# Patient Record
Sex: FEMALE | Race: WHITE | Employment: OTHER | URBAN - METROPOLITAN AREA
[De-identification: names, ages, dates, MRNs, and addresses within clinical notes are randomized per-mention and may not be internally consistent; named-entity substitution may affect disease eponyms.]

---

## 2017-03-04 ENCOUNTER — APPOINTMENT (OUTPATIENT)
Dept: GENERAL RADIOLOGY | Age: 62
DRG: 207 | End: 2017-03-04
Attending: INTERNAL MEDICINE
Payer: OTHER GOVERNMENT

## 2017-03-04 ENCOUNTER — APPOINTMENT (OUTPATIENT)
Dept: CT IMAGING | Age: 62
DRG: 207 | End: 2017-03-04
Attending: INTERNAL MEDICINE
Payer: OTHER GOVERNMENT

## 2017-03-04 ENCOUNTER — APPOINTMENT (OUTPATIENT)
Dept: ULTRASOUND IMAGING | Age: 62
DRG: 207 | End: 2017-03-04
Attending: INTERNAL MEDICINE
Payer: OTHER GOVERNMENT

## 2017-03-04 ENCOUNTER — HOSPITAL ENCOUNTER (INPATIENT)
Age: 62
LOS: 12 days | Discharge: HOME HEALTH | DRG: 207 | End: 2017-03-16
Attending: INTERNAL MEDICINE | Admitting: INTERNAL MEDICINE
Payer: OTHER GOVERNMENT

## 2017-03-04 DIAGNOSIS — R77.8 ELEVATED TROPONIN: ICD-10-CM

## 2017-03-04 DIAGNOSIS — R91.8 PULMONARY NODULES: ICD-10-CM

## 2017-03-04 DIAGNOSIS — J34.89 MASS OF SINUS: ICD-10-CM

## 2017-03-04 DIAGNOSIS — N28.89 RENAL MASS: ICD-10-CM

## 2017-03-04 DIAGNOSIS — R06.89 RESPIRATORY INSUFFICIENCY: Primary | ICD-10-CM

## 2017-03-04 DIAGNOSIS — J44.1 CHRONIC OBSTRUCTIVE PULMONARY DISEASE WITH ACUTE EXACERBATION (HCC): ICD-10-CM

## 2017-03-04 DIAGNOSIS — R07.9 CHEST PAIN: ICD-10-CM

## 2017-03-04 DIAGNOSIS — J01.80 OTHER ACUTE SINUSITIS, RECURRENCE NOT SPECIFIED: ICD-10-CM

## 2017-03-04 PROBLEM — R94.31 ABNORMAL EKG: Status: ACTIVE | Noted: 2017-03-04

## 2017-03-04 PROBLEM — R94.31 ABNORMAL EKG: Status: RESOLVED | Noted: 2017-03-04 | Resolved: 2017-03-04

## 2017-03-04 PROBLEM — E87.6 HYPOKALEMIA: Status: ACTIVE | Noted: 2017-03-04

## 2017-03-04 PROBLEM — R74.01 TRANSAMINITIS: Status: ACTIVE | Noted: 2017-03-04

## 2017-03-04 PROBLEM — J32.9 SINUSITIS: Status: ACTIVE | Noted: 2017-03-04

## 2017-03-04 PROBLEM — J43.9 COPD (CHRONIC OBSTRUCTIVE PULMONARY DISEASE) WITH EMPHYSEMA (HCC): Status: ACTIVE | Noted: 2017-03-04

## 2017-03-04 LAB
ALBUMIN SERPL BCP-MCNC: 3.3 G/DL (ref 3.4–5)
ALBUMIN/GLOB SERPL: 0.8 {RATIO} (ref 0.8–1.7)
ALP SERPL-CCNC: 129 U/L (ref 45–117)
ALT SERPL-CCNC: 62 U/L (ref 13–56)
ANION GAP BLD CALC-SCNC: 8 MMOL/L (ref 3–18)
APAP SERPL-MCNC: 7 UG/ML (ref 10–30)
APTT PPP: 28.2 SEC (ref 23–36.4)
ARTERIAL PATENCY WRIST A: YES
AST SERPL W P-5'-P-CCNC: 65 U/L (ref 15–37)
BASE DEFICIT BLD-SCNC: 1 MMOL/L
BASOPHILS # BLD AUTO: 0 K/UL (ref 0–0.06)
BASOPHILS # BLD: 0 % (ref 0–2)
BDY SITE: ABNORMAL
BILIRUB SERPL-MCNC: 0.3 MG/DL (ref 0.2–1)
BNP SERPL-MCNC: 325 PG/ML (ref 0–900)
BUN SERPL-MCNC: 16 MG/DL (ref 7–18)
BUN/CREAT SERPL: 19 (ref 12–20)
CALCIUM SERPL-MCNC: 9.1 MG/DL (ref 8.5–10.1)
CHLORIDE SERPL-SCNC: 103 MMOL/L (ref 100–108)
CK MB CFR SERPL CALC: 0.8 % (ref 0–4)
CK MB CFR SERPL CALC: 2.6 % (ref 0–4)
CK MB SERPL-MCNC: 13.6 NG/ML (ref 5–25)
CK MB SERPL-MCNC: 5.1 NG/ML (ref 5–25)
CK SERPL-CCNC: 522 U/L (ref 26–192)
CK SERPL-CCNC: 667 U/L (ref 26–192)
CO2 SERPL-SCNC: 34 MMOL/L (ref 21–32)
CREAT SERPL-MCNC: 0.85 MG/DL (ref 0.6–1.3)
D DIMER PPP FEU-MCNC: 1.18 UG/ML(FEU)
DIFFERENTIAL METHOD BLD: NORMAL
EOSINOPHIL # BLD: 0.1 K/UL (ref 0–0.4)
EOSINOPHIL NFR BLD: 1 % (ref 0–5)
ERYTHROCYTE [DISTWIDTH] IN BLOOD BY AUTOMATED COUNT: 13.6 % (ref 11.6–14.5)
EST. AVERAGE GLUCOSE BLD GHB EST-MCNC: 131 MG/DL
GAS FLOW.O2 O2 DELIVERY SYS: ABNORMAL L/MIN
GAS FLOW.O2 SETTING OXYMISER: 2 L/M
GLOBULIN SER CALC-MCNC: 4.3 G/DL (ref 2–4)
GLUCOSE BLD STRIP.AUTO-MCNC: 171 MG/DL (ref 70–110)
GLUCOSE BLD STRIP.AUTO-MCNC: 174 MG/DL (ref 70–110)
GLUCOSE SERPL-MCNC: 70 MG/DL (ref 74–99)
HBA1C MFR BLD: 6.2 % (ref 4.5–5.6)
HCO3 BLD-SCNC: 24.4 MMOL/L (ref 22–26)
HCT VFR BLD AUTO: 44.2 % (ref 35–45)
HGB BLD-MCNC: 14.8 G/DL (ref 12–16)
INR PPP: 0.8 (ref 0.8–1.2)
LACTATE SERPL-SCNC: 1.5 MMOL/L (ref 0.4–2)
LACTATE SERPL-SCNC: 1.7 MMOL/L (ref 0.4–2)
LYMPHOCYTES # BLD AUTO: 25 % (ref 21–52)
LYMPHOCYTES # BLD: 1.8 K/UL (ref 0.9–3.6)
MAGNESIUM SERPL-MCNC: 1.9 MG/DL (ref 1.8–2.4)
MCH RBC QN AUTO: 32.3 PG (ref 24–34)
MCHC RBC AUTO-ENTMCNC: 33.5 G/DL (ref 31–37)
MCV RBC AUTO: 96.5 FL (ref 74–97)
MONOCYTES # BLD: 0.7 K/UL (ref 0.05–1.2)
MONOCYTES NFR BLD AUTO: 10 % (ref 3–10)
NEUTS SEG # BLD: 4.7 K/UL (ref 1.8–8)
NEUTS SEG NFR BLD AUTO: 64 % (ref 40–73)
O2/TOTAL GAS SETTING VFR VENT: 28 %
PCO2 BLD: 41 MMHG (ref 35–45)
PH BLD: 7.38 [PH] (ref 7.35–7.45)
PHOSPHATE SERPL-MCNC: 3.1 MG/DL (ref 2.5–4.9)
PLATELET # BLD AUTO: 220 K/UL (ref 135–420)
PMV BLD AUTO: 10.9 FL (ref 9.2–11.8)
PO2 BLD: 67 MMHG (ref 80–100)
POTASSIUM SERPL-SCNC: 2.7 MMOL/L (ref 3.5–5.5)
PROT SERPL-MCNC: 7.6 G/DL (ref 6.4–8.2)
PROTHROMBIN TIME: 11.3 SEC (ref 11.5–15.2)
RBC # BLD AUTO: 4.58 M/UL (ref 4.2–5.3)
SAO2 % BLD: 93 % (ref 92–97)
SERVICE CMNT-IMP: ABNORMAL
SODIUM SERPL-SCNC: 145 MMOL/L (ref 136–145)
SPECIMEN TYPE: ABNORMAL
TOTAL RESP. RATE, ITRR: 27
TROPONIN I SERPL-MCNC: 0.37 NG/ML (ref 0–0.06)
TROPONIN I SERPL-MCNC: 2.7 NG/ML (ref 0–0.06)
WBC # BLD AUTO: 7.3 K/UL (ref 4.6–13.2)

## 2017-03-04 PROCEDURE — 70450 CT HEAD/BRAIN W/O DYE: CPT

## 2017-03-04 PROCEDURE — 74011250636 HC RX REV CODE- 250/636: Performed by: INTERNAL MEDICINE

## 2017-03-04 PROCEDURE — 77010033678 HC OXYGEN DAILY

## 2017-03-04 PROCEDURE — 74011000250 HC RX REV CODE- 250: Performed by: INTERNAL MEDICINE

## 2017-03-04 PROCEDURE — 83605 ASSAY OF LACTIC ACID: CPT | Performed by: INTERNAL MEDICINE

## 2017-03-04 PROCEDURE — 96367 TX/PROPH/DG ADDL SEQ IV INF: CPT

## 2017-03-04 PROCEDURE — 82962 GLUCOSE BLOOD TEST: CPT

## 2017-03-04 PROCEDURE — 93005 ELECTROCARDIOGRAM TRACING: CPT

## 2017-03-04 PROCEDURE — 93306 TTE W/DOPPLER COMPLETE: CPT

## 2017-03-04 PROCEDURE — 99285 EMERGENCY DEPT VISIT HI MDM: CPT

## 2017-03-04 PROCEDURE — 85379 FIBRIN DEGRADATION QUANT: CPT | Performed by: INTERNAL MEDICINE

## 2017-03-04 PROCEDURE — 83036 HEMOGLOBIN GLYCOSYLATED A1C: CPT | Performed by: INTERNAL MEDICINE

## 2017-03-04 PROCEDURE — 96375 TX/PRO/DX INJ NEW DRUG ADDON: CPT

## 2017-03-04 PROCEDURE — 85730 THROMBOPLASTIN TIME PARTIAL: CPT | Performed by: INTERNAL MEDICINE

## 2017-03-04 PROCEDURE — 77030013140 HC MSK NEB VYRM -A

## 2017-03-04 PROCEDURE — 76700 US EXAM ABDOM COMPLETE: CPT

## 2017-03-04 PROCEDURE — B246ZZZ ULTRASONOGRAPHY OF RIGHT AND LEFT HEART: ICD-10-PCS | Performed by: INTERNAL MEDICINE

## 2017-03-04 PROCEDURE — 80053 COMPREHEN METABOLIC PANEL: CPT | Performed by: INTERNAL MEDICINE

## 2017-03-04 PROCEDURE — 84439 ASSAY OF FREE THYROXINE: CPT | Performed by: INTERNAL MEDICINE

## 2017-03-04 PROCEDURE — 96361 HYDRATE IV INFUSION ADD-ON: CPT

## 2017-03-04 PROCEDURE — 65660000000 HC RM CCU STEPDOWN

## 2017-03-04 PROCEDURE — 94640 AIRWAY INHALATION TREATMENT: CPT

## 2017-03-04 PROCEDURE — 71275 CT ANGIOGRAPHY CHEST: CPT

## 2017-03-04 PROCEDURE — 82550 ASSAY OF CK (CPK): CPT | Performed by: INTERNAL MEDICINE

## 2017-03-04 PROCEDURE — 36415 COLL VENOUS BLD VENIPUNCTURE: CPT | Performed by: INTERNAL MEDICINE

## 2017-03-04 PROCEDURE — 77030009834 HC MSK O2 TRACH VYRM -A

## 2017-03-04 PROCEDURE — 71010 XR CHEST PORT: CPT

## 2017-03-04 PROCEDURE — 96365 THER/PROPH/DIAG IV INF INIT: CPT

## 2017-03-04 PROCEDURE — 87070 CULTURE OTHR SPECIMN AEROBIC: CPT | Performed by: INTERNAL MEDICINE

## 2017-03-04 PROCEDURE — 74011250637 HC RX REV CODE- 250/637: Performed by: INTERNAL MEDICINE

## 2017-03-04 PROCEDURE — 85610 PROTHROMBIN TIME: CPT | Performed by: INTERNAL MEDICINE

## 2017-03-04 PROCEDURE — 74011636320 HC RX REV CODE- 636/320: Performed by: INTERNAL MEDICINE

## 2017-03-04 PROCEDURE — 82803 BLOOD GASES ANY COMBINATION: CPT

## 2017-03-04 PROCEDURE — 84100 ASSAY OF PHOSPHORUS: CPT | Performed by: INTERNAL MEDICINE

## 2017-03-04 PROCEDURE — 80307 DRUG TEST PRSMV CHEM ANLYZR: CPT | Performed by: INTERNAL MEDICINE

## 2017-03-04 PROCEDURE — 85025 COMPLETE CBC W/AUTO DIFF WBC: CPT | Performed by: INTERNAL MEDICINE

## 2017-03-04 PROCEDURE — 87040 BLOOD CULTURE FOR BACTERIA: CPT | Performed by: INTERNAL MEDICINE

## 2017-03-04 PROCEDURE — 83880 ASSAY OF NATRIURETIC PEPTIDE: CPT | Performed by: INTERNAL MEDICINE

## 2017-03-04 PROCEDURE — 94762 N-INVAS EAR/PLS OXIMTRY CONT: CPT

## 2017-03-04 PROCEDURE — 65610000006 HC RM INTENSIVE CARE

## 2017-03-04 PROCEDURE — 36600 WITHDRAWAL OF ARTERIAL BLOOD: CPT

## 2017-03-04 PROCEDURE — 96372 THER/PROPH/DIAG INJ SC/IM: CPT

## 2017-03-04 PROCEDURE — 74011000250 HC RX REV CODE- 250

## 2017-03-04 PROCEDURE — 84443 ASSAY THYROID STIM HORMONE: CPT | Performed by: INTERNAL MEDICINE

## 2017-03-04 PROCEDURE — 83735 ASSAY OF MAGNESIUM: CPT | Performed by: INTERNAL MEDICINE

## 2017-03-04 PROCEDURE — 82977 ASSAY OF GGT: CPT | Performed by: INTERNAL MEDICINE

## 2017-03-04 PROCEDURE — 96366 THER/PROPH/DIAG IV INF ADDON: CPT

## 2017-03-04 RX ORDER — ENOXAPARIN SODIUM 100 MG/ML
1 INJECTION SUBCUTANEOUS
Status: COMPLETED | OUTPATIENT
Start: 2017-03-04 | End: 2017-03-04

## 2017-03-04 RX ORDER — HYDROCODONE BITARTRATE AND ACETAMINOPHEN 5; 325 MG/1; MG/1
1 TABLET ORAL
Status: COMPLETED | OUTPATIENT
Start: 2017-03-04 | End: 2017-03-04

## 2017-03-04 RX ORDER — IPRATROPIUM BROMIDE AND ALBUTEROL SULFATE 2.5; .5 MG/3ML; MG/3ML
3 SOLUTION RESPIRATORY (INHALATION)
Status: DISCONTINUED | OUTPATIENT
Start: 2017-03-04 | End: 2017-03-06

## 2017-03-04 RX ORDER — BUDESONIDE 0.5 MG/2ML
500 INHALANT ORAL
Status: DISCONTINUED | OUTPATIENT
Start: 2017-03-04 | End: 2017-03-15

## 2017-03-04 RX ORDER — IPRATROPIUM BROMIDE AND ALBUTEROL SULFATE 2.5; .5 MG/3ML; MG/3ML
3 SOLUTION RESPIRATORY (INHALATION)
Status: COMPLETED | OUTPATIENT
Start: 2017-03-04 | End: 2017-03-04

## 2017-03-04 RX ORDER — POTASSIUM CHLORIDE 20 MEQ/1
40 TABLET, EXTENDED RELEASE ORAL
Status: COMPLETED | OUTPATIENT
Start: 2017-03-04 | End: 2017-03-04

## 2017-03-04 RX ORDER — HYDROCODONE BITARTRATE AND ACETAMINOPHEN 5; 325 MG/1; MG/1
1 TABLET ORAL
Status: DISCONTINUED | OUTPATIENT
Start: 2017-03-04 | End: 2017-03-11

## 2017-03-04 RX ORDER — IPRATROPIUM BROMIDE AND ALBUTEROL SULFATE 2.5; .5 MG/3ML; MG/3ML
3 SOLUTION RESPIRATORY (INHALATION)
Status: DISCONTINUED | OUTPATIENT
Start: 2017-03-04 | End: 2017-03-04

## 2017-03-04 RX ORDER — LEVOFLOXACIN 5 MG/ML
750 INJECTION, SOLUTION INTRAVENOUS ONCE
Status: COMPLETED | OUTPATIENT
Start: 2017-03-04 | End: 2017-03-08

## 2017-03-04 RX ORDER — SODIUM CHLORIDE 9 MG/ML
75 INJECTION, SOLUTION INTRAVENOUS CONTINUOUS
Status: DISCONTINUED | OUTPATIENT
Start: 2017-03-04 | End: 2017-03-05

## 2017-03-04 RX ORDER — ACETAMINOPHEN 325 MG/1
650 TABLET ORAL
Status: DISCONTINUED | OUTPATIENT
Start: 2017-03-04 | End: 2017-03-16 | Stop reason: HOSPADM

## 2017-03-04 RX ORDER — LEVOFLOXACIN 5 MG/ML
750 INJECTION, SOLUTION INTRAVENOUS EVERY 24 HOURS
Status: DISCONTINUED | OUTPATIENT
Start: 2017-03-05 | End: 2017-03-13

## 2017-03-04 RX ORDER — ALBUTEROL SULFATE 0.83 MG/ML
SOLUTION RESPIRATORY (INHALATION)
Status: COMPLETED
Start: 2017-03-04 | End: 2017-03-04

## 2017-03-04 RX ORDER — INSULIN LISPRO 100 [IU]/ML
INJECTION, SOLUTION INTRAVENOUS; SUBCUTANEOUS
Status: DISCONTINUED | OUTPATIENT
Start: 2017-03-04 | End: 2017-03-05

## 2017-03-04 RX ORDER — CLONAZEPAM 0.5 MG/1
1 TABLET ORAL
Status: COMPLETED | OUTPATIENT
Start: 2017-03-04 | End: 2017-03-04

## 2017-03-04 RX ORDER — DEXTROSE 50 % IN WATER (D50W) INTRAVENOUS SYRINGE
25-50 AS NEEDED
Status: DISCONTINUED | OUTPATIENT
Start: 2017-03-04 | End: 2017-03-16 | Stop reason: HOSPADM

## 2017-03-04 RX ORDER — ONDANSETRON 2 MG/ML
4 INJECTION INTRAMUSCULAR; INTRAVENOUS
Status: DISCONTINUED | OUTPATIENT
Start: 2017-03-04 | End: 2017-03-05

## 2017-03-04 RX ORDER — ENOXAPARIN SODIUM 100 MG/ML
40 INJECTION SUBCUTANEOUS EVERY 24 HOURS
Status: DISCONTINUED | OUTPATIENT
Start: 2017-03-05 | End: 2017-03-16 | Stop reason: HOSPADM

## 2017-03-04 RX ORDER — MAGNESIUM SULFATE HEPTAHYDRATE 40 MG/ML
2 INJECTION, SOLUTION INTRAVENOUS
Status: COMPLETED | OUTPATIENT
Start: 2017-03-04 | End: 2017-03-04

## 2017-03-04 RX ORDER — POTASSIUM CHLORIDE 20 MEQ/1
40 TABLET, EXTENDED RELEASE ORAL DAILY
Status: DISCONTINUED | OUTPATIENT
Start: 2017-03-05 | End: 2017-03-04

## 2017-03-04 RX ORDER — LORAZEPAM 2 MG/ML
1 INJECTION INTRAMUSCULAR
Status: DISCONTINUED | OUTPATIENT
Start: 2017-03-04 | End: 2017-03-05

## 2017-03-04 RX ORDER — LEVOFLOXACIN 5 MG/ML
500 INJECTION, SOLUTION INTRAVENOUS EVERY 24 HOURS
Status: DISCONTINUED | OUTPATIENT
Start: 2017-03-04 | End: 2017-03-04

## 2017-03-04 RX ORDER — POTASSIUM CHLORIDE 7.45 MG/ML
10 INJECTION INTRAVENOUS
Status: COMPLETED | OUTPATIENT
Start: 2017-03-04 | End: 2017-03-04

## 2017-03-04 RX ORDER — BACLOFEN 10 MG/1
10 TABLET ORAL 3 TIMES DAILY
COMMUNITY
End: 2017-03-16

## 2017-03-04 RX ORDER — MAGNESIUM SULFATE 100 %
4 CRYSTALS MISCELLANEOUS AS NEEDED
Status: DISCONTINUED | OUTPATIENT
Start: 2017-03-04 | End: 2017-03-16 | Stop reason: HOSPADM

## 2017-03-04 RX ORDER — DIPHENHYDRAMINE HYDROCHLORIDE 50 MG/ML
25 INJECTION, SOLUTION INTRAMUSCULAR; INTRAVENOUS ONCE
Status: DISCONTINUED | OUTPATIENT
Start: 2017-03-04 | End: 2017-03-04

## 2017-03-04 RX ORDER — ALBUTEROL SULFATE 0.83 MG/ML
2.5 SOLUTION RESPIRATORY (INHALATION)
Status: DISCONTINUED | OUTPATIENT
Start: 2017-03-04 | End: 2017-03-10

## 2017-03-04 RX ORDER — ALBUTEROL SULFATE 90 UG/1
2 AEROSOL, METERED RESPIRATORY (INHALATION)
COMMUNITY

## 2017-03-04 RX ORDER — POTASSIUM CHLORIDE 20 MEQ/1
40 TABLET, EXTENDED RELEASE ORAL 2 TIMES DAILY
Status: COMPLETED | OUTPATIENT
Start: 2017-03-04 | End: 2017-03-04

## 2017-03-04 RX ADMIN — METHYLPREDNISOLONE SODIUM SUCCINATE 40 MG: 40 INJECTION, POWDER, FOR SOLUTION INTRAMUSCULAR; INTRAVENOUS at 18:10

## 2017-03-04 RX ADMIN — IPRATROPIUM BROMIDE AND ALBUTEROL SULFATE 3 ML: .5; 3 SOLUTION RESPIRATORY (INHALATION) at 16:25

## 2017-03-04 RX ADMIN — IOPAMIDOL 100 ML: 755 INJECTION, SOLUTION INTRAVENOUS at 15:02

## 2017-03-04 RX ADMIN — BUDESONIDE 500 MCG: 0.5 INHALANT RESPIRATORY (INHALATION) at 20:00

## 2017-03-04 RX ADMIN — SODIUM CHLORIDE 75 ML/HR: 900 INJECTION, SOLUTION INTRAVENOUS at 17:34

## 2017-03-04 RX ADMIN — ALBUTEROL SULFATE 2.5 MG: 2.5 SOLUTION RESPIRATORY (INHALATION) at 18:13

## 2017-03-04 RX ADMIN — HYDROCODONE BITARTRATE AND ACETAMINOPHEN 1 TABLET: 5; 325 TABLET ORAL at 22:50

## 2017-03-04 RX ADMIN — ALBUTEROL SULFATE 2.5 MG: 2.5 SOLUTION RESPIRATORY (INHALATION) at 13:12

## 2017-03-04 RX ADMIN — POTASSIUM CHLORIDE 40 MEQ: 20 TABLET, EXTENDED RELEASE ORAL at 09:36

## 2017-03-04 RX ADMIN — SODIUM CHLORIDE 500 ML: 900 INJECTION, SOLUTION INTRAVENOUS at 16:13

## 2017-03-04 RX ADMIN — SODIUM CHLORIDE 500 ML: 900 INJECTION, SOLUTION INTRAVENOUS at 17:00

## 2017-03-04 RX ADMIN — CLONAZEPAM 1 MG: 0.5 TABLET ORAL at 13:35

## 2017-03-04 RX ADMIN — IPRATROPIUM BROMIDE AND ALBUTEROL SULFATE 3 ML: .5; 3 SOLUTION RESPIRATORY (INHALATION) at 07:55

## 2017-03-04 RX ADMIN — POTASSIUM CHLORIDE 40 MEQ: 20 TABLET, EXTENDED RELEASE ORAL at 18:10

## 2017-03-04 RX ADMIN — IPRATROPIUM BROMIDE AND ALBUTEROL SULFATE 3 ML: .5; 3 SOLUTION RESPIRATORY (INHALATION) at 23:55

## 2017-03-04 RX ADMIN — LEVOFLOXACIN 750 MG: 5 INJECTION, SOLUTION INTRAVENOUS at 12:11

## 2017-03-04 RX ADMIN — METHYLPREDNISOLONE SODIUM SUCCINATE 125 MG: 125 INJECTION, POWDER, FOR SOLUTION INTRAMUSCULAR; INTRAVENOUS at 08:23

## 2017-03-04 RX ADMIN — POTASSIUM CHLORIDE 40 MEQ: 20 TABLET, EXTENDED RELEASE ORAL at 21:42

## 2017-03-04 RX ADMIN — HYDROCODONE BITARTRATE AND ACETAMINOPHEN 1 TABLET: 5; 325 TABLET ORAL at 09:48

## 2017-03-04 RX ADMIN — MAGNESIUM SULFATE HEPTAHYDRATE 2 G: 40 INJECTION, SOLUTION INTRAVENOUS at 08:23

## 2017-03-04 RX ADMIN — IPRATROPIUM BROMIDE AND ALBUTEROL SULFATE 3 ML: .5; 3 SOLUTION RESPIRATORY (INHALATION) at 20:00

## 2017-03-04 RX ADMIN — ENOXAPARIN SODIUM 60 MG: 40 INJECTION SUBCUTANEOUS at 12:11

## 2017-03-04 RX ADMIN — CLONAZEPAM 1 MG: 0.5 TABLET ORAL at 09:36

## 2017-03-04 RX ADMIN — IPRATROPIUM BROMIDE AND ALBUTEROL SULFATE 3 ML: .5; 3 SOLUTION RESPIRATORY (INHALATION) at 08:00

## 2017-03-04 RX ADMIN — POTASSIUM CHLORIDE 10 MEQ: 10 INJECTION, SOLUTION INTRAVENOUS at 09:43

## 2017-03-04 RX ADMIN — HYDROCODONE BITARTRATE AND ACETAMINOPHEN 1 TABLET: 5; 325 TABLET ORAL at 14:06

## 2017-03-04 RX ADMIN — SODIUM CHLORIDE 1000 ML: 900 INJECTION, SOLUTION INTRAVENOUS at 10:55

## 2017-03-04 NOTE — ED TRIAGE NOTES
Pt arrives via ems/als from home for c/o shortness of breath, wheezing, difficulty breathing, diaphoretic, all onset at 6am when pt blew her nose and started coughing; pt given 1 duo neb en route, unsuccessful iv attempt x 1; pt arrives in tripod sitting up position, tachypneic, diaphoretic but alert oriented, denies any pain at this time other than her chronic back pain(in pain mgmt) she states\"; also reports the neb tx en route did help;

## 2017-03-04 NOTE — ED NOTES
TRANSFER - OUT REPORT:    Verbal report given to Izaeugene Manriquez(name) on Evelyna Habermann  being transferred to [facility](unit) for routine progression of care       Report consisted of patients Situation, Background, Assessment and   Recommendations(SBAR). Information from the following report(s) SBAR, Kardex, ED Summary, MAR, Recent Results, Med Rec Status, Cardiac Rhythm nsr and Alarm Parameters  was reviewed with the receiving nurse. Lines:   Peripheral IV 03/04/17 Right Antecubital (Active)   Site Assessment Clean, dry, & intact 3/4/2017  7:58 AM   Phlebitis Assessment 0 3/4/2017  7:58 AM   Infiltration Assessment 0 3/4/2017  7:58 AM   Dressing Status Clean, dry, & intact 3/4/2017  7:58 AM   Dressing Type Transparent 3/4/2017  7:58 AM       Peripheral IV 03/04/17 Right Arm (Active)   Site Assessment Clean, dry, & intact 3/4/2017  1:56 PM   Phlebitis Assessment 0 3/4/2017  1:56 PM   Infiltration Assessment 0 3/4/2017  1:56 PM   Dressing Status Clean, dry, & intact 3/4/2017  1:56 PM   Dressing Type Transparent 3/4/2017  1:56 PM        Opportunity for questions and clarification was provided.       Patient transported with:   Monitor  O2 @ 2 liters  Registered Nurse  [facility]

## 2017-03-04 NOTE — ED PROVIDER NOTES
HPI Comments:   8:14 AM   Lanre Rand is a 64 y.o. female with a hx of COPD, HTN, PTSD, and chronic back pain (followed by pain management) presenting via EMS/ALS from home, with  and daughter at bedside, to the ED in respiratory distress that began ~2 hours ago after blowing her nose and coughing. Pt was given 1 duo neb en route to ED w/ mild improvement. Daughter reports pt has had difficulty breathing/increased URI sxs intermittent x several months but states it's become worse over the last several weeks. She has been seen by [facility] ENT Dr. Aiden Phelps (w/ whom she's had a right tympanostomy) and her PCP multiple times for these complaints but states,  \"they can never figure out what's wrong with me and I'm just so tired of this. \" Pt was recently treated for sinusitis. No prior hx of cardiac issues, GI issues, or cancer. + daily tobacco use. Pt denies any other Sx or complaints. Patient is a 64 y.o. female presenting with respiratory distress syndrome. The history is provided by the patient, the spouse and a relative. No  was used. Respiratory Distress   This is a new problem. The current episode started 1 to 2 hours ago. Associated symptoms include cough and wheezing. Associated medical issues include COPD. Past Medical History:   Diagnosis Date    Back pain     COPD (chronic obstructive pulmonary disease) (HCC)     Hypertension     PTSD (post-traumatic stress disorder)     Shingles        Past Surgical History:   Procedure Laterality Date    HX TUBAL LIGATION      HX TYMPANOSTOMY      LAMINECTOMY,LUMBAR           History reviewed. No pertinent family history. Social History     Social History    Marital status:      Spouse name: N/A    Number of children: N/A    Years of education: N/A     Occupational History    Not on file.      Social History Main Topics    Smoking status: Current Some Day Smoker     Packs/day: 0.50    Smokeless tobacco: Not on file    Alcohol use Not on file    Drug use: Not on file    Sexual activity: Not on file     Other Topics Concern    Not on file     Social History Narrative         ALLERGIES: Sulfacetamide    Review of Systems   Respiratory: Positive for cough, chest tightness, shortness of breath and wheezing. Psychiatric/Behavioral: The patient is nervous/anxious. All other systems reviewed and are negative. Vitals:    03/04/17 1312 03/04/17 1349 03/04/17 1412 03/04/17 1512   BP: 133/78 91/68 90/57 95/70   Pulse: (!) 124 95 87 84   Resp:  27 18 (!) 31   Temp:  98.6 °F (37 °C)  97.6 °F (36.4 °C)   SpO2:  98% 95% 96%   Weight:       Height:                Physical Exam   Constitutional: She is oriented to person, place, and time. She appears well-developed and well-nourished. She appears distressed. Anxious, in moderate respiratory distress. HENT:   Head: Normocephalic and atraumatic. Right Ear: No drainage. Tympanic membrane is erythematous. Tympanic membrane is not perforated. No middle ear effusion. Left Ear: External ear normal. No drainage. Tympanic membrane is perforated and erythematous. Nose: Mucosal edema and rhinorrhea present. No epistaxis. Mouth/Throat: Mucous membranes are dry (slightly). Posterior oropharyngeal edema (mild) and posterior oropharyngeal erythema (mild) present. No oropharyngeal exudate. Right TM with mild to moderate redness with redness of the eustachian tube. No perforation or fluid. Left ear there is mild to moderate erythema of TM with perforation of the TM, which is old per family members, and scarring. No drainage. Nose with moderate to severe edema (R>L) with mostly clear drainage, which is slightly yellow in color. No blood. Some postnasal drainage. Eyes: Conjunctivae and EOM are normal. Pupils are equal, round, and reactive to light. Right eye exhibits no discharge. Left eye exhibits no discharge. No scleral icterus.    Neck: Normal range of motion. Neck supple. No JVD present. No tracheal deviation present. No thyroid mass and no thyromegaly present. Cardiovascular: Normal rate, regular rhythm, normal heart sounds and intact distal pulses. Pulmonary/Chest: Tachypnea noted. She is in respiratory distress (moderate). She has wheezes. She has rales in the right lower field. Lungs with expiratory wheezes diffusely. Abdominal: Soft. Bowel sounds are normal. She exhibits no distension. There is no tenderness. No HSM   Musculoskeletal: Normal range of motion. She exhibits no edema. Neurological: She is alert and oriented to person, place, and time. She has normal reflexes. No focal motor weakness. Skin: Skin is warm and dry. No rash noted. Psychiatric: Her behavior is normal. Her mood appears anxious. Nursing note and vitals reviewed. RESULTS:    Ekg: nsr, no stemi, no arrythmia. RADIOLOGY FINDINGS  Chest X-ray shows possible RLL infiltrate. No flattening of the diaphragm. Prominent interstitial markings. No obvious evidence of CHF, though minimal left pleural blunting. Pending review by Radiologist  Recorded by PHYLICIA Vázquez, as dictated by Airam Xiao MD     CT HEAD WO CONT   Final Result:  1. No evidence of acute infarct, hemorrhage or mass. Normal noncontrast CT brain. 2. Focal left ethmoid soft tissue density. This could represent mucosal  thickening however mass cannot be completely excluded. There is adjacent  destruction of the medial wall of the left orbit with possible soft tissue  density encroaching into the left orbit. Differential includes malignancy with  bony destruction versus sinusitis with subperiosteal abscess involving the  medial left orbit. 3. Prior right mastoidectomy.   As read by the radiologist.       XR CHEST PORT   Final Result:  Slightly increased interstitial markings which are symmetric bilaterally and  new compared to previous exam. Differential diagnosis includes interval  development of interstitial lung disease. Acute interstitial process is not  excluded. As read by the radiologist.          Tracey Reyna MARQUES  Final Result: pending  As read by the radiologist.     Labs Reviewed   METABOLIC PANEL, COMPREHENSIVE - Abnormal; Notable for the following:        Result Value    Potassium 2.7 (*)     CO2 34 (*)     Glucose 70 (*)     ALT (SGPT) 62 (*)     AST (SGOT) 65 (*)     Alk. phosphatase 129 (*)     Albumin 3.3 (*)     Globulin 4.3 (*)     All other components within normal limits   CARDIAC PANEL,(CK, CKMB & TROPONIN) - Abnormal; Notable for the following:      (*)     CK - MB 5.1 (*)     Troponin-I, Qt. 0.37 (*)     All other components within normal limits   PROTHROMBIN TIME + INR - Abnormal; Notable for the following:     Prothrombin time 11.3 (*)     All other components within normal limits   D DIMER - Abnormal; Notable for the following:     D DIMER 1.18 (*)     All other components within normal limits   CULTURE, BLOOD   CBC WITH AUTOMATED DIFF   LACTIC ACID, PLASMA   PRO-BNP   MAGNESIUM   PTT   BLOOD GAS, ARTERIAL       Recent Results (from the past 12 hour(s))   CBC WITH AUTOMATED DIFF    Collection Time: 03/04/17  7:55 AM   Result Value Ref Range    WBC 7.3 4.6 - 13.2 K/uL    RBC 4.58 4.20 - 5.30 M/uL    HGB 14.8 12.0 - 16.0 g/dL    HCT 44.2 35.0 - 45.0 %    MCV 96.5 74.0 - 97.0 FL    MCH 32.3 24.0 - 34.0 PG    MCHC 33.5 31.0 - 37.0 g/dL    RDW 13.6 11.6 - 14.5 %    PLATELET 560 984 - 749 K/uL    MPV 10.9 9.2 - 11.8 FL    NEUTROPHILS 64 40 - 73 %    LYMPHOCYTES 25 21 - 52 %    MONOCYTES 10 3 - 10 %    EOSINOPHILS 1 0 - 5 %    BASOPHILS 0 0 - 2 %    ABS. NEUTROPHILS 4.7 1.8 - 8.0 K/UL    ABS. LYMPHOCYTES 1.8 0.9 - 3.6 K/UL    ABS. MONOCYTES 0.7 0.05 - 1.2 K/UL    ABS. EOSINOPHILS 0.1 0.0 - 0.4 K/UL    ABS.  BASOPHILS 0.0 0.0 - 0.06 K/UL    DF AUTOMATED     METABOLIC PANEL, COMPREHENSIVE    Collection Time: 03/04/17  7:55 AM   Result Value Ref Range    Sodium 145 136 - 145 mmol/L Potassium 2.7 (LL) 3.5 - 5.5 mmol/L    Chloride 103 100 - 108 mmol/L    CO2 34 (H) 21 - 32 mmol/L    Anion gap 8 3.0 - 18 mmol/L    Glucose 70 (L) 74 - 99 mg/dL    BUN 16 7.0 - 18 MG/DL    Creatinine 0.85 0.6 - 1.3 MG/DL    BUN/Creatinine ratio 19 12 - 20      GFR est AA >60 >60 ml/min/1.73m2    GFR est non-AA >60 >60 ml/min/1.73m2    Calcium 9.1 8.5 - 10.1 MG/DL    Bilirubin, total 0.3 0.2 - 1.0 MG/DL    ALT (SGPT) 62 (H) 13 - 56 U/L    AST (SGOT) 65 (H) 15 - 37 U/L    Alk.  phosphatase 129 (H) 45 - 117 U/L    Protein, total 7.6 6.4 - 8.2 g/dL    Albumin 3.3 (L) 3.4 - 5.0 g/dL    Globulin 4.3 (H) 2.0 - 4.0 g/dL    A-G Ratio 0.8 0.8 - 1.7     CARDIAC PANEL,(CK, CKMB & TROPONIN)    Collection Time: 03/04/17  7:55 AM   Result Value Ref Range     (H) 26 - 192 U/L    CK - MB 5.1 (H) <3.6 ng/ml    CK-MB Index 0.8 0.0 - 4.0 %    Troponin-I, Qt. 0.37 (H) 0.00 - 0.06 NG/ML   LACTIC ACID, PLASMA    Collection Time: 03/04/17  7:55 AM   Result Value Ref Range    Lactic acid 1.5 0.4 - 2.0 MMOL/L   PRO-BNP    Collection Time: 03/04/17  7:55 AM   Result Value Ref Range    NT pro- 0 - 900 PG/ML   MAGNESIUM    Collection Time: 03/04/17  7:55 AM   Result Value Ref Range    Magnesium 1.9 1.8 - 2.4 mg/dL   PROTHROMBIN TIME + INR    Collection Time: 03/04/17  7:55 AM   Result Value Ref Range    Prothrombin time 11.3 (L) 11.5 - 15.2 sec    INR 0.8 0.8 - 1.2     PTT    Collection Time: 03/04/17  7:55 AM   Result Value Ref Range    aPTT 28.2 23.0 - 36.4 SEC   D DIMER    Collection Time: 03/04/17  7:55 AM   Result Value Ref Range    D DIMER 1.18 (H) <0.46 ug/ml(FEU)        MDM  Number of Diagnoses or Management Options  Chronic obstructive pulmonary disease with acute exacerbation ([facility] [address]):   Respiratory insufficiency:      Amount and/or Complexity of Data Reviewed  Clinical lab tests: ordered and reviewed  Tests in the radiology section of CPT®: ordered and reviewed (CXR, CT head, CTA chest)  Obtain history from someone other than the patient: yes ( and daughter)  Discuss the patient with other providers: yes Sarah Centeno MD (Cardiology), Destini Alexandre DO (Internal Medicine), Hoa Faustin MD (ENT at [facility]))  Independent visualization of images, tracings, or specimens: yes (CXR)    Critical Care  Total time providing critical care: 30-74 minutes (45)    ED Course     MEDICATIONS GIVEN:  Medications   albuterol-ipratropium (DUO-NEB) 2.5 MG-0.5 MG/3 ML (3 mL Nebulization Given 3/4/17 0755)   methylPREDNISolone (PF) (SOLU-MEDROL) injection 125 mg (125 mg IntraVENous Given 3/4/17 0823)   albuterol-ipratropium (DUO-NEB) 2.5 MG-0.5 MG/3 ML (3 mL Nebulization Given 3/4/17 0800)   magnesium sulfate 2 g/50 ml IVPB (premix or compounded) (0 g IntraVENous IV Completed 3/4/17 0843)   potassium chloride (K-DUR, KLOR-CON) SR tablet 40 mEq (40 mEq Oral Given 3/4/17 0936)   potassium chloride 10 mEq in 100 ml IVPB (0 mEq IntraVENous IV Completed 3/4/17 1356)   clonazePAM (KlonoPIN) tablet 1 mg (1 mg Oral Given 3/4/17 0936)   HYDROcodone-acetaminophen (NORCO) 5-325 mg per tablet 1 Tab (1 Tab Oral Given 3/4/17 0948)   enoxaparin (LOVENOX) injection 60 mg (60 mg SubCUTAneous Given 3/4/17 1211)   levoFLOXacin (LEVAQUIN) 750 mg in D5W IVPB (750 mg IntraVENous Continued On Admission 3/4/17 1433)   sodium chloride 0.9 % bolus infusion 1,000 mL (0 mL IntraVENous IV Completed 3/4/17 1432)   albuterol (PROVENTIL VENTOLIN) 2.5 mg /3 mL (0.083 %) nebulizer solution (2.5 mg  Given 3/4/17 1312)   clonazePAM (KlonoPIN) tablet 1 mg (1 mg Oral Given 3/4/17 1335)   HYDROcodone-acetaminophen (NORCO) 5-325 mg per tablet 1 Tab (1 Tab Oral Given 3/4/17 1406)   iopamidol (ISOVUE-370) 76 % injection  mL (100 mL IntraVENous Given 3/4/17 2952)        Procedures    PROGRESS NOTE:   8:14 AM  Initial assessment performed.   Written by PHYLICIA Lockwood, as dictated by Leandra Mann MD     CONSULT NOTE:   10:10 Jeronimo Marti MD spoke with Sarah Centeno MD Specialty: Cardiology  Discussed pt's hx, disposition, and available diagnostic and imaging results over the telephone. Reviewed care plans. He agrees with plan for admission and recommends Lovenox and CTA chest.    Written by PHYLICIA Mendoza, as dictated by Jonelle Brito MD.     CONSULT NOTE:   10:27 Aby Wilkins MD spoke with Junior Thompson DO   Specialty: Internal Medicine  Discussed pt's hx, disposition, and available diagnostic and imaging results over the telephone. Reviewed care plans. He states he will accept the pt for admission once ENT clears the pt's CT scan. Written by PHYLICIA Mendozaibpato, as dictated by Jonelle Brito MD    PROGRESS NOTE:   10:47 AM  Pt has been re-examined by Jonelle Brito MD. Pt looks better. Breathing better, less anxious. Discussed CT results and pt states shes always had that and doesnt think its a new finding. She states they previously thought it was a fatty tumor. CONSULT NOTE:   12:17 PM  Jonelle Brito MD spoke with Paige Nazario MD  Specialty: ENT at [facility]  Discussed pt's hx, disposition, and available diagnostic and imaging results over the telephone. Reviewed care plans. He states he doesn't think the pt needs IP ENT consult and states OP follow up would be appropriate. Written by PHYLICIA Mendoza, as dictated by Jonelle Brito MD.     ADMISSION NOTE:  12:23 PM  Patient is being admitted to the hospital by Junior Thompson DO. The results of their tests and reasons for their admission have been discussed with them and/or available family. They convey agreement and understanding for the need to be admitted and for their admission diagnosis. Written by PHYLICIA Mendoza, as dictated by Jonelle Brito MD.      1420 hrs pt w/ increased dyspnea, so given nebs albuterol, also pt requesting pain med, vicodin and klonopin given po in Er. Floor nurse not comfortable w/ pt being on floor, so place admission to icu.      D/w  Jake Carranza. CONDITIONS ON ADMISSION:  Pneumonia is possibly present at the time of admission. MRSA is not present at the time of admission. Wound infection is not present at the time of admission. Pressure Ulcer is not present at the time of admission. CLINICAL IMPRESSION    1. Respiratory insufficiency    2. Chronic obstructive pulmonary disease with acute exacerbation ([address]    3. Mass of sinus    4. Other acute sinusitis, recurrence not specified    5. Elevated troponin    6. Pulmonary nodules    7. Renal mass         Attestations: This note is prepared by Joshua Sharpe, acting as Scribe for MD Sonia Walter MD: The scribe's documentation has been prepared under my direction and personally reviewed by me in its entirety. I confirm that the note above accurately reflects all work, treatment, procedures, and medical decision making performed by me. 12:25 PM   I have spent 45 minutes of critical care time involved in lab review, consultations with specialist, family decision-making, and documentation. During this entire length of time I was immediately available to the patient. Critical Care: The reason for providing this level of medical care for this critically ill patient was due a critical illness that impaired one or more vital organ systems such that there was a high probability of imminent or life threatening deterioration in the patients condition. This care involved high complexity decision making to assess, manipulate, and support vital system functions, to treat this degreee vital organ system failure and to prevent further life threatening deterioration of the patients condition.

## 2017-03-04 NOTE — PROGRESS NOTES
1800 Dr. Floridalma Kennedy aware of elevated troponin 2.70. No chest pain. No orders received at this time. He states that she may need a stress test in the future.

## 2017-03-04 NOTE — PROGRESS NOTES
1515 Patient arrives to ICU via stretcher. Oriented to room and call bell system. VSS. Pain under control at this time. Expiratory wheezing noted bilaterally. O2 sat stable on 2L NC. Labored breathing, patient hunched over in bed using accessory muscles. Extremely anxious. ED tech at bedside to perform prior ordered EKG. 1600 Dr. Negar Summers at bedside evaluating patient. No change in status. VSS. IV bolus ordered. See MAR.    1630  updated on plan of care at this time. Patient is to be NPO until ultrasound is completed. Patient voices understanding. 1710 U/S at bedside. 1800 Patient had anxiety episode after attempting to use bedpan. O2 sats remain >95% on 2L NC. Family anxious. Patient severely anxious, stating \"Do something, I can't do this\" Respiratory called for treatment. Mets[place] 36 Patient placed on HighFlo NC 35L, 40%. Hospitalist paged and made aware of this. 1915 Bedside and Verbal shift change report given to EFFIE Robertson (oncoming nurse) by Ankit Brock RN   (offgoing nurse). Report included the following information SBAR, Kardex and MAR.

## 2017-03-04 NOTE — ED NOTES
Pt sitting up in bed - respirations minimally labored - able to speak in sentences - skin w/d. Family at bedside - supportive and very concerned about pt. Lungs w/ no wheezes at present - somewhat diminished but moving air freely. Pt w/ noted congested cough - not bringing up anything w/ cough.

## 2017-03-04 NOTE — ED NOTES
Pt returned from echo - requested to go to bathroom - did not want bedside commode - taken to bathroom via w/c - pt became panicked - reporting she could not breathe and needed a treatment \"right now\". Pt given albuterol neb - anxiety resolved - sats 98 on o2 and HR - 124. Pt w/ prod coughing noted. Pt requesting continuous neb treatment - and highly anxious -tripod position to breathe- pulling off o2 and neb mask - family very anxious at bedside. Pt reassured and eventually calmed down - receiving HHN and breathing improved.

## 2017-03-04 NOTE — IP AVS SNAPSHOT
Current Discharge Medication List  
  
START taking these medications Dose & Instructions Dispensing Information Comments Morning Noon Evening Bedtime  
 aspirin 81 mg chewable tablet Your last dose was: Your next dose is:    
   
   
 Dose:  81 mg Take 1 Tab by mouth daily. Quantity:  30 Tab Refills:  1  
     
   
   
   
  
 atorvastatin 20 mg tablet Commonly known as:  LIPITOR Your last dose was: Your next dose is:    
   
   
 Dose:  20 mg Take 1 Tab by mouth daily. Quantity:  30 Tab Refills:  1  
     
   
   
   
  
 dronabinol 5 mg capsule Commonly known as:  Elicia Dopp Your last dose was: Your next dose is:    
   
   
 Dose:  5 mg Take 1 Cap by mouth two (2) times a day. Max Daily Amount: 10 mg.  
 Quantity:  60 Cap Refills:  1  
     
   
   
   
  
 folic acid 1 mg tablet Commonly known as:  Google Your last dose was: Your next dose is:    
   
   
 Dose:  1 mg Take 1 Tab by mouth daily. Quantity:  30 Tab Refills:  1  
     
   
   
   
  
 gabapentin 300 mg capsule Commonly known as:  NEURONTIN Your last dose was: Your next dose is:    
   
   
 Dose:  300 mg Take 1 Cap by mouth two (2) times a day. Quantity:  60 Cap Refills:  0 HYDROcodone-acetaminophen 5-325 mg per tablet Commonly known as:  Raúl Songster Replaces:  NORCO  mg tablet Your last dose was: Your next dose is:    
   
   
 Dose:  1 Tab Take 1 Tab by mouth every eight (8) hours as needed for Pain. Max Daily Amount: 3 Tabs. Quantity:  30 Tab Refills:  0  
     
   
   
   
  
 lidocaine 2 % solution Commonly known as:  XYLOCAINE Your last dose was: Your next dose is:    
   
   
 Dose:  15 mL Take 15 mL by mouth as needed for Pain. Quantity:  1 Bottle Refills:  0  
     
   
   
   
  
 magic mouthwash 038--40 mg/30 mL Mwsh oral suspension Commonly known as:  FIRST-MOUTHWASH BLM Your last dose was: Your next dose is:    
   
   
 Dose:  5 mL Take 5 mL by mouth every four (4) hours as needed for Stomatitis. Quantity:  237 mL Refills:  0  
     
   
   
   
  
 nicotine 14 mg/24 hr patch Commonly known as:  Wyatt Schulz Your last dose was: Your next dose is:    
   
   
 Dose:  1 Patch 1 Patch by TransDERmal route daily for 30 days. Quantity:  30 Patch Refills:  1  
     
   
   
   
  
 nystatin 100,000 unit/mL suspension Commonly known as:  MYCOSTATIN Your last dose was: Your next dose is:    
   
   
 Dose:  016420 Units Take 5 mL by mouth four (4) times daily for 7 days. swish and spit Quantity:  120 mL Refills:  0  
     
   
   
   
  
 pantoprazole 40 mg tablet Commonly known as:  PROTONIX Your last dose was: Your next dose is:    
   
   
 Dose:  40 mg Take 1 Tab by mouth Daily (before breakfast). Quantity:  30 Tab Refills:  0  
     
   
   
   
  
 predniSONE 20 mg tablet Commonly known as:  Rudolph Gun Your last dose was: Your next dose is:    
   
   
 Dose:  20 mg Take 1 Tab by mouth daily (with breakfast). Quantity:  5 Tab Refills:  0 Thiamine Mononitrate 100 mg tablet Commonly known as:  B-1 Your last dose was: Your next dose is:    
   
   
 Dose:  100 mg Take 1 Tab by mouth daily. Quantity:  30 Tab Refills:  1 CONTINUE these medications which have CHANGED Dose & Instructions Dispensing Information Comments Morning Noon Evening Bedtime  
 baclofen 10 mg tablet Commonly known as:  LIORESAL What changed:  how much to take Your last dose was: Your next dose is:    
   
   
 Dose:  5 mg Take 0.5 Tabs by mouth three (3) times daily. Quantity:  30 Tab Refills:  0  
     
   
   
   
  
 escitalopram oxalate 20 mg tablet Commonly known as:  Pat Laboy What changed:  how much to take Your last dose was: Your next dose is:    
   
   
 Dose:  20 mg Take 1 Tab by mouth daily. Quantity:  30 Tab Refills:  1 CONTINUE these medications which have NOT CHANGED Dose & Instructions Dispensing Information Comments Morning Noon Evening Bedtime ADVAIR DISKUS 100-50 mcg/dose diskus inhaler Generic drug:  fluticasone-salmeterol Your last dose was: Your next dose is:    
   
   
 Dose:  1 Puff Take 1 Puff by inhalation every twelve (12) hours. Refills:  0  
     
   
   
   
  
 albuterol 90 mcg/actuation inhaler Commonly known as:  PROVENTIL HFA, VENTOLIN HFA, PROAIR HFA Your last dose was: Your next dose is:    
   
   
 Dose:  2 Puff Take 2 Puffs by inhalation every four (4) hours as needed for Wheezing. Refills:  0  
     
   
   
   
  
 DIOVAN -25 mg per tablet Generic drug:  valsartan-hydroCHLOROthiazide Your last dose was: Your next dose is:    
   
   
 Dose:  1 Tab Take 1 Tab by mouth daily. Refills:  0 STOP taking these medications KlonoPIN 0.5 mg tablet Generic drug:  clonazePAM  
   
  
 MOTRIN 800 mg tablet Generic drug:  ibuprofen NORCO  mg tablet Generic drug:  HYDROcodone-acetaminophen Replaced by:  HYDROcodone-acetaminophen 5-325 mg per tablet Where to Get Your Medications Information on where to get these meds will be given to you by the nurse or doctor. ! Ask your nurse or doctor about these medications  
  aspirin 81 mg chewable tablet  
 atorvastatin 20 mg tablet  
 baclofen 10 mg tablet  
 dronabinol 5 mg capsule  
 escitalopram oxalate 20 mg tablet  
 folic acid 1 mg tablet  
 gabapentin 300 mg capsule HYDROcodone-acetaminophen 5-325 mg per tablet  
 lidocaine 2 % solution magic mouthwash 418--40 mg/30 mL Mwsh oral suspension  
 nicotine 14 mg/24 hr patch  
 nystatin 100,000 unit/mL suspension  
 pantoprazole 40 mg tablet  
 predniSONE 20 mg tablet Thiamine Mononitrate 100 mg tablet

## 2017-03-04 NOTE — CONSULTS
64 WF COPD, HTN, with sinus congestion admitted with SOB due to inability to clear secretions from her throat (yellowish sputum). Was initially told had a LBBB, but apparently she has a normal EKG. We were consulted by ER because of mild troponin elevation. No CP reported. No prior cardiac problems. Echo of suboptimal quality, but no obvious wall motion abnormalities and EF is low normal at 50%. Agree with serial cardiac enzymes. This patient has not had a MI and I do not feel the troponin elevation is reflective of an ACS. Will follow along with you and if pattern is nondiagnostic as I suspect, will sign off. The patient has been examined and chart reviewed. Full consult to follow (dictated).

## 2017-03-04 NOTE — IP AVS SNAPSHOT
[address] [address] 
[phone] Patient: Kai Aquino MRN: [id number] :1955 You are allergic to the following Allergen Reactions Sulfacetamide Anaphylaxis Recent Documentation Height Weight BMI OB Status Smoking Status 1.727 m 67.1 kg 22.5 kg/m2 Postmenopausal Current Some Day Smoker Emergency Contacts Name Discharge Info Relation Home Work Mobile Casey Cid  Spouse [3] [phone] About your hospitalization You were admitted on:  2017 You last received care in the:  [address] [place] [address] You were discharged on:  2017 Unit phone number:  [phone] Why you were hospitalized Your primary diagnosis was:  Copd (Chronic Obstructive Pulmonary Disease) With Emphysema (Hcc) Your diagnoses also included:  Respiratory Insufficiency, Sinusitis, Abnormal Ekg, Mass Of Sinus, Hypokalemia, Transaminitis, Nodule Of Kidney, Copd With Acute Exacerbation (Hcc), Ptsd (Post-Traumatic Stress Disorder), Acute Respiratory Failure (Hcc) Providers Seen During Your Hospitalizations Provider Role Specialty Primary office phone Cas Rubalcava MD Attending Provider Emergency Medicine [phone] Martha Harris DO Attending Provider Internal Medicine [phone] Your Primary Care Physician (PCP) Primary Care Physician Office Phone Office Fax Yolande Josh [phone] Follow-up Information Follow up With Details Comments Contact [address]   [phone] Martha Harris DO Go on 3/22/2017 3 pm appt [address] [place] [address] [address] [place] [address] 
[phone] Mane Jensen MD On 4/3/2017 Follow-up appointment @ 3:15 p.m. [id number] [address] 
[phone] Current Discharge Medication List  
  
 START taking these medications Dose & Instructions Dispensing Information Comments Morning Noon Evening Bedtime  
 aspirin 81 mg chewable tablet Your last dose was: Your next dose is:    
   
   
 Dose:  81 mg Take 1 Tab by mouth daily. Quantity:  30 Tab Refills:  1  
     
   
   
   
  
 atorvastatin 20 mg tablet Commonly known as:  LIPITOR Your last dose was: Your next dose is:    
   
   
 Dose:  20 mg Take 1 Tab by mouth daily. Quantity:  30 Tab Refills:  1  
     
   
   
   
  
 dronabinol 5 mg capsule Commonly known as:  Megan Galea Your last dose was: Your next dose is:    
   
   
 Dose:  5 mg Take 1 Cap by mouth two (2) times a day. Max Daily Amount: 10 mg.  
 Quantity:  60 Cap Refills:  1  
     
   
   
   
  
 folic acid 1 mg tablet Commonly known as:  Google Your last dose was: Your next dose is:    
   
   
 Dose:  1 mg Take 1 Tab by mouth daily. Quantity:  30 Tab Refills:  1  
     
   
   
   
  
 gabapentin 300 mg capsule Commonly known as:  NEURONTIN Your last dose was: Your next dose is:    
   
   
 Dose:  300 mg Take 1 Cap by mouth two (2) times a day. Quantity:  60 Cap Refills:  0 HYDROcodone-acetaminophen 5-325 mg per tablet Commonly known as:  Isreal Dolphin Replaces:  NORCO  mg tablet Your last dose was: Your next dose is:    
   
   
 Dose:  1 Tab Take 1 Tab by mouth every eight (8) hours as needed for Pain. Max Daily Amount: 3 Tabs. Quantity:  30 Tab Refills:  0  
     
   
   
   
  
 lidocaine 2 % solution Commonly known as:  XYLOCAINE Your last dose was: Your next dose is:    
   
   
 Dose:  15 mL Take 15 mL by mouth as needed for Pain. Quantity:  1 Bottle Refills:  0  
     
   
   
   
  
 magic mouthwash 709--40 mg/30 mL Mwsh oral suspension Commonly known as:  FIRST-MOUTHWASH BLM  
   
 Your last dose was: Your next dose is:    
   
   
 Dose:  5 mL Take 5 mL by mouth every four (4) hours as needed for Stomatitis. Quantity:  237 mL Refills:  0  
     
   
   
   
  
 nicotine 14 mg/24 hr patch Commonly known as:  Fartun Speaks Your last dose was: Your next dose is:    
   
   
 Dose:  1 Patch 1 Patch by TransDERmal route daily for 30 days. Quantity:  30 Patch Refills:  1  
     
   
   
   
  
 nystatin 100,000 unit/mL suspension Commonly known as:  MYCOSTATIN Your last dose was: Your next dose is:    
   
   
 Dose:  846162 Units Take 5 mL by mouth four (4) times daily for 7 days. swish and spit Quantity:  120 mL Refills:  0  
     
   
   
   
  
 pantoprazole 40 mg tablet Commonly known as:  PROTONIX Your last dose was: Your next dose is:    
   
   
 Dose:  40 mg Take 1 Tab by mouth Daily (before breakfast). Quantity:  30 Tab Refills:  0  
     
   
   
   
  
 predniSONE 20 mg tablet Commonly known as:  Mac Aas Your last dose was: Your next dose is:    
   
   
 Dose:  20 mg Take 1 Tab by mouth daily (with breakfast). Quantity:  5 Tab Refills:  0 Thiamine Mononitrate 100 mg tablet Commonly known as:  B-1 Your last dose was: Your next dose is:    
   
   
 Dose:  100 mg Take 1 Tab by mouth daily. Quantity:  30 Tab Refills:  1 CONTINUE these medications which have CHANGED Dose & Instructions Dispensing Information Comments Morning Noon Evening Bedtime  
 baclofen 10 mg tablet Commonly known as:  LIORESAL What changed:  how much to take Your last dose was: Your next dose is:    
   
   
 Dose:  5 mg Take 0.5 Tabs by mouth three (3) times daily. Quantity:  30 Tab Refills:  0  
     
   
   
   
  
 escitalopram oxalate 20 mg tablet Commonly known as:  Yovany Crawford What changed:  how much to take Your last dose was: Your next dose is:    
   
   
 Dose:  20 mg Take 1 Tab by mouth daily. Quantity:  30 Tab Refills:  1 CONTINUE these medications which have NOT CHANGED Dose & Instructions Dispensing Information Comments Morning Noon Evening Bedtime ADVAIR DISKUS 100-50 mcg/dose diskus inhaler Generic drug:  fluticasone-salmeterol Your last dose was: Your next dose is:    
   
   
 Dose:  1 Puff Take 1 Puff by inhalation every twelve (12) hours. Refills:  0  
     
   
   
   
  
 albuterol 90 mcg/actuation inhaler Commonly known as:  PROVENTIL HFA, VENTOLIN HFA, PROAIR HFA Your last dose was: Your next dose is:    
   
   
 Dose:  2 Puff Take 2 Puffs by inhalation every four (4) hours as needed for Wheezing. Refills:  0  
     
   
   
   
  
 DIOVAN -25 mg per tablet Generic drug:  valsartan-hydroCHLOROthiazide Your last dose was: Your next dose is:    
   
   
 Dose:  1 Tab Take 1 Tab by mouth daily. Refills:  0 STOP taking these medications KlonoPIN 0.5 mg tablet Generic drug:  clonazePAM  
   
  
 MOTRIN 800 mg tablet Generic drug:  ibuprofen NORCO  mg tablet Generic drug:  HYDROcodone-acetaminophen Replaced by:  HYDROcodone-acetaminophen 5-325 mg per tablet Where to Get Your Medications Information on where to get these meds will be given to you by the nurse or doctor. ! Ask your nurse or doctor about these medications  
  aspirin 81 mg chewable tablet  
 atorvastatin 20 mg tablet  
 baclofen 10 mg tablet  
 dronabinol 5 mg capsule  
 escitalopram oxalate 20 mg tablet  
 folic acid 1 mg tablet  
 gabapentin 300 mg capsule HYDROcodone-acetaminophen 5-325 mg per tablet  
 lidocaine 2 % solution  
 magic mouthwash 551--40 mg/30 mL Mwsh oral suspension nicotine 14 mg/24 hr patch  
 nystatin 100,000 unit/mL suspension  
 pantoprazole 40 mg tablet  
 predniSONE 20 mg tablet Thiamine Mononitrate 100 mg tablet Discharge Instructions Lab Results Component Value Date/Time Hemoglobin A1c 6.2 03/04/2017 04:25 PM  
 
 
Repeat ct scan 3 months Diabetic diet Chronic Obstructive Pulmonary Disease (COPD): Care Instructions Your Care Instructions Chronic obstructive pulmonary disease (COPD) is a general term for a group of lung diseases, including emphysema and chronic bronchitis. People with COPD have decreased airflow in and out of the lungs, which makes it hard to breathe. The airways also can get clogged with thick mucus. Cigarette smoking is a major cause of COPD. Although there is no cure for COPD, you can slow its progress. Following your treatment plan and taking care of yourself can help you feel better and live longer. Follow-up care is a key part of your treatment and safety. Be sure to make and go to all appointments, and call your doctor if you are having problems. It's also a good idea to know your test results and keep a list of the medicines you take. How can you care for yourself at home? Staying healthy · Do not smoke. This is the most important step you can take to prevent more damage to your lungs. If you need help quitting, talk to your doctor about stop-smoking programs and medicines. These can increase your chances of quitting for good. · Avoid colds and flu. Get a pneumococcal vaccine shot. If you have had one before, ask your doctor whether you need a second dose. Get the flu vaccine every fall. If you must be around people with colds or the flu, wash your hands often. · Avoid secondhand smoke, air pollution, and high altitudes. Also avoid cold, dry air and hot, humid air. Stay at home with your windows closed when air pollution is bad. Medicines and oxygen therapy · Take your medicines exactly as prescribed. Call your doctor if you think you are having a problem with your medicine. · You may be taking medicines such as: ¨ Bronchodilators. These help open your airways and make breathing easier. Bronchodilators are either short-acting (work for 6 to 9 hours) or long-acting (work for 24 hours). You inhale most bronchodilators, so they start to act quickly. Always carry your quick-relief inhaler with you in case you need it while you are away from home. ¨ Corticosteroids (prednisone, budesonide). These reduce airway inflammation. They come in pill or inhaled form. You must take these medicines every day for them to work well. · A spacer may help you get more inhaled medicine to your lungs. Ask your doctor or pharmacist if a spacer is right for you. If it is, ask how to use it properly. · Do not take any vitamins, over-the-counter medicine, or herbal products without talking to your doctor first. 
· If your doctor prescribed antibiotics, take them as directed. Do not stop taking them just because you feel better. You need to take the full course of antibiotics. · Oxygen therapy boosts the amount of oxygen in your blood and helps you breathe easier. Use the flow rate your doctor has recommended, and do not change it without talking to your doctor first. 
Activity · Get regular exercise. Walking is an easy way to get exercise. Start out slowly, and walk a little more each day. · Pay attention to your breathing. You are exercising too hard if you cannot talk while you are exercising. · Take short rest breaks when doing household chores and other activities. · Learn breathing methodssuch as breathing through pursed lipsto help you become less short of breath. · If your doctor has not set you up with a pulmonary rehabilitation program, talk to him or her about whether rehab is right for you.  Rehab includes exercise programs, education about your disease and how to manage it, help with diet and other changes, and emotional support. Diet · Eat regular, healthy meals. Use bronchodilators about 1 hour before you eat to make it easier to eat. Eat several small meals instead of three large ones. Drink beverages at the end of the meal. Avoid foods that are hard to chew. · Eat foods that contain protein so that you do not lose muscle mass. Mental health · Talk to your family, friends, or a therapist about your feelings. It is normal to feel frightened, angry, hopeless, helpless, and even guilty. Talking openly about bad feelings can help you cope. If these feelings last, talk to your doctor. When should you call for help? Call 911 anytime you think you may need emergency care. For example, call if: 
· You have severe trouble breathing. Call your doctor now or seek immediate medical care if: 
· You have new or worse trouble breathing. · You cough up blood. · You have a fever. Watch closely for changes in your health, and be sure to contact your doctor if: 
· You cough more deeply or more often, especially if you notice more mucus or a change in the color of your mucus. · You have new or worse swelling in your legs or belly. · You are not getting better as expected. Where can you learn more? Go to http://norah-rush.info/. Linnette Espinoza in the search box to learn more about \"Chronic Obstructive Pulmonary Disease (COPD): Care Instructions. \" Current as of: May 23, 2016 Content Version: 11.1 © 5690-5618 [employer]. Care instructions adapted under license by [employer] (which disclaims liability or warranty for this information). If you have questions about a medical condition or this instruction, always ask your healthcare professional. [place] [address] any warranty or liability for your use of this information. COPD Exacerbation Plan: Care Instructions Your Care Instructions If you have chronic obstructive pulmonary disease (COPD), your usual shortness of breath could suddenly get worse. You may start coughing more and have more mucus. This flare-up is called a COPD exacerbation (say \"fb-BGP-gp-BAY-shun\"). A lung infection or air pollution could set off an exacerbation. Sometimes it can happen after a quick change in temperature or being around chemicals. Work with your doctor to make a plan for dealing with an exacerbation. You can better manage it if you plan ahead. Follow-up care is a key part of your treatment and safety. Be sure to make and go to all appointments, and call your doctor if you are having problems. It's also a good idea to know your test results and keep a list of the medicines you take. How can you care for yourself at home? During an exacerbation · Do not panic if you start to have one. Quick treatment at home may help you prevent serious breathing problems. If you have a COPD exacerbation plan that you developed with your doctor, follow it. · Take your medicines exactly as your doctor tells you. ¨ Use your inhaler as directed by your doctor. If your symptoms do not get better after you use your medicine, have someone take you to the emergency room. Call an ambulance if necessary. ¨ With inhaled medicines, a spacer or a nebulizer may help you get more medicine to your lungs. Ask your doctor or pharmacist how to use them properly. Practice using the spacer in front of a mirror before you have an exacerbation. This may help you get the medicine into your lungs quickly. ¨ If your doctor has given you steroid pills, take them as directed. ¨ Your doctor may have given you a prescription for antibiotics, which you can fill if you need it. ¨ Talk to your doctor if you have any problems with your medicine. And call your doctor if you have to use your antibiotic or steroid pills. Preventing an exacerbation · Do not smoke. This is the most important step you can take to prevent more damage to your lungs and prevent problems. If you already smoke, it is never too late to stop. If you need help quitting, talk to your doctor about stop-smoking programs and medicines. These can increase your chances of quitting for good. · Take your daily medicines as prescribed. · Avoid colds and flu. ¨ Get a pneumococcal vaccine. ¨ Get a flu vaccine each year, as soon as it is available. Ask those you live or work with to do the same, so they will not get the flu and infect you. ¨ Try to stay away from people with colds or the flu. ¨ Wash your hands often. · Avoid secondhand smoke; air pollution; cold, dry air; hot, humid air; and high altitudes. Stay at home with your windows closed when air pollution is bad. · Learn breathing techniques for COPD, such as breathing through pursed lips. These techniques can help you breathe easier during an exacerbation. When should you call for help? Call 911 anytime you think you may need emergency care. For example, call if: 
· You have severe trouble breathing. · You have severe chest pain. Call your doctor now or seek immediate medical care if: 
· You have new or worse shortness of breath. · You develop new chest pain. · You are coughing more deeply or more often, especially if you notice more mucus or a change in the color of your mucus. · You cough up blood. · You have new or increased swelling in your legs or belly. · You have a fever. Watch closely for changes in your health, and be sure to contact your doctor if: 
· You need to use your antibiotic or steroid pills. · Your symptoms are getting worse. Where can you learn more? Go to http://norah-rush.info/. Enter O949 in the search box to learn more about \"COPD Exacerbation Plan: Care Instructions. \" Current as of: July 21, 2016 Content Version: 11.1 © 0603-5527 [employer]. Care instructions adapted under license by [employer] (which disclaims liability or warranty for this information). If you have questions about a medical condition or this instruction, always ask your healthcare professional. [address] any warranty or liability for your use of this information. Learning About COPD, Asthma, and Air Pollution How does air pollution affect COPD and asthma? When you have COPD or asthma, air pollution may make your symptoms worse. If it does, it means that air pollution is a trigger for you. It is important to know what your triggers are and how to deal with them. If air pollution is a trigger for you, you need to learn about air quality and pay attention to weather forecasts that include how bad the air is expected to be. How can you manage a flare-up caused by air pollution? · Do not panic. Quick treatment at home may help you prevent serious breathing problems. · Take your medicines exactly as your doctor tells you. ¨ Use your quick-relief inhaler as directed by your doctor. If your symptoms do not get better after you use your medicine, have someone take you to the emergency room. Call an ambulance if necessary. ¨ With inhaled medicines, a spacer or a nebulizer may help you get more medicine to your lungs. Ask your doctor or pharmacist how to use them properly. Practice using the spacer in front of a mirror before you have a flare-up. This may help you get the medicine into your lungs quickly. ¨ If your doctor has given you steroid pills, take them as directed. ¨ Talk to your doctor if you have any problems with your medicine. What can you do to prevent flare-ups? · Try not to be outside when air pollution levels are high. Stay at home with your windows closed. · Do not smoke.  This is the most important step you can take to prevent more damage to your lungs and prevent problems. If you already smoke, it is never too late to stop. If you need help quitting, talk to your doctor about stop-smoking programs and medicines. These can increase your chances of quitting for good. · Avoid secondhand smoke; cold, dry air; and high altitudes. · Take your daily medicines as prescribed. · Avoid colds and flu. ¨ Get a pneumococcal vaccine. ¨ Get a flu vaccine each year, as soon as it is available. Ask those you live or work with to do the same, so they will not get the flu and infect you. ¨ Try to stay away from people with colds or the flu. ¨ Wash your hands often. Follow-up care is a key part of your treatment and safety. Be sure to make and go to all appointments, and call your doctor if you are having problems. It's also a good idea to know your test results and keep a list of the medicines you take. Where can you learn more? Go to http://norah-rush.info/. Enter  in the search box to learn more about \"Learning About COPD, Asthma, and Air Pollution. \" Current as of: May 23, 2016 Content Version: 11.1 © 9153-7250 [employer]. Care instructions adapted under license by [employer] (which disclaims liability or warranty for this information). If you have questions about a medical condition or this instruction, always ask your healthcare professional. [place] [address] any warranty or liability for your use of this information. Learning About Emphysema What is emphysema? Emphysema is damage to the air sacs in your lungs. In a healthy person, the tiny air sacs in the lungs are like balloons. As you breathe in and out, they get bigger and smaller to move air through your lungs. With emphysema, these air sacs lose their stretch. Less oxygen gets into your blood and you feel short of breath. Emphysema is a form of COPD (chronic obstructive pulmonary disease). Emphysema is usually caused by smoking. But chemical fumes, dust, or air pollution also can cause it over time. People who get it in their 35s or 45s may have a disorder that runs in families, called alpha-1 antitrypsin deficiency. But this is rare. What can you expect when you have emphysema? Emphysema gets worse over time. You cannot undo the damage to your lungs. Over time, you may find that: 
· You get short of breath even when you do simple things like get dressed or fix a meal. 
· It is hard to eat or exercise. · You lose weight and feel weaker. But there are things you can do to prevent more damage and feel better. What are the symptoms? The main symptoms of emphysema are: · A cough that will not go away. · Mucus that comes up when you cough. · Shortness of breath that gets worse when you exercise. At times, your symptoms may suddenly flare up and get much worse. This is a called an exacerbation (say \"egg-DEEPALI--BAY-[facility]\"). When this happens, your usual symptoms quickly get worse and stay bad. This can be dangerous. You may have to go to the hospital. 
How can you keep emphysema from getting worse? Don't smoke. That is the best way to keep emphysema from getting worse. If you already smoke, it is never too late to stop. If you need help quitting, talk to your doctor about stop-smoking programs and medicines. These can increase your chances of quitting for good. You can do other things to keep emphysema from getting worse: · Avoid bad air. Air pollution, chemical fumes, and dust also can make emphysema worse. · Get a flu shot every year. A shot may keep the flu from turning into something more serious, like pneumonia. A flu shot also may lower your chances of having a flare-up. · Get a pneumococcal shot. A shot can prevent some of the serious complications of pneumonia. Ask your doctor how often you should get this shot. How is emphysema treated? Emphysema is treated with medicines and oxygen. You also can take steps at home to stay healthy and keep your condition from getting worse. Medicines and oxygen therapy · You may be taking medicines such as: ¨ Bronchodilators. These help open your airways and make breathing easier. Bronchodilators are either short-acting (work for 6 to 9 hours) or long-acting (work for 24 hours). You inhale most bronchodilators, so they start to act quickly. Always carry your quick-relief inhaler with you in case you need it while you are away from home. ¨ Corticosteroids. These reduce airway inflammation. They come in pill or inhaled form. You must take these medicines every day for them to work well. ¨ Antibiotics. These medicines are used when you have a bacterial lung infection. · Take your medicines exactly as prescribed. Call your doctor if you think you are having a problem with your medicine. · Oxygen therapy boosts the amount of oxygen in your blood and helps you breathe easier. Use the flow rate your doctor has recommended, and do not change it without talking to your doctor first. 
Other care at home · If your doctor recommends it, get more exercise. Walking is a good choice. Bit by bit, increase the amount you walk every day. Try for at least 30 minutes on most days of the week. · Learn breathing methodssuch as breathing through pursed lipsto help you become less short of breath. · If your doctor has not set you up with a pulmonary rehabilitation program, talk to him or her about whether rehab is right for you. Rehab includes exercise programs, education about your disease and how to manage it, help with diet and other changes, and emotional support. · Eat regular, healthy meals. Use bronchodilators about 1 hour before you eat to make it easier to eat. Eat several small meals instead of three large ones. Drink beverages at the end of the meal. Avoid foods that are hard to chew. Follow-up care is a key part of your treatment and safety. Be sure to make and go to all appointments, and call your doctor if you are having problems. It's also a good idea to know your test results and keep a list of the medicines you take. Where can you learn more? Go to http://norah-rush.info/. Enter Q627 in the search box to learn more about \"Learning About Emphysema. \" Current as of: May 23, 2016 Content Version: 11.1 © 5406-8410 Voolgo. Care instructions adapted under license by [employer] (which disclaims liability or warranty for this information). If you have questions about a medical condition or this instruction, always ask your healthcare professional. [address] any warranty or liability for your use of this information. Sinusitis: Care Instructions Your Care Instructions Sinusitis is an infection of the lining of the sinus cavities in your head. Sinusitis often follows a cold. It causes pain and pressure in your head and face. In most cases, sinusitis gets better on its own in 1 to 2 weeks. But some mild symptoms may last for several weeks. Sometimes antibiotics are needed. Follow-up care is a key part of your treatment and safety. Be sure to make and go to all appointments, and call your doctor if you are having problems. It's also a good idea to know your test results and keep a list of the medicines you take. How can you care for yourself at home? · Take an over-the-counter pain medicine, such as acetaminophen (Tylenol), ibuprofen (Advil, Motrin), or naproxen (Aleve). Read and follow all instructions on the label. · If the doctor prescribed antibiotics, take them as directed. Do not stop taking them just because you feel better. You need to take the full course of antibiotics.  
· Be careful when taking over-the-counter cold or flu medicines and Tylenol at the same time. Many of these medicines have acetaminophen, which is Tylenol. Read the labels to make sure that you are not taking more than the recommended dose. Too much acetaminophen (Tylenol) can be harmful. · Breathe warm, moist air from a steamy shower, a hot bath, or a sink filled with hot water. Avoid cold, dry air. Using a humidifier in your home may help. Follow the directions for cleaning the machine. · Use saline (saltwater) nasal washes to help keep your nasal passages open and wash out mucus and bacteria. You can buy saline nose drops at a grocery store or drugstore. Or you can make your own at home by adding 1 teaspoon of salt and 1 teaspoon of baking soda to 2 cups of distilled water. If you make your own, fill a bulb syringe with the solution, insert the tip into your nostril, and squeeze gently. Sidonie Jody your nose. · Put a hot, wet towel or a warm gel pack on your face 3 or 4 times a day for 5 to 10 minutes each time. · Try a decongestant nasal spray like oxymetazoline (Afrin). Do not use it for more than 3 days in a row. Using it for more than 3 days can make your congestion worse. When should you call for help? Call your doctor now or seek immediate medical care if: 
· You have new or worse swelling or redness in your face or around your eyes. · You have a new or higher fever. Watch closely for changes in your health, and be sure to contact your doctor if: 
· You have new or worse facial pain. · The mucus from your nose becomes thicker (like pus) or has new blood in it. · You are not getting better as expected. Where can you learn more? Go to http://norah-rush.info/. Enter P965 in the search box to learn more about \"Sinusitis: Care Instructions. \" Current as of: July 29, 2016 Content Version: 11.1 © 8895-0555 9You.  Care instructions adapted under license by [employer] (which disclaims liability or warranty for this information). If you have questions about a medical condition or this instruction, always ask your healthcare professional. [address] any warranty or liability for your use of this information. Post-Traumatic Stress Disorder (PTSD): Care Instructions Your Care Instructions Post-traumatic stress disorder (PTSD) is a mental condition that can result from being in or seeing a traumatic or terrifying event. These events can include combat, a terrorist attack, a natural disaster, a serious accident, an assault, or a rape. If you have PTSD, you may often relive the experience in nightmares or flashbacks. These are clear and frightening memories of the event. You may also have trouble sleeping. PTSD affects people in very different ways. It can interfere with daily activities such as work or school, and it can make you withdraw from friends or loved ones. Follow-up care is a key part of your treatment and safety. Be sure to make and go to all appointments, and call your doctor if you are having problems. It's also a good idea to know your test results and keep a list of the medicines you take. How can you care for yourself at home? · Take medicines exactly as directed. Call your doctor if you think you are having a problem with your medicine. · Go to your counseling sessions and follow-up appointments. · Recognize and accept your anxiety. Then, when you are in a situation that makes you anxious, say to yourself, \"This is not an emergency. I feel uncomfortable, but I am not in danger. I can keep going even if I feel anxious. \" · Be kind to your body: ¨ Relieve tension with exercise or a massage. ¨ Get enough rest. 
¨ Avoid alcohol, caffeine, nicotine, and illegal drugs. They can increase your anxiety level and cause sleep problems. ¨ Learn and do relaxation techniques. See below for more about these techniques. · Engage your mind. Get out and do something you enjoy. Go to a funny movie, or take a walk or hike. Plan your day. Having too much or too little to do can make you anxious. · Keep a record of your symptoms. Discuss your fears with a good friend or family member, or join a support group for people with similar problems. Talking to others sometimes relieves stress. · Get involved in social groups, or volunteer to help others. Being alone sometimes makes things seem worse than they are. · Get at least 30 minutes of exercise on most days of the week. Walking is a good choice. You also may want to do other activities, such as running, swimming, cycling, or playing tennis or team sports. · Keep the numbers for these national suicide hotlines: 4-811-644-TALK (1-157.457.5089) and 0-763-XUTBBPF (4-642.506.7277). If you or someone you know talks about suicide or feeling hopeless, get help right away. Relaxation techniques Do relaxation exercises 10 to 20 minutes a day. You can play soothing, relaxing music while you do them, if you wish. · Tell others in your house that you are going to do your relaxation exercises. Ask them not to disturb you. · Find a comfortable place, away from all distractions and noise. · Lie down on your back, or sit with your back straight. · Focus on your breathing. Make it slow and steady. · Breathe in through your nose. Breathe out through either your nose or mouth. · Breathe deeply, filling up the area between your navel and your rib cage. Breathe so that your belly goes up and down. · Do not hold your breath. · Breathe like this for 5 to 10 minutes. Notice the feeling of calmness throughout your whole body. As you continue to breathe slowly and deeply, relax by doing the following for another 5 to 10 minutes: · Tighten and relax each muscle group in your body. You can begin at your toes and work your way up to your head. · Imagine your muscle groups relaxing and becoming heavy. · Empty your mind of all thoughts. · Let yourself relax more and more deeply. · Become aware of the state of calmness that surrounds you. · When your relaxation time is over, you can bring yourself back to alertness by moving your fingers and toes and then your hands and feet and then stretching and moving your entire body. Sometimes people fall asleep during relaxation, but they usually wake up shortly afterward. · Always give yourself time to return to full alertness before you drive a car or do anything that might cause an accident if you are not fully alert. Never play a relaxation tape while you drive a car. When should you call for help? Call 911 anytime you think you may need emergency care. For example, call if: 
· You feel you cannot stop from hurting yourself or someone else. Watch closely for changes in your health, and be sure to contact your doctor if: 
· Your PTSD symptoms are getting worse. · You have new or worsening symptoms of anxiety. · You are not getting better as expected. Where can you learn more? Go to http://norah-rush.info/. Lynda Kuo in the search box to learn more about \"Post-Traumatic Stress Disorder (PTSD): Care Instructions. \" Current as of: July 26, 2016 Content Version: 11.1 © 5126-6792 PlayEnable, [employer]Materials. Care instructions adapted under license by [employer] (which disclaims liability or warranty for this information). If you have questions about a medical condition or this instruction, always ask your healthcare professional. [place] [address] any warranty or liability for your use of this information. DISCHARGE SUMMARY from Nurse The following personal items are in your possession at time of discharge: 
 
Dental Appliances: None Visual Aid: None Home Medications: None Jewelry: None Clothing: None Other Valuables: None PATIENT INSTRUCTIONS: 
 
 
F-face looks uneven A-arms unable to move or move unevenly S-speech slurred or non-existent T-time-call 911 as soon as signs and symptoms begin-DO NOT go Back to bed or wait to see if you get better-TIME IS BRAIN. Warning Signs of HEART ATTACK Call 911 if you have these symptoms: 
? Chest discomfort. Most heart attacks involve discomfort in the center of the chest that lasts more than a few minutes, or that goes away and comes back. It can feel like uncomfortable pressure, squeezing, fullness, or pain. ? Discomfort in other areas of the upper body. Symptoms can include pain or discomfort in one or both arms, the back, neck, jaw, or stomach. ? Shortness of breath with or without chest discomfort. ? Other signs may include breaking out in a cold sweat, nausea, or lightheadedness. Don't wait more than five minutes to call [address]! Fast action can save your life. Calling 911 is almost always the fastest way to get lifesaving treatment. Emergency Medical Services staff can begin treatment when they arrive  up to an hour sooner than if someone gets to the hospital by car. The discharge information has been reviewed with the patient. The patient verbalized understanding. Discharge medications reviewed with the patient and appropriate educational materials and side effects teaching were provided. This lab test reflects that your blood sugar has been slightly elevated over the past 3 months and should be evaluated by your primary care provider. An A1C of 5.7-6.4% meets the criteria for pre-diabetes; an A1C of 6.5% or higher meets the criteria for diabetes.   
 
Steroids can increase your blood sugar so it is important to follow up with your provider to determine if your blood sugar has returned to normal or needs further treatment. It is important to follow up with your provider on a routine basis to continue to evaluate your blood sugar and discuss any necessary treatment. Nutrition recommendations for discharge: Consistent Carbohydrate diet. Patient armband removed and shredded Discharge Instructions Attachments/References PREDIABETES (ENGLISH) HEALTHY DIET (ENGLISH) Discharge Orders None Introducing [facility]! Daniel England introduces [employer] patient portal. Now you can access parts of your medical record, email your doctor's office, and request medication refills online. 1. In your internet browser, go to [email or web address]. [email or web address] 2. Click on the First Time User? Click Here link in the Sign In box. You will see the New Member Sign Up page. 3. Enter your [employer] Access Code exactly as it appears below. You will not need to use this code after youve completed the sign-up process. If you do not sign up before the expiration date, you must request a new code. · [employer] Access Code: [email or web address] Expires: 6/2/2017  8:37 AM 
 
4. Enter the last four digits of your Social Security Number (xxxx) and Date of Birth (mm/dd/yyyy) as indicated and click Submit. You will be taken to the next sign-up page. 5. Create a [employer] ID. This will be your [employer] login ID and cannot be changed, so think of one that is secure and easy to remember. 6. Create a [employer] password. You can change your password at any time. 7. Enter your Password Reset Question and Answer. This can be used at a later time if you forget your password. 8. Enter your e-mail address. You will receive e-mail notification when new information is available in [address] 9. Click Sign Up. You can now view and download portions of your medical record. 10. Click the Download Summary menu link to download a portable copy of your medical information. If you have questions, please visit the Frequently Asked Questions section of the My[employer]ri[employer] website. Remember, [employer] is NOT to be used for urgent needs. For medical emergencies, dial 911. Now available from your iPhone and Android! General Information Please provide this summary of care documentation to your next provider. Patient Signature:  ____________________________________________________________ Date:  ____________________________________________________________  
  
Jose A Triplett Provider Signature:  ____________________________________________________________ Date:  ____________________________________________________________ More Information Prediabetes: Care Instructions Your Care Instructions Prediabetes is a warning sign that you are at risk for getting type 2 diabetes. It means that your blood sugar is higher than it should be. The food you eat turns into sugar, which your body uses for energy. Normally, an organ called the pancreas makes insulin, which allows the sugar in your blood to get into your body's cells. But when your body can't use insulin the right way, the sugar doesn't move into cells. It stays in your blood instead. This is called insulin resistance. The buildup of sugar in the blood causes prediabetes. The good news is that lifestyle changes may help you get your blood sugar back to normal and help you avoid or delay diabetes. Follow-up care is a key part of your treatment and safety. Be sure to make and go to all appointments, and call your doctor if you are having problems. It's also a good idea to know your test results and keep a list of the medicines you take. How can you care for yourself at home? · Watch your weight. A healthy weight helps your body use insulin properly. · Limit the amount of calories, sweets, and unhealthy fat you eat.  Ask your doctor if you should see a dietitian. A registered dietitian can help you create meal plans that fit your lifestyle. · Get at least 30 minutes of exercise on most days of the week. Exercise helps control your blood sugar. It also helps you maintain a healthy weight. Walking is a good choice. You also may want to do other activities, such as running, swimming, cycling, or playing tennis or team sports. · Do not smoke. Smoking can make prediabetes worse. If you need help quitting, talk to your doctor about stop-smoking programs and medicines. These can increase your chances of quitting for good. · If your doctor prescribed medicines, take them exactly as prescribed. Call your doctor if you think you are having a problem with your medicine. You will get more details on the specific medicines your doctor prescribes. When should you call for help? Watch closely for changes in your health, and be sure to contact your doctor if: 
· You have any symptoms of diabetes. These may include: ¨ Being thirsty more often. ¨ Urinating more. ¨ Being hungrier. ¨ Losing weight. ¨ Being very tired. ¨ Having blurry vision. · You have a wound that will not heal. 
· You have an infection that will not go away. · You have problems with your blood pressure. · You want more information about diabetes and how you can keep from getting it. Where can you learn more? Go to http://norah-rush.info/. Enter I222 in the search box to learn more about \"Prediabetes: Care Instructions. \" Current as of: May 23, 2016 Content Version: 11.1 © 0227-6400 nextsocial, Incorporated. Care instructions adapted under license by [employer] (which disclaims liability or warranty for this information). If you have questions about a medical condition or this instruction, always ask your healthcare professional. [address] any warranty or liability for your use of this information. Eating Healthy Foods: Care Instructions Your Care Instructions Eating healthy foods can help lower your risk for disease. Healthy food gives you energy and keeps your heart strong, your brain active, your muscles working, and your bones strong. A healthy diet includes a variety of foods from the basic food groups: grains, vegetables, fruits, milk and milk products, and meat and beans. Some people may eat more of their favorite foods from only one food group and, as a result, miss getting the nutrients they need. So, it is important to pay attention not only to what you eat but also to what you are missing from your diet. You can eat a healthy, balanced diet by making a few small changes. Follow-up care is a key part of your treatment and safety. Be sure to make and go to all appointments, and call your doctor if you are having problems. Its also a good idea to know your test results and keep a list of the medicines you take. How can you care for yourself at home? Look at what you eat · Keep a food diary for a week or two and record everything you eat or drink. Track the number of servings you eat from each food group. · For a balanced diet every day, eat a variety of: ¨ 6 or more ounce-equivalents of grains, such as cereals, breads, crackers, rice, or pasta, every day. An ounce-equivalent is 1 slice of bread, 1 cup of ready-to-eat cereal, or ½ cup of cooked rice, cooked pasta, or cooked cereal. 
¨ 2½ cups of vegetables, especially: § Dark-green vegetables such as broccoli and spinach. § Orange vegetables such as carrots and sweet potatoes. § Dry beans (such as jorge and kidney beans) and peas (such as lentils). ¨ 2 cups of fresh, frozen, or canned fruit. A small apple or 1 banana or orange equals 1 cup. ¨ 3 cups of nonfat or low-fat milk, yogurt, or other milk products.  
¨ 5½ ounces of meat and beans, such as chicken, fish, lean meat, beans, nuts, and seeds. One egg, 1 tablespoon of peanut butter, ½ ounce nuts or seeds, or ¼ cup of cooked beans equals 1 ounce of meat. · Learn how to read food labels for serving sizes and ingredients. Fast-food and convenience-food meals often contain few or no fruits or vegetables. Make sure you eat some fruits and vegetables to make the meal more nutritious. · Look at your food diary. For each food group, add up what you have eaten and then divide the total by the number of days. This will give you an idea of how much you are eating from each food group. See if you can find some ways to change your diet to make it more healthy. Start small · Do not try to make dramatic changes to your diet all at once. You might feel that you are missing out on your favorite foods and then be more likely to fail. · Start slowly, and gradually change your habits. Try some of the following: ¨ Use whole wheat bread instead of white bread. ¨ Use nonfat or low-fat milk instead of whole milk. ¨ Eat brown rice instead of white rice, and eat whole wheat pasta instead of white-flour pasta. ¨ Try low-fat cheeses and low-fat yogurt. ¨ Add more fruits and vegetables to meals and have them for snacks. ¨ Add lettuce, tomato, cucumber, and onion to sandwiches. ¨ Add fruit to yogurt and cereal. 
Enjoy food · You can still eat your favorite foods. You just may need to eat less of them. If your favorite foods are high in fat, salt, and sugar, limit how often you eat them, but do not cut them out entirely. · Eat a wide variety of foods. Make healthy choices when eating out · The type of restaurant you choose can help you make healthy choices. Even fast-food chains are now offering more low-fat or healthier choices on the menu. · Choose smaller portions, or take half of your meal home. · When eating out, try: ¨ A veggie pizza with a whole wheat crust or grilled chicken (instead of sausage or pepperoni). ¨ Pasta with roasted vegetables, grilled chicken, or marinara sauce instead of cream sauce. ¨ A vegetable wrap or grilled chicken wrap. ¨ Broiled or poached food instead of fried or breaded items. Make healthy choices easy · Buy packaged, prewashed, ready-to-eat fresh vegetables and fruits, such as baby carrots, salad mixes, and chopped or shredded broccoli and cauliflower. · Buy packaged, presliced fruits, such as melon or pineapple. · Choose 100% fruit or vegetable juice instead of soda. Limit juice intake to 4 to 6 oz (½ to ¾ cup) a day. · Blend low-fat yogurt, fruit juice, and canned or frozen fruit to make a smoothie for breakfast or a snack. Where can you learn more? Go to http://norah-rush.info/. Enter T756 in the search box to learn more about \"Eating Healthy Foods: Care Instructions. \" Current as of: November 20, 2015 Content Version: 11.1 © 8082-5498 UXPin. Care instructions adapted under license by [employer] (which disclaims liability or warranty for this information). If you have questions about a medical condition or this instruction, always ask your healthcare professional. [address] any warranty or liability for your use of this information.

## 2017-03-04 NOTE — Clinical Note
Status[de-identified] Inpatient [101] Type of Bed: Telemetry [19] Inpatient Hospitalization Certified Necessary for the Following Reasons: 8. Other (further clarification in H&P documentation) Admitting Diagnosis: Respiratory insufficiency [174918] Admitting Diagnosis: Abnormal EKG [190224] Admitting Diagnosis: Mass of sinus [[id number]] Admitting Diagnosis: Sinusitis [502969] Admitting Physician: Eamon Montalvo Attending Physician: Eamon Montalvo Estimated Length of Stay: > or = to 2 Midnights Discharge Plan[de-identified] Home with Office Follow-up

## 2017-03-04 NOTE — PROGRESS NOTES
PATIENT BECAME ACUTELY SOB AFTER BEING PLACED ON BEDPAN. IN TRIPOD POSITION WHEN THERAPIST ARRIVED. PRN ALBUTEROL NEB GIVEN. BS DIMINISHED PRE AND POST NEB. CHANGED FROM 3L NC TO HFNC @ 35L/40%. CURRENT SPO2 99% ON SAME. PATIENT NOW ABLE TO SPEAK IN SHORT SENTENCES. FAMILY AT BEDSIDE.

## 2017-03-05 ENCOUNTER — APPOINTMENT (OUTPATIENT)
Dept: GENERAL RADIOLOGY | Age: 62
DRG: 207 | End: 2017-03-05
Attending: INTERNAL MEDICINE
Payer: OTHER GOVERNMENT

## 2017-03-05 ENCOUNTER — ANESTHESIA (OUTPATIENT)
Dept: ICU | Age: 62
DRG: 207 | End: 2017-03-05
Payer: OTHER GOVERNMENT

## 2017-03-05 ENCOUNTER — ANESTHESIA EVENT (OUTPATIENT)
Dept: ICU | Age: 62
DRG: 207 | End: 2017-03-05
Payer: OTHER GOVERNMENT

## 2017-03-05 LAB
ALBUMIN SERPL BCP-MCNC: 2.6 G/DL (ref 3.4–5)
ALBUMIN/GLOB SERPL: 0.7 {RATIO} (ref 0.8–1.7)
ALP SERPL-CCNC: 140 U/L (ref 45–117)
ALT SERPL-CCNC: 71 U/L (ref 13–56)
ANION GAP BLD CALC-SCNC: 8 MMOL/L (ref 3–18)
APPEARANCE UR: ABNORMAL
ARTERIAL PATENCY WRIST A: ABNORMAL
ARTERIAL PATENCY WRIST A: ABNORMAL
ARTERIAL PATENCY WRIST A: YES
AST SERPL W P-5'-P-CCNC: 66 U/L (ref 15–37)
BACTERIA URNS QL MICRO: ABNORMAL /HPF
BASE DEFICIT BLD-SCNC: 2 MMOL/L
BASE DEFICIT BLD-SCNC: 5 MMOL/L
BASE DEFICIT BLD-SCNC: 5 MMOL/L
BASE DEFICIT BLD-SCNC: 6 MMOL/L
BASE DEFICIT BLDV-SCNC: 5 MMOL/L
BASOPHILS # BLD AUTO: 0 K/UL (ref 0–0.06)
BASOPHILS # BLD: 0 % (ref 0–2)
BDY SITE: ABNORMAL
BILIRUB SERPL-MCNC: 0.2 MG/DL (ref 0.2–1)
BILIRUB UR QL: NEGATIVE
BODY TEMPERATURE: 98.9
BUN SERPL-MCNC: 15 MG/DL (ref 7–18)
BUN/CREAT SERPL: 19 (ref 12–20)
CALCIUM SERPL-MCNC: 8.4 MG/DL (ref 8.5–10.1)
CHLORIDE SERPL-SCNC: 108 MMOL/L (ref 100–108)
CK MB CFR SERPL CALC: 3.5 % (ref 0–4)
CK MB SERPL-MCNC: 16.3 NG/ML (ref 5–25)
CK SERPL-CCNC: 466 U/L (ref 26–192)
CO2 SERPL-SCNC: 27 MMOL/L (ref 21–32)
COLOR UR: YELLOW
CREAT SERPL-MCNC: 0.8 MG/DL (ref 0.6–1.3)
DIFFERENTIAL METHOD BLD: ABNORMAL
EOSINOPHIL # BLD: 0 K/UL (ref 0–0.4)
EOSINOPHIL NFR BLD: 0 % (ref 0–5)
EPITH CASTS URNS QL MICRO: ABNORMAL /LPF (ref 0–5)
ERYTHROCYTE [DISTWIDTH] IN BLOOD BY AUTOMATED COUNT: 13.9 % (ref 11.6–14.5)
GAS FLOW.O2 O2 DELIVERY SYS: ABNORMAL L/MIN
GAS FLOW.O2 SETTING OXYMISER: 16 BPM
GAS FLOW.O2 SETTING OXYMISER: 18 BPM
GAS FLOW.O2 SETTING OXYMISER: 40 L/M
GLOBULIN SER CALC-MCNC: 3.7 G/DL (ref 2–4)
GLUCOSE BLD STRIP.AUTO-MCNC: 143 MG/DL (ref 70–110)
GLUCOSE BLD STRIP.AUTO-MCNC: 174 MG/DL (ref 70–110)
GLUCOSE BLD STRIP.AUTO-MCNC: 205 MG/DL (ref 70–110)
GLUCOSE SERPL-MCNC: 165 MG/DL (ref 74–99)
GLUCOSE UR STRIP.AUTO-MCNC: NEGATIVE MG/DL
GRAN CASTS URNS QL MICRO: ABNORMAL /LPF
HCO3 BLD-SCNC: 23.3 MMOL/L (ref 22–26)
HCO3 BLD-SCNC: 24.5 MMOL/L (ref 22–26)
HCO3 BLDV-SCNC: 22.3 MMOL/L (ref 23–28)
HCT VFR BLD AUTO: 37.1 % (ref 35–45)
HGB BLD-MCNC: 12.7 G/DL (ref 12–16)
HGB UR QL STRIP: ABNORMAL
HYALINE CASTS URNS QL MICRO: ABNORMAL /LPF (ref 0–2)
KETONES UR QL STRIP.AUTO: NEGATIVE MG/DL
LEUKOCYTE ESTERASE UR QL STRIP.AUTO: ABNORMAL
LYMPHOCYTES # BLD AUTO: 26 % (ref 21–52)
LYMPHOCYTES # BLD: 1.2 K/UL (ref 0.9–3.6)
MAGNESIUM SERPL-MCNC: 2.2 MG/DL (ref 1.8–2.4)
MCH RBC QN AUTO: 32.6 PG (ref 24–34)
MCHC RBC AUTO-ENTMCNC: 34.2 G/DL (ref 31–37)
MCV RBC AUTO: 95.4 FL (ref 74–97)
MONOCYTES # BLD: 0.2 K/UL (ref 0.05–1.2)
MONOCYTES NFR BLD AUTO: 5 % (ref 3–10)
NEUTS SEG # BLD: 3.1 K/UL (ref 1.8–8)
NEUTS SEG NFR BLD AUTO: 69 % (ref 40–73)
NITRITE UR QL STRIP.AUTO: NEGATIVE
O2/TOTAL GAS SETTING VFR VENT: 100 %
O2/TOTAL GAS SETTING VFR VENT: 100 %
O2/TOTAL GAS SETTING VFR VENT: 40 %
O2/TOTAL GAS SETTING VFR VENT: 70 %
O2/TOTAL GAS SETTING VFR VENT: 70 %
PCO2 BLD: 39.5 MMHG (ref 35–45)
PCO2 BLD: 58.3 MMHG (ref 35–45)
PCO2 BLD: 63.9 MMHG (ref 35–45)
PCO2 BLD: 92.4 MMHG (ref 35–45)
PCO2 BLDV: 52.3 MMHG (ref 41–51)
PEEP RESPIRATORY: 5 CMH2O
PH BLD: 7.03 [PH] (ref 7.35–7.45)
PH BLD: 7.17 [PH] (ref 7.35–7.45)
PH BLD: 7.21 [PH] (ref 7.35–7.45)
PH BLD: 7.38 [PH] (ref 7.35–7.45)
PH BLDV: 7.24 [PH] (ref 7.32–7.42)
PH UR STRIP: 5 [PH] (ref 5–8)
PLATELET # BLD AUTO: 214 K/UL (ref 135–420)
PMV BLD AUTO: 11.2 FL (ref 9.2–11.8)
PO2 BLD: 105 MMHG (ref 80–100)
PO2 BLD: 236 MMHG (ref 80–100)
PO2 BLD: 326 MMHG (ref 80–100)
PO2 BLD: 93 MMHG (ref 80–100)
PO2 BLDV: 46 MMHG (ref 25–40)
POTASSIUM SERPL-SCNC: 4.5 MMOL/L (ref 3.5–5.5)
PROT SERPL-MCNC: 6.3 G/DL (ref 6.4–8.2)
PROT UR STRIP-MCNC: 30 MG/DL
RBC # BLD AUTO: 3.89 M/UL (ref 4.2–5.3)
RBC #/AREA URNS HPF: ABNORMAL /HPF (ref 0–5)
SAO2 % BLD: 100 % (ref 92–97)
SAO2 % BLD: 100 % (ref 92–97)
SAO2 % BLD: 95 % (ref 92–97)
SAO2 % BLD: 98 % (ref 92–97)
SAO2 % BLDV: 74 % (ref 65–88)
SERVICE CMNT-IMP: ABNORMAL
SODIUM SERPL-SCNC: 143 MMOL/L (ref 136–145)
SP GR UR REFRACTOMETRY: 1.02 (ref 1–1.03)
SPECIMEN TYPE: ABNORMAL
T4 FREE SERPL-MCNC: 1.2 NG/DL (ref 0.7–1.5)
TOTAL RESP. RATE, ITRR: 18
TOTAL RESP. RATE, ITRR: 18
TOTAL RESP. RATE, ITRR: 22
TOTAL RESP. RATE, ITRR: 23
TROPONIN I SERPL-MCNC: 2.35 NG/ML (ref 0–0.06)
TROPONIN I SERPL-MCNC: ABNORMAL NG/ML (ref 0–0.04)
TSH SERPL DL<=0.05 MIU/L-ACNC: 0.05 UIU/ML (ref 0.36–3.74)
UROBILINOGEN UR QL STRIP.AUTO: 1 EU/DL (ref 0.2–1)
VENTILATION MODE VENT: ABNORMAL
VOLUME CONTROL IVLC: YES
VOLUME CONTROL PLUS IVLCP: YES
VT SETTING VENT: 400 ML
WBC # BLD AUTO: 4.5 K/UL (ref 4.6–13.2)
WBC URNS QL MICRO: ABNORMAL /HPF (ref 0–5)

## 2017-03-05 PROCEDURE — 80053 COMPREHEN METABOLIC PANEL: CPT | Performed by: INTERNAL MEDICINE

## 2017-03-05 PROCEDURE — 65610000006 HC RM INTENSIVE CARE

## 2017-03-05 PROCEDURE — 93005 ELECTROCARDIOGRAM TRACING: CPT

## 2017-03-05 PROCEDURE — 74011000250 HC RX REV CODE- 250: Performed by: INTERNAL MEDICINE

## 2017-03-05 PROCEDURE — 5A1955Z RESPIRATORY VENTILATION, GREATER THAN 96 CONSECUTIVE HOURS: ICD-10-PCS | Performed by: INTERNAL MEDICINE

## 2017-03-05 PROCEDURE — 74011000258 HC RX REV CODE- 258: Performed by: PHYSICIAN ASSISTANT

## 2017-03-05 PROCEDURE — 74011250636 HC RX REV CODE- 250/636: Performed by: INTERNAL MEDICINE

## 2017-03-05 PROCEDURE — 77010033678 HC OXYGEN DAILY

## 2017-03-05 PROCEDURE — 74011250637 HC RX REV CODE- 250/637: Performed by: INTERNAL MEDICINE

## 2017-03-05 PROCEDURE — 74011636637 HC RX REV CODE- 636/637: Performed by: PHYSICIAN ASSISTANT

## 2017-03-05 PROCEDURE — 81001 URINALYSIS AUTO W/SCOPE: CPT | Performed by: PHYSICIAN ASSISTANT

## 2017-03-05 PROCEDURE — P9045 ALBUMIN (HUMAN), 5%, 250 ML: HCPCS | Performed by: PHYSICIAN ASSISTANT

## 2017-03-05 PROCEDURE — 94002 VENT MGMT INPAT INIT DAY: CPT

## 2017-03-05 PROCEDURE — 82962 GLUCOSE BLOOD TEST: CPT

## 2017-03-05 PROCEDURE — 71010 XR CHEST PORT: CPT

## 2017-03-05 PROCEDURE — 87086 URINE CULTURE/COLONY COUNT: CPT | Performed by: PHYSICIAN ASSISTANT

## 2017-03-05 PROCEDURE — 85025 COMPLETE CBC W/AUTO DIFF WBC: CPT | Performed by: INTERNAL MEDICINE

## 2017-03-05 PROCEDURE — 74011250636 HC RX REV CODE- 250/636: Performed by: PHYSICIAN ASSISTANT

## 2017-03-05 PROCEDURE — 36600 WITHDRAWAL OF ARTERIAL BLOOD: CPT

## 2017-03-05 PROCEDURE — 84484 ASSAY OF TROPONIN QUANT: CPT | Performed by: INTERNAL MEDICINE

## 2017-03-05 PROCEDURE — 36415 COLL VENOUS BLD VENIPUNCTURE: CPT | Performed by: INTERNAL MEDICINE

## 2017-03-05 PROCEDURE — P9045 ALBUMIN (HUMAN), 5%, 250 ML: HCPCS | Performed by: INTERNAL MEDICINE

## 2017-03-05 PROCEDURE — 74011250636 HC RX REV CODE- 250/636

## 2017-03-05 PROCEDURE — 94640 AIRWAY INHALATION TREATMENT: CPT

## 2017-03-05 PROCEDURE — 82550 ASSAY OF CK (CPK): CPT | Performed by: INTERNAL MEDICINE

## 2017-03-05 PROCEDURE — 0BH17EZ INSERTION OF ENDOTRACHEAL AIRWAY INTO TRACHEA, VIA NATURAL OR ARTIFICIAL OPENING: ICD-10-PCS | Performed by: INTERNAL MEDICINE

## 2017-03-05 PROCEDURE — 82803 BLOOD GASES ANY COMBINATION: CPT

## 2017-03-05 PROCEDURE — 77030019938 HC TBNG IV PCA ICUM -A

## 2017-03-05 PROCEDURE — C1751 CATH, INF, PER/CENT/MIDLINE: HCPCS

## 2017-03-05 PROCEDURE — C9113 INJ PANTOPRAZOLE SODIUM, VIA: HCPCS | Performed by: PHYSICIAN ASSISTANT

## 2017-03-05 PROCEDURE — 74011000250 HC RX REV CODE- 250: Performed by: PHYSICIAN ASSISTANT

## 2017-03-05 PROCEDURE — 74011000250 HC RX REV CODE- 250

## 2017-03-05 PROCEDURE — 77030020454 HC TU ET CUF HI/LO COVD -B

## 2017-03-05 PROCEDURE — 65660000000 HC RM CCU STEPDOWN

## 2017-03-05 PROCEDURE — 31500 INSERT EMERGENCY AIRWAY: CPT

## 2017-03-05 PROCEDURE — 83735 ASSAY OF MAGNESIUM: CPT | Performed by: INTERNAL MEDICINE

## 2017-03-05 PROCEDURE — 74011250637 HC RX REV CODE- 250/637: Performed by: PHYSICIAN ASSISTANT

## 2017-03-05 RX ORDER — ALBUTEROL SULFATE 2.5 MG/.5ML
10 SOLUTION RESPIRATORY (INHALATION) ONCE
Status: DISCONTINUED | OUTPATIENT
Start: 2017-03-05 | End: 2017-03-05 | Stop reason: SDUPTHER

## 2017-03-05 RX ORDER — PROPOFOL 10 MG/ML
5-30 VIAL (ML) INTRAVENOUS
Status: DISCONTINUED | OUTPATIENT
Start: 2017-03-05 | End: 2017-03-05

## 2017-03-05 RX ORDER — ARFORMOTEROL TARTRATE 15 UG/2ML
15 SOLUTION RESPIRATORY (INHALATION)
Status: DISCONTINUED | OUTPATIENT
Start: 2017-03-05 | End: 2017-03-15

## 2017-03-05 RX ORDER — HALOPERIDOL 5 MG/ML
INJECTION INTRAMUSCULAR
Status: COMPLETED
Start: 2017-03-05 | End: 2017-03-05

## 2017-03-05 RX ORDER — FENTANYL CITRATE 50 UG/ML
50-100 INJECTION, SOLUTION INTRAMUSCULAR; INTRAVENOUS
Status: DISCONTINUED | OUTPATIENT
Start: 2017-03-05 | End: 2017-03-11

## 2017-03-05 RX ORDER — MAGNESIUM SULFATE HEPTAHYDRATE 40 MG/ML
2 INJECTION, SOLUTION INTRAVENOUS ONCE
Status: COMPLETED | OUTPATIENT
Start: 2017-03-05 | End: 2017-03-08

## 2017-03-05 RX ORDER — PROPOFOL 10 MG/ML
INJECTION, EMULSION INTRAVENOUS
Status: DISPENSED
Start: 2017-03-05 | End: 2017-03-05

## 2017-03-05 RX ORDER — FENTANYL CITRATE 50 UG/ML
INJECTION, SOLUTION INTRAMUSCULAR; INTRAVENOUS
Status: DISPENSED
Start: 2017-03-05 | End: 2017-03-05

## 2017-03-05 RX ORDER — CLONAZEPAM 0.5 MG/1
0.5 TABLET ORAL
Status: COMPLETED | OUTPATIENT
Start: 2017-03-05 | End: 2017-03-05

## 2017-03-05 RX ORDER — SODIUM BICARBONATE 1 MEQ/ML
SYRINGE (ML) INTRAVENOUS
Status: COMPLETED
Start: 2017-03-05 | End: 2017-03-05

## 2017-03-05 RX ORDER — HYDROCORTISONE SODIUM SUCCINATE 100 MG/2ML
100 INJECTION, POWDER, FOR SOLUTION INTRAMUSCULAR; INTRAVENOUS ONCE
Status: COMPLETED | OUTPATIENT
Start: 2017-03-05 | End: 2017-03-05

## 2017-03-05 RX ORDER — FUROSEMIDE 10 MG/ML
20 INJECTION INTRAMUSCULAR; INTRAVENOUS ONCE
Status: DISCONTINUED | OUTPATIENT
Start: 2017-03-05 | End: 2017-03-05

## 2017-03-05 RX ORDER — ALBUMIN HUMAN 50 G/1000ML
25 SOLUTION INTRAVENOUS ONCE
Status: COMPLETED | OUTPATIENT
Start: 2017-03-05 | End: 2017-03-08

## 2017-03-05 RX ORDER — MIDAZOLAM HYDROCHLORIDE 1 MG/ML
1-2 INJECTION, SOLUTION INTRAMUSCULAR; INTRAVENOUS
Status: DISCONTINUED | OUTPATIENT
Start: 2017-03-05 | End: 2017-03-09

## 2017-03-05 RX ORDER — MIDAZOLAM IN 0.9 % SOD.CHLORID 1 MG/ML
1-4 PLASTIC BAG, INJECTION (ML) INTRAVENOUS
Status: DISCONTINUED | OUTPATIENT
Start: 2017-03-05 | End: 2017-03-05

## 2017-03-05 RX ORDER — INSULIN LISPRO 100 [IU]/ML
INJECTION, SOLUTION INTRAVENOUS; SUBCUTANEOUS EVERY 6 HOURS
Status: DISCONTINUED | OUTPATIENT
Start: 2017-03-05 | End: 2017-03-10

## 2017-03-05 RX ORDER — ALBUMIN HUMAN 50 G/1000ML
12.5 SOLUTION INTRAVENOUS ONCE
Status: COMPLETED | OUTPATIENT
Start: 2017-03-05 | End: 2017-03-08

## 2017-03-05 RX ORDER — HYDROCODONE BITARTRATE AND ACETAMINOPHEN 10; 325 MG/1; MG/1
1 TABLET ORAL 4 TIMES DAILY
COMMUNITY
End: 2017-03-16

## 2017-03-05 RX ORDER — MORPHINE SULFATE 2 MG/ML
INJECTION, SOLUTION INTRAMUSCULAR; INTRAVENOUS
Status: COMPLETED
Start: 2017-03-05 | End: 2017-03-05

## 2017-03-05 RX ORDER — FENTANYL CITRATE-0.9 % NACL/PF 900MCG/30
PATIENT CONTROLLED ANALGESIA VIAL INJECTION
Status: DISCONTINUED | OUTPATIENT
Start: 2017-03-05 | End: 2017-03-11

## 2017-03-05 RX ORDER — MIDODRINE HYDROCHLORIDE 2.5 MG/1
5 TABLET ORAL ONCE
Status: COMPLETED | OUTPATIENT
Start: 2017-03-05 | End: 2017-03-05

## 2017-03-05 RX ORDER — CHLORHEXIDINE GLUCONATE 1.2 MG/ML
10 RINSE ORAL EVERY 12 HOURS
Status: DISCONTINUED | OUTPATIENT
Start: 2017-03-05 | End: 2017-03-16 | Stop reason: HOSPADM

## 2017-03-05 RX ORDER — ALBUMIN HUMAN 250 G/1000ML
25 SOLUTION INTRAVENOUS ONCE
Status: DISCONTINUED | OUTPATIENT
Start: 2017-03-05 | End: 2017-03-05

## 2017-03-05 RX ORDER — MORPHINE SULFATE 2 MG/ML
2 INJECTION, SOLUTION INTRAMUSCULAR; INTRAVENOUS ONCE
Status: COMPLETED | OUTPATIENT
Start: 2017-03-05 | End: 2017-03-05

## 2017-03-05 RX ORDER — IBUPROFEN 200 MG
1 TABLET ORAL DAILY
Status: DISCONTINUED | OUTPATIENT
Start: 2017-03-05 | End: 2017-03-16 | Stop reason: HOSPADM

## 2017-03-05 RX ORDER — FENTANYL CITRATE-0.9 % NACL/PF 900MCG/30
PATIENT CONTROLLED ANALGESIA VIAL INJECTION
Status: DISCONTINUED | OUTPATIENT
Start: 2017-03-05 | End: 2017-03-05

## 2017-03-05 RX ORDER — SODIUM CHLORIDE 9 MG/ML
150 INJECTION, SOLUTION INTRAVENOUS CONTINUOUS
Status: DISCONTINUED | OUTPATIENT
Start: 2017-03-05 | End: 2017-03-07

## 2017-03-05 RX ORDER — MIDAZOLAM HYDROCHLORIDE 1 MG/ML
INJECTION, SOLUTION INTRAMUSCULAR; INTRAVENOUS
Status: COMPLETED
Start: 2017-03-05 | End: 2017-03-05

## 2017-03-05 RX ORDER — ESCITALOPRAM OXALATE 10 MG/1
20 TABLET ORAL DAILY
Status: DISCONTINUED | OUTPATIENT
Start: 2017-03-05 | End: 2017-03-16 | Stop reason: HOSPADM

## 2017-03-05 RX ORDER — HALOPERIDOL 5 MG/ML
4 INJECTION INTRAMUSCULAR
Status: DISCONTINUED | OUTPATIENT
Start: 2017-03-05 | End: 2017-03-09 | Stop reason: DRUGHIGH

## 2017-03-05 RX ORDER — MIDAZOLAM HYDROCHLORIDE 1 MG/ML
INJECTION, SOLUTION INTRAMUSCULAR; INTRAVENOUS
Status: DISPENSED
Start: 2017-03-05 | End: 2017-03-05

## 2017-03-05 RX ORDER — MIDAZOLAM HYDROCHLORIDE 1 MG/ML
2 INJECTION, SOLUTION INTRAMUSCULAR; INTRAVENOUS ONCE
Status: ACTIVE | OUTPATIENT
Start: 2017-03-05 | End: 2017-03-05

## 2017-03-05 RX ORDER — NOREPINEPHRINE BITARTRATE/D5W 8 MG/250ML
PLASTIC BAG, INJECTION (ML) INTRAVENOUS
Status: COMPLETED
Start: 2017-03-05 | End: 2017-03-05

## 2017-03-05 RX ORDER — CHLORHEXIDINE GLUCONATE 1.2 MG/ML
10 RINSE ORAL ONCE
Status: DISCONTINUED | OUTPATIENT
Start: 2017-03-05 | End: 2017-03-05

## 2017-03-05 RX ORDER — ALBUTEROL SULFATE 0.83 MG/ML
10 SOLUTION RESPIRATORY (INHALATION)
Status: COMPLETED | OUTPATIENT
Start: 2017-03-05 | End: 2017-03-05

## 2017-03-05 RX ADMIN — MORPHINE SULFATE 1 MG: 2 INJECTION, SOLUTION INTRAMUSCULAR; INTRAVENOUS at 06:00

## 2017-03-05 RX ADMIN — ALBUTEROL SULFATE 2.5 MG: 2.5 SOLUTION RESPIRATORY (INHALATION) at 06:48

## 2017-03-05 RX ADMIN — LORAZEPAM 1 MG: 2 INJECTION INTRAMUSCULAR; INTRAVENOUS at 01:51

## 2017-03-05 RX ADMIN — PHENYLEPHRINE HYDROCHLORIDE 10 MCG/MIN: 10 INJECTION INTRAVENOUS at 18:46

## 2017-03-05 RX ADMIN — IPRATROPIUM BROMIDE AND ALBUTEROL SULFATE 3 ML: .5; 3 SOLUTION RESPIRATORY (INHALATION) at 11:50

## 2017-03-05 RX ADMIN — SODIUM CHLORIDE 40 MG: 9 INJECTION INTRAMUSCULAR; INTRAVENOUS; SUBCUTANEOUS at 12:00

## 2017-03-05 RX ADMIN — METHYLPREDNISOLONE SODIUM SUCCINATE 60 MG: 40 INJECTION, POWDER, FOR SOLUTION INTRAMUSCULAR; INTRAVENOUS at 18:22

## 2017-03-05 RX ADMIN — MAGNESIUM SULFATE HEPTAHYDRATE 2 G: 40 INJECTION, SOLUTION INTRAVENOUS at 12:30

## 2017-03-05 RX ADMIN — IPRATROPIUM BROMIDE AND ALBUTEROL SULFATE 3 ML: .5; 3 SOLUTION RESPIRATORY (INHALATION) at 20:41

## 2017-03-05 RX ADMIN — CHLORHEXIDINE GLUCONATE 10 ML: 1.2 RINSE ORAL at 21:00

## 2017-03-05 RX ADMIN — Medication: at 11:07

## 2017-03-05 RX ADMIN — LORAZEPAM 1 MG: 2 INJECTION INTRAMUSCULAR; INTRAVENOUS at 06:22

## 2017-03-05 RX ADMIN — Medication 8000 MCG: at 19:20

## 2017-03-05 RX ADMIN — ALBUMIN (HUMAN) 25 G: 12.5 INJECTION, SOLUTION INTRAVENOUS at 14:00

## 2017-03-05 RX ADMIN — NOREPINEPHRINE BITARTRATE 6 MCG/MIN: 1 INJECTION, SOLUTION, CONCENTRATE INTRAVENOUS at 20:18

## 2017-03-05 RX ADMIN — MIDAZOLAM HYDROCHLORIDE 2 MG: 1 INJECTION, SOLUTION INTRAMUSCULAR; INTRAVENOUS at 23:03

## 2017-03-05 RX ADMIN — IPRATROPIUM BROMIDE AND ALBUTEROL SULFATE 3 ML: .5; 3 SOLUTION RESPIRATORY (INHALATION) at 05:01

## 2017-03-05 RX ADMIN — ESCITALOPRAM OXALATE 20 MG: 10 TABLET ORAL at 08:10

## 2017-03-05 RX ADMIN — METHYLPREDNISOLONE SODIUM SUCCINATE 40 MG: 40 INJECTION, POWDER, FOR SOLUTION INTRAMUSCULAR; INTRAVENOUS at 06:00

## 2017-03-05 RX ADMIN — Medication: at 22:50

## 2017-03-05 RX ADMIN — Medication 1 MG: at 06:00

## 2017-03-05 RX ADMIN — METHYLPREDNISOLONE SODIUM SUCCINATE 40 MG: 40 INJECTION, POWDER, FOR SOLUTION INTRAMUSCULAR; INTRAVENOUS at 00:42

## 2017-03-05 RX ADMIN — SODIUM CHLORIDE 75 ML/HR: 900 INJECTION, SOLUTION INTRAVENOUS at 12:04

## 2017-03-05 RX ADMIN — NOREPINEPHRINE BITARTRATE 10 MCG/MIN: 1 INJECTION, SOLUTION, CONCENTRATE INTRAVENOUS at 20:32

## 2017-03-05 RX ADMIN — IPRATROPIUM BROMIDE AND ALBUTEROL SULFATE 3 ML: .5; 3 SOLUTION RESPIRATORY (INHALATION) at 07:47

## 2017-03-05 RX ADMIN — BUDESONIDE 500 MCG: 0.5 INHALANT RESPIRATORY (INHALATION) at 20:41

## 2017-03-05 RX ADMIN — INSULIN LISPRO 2 UNITS: 100 INJECTION, SOLUTION INTRAVENOUS; SUBCUTANEOUS at 22:58

## 2017-03-05 RX ADMIN — SODIUM CHLORIDE 150 ML/HR: 900 INJECTION, SOLUTION INTRAVENOUS at 19:26

## 2017-03-05 RX ADMIN — MIDAZOLAM HYDROCHLORIDE 2 MG: 1 INJECTION, SOLUTION INTRAMUSCULAR; INTRAVENOUS at 09:45

## 2017-03-05 RX ADMIN — INSULIN LISPRO 4 UNITS: 100 INJECTION, SOLUTION INTRAVENOUS; SUBCUTANEOUS at 12:13

## 2017-03-05 RX ADMIN — ENOXAPARIN SODIUM 40 MG: 40 INJECTION SUBCUTANEOUS at 12:19

## 2017-03-05 RX ADMIN — IPRATROPIUM BROMIDE AND ALBUTEROL SULFATE 3 ML: .5; 3 SOLUTION RESPIRATORY (INHALATION) at 23:29

## 2017-03-05 RX ADMIN — Medication: at 17:03

## 2017-03-05 RX ADMIN — ALBUTEROL SULFATE 10 MG: 2.5 SOLUTION RESPIRATORY (INHALATION) at 10:25

## 2017-03-05 RX ADMIN — NOREPINEPHRINE BITARTRATE 12 MCG/MIN: 1 INJECTION, SOLUTION, CONCENTRATE INTRAVENOUS at 22:49

## 2017-03-05 RX ADMIN — ALBUTEROL SULFATE 2.5 MG: 2.5 SOLUTION RESPIRATORY (INHALATION) at 02:37

## 2017-03-05 RX ADMIN — LEVOFLOXACIN 750 MG: 5 INJECTION, SOLUTION INTRAVENOUS at 12:16

## 2017-03-05 RX ADMIN — BUDESONIDE 500 MCG: 0.5 INHALANT RESPIRATORY (INHALATION) at 07:47

## 2017-03-05 RX ADMIN — NOREPINEPHRINE BITARTRATE 12 MCG/MIN: 1 INJECTION, SOLUTION, CONCENTRATE INTRAVENOUS at 20:35

## 2017-03-05 RX ADMIN — HYDROCORTISONE SODIUM SUCCINATE 100 MG: 100 INJECTION, POWDER, FOR SOLUTION INTRAMUSCULAR; INTRAVENOUS at 15:25

## 2017-03-05 RX ADMIN — SODIUM CHLORIDE 1000 ML: 900 INJECTION, SOLUTION INTRAVENOUS at 12:52

## 2017-03-05 RX ADMIN — HALOPERIDOL LACTATE 5 MG: 5 INJECTION, SOLUTION INTRAMUSCULAR at 11:20

## 2017-03-05 RX ADMIN — NOREPINEPHRINE BITARTRATE 8 MCG/MIN: 1 INJECTION, SOLUTION, CONCENTRATE INTRAVENOUS at 20:26

## 2017-03-05 RX ADMIN — FENTANYL CITRATE 100 MCG: 50 INJECTION, SOLUTION INTRAMUSCULAR; INTRAVENOUS at 16:49

## 2017-03-05 RX ADMIN — MIDAZOLAM HYDROCHLORIDE 2 MG: 1 INJECTION, SOLUTION INTRAMUSCULAR; INTRAVENOUS at 20:26

## 2017-03-05 RX ADMIN — PROPOFOL 5 MCG/KG/MIN: 10 INJECTION, EMULSION INTRAVENOUS at 11:44

## 2017-03-05 RX ADMIN — Medication: at 10:49

## 2017-03-05 RX ADMIN — SODIUM BICARBONATE 50 MEQ: 84 INJECTION INTRAVENOUS at 14:01

## 2017-03-05 RX ADMIN — HYDROCODONE BITARTRATE AND ACETAMINOPHEN 1 TABLET: 5; 325 TABLET ORAL at 05:30

## 2017-03-05 RX ADMIN — ALBUTEROL SULFATE 2.5 MG: 2.5 SOLUTION RESPIRATORY (INHALATION) at 08:40

## 2017-03-05 RX ADMIN — ARFORMOTEROL TARTRATE 15 MCG: 15 SOLUTION RESPIRATORY (INHALATION) at 20:41

## 2017-03-05 RX ADMIN — MIDODRINE HYDROCHLORIDE 5 MG: 2.5 TABLET ORAL at 15:25

## 2017-03-05 RX ADMIN — NOREPINEPHRINE BITARTRATE 2 MCG/MIN: 1 INJECTION, SOLUTION, CONCENTRATE INTRAVENOUS at 19:42

## 2017-03-05 RX ADMIN — METHYLPREDNISOLONE SODIUM SUCCINATE 125 MG: 125 INJECTION, POWDER, FOR SOLUTION INTRAMUSCULAR; INTRAVENOUS at 09:00

## 2017-03-05 RX ADMIN — CLONAZEPAM 0.5 MG: 0.5 TABLET ORAL at 03:42

## 2017-03-05 RX ADMIN — METHYLPREDNISOLONE SODIUM SUCCINATE 60 MG: 40 INJECTION, POWDER, FOR SOLUTION INTRAMUSCULAR; INTRAVENOUS at 12:13

## 2017-03-05 RX ADMIN — IPRATROPIUM BROMIDE AND ALBUTEROL SULFATE 3 ML: .5; 3 SOLUTION RESPIRATORY (INHALATION) at 15:52

## 2017-03-05 RX ADMIN — ALBUMIN (HUMAN) 12.5 G: 12.5 INJECTION, SOLUTION INTRAVENOUS at 12:29

## 2017-03-05 RX ADMIN — FENTANYL CITRATE 100 MCG: 50 INJECTION, SOLUTION INTRAMUSCULAR; INTRAVENOUS at 10:10

## 2017-03-05 NOTE — PROGRESS NOTES
Tried patient on Bipap due to increased WOB, but patient could not tolerate. HFNC placed back on patient, Bipap on stand-by.

## 2017-03-05 NOTE — PROGRESS NOTES
PCCM f/u     Late entry    Spoke with intensivist Oksana joe with MAP of >= 55 mmHg.      Zhou Holcomb PA-C

## 2017-03-05 NOTE — PROGRESS NOTES
Called by staff for intubation.  and daughter present.   Family was normally emotional.      sumeet Herrera

## 2017-03-05 NOTE — PROGRESS NOTES
Hospitalist Progress Note    Patient: Alfonso Daniels MRN: [id number]  CSN: [id number]    YOB: 1955  Age: 64 y.o. Sex: female    DOA: 3/4/2017 LOS:  LOS: 1 day            Assessment/Plan     1. Acute exacerbation of COPD- worsening anxiety with out change in hypoxia. Ventilating well, currently on high flow nasal cannula  2. Bacterial sinusitis w left enthoid soft tissue density- she has been followed by ENT at [facility]. No signs of CNS involvement. ENT had been called by ER and images reviewed with out further inpatient ENT recommendations  3. Elevated troponin with out chest pain, doubt ACS, evaluated by pulmonology  4. Anxiety  5. Chronic pain  6. transaminitis  7. Intravascular volume depletion  8. Hypokalemia  9. Exophytic kidney nodule     Plan:  - give dose of morphine now  - ativan as needed  - continue IV glucocorticoids, IV antibiotics, scheduled bronchodilators  - RUQ US, MRi orbit pending  - consult pulmonology, discussed w Dr Monae Sun  - full code, dvt ppx lovenox  - discussed w patient and  at bedside    Patient Active Problem List   Diagnosis Code    Respiratory insufficiency R06.89    Sinusitis J32.9    Mass of sinus R22.0    COPD (chronic obstructive pulmonary disease) with emphysema ([facility] [address] J43.9    Hypokalemia E87.6    Transaminitis R74.0    Nodule of kidney N28.89               Subjective:    cc: shortness of breath  Pt w worsening dyspnea overnight, associated w anxiety  Placed on high flow nasal cannula  ABG improved from admission, CXR unchanged  Given ativan with out relief    Pt on BIPAP when evaluated by me, anxious, asks to be taken off  Denies pain, chest pain, headahce      REVIEW OF SYSTEMS:  General: No fevers or chills. Cardiovascular: No chest pain or pressure. No palpitations. Pulmonary: + shortness of breath. Gastrointestinal: No nausea, vomiting.      Objective:        Vital signs/Intake and Output:  Visit Vitals    BP (!) 88/50    Pulse 79    Temp 98.9 °F (37.2 °C)    Resp (!) 42    Ht 5' 8\" (1.727 m)    Wt 62.6 kg (138 lb)    SpO2 100%    BMI 20.98 kg/m2     Current Shift:  03/04 1901 - 03/05 0700  In: 733.8 [I.V.:733.8]  Out: -   Last three shifts:  03/03 0701 - 03/04 1900  In: 1048.8 [I.V.:1048.8]  Out: -     Body mass index is 20.98 kg/(m^2).     Physical Exam:  GEN: Alert and oriented times three NAD, in respiratory distress  EYES: conjunctiva normal, lids with out lesions  HEENT: MMM, No thyromegaly, no lymphadenopathy  HEART: RRR +S1 +S2, no JVD, pulses 2+ distally  LUNGS: decreased air movement bilat w expiratory wheezes noted, no rales, rhonchi  ABDOMEN: + BS, soft NT/ND no organomegaly,  No rebound  EXTREMITIES: No edema cyanosis, cap refill normal   SKIN: no rashes or skin breakdown, no nodules, normal turgor  Current Facility-Administered Medications   Medication Dose Route Frequency    furosemide (LASIX) injection 20 mg  20 mg IntraVENous ONCE    escitalopram oxalate (LEXAPRO) tablet 20 mg  20 mg Oral DAILY    morphine injection 2 mg  2 mg IntraVENous ONCE    albuterol-ipratropium (DUO-NEB) 2.5 MG-0.5 MG/3 ML  3 mL Nebulization Q4H RT    albuterol (PROVENTIL VENTOLIN) nebulizer solution 2.5 mg  2.5 mg Nebulization Q2H PRN    methylPREDNISolone (PF) (SOLU-MEDROL) injection 40 mg  40 mg IntraVENous Q6H    LORazepam (ATIVAN) injection 1 mg  1 mg IntraVENous Q4H PRN    acetaminophen (TYLENOL) tablet 650 mg  650 mg Oral Q4H PRN    ondansetron (ZOFRAN) injection 4 mg  4 mg IntraVENous Q4H PRN    enoxaparin (LOVENOX) injection 40 mg  40 mg SubCUTAneous Q24H    insulin lispro (HUMALOG) injection   SubCUTAneous AC&HS    glucose chewable tablet 16 g  4 Tab Oral PRN    glucagon (GLUCAGEN) injection 1 mg  1 mg IntraMUSCular PRN    dextrose (D50W) injection syrg 12.5-25 g  25-50 mL IntraVENous PRN    budesonide (PULMICORT) 500 mcg/2 ml nebulizer suspension  500 mcg Nebulization BID RT    0.9% sodium chloride infusion  75 mL/hr IntraVENous CONTINUOUS    sodium chloride (OCEAN) 0.65 % nasal spray 2 Spray  2 Spray Both Nostrils Q2H PRN    levoFLOXacin (LEVAQUIN) 750 mg in D5W IVPB  750 mg IntraVENous Q24H    HYDROcodone-acetaminophen (NORCO) 5-325 mg per tablet 1 Tab  1 Tab Oral Q6H PRN         All the patient's labs over the past 24 hours were reviewed both during my initial daily workflow process and at the time notated as \"note time\" in [facility]. (It is not time stamped separately in this workflow.)  Select labs are listed below.         Labs: Results:       Chemistry Recent Labs      03/05/17   0030  03/04/17   0755   GLU  165*  70*   NA  143  145   K  4.5  2.7*   CL  108  103   CO2  27  34*   BUN  15  16   CREA  0.80  0.85   CA  8.4*  9.1   AGAP  8  8   BUCR  19  19   AP  140*  129*   TP  6.3*  7.6   ALB  2.6*  3.3*   GLOB  3.7  4.3*   AGRAT  0.7*  0.8      CBC w/Diff Recent Labs      03/05/17   0030  03/04/17   0755   WBC  4.5*  7.3   RBC  3.89*  4.58   HGB  12.7  14.8   HCT  37.1  44.2   PLT  214  220   GRANS  69  64   LYMPH  26  25   EOS  0  1      Cardiac Enzymes Recent Labs      03/04/17   1625  03/04/17   0755   CPK  522*  667*   CKND1  2.6  0.8      Coagulation Recent Labs      03/04/17   0755   PTP  11.3*   INR  0.8   APTT  28.2               Liver Enzymes Recent Labs      03/05/17   0030   TP  6.3*   ALB  2.6*   AP  140*   SGOT  66*          Procedures/imaging: see electronic medical records for all procedures/Xrays and details which were not copied into this note but were reviewed prior to creation of Plan    Imaging personally reviewed:  CXR: unchanged                Radha Lynn, DO  Internal Medicine/Geriatrics

## 2017-03-05 NOTE — PROGRESS NOTES
6153 Received bedside report, pt tripoding over bedside table stating she cant breathe, NC prongs found to be hanging out of nose and replaced at this time. She is  demanding 02 be increased to 50. Pt is  Currently on HF at 50% and 40 lpm. This was told to the pt and she began to cry and tell me that she has been suffering from PTSD for a long time and she needs her  1mg klonopin. He PTA medication list states she take 0.5 mg klonipin. Her  is at the bedside. Pts sats are 97% and RR 18. Writer informed her that her vitals are stable and that I will help her the best I can but she needs to try and calm down. I reviewed the medication shes has taken and when they are due again. They are aware that a pulmonary specialist will be here to round on here this morning and that we will speak with them regarding the klonopin but in the meantime I will give her lexapro as ordered. 0800 Pt recieving nebulizer treatment. Dr. Barragan Rbo calling pulmonologist for consult. 2899 Pt took lexapro but also states that is not the correct dose. She states she takes 40 mg not 20 like the record indicated. Writer strongly encouraged spouse to print medication list and bring it in so it can be updates. He states he will once the pulmonologist rounds. 8801 PA at bedside. Pt in respiratory distress. Placed on bipap and 125mg solumedrol administered.  and daughter at bedside and he is discussing possible intubation with them. He wishes to wait for Dr. Ramakrishna Barragan pt obtunded on bipap, needs to intubated. Daughter hysterical  left room awaiting dr. Amber Borrero and or husbands discission.  paged. [phone] chaplian took family to waiting room they agreed to intubate anesthesia paged. [phone] 4 versed and 100 mcqs fentanyl IV given by anesthesia. 3086 pt intubated by [facility] without difficulty, good color change with c02 detector. OGT inserted by writer and xray completed.  Linens changes and kassandra soft wrist restraints applied.  sats 99% RR18 bp low most likely from diprivan IVF infusing will continue to monitor closely. 0937 bp improved 93/65. Thick yellow secretions suctioned. 0945 versed 2mg iv push given per PA order    1005 chlorhexadine bath given. ett advanced by RT per MD order. 1010 fentanyl 100 mcqs iv given    1040 Family now at bedside, teaching provided re: vent, tubes and sedation. Pt began to cough and daughter again got hysterical and was asked to step out of the room. additional 2 mg versed given per PA order because pt sitting up in bed. Pharmacy called again to request pca fentanyl stat. 1045 pt calmer, daughter back to bedside. 1100 pt still not sedated awake trying to pull at ett. .     1125 haldol iv given 4 mg per dr. Caleb Nagel order, pulse dropped to 85%. FIO2 increased by RT to 100%. Pt still working to breath, diaphoretic but with eyes closed and sitting back in bed.     1145 Dr. Jina Smith to bedside spoke with family and new orders obtained, versed ordered but never started and d/cd. Diprivan started now at 5 mcqs will monitor bp.     1255 second liter IV NS infusing and albumin infusing bp 71/42 diprivan at 10 mcqs. And fentanyl remains at 150. If pt bp doesn't respond to fluid and albumin will consider decreasing diprivan. Dr. Caleb Nagel order for BP goal is MAP >55    1303 bp trending up 86/51will continue to monitor. 1332 bp 69/42 diprivan stopped. Dr. Jina Smith at bedside    1411 ABG collected, bicarb given bp 72/43 Dr. Jina Smith aware. Another liter bolus infusing. HR improved 85     1417 12 lead EKG in progress bp 69/37 PA aware, bolus still infusing with about 650 left to infuse. 1434 bp 87/47 appears calm. Daughter at bedside also calm. 26  brought in bottles of home medications PTA medlist updated. Candance Alas returned and he will take home. RT at bedside collecting ABG. bp 95/60.  Daughter at bedside other VSS resting comfortably as long as no stimulation. 1647 Fentanyl pca empty, pharmacy called, pt agitated and restless 100 mcq iv push given per PA instruction. 1800 Time out completed and Central line inserted to right groin with ultrasound. Pt tolerated well and CVP connected to verify placement is veinous. 1837 MAP 51 john order entered writer spoke with Melanie Willson pharmacist to request it be sent stat. 1846 john started at 10 via Central line, immediately after pts HR dipped to 47 breifly, stat EKG obtained and HR came back up without any other intervention. Pt resting comfortably FLACC 2 RASS -2    1905 john titrated up to 20    1912 john titrated up to [address] Report to [address] at bedside pt comfortable and VSS She will start levophed and stop john.      2059 Remaining Diprivan wasted with Raiza Gómez RN

## 2017-03-05 NOTE — CONSULTS
[facility]  Pulmonary, Critical Care, and Sleep Medicine      Name: Lazaro Chowdary MRN: [id number]   : 1955 Hospital: [facility] [place]   Date: 3/5/2017          Critical Care Initial Patient Consult    Requesting MD: Dr. Gianna Rosenthal                                  Reason for CC Consult: COPD exacerbation    IMPRESSION:   · Acute hypoxemic respiratory failure, 2/2 COPD exacerbation with unknown triggering cause - Sinusitis vs. Common cold vs influenza vs underlying PNA vs allergies   · Hypokalemia on admission, ? 2/2 extensive albuterol usage, resolved  · Elevated troponin, ? 2/2 respiratory efforts, cardiology following   · Elevated liver enzymes  · Anxiety, severe, with history of PTSD   · Possible left upper pole renal mass, as per CTA chest   · Multiple pulmonary nodules from CTA chest, will need outpatient follow up   · Hx HTN      RECOMMENDATIONS:   · Resp -  Very tight with significant wheezing on exam; Duonebs and 125 mg Solumedrol given prior to intubation; intubated for respiratory failure; 10 mg continual albuterol over 1 hour now; 60 mg q6h solumedrol, 2 mg magnesium now, CXR with high riding ETT - will advance 3 cm; obtain ABG 30 minutes post-intubation; VAP bundle; daily CXR; daily ABG. Titrate FiO2 to maintain SpO2 >90%   · ID - No clear infectious etiology; tachycardia and tachypnea are physiological responses to COPD exacerbation; will obtain sputum culture; obtain rapid flu; blood cultures pending in lab, obtain urine culture; on Levaquin which is satisfactory for now; will adjust antibiotics per culture results. · CVS - HD stable. Troponin elevated on admission - cardiology following. Echo with EF 50%. Monitor hemodynamics. · Heme/Onc- H&H and platelets stable; monitor. · Metabolic -  Monitor electrolytes and replace per electrolyte replacement protocol.    · Renal - Trend renal indices and monitor UOP; avoid lasix for now as she may have mucous plugged. · Endocrine - SSI for glycemic control given steroids; obtain TSH and T4  · Neuro/ Pain/ Sedation -Fentanyl gtt for sedation; prn versed and fentanyl; fentanyl for pain  · GI - Protonix prophylaxis; NPO for now, may start tube feeds tomorrow. · Prophylaxis - DVT (lovenox), GI (protonix)     Subjective/History: This patient has been seen and evaluated at the request of Dr. Stormy Kearns for COPD exacerbation. Patient is a 64 y.o. female with history of COPD, PTSD, chronic sinusitis and HTN who presented to the ED with progressive shortness of breath for two weeks. She has had wheezing and a non-productive cough. Her  states she had a \"cold\" about 2 weeks ago and everything seems to have gotten worse since. She also recently traveled to [place] and stayed in a hotel but according to family it was clean hotel with no mold or dust.  No new exposure to pets. Daughter states she does get bad seasonal allergies which does typically flare her COPD. She is currently still smoking, which the daughter reports is down to less than 1 pack per day. In the ED, she was tachypneic and tachycardic and was brought to the ICU for further management under the hospitalist service. She also had a CT chest in the ED which was was negative for a PE but did show emphysematous changes. She also had a CT of the head which showed ethmoid mucosal thickening with partial destruction of the medial wall of the left orbit - a mass or abscess could not be ruled out. ENT was consulted but felt as if this could be followed up on an outpatient basis. Additionally, she had a mild elevated troponin for which cardiology was consulted and is following. Pulmonary was then consulted on 3/5 for assistance with management. She was treated overnight with Ativan, Klonopin, and Lexapro. She was maintained on HFNC as she was unable to tolerate the BiPAP.   During my evaluation, she is minimally responsive on HFNC, with RR 38-42, 's, and hypertensive. She was given multiple duonebs along with solumedrol and placed back on BiPAP without improvement. She was the intubated for respiratory failure. The patient is critically ill and can not provide additional history due to Ventilated. Past Medical History:   Diagnosis Date    Back pain     COPD (chronic obstructive pulmonary disease) (HCC)     Hypertension     PTSD (post-traumatic stress disorder)     Shingles       Past Surgical History:   Procedure Laterality Date    HX TUBAL LIGATION      HX TYMPANOSTOMY     Janet Milford        Prior to Admission medications    Medication Sig Start Date End Date Taking? Authorizing Provider   baclofen (LIORESAL) 10 mg tablet Take 10 mg by mouth three (3) times daily. Yes Marissa Son MD   albuterol (PROVENTIL HFA, VENTOLIN HFA, PROAIR HFA) 90 mcg/actuation inhaler Take 2 Puffs by inhalation every four (4) hours as needed for Wheezing. Yes Marissa Son MD   valsartan (DIOVAN) 160 mg tablet Take  by mouth daily. Yes Marissa Son MD   escitalopram (LEXAPRO) 20 mg tablet Take 40 mg by mouth daily. Yes Marisas Son MD   clonazePAM (KLONOPIN) 0.5 mg tablet Take 0.5 mg by mouth nightly as needed. Yes Marissa Son MD   fluticasone-salmeterol (ADVAIR DISKUS) 100-50 mcg/dose diskus inhaler Take 1 Puff by inhalation every twelve (12) hours. Yes Marissa Son MD   HYDROcodone-acetaminophen (NORCO) 5-325 mg per tablet Take 1 Tab by mouth. Yes Marissa Son MD   busPIRone (BUSPAR) 15 mg tablet Take 15 mg by mouth three (3) times daily. Phys MD Huy   atorvastatin (LIPITOR) 20 mg tablet Take 20 mg by mouth daily. Marissa Son MD   ipratropium-albuterol (COMBIVENT)  mcg/actuation inhaler Take  by inhalation every six (6) hours as needed. Marissa Son MD   ibuprofen (MOTRIN) 800 mg tablet Take 800 mg by mouth every six (6) hours as needed.       Marissa Son MD   oxyCODONE-acetaminophen (PERCOCET) 5-712 mg per tablet Take 1 Tab by mouth every four (4) hours as needed for Pain. 5/2/12   Asa RICARDO Alberto     Current Facility-Administered Medications   Medication Dose Route Frequency    escitalopram oxalate (LEXAPRO) tablet 20 mg  20 mg Oral DAILY    methylPREDNISolone (PF) (SOLU-MEDROL) 125 mg/2 mL injection        midazolam (VERSED) 1 mg/mL injection        fentaNYL citrate (PF) 50 mcg/mL injection        chlorhexidine (PERIDEX) 0.12 % mouthwash 10 mL  10 mL Oral Q12H    methylPREDNISolone (PF) (SOLU-MEDROL) injection 60 mg  60 mg IntraVENous Q6H    midazolam (VERSED) 1 mg/mL injection        insulin lispro (HUMALOG) injection   SubCUTAneous Q6H    fentaNYL (PF)  mcg/30 ml   IntraVENous TITRATE    magnesium sulfate 2 g/50 ml IVPB (premix or compounded)  2 g IntraVENous ONCE    albuterol CONCENTRATE 2.5mg/0.5 mL neb soln  10 mg Inhalation ONCE    albuterol (PROVENTIL VENTOLIN) nebulizer solution 10 mg  10 mg Nebulization NOW    albuterol-ipratropium (DUO-NEB) 2.5 MG-0.5 MG/3 ML  3 mL Nebulization Q4H RT    enoxaparin (LOVENOX) injection 40 mg  40 mg SubCUTAneous Q24H    budesonide (PULMICORT) 500 mcg/2 ml nebulizer suspension  500 mcg Nebulization BID RT    0.9% sodium chloride infusion  75 mL/hr IntraVENous CONTINUOUS    levoFLOXacin (LEVAQUIN) 750 mg in D5W IVPB  750 mg IntraVENous Q24H     Allergies   Allergen Reactions    Sulfacetamide Anaphylaxis      Social History   Substance Use Topics    Smoking status: Current Some Day Smoker     Packs/day: 0.50    Smokeless tobacco: Not on file    Alcohol use Not on file      History reviewed. No pertinent family history. Review of Systems:  A comprehensive review of systems was negative except for that written in the HPI.     Objective:   Vital Signs:    Visit Vitals    BP (!) 135/101    Pulse (!) (P) 114    Temp 97.5 °F (36.4 °C)    Resp (P) 19    Ht 5' 8\" (1.727 m)    Wt 62.6 kg (138 lb)    SpO2 (P) 99%    BMI 20.98 kg/m2 O2 Device: Hi flow nasal cannula   O2 Flow Rate (L/min): 40 l/min   Temp (24hrs), Av.1 °F (36.7 °C), Min:97.4 °F (36.3 °C), Max:99.1 °F (37.3 °C)       Intake/Output:   Last shift:      701 - 1900  In: -   Out: 500 [Urine:500]  Last 3 shifts: 1901 - 700  In: 1782.5 [I.V.:1782.5]  Out: -     Intake/Output Summary (Last 24 hours) at 17 1003  Last data filed at 17 0739   Gross per 24 hour   Intake           1782.5 ml   Output              500 ml   Net           1282.5 ml       Ventilator Settings:  Mode Rate Tidal Volume Pressure FiO2 PEEP            40 %       Peak airway pressure:      Minute ventilation:        ARDS network Guidelines: Lung protective strategy and Pl pressure goals____________      Physical Exam:    General:  Extremely distressed mild aged female in tripod position. Head:  Normocephalic, without obvious abnormality, atraumatic. Eyes:  Conjunctivae/corneas clear. Nose: Nares normal. Septum midline. Mucosa normal.   Throat: Lips and tongue normal.  Teeth present. Mucosa moist.    Neck: Supple, symmetrical, trachea midline, no adenopathy, no JVD. Lungs:   Poor respiratory effort; little air movement with significant inspiratory and expiratory wheezes bilaterally        Heart:  Tachycardic rate and regular rhythm; no audible mrg   Abdomen:   Hypoactive bowel sounds; soft, non-tender   Extremities: Extremities without edema. Cyanosis to fingers and toes with mottling on lower extremities. Pulses: 2+ and symmetric all extremities. Skin: Warm, dry.        Neurologic: Unable to complete neurological exam due to patient status        Data:     Recent Results (from the past 24 hour(s))   CULTURE, BLOOD    Collection Time: 17 11:00 AM   Result Value Ref Range    Special Requests: NO SPECIAL REQUESTS      Culture result: NO GROWTH AFTER 19 HOURS     POC G3    Collection Time: 17  4:04 PM   Result Value Ref Range    Device: NASAL CANNULA Flow rate (POC) 2.0 L/M    FIO2 (POC) 28 %    pH (POC) 7.384 7.35 - 7.45      pCO2 (POC) 41.0 35.0 - 45.0 MMHG    pO2 (POC) 67 (L) 80 - 100 MMHG    HCO3 (POC) 24.4 22 - 26 MMOL/L    sO2 (POC) 93 92 - 97 %    Base deficit (POC) 1 mmol/L    Allens test (POC) YES      Total resp.  rate 27      Site RIGHT BRACHIAL      Specimen type (POC) ARTERIAL      Performed by Arnulfo Lewis    CARDIAC PANEL,(CK, CKMB & TROPONIN)    Collection Time: 03/04/17  4:25 PM   Result Value Ref Range     (H) 26 - 192 U/L    CK - MB 13.6 (H) <3.6 ng/ml    CK-MB Index 2.6 0.0 - 4.0 %    Troponin-I, Qt. 2.70 (HH) 0.00 - 0.06 NG/ML   HEMOGLOBIN A1C WITH EAG    Collection Time: 03/04/17  4:25 PM   Result Value Ref Range    Hemoglobin A1c 6.2 (H) 4.5 - 5.6 %    Est. average glucose 131 mg/dL   PHOSPHORUS    Collection Time: 03/04/17  4:25 PM   Result Value Ref Range    Phosphorus 3.1 2.5 - 4.9 MG/DL   ACETAMINOPHEN    Collection Time: 03/04/17  4:25 PM   Result Value Ref Range    ACETAMINOPHEN 7 (L) 10 - 30 ug/mL   LACTIC ACID, PLASMA    Collection Time: 03/04/17  4:25 PM   Result Value Ref Range    Lactic acid 1.7 0.4 - 2.0 MMOL/L   GLUCOSE, POC    Collection Time: 03/04/17  6:17 PM   Result Value Ref Range    Glucose (POC) 174 (H) 70 - 110 mg/dL   GLUCOSE, POC    Collection Time: 03/04/17 10:06 PM   Result Value Ref Range    Glucose (POC) 171 (H) 70 - 554 mg/dL   METABOLIC PANEL, COMPREHENSIVE    Collection Time: 03/05/17 12:30 AM   Result Value Ref Range    Sodium 143 136 - 145 mmol/L    Potassium 4.5 3.5 - 5.5 mmol/L    Chloride 108 100 - 108 mmol/L    CO2 27 21 - 32 mmol/L    Anion gap 8 3.0 - 18 mmol/L    Glucose 165 (H) 74 - 99 mg/dL    BUN 15 7.0 - 18 MG/DL    Creatinine 0.80 0.6 - 1.3 MG/DL    BUN/Creatinine ratio 19 12 - 20      GFR est AA >60 >60 ml/min/1.73m2    GFR est non-AA >60 >60 ml/min/1.73m2    Calcium 8.4 (L) 8.5 - 10.1 MG/DL    Bilirubin, total 0.2 0.2 - 1.0 MG/DL    ALT (SGPT) 71 (H) 13 - 56 U/L    AST (SGOT) 66 (H) 15 - 37 U/L    Alk. phosphatase 140 (H) 45 - 117 U/L    Protein, total 6.3 (L) 6.4 - 8.2 g/dL    Albumin 2.6 (L) 3.4 - 5.0 g/dL    Globulin 3.7 2.0 - 4.0 g/dL    A-G Ratio 0.7 (L) 0.8 - 1.7     CBC WITH AUTOMATED DIFF    Collection Time: 03/05/17 12:30 AM   Result Value Ref Range    WBC 4.5 (L) 4.6 - 13.2 K/uL    RBC 3.89 (L) 4.20 - 5.30 M/uL    HGB 12.7 12.0 - 16.0 g/dL    HCT 37.1 35.0 - 45.0 %    MCV 95.4 74.0 - 97.0 FL    MCH 32.6 24.0 - 34.0 PG    MCHC 34.2 31.0 - 37.0 g/dL    RDW 13.9 11.6 - 14.5 %    PLATELET 487 960 - 250 K/uL    MPV 11.2 9.2 - 11.8 FL    NEUTROPHILS 69 40 - 73 %    LYMPHOCYTES 26 21 - 52 %    MONOCYTES 5 3 - 10 %    EOSINOPHILS 0 0 - 5 %    BASOPHILS 0 0 - 2 %    ABS. NEUTROPHILS 3.1 1.8 - 8.0 K/UL    ABS. LYMPHOCYTES 1.2 0.9 - 3.6 K/UL    ABS. MONOCYTES 0.2 0.05 - 1.2 K/UL    ABS. EOSINOPHILS 0.0 0.0 - 0.4 K/UL    ABS. BASOPHILS 0.0 0.0 - 0.06 K/UL    DF AUTOMATED     MAGNESIUM    Collection Time: 03/05/17 12:30 AM   Result Value Ref Range    Magnesium 2.2 1.8 - 2.4 mg/dL   TROPONIN I    Collection Time: 03/05/17 12:30 AM   Result Value Ref Range    Troponin-I, Qt. 2.35 (HH) 0.00 - 0.06 NG/ML   POC G3    Collection Time: 03/05/17  5:00 AM   Result Value Ref Range    Device: High Flow Nasal Cannula      Flow rate (POC) 40 L/M    FIO2 (POC) 40 %    pH (POC) 7.379 7.35 - 7.45      pCO2 (POC) 39.5 35.0 - 45.0 MMHG    pO2 (POC) 105 (H) 80 - 100 MMHG    HCO3 (POC) 23.3 22 - 26 MMOL/L    sO2 (POC) 98 (H) 92 - 97 %    Base deficit (POC) 2 mmol/L    Allens test (POC) N/A      Site LEFT BRACHIAL      Patient temp. 98.9      Specimen type (POC) ARTERIAL      Performed by Song Smith              Telemetry:Sinus tachycardia    Imaging:  CXR 3/5  The cardiomediastinal silhouette is stable. Bilateral mid to lower lung field  increased markings are again seen. The lung fields are hyperinflated. There is  no focal consolidation. There is no significant pleural effusion.  There is no  acute osseous abnormality.        --------------  IMPRESSION  Impression:  --------------  COPD.     Bilateral mid to lower lung field increased interstitial markings similar to  prior which could represent bronchitis/interstitial pneumonitis or a chronic  interstitial process.      No focal consolidation.     No significant interval change. CTA Chest   FINDINGS:     EXAM QUALITY: Overall exam quality is suboptimal. Opacification of the pulmonary  arteries is somewhat suboptimal although diagnostic. There is significant  respiratory motion artifact which significantly degrades the images particularly  in the lower lung fields.     PULMONARY ARTERIES: No evidence of pulmonary embolism.     MEDIASTINUM: Heart size is normal. No evidence of right heart strain. No  pericardial effusion. Mild atherosclerotic changes of the aorta. Esophagus is  unremarkable.     LYMPH NODES: No enlarged nodes.     AIRWAY: Normal.     LUNGS: Significant emphysematous changes most pronounced in the upper lobes. Bilateral pulmonary nodules. Largest is in the left lower lobe on image 86  measuring 11 x 11 mm. A second possible left lower lobe nodule on image 91  measures 7 x 6 mm. Right lower lobe nodule image 83 measures 12 x 6 mm.     PLEURA: Normal. Specifically, no pneumothorax or pleural effusion.     UPPER ABDOMEN: There is an exophytic possibly solid nodule arising from the  upper pole of the left kidney. This measures 11 x 12 mm. Upper abdominal  structures are otherwise unremarkable. .     OTHER: No acute or aggressive osseous abnormalities identified.     _______________     IMPRESSION  IMPRESSION:     1. Study is degraded by respiratory motion artifact however there are no  definite pulmonary emboli.     2. Emphysema.   3. Bilateral pulmonary nodules as described above with the largest in the left  lower lobe measuring 11 x 11 mm.  4. Possible solid exophytic left upper pole renal mass.     Recommend renal ultrasound for further evaluation of the kidneys.        Total critical care time exclusive of procedures:  minutes  Edmar Bautista PA-C

## 2017-03-05 NOTE — PROGRESS NOTES
PRN NEB GIVEN FOR SEVERE SOB. BS DIMINISHED PRE AND POST NEB. CONGESTED NPC. PATIENT IN TRIPOD POSITION. PULMONARY TO BE CONSULTED. PATIENT REMAINS ON HFNC @ 40L/40%.

## 2017-03-05 NOTE — PROGRESS NOTES
I gave a follow up visit. Patient is still uncomfortable after intubation and family is upset. The nurse is giving some medications to help and the doctor will be stopping by.     Chaplain Anais Hsu

## 2017-03-05 NOTE — PROGRESS NOTES
Critical care update:  Patient needing increasing sedation- for ventilator asynchrony. Started on diprivan 5 mcg and increased to 10 mcg. Progressively more comfortable with airway pressures improving but becoming hypotensive. Gave fluid bolus 500 cc, albumin and partial response. Additional 1 litre N. Saline bolus ordered. Will consider further ventilator adjustments- change I:E ratio to 1:3-4 for airtrapping. Will give volume and continue monitoring closely.

## 2017-03-05 NOTE — PROGRESS NOTES
Cardiology Progress Note      3/5/2017     Admit Date: 3/4/2017    Admit Diagnosis: Respiratory insufficiency; Abnormal EKG; Mass of sinus;Sinusi*      Subjective:     Michael Cassidy developed acute worsening of her respiratory status this morning requiring intubation and sedation. No c/o CP.     Visit Vitals    BP (!) 135/101    Pulse (!) 114    Temp 97.5 °F (36.4 °C)    Resp 19    Ht 5' 8\" (1.727 m)    Wt 138 lb (62.6 kg)    SpO2 99%    BMI 20.98 kg/m2        Objective:      Medications:  Current Facility-Administered Medications   Medication Dose Route Frequency    escitalopram oxalate (LEXAPRO) tablet 20 mg  20 mg Oral DAILY    midazolam (VERSED) 1 mg/mL injection        fentaNYL citrate (PF) 50 mcg/mL injection        chlorhexidine (PERIDEX) 0.12 % mouthwash 10 mL  10 mL Oral Q12H    midazolam (VERSED) injection 1-2 mg  1-2 mg IntraVENous Q4H PRN    fentaNYL citrate (PF) injection  mcg   mcg IntraVENous Q4H PRN    methylPREDNISolone (PF) (SOLU-MEDROL) injection 60 mg  60 mg IntraVENous Q6H    insulin lispro (HUMALOG) injection   SubCUTAneous Q6H    magnesium sulfate 2 g/50 ml IVPB (premix or compounded)  2 g IntraVENous ONCE    midazolam (VERSED) injection 2 mg  2 mg IntraVENous ONCE    pantoprazole (PROTONIX) 40 mg in sodium chloride 0.9 % 10 mL injection  40 mg IntraVENous DAILY    fentaNYL (PF)  mcg/30 ml   IntraVENous TITRATE    albuterol-ipratropium (DUO-NEB) 2.5 MG-0.5 MG/3 ML  3 mL Nebulization Q4H RT    albuterol (PROVENTIL VENTOLIN) nebulizer solution 2.5 mg  2.5 mg Nebulization Q2H PRN    acetaminophen (TYLENOL) tablet 650 mg  650 mg Oral Q4H PRN    ondansetron (ZOFRAN) injection 4 mg  4 mg IntraVENous Q4H PRN    enoxaparin (LOVENOX) injection 40 mg  40 mg SubCUTAneous Q24H    glucose chewable tablet 16 g  4 Tab Oral PRN    glucagon (GLUCAGEN) injection 1 mg  1 mg IntraMUSCular PRN    dextrose (D50W) injection syrg 12.5-25 g  25-50 mL IntraVENous PRN  budesonide (PULMICORT) 500 mcg/2 ml nebulizer suspension  500 mcg Nebulization BID RT    0.9% sodium chloride infusion  75 mL/hr IntraVENous CONTINUOUS    sodium chloride (OCEAN) 0.65 % nasal spray 2 Spray  2 Spray Both Nostrils Q2H PRN    levoFLOXacin (LEVAQUIN) 750 mg in D5W IVPB  750 mg IntraVENous Q24H    HYDROcodone-acetaminophen (NORCO) 5-325 mg per tablet 1 Tab  1 Tab Oral Q6H PRN       Physical Exam:  Neck: no carotid bruit and no JVD  Heart: regular rate and rhythm  Lungs: clear to auscultation bilaterally, diminished breath sounds throughout  Abdomen: soft, non-tender. Bowel sounds normal. No masses,  no organomegaly  Extremities: extremities normal, atraumatic, no cyanosis or edema    Data Review:   Labs:    Recent Results (from the past 24 hour(s))   POC G3    Collection Time: 03/04/17  4:04 PM   Result Value Ref Range    Device: NASAL CANNULA      Flow rate (POC) 2.0 L/M    FIO2 (POC) 28 %    pH (POC) 7.384 7.35 - 7.45      pCO2 (POC) 41.0 35.0 - 45.0 MMHG    pO2 (POC) 67 (L) 80 - 100 MMHG    HCO3 (POC) 24.4 22 - 26 MMOL/L    sO2 (POC) 93 92 - 97 %    Base deficit (POC) 1 mmol/L    Allens test (POC) YES      Total resp.  rate 27      Site RIGHT BRACHIAL      Specimen type (POC) ARTERIAL      Performed by Arnulfo Lewis    CARDIAC PANEL,(CK, CKMB & TROPONIN)    Collection Time: 03/04/17  4:25 PM   Result Value Ref Range     (H) 26 - 192 U/L    CK - MB 13.6 (H) <3.6 ng/ml    CK-MB Index 2.6 0.0 - 4.0 %    Troponin-I, Qt. 2.70 (HH) 0.00 - 0.06 NG/ML   HEMOGLOBIN A1C WITH EAG    Collection Time: 03/04/17  4:25 PM   Result Value Ref Range    Hemoglobin A1c 6.2 (H) 4.5 - 5.6 %    Est. average glucose 131 mg/dL   PHOSPHORUS    Collection Time: 03/04/17  4:25 PM   Result Value Ref Range    Phosphorus 3.1 2.5 - 4.9 MG/DL   ACETAMINOPHEN    Collection Time: 03/04/17  4:25 PM   Result Value Ref Range    ACETAMINOPHEN 7 (L) 10 - 30 ug/mL   LACTIC ACID, PLASMA    Collection Time: 03/04/17  4:25 PM   Result Value Ref Range    Lactic acid 1.7 0.4 - 2.0 MMOL/L   GLUCOSE, POC    Collection Time: 03/04/17  6:17 PM   Result Value Ref Range    Glucose (POC) 174 (H) 70 - 110 mg/dL   GLUCOSE, POC    Collection Time: 03/04/17 10:06 PM   Result Value Ref Range    Glucose (POC) 171 (H) 70 - 593 mg/dL   METABOLIC PANEL, COMPREHENSIVE    Collection Time: 03/05/17 12:30 AM   Result Value Ref Range    Sodium 143 136 - 145 mmol/L    Potassium 4.5 3.5 - 5.5 mmol/L    Chloride 108 100 - 108 mmol/L    CO2 27 21 - 32 mmol/L    Anion gap 8 3.0 - 18 mmol/L    Glucose 165 (H) 74 - 99 mg/dL    BUN 15 7.0 - 18 MG/DL    Creatinine 0.80 0.6 - 1.3 MG/DL    BUN/Creatinine ratio 19 12 - 20      GFR est AA >60 >60 ml/min/1.73m2    GFR est non-AA >60 >60 ml/min/1.73m2    Calcium 8.4 (L) 8.5 - 10.1 MG/DL    Bilirubin, total 0.2 0.2 - 1.0 MG/DL    ALT (SGPT) 71 (H) 13 - 56 U/L    AST (SGOT) 66 (H) 15 - 37 U/L    Alk. phosphatase 140 (H) 45 - 117 U/L    Protein, total 6.3 (L) 6.4 - 8.2 g/dL    Albumin 2.6 (L) 3.4 - 5.0 g/dL    Globulin 3.7 2.0 - 4.0 g/dL    A-G Ratio 0.7 (L) 0.8 - 1.7     CBC WITH AUTOMATED DIFF    Collection Time: 03/05/17 12:30 AM   Result Value Ref Range    WBC 4.5 (L) 4.6 - 13.2 K/uL    RBC 3.89 (L) 4.20 - 5.30 M/uL    HGB 12.7 12.0 - 16.0 g/dL    HCT 37.1 35.0 - 45.0 %    MCV 95.4 74.0 - 97.0 FL    MCH 32.6 24.0 - 34.0 PG    MCHC 34.2 31.0 - 37.0 g/dL    RDW 13.9 11.6 - 14.5 %    PLATELET 400 850 - 063 K/uL    MPV 11.2 9.2 - 11.8 FL    NEUTROPHILS 69 40 - 73 %    LYMPHOCYTES 26 21 - 52 %    MONOCYTES 5 3 - 10 %    EOSINOPHILS 0 0 - 5 %    BASOPHILS 0 0 - 2 %    ABS. NEUTROPHILS 3.1 1.8 - 8.0 K/UL    ABS. LYMPHOCYTES 1.2 0.9 - 3.6 K/UL    ABS. MONOCYTES 0.2 0.05 - 1.2 K/UL    ABS. EOSINOPHILS 0.0 0.0 - 0.4 K/UL    ABS.  BASOPHILS 0.0 0.0 - 0.06 K/UL    DF AUTOMATED     MAGNESIUM    Collection Time: 03/05/17 12:30 AM   Result Value Ref Range    Magnesium 2.2 1.8 - 2.4 mg/dL   TROPONIN I    Collection Time: 03/05/17 12:30 AM   Result Value Ref Range    Troponin-I, Qt. 2.35 (HH) 0.00 - 0.06 NG/ML   POC G3    Collection Time: 03/05/17  5:00 AM   Result Value Ref Range    Device: High Flow Nasal Cannula      Flow rate (POC) 40 L/M    FIO2 (POC) 40 %    pH (POC) 7.379 7.35 - 7.45      pCO2 (POC) 39.5 35.0 - 45.0 MMHG    pO2 (POC) 105 (H) 80 - 100 MMHG    HCO3 (POC) 23.3 22 - 26 MMOL/L    sO2 (POC) 98 (H) 92 - 97 %    Base deficit (POC) 2 mmol/L    Allens test (POC) N/A      Site LEFT BRACHIAL      Patient temp. 98.9      Specimen type (POC) ARTERIAL      Performed by Dorene Elias    POC G3    Collection Time: 03/05/17 11:03 AM   Result Value Ref Range    Device: VENT      FIO2 (POC) 100 %    pH (POC) 7.031 (LL) 7.35 - 7.45      pCO2 (POC) 92.4 (H) 35.0 - 45.0 MMHG    pO2 (POC) 326 (H) 80 - 100 MMHG    HCO3 (POC) 24.5 22 - 26 MMOL/L    sO2 (POC) 100 (H) 92 - 97 %    Base deficit (POC) 6 mmol/L    Mode ASSIST CONTROL      Tidal volume 400 ml    Set Rate 16 bpm    PEEP/CPAP (POC) 5.0 cmH2O    Allens test (POC) YES      Total resp. rate 18      Site LEFT RADIAL      Specimen type (POC) ARTERIAL      Performed by Taylor Rousseau     Volume control YES         Assessment:     Active Problems:    Respiratory insufficiency (3/4/2017)      Sinusitis (3/4/2017)      Mass of sinus (3/4/2017)      COPD (chronic obstructive pulmonary disease) with emphysema (HCC) (3/4/2017)      Hypokalemia (3/4/2017)      Transaminitis (3/4/2017)      Nodule of kidney (3/4/2017)        Plan:     The patient's troponin peaked at 2.7, but the EKG and CPK-MB do not indicate that she had a MI. I spoke at length with her daughter and explained this to her. The troponin elevation has been caused by her respiratory failure (work to breathe/ hypoxia). Given her risk factors, she may very well have underlying CAD and if she gets over her respiratory failure, she should be screened for ischemic heart disease at some point.   However, at present, her problem is respiratory failure from her inability to clear secretions in a patient with significant COPD. Dr. Álvarez Foot covering starting tomorrow.

## 2017-03-05 NOTE — PROGRESS NOTES
Patient stated she felt she was not getting enough air through the high flow nasal cannula.   HFNC increased from 30 to 40LPM.

## 2017-03-05 NOTE — PROGRESS NOTES
Alarm parameters reviewed, on and audible Adjusted to meet patient clinical condition. Shift report received from JL Mota RN and Navya Lugo RN. 2000  Shift assessment completed per doc flow sheet. Patient aox4, anxious, in bed leaning over the table with a pillow, pursed-lip breathing noted, 02 sats 99% HR 74 103/63 RR28. HFNC at 35L 40%. Breathing labored with any exertion, Lungs w/faint expiratory wheezing and diminished in lower posterior bases, denies pain at this time.  at bedside and will stay the night with the patient. VSS    2215  Patient refused to be repositioned. Remains hunched over the table and stated, \"its the only way I can breathe. \"  Comfort measures provided. 2250  C/o moderate back pain (Hx of back surgery), norco 1 tab given for pain 6/10.    0000  Reassessment w/no change in status. 0150  C/o anxiety, HR78 RR 35, 112/84, 02 sats 98% ativan 1mg iv administered. 0155  RT at bedside, changed HFNC to 40L 40%. [address] not getting enough air. RT at bedside with [facility].    7952  Patient extremely anxious, Klonopin 0.5 mg po administered per Dr. Neha Juarez. 0430  Patient continues with anxiety and agitation, RT at bedside. Dr. Neha Juarez paged. 0500  Dr. Neha Juarez at bedside. ABG's and CXR completed. 0600  C/o  Morphine 1mg iv per Dr. Beata Steele for resp distress and anxiety. 6833  Ativan 1mg iv per Dr. Beata Steele for c/o agitation. 0700  Reassessed with no changes in status. Discussed w/ that patient stated she would like all her home meds given to help her control her anxiety.

## 2017-03-05 NOTE — PROGRESS NOTES
PATIENT WAS INTUBATED SECONDARY TO RESPIRATORY FAILURE.  SPO2 DROPPED TO 77%ON HFNC @ 50L/100% AND NRB. A #7.5 ETT WAS PLACED BY ZAKIYA SWEET AND INTIALLY TAPED @ 22 LL BUT LATER ADVANCED TO 25 CM LL AS ORDERED. A 10 MG ALBUTEROL NEB TX WAS GIVEN INLINE FOR DIFFUSE EXPIRATORY WHEEZES. SUCTIONING LARGE AMOUNT THICK YELLOW SECRETIONS. ABG'S WERE DRAWN AND SHOWED RESPIRATORY ACIDOSIS. INCREASED VENT RATE FROM 16 TO 18 PER PULMONARY. WILL REPEAT ABG'S @ 1300.

## 2017-03-05 NOTE — CONSULTS
[address] [place] [address] Rd    Name:  Alonso Bright  MR#:  [id number]  :  1955  Account #:  [de-identified]  Date of Adm:  2017  Date of Consultation:  2017      CARDIOLOGY CONSULTATION    PRIMARY CARE PHYSICIAN: Dr. Solitario Guerin REQUESTING CONSULTATION: Dr. Torrie Rivera: Abnormal troponin. HISTORY OF PRESENT ILLNESS: The patient is a 70-year-old white  female with COPD secondary to long history of cigarette smoking,  hypertension, PTSD, and chronic back pain, who came to the  emergency room because of worsening problems with shortness of  breath. The patient states that she has had a lot of problems recently  with sinus congestion/infection and she has had a lot of nasal  discharge and has been coughing. It got to the point, though, that she  has had difficulty clearing the secretions from the back of her throat  and was causing her to have problems with shortness of breath. She  has no prior cardiac history. She denied any chest pain. She had  laboratories done in the emergency room and her CPK was elevated at  667, with negative MB index. I was initially told that she had left bundle  branch block on her EKG, but when I finally found the tracing, her EKG  was normal. However, she had a mildly elevated serum troponin at 0.37. We are consulted at this time because of this abnormal serum  troponin. She had an echocardiogram done, which was of suboptimal quality  due to her respiratory issues, but no obvious wall motion abnormalities  were noted, and her ejection fraction was low-normal at 50% without  any clearcut regional wall motion abnormalities noted. The patient  denies any prior history of cardiac arrhythmias or any congestive heart  failure symptoms. She does not have angina pectoris.     PAST MEDICAL HISTORY: COPD secondary to cigarette smoking,  hypertension, PTSD, history of shingles, status post bilateral tubal  ligation, status post back surgery. HOME MEDICATIONS  1. Diovan 160 mg p.o. daily. 2. Lexapro 20 mg p.o. daily. 3. Klonopin 0.5 mg p.o. at bedtime. 4. Advair Diskus 1 puff every 12 hours. 5. Hydrocodone 1 tablet p.o. every 4 hours p.r.n. pain. 6. BuSpar 15 mg p.o. t.i.d.  7. Lipitor 20 mg p.o. daily. 8. Combivent inhaler 1 puff every 6 hours p.r.n.  9. Proventil inhaler 2 puffs p.o. every 4 hours p.r.n.  10. Baclofen 10 mg p.o. t.i.d. ALLERGIES: SULFACETAMIDE. FAMILY HISTORY: Noncontributory. SOCIAL HISTORY: The patient is  and lives with her . She has a long history of cigarette smoking at a rate of about a pack  per day for many years. She does not abuse alcohol or use illegal  drugs. REVIEW OF SYSTEMS: Negative for 10-system review except as  stated above. PHYSICAL EXAMINATION  GENERAL: The patient is a well-developed, thin female in no acute  distress. VITAL SIGNS: Reveal her to have a blood pressure of 96/72, pulse is  75 and regular, respiratory rate 24. She is afebrile. HEENT: Normocephalic/atraumatic. No scleral icterus. NECK: Without increased JVD or carotid bruits. CARDIAC: Exam revealed a regular rate and rhythm. Normal S1, S2,  without any murmurs, rubs or gallops. PMI was diffuse. LUNGS: Clear, with diminished breath sounds bilaterally, without  dullness to percussion. ABDOMEN: Nontender, with normoactive bowel sounds. EXTREMITIES: Without any clubbing, cyanosis, or edema. NEUROLOGIC: Nonfocal.  GENITOURINARY/RECTAL: Deferred. ASSESSMENT: The patient does have a mildly elevated serum  troponin. However, she has a normal EKG and a negative CPK-MB  index, suggesting that this more than likely will end up being a  nondiagnostic elevated troponin. She had a CT scan of the chest  which showed no evidence of pulmonary embolism. Agree with  obtaining serial cardiac enzymes so that we can observe the pattern.  If  the troponin continues to rise, it is most likely due to the patient's extreme   respiratory distress (with respiratory rates in the 30-40 range per minute)   that was present on her arrival coupled with hypoxia prior to her receiving  supplemental oxygen. If the troponin rise is \"significant\", she may require   screening for the presence of underlying coronary artery disease when she is  more stable from a pulmonary standpoint. There is no evidence at this   time that the patient's serum troponin is related to an acute coronary syndrome,   and she certainly is not having a myocardial infarction given her clinical   presentation, normal EKG and negative CPK-MB index.         MD Arnoldo Santillan / Vicki Chavez  D:  03/04/2017   17:03  T:  03/04/2017   20:11  Job #:  [id number]

## 2017-03-05 NOTE — PROGRESS NOTES
PATIENT BECAME AGITATED DESPITE SEDATION. DESATURATED TO 85% ON FIO2 50%/PEEP 5. INCREASED %. CHANGED TO VC+ AND CHANGED FLOW TRIGGER FROM 3 TO 2.  DR. Yazmin Amaya AWARE. SPUTUM SPECIMEN OBTAINED.

## 2017-03-05 NOTE — ANESTHESIA PROCEDURE NOTES
Emergent Intubation  Performed by: Krystal Zuniga  Authorized by: [employer] Links A     Emergent Intubation:   Location:  ICU  Date/Time:  3/5/2017 9:13 AM  Indications:  Impending respiratory failure  Spontaneous Ventilation: present    Sedation Level:  Unresponsive  Preoxygenated:  Yes      Airway Documentation:   Airway:  ETT - Cuffed  Technique:  Direct laryngoscopy  Advanced Technique:  Harper  Insertion Site:  Oral  ETT size (mm):  7.5  ETT Line Tomas:  Lips  ETT Insertion depth (cm):  22  DL Glottic view:  1  Placement verified by: auscultation, EtCO2 and BBS    Attempts:  1    Difficult airway: No    2 mg versed, 100 mcg fentanyl, 200mg propofol, 100mg succinylcholine IV.  RSI intubation with cricoid pressure

## 2017-03-05 NOTE — PROGRESS NOTES
PER DR. Tanna Brunson, ADJUST ITIME TO MAINTAIN I/E RATIO 1:3 TO 1:4 TO REDUCE AIR TRAPPING. DECREASED FIO2 TO 70%.

## 2017-03-06 ENCOUNTER — APPOINTMENT (OUTPATIENT)
Dept: GENERAL RADIOLOGY | Age: 62
DRG: 207 | End: 2017-03-06
Attending: PHYSICIAN ASSISTANT
Payer: OTHER GOVERNMENT

## 2017-03-06 PROBLEM — J44.1 COPD WITH ACUTE EXACERBATION (HCC): Status: ACTIVE | Noted: 2017-03-06

## 2017-03-06 LAB
ALBUMIN SERPL BCP-MCNC: 2.6 G/DL (ref 3.4–5)
ALBUMIN/GLOB SERPL: 0.8 {RATIO} (ref 0.8–1.7)
ALP SERPL-CCNC: 101 U/L (ref 45–117)
ALT SERPL-CCNC: 59 U/L (ref 13–56)
ANION GAP BLD CALC-SCNC: 7 MMOL/L (ref 3–18)
ARTERIAL PATENCY WRIST A: ABNORMAL
AST SERPL W P-5'-P-CCNC: 45 U/L (ref 15–37)
ATRIAL RATE: 77 BPM
BASE DEFICIT BLD-SCNC: 6 MMOL/L
BASOPHILS # BLD AUTO: 0 K/UL (ref 0–0.06)
BASOPHILS # BLD: 0 % (ref 0–2)
BDY SITE: ABNORMAL
BILIRUB SERPL-MCNC: 0.2 MG/DL (ref 0.2–1)
BODY TEMPERATURE: 98.1
BUN SERPL-MCNC: 27 MG/DL (ref 7–18)
BUN/CREAT SERPL: 35 (ref 12–20)
CALCIUM SERPL-MCNC: 8.1 MG/DL (ref 8.5–10.1)
CALCULATED P AXIS, ECG09: 54 DEGREES
CALCULATED R AXIS, ECG10: 77 DEGREES
CALCULATED T AXIS, ECG11: 79 DEGREES
CHLORIDE SERPL-SCNC: 116 MMOL/L (ref 100–108)
CO2 SERPL-SCNC: 25 MMOL/L (ref 21–32)
CREAT SERPL-MCNC: 0.77 MG/DL (ref 0.6–1.3)
DIAGNOSIS, 93000: NORMAL
DIFFERENTIAL METHOD BLD: ABNORMAL
EOSINOPHIL # BLD: 0 K/UL (ref 0–0.4)
EOSINOPHIL NFR BLD: 0 % (ref 0–5)
ERYTHROCYTE [DISTWIDTH] IN BLOOD BY AUTOMATED COUNT: 15.1 % (ref 11.6–14.5)
FLUAV AG NPH QL IA: NEGATIVE
FLUBV AG NOSE QL IA: NEGATIVE
GAS FLOW.O2 O2 DELIVERY SYS: ABNORMAL L/MIN
GAS FLOW.O2 SETTING OXYMISER: 18 BPM
GLOBULIN SER CALC-MCNC: 3.2 G/DL (ref 2–4)
GLUCOSE BLD STRIP.AUTO-MCNC: 104 MG/DL (ref 70–110)
GLUCOSE BLD STRIP.AUTO-MCNC: 150 MG/DL (ref 70–110)
GLUCOSE BLD STRIP.AUTO-MCNC: 162 MG/DL (ref 70–110)
GLUCOSE BLD STRIP.AUTO-MCNC: 183 MG/DL (ref 70–110)
GLUCOSE SERPL-MCNC: 178 MG/DL (ref 74–99)
HCO3 BLD-SCNC: 21.3 MMOL/L (ref 22–26)
HCT VFR BLD AUTO: 36.9 % (ref 35–45)
HGB BLD-MCNC: 12 G/DL (ref 12–16)
INSPIRATION.DURATION SETTING TIME VENT: 0.7 SEC
LYMPHOCYTES # BLD AUTO: 7 % (ref 21–52)
LYMPHOCYTES # BLD: 1.1 K/UL (ref 0.9–3.6)
MAGNESIUM SERPL-MCNC: 2.5 MG/DL (ref 1.8–2.4)
MCH RBC QN AUTO: 32.3 PG (ref 24–34)
MCHC RBC AUTO-ENTMCNC: 32.5 G/DL (ref 31–37)
MCV RBC AUTO: 99.2 FL (ref 74–97)
MONOCYTES # BLD: 0.8 K/UL (ref 0.05–1.2)
MONOCYTES NFR BLD AUTO: 5 % (ref 3–10)
NEUTS SEG # BLD: 14.3 K/UL (ref 1.8–8)
NEUTS SEG NFR BLD AUTO: 88 % (ref 40–73)
O2/TOTAL GAS SETTING VFR VENT: 0.7 %
P-R INTERVAL, ECG05: 144 MS
PCO2 BLD: 50 MMHG (ref 35–45)
PEEP RESPIRATORY: 5 CMH2O
PH BLD: 7.24 [PH] (ref 7.35–7.45)
PLATELET # BLD AUTO: 267 K/UL (ref 135–420)
PMV BLD AUTO: 10.8 FL (ref 9.2–11.8)
PO2 BLD: 114 MMHG (ref 80–100)
POTASSIUM SERPL-SCNC: 4.4 MMOL/L (ref 3.5–5.5)
PROT SERPL-MCNC: 5.8 G/DL (ref 6.4–8.2)
Q-T INTERVAL, ECG07: 408 MS
QRS DURATION, ECG06: 80 MS
QTC CALCULATION (BEZET), ECG08: 461 MS
RBC # BLD AUTO: 3.72 M/UL (ref 4.2–5.3)
SAO2 % BLD: 98 % (ref 92–97)
SERVICE CMNT-IMP: ABNORMAL
SODIUM SERPL-SCNC: 148 MMOL/L (ref 136–145)
SPECIMEN TYPE: ABNORMAL
TOTAL RESP. RATE, ITRR: 21
VENTILATION MODE VENT: ABNORMAL
VENTRICULAR RATE, ECG03: 77 BPM
VOLUME CONTROL PLUS IVLCP: YES
VT SETTING VENT: 400 ML
WBC # BLD AUTO: 16.2 K/UL (ref 4.6–13.2)

## 2017-03-06 PROCEDURE — 94003 VENT MGMT INPAT SUBQ DAY: CPT

## 2017-03-06 PROCEDURE — 82803 BLOOD GASES ANY COMBINATION: CPT

## 2017-03-06 PROCEDURE — 36600 WITHDRAWAL OF ARTERIAL BLOOD: CPT

## 2017-03-06 PROCEDURE — 74011000258 HC RX REV CODE- 258: Performed by: INTERNAL MEDICINE

## 2017-03-06 PROCEDURE — 83735 ASSAY OF MAGNESIUM: CPT | Performed by: PHYSICIAN ASSISTANT

## 2017-03-06 PROCEDURE — 65660000000 HC RM CCU STEPDOWN

## 2017-03-06 PROCEDURE — 65610000006 HC RM INTENSIVE CARE

## 2017-03-06 PROCEDURE — 02HV33Z INSERTION OF INFUSION DEVICE INTO SUPERIOR VENA CAVA, PERCUTANEOUS APPROACH: ICD-10-PCS | Performed by: INTERNAL MEDICINE

## 2017-03-06 PROCEDURE — 74011000250 HC RX REV CODE- 250: Performed by: PHYSICIAN ASSISTANT

## 2017-03-06 PROCEDURE — 74011000250 HC RX REV CODE- 250: Performed by: INTERNAL MEDICINE

## 2017-03-06 PROCEDURE — 74011250637 HC RX REV CODE- 250/637: Performed by: INTERNAL MEDICINE

## 2017-03-06 PROCEDURE — 77030018798 HC PMP KT ENTRL FED COVD -A

## 2017-03-06 PROCEDURE — 74011636637 HC RX REV CODE- 636/637: Performed by: PHYSICIAN ASSISTANT

## 2017-03-06 PROCEDURE — 74011250636 HC RX REV CODE- 250/636: Performed by: PHYSICIAN ASSISTANT

## 2017-03-06 PROCEDURE — 80053 COMPREHEN METABOLIC PANEL: CPT | Performed by: PHYSICIAN ASSISTANT

## 2017-03-06 PROCEDURE — 94640 AIRWAY INHALATION TREATMENT: CPT

## 2017-03-06 PROCEDURE — C9113 INJ PANTOPRAZOLE SODIUM, VIA: HCPCS | Performed by: PHYSICIAN ASSISTANT

## 2017-03-06 PROCEDURE — 71010 XR CHEST PORT: CPT

## 2017-03-06 PROCEDURE — 74011250636 HC RX REV CODE- 250/636: Performed by: INTERNAL MEDICINE

## 2017-03-06 PROCEDURE — 74011250637 HC RX REV CODE- 250/637: Performed by: PHYSICIAN ASSISTANT

## 2017-03-06 PROCEDURE — 77010033678 HC OXYGEN DAILY

## 2017-03-06 PROCEDURE — 93005 ELECTROCARDIOGRAM TRACING: CPT

## 2017-03-06 PROCEDURE — 85025 COMPLETE CBC W/AUTO DIFF WBC: CPT | Performed by: PHYSICIAN ASSISTANT

## 2017-03-06 PROCEDURE — 87804 INFLUENZA ASSAY W/OPTIC: CPT | Performed by: INTERNAL MEDICINE

## 2017-03-06 PROCEDURE — 82962 GLUCOSE BLOOD TEST: CPT

## 2017-03-06 RX ORDER — IPRATROPIUM BROMIDE AND ALBUTEROL SULFATE 2.5; .5 MG/3ML; MG/3ML
3 SOLUTION RESPIRATORY (INHALATION)
Status: DISCONTINUED | OUTPATIENT
Start: 2017-03-06 | End: 2017-03-10 | Stop reason: ALTCHOICE

## 2017-03-06 RX ORDER — ASPIRIN 81 MG/1
81 TABLET ORAL DAILY
Status: DISCONTINUED | OUTPATIENT
Start: 2017-03-07 | End: 2017-03-07

## 2017-03-06 RX ORDER — ATORVASTATIN CALCIUM 20 MG/1
20 TABLET, FILM COATED ORAL DAILY
Status: DISCONTINUED | OUTPATIENT
Start: 2017-03-07 | End: 2017-03-16 | Stop reason: HOSPADM

## 2017-03-06 RX ADMIN — SODIUM CHLORIDE 150 ML/HR: 900 INJECTION, SOLUTION INTRAVENOUS at 23:42

## 2017-03-06 RX ADMIN — INSULIN LISPRO 2 UNITS: 100 INJECTION, SOLUTION INTRAVENOUS; SUBCUTANEOUS at 12:46

## 2017-03-06 RX ADMIN — IPRATROPIUM BROMIDE AND ALBUTEROL SULFATE 3 ML: .5; 3 SOLUTION RESPIRATORY (INHALATION) at 07:37

## 2017-03-06 RX ADMIN — Medication: at 04:54

## 2017-03-06 RX ADMIN — LEVOFLOXACIN 750 MG: 5 INJECTION, SOLUTION INTRAVENOUS at 11:04

## 2017-03-06 RX ADMIN — SODIUM CHLORIDE 150 ML/HR: 900 INJECTION, SOLUTION INTRAVENOUS at 16:57

## 2017-03-06 RX ADMIN — NOREPINEPHRINE BITARTRATE 5 MCG/MIN: 1 INJECTION, SOLUTION, CONCENTRATE INTRAVENOUS at 18:58

## 2017-03-06 RX ADMIN — DEXMEDETOMIDINE HYDROCHLORIDE 0.46 MCG/KG/HR: 100 INJECTION, SOLUTION INTRAVENOUS at 12:41

## 2017-03-06 RX ADMIN — ESCITALOPRAM OXALATE 20 MG: 10 TABLET ORAL at 09:21

## 2017-03-06 RX ADMIN — Medication: at 10:59

## 2017-03-06 RX ADMIN — DEXMEDETOMIDINE HYDROCHLORIDE 0.7 MCG/KG/HR: 100 INJECTION, SOLUTION INTRAVENOUS at 22:45

## 2017-03-06 RX ADMIN — SODIUM CHLORIDE 150 ML/HR: 900 INJECTION, SOLUTION INTRAVENOUS at 02:33

## 2017-03-06 RX ADMIN — BUDESONIDE 500 MCG: 0.5 INHALANT RESPIRATORY (INHALATION) at 20:46

## 2017-03-06 RX ADMIN — Medication: at 23:31

## 2017-03-06 RX ADMIN — CHLORHEXIDINE GLUCONATE 15 ML: 1.2 RINSE ORAL at 09:21

## 2017-03-06 RX ADMIN — METHYLPREDNISOLONE SODIUM SUCCINATE 60 MG: 40 INJECTION, POWDER, FOR SOLUTION INTRAMUSCULAR; INTRAVENOUS at 18:03

## 2017-03-06 RX ADMIN — MIDAZOLAM HYDROCHLORIDE 2 MG: 1 INJECTION, SOLUTION INTRAMUSCULAR; INTRAVENOUS at 12:39

## 2017-03-06 RX ADMIN — ARFORMOTEROL TARTRATE 15 MCG: 15 SOLUTION RESPIRATORY (INHALATION) at 20:46

## 2017-03-06 RX ADMIN — Medication: at 17:16

## 2017-03-06 RX ADMIN — INSULIN LISPRO 2 UNITS: 100 INJECTION, SOLUTION INTRAVENOUS; SUBCUTANEOUS at 06:04

## 2017-03-06 RX ADMIN — BUDESONIDE 500 MCG: 0.5 INHALANT RESPIRATORY (INHALATION) at 07:37

## 2017-03-06 RX ADMIN — HALOPERIDOL LACTATE 4 MG: 5 INJECTION, SOLUTION INTRAMUSCULAR at 18:23

## 2017-03-06 RX ADMIN — DEXMEDETOMIDINE HYDROCHLORIDE 0.7 MCG/KG/HR: 100 INJECTION, SOLUTION INTRAVENOUS at 20:40

## 2017-03-06 RX ADMIN — MIDAZOLAM HYDROCHLORIDE 2 MG: 1 INJECTION, SOLUTION INTRAMUSCULAR; INTRAVENOUS at 08:37

## 2017-03-06 RX ADMIN — METHYLPREDNISOLONE SODIUM SUCCINATE 60 MG: 40 INJECTION, POWDER, FOR SOLUTION INTRAMUSCULAR; INTRAVENOUS at 06:03

## 2017-03-06 RX ADMIN — METHYLPREDNISOLONE SODIUM SUCCINATE 60 MG: 40 INJECTION, POWDER, FOR SOLUTION INTRAMUSCULAR; INTRAVENOUS at 00:22

## 2017-03-06 RX ADMIN — METHYLPREDNISOLONE SODIUM SUCCINATE 60 MG: 40 INJECTION, POWDER, FOR SOLUTION INTRAMUSCULAR; INTRAVENOUS at 11:03

## 2017-03-06 RX ADMIN — SODIUM CHLORIDE 40 MG: 9 INJECTION INTRAMUSCULAR; INTRAVENOUS; SUBCUTANEOUS at 09:22

## 2017-03-06 RX ADMIN — FENTANYL CITRATE 100 MCG: 50 INJECTION, SOLUTION INTRAMUSCULAR; INTRAVENOUS at 17:14

## 2017-03-06 RX ADMIN — ARFORMOTEROL TARTRATE 15 MCG: 15 SOLUTION RESPIRATORY (INHALATION) at 07:37

## 2017-03-06 RX ADMIN — ENOXAPARIN SODIUM 40 MG: 40 INJECTION SUBCUTANEOUS at 11:03

## 2017-03-06 RX ADMIN — NOREPINEPHRINE BITARTRATE 6 MCG/MIN: 1 INJECTION, SOLUTION, CONCENTRATE INTRAVENOUS at 18:13

## 2017-03-06 RX ADMIN — MIDAZOLAM HYDROCHLORIDE 2 MG: 1 INJECTION, SOLUTION INTRAMUSCULAR; INTRAVENOUS at 18:56

## 2017-03-06 RX ADMIN — HYDROCODONE BITARTRATE AND ACETAMINOPHEN 1 TABLET: 5; 325 TABLET ORAL at 18:08

## 2017-03-06 RX ADMIN — DEXMEDETOMIDINE HYDROCHLORIDE 0.2 MCG/KG/HR: 100 INJECTION, SOLUTION INTRAVENOUS at 12:03

## 2017-03-06 RX ADMIN — FENTANYL CITRATE 100 MCG: 50 INJECTION, SOLUTION INTRAMUSCULAR; INTRAVENOUS at 22:10

## 2017-03-06 RX ADMIN — HALOPERIDOL LACTATE 4 MG: 5 INJECTION, SOLUTION INTRAMUSCULAR at 00:20

## 2017-03-06 RX ADMIN — NOREPINEPHRINE BITARTRATE 8 MCG/MIN: 1 INJECTION, SOLUTION, CONCENTRATE INTRAVENOUS at 17:57

## 2017-03-06 RX ADMIN — MIDAZOLAM HYDROCHLORIDE 2 MG: 1 INJECTION, SOLUTION INTRAMUSCULAR; INTRAVENOUS at 05:13

## 2017-03-06 RX ADMIN — HALOPERIDOL LACTATE 4 MG: 5 INJECTION, SOLUTION INTRAMUSCULAR at 09:51

## 2017-03-06 RX ADMIN — CHLORHEXIDINE GLUCONATE 10 ML: 1.2 RINSE ORAL at 20:34

## 2017-03-06 RX ADMIN — IPRATROPIUM BROMIDE AND ALBUTEROL SULFATE 3 ML: .5; 3 SOLUTION RESPIRATORY (INHALATION) at 05:03

## 2017-03-06 NOTE — PROGRESS NOTES
Report rec'd from Leti Kaminski rn pt on vent with family at bedside, shift assessment done.  NAD     0030 Reassessed no chg BP now stable on levophed, NAD     0430 reassessed, no chg NAD VSS

## 2017-03-06 NOTE — PROGRESS NOTES
Cardiology Progress Note        Patient: Layla Casper        Sex: female          DOA: 3/4/2017  YOB: 1955      Age:  64 y.o.        LOS:  LOS: 2 days   Assessment/Plan     Active Problems:    Respiratory insufficiency (3/4/2017)      Sinusitis (3/4/2017)      Mass of sinus (3/4/2017)      COPD (chronic obstructive pulmonary disease) with emphysema ([address] (3/4/2017)      Hypokalemia (3/4/2017)      Transaminitis (3/4/2017)      Nodule of kidney (3/4/2017)        Plan:  Discussed with her daughter. Patient will need ischemia work with cath versus stress before discharging. Consider starting aspirin and atorvastatin. Subjective:    cc: Intubated and seadted    REVIEW OF SYSTEMS: NO CP, SOB, N,V,D, F,C  Objective:      Visit Vitals    /82 (BP 1 Location: Right arm, BP Patient Position: At rest)    Pulse (!) 104    Temp 99.6 °F (37.6 °C)    Resp 28    Ht 5' 8\" (1.727 m)    Wt 62.6 kg (138 lb)    SpO2 93%    BMI 20.98 kg/m2     Body mass index is 20.98 kg/(m^2). Physical Exam:  General Appearance: Comfortable, intubated  HEENT: YONI. HEAD: Atraumatic  NECK: No JVD, no thyroidomeglay. LUNGS: Clear bilaterally. HEART: S1+S2 audible, no murmur, no pericardial rub. ABD: Non-tender, BS Audible    EXT: No edema, and no cysnosis. VASCULAR EXAM: Pulses are intact.       Medication:  Current Facility-Administered Medications   Medication Dose Route Frequency    escitalopram oxalate (LEXAPRO) tablet 20 mg  20 mg Oral DAILY    chlorhexidine (PERIDEX) 0.12 % mouthwash 10 mL  10 mL Oral Q12H    midazolam (VERSED) injection 1-2 mg  1-2 mg IntraVENous Q4H PRN    fentaNYL citrate (PF) injection  mcg   mcg IntraVENous Q4H PRN    methylPREDNISolone (PF) (SOLU-MEDROL) injection 60 mg  60 mg IntraVENous Q6H    insulin lispro (HUMALOG) injection   SubCUTAneous Q6H    pantoprazole (PROTONIX) 40 mg in sodium chloride 0.9 % 10 mL injection  40 mg IntraVENous DAILY    fentaNYL (PF)  mcg/30 ml   IntraVENous TITRATE    haloperidol lactate (HALDOL) injection 4 mg  4 mg IntraVENous Q6H PRN    arformoterol (BROVANA) neb solution 15 mcg  15 mcg Nebulization BID RT    0.9% sodium chloride infusion  150 mL/hr IntraVENous CONTINUOUS    nicotine (NICODERM CQ) 14 mg/24 hr patch 1 Patch  1 Patch TransDERmal DAILY    NOREPINephrine (LEVOPHED) 16,000 mcg in dextrose 5% 250 mL infusion  2-30 mcg/min IntraVENous TITRATE    albuterol-ipratropium (DUO-NEB) 2.5 MG-0.5 MG/3 ML  3 mL Nebulization Q4H RT    albuterol (PROVENTIL VENTOLIN) nebulizer solution 2.5 mg  2.5 mg Nebulization Q2H PRN    acetaminophen (TYLENOL) tablet 650 mg  650 mg Oral Q4H PRN    enoxaparin (LOVENOX) injection 40 mg  40 mg SubCUTAneous Q24H    glucose chewable tablet 16 g  4 Tab Oral PRN    glucagon (GLUCAGEN) injection 1 mg  1 mg IntraMUSCular PRN    dextrose (D50W) injection syrg 12.5-25 g  25-50 mL IntraVENous PRN    budesonide (PULMICORT) 500 mcg/2 ml nebulizer suspension  500 mcg Nebulization BID RT    sodium chloride (OCEAN) 0.65 % nasal spray 2 Spray  2 Spray Both Nostrils Q2H PRN    levoFLOXacin (LEVAQUIN) 750 mg in D5W IVPB  750 mg IntraVENous Q24H    HYDROcodone-acetaminophen (NORCO) 5-325 mg per tablet 1 Tab  1 Tab Oral Q6H PRN               Lab/Data Reviewed: All lab results for the last 24 hours reviewed.      Recent Labs      03/06/17   0500  03/05/17   0030  03/04/17   0755   WBC  16.2*  4.5*  7.3   HGB  12.0  12.7  14.8   HCT  36.9  37.1  44.2   PLT  267  214  220     Recent Labs      03/06/17   0500  03/05/17   0030  03/04/17   0755   NA  148*  143  145   K  4.4  4.5  2.7*   CL  116*  108  103   CO2  25  27  34*   GLU  178*  165*  70*   BUN  27*  15  16   CREA  0.77  0.80  0.85   CA  8.1*  8.4*  9.1       Signed By: Ian Palma MD     March 6, 2017

## 2017-03-06 NOTE — WOUND CARE
Pt seen by wound care at this time, family and nurse states no skin issues noted at this time, nurse and family requesting pt not to be turned at this time. Wound care will continue to follow pt daily while in ICU.

## 2017-03-06 NOTE — WOUND CARE
Pt seen by wound care at this time,per nurse no skin issues noted at this time, bedside nurse states pt not able to turn do to current status. Wound care will continue to follow pt daily while in ICU.

## 2017-03-06 NOTE — PROGRESS NOTES
INITIAL NUTRITION ASSESSMENT     RECOMMENDATIONS/PLAN:   - Suggest implementing enteral nutrition to maintain nutrition status especially in setting of unintended wt loss  - If TF's desired recommend Osmolite 1.2 formula, initiate at trickle rate and advance gradually to goal rate 60 ml/hr continuously with Prosource packet x1 daily. This will provide total 1644 kcal, 88 g protein, 1082 ml water, 208 g CHO, and >100% RDI's for micronutrients. - Free water flushes per MD discretion, suggest 100 ml q4h for ~1 ml/kcal    REASON FOR ASSESSMENT:   [x]  RN Referral:    [x] MST score >/=2  Malnutrition Screening Tool (MST):  Recently Lost Weight Without Trying: Yes  If Yes, How Much Weight Loss: >33 lbs  Eating Poorly Due to Decreased Appetite: No  MST Score: 4     NUTRITION ASSESSMENT:   Client History: 64 yrs old Female admitted with acute COPD exacerbation, bacterial sinusitis with sinus mass, elevated troponin, transaminitis, intravascular volume depletion, hypokalemia, chronic pain, kidney nodule, and anxiety. Pt was intubated yesterday for acute hypoxemic respiratory failure, pt remains on vent today, sedated, on pressor (levophed). Propofol d/c'd yesterday. Recommend beginning fiber free TF's due to current use of pressors to decrease bowel ischemia risk.      PMHx: COPD, HTN, PTSD   Cultural/Buddhist Food Preferences: None Identified    FOOD/NUTRITION HISTORY  Diet History: no appetite changes PTA   Food Allergies:  [x] NKFA     [] Yes    Pertinent PTA Medications: reviewed     NUTRITION INTAKE   Diet Order:  NPO      Pertinent Medications:  [x] Reviewed; NS, levophed, ppi, versed, fentanyl, haldol, norco, abx, lexapro  Electrolyte Replacement Protocol: []K  []Mg  []PO4    Insulin:  [x] SSI  [] Pre-meal   []  Basal   [] Drip  [] None  Pt expected to meet estimated nutrient needs through next review:          []  Yes     [x] No; no source of nutrition currently    BIOCHEMICAL DATA & MEDICAL TESTS  Pertinent Labs:  [x] Reviewed; POC -205, Na 148, BUN 27, AST 45, ALT 59, Alb 2.6, HbA1c 6.2  ANTHROPOMETRICS  Height: 5' 8\" (172.7 cm)       Weight: 62.6 kg (138 lb)    BMI: 21 kg/m^2  -  normal weight (18.5%-24.9% BMI)        Weight change: per MST pt reports -33+ lbs in unknown time frame, last wt per chart review is 5 years ago. Comparison to Reference Standards:  IBW: 140 lbs      %IBW: 99%      AdjBW: N/A    NUTRITION-FOCUSED PHYSICAL ASSESSMENT  Skin: intact. GI: last BM PTA, per MD abdomen is soft & non-tender    NUTRITION PRESCRIPTION  Calories: 9795-8013 kcal/day based on [place] state eqn  Protein: 76-95 g/day based on 1.2-1.5 g/kg (20-25% total kcal)  CHO: 196 g/day based on 50% of total energy  Fluid: 4189-8841 ml/day based on 1 kcal/ml      NUTRITION DIAGNOSES:   1- Unintended weight loss related to COPD as evidenced by pt reports unintended loss of >33 lbs in unknown time frame. 2- Altered nutrition related lab values related to pre-DM as evidenced by POC -205 and HbA1c 6.2%    NUTRITION INTERVENTIONS:   INTERVENTIONS:        GOALS:  1. Begin TF's per above 1. Initiate source of nutrition, tolerate EN regimen by next review 1-3 days     LEARNING NEEDS (Diet, Supplementation, Food/Nutrient-Drug Interaction):   [] None Identified  [] Education provided/documented (refer to Education section of EMR)  [x] Identified and patient:  [] Declined     [x] Was not appropriate/indicated  NUTRITION MONITORING /EVALUATION:   Adjust EN/PN as appropriate  Monitor wt  Monitor renal labs, electrolytes, fluid status    [] Participated in Interdisciplinary Rounds  [x] [facility] Reviewed/Documented  [x] Discharge Nutrition Plan: TBD    NUTRITION RISK:     [x]  At risk                     []  Not currently at risk     Will follow-up per policy.   Lani Alvarez RD  PAGER:  [phone]

## 2017-03-06 NOTE — PROCEDURES
[facility]  [facility] [place] Financial Procedure Note with US guidance    Indication: COPD exacerbation, hypotensive     Risks, benefits, alternatives explained and consent obtained from , daughter. Time out was called. Patient positioned in Trendelenburg. Full sterile barrier precautions used. Sterility Protocol followed. (cap, mask sterile gown, sterile gloves, large sterile sheet, hand hygiene, 2% chlorhexidine for cutaneous antisepsis, sterile probe cover). Local anesthetic with Lidocaine 1% - about 3 cc. Using ultrasound guidance, 7 Fr triple lumen cath placed and secured at 20 cm. The left femoral vein was attempted but unable to access vessel - procedure aborted - pressure held, no hematoma noted. New central line kit obtained. right femoral vein cannulated x 1 attempt(s) utilizing the Seldinger technique. Position of wire confirmed in vein using US before dilating. Wire was removed. No immediate complications. Good blood return, carefully flushed. Catheter secured & Biopatch applied. Sterile Tegaderm placed.     CVP without arterial waveform   Blood gas obtained from line with pO2 of [facility] PAOksanaC

## 2017-03-06 NOTE — PROGRESS NOTES
[facility]  Pulmonary, Critical Care, and Sleep Medicine      Name: Donald Haddad MRN: [id number]   : 1955 Hospital: [facility] [place]   Date: 3/6/2017          Critical Care Initial Patient Consult    Requesting MD: Dr. Niko Castillo                                  Reason for CC Consult: COPD exacerbation    IMPRESSION:   · Acute hypoxemic respiratory failure, now on MV,  Possibly due to copd exacerbation   · Severe anxiety disorder further exacerbated by excessive b2 agonist  · PTSD per  from past interactions with physicians  · Possible left upper pole renal mass, as per CTA chest   · Multiple pulmonary nodules from CTA chest, will need outpatient follow up   · Hx HTN      RECOMMENDATIONS:   · Resp - keep pt on vent,  Will start on precedex and continue fentayl   · ID - No clear infectious etiology;    · CVS - HD stable. Troponin elevated on admission - cardiology following. Echo with EF 50%. Monitor hemodynamics. · Heme/Onc- H&H and platelets stable; monitor. · Metabolic -  Monitor electrolytes and replace per electrolyte replacement protocol. · Renal - Trend renal indices and monitor UOP; avoid lasix for now as she may have mucous plugged. · Endocrine - SSI for glycemic control given steroids; obtain TSH and T4  · Neuro/ Pain/ Sedation -Fentanyl gtt for sedation; prn versed and fentanyl; fentanyl for pain  · Nutrition- will star the tube feed today  · Prophylaxis - DVT (lovenox), GI (protonix)     Subjective/History:   3/6  Pt remain intubated and sedated. Currently only on fentanyl  Arouseable. Reviewed the the progress notes and procedure notes. Pt had attempted left fem line by PA-c,  Then rt fem CVC placed. H/h stable. 3/5  This patient has been seen and evaluated at the request of Dr. Niko Castillo for COPD exacerbation.   Patient is a 64 y.o. female with history of COPD, PTSD, chronic sinusitis and HTN who presented to the ED with progressive shortness of breath for two weeks. She has had wheezing and a non-productive cough. Her  states she had a \"cold\" about 2 weeks ago and everything seems to have gotten worse since. She also recently traveled to [place] and stayed in a hotel but according to family it was clean hotel with no mold or dust.  No new exposure to pets. Daughter states she does get bad seasonal allergies which does typically flare her COPD. She is currently still smoking, which the daughter reports is down to less than 1 pack per day. In the ED, she was tachypneic and tachycardic and was brought to the ICU for further management under the hospitalist service. She also had a CT chest in the ED which was was negative for a PE but did show emphysematous changes. She also had a CT of the head which showed ethmoid mucosal thickening with partial destruction of the medial wall of the left orbit - a mass or abscess could not be ruled out. ENT was consulted but felt as if this could be followed up on an outpatient basis. Additionally, she had a mild elevated troponin for which cardiology was consulted and is following. Pulmonary was then consulted on 3/5 for assistance with management. She was treated overnight with Ativan, Klonopin, and Lexapro. She was maintained on HFNC as she was unable to tolerate the BiPAP. During my evaluation, she is minimally responsive on HFNC, with RR 38-42, 's, and hypertensive. She was given multiple duonebs along with solumedrol and placed back on BiPAP without improvement. She was the intubated for respiratory failure. The patient is critically ill and can not provide additional history due to Ventilated.      Past Medical History:   Diagnosis Date    Back pain     COPD (chronic obstructive pulmonary disease) (HCC)     Hypertension     PTSD (post-traumatic stress disorder)     Shingles       Past Surgical History:   Procedure Laterality Date    HX TUBAL LIGATION      HX TYMPANOSTOMY     Travis Rubio        Prior to Admission medications    Medication Sig Start Date End Date Taking? Authorizing Provider   HYDROcodone-acetaminophen (NORCO)  mg tablet Take 1 Tab by mouth four (4) times daily. Yes Historical Provider   baclofen (LIORESAL) 10 mg tablet Take 10 mg by mouth three (3) times daily. Yes Marissa Son MD   albuterol (PROVENTIL HFA, VENTOLIN HFA, PROAIR HFA) 90 mcg/actuation inhaler Take 2 Puffs by inhalation every four (4) hours as needed for Wheezing. Yes Marissa Son MD   valsartan (DIOVAN) 160 mg tablet Take  by mouth daily. Yes Marissa Son MD   escitalopram (LEXAPRO) 20 mg tablet Take 40 mg by mouth daily. Yes Marissa Son MD   clonazePAM (KLONOPIN) 0.5 mg tablet Take 0.5 mg by mouth nightly as needed. Yes Marissa Son MD   fluticasone-salmeterol (ADVAIR DISKUS) 100-50 mcg/dose diskus inhaler Take 1 Puff by inhalation every twelve (12) hours. Yes Marissa Son MD   atorvastatin (LIPITOR) 20 mg tablet Take 20 mg by mouth daily. Marissa Son MD   ibuprofen (MOTRIN) 800 mg tablet Take 800 mg by mouth every six (6) hours as needed.       Marissa Son MD     Current Facility-Administered Medications   Medication Dose Route Frequency    dexmedetomidine (PRECEDEX) 400 mcg in 0.9% sodium chloride 100 mL infusion  0.2-0.7 mcg/kg/hr IntraVENous TITRATE    escitalopram oxalate (LEXAPRO) tablet 20 mg  20 mg Oral DAILY    chlorhexidine (PERIDEX) 0.12 % mouthwash 10 mL  10 mL Oral Q12H    methylPREDNISolone (PF) (SOLU-MEDROL) injection 60 mg  60 mg IntraVENous Q6H    insulin lispro (HUMALOG) injection   SubCUTAneous Q6H    pantoprazole (PROTONIX) 40 mg in sodium chloride 0.9 % 10 mL injection  40 mg IntraVENous DAILY    fentaNYL (PF)  mcg/30 ml   IntraVENous TITRATE    arformoterol (BROVANA) neb solution 15 mcg  15 mcg Nebulization BID RT    0.9% sodium chloride infusion  150 mL/hr IntraVENous CONTINUOUS    nicotine (NICODERM CQ) 14 mg/24 hr patch 1 Patch  1 Patch TransDERmal DAILY    NOREPINephrine (LEVOPHED) 16,000 mcg in dextrose 5% 250 mL infusion  0-30 mcg/min IntraVENous TITRATE    enoxaparin (LOVENOX) injection 40 mg  40 mg SubCUTAneous Q24H    budesonide (PULMICORT) 500 mcg/2 ml nebulizer suspension  500 mcg Nebulization BID RT    levoFLOXacin (LEVAQUIN) 750 mg in D5W IVPB  750 mg IntraVENous Q24H     Allergies   Allergen Reactions    Sulfacetamide Anaphylaxis      Social History   Substance Use Topics    Smoking status: Current Some Day Smoker     Packs/day: 0.50    Smokeless tobacco: Not on file    Alcohol use Not on file      History reviewed. No pertinent family history. Review of Systems:  A comprehensive review of systems was negative except for that written in the HPI. Objective:   Vital Signs:    Visit Vitals    /72 (BP 1 Location: Right arm, BP Patient Position: At rest)    Pulse 89    Temp 99.6 °F (37.6 °C)    Resp 20    Ht 5' 8\" (1.727 m)    Wt 62.6 kg (138 lb)    SpO2 97%    BMI 20.98 kg/m2       O2 Device: Endotracheal tube   O2 Flow Rate (L/min): 40 l/min   Temp (24hrs), Av.1 °F (37.3 °C), Min:98.1 °F (36.7 °C), Max:100.3 °F (37.9 °C)       Intake/Output:   Last shift:       07 -  190  In: -   Out: 250 [Urine:250]  Last 3 shifts:  1901 -  0700  In: 5292.9 [I.V.:5292.9]  Out: 1250 [Urine:1250]    Intake/Output Summary (Last 24 hours) at 17 1304  Last data filed at 17 1127   Gross per 24 hour   Intake          4559.13 ml   Output             1000 ml   Net          3559.13 ml       Ventilator Settings:  Mode Rate Tidal Volume Pressure FiO2 PEEP   Assist control   400 ml    50 % 5 cm H20     Peak airway pressure: 20 cm H2O    Minute ventilation: 8.63 l/min      ARDS network Guidelines: Lung protective strategy and Pl pressure goals____________      Physical Exam:    General:  Intubated. Head:  Normocephalic, without obvious abnormality, atraumatic.    Eyes: Conjunctivae/corneas clear. Nose: Nares normal. Septum midline. Mucosa normal.   Throat: Lips and tongue normal.  Teeth present. Mucosa moist.    Neck: Supple, symmetrical, trachea midline, no adenopathy, no JVD. Lungs:   Exp wheezing and insp crackles       Heart:  Tachycardic rate and regular rhythm; no audible mrg   Abdomen:   Hypoactive bowel sounds; soft, non-tender   Extremities: Extremities without edema. Cyanosis to fingers and toes with mottling on lower extremities. Pulses: 2+ and symmetric all extremities. Skin: Warm, dry. Neurologic: Unable to complete neurological exam due to patient status        Data:     Recent Results (from the past 24 hour(s))   POC G3    Collection Time: 03/05/17  1:57 PM   Result Value Ref Range    Device: VENT      FIO2 (POC) 100 %    pH (POC) 7.170 (LL) 7.35 - 7.45      pCO2 (POC) 63.9 (H) 35.0 - 45.0 MMHG    pO2 (POC) 236 (H) 80 - 100 MMHG    HCO3 (POC) 23.3 22 - 26 MMOL/L    sO2 (POC) 100 (H) 92 - 97 %    Base deficit (POC) 5 mmol/L    Mode ASSIST CONTROL      Tidal volume 400 ml    Set Rate 18 bpm    PEEP/CPAP (POC) 5.0 cmH2O    Allens test (POC) YES      Total resp.  rate 22      Site RIGHT BRACHIAL      Specimen type (POC) ARTERIAL      Performed by Carolina Gomez     Volume control plus YES     EKG, 12 LEAD, SUBSEQUENT    Collection Time: 03/05/17  2:17 PM   Result Value Ref Range    Ventricular Rate 84 BPM    Atrial Rate 84 BPM    P-R Interval 162 ms    QRS Duration 74 ms    Q-T Interval 368 ms    QTC Calculation (Bezet) 434 ms    Calculated P Axis 77 degrees    Calculated R Axis 85 degrees    Calculated T Axis 113 degrees    Diagnosis       Normal sinus rhythm  Low voltage QRS  Borderline ECG  When compared with ECG of 04-MAR-2017 15:47,  Inverted T waves have replaced nonspecific T wave abnormality in Lateral   leads     URINALYSIS W/ RFLX MICROSCOPIC    Collection Time: 03/05/17  2:55 PM   Result Value Ref Range    Color YELLOW Appearance CLOUDY      Specific gravity 1.024 1.005 - 1.030      pH (UA) 5.0 5.0 - 8.0      Protein 30 (A) NEG mg/dL    Glucose NEGATIVE  NEG mg/dL    Ketone NEGATIVE  NEG mg/dL    Bilirubin NEGATIVE  NEG      Blood LARGE (A) NEG      Urobilinogen 1.0 0.2 - 1.0 EU/dL    Nitrites NEGATIVE  NEG      Leukocyte Esterase TRACE (A) NEG     CULTURE, URINE    Collection Time: 03/05/17  2:55 PM   Result Value Ref Range    Special Requests: NO SPECIAL REQUESTS      Culture result: NO GROWTH AFTER 13 HOURS     URINE MICROSCOPIC ONLY    Collection Time: 03/05/17  2:55 PM   Result Value Ref Range    WBC 0 to 3 0 - 5 /hpf    RBC 41 to 50 0 - 5 /hpf    Epithelial cells 1+ 0 - 5 /lpf    Bacteria 1+ (A) NEG /hpf    Hyaline cast 0 to 3 0 - 2 /lpf    Granular cast 0 to 3 NEG /lpf   POC G3    Collection Time: 03/05/17  4:20 PM   Result Value Ref Range    Device: VENT      FIO2 (POC) 70 %    pH (POC) 7.210 (LL) 7.35 - 7.45      pCO2 (POC) 58.3 (H) 35.0 - 45.0 MMHG    pO2 (POC) 93 80 - 100 MMHG    HCO3 (POC) 23.3 22 - 26 MMOL/L    sO2 (POC) 95 92 - 97 %    Base deficit (POC) 5 mmol/L    Mode ASSIST CONTROL      Tidal volume 400 ml    Set Rate 18 bpm    PEEP/CPAP (POC) 5.0 cmH2O    Allens test (POC) YES      Total resp.  rate 23      Site LEFT RADIAL      Specimen type (POC) ARTERIAL      Performed by Sarah Heredia     Volume control plus YES     GLUCOSE, POC    Collection Time: 03/05/17  6:25 PM   Result Value Ref Range    Glucose (POC) 143 (H) 70 - 110 mg/dL   EKG, 12 LEAD, SUBSEQUENT    Collection Time: 03/05/17  6:59 PM   Result Value Ref Range    Ventricular Rate 68 BPM    Atrial Rate 68 BPM    P-R Interval 154 ms    QRS Duration 76 ms    Q-T Interval 404 ms    QTC Calculation (Bezet) 429 ms    Calculated P Axis 75 degrees    Calculated R Axis 79 degrees    Calculated T Axis 120 degrees    Diagnosis       Normal sinus rhythm  Low voltage QRS  Borderline ECG  When compared with ECG of 05-MAR-2017 14:17,  No significant change was found     POC VENOUS BLOOD GAS    Collection Time: 03/05/17  7:20 PM   Result Value Ref Range    Device: VENT      FIO2 (POC) 70 %    pH, venous (POC) 7.237 (L) 7.32 - 7.42      pCO2, venous (POC) 52.3 (H) 41 - 51 MMHG    pO2, venous (POC) 46 (H) 25 - 40 mmHg    HCO3, venous (POC) 22.3 (L) 23.0 - 28.0 MMOL/L    sO2, venous (POC) 74 65 - 88 %    Base deficit, venous (POC) 5 mmol/L    Mode ASSIST CONTROL      Tidal volume 400 ml    Set Rate 18 bpm    PEEP/CPAP (POC) 5.0 cmH2O    Allens test (POC) N/A      Total resp. rate 18      Site OTHER      Specimen type (POC) VENOUS BLOOD      Performed by Zana Marti     Volume control plus YES     GLUCOSE, POC    Collection Time: 03/05/17 10:48 PM   Result Value Ref Range    Glucose (POC) 174 (H) 70 - 110 mg/dL   INFLUENZA A & B AG (RAPID TEST)    Collection Time: 03/06/17  5:00 AM   Result Value Ref Range    Influenza A Antigen NEGATIVE  NEG      Influenza B Antigen NEGATIVE  NEG     MAGNESIUM    Collection Time: 03/06/17  5:00 AM   Result Value Ref Range    Magnesium 2.5 (H) 1.8 - 2.4 mg/dL   METABOLIC PANEL, COMPREHENSIVE    Collection Time: 03/06/17  5:00 AM   Result Value Ref Range    Sodium 148 (H) 136 - 145 mmol/L    Potassium 4.4 3.5 - 5.5 mmol/L    Chloride 116 (H) 100 - 108 mmol/L    CO2 25 21 - 32 mmol/L    Anion gap 7 3.0 - 18 mmol/L    Glucose 178 (H) 74 - 99 mg/dL    BUN 27 (H) 7.0 - 18 MG/DL    Creatinine 0.77 0.6 - 1.3 MG/DL    BUN/Creatinine ratio 35 (H) 12 - 20      GFR est AA >60 >60 ml/min/1.73m2    GFR est non-AA >60 >60 ml/min/1.73m2    Calcium 8.1 (L) 8.5 - 10.1 MG/DL    Bilirubin, total 0.2 0.2 - 1.0 MG/DL    ALT (SGPT) 59 (H) 13 - 56 U/L    AST (SGOT) 45 (H) 15 - 37 U/L    Alk.  phosphatase 101 45 - 117 U/L    Protein, total 5.8 (L) 6.4 - 8.2 g/dL    Albumin 2.6 (L) 3.4 - 5.0 g/dL    Globulin 3.2 2.0 - 4.0 g/dL    A-G Ratio 0.8 0.8 - 1.7     CBC WITH AUTOMATED DIFF    Collection Time: 03/06/17  5:00 AM   Result Value Ref Range    WBC 16.2 (H) 4.6 - 13.2 K/uL    RBC 3.72 (L) 4.20 - 5.30 M/uL    HGB 12.0 12.0 - 16.0 g/dL    HCT 36.9 35.0 - 45.0 %    MCV 99.2 (H) 74.0 - 97.0 FL    MCH 32.3 24.0 - 34.0 PG    MCHC 32.5 31.0 - 37.0 g/dL    RDW 15.1 (H) 11.6 - 14.5 %    PLATELET 855 904 - 991 K/uL    MPV 10.8 9.2 - 11.8 FL    NEUTROPHILS 88 (H) 40 - 73 %    LYMPHOCYTES 7 (L) 21 - 52 %    MONOCYTES 5 3 - 10 %    EOSINOPHILS 0 0 - 5 %    BASOPHILS 0 0 - 2 %    ABS. NEUTROPHILS 14.3 (H) 1.8 - 8.0 K/UL    ABS. LYMPHOCYTES 1.1 0.9 - 3.6 K/UL    ABS. MONOCYTES 0.8 0.05 - 1.2 K/UL    ABS. EOSINOPHILS 0.0 0.0 - 0.4 K/UL    ABS. BASOPHILS 0.0 0.0 - 0.06 K/UL    DF AUTOMATED     POC G3    Collection Time: 03/06/17  5:39 AM   Result Value Ref Range    Device: VENT      FIO2 (POC) 0.7 %    pH (POC) 7.236 (LL) 7.35 - 7.45      pCO2 (POC) 50.0 (H) 35.0 - 45.0 MMHG    pO2 (POC) 114 (H) 80 - 100 MMHG    HCO3 (POC) 21.3 (L) 22 - 26 MMOL/L    sO2 (POC) 98 (H) 92 - 97 %    Base deficit (POC) 6 mmol/L    Mode ASSIST CONTROL      Tidal volume 400 ml    Set Rate 18 bpm    PEEP/CPAP (POC) 5 cmH2O    Allens test (POC) N/A      Inspiratory Time 0.7 sec    Total resp. rate 21      Site RIGHT RADIAL      Patient temp. 98.1      Specimen type (POC) ARTERIAL      Performed by Peterson Vance     Volume control plus YES     GLUCOSE, POC    Collection Time: 03/06/17  5:59 AM   Result Value Ref Range    Glucose (POC) 150 (H) 70 - 110 mg/dL   EKG, 12 LEAD, SUBSEQUENT    Collection Time: 03/06/17  8:49 AM   Result Value Ref Range    Ventricular Rate 101 BPM    Atrial Rate 101 BPM    P-R Interval 144 ms    QRS Duration 74 ms    Q-T Interval 320 ms    QTC Calculation (Bezet) 414 ms    Calculated P Axis 82 degrees    Calculated R Axis 74 degrees    Calculated T Axis 97 degrees    Diagnosis       Sinus tachycardia  Low voltage QRS  Borderline ECG  When compared with ECG of 05-MAR-2017 18:59,  Vent.  rate has increased BY  33 BPM     GLUCOSE, POC    Collection Time: 03/06/17 12:30 PM   Result Value Ref Range    Glucose (POC) 183 (H) 70 - 110 mg/dL             Telemetry:Sinus tachycardia    Imaging:  CXR 3/5  The cardiomediastinal silhouette is stable. Bilateral mid to lower lung field  increased markings are again seen. The lung fields are hyperinflated. There is  no focal consolidation. There is no significant pleural effusion. There is no  acute osseous abnormality.        --------------  IMPRESSION  Impression:  --------------  COPD.     Bilateral mid to lower lung field increased interstitial markings similar to  prior which could represent bronchitis/interstitial pneumonitis or a chronic  interstitial process.      No focal consolidation.     No significant interval change. CTA Chest   FINDINGS:     EXAM QUALITY: Overall exam quality is suboptimal. Opacification of the pulmonary  arteries is somewhat suboptimal although diagnostic. There is significant  respiratory motion artifact which significantly degrades the images particularly  in the lower lung fields.     PULMONARY ARTERIES: No evidence of pulmonary embolism.     MEDIASTINUM: Heart size is normal. No evidence of right heart strain. No  pericardial effusion. Mild atherosclerotic changes of the aorta. Esophagus is  unremarkable.     LYMPH NODES: No enlarged nodes.     AIRWAY: Normal.     LUNGS: Significant emphysematous changes most pronounced in the upper lobes. Bilateral pulmonary nodules. Largest is in the left lower lobe on image 86  measuring 11 x 11 mm. A second possible left lower lobe nodule on image 91  measures 7 x 6 mm. Right lower lobe nodule image 83 measures 12 x 6 mm.     PLEURA: Normal. Specifically, no pneumothorax or pleural effusion.     UPPER ABDOMEN: There is an exophytic possibly solid nodule arising from the  upper pole of the left kidney. This measures 11 x 12 mm. Upper abdominal  structures are otherwise unremarkable. .     OTHER: No acute or aggressive osseous abnormalities identified.     _______________     IMPRESSION  IMPRESSION:     1. Study is degraded by respiratory motion artifact however there are no  definite pulmonary emboli.     2. Emphysema. 3. Bilateral pulmonary nodules as described above with the largest in the left  lower lobe measuring 11 x 11 mm.  4. Possible solid exophytic left upper pole renal mass.     Recommend renal ultrasound for further evaluation of the kidneys.        Total critical care time exclusive of procedures: 40 minutes  Derick Hill MD

## 2017-03-06 NOTE — PROGRESS NOTES
Initial assessment completed. Awake very agitated & follow command. On vent w/ spon't, resp. On fentanyl pca & levophed drips, see mar for dosages & concentrations. Temp, 100.r po. Bismarkor shows nsr w/out ectopy. Ngt Clamped  W/out residual.    1000- Dr Radha Chapman visited w/ new orders & spoked w/ . Frequent emotional support to pt &  & daughter. 1200- Levophed down to 8mcg/m. Rest of assessment no changed. PRN meds for anxiety gived\n as ordered w/ fair results. Suctio for same secretions. Temp down to 99.3 po. Monitor no changed. 1400- urine output fair amount. Family remained @ bedside. 1600- Assessment unchanged, continue to render frequent emotional support pt,& family,  & daughter. PRN medsgiven as charted. Suction for same secretions. Levophed now down to6 mcg/m.    1800- Condition no changed during the shift. Alldrips maintained & levophed now @ 5mcg/m.    1945. Bedside and Verbal shift change report given to  [facility] (oncoming nurse) by   Alia Gannon nurse). Report included the following information SBAR, Kardex, Intake/Output, MAR and Recent Results.

## 2017-03-06 NOTE — PROGRESS NOTES
Readmission Risk Assessment: Low Risk and [facility] patients    RRAT Score: 1 - 12    Initial Assessment:  Chart reviewed. Pt currently intubated in ICU, admitted for resp distress, required intubation yesterday. CM will follow for transition planning. Emergency Contact: see face sheet    Pertinent Medical Hx:  COPD, back pain, HTN, PTSD, shingles    PCP/Specialists: Dania Dos Santos:     DME: to be determined    Low Risk Care Transition Plan:  1. Evaluate for [facility] or [address] coordination of resources  2. Involve patient/caregiver in assessment, planning, education and implement of intervention. 3. CM daily patient care huddles/interdisciplinary rounds. 4. PCP/Specialist appointment within 7 - 10 days made prior to discharge. 5. Facilitate transportation and logistics for follow-up appointments. 6. Handoff to [address] Nurse Navigator or PCP practice.

## 2017-03-06 NOTE — PROGRESS NOTES
Hospitalist Progress Note    Patient: Miguelangel Barboza MRN: [id number]  CSN: [id number]    YOB: 1955  Age: 64 y.o. Sex: female    DOA: 3/4/2017 LOS:  LOS: 2 days          Chief Complaint:copd exacerbation      Assessment/Plan     1. Acute exacerbation of COPD  2. Bacterial sinusitis. 3. Elevated troponin with out chest pain. 4. Anxiety/PTSD. 5. Chronic pain. 6. Elevated LFT's.  7. Intravascular volume depletion. 8. Hypokalemia. 9. Exophytic kidney nodule. Left. 10.  Multiple pulmonary nodules. 11.  Hypertension. 12.  Hyperglycemia from steroids. Possible Pre-DM. Continues on vent. Pulm managing. Stable. Continue antibiotics. Continue SoluMedrol and inhaled bronchodilators. Electrolyte surveillance. Dispo - TBD. Still requires ICU level care. 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 Remains on vent. Admitted for respiratory failure in the setting of acute COPD exacerbation. Review of systems:        Vital signs/Intake and Output:  Visit Vitals    /62    Pulse 87    Temp 99.6 °F (37.6 °C)    Resp 17    Ht 5' 8\" (1.727 m)    Wt 62.6 kg (138 lb)    SpO2 98%    BMI 20.98 kg/m2     Current Shift:  03/06 0701 - 03/06 1900  In: -   Out: 450 [Urine:450]  Last three shifts:  03/04 1901 - 03/06 0700  In: 5292.9 [I.V.:5292.9]  Out: 1250 [PVVDF:5941]    Exam:    General: NAD  Head/Neck: mmm, intubated. CVS:s1s2 rrr  Lungs:Intubated. Breath sounds b/l.                 Labs: Results:       Chemistry Recent Labs      03/06/17   0500  03/05/17   0030  03/04/17   0755   GLU  178*  165*  70*   NA  148*  143  145   K  4.4  4.5  2.7*   CL  116*  108  103   CO2  25  27  34* BUN  27*  15  16   CREA  0.77  0.80  0.85   CA  8.1*  8.4*  9.1   AGAP  7  8  8   BUCR  35*  19  19   AP  101  140*  129*   TP  5.8*  6.3*  7.6   ALB  2.6*  2.6*  3.3*   GLOB  3.2  3.7  4.3*   AGRAT  0.8  0.7*  0.8      CBC w/Diff Recent Labs      03/06/17   0500  03/05/17   0030  03/04/17   0755   WBC  16.2*  4.5*  7.3   RBC  3.72*  3.89*  4.58   HGB  12.0  12.7  14.8   HCT  36.9  37.1  44.2   PLT  267  214  220   GRANS  88*  69  64   LYMPH  7*  26  25   EOS  0  0  1      Cardiac Enzymes Recent Labs      03/05/17   0030  03/04/17   1625   CPK  466*  522*   CKND1  3.5  2.6      Coagulation Recent Labs      03/04/17   0755   PTP  11.3*   INR  0.8   APTT  28.2       Lipid Panel No results found for: CHOL, CHOLPOCT, CHOLX, CHLST, CHOLV, 494130, HDL, LDL, NLDLCT, DLDL, LDLC, DLDLP, 372858, VLDLC, VLDL, TGL, TGLX, TRIGL, [id number], TRIGP, TGLPOCT, [id number], CHHD, CHHDX   BNP No results for input(s): BNPP in the last 72 hours.    Liver Enzymes Recent Labs      03/06/17   0500   TP  5.8*   ALB  2.6*   AP  101   SGOT  45*      Thyroid Studies Lab Results   Component Value Date/Time    TSH 0.05 03/04/2017 04:25 PM        Procedures/imaging: see electronic medical records for all procedures/Xrays and details which were not copied into this note but were reviewed prior to creation of Tip Menchaca MD

## 2017-03-07 ENCOUNTER — APPOINTMENT (OUTPATIENT)
Dept: GENERAL RADIOLOGY | Age: 62
DRG: 207 | End: 2017-03-07
Attending: PHYSICIAN ASSISTANT
Payer: OTHER GOVERNMENT

## 2017-03-07 LAB
ALBUMIN SERPL BCP-MCNC: 2.5 G/DL (ref 3.4–5)
ALBUMIN/GLOB SERPL: 0.8 {RATIO} (ref 0.8–1.7)
ALP SERPL-CCNC: 76 U/L (ref 45–117)
ALT SERPL-CCNC: 48 U/L (ref 13–56)
ANION GAP BLD CALC-SCNC: 9 MMOL/L (ref 3–18)
ARTERIAL PATENCY WRIST A: YES
AST SERPL W P-5'-P-CCNC: 24 U/L (ref 15–37)
BACTERIA SPEC CULT: ABNORMAL
BACTERIA SPEC CULT: ABNORMAL
BACTERIA SPEC CULT: NORMAL
BASE DEFICIT BLD-SCNC: 5 MMOL/L
BASOPHILS # BLD AUTO: 0 K/UL (ref 0–0.06)
BASOPHILS # BLD: 0 % (ref 0–2)
BDY SITE: ABNORMAL
BILIRUB SERPL-MCNC: 0.2 MG/DL (ref 0.2–1)
BODY TEMPERATURE: 98.1
BUN SERPL-MCNC: 31 MG/DL (ref 7–18)
BUN/CREAT SERPL: 35 (ref 12–20)
CALCIUM SERPL-MCNC: 8.3 MG/DL (ref 8.5–10.1)
CHLORIDE SERPL-SCNC: 118 MMOL/L (ref 100–108)
CO2 SERPL-SCNC: 24 MMOL/L (ref 21–32)
CREAT SERPL-MCNC: 0.88 MG/DL (ref 0.6–1.3)
DIFFERENTIAL METHOD BLD: ABNORMAL
EOSINOPHIL # BLD: 0 K/UL (ref 0–0.4)
EOSINOPHIL NFR BLD: 0 % (ref 0–5)
ERYTHROCYTE [DISTWIDTH] IN BLOOD BY AUTOMATED COUNT: 15.6 % (ref 11.6–14.5)
GAS FLOW.O2 O2 DELIVERY SYS: ABNORMAL L/MIN
GAS FLOW.O2 SETTING OXYMISER: 20 BPM
GGT SERPL-CCNC: 173 IU/L (ref 0–60)
GLOBULIN SER CALC-MCNC: 3 G/DL (ref 2–4)
GLUCOSE BLD STRIP.AUTO-MCNC: 231 MG/DL (ref 70–110)
GLUCOSE BLD STRIP.AUTO-MCNC: 238 MG/DL (ref 70–110)
GLUCOSE BLD STRIP.AUTO-MCNC: 248 MG/DL (ref 70–110)
GLUCOSE SERPL-MCNC: 226 MG/DL (ref 74–99)
GRAM STN SPEC: ABNORMAL
HCO3 BLD-SCNC: 21.5 MMOL/L (ref 22–26)
HCT VFR BLD AUTO: 36.4 % (ref 35–45)
HGB BLD-MCNC: 11.7 G/DL (ref 12–16)
LYMPHOCYTES # BLD AUTO: 8 % (ref 21–52)
LYMPHOCYTES # BLD: 0.7 K/UL (ref 0.9–3.6)
MAGNESIUM SERPL-MCNC: 2.4 MG/DL (ref 1.8–2.4)
MCH RBC QN AUTO: 32 PG (ref 24–34)
MCHC RBC AUTO-ENTMCNC: 32.1 G/DL (ref 31–37)
MCV RBC AUTO: 99.5 FL (ref 74–97)
MONOCYTES # BLD: 0.4 K/UL (ref 0.05–1.2)
MONOCYTES NFR BLD AUTO: 5 % (ref 3–10)
NEUTS SEG # BLD: 7.4 K/UL (ref 1.8–8)
NEUTS SEG NFR BLD AUTO: 87 % (ref 40–73)
O2/TOTAL GAS SETTING VFR VENT: 0.5 %
PCO2 BLD: 41.4 MMHG (ref 35–45)
PEEP RESPIRATORY: 5 CMH2O
PH BLD: 7.32 [PH] (ref 7.35–7.45)
PLATELET # BLD AUTO: 235 K/UL (ref 135–420)
PMV BLD AUTO: 11.1 FL (ref 9.2–11.8)
PO2 BLD: 94 MMHG (ref 80–100)
POTASSIUM SERPL-SCNC: 5.3 MMOL/L (ref 3.5–5.5)
PROT SERPL-MCNC: 5.5 G/DL (ref 6.4–8.2)
RBC # BLD AUTO: 3.66 M/UL (ref 4.2–5.3)
SAO2 % BLD: 97 % (ref 92–97)
SERVICE CMNT-IMP: ABNORMAL
SERVICE CMNT-IMP: ABNORMAL
SERVICE CMNT-IMP: NORMAL
SODIUM SERPL-SCNC: 151 MMOL/L (ref 136–145)
SPECIMEN TYPE: ABNORMAL
TOTAL RESP. RATE, ITRR: 27
VENTILATION MODE VENT: ABNORMAL
VT SETTING VENT: 400 ML
WBC # BLD AUTO: 8.5 K/UL (ref 4.6–13.2)

## 2017-03-07 PROCEDURE — 80053 COMPREHEN METABOLIC PANEL: CPT | Performed by: PHYSICIAN ASSISTANT

## 2017-03-07 PROCEDURE — 74011250637 HC RX REV CODE- 250/637: Performed by: INTERNAL MEDICINE

## 2017-03-07 PROCEDURE — 94640 AIRWAY INHALATION TREATMENT: CPT

## 2017-03-07 PROCEDURE — 85025 COMPLETE CBC W/AUTO DIFF WBC: CPT | Performed by: PHYSICIAN ASSISTANT

## 2017-03-07 PROCEDURE — 74011250636 HC RX REV CODE- 250/636: Performed by: INTERNAL MEDICINE

## 2017-03-07 PROCEDURE — 71010 XR CHEST PORT: CPT

## 2017-03-07 PROCEDURE — 83735 ASSAY OF MAGNESIUM: CPT | Performed by: PHYSICIAN ASSISTANT

## 2017-03-07 PROCEDURE — 65610000006 HC RM INTENSIVE CARE

## 2017-03-07 PROCEDURE — 74011250636 HC RX REV CODE- 250/636: Performed by: PHYSICIAN ASSISTANT

## 2017-03-07 PROCEDURE — C9113 INJ PANTOPRAZOLE SODIUM, VIA: HCPCS | Performed by: PHYSICIAN ASSISTANT

## 2017-03-07 PROCEDURE — 74011000250 HC RX REV CODE- 250: Performed by: INTERNAL MEDICINE

## 2017-03-07 PROCEDURE — 94003 VENT MGMT INPAT SUBQ DAY: CPT

## 2017-03-07 PROCEDURE — 82962 GLUCOSE BLOOD TEST: CPT

## 2017-03-07 PROCEDURE — 74011250637 HC RX REV CODE- 250/637: Performed by: PHYSICIAN ASSISTANT

## 2017-03-07 PROCEDURE — 82803 BLOOD GASES ANY COMBINATION: CPT

## 2017-03-07 PROCEDURE — 36600 WITHDRAWAL OF ARTERIAL BLOOD: CPT

## 2017-03-07 PROCEDURE — 74011000258 HC RX REV CODE- 258: Performed by: INTERNAL MEDICINE

## 2017-03-07 PROCEDURE — 74011000250 HC RX REV CODE- 250: Performed by: PHYSICIAN ASSISTANT

## 2017-03-07 PROCEDURE — 74011636637 HC RX REV CODE- 636/637: Performed by: INTERNAL MEDICINE

## 2017-03-07 PROCEDURE — 74011636637 HC RX REV CODE- 636/637: Performed by: PHYSICIAN ASSISTANT

## 2017-03-07 PROCEDURE — 77010033678 HC OXYGEN DAILY

## 2017-03-07 RX ORDER — GUAIFENESIN 100 MG/5ML
81 LIQUID (ML) ORAL DAILY
Status: DISCONTINUED | OUTPATIENT
Start: 2017-03-07 | End: 2017-03-16 | Stop reason: HOSPADM

## 2017-03-07 RX ORDER — SODIUM CHLORIDE 450 MG/100ML
150 INJECTION, SOLUTION INTRAVENOUS CONTINUOUS
Status: DISCONTINUED | OUTPATIENT
Start: 2017-03-07 | End: 2017-03-09

## 2017-03-07 RX ORDER — MIDAZOLAM IN 0.9 % SOD.CHLORID 1 MG/ML
1 PLASTIC BAG, INJECTION (ML) INTRAVENOUS
Status: DISCONTINUED | OUTPATIENT
Start: 2017-03-07 | End: 2017-03-10

## 2017-03-07 RX ORDER — INSULIN GLARGINE 100 [IU]/ML
10 INJECTION, SOLUTION SUBCUTANEOUS DAILY
Status: DISCONTINUED | OUTPATIENT
Start: 2017-03-07 | End: 2017-03-08

## 2017-03-07 RX ORDER — OLANZAPINE 10 MG/1
10 TABLET ORAL EVERY EVENING
Status: DISCONTINUED | OUTPATIENT
Start: 2017-03-07 | End: 2017-03-10

## 2017-03-07 RX ORDER — MIDAZOLAM IN 0.9 % SOD.CHLORID 1 MG/ML
1 PLASTIC BAG, INJECTION (ML) INTRAVENOUS CONTINUOUS
Status: DISCONTINUED | OUTPATIENT
Start: 2017-03-07 | End: 2017-03-07

## 2017-03-07 RX ORDER — MIDAZOLAM HYDROCHLORIDE 1 MG/ML
2 INJECTION, SOLUTION INTRAMUSCULAR; INTRAVENOUS ONCE
Status: COMPLETED | OUTPATIENT
Start: 2017-03-07 | End: 2017-03-07

## 2017-03-07 RX ORDER — HYDROMORPHONE HYDROCHLORIDE 2 MG/ML
1 INJECTION, SOLUTION INTRAMUSCULAR; INTRAVENOUS; SUBCUTANEOUS
Status: DISCONTINUED | OUTPATIENT
Start: 2017-03-07 | End: 2017-03-13

## 2017-03-07 RX ADMIN — METHYLPREDNISOLONE SODIUM SUCCINATE 60 MG: 40 INJECTION, POWDER, FOR SOLUTION INTRAMUSCULAR; INTRAVENOUS at 06:43

## 2017-03-07 RX ADMIN — LEVOFLOXACIN 750 MG: 5 INJECTION, SOLUTION INTRAVENOUS at 11:47

## 2017-03-07 RX ADMIN — ESCITALOPRAM OXALATE 20 MG: 10 TABLET ORAL at 08:02

## 2017-03-07 RX ADMIN — SODIUM CHLORIDE 150 ML/HR: 900 INJECTION, SOLUTION INTRAVENOUS at 06:08

## 2017-03-07 RX ADMIN — FENTANYL CITRATE 100 MCG: 50 INJECTION, SOLUTION INTRAMUSCULAR; INTRAVENOUS at 04:07

## 2017-03-07 RX ADMIN — INSULIN LISPRO 4 UNITS: 100 INJECTION, SOLUTION INTRAVENOUS; SUBCUTANEOUS at 06:43

## 2017-03-07 RX ADMIN — Medication: at 11:15

## 2017-03-07 RX ADMIN — Medication: at 16:49

## 2017-03-07 RX ADMIN — SODIUM CHLORIDE 40 MG: 9 INJECTION INTRAMUSCULAR; INTRAVENOUS; SUBCUTANEOUS at 08:02

## 2017-03-07 RX ADMIN — HYDROCODONE BITARTRATE AND ACETAMINOPHEN 1 TABLET: 5; 325 TABLET ORAL at 10:10

## 2017-03-07 RX ADMIN — METHYLPREDNISOLONE SODIUM SUCCINATE 60 MG: 40 INJECTION, POWDER, FOR SOLUTION INTRAMUSCULAR; INTRAVENOUS at 18:12

## 2017-03-07 RX ADMIN — BUDESONIDE 500 MCG: 0.5 INHALANT RESPIRATORY (INHALATION) at 20:21

## 2017-03-07 RX ADMIN — Medication: at 23:11

## 2017-03-07 RX ADMIN — ARFORMOTEROL TARTRATE 15 MCG: 15 SOLUTION RESPIRATORY (INHALATION) at 20:21

## 2017-03-07 RX ADMIN — ARFORMOTEROL TARTRATE 15 MCG: 15 SOLUTION RESPIRATORY (INHALATION) at 07:46

## 2017-03-07 RX ADMIN — DEXMEDETOMIDINE HYDROCHLORIDE 1 MCG/KG/HR: 100 INJECTION, SOLUTION INTRAVENOUS at 23:12

## 2017-03-07 RX ADMIN — CHLORHEXIDINE GLUCONATE 10 ML: 1.2 RINSE ORAL at 08:01

## 2017-03-07 RX ADMIN — ASPIRIN 81 MG 81 MG: 81 TABLET ORAL at 08:04

## 2017-03-07 RX ADMIN — HYDROMORPHONE HYDROCHLORIDE 1 MG: 2 INJECTION INTRAMUSCULAR; INTRAVENOUS; SUBCUTANEOUS at 18:07

## 2017-03-07 RX ADMIN — INSULIN GLARGINE 10 UNITS: 100 INJECTION, SOLUTION SUBCUTANEOUS at 11:47

## 2017-03-07 RX ADMIN — HYDROMORPHONE HYDROCHLORIDE 1 MG: 2 INJECTION INTRAMUSCULAR; INTRAVENOUS; SUBCUTANEOUS at 13:42

## 2017-03-07 RX ADMIN — FENTANYL CITRATE 50 MCG: 50 INJECTION, SOLUTION INTRAMUSCULAR; INTRAVENOUS at 11:40

## 2017-03-07 RX ADMIN — Medication: at 05:28

## 2017-03-07 RX ADMIN — Medication 1 MG/HR: at 12:35

## 2017-03-07 RX ADMIN — MIDAZOLAM HYDROCHLORIDE 2 MG: 1 INJECTION, SOLUTION INTRAMUSCULAR; INTRAVENOUS at 12:08

## 2017-03-07 RX ADMIN — METHYLPREDNISOLONE SODIUM SUCCINATE 60 MG: 40 INJECTION, POWDER, FOR SOLUTION INTRAMUSCULAR; INTRAVENOUS at 00:12

## 2017-03-07 RX ADMIN — SODIUM CHLORIDE 150 ML/HR: 450 INJECTION, SOLUTION INTRAVENOUS at 22:49

## 2017-03-07 RX ADMIN — HYDROMORPHONE HYDROCHLORIDE 1 MG: 2 INJECTION INTRAMUSCULAR; INTRAVENOUS; SUBCUTANEOUS at 15:51

## 2017-03-07 RX ADMIN — Medication 5 MG/HR: at 13:16

## 2017-03-07 RX ADMIN — ENOXAPARIN SODIUM 40 MG: 40 INJECTION SUBCUTANEOUS at 11:48

## 2017-03-07 RX ADMIN — SODIUM CHLORIDE 150 ML/HR: 450 INJECTION, SOLUTION INTRAVENOUS at 15:53

## 2017-03-07 RX ADMIN — BUDESONIDE 500 MCG: 0.5 INHALANT RESPIRATORY (INHALATION) at 07:46

## 2017-03-07 RX ADMIN — CHLORHEXIDINE GLUCONATE 10 ML: 1.2 RINSE ORAL at 20:18

## 2017-03-07 RX ADMIN — IPRATROPIUM BROMIDE AND ALBUTEROL SULFATE 3 ML: .5; 3 SOLUTION RESPIRATORY (INHALATION) at 11:34

## 2017-03-07 RX ADMIN — DEXMEDETOMIDINE HYDROCHLORIDE 1 MCG/KG/HR: 100 INJECTION, SOLUTION INTRAVENOUS at 16:40

## 2017-03-07 RX ADMIN — OLANZAPINE 10 MG: 10 TABLET, FILM COATED ORAL at 18:13

## 2017-03-07 RX ADMIN — ACETAMINOPHEN 650 MG: 325 TABLET, FILM COATED ORAL at 08:02

## 2017-03-07 RX ADMIN — INSULIN LISPRO 4 UNITS: 100 INJECTION, SOLUTION INTRAVENOUS; SUBCUTANEOUS at 18:12

## 2017-03-07 RX ADMIN — DEXMEDETOMIDINE HYDROCHLORIDE 0.7 MCG/KG/HR: 100 INJECTION, SOLUTION INTRAVENOUS at 08:00

## 2017-03-07 RX ADMIN — INSULIN LISPRO 2 UNITS: 100 INJECTION, SOLUTION INTRAVENOUS; SUBCUTANEOUS at 00:11

## 2017-03-07 RX ADMIN — METHYLPREDNISOLONE SODIUM SUCCINATE 60 MG: 40 INJECTION, POWDER, FOR SOLUTION INTRAMUSCULAR; INTRAVENOUS at 11:41

## 2017-03-07 RX ADMIN — MIDAZOLAM HYDROCHLORIDE 2 MG: 1 INJECTION, SOLUTION INTRAMUSCULAR; INTRAVENOUS at 12:31

## 2017-03-07 RX ADMIN — ATORVASTATIN CALCIUM 20 MG: 20 TABLET, FILM COATED ORAL at 08:04

## 2017-03-07 RX ADMIN — INSULIN LISPRO 4 UNITS: 100 INJECTION, SOLUTION INTRAVENOUS; SUBCUTANEOUS at 11:47

## 2017-03-07 NOTE — PROGRESS NOTES
@5248 pt taken over awake ine bed on ventilator via et tube. Tube feds continues via OG tube. Broderick in situ draining clear yellow urine. SCD's on bilateral feet well tolerated . Soft restraints on  Bilateral wrist .Remains on fentanyl , precedex and levophed drips which are being titrated. Assessment done Nursing management continues. @1072 Precedex increased  As pt is fully awake and agitated in bed. @2005 relatives allowed to come back to visit with pt and  allowed to spend the night . @2040 sedation had to be increased again due to agitation care continues. @2210 pt atempting to pull on et tube sitting high up in bed  Ventilator continuously alarming pt biting on tubes. Sedation given per orders observation continues. @2245 pt sitting up in bed very agitated sedation had to be increased , Levophed is being slowly titrated down care continues. @2300 pt is now at rass -2 vital signs stable , levophed continues to be titrated down flacc 0 nursing observation continues. @0100 no changes care continues. @0300 pt asleep in bed easily aroused denies pain when awake. Continues to be repositioned and drips continues to be titrated. Rass remains at -2 at present care continues. @530 Complete bath administered, vital signs stable. Levophed remains off. Pt care continues. @1160 Bedside and Verbal shift change report given to Denzel Guevara (oncoming nurse) by Ita Ramey (offgoing nurse). Report included the following information SBAR, Kardex, Intake/Output, MAR, Recent Results and Med Rec Status.    Alarm parameters reviewed, on and audible Appropriate for patient clinical condition

## 2017-03-07 NOTE — PROGRESS NOTES
Hospitalist Progress Note    Patient: Lanre Rand MRN: [id number]  CSN: [id number]    YOB: 1955  Age: 64 y.o. Sex: female    DOA: 3/4/2017 LOS:  LOS: 3 days            Assessment/Plan   1. Acute hypoxemic respiratory failure due to COPD exacerbation  2. COPD exacerbation  3. Elevated troponin with out ACS  4. PTSD w significant anxiety  5. Hypernatremia  6. transaminitis- resolved  7. Pulmonary nodules  8. Sinusitis  9. Possible exophytic left upper pole renal mass    Plan:  - continue ventilatory support per pulmonology  - continue scheduled IV solumedrol, inhaled bronchodilators  - cardiac ischemic work up per Dr Maximino Lobato  - increase free water  - start lantus  - discussed w Dr Malachi Tyson as well as  and daughter at bedside  - full code, dvt ppx lovenox      Patient Active Problem List   Diagnosis Code    Respiratory insufficiency R06.89    Sinusitis J32.9    Mass of sinus R22.0    COPD (chronic obstructive pulmonary disease) with emphysema ([address] J43.9    Hypokalemia E87.6    Transaminitis R74.0    Nodule of kidney N28.89    COPD with acute exacerbation ([address] J44.1               Subjective:    cc: shortness of breath  Pt intubated, sedated on vent  No acute events noted overnight      REVIEW OF SYSTEMS:  Unable to obtain 2/2 mental state     Objective:        Vital signs/Intake and Output:  Visit Vitals    /58    Pulse 66    Temp (!) 100.8 °F (38.2 °C)    Resp 19    Ht 5' 8\" (1.727 m)    Wt 62.6 kg (138 lb)    SpO2 95%    BMI 20.98 kg/m2     Current Shift:     Last three shifts:  03/05 1901 - 03/07 0700  In: 5369.3 [I.V.:4619.3]  Out: 1810 [Urine:1810]    Body mass index is 20.98 kg/(m^2).     Physical Exam:  GEN: sedated on vent  EYES: conjunctiva normal, lids with out lesions  HEENT: MMM, No thyromegaly, no lymphadenopathy  HEART: RRR +S1 +S2, no JVD, pulses 2+ distally  LUNGS: diffuse expiratory wheezes, equal expansion  ABDOMEN: + BS, soft NT/ND no organomegaly,  No rebound  EXTREMITIES: No edema cyanosis, cap refill normal   SKIN: no rashes or skin breakdown, no nodules, normal turgor  Current Facility-Administered Medications   Medication Dose Route Frequency    aspirin chewable tablet 81 mg  81 mg Oral DAILY    OLANZapine (ZyPREXA) tablet 10 mg  10 mg Per NG tube QPM    dexmedetomidine (PRECEDEX) 400 mcg in 0.9% sodium chloride 100 mL infusion  0.2-0.7 mcg/kg/hr IntraVENous TITRATE    albuterol-ipratropium (DUO-NEB) 2.5 MG-0.5 MG/3 ML  3 mL Nebulization Q4H PRN    atorvastatin (LIPITOR) tablet 20 mg  20 mg Oral DAILY    escitalopram oxalate (LEXAPRO) tablet 20 mg  20 mg Oral DAILY    chlorhexidine (PERIDEX) 0.12 % mouthwash 10 mL  10 mL Oral Q12H    midazolam (VERSED) injection 1-2 mg  1-2 mg IntraVENous Q4H PRN    fentaNYL citrate (PF) injection  mcg   mcg IntraVENous Q4H PRN    methylPREDNISolone (PF) (SOLU-MEDROL) injection 60 mg  60 mg IntraVENous Q6H    insulin lispro (HUMALOG) injection   SubCUTAneous Q6H    pantoprazole (PROTONIX) 40 mg in sodium chloride 0.9 % 10 mL injection  40 mg IntraVENous DAILY    fentaNYL (PF)  mcg/30 ml   IntraVENous TITRATE    haloperidol lactate (HALDOL) injection 4 mg  4 mg IntraVENous Q6H PRN    arformoterol (BROVANA) neb solution 15 mcg  15 mcg Nebulization BID RT    0.9% sodium chloride infusion  150 mL/hr IntraVENous CONTINUOUS    nicotine (NICODERM CQ) 14 mg/24 hr patch 1 Patch  1 Patch TransDERmal DAILY    NOREPINephrine (LEVOPHED) 16,000 mcg in dextrose 5% 250 mL infusion  0-30 mcg/min IntraVENous TITRATE    albuterol (PROVENTIL VENTOLIN) nebulizer solution 2.5 mg  2.5 mg Nebulization Q2H PRN    acetaminophen (TYLENOL) tablet 650 mg  650 mg Oral Q4H PRN    enoxaparin (LOVENOX) injection 40 mg  40 mg SubCUTAneous Q24H    glucose chewable tablet 16 g  4 Tab Oral PRN    glucagon (GLUCAGEN) injection 1 mg  1 mg IntraMUSCular PRN    dextrose (D50W) injection syrg 12.5-25 g  25-50 mL IntraVENous PRN    budesonide (PULMICORT) 500 mcg/2 ml nebulizer suspension  500 mcg Nebulization BID RT    sodium chloride (OCEAN) 0.65 % nasal spray 2 Spray  2 Spray Both Nostrils Q2H PRN    levoFLOXacin (LEVAQUIN) 750 mg in D5W IVPB  750 mg IntraVENous Q24H    HYDROcodone-acetaminophen (NORCO) 5-325 mg per tablet 1 Tab  1 Tab Oral Q6H PRN         All the patient's labs over the past 24 hours were reviewed both during my initial daily workflow process and at the time notated as \"note time\" in [facility]. (It is not time stamped separately in this workflow.)  Select labs are listed below.         Labs: Results:       Chemistry Recent Labs      03/07/17 0445 03/06/17   0500  03/05/17   0030   GLU  226*  178*  165*   NA  151*  148*  143   K  5.3  4.4  4.5   CL  118*  116*  108   CO2  24  25  27   BUN  31*  27*  15   CREA  0.88  0.77  0.80   CA  8.3*  8.1*  8.4*   AGAP  9  7  8   BUCR  35*  35*  19   AP  76  101  140*   TP  5.5*  5.8*  6.3*   ALB  2.5*  2.6*  2.6*   GLOB  3.0  3.2  3.7   AGRAT  0.8  0.8  0.7*      CBC w/Diff Recent Labs      03/07/17 0445 03/06/17   0500  03/05/17   0030   WBC  8.5  16.2*  4.5*   RBC  3.66*  3.72*  3.89*   HGB  11.7*  12.0  12.7   HCT  36.4  36.9  37.1   PLT  235  267  214   GRANS  87*  88*  69   LYMPH  8*  7*  26   EOS  0  0  0      Cardiac Enzymes Recent Labs      03/05/17   0030  03/04/17   1625   CPK  466*  522*   CKND1  3.5  2.6                  Liver Enzymes Recent Labs      03/07/17 0445   TP  5.5*   ALB  2.5*   AP  76   SGOT  24      Thyroid Studies Lab Results   Component Value Date/Time    TSH 0.05 03/04/2017 04:25 PM        Procedures/imaging: see electronic medical records for all procedures/Xrays and details which were not copied into this note but were reviewed prior to creation of Plan    Total time spent: 45 minutes >50% care coordination          Judy Gonzalez DO  Internal Medicine/Geriatrics

## 2017-03-07 NOTE — PROGRESS NOTES
[facility]  Pulmonary, Critical Care, and Sleep Medicine      Name: Marquita Deleon MRN: [id number]   : 1955 Hospital: [facility] [place]   Date: 3/7/2017          Critical Care Initial Patient Consult    Requesting MD: Dr. Mimi Salmon                                  Reason for CC Consult: COPD exacerbation    IMPRESSION:   · Acute hypoxemic respiratory failure, now on MV,  Possibly due to copd exacerbation   · Severe anxiety disorder further exacerbated by excessive b2 agonist  · PTSD per  from past interactions with physicians  · Possible left upper pole renal mass, as per CTA chest   · Multiple pulmonary nodules from CTA chest, will need outpatient follow up   · Hx HTN      RECOMMENDATIONS:   · Resp - keep pt on vent,    on precedex and continue fentayl . Will decrease the rr from 20 to 12  · ID - No clear infectious etiology;  Cultures from resp suggest normal cristino. Will remove groin line  · CVS - HD stable. Troponin elevated on admission - cardiology following. Echo with EF 50%. Monitor hemodynamics. Possible ischemic work up by cardiology today   · Heme/Onc- H&H and platelets stable; monitor. · Metabolic -  Monitor electrolytes and replace per electrolyte replacement protocol. Will increase the free water with tube feed  · Renal - Trend renal indices and monitor UOP;     · Endocrine - SSI for glycemic control given steroids; Will need to add lantus  · Neuro/ Pain/ Sedation -will schedule zyprexa and continue sedation. · Nutrition- continue tube feed for now  · Prophylaxis - DVT (lovenox), GI (protonix)     Subjective/History:   3/7  Unclear why rr was increased to 20 last night. I decreased to 14. Pt is on fentanyl and precedex. Tolerating tube feed. 3/5  This patient has been seen and evaluated at the request of Dr. Mimi Salmon for COPD exacerbation.   Patient is a 64 y.o. female with history of COPD, PTSD, chronic sinusitis and HTN who presented to the ED with progressive shortness of breath for two weeks. She has had wheezing and a non-productive cough. Her  states she had a \"cold\" about 2 weeks ago and everything seems to have gotten worse since. She also recently traveled to [place] and stayed in a hotel but according to family it was clean hotel with no mold or dust.  No new exposure to pets. Daughter states she does get bad seasonal allergies which does typically flare her COPD. She is currently still smoking, which the daughter reports is down to less than 1 pack per day. In the ED, she was tachypneic and tachycardic and was brought to the ICU for further management under the hospitalist service. She also had a CT chest in the ED which was was negative for a PE but did show emphysematous changes. She also had a CT of the head which showed ethmoid mucosal thickening with partial destruction of the medial wall of the left orbit - a mass or abscess could not be ruled out. ENT was consulted but felt as if this could be followed up on an outpatient basis. Additionally, she had a mild elevated troponin for which cardiology was consulted and is following. Pulmonary was then consulted on 3/5 for assistance with management. She was treated overnight with Ativan, Klonopin, and Lexapro. She was maintained on HFNC as she was unable to tolerate the BiPAP. During my evaluation, she is minimally responsive on HFNC, with RR 38-42, 's, and hypertensive. She was given multiple duonebs along with solumedrol and placed back on BiPAP without improvement. She was the intubated for respiratory failure. The patient is critically ill and can not provide additional history due to Ventilated.      Past Medical History:   Diagnosis Date    Back pain     COPD (chronic obstructive pulmonary disease) (HCC)     Hypertension     PTSD (post-traumatic stress disorder)     Shingles       Past Surgical History:   Procedure Laterality Date    HX TUBAL LIGATION      HX TYMPANOSTOMY     Janet Wallace        Prior to Admission medications    Medication Sig Start Date End Date Taking? Authorizing Provider   HYDROcodone-acetaminophen (NORCO)  mg tablet Take 1 Tab by mouth four (4) times daily. Yes Historical Provider   baclofen (LIORESAL) 10 mg tablet Take 10 mg by mouth three (3) times daily. Yes Marissa Son MD   albuterol (PROVENTIL HFA, VENTOLIN HFA, PROAIR HFA) 90 mcg/actuation inhaler Take 2 Puffs by inhalation every four (4) hours as needed for Wheezing. Yes Marissa Son MD   valsartan (DIOVAN) 160 mg tablet Take  by mouth daily. Yes Marissa Son MD   escitalopram (LEXAPRO) 20 mg tablet Take 40 mg by mouth daily. Yes Marissa Son MD   clonazePAM (KLONOPIN) 0.5 mg tablet Take 0.5 mg by mouth nightly as needed. Yes Marissa Son MD   fluticasone-salmeterol (ADVAIR DISKUS) 100-50 mcg/dose diskus inhaler Take 1 Puff by inhalation every twelve (12) hours. Yes Marissa Son MD   atorvastatin (LIPITOR) 20 mg tablet Take 20 mg by mouth daily. Marissa Son MD   ibuprofen (MOTRIN) 800 mg tablet Take 800 mg by mouth every six (6) hours as needed.       Marissa Son MD     Current Facility-Administered Medications   Medication Dose Route Frequency    aspirin chewable tablet 81 mg  81 mg Oral DAILY    OLANZapine (ZyPREXA) tablet 10 mg  10 mg Per NG tube QPM    dexmedetomidine (PRECEDEX) 400 mcg in 0.9% sodium chloride 100 mL infusion  0.2-0.7 mcg/kg/hr IntraVENous TITRATE    atorvastatin (LIPITOR) tablet 20 mg  20 mg Oral DAILY    escitalopram oxalate (LEXAPRO) tablet 20 mg  20 mg Oral DAILY    chlorhexidine (PERIDEX) 0.12 % mouthwash 10 mL  10 mL Oral Q12H    methylPREDNISolone (PF) (SOLU-MEDROL) injection 60 mg  60 mg IntraVENous Q6H    insulin lispro (HUMALOG) injection   SubCUTAneous Q6H    pantoprazole (PROTONIX) 40 mg in sodium chloride 0.9 % 10 mL injection  40 mg IntraVENous DAILY    fentaNYL (PF)  mcg/30 ml   IntraVENous TITRATE    arformoterol (BROVANA) neb solution 15 mcg  15 mcg Nebulization BID RT    0.9% sodium chloride infusion  150 mL/hr IntraVENous CONTINUOUS    nicotine (NICODERM CQ) 14 mg/24 hr patch 1 Patch  1 Patch TransDERmal DAILY    NOREPINephrine (LEVOPHED) 16,000 mcg in dextrose 5% 250 mL infusion  0-30 mcg/min IntraVENous TITRATE    enoxaparin (LOVENOX) injection 40 mg  40 mg SubCUTAneous Q24H    budesonide (PULMICORT) 500 mcg/2 ml nebulizer suspension  500 mcg Nebulization BID RT    levoFLOXacin (LEVAQUIN) 750 mg in D5W IVPB  750 mg IntraVENous Q24H     Allergies   Allergen Reactions    Sulfacetamide Anaphylaxis      Social History   Substance Use Topics    Smoking status: Current Some Day Smoker     Packs/day: 0.50    Smokeless tobacco: Not on file    Alcohol use Not on file      History reviewed. No pertinent family history. Review of Systems:  A comprehensive review of systems was negative except for that written in the HPI.     Objective:   Vital Signs:    Visit Vitals    /58    Pulse 66    Temp (!) 100.8 °F (38.2 °C)  Comment: RN Notified    Resp 19    Ht 5' 8\" (1.727 m)    Wt 62.6 kg (138 lb)    SpO2 95%    BMI 20.98 kg/m2       O2 Device: Endotracheal tube   O2 Flow Rate (L/min): 40 l/min   Temp (24hrs), Av.4 °F (37.4 °C), Min:98.1 °F (36.7 °C), Max:100.8 °F (38.2 °C)       Intake/Output:   Last shift:         Last 3 shifts:  1901 -  07  In: 5369.3 [I.V.:4619.3]  Out: 7855 [Urine:1810]    Intake/Output Summary (Last 24 hours) at 17 0930  Last data filed at 17 0622   Gross per 24 hour   Intake          4957.02 ml   Output             1260 ml   Net          3697.02 ml       Ventilator Settings:  Mode Rate Tidal Volume Pressure FiO2 PEEP   Assist control, VC+   400 ml    40 % (decreased) 5 cm H20     Peak airway pressure: 11 cm H2O    Minute ventilation: 9.53 l/min      ARDS network Guidelines: Lung protective strategy and Pl pressure goals____________      Physical Exam:    General:  Intubated. Head:  Normocephalic, without obvious abnormality, atraumatic. Eyes:  Conjunctivae/corneas clear. Nose: Nares normal. Septum midline. Mucosa normal.   Throat: Lips and tongue normal.  Teeth present. Mucosa moist.    Neck: Supple, symmetrical, trachea midline, no adenopathy, no JVD. Lungs:   Exp wheezing and insp crackles       Heart:  Tachycardic rate and regular rhythm; no audible mrg   Abdomen:   Hypoactive bowel sounds; soft, non-tender   Extremities: Extremities without edema. Cyanosis to fingers and toes with mottling on lower extremities. Pulses: 2+ and symmetric all extremities. Skin: Warm, dry. Neurologic: Unable to complete neurological exam due to patient status        Data:     Recent Results (from the past 24 hour(s))   GLUCOSE, POC    Collection Time: 03/06/17 12:30 PM   Result Value Ref Range    Glucose (POC) 183 (H) 70 - 110 mg/dL   GLUCOSE, POC    Collection Time: 03/06/17  5:34 PM   Result Value Ref Range    Glucose (POC) 104 70 - 110 mg/dL   GLUCOSE, POC    Collection Time: 03/06/17 11:22 PM   Result Value Ref Range    Glucose (POC) 162 (H) 70 - 110 mg/dL   MAGNESIUM    Collection Time: 03/07/17  4:45 AM   Result Value Ref Range    Magnesium 2.4 1.8 - 2.4 mg/dL   METABOLIC PANEL, COMPREHENSIVE    Collection Time: 03/07/17  4:45 AM   Result Value Ref Range    Sodium 151 (H) 136 - 145 mmol/L    Potassium 5.3 3.5 - 5.5 mmol/L    Chloride 118 (H) 100 - 108 mmol/L    CO2 24 21 - 32 mmol/L    Anion gap 9 3.0 - 18 mmol/L    Glucose 226 (H) 74 - 99 mg/dL    BUN 31 (H) 7.0 - 18 MG/DL    Creatinine 0.88 0.6 - 1.3 MG/DL    BUN/Creatinine ratio 35 (H) 12 - 20      GFR est AA >60 >60 ml/min/1.73m2    GFR est non-AA >60 >60 ml/min/1.73m2    Calcium 8.3 (L) 8.5 - 10.1 MG/DL    Bilirubin, total 0.2 0.2 - 1.0 MG/DL    ALT (SGPT) 48 13 - 56 U/L    AST (SGOT) 24 15 - 37 U/L    Alk.  phosphatase 76 45 - 117 U/L Protein, total 5.5 (L) 6.4 - 8.2 g/dL    Albumin 2.5 (L) 3.4 - 5.0 g/dL    Globulin 3.0 2.0 - 4.0 g/dL    A-G Ratio 0.8 0.8 - 1.7     CBC WITH AUTOMATED DIFF    Collection Time: 03/07/17  4:45 AM   Result Value Ref Range    WBC 8.5 4.6 - 13.2 K/uL    RBC 3.66 (L) 4.20 - 5.30 M/uL    HGB 11.7 (L) 12.0 - 16.0 g/dL    HCT 36.4 35.0 - 45.0 %    MCV 99.5 (H) 74.0 - 97.0 FL    MCH 32.0 24.0 - 34.0 PG    MCHC 32.1 31.0 - 37.0 g/dL    RDW 15.6 (H) 11.6 - 14.5 %    PLATELET 834 301 - 753 K/uL    MPV 11.1 9.2 - 11.8 FL    NEUTROPHILS 87 (H) 40 - 73 %    LYMPHOCYTES 8 (L) 21 - 52 %    MONOCYTES 5 3 - 10 %    EOSINOPHILS 0 0 - 5 %    BASOPHILS 0 0 - 2 %    ABS. NEUTROPHILS 7.4 1.8 - 8.0 K/UL    ABS. LYMPHOCYTES 0.7 (L) 0.9 - 3.6 K/UL    ABS. MONOCYTES 0.4 0.05 - 1.2 K/UL    ABS. EOSINOPHILS 0.0 0.0 - 0.4 K/UL    ABS. BASOPHILS 0.0 0.0 - 0.06 K/UL    DF AUTOMATED     POC G3    Collection Time: 03/07/17  5:07 AM   Result Value Ref Range    Device: VENT      FIO2 (POC) 0.50 %    pH (POC) 7.323 (L) 7.35 - 7.45      pCO2 (POC) 41.4 35.0 - 45.0 MMHG    pO2 (POC) 94 80 - 100 MMHG    HCO3 (POC) 21.5 (L) 22 - 26 MMOL/L    sO2 (POC) 97 92 - 97 %    Base deficit (POC) 5 mmol/L    Mode ASSIST CONTROL      Tidal volume 400 ml    Set Rate 20 bpm    PEEP/CPAP (POC) 5 cmH2O    Allens test (POC) YES      Total resp. rate 27      Site LEFT RADIAL      Patient temp. 98.1      Specimen type (POC) ARTERIAL      Performed by Myra Oswald    GLUCOSE, POC    Collection Time: 03/07/17  5:59 AM   Result Value Ref Range    Glucose (POC) 231 (H) 70 - 110 mg/dL             Telemetry:Sinus tachycardia    Imaging:  CXR 3/5  The cardiomediastinal silhouette is stable. Bilateral mid to lower lung field  increased markings are again seen. The lung fields are hyperinflated. There is  no focal consolidation. There is no significant pleural effusion.  There is no  acute osseous abnormality.        --------------  IMPRESSION  Impression:  --------------  COPD.     Bilateral mid to lower lung field increased interstitial markings similar to  prior which could represent bronchitis/interstitial pneumonitis or a chronic  interstitial process.      No focal consolidation.     No significant interval change. CTA Chest   FINDINGS:     EXAM QUALITY: Overall exam quality is suboptimal. Opacification of the pulmonary  arteries is somewhat suboptimal although diagnostic. There is significant  respiratory motion artifact which significantly degrades the images particularly  in the lower lung fields.     PULMONARY ARTERIES: No evidence of pulmonary embolism.     MEDIASTINUM: Heart size is normal. No evidence of right heart strain. No  pericardial effusion. Mild atherosclerotic changes of the aorta. Esophagus is  unremarkable.     LYMPH NODES: No enlarged nodes.     AIRWAY: Normal.     LUNGS: Significant emphysematous changes most pronounced in the upper lobes. Bilateral pulmonary nodules. Largest is in the left lower lobe on image 86  measuring 11 x 11 mm. A second possible left lower lobe nodule on image 91  measures 7 x 6 mm. Right lower lobe nodule image 83 measures 12 x 6 mm.     PLEURA: Normal. Specifically, no pneumothorax or pleural effusion.     UPPER ABDOMEN: There is an exophytic possibly solid nodule arising from the  upper pole of the left kidney. This measures 11 x 12 mm. Upper abdominal  structures are otherwise unremarkable. .     OTHER: No acute or aggressive osseous abnormalities identified.     _______________     IMPRESSION  IMPRESSION:     1. Study is degraded by respiratory motion artifact however there are no  definite pulmonary emboli.     2. Emphysema.   3. Bilateral pulmonary nodules as described above with the largest in the left  lower lobe measuring 11 x 11 mm.  4. Possible solid exophytic left upper pole renal mass.     Recommend renal ultrasound for further evaluation of the kidneys.        Total critical care time exclusive of procedures: 35minutes  Kalin Quinn MD

## 2017-03-07 NOTE — INTERDISCIPLINARY ROUNDS
CRITICAL CARE INTERDISCIPLINARY ROUNDS    Patient Information:    Name:   Constanza Camarillo    Age:   64 y.o. Admission Date:   3/4/2017    Critical Care Day: 3    Surgery Date:      Attending Provider:   Berhane Agrawal DO    Surgeon:        Consultant(s):   [address] Physician:  Tio Gustafson    Code Status: Full Code    Problem List:     Patient Active Problem List   Diagnosis Code    Respiratory insufficiency R06.89    Sinusitis J32.9    Mass of sinus R22.0    COPD (chronic obstructive pulmonary disease) with emphysema ([facility] [address] J43.9    Hypokalemia E87.6    Transaminitis R74.0    Nodule of kidney N28.89    COPD with acute exacerbation ([facility] [address] J44.1       Principal Problem:  COPD (chronic obstructive pulmonary disease) with emphysema ([facility] [address]    During rounds the following quality care indicators and evidence based practices were addressed :  DVT Prophylaxis and PUD Prophylaxis Broderick Day 2 ([facility] y) ; Central Line Day: 2 (femerol)  Isolation:; Antibiotic Stewardship: reviewed; Probiotics Necessary:  y    Ventilator Bundle:  Ventilator Bundle Order Set:  y Sedation Vacation  yes;  Spontaneous Breathing Trial  ; Restraintsyes (Order, Necessity, Documentation)  Vent Day: 3    Sepsis Order Set:  or Elements of Sepsis Bundle Reviewed (Fluids, Antibiotics, Lactic Acid, Cultures, Vasopressors, Steriods, Glycemic control, Peak Plateau)    Glycemic Control:   Recent Labs      03/07/17   0445  03/06/17   0500  03/05/17   0030   GLU  226*  178*  165*   ;  Recent Labs      03/07/17   0507  03/06/17   0539  03/05/17   1620   PHI  7.323*  7.236*  7.210*   PCO2I  41.4  50.0*  58.3*   PO2I  94  114*  93    Adjustments     Acute MI/PCI:   Not applicable    Door to Balloon: Admission Time: 0737      Heart Failure:    Not applicable     SCIP Measures for other Surgeries:   Not applicable     Pneumonia:    Not applicable    Interdisciplinary team rounds were held with the following team membersCare Management, Diabetes Treatment Specialist, Nursing, Nutrition, Pastoral Care, Pharmacy, Physician and Respiratory Therapy. Plan of care discussed. Goals of Care/ Recommendations: continue plan, restraints renewed. Renew precedex until extubation. Removing femerol line    See clinical pathway and/or care plan for interventions and desired outcomes.     Critical Care Discharge Status:  Red

## 2017-03-07 NOTE — DIABETES MGMT
GLYCEMIC CONTROL & NUTRITION:    - noted A1C meets criteria for pre-DM, no known documentation of previous dx, BG out of target  - steroids on board (IV Solu-medrol 60 mg q 6 hours continues)  - noted orders for Lantus 10 units every day have been initiated, 1st dose today, will continue to follow trends & make recommendations prn  - anticipate will need insulin adjustment once steroids begin to wean  - POCt + Humalog orders in place, recommend continue  - recommend follow-up with OP PCM re: BG monitoring and discuss treatment options  - encourage OP DM Self Management Class (schedule given)      Recent Glucose Results:   Lab Results   Component Value Date/Time     (H) 03/07/2017 04:45 AM    GLUCPOC 248 (H) 03/07/2017 11:43 AM    GLUCPOC 231 (H) 03/07/2017 05:59 AM    GLUCPOC 162 (H) 03/06/2017 11:22 PM       Lab Results   Component Value Date/Time    Hemoglobin A1c 6.2 03/04/2017 04:25 PM               Maxi Sarabia, MPH, RD

## 2017-03-07 NOTE — WOUND CARE
Wound care in to assess pt's skin. Pt's family and nurse does not want pt turned at this time. Nurse states no skin issues at this time. Wound care will continue to follow pt daily while in ICU.

## 2017-03-07 NOTE — PROGRESS NOTES
Cardiology Progress Note        Patient: José Miguel Murray        Sex: female          DOA: 3/4/2017  YOB: 1955      Age:  64 y.o.        LOS:  LOS: 3 days   Assessment/Plan     Principal Problem:    COPD (chronic obstructive pulmonary disease) with emphysema ([facility] [address] (3/4/2017)    Active Problems:    Respiratory insufficiency (3/4/2017)      Sinusitis (3/4/2017)      Mass of sinus (3/4/2017)      Hypokalemia (3/4/2017)      Transaminitis (3/4/2017)      Nodule of kidney (3/4/2017)      COPD with acute exacerbation ([facility] [address] (3/6/2017)        Plan:  Intubated and respiratory distress with withdrawal symptoms. Discussed with Dr. Lisa Sen. At this point will wait for ischemia work to recover from withdrawal symptoms and severe respiratory distress stand point. Echo stable and trop trended down  Patient is electrically stable, continue with aspirin and statin and not a candidate for beta-blocker. Discussed with her  and daughter, I will sign off and please notify me once patient is recovered and stable for ischemia work. Please feel free to contact with me any time. THX                    Subjective:    cc:  intubated      REVIEW OF SYSTEMS: NO CP, SOB, N,V,D, F,C  Objective:      Visit Vitals    /62 (BP 1 Location: Right arm, BP Patient Position: At rest)    Pulse 61    Temp 99.9 °F (37.7 °C)    Resp 18    Ht 5' 8\" (1.727 m)    Wt 62.6 kg (138 lb)    SpO2 95%    BMI 20.98 kg/m2     Body mass index is 20.98 kg/(m^2). Physical Exam:  General Appearance: Comfortable  HEENT: YONI. HEAD: Atraumatic  NECK: No JVD,   LUNGS: Clear bilaterally. HEART: S1+S2 audible, no murmur, no pericardial rub. ABD: Non-tender, BS Audible    EXT: No edema, and no cysnosis. VASCULAR EXAM: Pulses are intact.         Medication:  Current Facility-Administered Medications   Medication Dose Route Frequency    aspirin chewable tablet 81 mg  81 mg Oral DAILY    OLANZapine (ZyPREXA) tablet 10 mg  10 mg Per NG tube QPM    insulin glargine (LANTUS) injection 10 Units  10 Units SubCUTAneous DAILY    dexmedetomidine (PRECEDEX) 400 mcg in 0.9% sodium chloride 100 mL infusion  0.2-1.4 mcg/kg/hr IntraVENous TITRATE    midazolam in normal saline (VERSED) 1 mg/mL infusion  1 mg/hr IntraVENous TITRATE    0.45% sodium chloride infusion  150 mL/hr IntraVENous CONTINUOUS    HYDROmorphone (DILAUDID) injection 1 mg  1 mg IntraVENous Q1H PRN    albuterol-ipratropium (DUO-NEB) 2.5 MG-0.5 MG/3 ML  3 mL Nebulization Q4H PRN    atorvastatin (LIPITOR) tablet 20 mg  20 mg Oral DAILY    escitalopram oxalate (LEXAPRO) tablet 20 mg  20 mg Oral DAILY    chlorhexidine (PERIDEX) 0.12 % mouthwash 10 mL  10 mL Oral Q12H    midazolam (VERSED) injection 1-2 mg  1-2 mg IntraVENous Q4H PRN    fentaNYL citrate (PF) injection  mcg   mcg IntraVENous Q4H PRN    methylPREDNISolone (PF) (SOLU-MEDROL) injection 60 mg  60 mg IntraVENous Q6H    insulin lispro (HUMALOG) injection   SubCUTAneous Q6H    pantoprazole (PROTONIX) 40 mg in sodium chloride 0.9 % 10 mL injection  40 mg IntraVENous DAILY    fentaNYL (PF)  mcg/30 ml   IntraVENous TITRATE    haloperidol lactate (HALDOL) injection 4 mg  4 mg IntraVENous Q6H PRN    arformoterol (BROVANA) neb solution 15 mcg  15 mcg Nebulization BID RT    nicotine (NICODERM CQ) 14 mg/24 hr patch 1 Patch  1 Patch TransDERmal DAILY    albuterol (PROVENTIL VENTOLIN) nebulizer solution 2.5 mg  2.5 mg Nebulization Q2H PRN    acetaminophen (TYLENOL) tablet 650 mg  650 mg Oral Q4H PRN    enoxaparin (LOVENOX) injection 40 mg  40 mg SubCUTAneous Q24H    glucose chewable tablet 16 g  4 Tab Oral PRN    glucagon (GLUCAGEN) injection 1 mg  1 mg IntraMUSCular PRN    dextrose (D50W) injection syrg 12.5-25 g  25-50 mL IntraVENous PRN    budesonide (PULMICORT) 500 mcg/2 ml nebulizer suspension  500 mcg Nebulization BID RT    sodium chloride (OCEAN) 0.65 % nasal spray 2 Spray  2 Spray Both Nostrils Q2H PRN    levoFLOXacin (LEVAQUIN) 750 mg in D5W IVPB  750 mg IntraVENous Q24H    HYDROcodone-acetaminophen (NORCO) 5-325 mg per tablet 1 Tab  1 Tab Oral Q6H PRN               Lab/Data Reviewed: All lab results for the last 24 hours reviewed.      Recent Labs      03/07/17   0445  03/06/17   0500  03/05/17   0030   WBC  8.5  16.2*  4.5*   HGB  11.7*  12.0  12.7   HCT  36.4  36.9  37.1   PLT  235  267  214     Recent Labs      03/07/17   0445  03/06/17   0500  03/05/17   0030   NA  151*  148*  143   K  5.3  4.4  4.5   CL  118*  116*  108   CO2  24  25  27   GLU  226*  178*  165*   BUN  31*  27*  15   CREA  0.88  0.77  0.80   CA  8.3*  8.1*  8.4*       Signed By: Shea Adams MD     March 7, 2017

## 2017-03-07 NOTE — PROGRESS NOTES
0730- Bedside and Verbal shift change report given to NOEMÍ Hess (oncoming nurse) by Abi Weaver RN (offgoing nurse). Report included the following information SBAR, Kardex, Intake/Output, MAR, Recent Results and Cardiac Rhythm SR. Drip rates verified. 0800- Initial shift assessment complete. Will continue to monitor. 1200- Reassessment complete. Will continue to monitor. 1225- Patient restless. Called and spoke with Dr Dakota Ku, SBAR given. telephone orders received. Medicated per MAR. Family remains at bedside. 1250- Patient sitting up in bed, RR 20's and sats in 80's, patient suctioned. RT at bedside. Placed patient on 100% oxygen. Sats came back up to 100. Dr Dakota Ku at bedside. Verbal orders received by Dr Dakota Ku. Versed to be titrated to max of 10mg/hr for Rass -4. Family remains at bedside. Will continue to monitor. 1600- Reassessment completed. Patient resting quietly. Will continue to monitor. 1450- Wasted 4ml of Fentanyl with Maury Ramirez RN.     1600- Reassessment complete. Will continue to monitor. 1930- Bedside and Verbal shift change report given to Abi Weaver RN (oncoming nurse) by NOEMÍ Hess (offgoing nurse). Report included the following information SBAR, Kardex, Intake/Output, MAR, Recent Results and Cardiac Rhythm SR.  Drips verifed with oncoming RN

## 2017-03-08 ENCOUNTER — APPOINTMENT (OUTPATIENT)
Dept: GENERAL RADIOLOGY | Age: 62
DRG: 207 | End: 2017-03-08
Attending: INTERNAL MEDICINE
Payer: OTHER GOVERNMENT

## 2017-03-08 ENCOUNTER — APPOINTMENT (OUTPATIENT)
Dept: GENERAL RADIOLOGY | Age: 62
DRG: 207 | End: 2017-03-08
Attending: PHYSICIAN ASSISTANT
Payer: OTHER GOVERNMENT

## 2017-03-08 LAB
ALBUMIN SERPL BCP-MCNC: 2.4 G/DL (ref 3.4–5)
ALBUMIN/GLOB SERPL: 0.8 {RATIO} (ref 0.8–1.7)
ALP SERPL-CCNC: 69 U/L (ref 45–117)
ALT SERPL-CCNC: 46 U/L (ref 13–56)
ANION GAP BLD CALC-SCNC: 9 MMOL/L (ref 3–18)
ARTERIAL PATENCY WRIST A: YES
AST SERPL W P-5'-P-CCNC: 20 U/L (ref 15–37)
BASE DEFICIT BLD-SCNC: 5 MMOL/L
BASOPHILS # BLD AUTO: 0 K/UL (ref 0–0.06)
BASOPHILS # BLD: 0 % (ref 0–2)
BDY SITE: ABNORMAL
BILIRUB SERPL-MCNC: 0.3 MG/DL (ref 0.2–1)
BODY TEMPERATURE: 99.3
BUN SERPL-MCNC: 31 MG/DL (ref 7–18)
BUN/CREAT SERPL: 37 (ref 12–20)
CALCIUM SERPL-MCNC: 8.4 MG/DL (ref 8.5–10.1)
CHLORIDE SERPL-SCNC: 115 MMOL/L (ref 100–108)
CO2 SERPL-SCNC: 25 MMOL/L (ref 21–32)
CREAT SERPL-MCNC: 0.84 MG/DL (ref 0.6–1.3)
DIFFERENTIAL METHOD BLD: ABNORMAL
EOSINOPHIL # BLD: 0 K/UL (ref 0–0.4)
EOSINOPHIL NFR BLD: 0 % (ref 0–5)
ERYTHROCYTE [DISTWIDTH] IN BLOOD BY AUTOMATED COUNT: 15.7 % (ref 11.6–14.5)
GAS FLOW.O2 O2 DELIVERY SYS: ABNORMAL L/MIN
GAS FLOW.O2 SETTING OXYMISER: 14 BPM
GLOBULIN SER CALC-MCNC: 3 G/DL (ref 2–4)
GLUCOSE BLD STRIP.AUTO-MCNC: 223 MG/DL (ref 70–110)
GLUCOSE BLD STRIP.AUTO-MCNC: 241 MG/DL (ref 70–110)
GLUCOSE BLD STRIP.AUTO-MCNC: 255 MG/DL (ref 70–110)
GLUCOSE BLD STRIP.AUTO-MCNC: 269 MG/DL (ref 70–110)
GLUCOSE SERPL-MCNC: 260 MG/DL (ref 74–99)
HCO3 BLD-SCNC: 21.1 MMOL/L (ref 22–26)
HCT VFR BLD AUTO: 36.2 % (ref 35–45)
HGB BLD-MCNC: 11.6 G/DL (ref 12–16)
INSPIRATION.DURATION SETTING TIME VENT: 0.75 SEC
LYMPHOCYTES # BLD AUTO: 6 % (ref 21–52)
LYMPHOCYTES # BLD: 0.5 K/UL (ref 0.9–3.6)
MAGNESIUM SERPL-MCNC: 2.4 MG/DL (ref 1.8–2.4)
MCH RBC QN AUTO: 32 PG (ref 24–34)
MCHC RBC AUTO-ENTMCNC: 32 G/DL (ref 31–37)
MCV RBC AUTO: 99.7 FL (ref 74–97)
MONOCYTES # BLD: 0.5 K/UL (ref 0.05–1.2)
MONOCYTES NFR BLD AUTO: 7 % (ref 3–10)
NEUTS SEG # BLD: 6.9 K/UL (ref 1.8–8)
NEUTS SEG NFR BLD AUTO: 87 % (ref 40–73)
O2/TOTAL GAS SETTING VFR VENT: 40 %
PCO2 BLD: 44.5 MMHG (ref 35–45)
PEEP RESPIRATORY: 2 CMH2O
PH BLD: 7.29 [PH] (ref 7.35–7.45)
PLATELET # BLD AUTO: 239 K/UL (ref 135–420)
PMV BLD AUTO: 11 FL (ref 9.2–11.8)
PO2 BLD: 73 MMHG (ref 80–100)
POTASSIUM SERPL-SCNC: 5.6 MMOL/L (ref 3.5–5.5)
PROT SERPL-MCNC: 5.4 G/DL (ref 6.4–8.2)
RBC # BLD AUTO: 3.63 M/UL (ref 4.2–5.3)
SAO2 % BLD: 92 % (ref 92–97)
SERVICE CMNT-IMP: ABNORMAL
SODIUM SERPL-SCNC: 149 MMOL/L (ref 136–145)
SPECIMEN TYPE: ABNORMAL
TOTAL RESP. RATE, ITRR: 17
VENTILATION MODE VENT: ABNORMAL
VOLUME CONTROL PLUS IVLCP: YES
VT SETTING VENT: 400 ML
WBC # BLD AUTO: 8 K/UL (ref 4.6–13.2)

## 2017-03-08 PROCEDURE — 74011250637 HC RX REV CODE- 250/637: Performed by: INTERNAL MEDICINE

## 2017-03-08 PROCEDURE — 83735 ASSAY OF MAGNESIUM: CPT | Performed by: PHYSICIAN ASSISTANT

## 2017-03-08 PROCEDURE — 74011000250 HC RX REV CODE- 250: Performed by: INTERNAL MEDICINE

## 2017-03-08 PROCEDURE — 94640 AIRWAY INHALATION TREATMENT: CPT

## 2017-03-08 PROCEDURE — 74011636637 HC RX REV CODE- 636/637: Performed by: INTERNAL MEDICINE

## 2017-03-08 PROCEDURE — 71010 XR CHEST PORT: CPT

## 2017-03-08 PROCEDURE — 74011250636 HC RX REV CODE- 250/636: Performed by: INTERNAL MEDICINE

## 2017-03-08 PROCEDURE — 94400 HC END TIDAL CO2 RESPONSE CURVE: CPT

## 2017-03-08 PROCEDURE — 77010033678 HC OXYGEN DAILY

## 2017-03-08 PROCEDURE — 82803 BLOOD GASES ANY COMBINATION: CPT

## 2017-03-08 PROCEDURE — 36600 WITHDRAWAL OF ARTERIAL BLOOD: CPT

## 2017-03-08 PROCEDURE — 80053 COMPREHEN METABOLIC PANEL: CPT | Performed by: PHYSICIAN ASSISTANT

## 2017-03-08 PROCEDURE — P9047 ALBUMIN (HUMAN), 25%, 50ML: HCPCS | Performed by: INTERNAL MEDICINE

## 2017-03-08 PROCEDURE — 74011636637 HC RX REV CODE- 636/637: Performed by: PHYSICIAN ASSISTANT

## 2017-03-08 PROCEDURE — 94003 VENT MGMT INPAT SUBQ DAY: CPT

## 2017-03-08 PROCEDURE — 85025 COMPLETE CBC W/AUTO DIFF WBC: CPT | Performed by: PHYSICIAN ASSISTANT

## 2017-03-08 PROCEDURE — 74011250636 HC RX REV CODE- 250/636: Performed by: PHYSICIAN ASSISTANT

## 2017-03-08 PROCEDURE — 02HV33Z INSERTION OF INFUSION DEVICE INTO SUPERIOR VENA CAVA, PERCUTANEOUS APPROACH: ICD-10-PCS | Performed by: INTERNAL MEDICINE

## 2017-03-08 PROCEDURE — 36415 COLL VENOUS BLD VENIPUNCTURE: CPT | Performed by: PHYSICIAN ASSISTANT

## 2017-03-08 PROCEDURE — 65610000006 HC RM INTENSIVE CARE

## 2017-03-08 PROCEDURE — 74011000258 HC RX REV CODE- 258: Performed by: INTERNAL MEDICINE

## 2017-03-08 PROCEDURE — 74011000250 HC RX REV CODE- 250: Performed by: PHYSICIAN ASSISTANT

## 2017-03-08 PROCEDURE — 74011250637 HC RX REV CODE- 250/637: Performed by: PHYSICIAN ASSISTANT

## 2017-03-08 PROCEDURE — C9113 INJ PANTOPRAZOLE SODIUM, VIA: HCPCS | Performed by: PHYSICIAN ASSISTANT

## 2017-03-08 RX ORDER — VALSARTAN AND HYDROCHLOROTHIAZIDE 160; 25 MG/1; MG/1
1 TABLET ORAL DAILY
COMMUNITY

## 2017-03-08 RX ORDER — ALBUMIN HUMAN 250 G/1000ML
12.5 SOLUTION INTRAVENOUS ONCE
Status: COMPLETED | OUTPATIENT
Start: 2017-03-08 | End: 2017-03-08

## 2017-03-08 RX ORDER — INSULIN LISPRO 100 [IU]/ML
4 INJECTION, SOLUTION INTRAVENOUS; SUBCUTANEOUS EVERY 6 HOURS
Status: DISCONTINUED | OUTPATIENT
Start: 2017-03-08 | End: 2017-03-09

## 2017-03-08 RX ORDER — INSULIN GLARGINE 100 [IU]/ML
15 INJECTION, SOLUTION SUBCUTANEOUS DAILY
Status: DISCONTINUED | OUTPATIENT
Start: 2017-03-09 | End: 2017-03-08

## 2017-03-08 RX ORDER — INSULIN GLARGINE 100 [IU]/ML
10 INJECTION, SOLUTION SUBCUTANEOUS DAILY
Status: DISCONTINUED | OUTPATIENT
Start: 2017-03-09 | End: 2017-03-09

## 2017-03-08 RX ORDER — NOREPINEPHRINE BITARTRATE/D5W 8 MG/250ML
0-16 PLASTIC BAG, INJECTION (ML) INTRAVENOUS
Status: DISCONTINUED | OUTPATIENT
Start: 2017-03-08 | End: 2017-03-10

## 2017-03-08 RX ORDER — FUROSEMIDE 10 MG/ML
20 INJECTION INTRAMUSCULAR; INTRAVENOUS EVERY 12 HOURS
Status: DISCONTINUED | OUTPATIENT
Start: 2017-03-08 | End: 2017-03-09

## 2017-03-08 RX ADMIN — INSULIN LISPRO 6 UNITS: 100 INJECTION, SOLUTION INTRAVENOUS; SUBCUTANEOUS at 06:18

## 2017-03-08 RX ADMIN — ENOXAPARIN SODIUM 40 MG: 40 INJECTION SUBCUTANEOUS at 12:26

## 2017-03-08 RX ADMIN — CHLORHEXIDINE GLUCONATE 10 ML: 1.2 RINSE ORAL at 20:58

## 2017-03-08 RX ADMIN — SODIUM CHLORIDE 150 ML/HR: 450 INJECTION, SOLUTION INTRAVENOUS at 19:58

## 2017-03-08 RX ADMIN — FUROSEMIDE 20 MG: 10 INJECTION, SOLUTION INTRAMUSCULAR; INTRAVENOUS at 12:26

## 2017-03-08 RX ADMIN — OLANZAPINE 10 MG: 10 TABLET, FILM COATED ORAL at 17:39

## 2017-03-08 RX ADMIN — Medication: at 05:15

## 2017-03-08 RX ADMIN — METHYLPREDNISOLONE SODIUM SUCCINATE 60 MG: 40 INJECTION, POWDER, FOR SOLUTION INTRAMUSCULAR; INTRAVENOUS at 00:21

## 2017-03-08 RX ADMIN — ALBUMIN (HUMAN) 12.5 G: 0.25 INJECTION, SOLUTION INTRAVENOUS at 11:36

## 2017-03-08 RX ADMIN — ESCITALOPRAM OXALATE 20 MG: 10 TABLET ORAL at 08:47

## 2017-03-08 RX ADMIN — METHYLPREDNISOLONE SODIUM SUCCINATE 60 MG: 40 INJECTION, POWDER, FOR SOLUTION INTRAMUSCULAR; INTRAVENOUS at 06:18

## 2017-03-08 RX ADMIN — FENTANYL CITRATE 100 MCG: 50 INJECTION, SOLUTION INTRAMUSCULAR; INTRAVENOUS at 06:48

## 2017-03-08 RX ADMIN — ATORVASTATIN CALCIUM 20 MG: 20 TABLET, FILM COATED ORAL at 08:47

## 2017-03-08 RX ADMIN — HYDROMORPHONE HYDROCHLORIDE 1 MG: 2 INJECTION INTRAMUSCULAR; INTRAVENOUS; SUBCUTANEOUS at 02:57

## 2017-03-08 RX ADMIN — INSULIN LISPRO 4 UNITS: 100 INJECTION, SOLUTION INTRAVENOUS; SUBCUTANEOUS at 12:55

## 2017-03-08 RX ADMIN — ASPIRIN 81 MG 81 MG: 81 TABLET ORAL at 09:00

## 2017-03-08 RX ADMIN — METHYLPREDNISOLONE SODIUM SUCCINATE 40 MG: 40 INJECTION, POWDER, FOR SOLUTION INTRAMUSCULAR; INTRAVENOUS at 17:40

## 2017-03-08 RX ADMIN — INSULIN LISPRO 4 UNITS: 100 INJECTION, SOLUTION INTRAVENOUS; SUBCUTANEOUS at 17:40

## 2017-03-08 RX ADMIN — LEVOFLOXACIN 750 MG: 5 INJECTION, SOLUTION INTRAVENOUS at 11:31

## 2017-03-08 RX ADMIN — Medication 2 MG/HR: at 01:45

## 2017-03-08 RX ADMIN — INSULIN GLARGINE 10 UNITS: 100 INJECTION, SOLUTION SUBCUTANEOUS at 08:47

## 2017-03-08 RX ADMIN — CHLORHEXIDINE GLUCONATE 10 ML: 1.2 RINSE ORAL at 08:30

## 2017-03-08 RX ADMIN — BUDESONIDE 500 MCG: 0.5 INHALANT RESPIRATORY (INHALATION) at 07:40

## 2017-03-08 RX ADMIN — SODIUM CHLORIDE 40 MG: 9 INJECTION INTRAMUSCULAR; INTRAVENOUS; SUBCUTANEOUS at 08:47

## 2017-03-08 RX ADMIN — ARFORMOTEROL TARTRATE 15 MCG: 15 SOLUTION RESPIRATORY (INHALATION) at 07:41

## 2017-03-08 RX ADMIN — CHLOROTHIAZIDE SODIUM 250 MG: 500 INJECTION, POWDER, LYOPHILIZED, FOR SOLUTION INTRAVENOUS at 12:56

## 2017-03-08 RX ADMIN — FUROSEMIDE 20 MG: 10 INJECTION, SOLUTION INTRAMUSCULAR; INTRAVENOUS at 21:02

## 2017-03-08 RX ADMIN — Medication 3 MCG/MIN: at 11:29

## 2017-03-08 RX ADMIN — ARFORMOTEROL TARTRATE 15 MCG: 15 SOLUTION RESPIRATORY (INHALATION) at 20:45

## 2017-03-08 RX ADMIN — INSULIN LISPRO 4 UNITS: 100 INJECTION, SOLUTION INTRAVENOUS; SUBCUTANEOUS at 12:56

## 2017-03-08 RX ADMIN — INSULIN LISPRO 6 UNITS: 100 INJECTION, SOLUTION INTRAVENOUS; SUBCUTANEOUS at 00:21

## 2017-03-08 RX ADMIN — BUDESONIDE 500 MCG: 0.5 INHALANT RESPIRATORY (INHALATION) at 20:45

## 2017-03-08 RX ADMIN — DEXMEDETOMIDINE HYDROCHLORIDE 1 MCG/KG/HR: 100 INJECTION, SOLUTION INTRAVENOUS at 05:58

## 2017-03-08 RX ADMIN — METHYLPREDNISOLONE SODIUM SUCCINATE 40 MG: 40 INJECTION, POWDER, FOR SOLUTION INTRAMUSCULAR; INTRAVENOUS at 12:25

## 2017-03-08 RX ADMIN — SODIUM CHLORIDE 150 ML/HR: 450 INJECTION, SOLUTION INTRAVENOUS at 12:55

## 2017-03-08 RX ADMIN — Medication: at 11:03

## 2017-03-08 RX ADMIN — SODIUM CHLORIDE 150 ML/HR: 450 INJECTION, SOLUTION INTRAVENOUS at 05:12

## 2017-03-08 NOTE — PROGRESS NOTES
NUTRITION FOLLOW-UP    RECOMMENDATIONS/PLAN:   - Nepro at 40 ml/hr continuous via OGT with prosource x1 packet daily provides total 1584 kcal, 86 g protein, 638 ml water, 147 g CHO daily. - Water flushes 300 ml q4h per MD discretion    NUTRITION ASSESSMENT:   Client Update: 64 yrs old Female remains intubated due to acute respiratory failure, COPD exacerbation. Also with sinus mass & sinusitis, pulmonary nodules, hypernatremia, and most recently hyperkalemia. Discussed in IDR, pt tolerating Osmolite 1.2 well however due to rise in K physician agreed to switch formula to Nepro for ~1.5 g less K daily via TF. FOOD/NUTRITION INTAKE   Diet Order:  NPO   Food Allergies: NKFA    TF access: [x] OGT       [] NGT      [] PEG      [] J tube  TF Order: Osmolite 1.2 at 60 ml/hr  Modulars: prosource x1   Free Water Flushes: 300 ml q4h   Provides: 1644 kcal, 88 g protein, 1082 ml water, 208 g CHO, and >100% RDI's for micronutrients.      Pertinent Medications:  [x] Reviewed; 1/2 NS, lipitor, fentanyl, haldol, norco, dilaudid, abx, solu-medrol, versed, ppi, lexapro, precedex  Insulin:  [x]SSI  []Pre-meal   [x]Basal    []Drip  []None  Cultural/Yarsani Food Preferences: None Identified ANTHROPOMETRICS  Height: 5' 8\" (172.7 cm)       Weight: 62.6 kg (138 lb)         BMI: 21 kg/m^2 normal weight (18.5%-24.9% BMI)   Adm Weight: 138 lbs                Weight change: none    NUTRITION-FOCUSED PHYSICAL ASSESSMENT  Skin: intact      GI: last BM 3/06    BIOCHEMICAL DATA & MEDICAL TESTS  Pertinent Labs:  [x] Reviewed; POC -269, Na 149, K 5.6, BUN 31, HbA1c 6.2      NUTRITION PRESCRIPTION  Calories: 8311-5505 kcal/day based on [place] state eqn  Protein: 76-95 g/day based on 1.2-1.5 g/kg (20-25% total kcal)  CHO: 196 g/day based on 50% of total energy  Fluid: 2495-5194 ml/day based on 1 kcal/ml      NUTRITION DIAGNOSES:   1- Unintended weight loss related to COPD as evidenced by pt reports unintended loss of >33 lbs in unknown time frame. - ongoing  2- Altered nutrition related lab values related to pre-DM as evidenced by POC -205 and HbA1c 6.2% -ongoing  3- Inadequate oral nutrition intake related to respiratory failure as evidenced by intubated and NPO x4 days -new     NUTRITION INTERVENTIONS:   INTERVENTIONS:        GOALS:  1. Nepro @ 40 + prosource x1 and  ml q4h 1. Tolerate >90% EN goal volume, K WNL by next review 1-3 days     LEARNING NEEDS (Diet, Supplementation, Food/Nutrient-Drug Interaction):   [] None Identified     [] Education provided & documented        Identified and patient:   [] Declined      [x] Was not appropriate/indicated        NUTRITION MONITORING /EVALUATION:   Adjust EN/PN as appropriate  Monitor wt  Monitor renal labs, electrolytes, fluid status    Previous Recommendations Implemented: yes        Previous Goals Met:  yes -progressing      [x] Participated in Interdisciplinary Rounds    [x] [address] [place] [address] Reviewed  [x] Discharge Nutrition Plan:  TBD    NUTRITION RISK:           [x] At risk                        [] Not currently at risk        Will follow-up per policy.   Wendy Gonzalez RD  PAGER:  [phone]

## 2017-03-08 NOTE — INTERDISCIPLINARY ROUNDS
CRITICAL CARE INTERDISCIPLINARY ROUNDS    Patient Information:    Name:   Wendie Parada    Age:   64 y.o. Admission Date:   3/4/2017    Critical Care Day: 4    Surgery Date:      Attending Provider:   Melani Negron DO    Surgeon:        Consultant(s):   [address] Physician:  Abilio Dangelo    Code Status: Full Code    Problem List:     Patient Active Problem List   Diagnosis Code    Respiratory insufficiency R06.89    Sinusitis J32.9    Mass of sinus R22.0    COPD (chronic obstructive pulmonary disease) with emphysema ([address] J43.9    Hypokalemia E87.6    Transaminitis R74.0    Nodule of kidney N28.89    COPD with acute exacerbation ([address] J44.1       Principal Problem:  COPD (chronic obstructive pulmonary disease) with emphysema ([address]    During rounds the following quality care indicators and evidence based practices were addressed :  DVT Prophylaxis and PUD Prophylaxis Broderick Day3 ([facility] y) ; Central Line Day: 1  Isolation:; Antibiotic Stewardship: reviewed; Probiotics Necessary:  y    Ventilator Bundle:  Ventilator Bundle Order Set:  y Sedation Vacation  yes;  Spontaneous Breathing Trial  ; Restraintsyes (Order, Necessity, Documentation)  Vent Day: 4    Sepsis Order Set:  or Elements of Sepsis Bundle Reviewed (Fluids, Antibiotics, Lactic Acid, Cultures, Vasopressors, Steriods, Glycemic control, Peak Plateau)    Glycemic Control:   Recent Labs      03/08/17   0450  03/07/17   0445  03/06/17   0500   GLU  260*  226*  178*   ;  Recent Labs      03/08/17   0422  03/07/17   0507  03/06/17   0539   PHI  7.285*  7.323*  7.236*   PCO2I  44.5  41.4  50.0*   PO2I  73*  94  114*    Adjustments     Acute MI/PCI:   Not applicable    Door to Balloon: Admission Time: 0737      Heart Failure:    Not applicable     SCIP Measures for other Surgeries:   Not applicable     Pneumonia:    Not applicable    Interdisciplinary team rounds were held with the following team members Care Management, Diabetes Treatment Specialist, Nursing, Nutrition,  Pharmacy, Physician and Respiratory Therapy. Plan of care discussed. Goals of Care/ Recommendations: Vent settings changed and sedation medication adjusted. New central TLC inserted. Solumderol reduced and insulin adjusted. Place ETCo2 . Sedation reduct to 50% in the Am and someone near by to help anxiety. Change TF to Nepro to 40 cc and increase water 300 cc q4h flushes. Monitor potassium. Lasix 20 mg IV q12h. May need vasopressor - start Levophed at 3 mcg. 2 doses of diuril. Map of 60     See clinical pathway and/or care plan for interventions and desired outcomes.     Critical Care Discharge Status:  Red

## 2017-03-08 NOTE — PROGRESS NOTES
[facility]  Pulmonary, Critical Care, and Sleep Medicine      Name: Raiza Berrios MRN: [id number]   : 1955 Hospital: [facility] [place]   Date: 3/8/2017               Requesting MD: Dr. Richa Murcia                                  Reason for CC Consult: COPD exacerbation    IMPRESSION:   · Acute hypoxemic respiratory failure, now on MV,  Possibly due to copd exacerbation   · Severe anxiety disorder further exacerbated by excessive b2 agonist  · PTSD per  from past interactions with physicians  · Possible left upper pole renal mass, as per CTA chest   · Multiple pulmonary nodules from CTA chest, will need outpatient follow up   · Hx HTN      RECOMMENDATIONS:   · Resp - keep pt on vent,   Will attempt weaning trial in am  · ID - No clear infectious etiology;  Cultures from resp suggest normal cristino. Will remove groin line  · CVS - HD stable. Troponin elevated on admission - cardiology following. Echo with EF 50%. Monitor hemodynamics. Possible ischemic work up by cardiology today   · Heme/Onc- H&H and platelets stable; monitor. · Metabolic -  Monitor electrolytes and replace per electrolyte replacement protocol. Will increase the free water with tube feed  · Renal - Trend renal indices and monitor UOP;     · Endocrine - SSI for glycemic control given steroids; Will need to add lantus  · Neuro/ Pain/ Sedation - schedule zyprexa and continue sedation and wean as tolerated. Sedation vacation in am  · Nutrition- continue tube feed for now  · Prophylaxis - DVT (lovenox), GI (protonix)     Subjective/History:   3/8  Pt off precedex due to bradycardia. Started to wean versed and fentanyl drip. Pt bradycardic in 40s to 50s. Easily arouseable however,  Hypotensive. Started on levophed. Spoke with Dr. Junior Mendoza patient's pain specialist.  Pt has been on norco 4x per day for several years.    Discussed about possible withdraw symptoms as patient for a time required max dose of precedex, versed drip at 7mg per hour and 150mcg of fentanyl. past 2 days. Yesterday daugther alluded that she may have taken more in time. Patient also see another physican for ptsd and apparently been on klonopin for prolonged period. .        3/5  This patient has been seen and evaluated at the request of Dr. Rich Sagastume for COPD exacerbation. Patient is a 64 y.o. female with history of COPD, PTSD, chronic sinusitis and HTN who presented to the ED with progressive shortness of breath for two weeks. She has had wheezing and a non-productive cough. Her  states she had a \"cold\" about 2 weeks ago and everything seems to have gotten worse since. She also recently traveled to [place] and stayed in a hotel but according to family it was clean hotel with no mold or dust.  No new exposure to pets. Daughter states she does get bad seasonal allergies which does typically flare her COPD. She is currently still smoking, which the daughter reports is down to less than 1 pack per day. In the ED, she was tachypneic and tachycardic and was brought to the ICU for further management under the hospitalist service. She also had a CT chest in the ED which was was negative for a PE but did show emphysematous changes. She also had a CT of the head which showed ethmoid mucosal thickening with partial destruction of the medial wall of the left orbit - a mass or abscess could not be ruled out. ENT was consulted but felt as if this could be followed up on an outpatient basis. Additionally, she had a mild elevated troponin for which cardiology was consulted and is following. Pulmonary was then consulted on 3/5 for assistance with management. She was treated overnight with Ativan, Klonopin, and Lexapro. She was maintained on HFNC as she was unable to tolerate the BiPAP. During my evaluation, she is minimally responsive on HFNC, with RR 38-42, 's, and hypertensive.   She was given multiple duonebs along with solumedrol and placed back on BiPAP without improvement. She was the intubated for respiratory failure. The patient is critically ill and can not provide additional history due to Ventilated. Past Medical History:   Diagnosis Date    Back pain     COPD (chronic obstructive pulmonary disease) (HCC)     Hypertension     PTSD (post-traumatic stress disorder)     Shingles       Past Surgical History:   Procedure Laterality Date    HX TUBAL LIGATION      HX TYMPANOSTOMY     Jennifer Clement        Prior to Admission medications    Medication Sig Start Date End Date Taking? Authorizing Provider   HYDROcodone-acetaminophen (NORCO)  mg tablet Take 1 Tab by mouth four (4) times daily. Yes Historical Provider   baclofen (LIORESAL) 10 mg tablet Take 10 mg by mouth three (3) times daily. Yes Marissa Son MD   albuterol (PROVENTIL HFA, VENTOLIN HFA, PROAIR HFA) 90 mcg/actuation inhaler Take 2 Puffs by inhalation every four (4) hours as needed for Wheezing. Yes Marissa Son MD   valsartan (DIOVAN) 160 mg tablet Take  by mouth daily. Yes Marissa Son MD   escitalopram (LEXAPRO) 20 mg tablet Take 40 mg by mouth daily. Yes Marissa Son MD   clonazePAM (KLONOPIN) 0.5 mg tablet Take 0.5 mg by mouth nightly as needed. Yes Marissa Son MD   fluticasone-salmeterol (ADVAIR DISKUS) 100-50 mcg/dose diskus inhaler Take 1 Puff by inhalation every twelve (12) hours. Yes Mairssa Son MD   atorvastatin (LIPITOR) 20 mg tablet Take 20 mg by mouth daily. Marissa Son MD   ibuprofen (MOTRIN) 800 mg tablet Take 800 mg by mouth every six (6) hours as needed.       Marissa Son MD     Current Facility-Administered Medications   Medication Dose Route Frequency    methylPREDNISolone (PF) (SOLU-MEDROL) injection 40 mg  40 mg IntraVENous Q6H    [START ON 3/9/2017] insulin glargine (LANTUS) injection 15 Units  15 Units SubCUTAneous DAILY    insulin lispro (HUMALOG) injection 4 Units  4 Units SubCUTAneous Q6H    furosemide (LASIX) injection 20 mg  20 mg IntraVENous Q12H    chlorothiazide (DIURIL) 250 mg in sterile water (preservative free) 9 mL injection  250 mg IntraVENous Q12H    NOREPINephrine (LEVOPHED) 8,000 mcg in dextrose 5% 250 mL infusion  0-16 mcg/min IntraVENous TITRATE    aspirin chewable tablet 81 mg  81 mg Oral DAILY    OLANZapine (ZyPREXA) tablet 10 mg  10 mg Per NG tube QPM    midazolam in normal saline (VERSED) 1 mg/mL infusion  1 mg/hr IntraVENous TITRATE    0.45% sodium chloride infusion  150 mL/hr IntraVENous CONTINUOUS    atorvastatin (LIPITOR) tablet 20 mg  20 mg Oral DAILY    escitalopram oxalate (LEXAPRO) tablet 20 mg  20 mg Oral DAILY    chlorhexidine (PERIDEX) 0.12 % mouthwash 10 mL  10 mL Oral Q12H    insulin lispro (HUMALOG) injection   SubCUTAneous Q6H    pantoprazole (PROTONIX) 40 mg in sodium chloride 0.9 % 10 mL injection  40 mg IntraVENous DAILY    fentaNYL (PF)  mcg/30 ml   IntraVENous TITRATE    arformoterol (BROVANA) neb solution 15 mcg  15 mcg Nebulization BID RT    nicotine (NICODERM CQ) 14 mg/24 hr patch 1 Patch  1 Patch TransDERmal DAILY    enoxaparin (LOVENOX) injection 40 mg  40 mg SubCUTAneous Q24H    budesonide (PULMICORT) 500 mcg/2 ml nebulizer suspension  500 mcg Nebulization BID RT    levoFLOXacin (LEVAQUIN) 750 mg in D5W IVPB  750 mg IntraVENous Q24H     Allergies   Allergen Reactions    Sulfacetamide Anaphylaxis      Social History   Substance Use Topics    Smoking status: Current Some Day Smoker     Packs/day: 0.50    Smokeless tobacco: Not on file    Alcohol use Not on file      History reviewed. No pertinent family history. Review of Systems:  A comprehensive review of systems was negative except for that written in the HPI.     Objective:   Vital Signs:    Visit Vitals    BP 91/53 (BP 1 Location: Right arm, BP Patient Position: At rest)    Pulse (!) 43    Temp 99.4 °F (37.4 °C)    Resp 17    Ht 5' 8\" (1.727 m)    Wt 62.6 kg (138 lb)    SpO2 98%    BMI 20.98 kg/m2       O2 Device: Ventilator   O2 Flow Rate (L/min): 40 l/min   Temp (24hrs), Av.3 °F (37.4 °C), Min:98.4 °F (36.9 °C), Max:99.9 °F (37.7 °C)       Intake/Output:   Last shift:      701 - 1900  In: -   Out: 275 [Urine:275]  Last 3 shifts: 1901 - 700  In: 7387.7 [I.V.:5448.7]  Out: 1960 [Urine:1960]    Intake/Output Summary (Last 24 hours) at 17 1319  Last data filed at 17 1055   Gross per 24 hour   Intake          4120.74 ml   Output             1350 ml   Net          2770.74 ml       Ventilator Settings:  Mode Rate Tidal Volume Pressure FiO2 PEEP   Pressure control, Assist control   400 ml    40 % 5 cm H20     Peak airway pressure: 21 cm H2O    Minute ventilation: 9.53 l/min      ARDS network Guidelines: Lung protective strategy and Pl pressure goals____________      Physical Exam:    General:  Intubated. Head:  Normocephalic, without obvious abnormality, atraumatic. Eyes:  Conjunctivae/corneas clear. Nose: Nares normal. Septum midline. Mucosa normal.   Throat: Lips and tongue normal.  Teeth present. Mucosa moist.    Neck: Supple, symmetrical, trachea midline, no adenopathy, no JVD. Lungs:   Exp wheezing and insp crackles       Heart:  Tachycardic rate and regular rhythm; no audible mrg   Abdomen:   Hypoactive bowel sounds; soft, non-tender   Extremities: Extremities without edema. Cyanosis to fingers and toes with mottling on lower extremities. Pulses: 2+ and symmetric all extremities. Skin: Warm, dry.        Neurologic: Unable to complete neurological exam due to patient status        Data:     Recent Results (from the past 24 hour(s))   GLUCOSE, POC    Collection Time: 17  6:07 PM   Result Value Ref Range    Glucose (POC) 238 (H) 70 - 110 mg/dL   GLUCOSE, POC    Collection Time: 17 11:59 PM   Result Value Ref Range    Glucose (POC) 255 (H) 70 - 110 mg/dL   POC G3    Collection Time: 17  4:22 AM   Result Value Ref Range    Device: VENT      FIO2 (POC) 40 %    pH (POC) 7.285 (L) 7.35 - 7.45      pCO2 (POC) 44.5 35.0 - 45.0 MMHG    pO2 (POC) 73 (L) 80 - 100 MMHG    HCO3 (POC) 21.1 (L) 22 - 26 MMOL/L    sO2 (POC) 92 92 - 97 %    Base deficit (POC) 5 mmol/L    Mode ASSIST CONTROL      Tidal volume 400 ml    Set Rate 14 bpm    PEEP/CPAP (POC) 2 cmH2O    Allens test (POC) YES      Inspiratory Time 0.75 sec    Total resp. rate 17      Site RIGHT RADIAL      Patient temp. 99.3      Specimen type (POC) ARTERIAL      Performed by Vivianne Dakin     Volume control plus YES     MAGNESIUM    Collection Time: 03/08/17  4:50 AM   Result Value Ref Range    Magnesium 2.4 1.8 - 2.4 mg/dL   METABOLIC PANEL, COMPREHENSIVE    Collection Time: 03/08/17  4:50 AM   Result Value Ref Range    Sodium 149 (H) 136 - 145 mmol/L    Potassium 5.6 (H) 3.5 - 5.5 mmol/L    Chloride 115 (H) 100 - 108 mmol/L    CO2 25 21 - 32 mmol/L    Anion gap 9 3.0 - 18 mmol/L    Glucose 260 (H) 74 - 99 mg/dL    BUN 31 (H) 7.0 - 18 MG/DL    Creatinine 0.84 0.6 - 1.3 MG/DL    BUN/Creatinine ratio 37 (H) 12 - 20      GFR est AA >60 >60 ml/min/1.73m2    GFR est non-AA >60 >60 ml/min/1.73m2    Calcium 8.4 (L) 8.5 - 10.1 MG/DL    Bilirubin, total 0.3 0.2 - 1.0 MG/DL    ALT (SGPT) 46 13 - 56 U/L    AST (SGOT) 20 15 - 37 U/L    Alk.  phosphatase 69 45 - 117 U/L    Protein, total 5.4 (L) 6.4 - 8.2 g/dL    Albumin 2.4 (L) 3.4 - 5.0 g/dL    Globulin 3.0 2.0 - 4.0 g/dL    A-G Ratio 0.8 0.8 - 1.7     CBC WITH AUTOMATED DIFF    Collection Time: 03/08/17  4:50 AM   Result Value Ref Range    WBC 8.0 4.6 - 13.2 K/uL    RBC 3.63 (L) 4.20 - 5.30 M/uL    HGB 11.6 (L) 12.0 - 16.0 g/dL    HCT 36.2 35.0 - 45.0 %    MCV 99.7 (H) 74.0 - 97.0 FL    MCH 32.0 24.0 - 34.0 PG    MCHC 32.0 31.0 - 37.0 g/dL    RDW 15.7 (H) 11.6 - 14.5 %    PLATELET 039 469 - 683 K/uL    MPV 11.0 9.2 - 11.8 FL    NEUTROPHILS 87 (H) 40 - 73 %    LYMPHOCYTES 6 (L) 21 - 52 %    MONOCYTES 7 3 - 10 % EOSINOPHILS 0 0 - 5 %    BASOPHILS 0 0 - 2 %    ABS. NEUTROPHILS 6.9 1.8 - 8.0 K/UL    ABS. LYMPHOCYTES 0.5 (L) 0.9 - 3.6 K/UL    ABS. MONOCYTES 0.5 0.05 - 1.2 K/UL    ABS. EOSINOPHILS 0.0 0.0 - 0.4 K/UL    ABS. BASOPHILS 0.0 0.0 - 0.06 K/UL    DF AUTOMATED     GLUCOSE, POC    Collection Time: 03/08/17  6:11 AM   Result Value Ref Range    Glucose (POC) 269 (H) 70 - 110 mg/dL   GLUCOSE, POC    Collection Time: 03/08/17 12:41 PM   Result Value Ref Range    Glucose (POC) 241 (H) 70 - 110 mg/dL             Telemetry:Sinus tachycardia    Imaging:  CXR 3/5  The cardiomediastinal silhouette is stable. Bilateral mid to lower lung field  increased markings are again seen. The lung fields are hyperinflated. There is  no focal consolidation. There is no significant pleural effusion. There is no  acute osseous abnormality.        --------------  IMPRESSION  Impression:  --------------  COPD.     Bilateral mid to lower lung field increased interstitial markings similar to  prior which could represent bronchitis/interstitial pneumonitis or a chronic  interstitial process.      No focal consolidation.     No significant interval change. CTA Chest   FINDINGS:     EXAM QUALITY: Overall exam quality is suboptimal. Opacification of the pulmonary  arteries is somewhat suboptimal although diagnostic. There is significant  respiratory motion artifact which significantly degrades the images particularly  in the lower lung fields.     PULMONARY ARTERIES: No evidence of pulmonary embolism.     MEDIASTINUM: Heart size is normal. No evidence of right heart strain. No  pericardial effusion. Mild atherosclerotic changes of the aorta. Esophagus is  unremarkable.     LYMPH NODES: No enlarged nodes.     AIRWAY: Normal.     LUNGS: Significant emphysematous changes most pronounced in the upper lobes. Bilateral pulmonary nodules. Largest is in the left lower lobe on image 86  measuring 11 x 11 mm.  A second possible left lower lobe nodule on image 91  measures 7 x 6 mm. Right lower lobe nodule image 83 measures 12 x 6 mm.     PLEURA: Normal. Specifically, no pneumothorax or pleural effusion.     UPPER ABDOMEN: There is an exophytic possibly solid nodule arising from the  upper pole of the left kidney. This measures 11 x 12 mm. Upper abdominal  structures are otherwise unremarkable. .     OTHER: No acute or aggressive osseous abnormalities identified.     _______________     IMPRESSION  IMPRESSION:     1. Study is degraded by respiratory motion artifact however there are no  definite pulmonary emboli.     2. Emphysema. 3. Bilateral pulmonary nodules as described above with the largest in the left  lower lobe measuring 11 x 11 mm.  4. Possible solid exophytic left upper pole renal mass.     Recommend renal ultrasound for further evaluation of the kidneys.        Total critical care time exclusive of procedures: 35minutes  Madonna Roach MD

## 2017-03-08 NOTE — PROGRESS NOTES
Hospitalist Progress Note    Patient: José Miguel Murray MRN: [id number]  CSN: [id number]    YOB: 1955  Age: 64 y.o. Sex: female    DOA: 3/4/2017 LOS:  LOS: 4 days          Chief Complaint:    Shortness of Breath    Assessment/Plan     Hospital Problems  Date Reviewed: 3/6/2017          Codes Class Noted POA    COPD with acute exacerbation ([facility] ICD-10-CM: J44.1  ICD-9-CM: 491.21  3/6/2017 Yes        Respiratory insufficiency ICD-10-CM: R06.89  ICD-9-CM: 786.09  3/4/2017 Yes        Sinusitis ICD-10-CM: J32.9  ICD-9-CM: 473.9  3/4/2017 Unknown        Mass of sinus ICD-10-CM: R22.0  ICD-9-CM: 784.2  3/4/2017 Yes        * (Principal)COPD (chronic obstructive pulmonary disease) with emphysema ([facility] ICD-10-CM: J43.9  ICD-9-CM: 492.8  3/4/2017 Yes        Hypokalemia ICD-10-CM: E87.6  ICD-9-CM: 276.8  3/4/2017 Yes        Transaminitis ICD-10-CM: R74.0  ICD-9-CM: 790.4  3/4/2017 Yes        Nodule of kidney ICD-10-CM: N28.89  ICD-9-CM: 593.9  3/4/2017 Yes            Continue critical care/vent support  - Weaning sedation as tolerated  - Vent management--possible extubation tomorrow  - Fluid and electrolyte management protocols    BG not at goal  - Lantus to be added tonight  - Humalog 4 units + SSI q6h    Steroids and antibiotics for COPD exacerbation continue per orders    I am very concerned about this patients history of substance use/abuse and current situation. I spoke with her children regarding her medications and possible alcohol use as her labs certainly reflect what could be chronic alcohol consumption. They became very defensive and upset when this was mentioned even in an extremely non-accusatory way. The patient's daughter said the patient used to drink \"a little bit\" but the patient's son corrected her and stated that it was actually a lot. The daughter then went on to describe how horrible of an experience her mother's alcohol withdrawal process was.   When the daughter was not looking, the son gestured to me as if to ask, \"do you really think she was drinking again? \"  I am not sure that we are getting the whole story here and would very strongly consider the possibility that we could be dealing with alcohol withdrawal as well as the COPD exacerbation and respiratory failure. I am not sure if she is taking baclofen chronically or not but this is on her medication list for review as well. This could further be complicating the picture. Pharmacy consult for assistance with sorting out her PTA medications. Dispo: continue inpatient; ICU level care; pharmacy consult; possible vent wean tomorrow      Subjective:    Difficulty weaning vent and managing sedation/BP/HR.   The patient remains intubated and sedated    Review of systems:    Unable to obtain      Vital signs/Intake and Output:  Visit Vitals    /56 (BP 1 Location: Right arm, BP Patient Position: At rest)    Pulse (!) 50    Temp 98.5 °F (36.9 °C)    Resp 14    Ht 5' 8\" (1.727 m)    Wt 62.6 kg (138 lb)    SpO2 99%    BMI 20.98 kg/m2     Current Shift:  03/08 0701 - 03/08 1900  In: -   Out: 7884 [LUPIZ:0515]  Last three shifts:  03/06 1901 - 03/08 0700  In: 7387.7 [I.V.:5448.7]  Out: 1960 [Urine:1960]    Exam:    General: Well developed, intubated, sedated  Head/Neck: NCAT, supple, No masses, No lymphadenopathy  CVS:bradycardia, no M/R/G, S1/S2 heard, no thrill  Lungs:Clear to auscultation bilaterally, no wheezes, rhonchi, or rales, on vent  Abdomen: Soft, Nontender, No distention, Normal Bowel sounds, No hepatomegaly  Extremities: No C/C/E, pulses palpable 2+  Skin:normal texture and turgor, no rashes, no lesions  Neuro/ Psych:unable to assess                Labs: Results:       Chemistry Recent Labs      03/08/17   0450  03/07/17   0445  03/06/17   0500   GLU  260*  226*  178*   NA  149*  151*  148*   K  5.6*  5.3  4.4   CL  115*  118*  116*   CO2  25  24  25   BUN  31*  31*  27*   CREA  0.84  0.88  0.77   CA  8.4*  8.3*  8.1* AGAP  9  9  7   BUCR  37*  35*  35*   AP  69  76  101   TP  5.4*  5.5*  5.8*   ALB  2.4*  2.5*  2.6*   GLOB  3.0  3.0  3.2   AGRAT  0.8  0.8  0.8      CBC w/Diff Recent Labs      03/08/17   0450  03/07/17   0445  03/06/17   0500   WBC  8.0  8.5  16.2*   RBC  3.63*  3.66*  3.72*   HGB  11.6*  11.7*  12.0   HCT  36.2  36.4  36.9   PLT  239  235  267   GRANS  87*  87*  88*   LYMPH  6*  8*  7*   EOS  0  0  0      Cardiac Enzymes No results for input(s): CPK, CKND1, NAUN in the last 72 hours. No lab exists for component: CKRMB, TROIP   Coagulation No results for input(s): PTP, INR, APTT in the last 72 hours. No lab exists for component: INREXT    Lipid Panel No results found for: CHOL, CHOLPOCT, CHOLX, CHLST, CHOLV, [id number], HDL, LDL, NLDLCT, DLDL, LDLC, DLDLP, [id number], VLDLC, VLDL, TGL, TGLX, TRIGL, [id number], TRIGP, TGLPOCT, [id number], CHHD, CHHDX   BNP No results for input(s): BNPP in the last 72 hours. Liver Enzymes Recent Labs      03/08/17   0450   TP  5.4*   ALB  2.4*   AP  69   SGOT  20      Thyroid Studies Lab Results   Component Value Date/Time    TSH 0.05 03/04/2017 04:25 PM        Procedures/imaging: see electronic medical records for all procedures/Xrays and details which were not copied into this note but were reviewed prior to creation of Plan    50 minutes of critical care time spent in the direct evaluation and treatment of this high risk patient. The reason for providing this level of medical care for this critically ill patient was due a critical illness that impaired one or more vital organ systems such that there was a high probability of imminent or life threatening deterioration in the patients condition. This care involved high complexity decision making to assess, manipulate, and support vital system functions, to treat this degreee vital organ system failure and to prevent further life threatening deterioration of the patients condition.         [facility], DO

## 2017-03-08 NOTE — PROGRESS NOTES
0716-Bedside and Verbal shift change report given to NOEMÍ Vásquez (oncoming nurse) by Noreen Mary RN (offgoing nurse). Report included the following information SBAR, Kardex, Intake/Output, MAR, Recent Results and Cardiac Rhythm SB.     0800-Initial shift assessment complete. Will continue to monitor. 5585- Time out done for central line placement. Left IJ central catheter placed by Dr Nayana Kahn. 1000- Dr Nayana Kahn at bedside. Patient RASS -5, sedation decreased per MD orders. Will continue to monitor. 1130- Per Dr Nayana Kahn, ok to use central line. Right Femoral line pulled. Pressure held, no oozing or hematoma. Patient tolerated well. Will continue to monitor. 1200- Reassessment complete. Patient at RASS -4. Family at bedside. Patient's HR remains in 40's- low 50's, MD is aware. Will continue to monitor. 1600- Reassessment complete. Will continue to monitor. 1655- Patient agitated and starting to pull at restraints. Multiple visitors at bedside. Versed increased to 3 mg/hr. 1726- Patient still alert, appears anxious. Versed increased to 5mg/hr. Will continue to monitor. 1745- Patient resting quietly. Titrated Versed down to 2mg/hr. Will continue to monitor. 1915- Bedside and Verbal shift change report given to Norene Mary RN (oncoming nurse) by NOEMÍ Vásquez (offgoing nurse). Report included the following information SBAR, Kardex, Procedure Summary, Intake/Output, MAR, Recent Results and Cardiac Rhythm SB. Drip rates verified with oncoming RN.

## 2017-03-08 NOTE — PROGRESS NOTES
Chart reviewed and pt remains in ICU on vent at this time. CM remains available for transition of care needs during patients admission. Care Management Interventions  PCP Verified by CM:  Yes  Transition of Care Consult (CM Consult): Discharge Planning

## 2017-03-08 NOTE — PROGRESS NOTES
@1930 Pt taken over sedated in bed, fl acc 0 , remains on ventilator via et tube. OG tube in situ infusing tube feeds, 0 residual, Ra ss -4 at present on fentanyl, versed, and precedex drips. Broderick remains in situ draining clear yellow urine. SCD's on bilateral feet. Relatives at bedside . Assessment done and documented in appropriate flow sheets. Nursing management continues. @2100 no new changes nursing care continues. @2300 no new developments flacc 0 vital signs WNL observation continues. @0023 pt at rass -5  Versed drip being titrated down to obtain a rass -4, Vital signs stable care continues. @ 0145 pt remains at rass -5  Versed continues to be titrated down , remains at bedside. Nursing observation continues. @0239 pt began to stack breaths . RT at bedside, pt suctioned ,no changes , sedation increased observation continues. @0400 pt resting comfortably care continues. Personal hygiene needs administered care continues. @0239 Bedside and Verbal shift change report given to Shawna Diamond (oncoming nurse) by Bryant Regalado (offgoing nurse). Report included the following information SBAR, Kardex, Intake/Output, MAR, Recent Results and Med Rec Status.    Alarm parameters reviewed, on and audible Appropriate for patient clinical condition

## 2017-03-08 NOTE — PROGRESS NOTES
Admission Medication Reconciliation has been performed on this intubated ICU patient consisting of interview of the patient family regarding their PTA Home Medication List, Allergies and PMH as well as obtaining outpatient pharmacy information. Interviewed patient  and adult son as pt is currently intubated. Patient did not provide a written list of home medications, the family provided a bag of rx vials from [facility] and [facility] although 3 meds she takes were not in the bag. Patient's outpatient pharmacy is [facility]      Medication Reconciliation Interventions:   Wrong Medication Identified 1  Wrong/missing medication strength or dose identified  1  Wrong/missing Interval Identified 1  Wrong/missing Route Identified 0  Medication Duplication 0  Omissions 1  Commissions 1  Other Issue(s) Identified (Indicate): 0            Medication Compliance Issues and/or Medication Concerns:   I was consulted by Dr. Hanna Bran to review home meds and perform VAPMP search. Per [facility] , Pt gets #270 Klonopin 0.5mg every 3 months from psychiatrist for PTSD and #120 norco 10/325mg from pain management Dr. Benny Gil. As a  matter of fact Dr. Kar Lorenzo came to see the pt while I was interviewing family. He stated to me that he had tried other pain meds without success and that norco 10/325 was the only one that worked for her and that she has been on this dose for quite some time. [facility]  also reports Tussionex 140ml on 2/3/17 from Dr. Kar Lorenzo. The  denies alcohol use in pt stating it would cause pancreatitis in her. I am not sure if the 1300mg of APAP she is getting from norco each day would be enough on its own to cause her elevated liver enzymes. I also spoke with the pharmacist at [place] [address] who was unable to give any further insight. Family is concerned that pt has not been getting her Klonopin. PTA Med list updated.      [facility] Pharmacist  [phone]

## 2017-03-08 NOTE — DIABETES MGMT
GLYCEMIC CONTROL & NUTRITION:    - noted A1C meets criteria for pre-DM, no known documentation of previous dx, BG out of target  - steroids on board (IV Solu-medrol decreased from 60 mg to 40mg q 6 hours today)  - noted TF formula to change r/t high potassium --> from Osmolite 1.2 @ 60 ml/hr to Nepro @ 40 ml/hr   - new TF will be -30% total CHO  - BG has been out of targets, recommend continue current Lantus 10 units every day and initiate scheduled dose of Humalog 4 units qac (orders entered per Dr. Teetee Snell)  - changes in [facility] and steroids discussed with RN on floor & to monitor BG trend, should need less corrective coverage  - recommend follow-up with OP PCM re: BG monitoring and discuss treatment options  - encourage OP DM Self Management Class (schedule given)  - see Clinical RD note for full nutrition assessment    Recent Glucose Results:   Lab Results   Component Value Date/Time     (H) 03/08/2017 04:50 AM    GLUCPOC 241 (H) 03/08/2017 12:41 PM    GLUCPOC 269 (H) 03/08/2017 06:11 AM    GLUCPOC 255 (H) 03/07/2017 11:59 PM       Lab Results   Component Value Date/Time    Hemoglobin A1c 6.2 03/04/2017 04:25 PM           M. Deloise Kocher, MPH, RD

## 2017-03-08 NOTE — PROCEDURES
[facility]  [facility] Procedure Note with US guidance    Indication: inadequate IV access    Risks, benefits, alternatives explained and consent obtained from . Patient positioned in Trendelenburg. Full sterile barrier precautions used. Sterility Protocol followed. (cap, mask sterile gown, sterile gloves, large sterile sheet, hand hygiene, 2% chlorhexidine for cutaneous antisepsis, sterile probe cover)    Using ultrasound guidance, 7 Fr triple lumen cath placed and secured at 20cm. left internal jugular vein cannulated x 2 attempt(s) utilizing the Seldinger technique. Position of wire confirmed in vein using US before dilating. Wire was removed. No immediate complications. Good blood return, carefully flushed. Catheter secured & Biopatch applied. Sterile Tegaderm placed.          CXR ordered

## 2017-03-09 ENCOUNTER — APPOINTMENT (OUTPATIENT)
Dept: GENERAL RADIOLOGY | Age: 62
DRG: 207 | End: 2017-03-09
Attending: PHYSICIAN ASSISTANT
Payer: OTHER GOVERNMENT

## 2017-03-09 PROBLEM — F43.10 PTSD (POST-TRAUMATIC STRESS DISORDER): Status: ACTIVE | Noted: 2017-03-09

## 2017-03-09 LAB
ALBUMIN SERPL BCP-MCNC: 2.5 G/DL (ref 3.4–5)
ALBUMIN/GLOB SERPL: 0.9 {RATIO} (ref 0.8–1.7)
ALP SERPL-CCNC: 101 U/L (ref 45–117)
ALT SERPL-CCNC: 47 U/L (ref 13–56)
ANION GAP BLD CALC-SCNC: 6 MMOL/L (ref 3–18)
ARTERIAL PATENCY WRIST A: ABNORMAL
ARTERIAL PATENCY WRIST A: YES
AST SERPL W P-5'-P-CCNC: 17 U/L (ref 15–37)
BASE EXCESS BLD CALC-SCNC: 0 MMOL/L
BASE EXCESS BLDV CALC-SCNC: 4 MMOL/L
BASOPHILS # BLD AUTO: 0 K/UL (ref 0–0.06)
BASOPHILS # BLD: 0 % (ref 0–2)
BDY SITE: ABNORMAL
BDY SITE: ABNORMAL
BILIRUB SERPL-MCNC: 0.3 MG/DL (ref 0.2–1)
BODY TEMPERATURE: 98
BUN SERPL-MCNC: 32 MG/DL (ref 7–18)
BUN/CREAT SERPL: 37 (ref 12–20)
CALCIUM SERPL-MCNC: 8.4 MG/DL (ref 8.5–10.1)
CHLORIDE SERPL-SCNC: 108 MMOL/L (ref 100–108)
CO2 SERPL-SCNC: 29 MMOL/L (ref 21–32)
CREAT SERPL-MCNC: 0.87 MG/DL (ref 0.6–1.3)
DIFFERENTIAL METHOD BLD: ABNORMAL
EOSINOPHIL # BLD: 0 K/UL (ref 0–0.4)
EOSINOPHIL NFR BLD: 0 % (ref 0–5)
ERYTHROCYTE [DISTWIDTH] IN BLOOD BY AUTOMATED COUNT: 15.1 % (ref 11.6–14.5)
GAS FLOW.O2 O2 DELIVERY SYS: ABNORMAL L/MIN
GAS FLOW.O2 O2 DELIVERY SYS: ABNORMAL L/MIN
GAS FLOW.O2 SETTING OXYMISER: 14 BPM
GAS FLOW.O2 SETTING OXYMISER: 50 L/M
GLOBULIN SER CALC-MCNC: 2.8 G/DL (ref 2–4)
GLUCOSE BLD STRIP.AUTO-MCNC: 141 MG/DL (ref 70–110)
GLUCOSE BLD STRIP.AUTO-MCNC: 166 MG/DL (ref 70–110)
GLUCOSE BLD STRIP.AUTO-MCNC: 187 MG/DL (ref 70–110)
GLUCOSE BLD STRIP.AUTO-MCNC: 194 MG/DL (ref 70–110)
GLUCOSE BLD STRIP.AUTO-MCNC: 241 MG/DL (ref 70–110)
GLUCOSE SERPL-MCNC: 238 MG/DL (ref 74–99)
HCO3 BLD-SCNC: 25.8 MMOL/L (ref 22–26)
HCO3 BLDV-SCNC: 29.6 MMOL/L (ref 23–28)
HCT VFR BLD AUTO: 36.2 % (ref 35–45)
HGB BLD-MCNC: 11.9 G/DL (ref 12–16)
INSPIRATION.DURATION SETTING TIME VENT: 1.2 SEC
LYMPHOCYTES # BLD AUTO: 5 % (ref 21–52)
LYMPHOCYTES # BLD: 0.6 K/UL (ref 0.9–3.6)
MAGNESIUM SERPL-MCNC: 1.9 MG/DL (ref 1.8–2.4)
MCH RBC QN AUTO: 32.3 PG (ref 24–34)
MCHC RBC AUTO-ENTMCNC: 32.9 G/DL (ref 31–37)
MCV RBC AUTO: 98.4 FL (ref 74–97)
MONOCYTES # BLD: 0.6 K/UL (ref 0.05–1.2)
MONOCYTES NFR BLD AUTO: 5 % (ref 3–10)
NEUTS SEG # BLD: 12.3 K/UL (ref 1.8–8)
NEUTS SEG NFR BLD AUTO: 90 % (ref 40–73)
O2/TOTAL GAS SETTING VFR VENT: 40 %
O2/TOTAL GAS SETTING VFR VENT: 40 %
PCO2 BLD: 47.7 MMHG (ref 35–45)
PCO2 BLDV: 51.6 MMHG (ref 41–51)
PEEP RESPIRATORY: 5 CMH2O
PH BLD: 7.34 [PH] (ref 7.35–7.45)
PH BLDV: 7.37 [PH] (ref 7.32–7.42)
PIP ISTAT,IPIP: 15
PLATELET # BLD AUTO: 231 K/UL (ref 135–420)
PMV BLD AUTO: 11.1 FL (ref 9.2–11.8)
PO2 BLD: 75 MMHG (ref 80–100)
PO2 BLDV: 34 MMHG (ref 25–40)
POTASSIUM SERPL-SCNC: 3.7 MMOL/L (ref 3.5–5.5)
PRESSURE CONTROL, IPC: YES
PROT SERPL-MCNC: 5.3 G/DL (ref 6.4–8.2)
RBC # BLD AUTO: 3.68 M/UL (ref 4.2–5.3)
SAO2 % BLD: 94 % (ref 92–97)
SAO2 % BLDV: 63 % (ref 65–88)
SERVICE CMNT-IMP: ABNORMAL
SERVICE CMNT-IMP: ABNORMAL
SODIUM SERPL-SCNC: 143 MMOL/L (ref 136–145)
SPECIMEN TYPE: ABNORMAL
SPECIMEN TYPE: ABNORMAL
TOTAL RESP. RATE, ITRR: 20
TOTAL RESP. RATE, ITRR: 22
VENTILATION MODE VENT: ABNORMAL
WBC # BLD AUTO: 13.6 K/UL (ref 4.6–13.2)

## 2017-03-09 PROCEDURE — C9113 INJ PANTOPRAZOLE SODIUM, VIA: HCPCS | Performed by: PHYSICIAN ASSISTANT

## 2017-03-09 PROCEDURE — 71010 XR CHEST PORT: CPT

## 2017-03-09 PROCEDURE — 74011250636 HC RX REV CODE- 250/636: Performed by: INTERNAL MEDICINE

## 2017-03-09 PROCEDURE — 74011000250 HC RX REV CODE- 250: Performed by: INTERNAL MEDICINE

## 2017-03-09 PROCEDURE — 74011000250 HC RX REV CODE- 250: Performed by: PHYSICIAN ASSISTANT

## 2017-03-09 PROCEDURE — 74011636637 HC RX REV CODE- 636/637: Performed by: PHYSICIAN ASSISTANT

## 2017-03-09 PROCEDURE — 36600 WITHDRAWAL OF ARTERIAL BLOOD: CPT

## 2017-03-09 PROCEDURE — 74011250637 HC RX REV CODE- 250/637: Performed by: INTERNAL MEDICINE

## 2017-03-09 PROCEDURE — 80053 COMPREHEN METABOLIC PANEL: CPT | Performed by: PHYSICIAN ASSISTANT

## 2017-03-09 PROCEDURE — 82962 GLUCOSE BLOOD TEST: CPT

## 2017-03-09 PROCEDURE — 74011000258 HC RX REV CODE- 258: Performed by: INTERNAL MEDICINE

## 2017-03-09 PROCEDURE — 94003 VENT MGMT INPAT SUBQ DAY: CPT

## 2017-03-09 PROCEDURE — 65610000006 HC RM INTENSIVE CARE

## 2017-03-09 PROCEDURE — 74011250636 HC RX REV CODE- 250/636: Performed by: PHYSICIAN ASSISTANT

## 2017-03-09 PROCEDURE — 74011250636 HC RX REV CODE- 250/636

## 2017-03-09 PROCEDURE — 83735 ASSAY OF MAGNESIUM: CPT | Performed by: PHYSICIAN ASSISTANT

## 2017-03-09 PROCEDURE — 77030013140 HC MSK NEB VYRM -A

## 2017-03-09 PROCEDURE — 82803 BLOOD GASES ANY COMBINATION: CPT

## 2017-03-09 PROCEDURE — 85025 COMPLETE CBC W/AUTO DIFF WBC: CPT | Performed by: PHYSICIAN ASSISTANT

## 2017-03-09 PROCEDURE — 74011636637 HC RX REV CODE- 636/637: Performed by: INTERNAL MEDICINE

## 2017-03-09 PROCEDURE — 77010033711 HC HIGH FLOW OXYGEN

## 2017-03-09 PROCEDURE — 94640 AIRWAY INHALATION TREATMENT: CPT

## 2017-03-09 PROCEDURE — 74011000250 HC RX REV CODE- 250

## 2017-03-09 PROCEDURE — 74011250637 HC RX REV CODE- 250/637: Performed by: PHYSICIAN ASSISTANT

## 2017-03-09 RX ORDER — POTASSIUM CHLORIDE 20MEQ/15ML
40 LIQUID (ML) ORAL
Status: COMPLETED | OUTPATIENT
Start: 2017-03-09 | End: 2017-03-09

## 2017-03-09 RX ORDER — LEVALBUTEROL 1.25 MG/.5ML
1.25 SOLUTION, CONCENTRATE RESPIRATORY (INHALATION)
Status: DISCONTINUED | OUTPATIENT
Start: 2017-03-09 | End: 2017-03-16 | Stop reason: HOSPADM

## 2017-03-09 RX ORDER — METOPROLOL TARTRATE 5 MG/5ML
INJECTION INTRAVENOUS
Status: DISPENSED
Start: 2017-03-09 | End: 2017-03-10

## 2017-03-09 RX ORDER — LEVALBUTEROL 1.25 MG/.5ML
SOLUTION, CONCENTRATE RESPIRATORY (INHALATION)
Status: COMPLETED
Start: 2017-03-09 | End: 2017-03-09

## 2017-03-09 RX ORDER — HALOPERIDOL 5 MG/ML
5 INJECTION INTRAMUSCULAR
Status: DISCONTINUED | OUTPATIENT
Start: 2017-03-09 | End: 2017-03-14

## 2017-03-09 RX ORDER — FUROSEMIDE 10 MG/ML
40 INJECTION INTRAMUSCULAR; INTRAVENOUS EVERY 12 HOURS
Status: DISCONTINUED | OUTPATIENT
Start: 2017-03-09 | End: 2017-03-09

## 2017-03-09 RX ORDER — MIDAZOLAM HYDROCHLORIDE 1 MG/ML
1-2 INJECTION, SOLUTION INTRAMUSCULAR; INTRAVENOUS
Status: DISCONTINUED | OUTPATIENT
Start: 2017-03-09 | End: 2017-03-14

## 2017-03-09 RX ORDER — LIDOCAINE 50 MG/G
1 PATCH TOPICAL EVERY 12 HOURS
Status: DISCONTINUED | OUTPATIENT
Start: 2017-03-09 | End: 2017-03-16 | Stop reason: HOSPADM

## 2017-03-09 RX ORDER — HYDROMORPHONE HYDROCHLORIDE 1 MG/ML
INJECTION, SOLUTION INTRAMUSCULAR; INTRAVENOUS; SUBCUTANEOUS
Status: COMPLETED
Start: 2017-03-09 | End: 2017-03-09

## 2017-03-09 RX ORDER — MAGNESIUM SULFATE HEPTAHYDRATE 40 MG/ML
2 INJECTION, SOLUTION INTRAVENOUS ONCE
Status: COMPLETED | OUTPATIENT
Start: 2017-03-09 | End: 2017-03-09

## 2017-03-09 RX ORDER — INSULIN GLARGINE 100 [IU]/ML
20 INJECTION, SOLUTION SUBCUTANEOUS DAILY
Status: DISCONTINUED | OUTPATIENT
Start: 2017-03-10 | End: 2017-03-10

## 2017-03-09 RX ORDER — PROPOFOL 10 MG/ML
0-50 VIAL (ML) INTRAVENOUS
Status: DISCONTINUED | OUTPATIENT
Start: 2017-03-09 | End: 2017-03-10

## 2017-03-09 RX ORDER — PROPOFOL 10 MG/ML
INJECTION, EMULSION INTRAVENOUS
Status: COMPLETED
Start: 2017-03-09 | End: 2017-03-09

## 2017-03-09 RX ORDER — INSULIN LISPRO 100 [IU]/ML
6 INJECTION, SOLUTION INTRAVENOUS; SUBCUTANEOUS EVERY 6 HOURS
Status: DISCONTINUED | OUTPATIENT
Start: 2017-03-09 | End: 2017-03-10

## 2017-03-09 RX ORDER — LEVALBUTEROL 1.25 MG/.5ML
1.25 SOLUTION, CONCENTRATE RESPIRATORY (INHALATION)
Status: ACTIVE | OUTPATIENT
Start: 2017-03-09 | End: 2017-03-10

## 2017-03-09 RX ORDER — FUROSEMIDE 10 MG/ML
40 INJECTION INTRAMUSCULAR; INTRAVENOUS EVERY 8 HOURS
Status: DISCONTINUED | OUTPATIENT
Start: 2017-03-09 | End: 2017-03-11

## 2017-03-09 RX ORDER — HALOPERIDOL 5 MG/ML
5 INJECTION INTRAMUSCULAR ONCE
Status: COMPLETED | OUTPATIENT
Start: 2017-03-09 | End: 2017-03-09

## 2017-03-09 RX ORDER — METOPROLOL TARTRATE 5 MG/5ML
5 INJECTION INTRAVENOUS ONCE
Status: COMPLETED | OUTPATIENT
Start: 2017-03-09 | End: 2017-03-09

## 2017-03-09 RX ORDER — INSULIN GLARGINE 100 [IU]/ML
10 INJECTION, SOLUTION SUBCUTANEOUS ONCE
Status: DISPENSED | OUTPATIENT
Start: 2017-03-09 | End: 2017-03-09

## 2017-03-09 RX ORDER — HYDROMORPHONE HYDROCHLORIDE 1 MG/ML
1 INJECTION, SOLUTION INTRAMUSCULAR; INTRAVENOUS; SUBCUTANEOUS ONCE
Status: COMPLETED | OUTPATIENT
Start: 2017-03-09 | End: 2017-03-09

## 2017-03-09 RX ADMIN — INSULIN GLARGINE 10 UNITS: 100 INJECTION, SOLUTION SUBCUTANEOUS at 08:11

## 2017-03-09 RX ADMIN — INSULIN LISPRO 4 UNITS: 100 INJECTION, SOLUTION INTRAVENOUS; SUBCUTANEOUS at 00:22

## 2017-03-09 RX ADMIN — MIDAZOLAM HYDROCHLORIDE 2 MG: 1 INJECTION, SOLUTION INTRAMUSCULAR; INTRAVENOUS at 22:10

## 2017-03-09 RX ADMIN — Medication: at 14:34

## 2017-03-09 RX ADMIN — HYDROMORPHONE HYDROCHLORIDE 1 MG: 1 INJECTION, SOLUTION INTRAMUSCULAR; INTRAVENOUS; SUBCUTANEOUS at 14:10

## 2017-03-09 RX ADMIN — INSULIN LISPRO 2 UNITS: 100 INJECTION, SOLUTION INTRAVENOUS; SUBCUTANEOUS at 11:48

## 2017-03-09 RX ADMIN — MIDAZOLAM HYDROCHLORIDE 2 MG: 1 INJECTION, SOLUTION INTRAMUSCULAR; INTRAVENOUS at 09:00

## 2017-03-09 RX ADMIN — SODIUM CHLORIDE 40 MG: 9 INJECTION INTRAMUSCULAR; INTRAVENOUS; SUBCUTANEOUS at 08:00

## 2017-03-09 RX ADMIN — INSULIN LISPRO 2 UNITS: 100 INJECTION, SOLUTION INTRAVENOUS; SUBCUTANEOUS at 06:27

## 2017-03-09 RX ADMIN — LEVALBUTEROL 1.25 MG: 1.25 SOLUTION, CONCENTRATE RESPIRATORY (INHALATION) at 14:18

## 2017-03-09 RX ADMIN — ARFORMOTEROL TARTRATE 15 MCG: 15 SOLUTION RESPIRATORY (INHALATION) at 20:13

## 2017-03-09 RX ADMIN — METHYLPREDNISOLONE SODIUM SUCCINATE 40 MG: 40 INJECTION, POWDER, FOR SOLUTION INTRAMUSCULAR; INTRAVENOUS at 18:00

## 2017-03-09 RX ADMIN — ENOXAPARIN SODIUM 40 MG: 40 INJECTION SUBCUTANEOUS at 11:39

## 2017-03-09 RX ADMIN — ASPIRIN 81 MG 81 MG: 81 TABLET ORAL at 08:01

## 2017-03-09 RX ADMIN — METOROPROLOL TARTRATE 5 MG: 5 INJECTION, SOLUTION INTRAVENOUS at 14:12

## 2017-03-09 RX ADMIN — MAGNESIUM SULFATE HEPTAHYDRATE 2 G: 40 INJECTION, SOLUTION INTRAVENOUS at 11:50

## 2017-03-09 RX ADMIN — SODIUM CHLORIDE 150 ML/HR: 450 INJECTION, SOLUTION INTRAVENOUS at 03:45

## 2017-03-09 RX ADMIN — Medication: at 12:58

## 2017-03-09 RX ADMIN — FUROSEMIDE 40 MG: 10 INJECTION, SOLUTION INTRAMUSCULAR; INTRAVENOUS at 14:46

## 2017-03-09 RX ADMIN — MIDAZOLAM HYDROCHLORIDE 2 MG: 1 INJECTION, SOLUTION INTRAMUSCULAR; INTRAVENOUS at 14:07

## 2017-03-09 RX ADMIN — INSULIN LISPRO 4 UNITS: 100 INJECTION, SOLUTION INTRAVENOUS; SUBCUTANEOUS at 06:27

## 2017-03-09 RX ADMIN — METHYLPREDNISOLONE SODIUM SUCCINATE 40 MG: 40 INJECTION, POWDER, FOR SOLUTION INTRAMUSCULAR; INTRAVENOUS at 23:53

## 2017-03-09 RX ADMIN — CHLOROTHIAZIDE SODIUM 250 MG: 500 INJECTION, POWDER, LYOPHILIZED, FOR SOLUTION INTRAVENOUS at 01:54

## 2017-03-09 RX ADMIN — FUROSEMIDE 20 MG: 10 INJECTION, SOLUTION INTRAMUSCULAR; INTRAVENOUS at 08:01

## 2017-03-09 RX ADMIN — INSULIN LISPRO 2 UNITS: 100 INJECTION, SOLUTION INTRAVENOUS; SUBCUTANEOUS at 23:53

## 2017-03-09 RX ADMIN — HYDROMORPHONE HYDROCHLORIDE 1 MG: 2 INJECTION INTRAMUSCULAR; INTRAVENOUS; SUBCUTANEOUS at 14:20

## 2017-03-09 RX ADMIN — BUDESONIDE 500 MCG: 0.5 INHALANT RESPIRATORY (INHALATION) at 08:15

## 2017-03-09 RX ADMIN — ARFORMOTEROL TARTRATE 15 MCG: 15 SOLUTION RESPIRATORY (INHALATION) at 08:15

## 2017-03-09 RX ADMIN — FUROSEMIDE 40 MG: 10 INJECTION, SOLUTION INTRAMUSCULAR; INTRAVENOUS at 21:48

## 2017-03-09 RX ADMIN — METHYLPREDNISOLONE SODIUM SUCCINATE 40 MG: 40 INJECTION, POWDER, FOR SOLUTION INTRAMUSCULAR; INTRAVENOUS at 11:38

## 2017-03-09 RX ADMIN — POTASSIUM CHLORIDE 40 MEQ: 20 SOLUTION ORAL at 09:53

## 2017-03-09 RX ADMIN — Medication 25 MCG/KG/MIN: at 08:48

## 2017-03-09 RX ADMIN — PROPOFOL 25 MCG/KG/MIN: 10 INJECTION, EMULSION INTRAVENOUS at 08:48

## 2017-03-09 RX ADMIN — ATORVASTATIN CALCIUM 20 MG: 20 TABLET, FILM COATED ORAL at 08:01

## 2017-03-09 RX ADMIN — METHYLPREDNISOLONE SODIUM SUCCINATE 40 MG: 40 INJECTION, POWDER, FOR SOLUTION INTRAMUSCULAR; INTRAVENOUS at 06:27

## 2017-03-09 RX ADMIN — BUDESONIDE 500 MCG: 0.5 INHALANT RESPIRATORY (INHALATION) at 20:13

## 2017-03-09 RX ADMIN — DEXMEDETOMIDINE HYDROCHLORIDE 0.7 MCG/KG/HR: 100 INJECTION, SOLUTION INTRAVENOUS at 14:26

## 2017-03-09 RX ADMIN — CHLORHEXIDINE GLUCONATE 10 ML: 1.2 RINSE ORAL at 09:54

## 2017-03-09 RX ADMIN — FENTANYL CITRATE 50 MCG: 50 INJECTION, SOLUTION INTRAMUSCULAR; INTRAVENOUS at 05:26

## 2017-03-09 RX ADMIN — ESCITALOPRAM OXALATE 20 MG: 10 TABLET ORAL at 08:01

## 2017-03-09 RX ADMIN — METHYLPREDNISOLONE SODIUM SUCCINATE 40 MG: 40 INJECTION, POWDER, FOR SOLUTION INTRAMUSCULAR; INTRAVENOUS at 00:21

## 2017-03-09 RX ADMIN — HALOPERIDOL LACTATE 5 MG: 5 INJECTION, SOLUTION INTRAMUSCULAR at 18:33

## 2017-03-09 RX ADMIN — INSULIN LISPRO 6 UNITS: 100 INJECTION, SOLUTION INTRAVENOUS; SUBCUTANEOUS at 11:48

## 2017-03-09 RX ADMIN — LEVOFLOXACIN 750 MG: 5 INJECTION, SOLUTION INTRAVENOUS at 13:25

## 2017-03-09 RX ADMIN — SODIUM CHLORIDE 150 ML/HR: 450 INJECTION, SOLUTION INTRAVENOUS at 01:40

## 2017-03-09 NOTE — DIABETES MGMT
GLYCEMIC CONTROL & NUTRITION:    - noted A1C meets criteria for pre-DM, no known documentation of previous dx, BG out of target  - steroids on board (IV Solu-medrol continue at 40mg q 6 hours today)  - noted TF potassium dropped with TF change, will change to Jevity 1.2 @ 60 ml/hr (carolyn start at goal) today --> from Nepro @ 40 ml/hr   - new TF will be +30% total CHO  - BG has been out of targets (>200), recommend increase basal to Lantus 20 units every day and increase scheduled dose of Humalog 6 units q 6 hours (orders entered per Dr. Tilda Bumpers)   * received 38 units total of insulin yesterday (10 Lantus, 38 Humalog), above dose increases reflect ~20% increase in TDD  - changes in CHO discussed with RN on floor & to monitor BG trend  - recommend follow-up with OP PCM re: BG monitoring and discuss treatment options  - encourage OP DM Self Management Class (schedule given)  - see Clinical RD note for full nutrition assessment    Recent Glucose Results:   Lab Results   Component Value Date/Time     (H) 2017 03:55 AM    GLUCPOC 187 (H) 2017 06:04 AM    GLUCPOC 241 (H) 2017 12:08 AM    GLUCPOC 223 (H) 2017 05:39 PM       Lab Results   Component Value Date/Time    Hemoglobin A1c 6.2 2017 04:25 PM           JL Shah, MPH, RD

## 2017-03-09 NOTE — PROGRESS NOTES
@1915 Pt taken over drowsy in bed awakes to verbal stimuli , remains on versed and fentanyl drips. Pt on ventilator via ET Tube Pt HR in the 40's previous nurse verbalized that MD is awake. Tube feed continues via OG tube 0 residual. Broderick remains in situ draining large amounts of clear liquid urine.  at bedside. Assessment done and documented in relevant flow sheets . Nursing management continues. @2102 pt sitting up in bed agitated sedation increased observation continues. @2300 pt asleep,HR in 50's , other vital signs stable, Daughter remains at pt bedside, daughter has a very anxious personality and hypervigilant, care continues. @5728 pt awake  Restless larisa bed , daughter in with her . Sedation increased care continues. @0300 pt resting comfortably ,Vital signs stable . Flacc -3. Nursing management continues. @7820 pt awake in bed , daughter over her hypervigilant and anxious. Pt coughing , HR immediately went up to the 140's , pt very anxious as well. Pt mouthed that she was in pain fentanyl 50 mcg administered as prescribed. Pt calmed down HR came back to normal, personal hygiene needs met with full assistance. Care continues. @6470 Bedside and Verbal shift change report given to Ashley Lee (oncoming nurse) by Subhash Sanchez (offgoing nurse). Report included the following information SBAR, Kardex, Intake/Output, MAR, Recent Results and Med Rec Status.    Alarm parameters reviewed, on and audible Appropriate for patient clinical condition

## 2017-03-09 NOTE — PROGRESS NOTES
2nd note    Returned again to assess her . Pt seemed comfortable on propofol at 15mcg/kg/hr and fentanyl 50mcg/hr. Pt follows command. wihtout telling patient I placed on pt on SBT with 5 of ps and 5 of peep. Pt was tolerating breathing trial with adquate TV in 500 to 700s with RR in 16s. HR in 90s. I have instructed RN to decompress the stomach, then stop the propofol and asked RT to extubate immediately when propofol was. Post extubation , pt was given additional 2mg of versed and increased the fentanyl drip to 75mcg/hr. Pt tolerated the extubation well. Alert and appropirate. Pt was extubated to high flow nc.  spo2 of 94%   HR in 90s. RR in high teens.

## 2017-03-09 NOTE — PROGRESS NOTES
NUTRITION UPDATE    - Hypernatremia and hyperkalemia corrected  - As discussed in IDR formula changed to Jevity 1.2 to resume normal potassium content & continue adequate fiber intake  - Jevity 1.2 at 60 ml/hr with prosource packet once daily will provide total: 2.4 g K, 1644 kcal, 88 g protein, 1065 ml water, 224 g CHO, & >100% RDI's for micronutrients. - Free water flushes to be decreased to 200 ml q4h for total enteral fluid intake 2265 ml daily. Continue to follow per policy.   Wendy Gonzalez, BONG  PAGER:  [phone]

## 2017-03-09 NOTE — PROGRESS NOTES
0730- Bedside and Verbal shift change report given to NOEMÍ Gibson (oncoming nurse) by Sharifa Serrano, RETA (offgoing nurse). Report included the following information SBAR, Kardex, Intake/Output, MAR, Recent Results and Cardiac Rhythm SR. Drip rates verified with offgoing RN. Initial shift assessment complete. Will continue to monitor. 1119- Patient on minimal sedation per MD order to evaluate readiness to extubate. Dr Silver Hidalgo at bedside. Patient awake and anxious, pulling at restraints. RT at bedside. Verbal orders received for additional sedation. Propofol drip started. Verbal orders received from Dr Silver Hidalgo to give a bolus of 50mcg of fentanyl from PCA one time now. See MAR. Patient resting quietly. Dr Silver Hidalgo remains at bedside and is updating family on plan of care. RT remains at bedside. Will continue to monitor. 1200- Reassessment complete. Will continue to monitor. 1255-MD at bedside, RT at bedside. Patient extubated. Verbal orders received from Dr Silver Hidalgo to give a bolus of 50mcg of fentanyl from PCA one time now and titrate continuous rate to 75mcg and hour. Patient also received 2 versed. Patient placed on high flow and is tolerating well. Will continue to monitor. 1336- Wasted 90ml propofol, 90ml Precedex, and 20ml Versed gtts with Emigdio Albarran RN.    2620- Patient anxious and HR into 150's. RT at bedside. Patient given PRN medication. Dr Silver Hidalgo at bedside. Verbal orders received. See MAR. Patient able to calm down after medications were given. Patient is now resting quietly. Will continue to monitor closely. Family remains at bedside. 1430- Patient continues to rest quietly. Will continue to monitor. 1600- Reassessment complete. Will continue to monitor. 200- Patient states she sees bugs flying around. Called and spoke with Dr Silver Hidalgo, gave SBAR and reported patient having hallucinations, received telephone orders.  See MAR.     1930- Bedside and Verbal shift change report given to Sharifa Serrano, RETA (oncoming nurse) by NOEMÍ Waller (offgoing nurse). Report included the following information SBAR, Kardex, Procedure Summary, Intake/Output, MAR and Cardiac Rhythm SR/SB. Verified drip rates with oncoming RN.

## 2017-03-09 NOTE — PROGRESS NOTES
1400 pt did not tolerate Face Tent, appeared in distress, was replaced by other RT on HFNC, 50L and 50%, neb tx given by this RT with xopenex, pt became more relaxed with this and other interventions by RN, able to gradually decrease FiO2 to 40%. Venous blood gas drawn at 1535, results called to Dr Dharmesh Vuong as requested, he ordered 1.25 mg Xopenex Q4 PRN to be added.

## 2017-03-09 NOTE — PROGRESS NOTES
Hospitalist Progress Note    Patient: Katie Jamison MRN: [id number]  CSN: [id number]    YOB: 1955  Age: 64 y.o. Sex: female    DOA: 3/4/2017 LOS:  LOS: 5 days          Chief Complaint:   Ac resp failure        Assessment/Plan     Acute resp failue-weaned from vent today  COPD exacrbation  'severe anxiety disorder and hx etoh use, PTSD  COPD  Renal pole lesion, versus cyst noted on US  Pulmonary nodules  diabetes    Vent weaned of  Anxiolytics PRN  DVT proph  Continue basal and short acting insulins  Continue empiric IV levaquin  Nebs routinely  Watch closely in ICU  IV steroids  IV PPI  Continue her zyprexa      Patient Active Problem List   Diagnosis Code    Respiratory insufficiency R06.89    Sinusitis J32.9    Mass of sinus R22.0    COPD (chronic obstructive pulmonary disease) with emphysema (HCC) J43.9    Hypokalemia E87.6    Transaminitis R74.0    Nodule of kidney N28.89    COPD with acute exacerbation (HCC) J44.1       Subjective:  No acute changes overnight oper nurse      Review of systems:    On rounds this am, on vent, not able to give ROS      Vital signs/Intake and Output:  Visit Vitals    /51    Pulse 61    Temp 98.2 °F (36.8 °C)    Resp 18    Ht 5' 8\" (1.727 m)    Wt 62.6 kg (138 lb)    SpO2 100%    BMI 20.98 kg/m2     Current Shift:  03/09 0701 - 03/09 1900  In: -   Out: 2000 [Urine:2000]  Last three shifts:  03/07 1901 - 03/09 0700  In: 8555.5 [I.V.:5838.5]  Out: 5400 [Urine:5400]    Exam:    General: seen when on vent, sedate WF, NAD  Head/Neck: NCAT, supple, No masses, No lymphadenopathy  CVS:Regular rate and rhythm, no M/R/G, S1/S2 heard, no thrill  Lungs:Clear to auscultation bilaterally, no wheezes, rhonchi, or rales  Abdomen: Soft, Nontender, No distention, Normal Bowel sounds, No hepatomegaly  Extremities: No C/C/E, pulses palpable 2+  Skin:normal texture and turgor, no rashes, no lesions                    Labs: Results:       Chemistry Recent Labs 03/09/17 0355 03/08/17 0450 03/07/17 0445   GLU  238*  260*  226*   NA  143  149*  151*   K  3.7  5.6*  5.3   CL  108  115*  118*   CO2  29  25  24   BUN  32*  31*  31*   CREA  0.87  0.84  0.88   CA  8.4*  8.4*  8.3*   AGAP  6  9  9   BUCR  37*  37*  35*   AP  101  69  76   TP  5.3*  5.4*  5.5*   ALB  2.5*  2.4*  2.5*   GLOB  2.8  3.0  3.0   AGRAT  0.9  0.8  0.8      CBC w/Diff Recent Labs      03/09/17 0355 03/08/17 0450 03/07/17 0445   WBC  13.6*  8.0  8.5   RBC  3.68*  3.63*  3.66*   HGB  11.9*  11.6*  11.7*   HCT  36.2  36.2  36.4   PLT  231  239  235   GRANS  90*  87*  87*   LYMPH  5*  6*  8*   EOS  0  0  0      Cardiac Enzymes No results for input(s): CPK, CKND1, NAUN in the last 72 hours. No lab exists for component: CKRMB, TROIP   Coagulation No results for input(s): PTP, INR, APTT in the last 72 hours. No lab exists for component: INREXT    Lipid Panel No results found for: CHOL, CHOLPOCT, CHOLX, CHLST, CHOLV, [id number], HDL, LDL, NLDLCT, DLDL, LDLC, DLDLP, [id number], VLDLC, VLDL, TGL, TGLX, TRIGL, [id number], TRIGP, TGLPOCT, [id number], CHHD, CHHDX   BNP No results for input(s): BNPP in the last 72 hours.    Liver Enzymes Recent Labs      03/09/17 0355   TP  5.3*   ALB  2.5*   AP  101   SGOT  17      Thyroid Studies Lab Results   Component Value Date/Time    TSH 0.05 03/04/2017 04:25 PM        Procedures/imaging: see electronic medical records for all procedures/Xrays and details which were not copied into this note but were reviewed prior to creation of Blade Rios MD

## 2017-03-09 NOTE — PROGRESS NOTES
Patient extubated and placed on Nasal Cannula at 6LPM with ETCO2 Monitoring. ETCO2 22-29, RR 26-30 O2 Sats 93%. After 15 minutes patient placed on HFNC at 35LPM and 35% FIO2. Will titrate to maintain sats above 92%.

## 2017-03-09 NOTE — PROGRESS NOTES
[facility]  Pulmonary, Critical Care, and Sleep Medicine      Name: Otis Montero MRN: [id number]   : 1955 Hospital: [facility] [place]   Date: 3/9/2017               Requesting MD: Dr. Nayeli Morillo                                  Reason for CC Consult: COPD exacerbation    IMPRESSION:   · Acute hypoxemic respiratory failure, now on MV,  Possibly due to copd exacerbation   · Severe anxiety disorder further exacerbated by excessive b2 agonist  · PTSD per  from past interactions with physicians  · Possible left upper pole renal mass, as per CTA chest   · Multiple pulmonary nodules from CTA chest, will need outpatient follow up   · Hx HTN      RECOMMENDATIONS:   · Resp - keep pt on vent,   Will attempt weaning trial in am  · ID - No clear infectious etiology;  Cultures from resp suggest normal cristino. Will remove groin line  · CVS - HD stable. Troponin elevated on admission - cardiology following. Echo with EF 50%. Monitor hemodynamics. Possible ischemic work up by cardiology today   · Heme/Onc- H&H and platelets stable; monitor. · Metabolic -  Monitor electrolytes and replace per electrolyte replacement protocol. Will increase the free water with tube feed  · Renal - Trend renal indices and monitor UOP;     · Endocrine - SSI for glycemic control given steroids; Will need to add lantus  · Neuro/ Pain/ Sedation - schedule zyprexa and continue sedation and wean as tolerated. Sedation vacation in am  · Nutrition- continue tube feed for now  · Prophylaxis - DVT (lovenox), GI (protonix)     Subjective/History:   3/9  Attempt to reduce sedation this am,  Pt became profoundly agitated with HR in 150s, hypoxic with spo2 of 60s. SBP in 160s. Had resume sedation. I elected to sedate this time with propofol. Increased the fentanyl  Back to 50mcg/hr. Sp2o  Improved  Gradually to 100%. HR improved to 90s then to 60s. SBP improved to 100s.      Had lengthy conversation again with . Reviewed the complexity of patient care including possible chemical withdraw. Advised them will continue to adjust the sedation to help patient overcome withdraw symptoms. Had another meeting with daughter//ICU nurse manager. Addressed all the concerns brought up by daughter and expected course of patient care. 3/8  Pt off precedex due to bradycardia. Started to wean versed and fentanyl drip. Pt bradycardic in 40s to 50s. Easily arouseable however,  Hypotensive. Started on levophed. Spoke with Dr. Ct Rivera patient's pain specialist.  Pt has been on norco 4x per day for several years. Discussed about possible withdraw symptoms as patient for a time required max dose of precedex, versed drip at 7mg per hour and 150mcg of fentanyl. past 2 days. Yesterday daugther alluded that she may have taken more in time. Patient also see another physican for ptsd and apparently been on klonopin for prolonged period. .        3/5  This patient has been seen and evaluated at the request of Dr. Jovita Hill for COPD exacerbation. Patient is a 64 y.o. female with history of COPD, PTSD, chronic sinusitis and HTN who presented to the ED with progressive shortness of breath for two weeks. She has had wheezing and a non-productive cough. Her  states she had a \"cold\" about 2 weeks ago and everything seems to have gotten worse since. She also recently traveled to [place] and stayed in a hotel but according to family it was clean hotel with no mold or dust.  No new exposure to pets. Daughter states she does get bad seasonal allergies which does typically flare her COPD. She is currently still smoking, which the daughter reports is down to less than 1 pack per day. In the ED, she was tachypneic and tachycardic and was brought to the ICU for further management under the hospitalist service.   She also had a CT chest in the ED which was was negative for a PE but did show emphysematous changes. She also had a CT of the head which showed ethmoid mucosal thickening with partial destruction of the medial wall of the left orbit - a mass or abscess could not be ruled out. ENT was consulted but felt as if this could be followed up on an outpatient basis. Additionally, she had a mild elevated troponin for which cardiology was consulted and is following. Pulmonary was then consulted on 3/5 for assistance with management. She was treated overnight with Ativan, Klonopin, and Lexapro. She was maintained on HFNC as she was unable to tolerate the BiPAP. During my evaluation, she is minimally responsive on HFNC, with RR 38-42, 's, and hypertensive. She was given multiple duonebs along with solumedrol and placed back on BiPAP without improvement. She was the intubated for respiratory failure. The patient is critically ill and can not provide additional history due to Ventilated. Past Medical History:   Diagnosis Date    Back pain     COPD (chronic obstructive pulmonary disease) (HCC)     Hypertension     PTSD (post-traumatic stress disorder)     Shingles       Past Surgical History:   Procedure Laterality Date    HX TUBAL LIGATION      HX TYMPANOSTOMY     Rg Deras        Prior to Admission medications    Medication Sig Start Date End Date Taking? Authorizing Provider   valsartan-hydroCHLOROthiazide (DIOVAN HCT) 160-25 mg per tablet Take 1 Tab by mouth daily. Yes Historical Provider   HYDROcodone-acetaminophen (NORCO)  mg tablet Take 1 Tab by mouth four (4) times daily. Yes Historical Provider   baclofen (LIORESAL) 10 mg tablet Take 10 mg by mouth three (3) times daily. Yes Marissa Son MD   albuterol (PROVENTIL HFA, VENTOLIN HFA, PROAIR HFA) 90 mcg/actuation inhaler Take 2 Puffs by inhalation every four (4) hours as needed for Wheezing. Yes Marissa Son MD   escitalopram (LEXAPRO) 20 mg tablet Take 40 mg by mouth daily.      Yes Marissa Son MD clonazePAM (KLONOPIN) 0.5 mg tablet Take 0.5-1.5 mg by mouth daily as needed. Yes Phys Other, MD   fluticasone-salmeterol (ADVAIR DISKUS) 100-50 mcg/dose diskus inhaler Take 1 Puff by inhalation every twelve (12) hours. Yes Phys Other, MD   ibuprofen (MOTRIN) 800 mg tablet Take 800 mg by mouth every six (6) hours as needed.       Phys Other, MD     Current Facility-Administered Medications   Medication Dose Route Frequency    propofol (DIPRIVAN) infusion  0-50 mcg/kg/min IntraVENous TITRATE    lidocaine (LIDODERM) 5 % patch 1 Patch  1 Patch TransDERmal Q12H    furosemide (LASIX) injection 40 mg  40 mg IntraVENous Q8H    insulin lispro (HUMALOG) injection 6 Units  6 Units SubCUTAneous Q6H    [START ON 3/10/2017] insulin glargine (LANTUS) injection 20 Units  20 Units SubCUTAneous DAILY    insulin glargine (LANTUS) injection 10 Units  10 Units SubCUTAneous ONCE    magnesium sulfate 2 g/50 ml IVPB (premix or compounded)  2 g IntraVENous ONCE    methylPREDNISolone (PF) (SOLU-MEDROL) injection 40 mg  40 mg IntraVENous Q6H    NOREPINephrine (LEVOPHED) 8,000 mcg in dextrose 5% 250 mL infusion  0-16 mcg/min IntraVENous TITRATE    aspirin chewable tablet 81 mg  81 mg Oral DAILY    OLANZapine (ZyPREXA) tablet 10 mg  10 mg Per NG tube QPM    midazolam in normal saline (VERSED) 1 mg/mL infusion  1 mg/hr IntraVENous TITRATE    atorvastatin (LIPITOR) tablet 20 mg  20 mg Oral DAILY    escitalopram oxalate (LEXAPRO) tablet 20 mg  20 mg Oral DAILY    chlorhexidine (PERIDEX) 0.12 % mouthwash 10 mL  10 mL Oral Q12H    insulin lispro (HUMALOG) injection   SubCUTAneous Q6H    pantoprazole (PROTONIX) 40 mg in sodium chloride 0.9 % 10 mL injection  40 mg IntraVENous DAILY    fentaNYL (PF)  mcg/30 ml   IntraVENous TITRATE    arformoterol (BROVANA) neb solution 15 mcg  15 mcg Nebulization BID RT    nicotine (NICODERM CQ) 14 mg/24 hr patch 1 Patch  1 Patch TransDERmal DAILY    enoxaparin (LOVENOX) injection 40 mg  40 mg SubCUTAneous Q24H    budesonide (PULMICORT) 500 mcg/2 ml nebulizer suspension  500 mcg Nebulization BID RT    levoFLOXacin (LEVAQUIN) 750 mg in D5W IVPB  750 mg IntraVENous Q24H     Allergies   Allergen Reactions    Sulfacetamide Anaphylaxis      Social History   Substance Use Topics    Smoking status: Current Some Day Smoker     Packs/day: 0.50    Smokeless tobacco: Not on file    Alcohol use Not on file      History reviewed. No pertinent family history. Review of Systems:  A comprehensive review of systems was negative except for that written in the HPI. Objective:   Vital Signs:    Visit Vitals    /50    Pulse 85    Temp 98.2 °F (36.8 °C)    Resp 17    Ht 5' 8\" (1.727 m)    Wt 62.6 kg (138 lb)    SpO2 99%    BMI 20.98 kg/m2       O2 Device: Ventilator, Endotracheal tube   O2 Flow Rate (L/min): 40 l/min   Temp (24hrs), Av.4 °F (36.9 °C), Min:98 °F (36.7 °C), Max:98.8 °F (37.1 °C)       Intake/Output:   Last shift:      701 - 1900  In: -   Out: 3576 [Urine:1350]  Last 3 shifts: 1901 -  0700  In: 8555.5 [I.V.:5838.5]  Out: 5400 [Urine:5400]    Intake/Output Summary (Last 24 hours) at 17 1149  Last data filed at 17 1144   Gross per 24 hour   Intake          5578.34 ml   Output             5725 ml   Net          -146.66 ml       Ventilator Settings:  Mode Rate Tidal Volume Pressure FiO2 PEEP   Pressure control   400 ml    40 % 5 cm H20     Peak airway pressure: 20 cm H2O    Minute ventilation: 11.4 l/min      ARDS network Guidelines: Lung protective strategy and Pl pressure goals____________      Physical Exam:    General:  Intubated. Head:  Normocephalic, without obvious abnormality, atraumatic. Eyes:  Conjunctivae/corneas clear. Nose: Nares normal. Septum midline. Mucosa normal.   Throat: Lips and tongue normal.  Teeth present. Mucosa moist.    Neck: Supple, symmetrical, trachea midline, no adenopathy, no JVD.        Lungs: Exp wheezing and insp crackles       Heart:  Tachycardic rate and regular rhythm; no audible mrg   Abdomen:   Hypoactive bowel sounds; soft, non-tender   Extremities: Extremities without edema. Cyanosis to fingers and toes with mottling on lower extremities. Pulses: 2+ and symmetric all extremities. Skin: Warm, dry. Neurologic: No focal deficit       Data:     Recent Results (from the past 24 hour(s))   GLUCOSE, POC    Collection Time: 03/08/17 12:41 PM   Result Value Ref Range    Glucose (POC) 241 (H) 70 - 110 mg/dL   GLUCOSE, POC    Collection Time: 03/08/17  5:39 PM   Result Value Ref Range    Glucose (POC) 223 (H) 70 - 110 mg/dL   GLUCOSE, POC    Collection Time: 03/09/17 12:08 AM   Result Value Ref Range    Glucose (POC) 241 (H) 70 - 110 mg/dL   MAGNESIUM    Collection Time: 03/09/17  3:55 AM   Result Value Ref Range    Magnesium 1.9 1.8 - 2.4 mg/dL   METABOLIC PANEL, COMPREHENSIVE    Collection Time: 03/09/17  3:55 AM   Result Value Ref Range    Sodium 143 136 - 145 mmol/L    Potassium 3.7 3.5 - 5.5 mmol/L    Chloride 108 100 - 108 mmol/L    CO2 29 21 - 32 mmol/L    Anion gap 6 3.0 - 18 mmol/L    Glucose 238 (H) 74 - 99 mg/dL    BUN 32 (H) 7.0 - 18 MG/DL    Creatinine 0.87 0.6 - 1.3 MG/DL    BUN/Creatinine ratio 37 (H) 12 - 20      GFR est AA >60 >60 ml/min/1.73m2    GFR est non-AA >60 >60 ml/min/1.73m2    Calcium 8.4 (L) 8.5 - 10.1 MG/DL    Bilirubin, total 0.3 0.2 - 1.0 MG/DL    ALT (SGPT) 47 13 - 56 U/L    AST (SGOT) 17 15 - 37 U/L    Alk.  phosphatase 101 45 - 117 U/L    Protein, total 5.3 (L) 6.4 - 8.2 g/dL    Albumin 2.5 (L) 3.4 - 5.0 g/dL    Globulin 2.8 2.0 - 4.0 g/dL    A-G Ratio 0.9 0.8 - 1.7     CBC WITH AUTOMATED DIFF    Collection Time: 03/09/17  3:55 AM   Result Value Ref Range    WBC 13.6 (H) 4.6 - 13.2 K/uL    RBC 3.68 (L) 4.20 - 5.30 M/uL    HGB 11.9 (L) 12.0 - 16.0 g/dL    HCT 36.2 35.0 - 45.0 %    MCV 98.4 (H) 74.0 - 97.0 FL    MCH 32.3 24.0 - 34.0 PG    MCHC 32.9 31.0 - 37.0 g/dL RDW 15.1 (H) 11.6 - 14.5 %    PLATELET 644 249 - 876 K/uL    MPV 11.1 9.2 - 11.8 FL    NEUTROPHILS 90 (H) 40 - 73 %    LYMPHOCYTES 5 (L) 21 - 52 %    MONOCYTES 5 3 - 10 %    EOSINOPHILS 0 0 - 5 %    BASOPHILS 0 0 - 2 %    ABS. NEUTROPHILS 12.3 (H) 1.8 - 8.0 K/UL    ABS. LYMPHOCYTES 0.6 (L) 0.9 - 3.6 K/UL    ABS. MONOCYTES 0.6 0.05 - 1.2 K/UL    ABS. EOSINOPHILS 0.0 0.0 - 0.4 K/UL    ABS. BASOPHILS 0.0 0.0 - 0.06 K/UL    DF AUTOMATED     POC G3    Collection Time: 03/09/17  4:40 AM   Result Value Ref Range    Device: VENT      FIO2 (POC) 40 %    pH (POC) 7.340 (L) 7.35 - 7.45      pCO2 (POC) 47.7 (H) 35.0 - 45.0 MMHG    pO2 (POC) 75 (L) 80 - 100 MMHG    HCO3 (POC) 25.8 22 - 26 MMOL/L    sO2 (POC) 94 92 - 97 %    Base excess (POC) 0 mmol/L    Mode ASSIST CONTROL      Set Rate 14 bpm    PEEP/CPAP (POC) 5 cmH2O    PIP (POC) 15      Allens test (POC) YES      Inspiratory Time 1.2 sec    Total resp. rate 20      Site RIGHT RADIAL      Patient temp. 98.0      Specimen type (POC) ARTERIAL      Performed by Lianna Felix     Pressure control YES     GLUCOSE, POC    Collection Time: 03/09/17  6:04 AM   Result Value Ref Range    Glucose (POC) 187 (H) 70 - 110 mg/dL   GLUCOSE, POC    Collection Time: 03/09/17 11:27 AM   Result Value Ref Range    Glucose (POC) 194 (H) 70 - 110 mg/dL             Telemetry:Sinus tachycardia    Imaging:  CXR 3/5  The cardiomediastinal silhouette is stable. Bilateral mid to lower lung field  increased markings are again seen. The lung fields are hyperinflated. There is  no focal consolidation. There is no significant pleural effusion. There is no  acute osseous abnormality.        --------------  IMPRESSION  Impression:  --------------  COPD.     Bilateral mid to lower lung field increased interstitial markings similar to  prior which could represent bronchitis/interstitial pneumonitis or a chronic  interstitial process.      No focal consolidation.     No significant interval change.     CTA Chest FINDINGS:     EXAM QUALITY: Overall exam quality is suboptimal. Opacification of the pulmonary  arteries is somewhat suboptimal although diagnostic. There is significant  respiratory motion artifact which significantly degrades the images particularly  in the lower lung fields.     PULMONARY ARTERIES: No evidence of pulmonary embolism.     MEDIASTINUM: Heart size is normal. No evidence of right heart strain. No  pericardial effusion. Mild atherosclerotic changes of the aorta. Esophagus is  unremarkable.     LYMPH NODES: No enlarged nodes.     AIRWAY: Normal.     LUNGS: Significant emphysematous changes most pronounced in the upper lobes. Bilateral pulmonary nodules. Largest is in the left lower lobe on image 86  measuring 11 x 11 mm. A second possible left lower lobe nodule on image 91  measures 7 x 6 mm. Right lower lobe nodule image 83 measures 12 x 6 mm.     PLEURA: Normal. Specifically, no pneumothorax or pleural effusion.     UPPER ABDOMEN: There is an exophytic possibly solid nodule arising from the  upper pole of the left kidney. This measures 11 x 12 mm. Upper abdominal  structures are otherwise unremarkable. .     OTHER: No acute or aggressive osseous abnormalities identified.     _______________     IMPRESSION  IMPRESSION:     1. Study is degraded by respiratory motion artifact however there are no  definite pulmonary emboli.     2. Emphysema. 3. Bilateral pulmonary nodules as described above with the largest in the left  lower lobe measuring 11 x 11 mm.  4. Possible solid exophytic left upper pole renal mass.     Recommend renal ultrasound for further evaluation of the kidneys.        Total critical care time exclusive of procedures: 35minutes  Isabel Dhaliwal MD

## 2017-03-09 NOTE — WOUND CARE
Pt not turned and assessed by wound care at this time per bedside RN's request.  Wound care will continue to follow pt daily while in ICU.

## 2017-03-09 NOTE — INTERDISCIPLINARY ROUNDS
CRITICAL CARE INTERDISCIPLINARY ROUNDS    Patient Information:    Name:   Alix Xiao    Age:   64 y.o. Admission Date:   3/4/2017    Critical Care Day: 5    Surgery Date:      Attending Provider:   Yajaira Feliz DO    Surgeon:        Consultant(s):   [address] Physician:  Soumya Durán    Code Status: Full Code    Problem List:     Patient Active Problem List   Diagnosis Code    Respiratory insufficiency R06.89    Sinusitis J32.9    Mass of sinus R22.0    COPD (chronic obstructive pulmonary disease) with emphysema ([address] J43.9    Hypokalemia E87.6    Transaminitis R74.0    Nodule of kidney N28.89    COPD with acute exacerbation ([address] J44.1       Principal Problem:  COPD (chronic obstructive pulmonary disease) with emphysema ([facility]    During rounds the following quality care indicators and evidence based practices were addressed :  DVT Prophylaxis and PUD Prophylaxis Broderick Day 4 ([facility] y) ; Central Line Day: 2  Isolation:; Antibiotic Stewardship: reviewed; Probiotics Necessary:  y    Ventilator Bundle:  Ventilator Bundle Order Set:  y Sedation Vacation  yes;  Spontaneous Breathing Trial  ; Restraintsyes (Order, Necessity, Documentation)  Vent Day: 5    Sepsis Order Set:  or Elements of Sepsis Bundle Reviewed (Fluids, Antibiotics, Lactic Acid, Cultures, Vasopressors, Steriods, Glycemic control, Peak Plateau)    Glycemic Control:   Recent Labs      03/09/17   0355  03/08/17   0450  03/07/17   0445   GLU  238*  260*  226*   ;  Recent Labs      03/09/17   0440  03/08/17   0422  03/07/17   0507   PHI  7.340*  7.285*  7.323*   PCO2I  47.7*  44.5  41.4   PO2I  75*  73*  94    Adjustments     Acute MI/PCI:   Not applicable    Door to Balloon: Admission Time: 0737      Heart Failure:    Not applicable     SCIP Measures for other Surgeries:   Not applicable     Pneumonia:    Not applicable    Interdisciplinary team rounds were held with the following team members Care Management, Diabetes Treatment Specialist, Nursing, Nutrition,  Pharmacy, Physician and Respiratory Therapy. Plan of care discussed. Goals of Care/ Recommendations: Vent settings changed and sedation medication adjusted. New central  Solumderol reduced and insulin adjusted. Place ETCo2 . Restraints  Renewed. 2 gm Magnesium. Jevity 1.2 at 60 cc. Water flush 200 cc q4h. Diprivan decrease to 15 mcg. Insulin adjusted. Remain on regular scale. Retry weaning trial . Leave fentanyl at dose. Don't stopped Diprivan until Dr Abilio Dangelo arrives. See clinical pathway and/or care plan for interventions and desired outcomes.     Critical Care Discharge Status:  Red

## 2017-03-10 ENCOUNTER — APPOINTMENT (OUTPATIENT)
Dept: GENERAL RADIOLOGY | Age: 62
DRG: 207 | End: 2017-03-10
Attending: PHYSICIAN ASSISTANT
Payer: OTHER GOVERNMENT

## 2017-03-10 LAB
ALBUMIN SERPL BCP-MCNC: 2.7 G/DL (ref 3.4–5)
ALBUMIN/GLOB SERPL: 1 {RATIO} (ref 0.8–1.7)
ALP SERPL-CCNC: 71 U/L (ref 45–117)
ALT SERPL-CCNC: 59 U/L (ref 13–56)
ANION GAP BLD CALC-SCNC: 7 MMOL/L (ref 3–18)
ARTERIAL PATENCY WRIST A: YES
AST SERPL W P-5'-P-CCNC: 27 U/L (ref 15–37)
ATRIAL RATE: 68 BPM
ATRIAL RATE: 84 BPM
BACTERIA SPEC CULT: NORMAL
BASE EXCESS BLD CALC-SCNC: 10 MMOL/L
BASOPHILS # BLD AUTO: 0 K/UL (ref 0–0.1)
BASOPHILS # BLD: 0 % (ref 0–3)
BDY SITE: ABNORMAL
BILIRUB SERPL-MCNC: 0.8 MG/DL (ref 0.2–1)
BODY TEMPERATURE: 98
BUN SERPL-MCNC: 32 MG/DL (ref 7–18)
BUN/CREAT SERPL: 43 (ref 12–20)
CALCIUM SERPL-MCNC: 8.7 MG/DL (ref 8.5–10.1)
CALCULATED P AXIS, ECG09: 75 DEGREES
CALCULATED P AXIS, ECG09: 77 DEGREES
CALCULATED R AXIS, ECG10: 79 DEGREES
CALCULATED R AXIS, ECG10: 85 DEGREES
CALCULATED T AXIS, ECG11: 113 DEGREES
CALCULATED T AXIS, ECG11: 120 DEGREES
CHLORIDE SERPL-SCNC: 106 MMOL/L (ref 100–108)
CO2 SERPL-SCNC: 35 MMOL/L (ref 21–32)
CREAT SERPL-MCNC: 0.75 MG/DL (ref 0.6–1.3)
DIAGNOSIS, 93000: NORMAL
DIAGNOSIS, 93000: NORMAL
DIFFERENTIAL METHOD BLD: ABNORMAL
EOSINOPHIL # BLD: 0 K/UL (ref 0–0.4)
EOSINOPHIL NFR BLD: 0 % (ref 0–5)
ERYTHROCYTE [DISTWIDTH] IN BLOOD BY AUTOMATED COUNT: 14.5 % (ref 11.6–14.5)
GAS FLOW.O2 O2 DELIVERY SYS: ABNORMAL L/MIN
GAS FLOW.O2 SETTING OXYMISER: 50 L/M
GLOBULIN SER CALC-MCNC: 2.8 G/DL (ref 2–4)
GLUCOSE BLD STRIP.AUTO-MCNC: 140 MG/DL (ref 70–110)
GLUCOSE BLD STRIP.AUTO-MCNC: 157 MG/DL (ref 70–110)
GLUCOSE BLD STRIP.AUTO-MCNC: 158 MG/DL (ref 70–110)
GLUCOSE BLD STRIP.AUTO-MCNC: 163 MG/DL (ref 70–110)
GLUCOSE SERPL-MCNC: 164 MG/DL (ref 74–99)
HCO3 BLD-SCNC: 33.8 MMOL/L (ref 22–26)
HCT VFR BLD AUTO: 38 % (ref 35–45)
HGB BLD-MCNC: 12.4 G/DL (ref 12–16)
LYMPHOCYTES # BLD AUTO: 5 % (ref 20–51)
LYMPHOCYTES # BLD: 0.6 K/UL (ref 0.8–3.5)
MAGNESIUM SERPL-MCNC: 2.1 MG/DL (ref 1.8–2.4)
MAGNESIUM SERPL-MCNC: 2.4 MG/DL (ref 1.8–2.4)
MCH RBC QN AUTO: 31.5 PG (ref 24–34)
MCHC RBC AUTO-ENTMCNC: 32.6 G/DL (ref 31–37)
MCV RBC AUTO: 96.4 FL (ref 74–97)
MONOCYTES # BLD: 0.4 K/UL (ref 0–1)
MONOCYTES NFR BLD AUTO: 3 % (ref 2–9)
NEUTS SEG # BLD: 11.8 K/UL (ref 1.8–8)
NEUTS SEG NFR BLD AUTO: 92 % (ref 42–75)
O2/TOTAL GAS SETTING VFR VENT: 40 %
P-R INTERVAL, ECG05: 154 MS
P-R INTERVAL, ECG05: 162 MS
PCO2 BLD: 49.4 MMHG (ref 35–45)
PH BLD: 7.44 [PH] (ref 7.35–7.45)
PLATELET # BLD AUTO: 228 K/UL (ref 135–420)
PMV BLD AUTO: 11.2 FL (ref 9.2–11.8)
PO2 BLD: 59 MMHG (ref 80–100)
POTASSIUM SERPL-SCNC: 3.5 MMOL/L (ref 3.5–5.5)
POTASSIUM SERPL-SCNC: 3.7 MMOL/L (ref 3.5–5.5)
PROT SERPL-MCNC: 5.5 G/DL (ref 6.4–8.2)
Q-T INTERVAL, ECG07: 368 MS
Q-T INTERVAL, ECG07: 404 MS
QRS DURATION, ECG06: 74 MS
QRS DURATION, ECG06: 76 MS
QTC CALCULATION (BEZET), ECG08: 429 MS
QTC CALCULATION (BEZET), ECG08: 434 MS
RBC # BLD AUTO: 3.94 M/UL (ref 4.2–5.3)
RBC MORPH BLD: ABNORMAL
SAO2 % BLD: 91 % (ref 92–97)
SERVICE CMNT-IMP: ABNORMAL
SERVICE CMNT-IMP: NORMAL
SODIUM SERPL-SCNC: 148 MMOL/L (ref 136–145)
SPECIMEN TYPE: ABNORMAL
VENTRICULAR RATE, ECG03: 68 BPM
VENTRICULAR RATE, ECG03: 84 BPM
WBC # BLD AUTO: 12.8 K/UL (ref 4.6–13.2)

## 2017-03-10 PROCEDURE — 36600 WITHDRAWAL OF ARTERIAL BLOOD: CPT

## 2017-03-10 PROCEDURE — 74011250637 HC RX REV CODE- 250/637: Performed by: INTERNAL MEDICINE

## 2017-03-10 PROCEDURE — 74011000250 HC RX REV CODE- 250: Performed by: INTERNAL MEDICINE

## 2017-03-10 PROCEDURE — 83735 ASSAY OF MAGNESIUM: CPT | Performed by: INTERNAL MEDICINE

## 2017-03-10 PROCEDURE — 74011250636 HC RX REV CODE- 250/636: Performed by: PHYSICIAN ASSISTANT

## 2017-03-10 PROCEDURE — 74011000258 HC RX REV CODE- 258: Performed by: INTERNAL MEDICINE

## 2017-03-10 PROCEDURE — 65610000006 HC RM INTENSIVE CARE

## 2017-03-10 PROCEDURE — 83735 ASSAY OF MAGNESIUM: CPT | Performed by: PHYSICIAN ASSISTANT

## 2017-03-10 PROCEDURE — 74011250636 HC RX REV CODE- 250/636: Performed by: INTERNAL MEDICINE

## 2017-03-10 PROCEDURE — 74011000250 HC RX REV CODE- 250: Performed by: PHYSICIAN ASSISTANT

## 2017-03-10 PROCEDURE — 74011250636 HC RX REV CODE- 250/636

## 2017-03-10 PROCEDURE — 74011250637 HC RX REV CODE- 250/637: Performed by: PHYSICIAN ASSISTANT

## 2017-03-10 PROCEDURE — 85025 COMPLETE CBC W/AUTO DIFF WBC: CPT | Performed by: PHYSICIAN ASSISTANT

## 2017-03-10 PROCEDURE — 82803 BLOOD GASES ANY COMBINATION: CPT

## 2017-03-10 PROCEDURE — 84132 ASSAY OF SERUM POTASSIUM: CPT | Performed by: PHYSICIAN ASSISTANT

## 2017-03-10 PROCEDURE — 94640 AIRWAY INHALATION TREATMENT: CPT

## 2017-03-10 PROCEDURE — 77010033711 HC HIGH FLOW OXYGEN

## 2017-03-10 PROCEDURE — 82962 GLUCOSE BLOOD TEST: CPT

## 2017-03-10 PROCEDURE — 74011636637 HC RX REV CODE- 636/637: Performed by: PHYSICIAN ASSISTANT

## 2017-03-10 PROCEDURE — C9113 INJ PANTOPRAZOLE SODIUM, VIA: HCPCS | Performed by: PHYSICIAN ASSISTANT

## 2017-03-10 RX ORDER — GABAPENTIN 300 MG/1
300 CAPSULE ORAL 2 TIMES DAILY
Status: DISCONTINUED | OUTPATIENT
Start: 2017-03-10 | End: 2017-03-16 | Stop reason: HOSPADM

## 2017-03-10 RX ORDER — BACLOFEN 10 MG/1
5 TABLET ORAL 3 TIMES DAILY
Status: DISCONTINUED | OUTPATIENT
Start: 2017-03-10 | End: 2017-03-10

## 2017-03-10 RX ORDER — INSULIN LISPRO 100 [IU]/ML
INJECTION, SOLUTION INTRAVENOUS; SUBCUTANEOUS
Status: DISCONTINUED | OUTPATIENT
Start: 2017-03-10 | End: 2017-03-14

## 2017-03-10 RX ORDER — POTASSIUM CHLORIDE 7.45 MG/ML
10 INJECTION INTRAVENOUS
Status: COMPLETED | OUTPATIENT
Start: 2017-03-10 | End: 2017-03-11

## 2017-03-10 RX ORDER — FOLIC ACID 1 MG/1
1 TABLET ORAL DAILY
Status: DISCONTINUED | OUTPATIENT
Start: 2017-03-10 | End: 2017-03-16 | Stop reason: HOSPADM

## 2017-03-10 RX ORDER — DOPAMINE HYDROCHLORIDE 160 MG/100ML
3-20 INJECTION, SOLUTION INTRAVENOUS
Status: DISCONTINUED | OUTPATIENT
Start: 2017-03-10 | End: 2017-03-11

## 2017-03-10 RX ORDER — DOPAMINE HYDROCHLORIDE 160 MG/100ML
INJECTION, SOLUTION INTRAVENOUS
Status: COMPLETED
Start: 2017-03-10 | End: 2017-03-10

## 2017-03-10 RX ORDER — ASPIRIN 325 MG/1
100 TABLET, FILM COATED ORAL DAILY
Status: DISCONTINUED | OUTPATIENT
Start: 2017-03-10 | End: 2017-03-16 | Stop reason: HOSPADM

## 2017-03-10 RX ORDER — DRONABINOL 2.5 MG/1
5 CAPSULE ORAL 2 TIMES DAILY
Status: DISCONTINUED | OUTPATIENT
Start: 2017-03-10 | End: 2017-03-16 | Stop reason: HOSPADM

## 2017-03-10 RX ORDER — BACLOFEN 10 MG/1
5 TABLET ORAL 3 TIMES DAILY
Status: DISCONTINUED | OUTPATIENT
Start: 2017-03-11 | End: 2017-03-16 | Stop reason: HOSPADM

## 2017-03-10 RX ORDER — MAGNESIUM SULFATE HEPTAHYDRATE 40 MG/ML
2 INJECTION, SOLUTION INTRAVENOUS ONCE
Status: COMPLETED | OUTPATIENT
Start: 2017-03-10 | End: 2017-03-10

## 2017-03-10 RX ORDER — POTASSIUM CHLORIDE 7.45 MG/ML
10 INJECTION INTRAVENOUS
Status: COMPLETED | OUTPATIENT
Start: 2017-03-10 | End: 2017-03-10

## 2017-03-10 RX ORDER — INSULIN GLARGINE 100 [IU]/ML
10 INJECTION, SOLUTION SUBCUTANEOUS DAILY
Status: DISCONTINUED | OUTPATIENT
Start: 2017-03-10 | End: 2017-03-11

## 2017-03-10 RX ORDER — POTASSIUM CHLORIDE 7.45 MG/ML
10 INJECTION INTRAVENOUS
Status: DISCONTINUED | OUTPATIENT
Start: 2017-03-10 | End: 2017-03-10

## 2017-03-10 RX ADMIN — DOPAMINE HYDROCHLORIDE 3 MCG/KG/MIN: 160 INJECTION, SOLUTION INTRAVENOUS at 02:15

## 2017-03-10 RX ADMIN — DRONABINOL 5 MG: 2.5 CAPSULE ORAL at 20:40

## 2017-03-10 RX ADMIN — SODIUM CHLORIDE 40 MG: 9 INJECTION INTRAMUSCULAR; INTRAVENOUS; SUBCUTANEOUS at 09:28

## 2017-03-10 RX ADMIN — INSULIN LISPRO 2 UNITS: 100 INJECTION, SOLUTION INTRAVENOUS; SUBCUTANEOUS at 22:05

## 2017-03-10 RX ADMIN — FUROSEMIDE 40 MG: 10 INJECTION, SOLUTION INTRAMUSCULAR; INTRAVENOUS at 13:31

## 2017-03-10 RX ADMIN — ARFORMOTEROL TARTRATE 15 MCG: 15 SOLUTION RESPIRATORY (INHALATION) at 20:24

## 2017-03-10 RX ADMIN — MIDAZOLAM HYDROCHLORIDE 2 MG: 1 INJECTION, SOLUTION INTRAMUSCULAR; INTRAVENOUS at 02:43

## 2017-03-10 RX ADMIN — DEXMEDETOMIDINE HYDROCHLORIDE 0.2 MCG/KG/HR: 100 INJECTION, SOLUTION INTRAVENOUS at 00:31

## 2017-03-10 RX ADMIN — ENOXAPARIN SODIUM 40 MG: 40 INJECTION SUBCUTANEOUS at 11:44

## 2017-03-10 RX ADMIN — CHLORHEXIDINE GLUCONATE 10 ML: 1.2 RINSE ORAL at 09:50

## 2017-03-10 RX ADMIN — POTASSIUM CHLORIDE 10 MEQ: 10 INJECTION, SOLUTION INTRAVENOUS at 22:03

## 2017-03-10 RX ADMIN — Medication: at 02:22

## 2017-03-10 RX ADMIN — METHYLPREDNISOLONE SODIUM SUCCINATE 40 MG: 40 INJECTION, POWDER, FOR SOLUTION INTRAMUSCULAR; INTRAVENOUS at 17:34

## 2017-03-10 RX ADMIN — POTASSIUM CHLORIDE 10 MEQ: 10 INJECTION, SOLUTION INTRAVENOUS at 12:22

## 2017-03-10 RX ADMIN — ATORVASTATIN CALCIUM 20 MG: 20 TABLET, FILM COATED ORAL at 09:30

## 2017-03-10 RX ADMIN — BUDESONIDE 500 MCG: 0.5 INHALANT RESPIRATORY (INHALATION) at 20:24

## 2017-03-10 RX ADMIN — POTASSIUM CHLORIDE 10 MEQ: 10 INJECTION, SOLUTION INTRAVENOUS at 09:38

## 2017-03-10 RX ADMIN — POTASSIUM CHLORIDE 10 MEQ: 10 INJECTION, SOLUTION INTRAVENOUS at 13:31

## 2017-03-10 RX ADMIN — POTASSIUM CHLORIDE 10 MEQ: 10 INJECTION, SOLUTION INTRAVENOUS at 08:07

## 2017-03-10 RX ADMIN — MIDAZOLAM HYDROCHLORIDE 2 MG: 1 INJECTION, SOLUTION INTRAMUSCULAR; INTRAVENOUS at 13:31

## 2017-03-10 RX ADMIN — POTASSIUM CHLORIDE 10 MEQ: 10 INJECTION, SOLUTION INTRAVENOUS at 23:06

## 2017-03-10 RX ADMIN — METHYLPREDNISOLONE SODIUM SUCCINATE 40 MG: 40 INJECTION, POWDER, FOR SOLUTION INTRAMUSCULAR; INTRAVENOUS at 23:05

## 2017-03-10 RX ADMIN — LEVOFLOXACIN 750 MG: 5 INJECTION, SOLUTION INTRAVENOUS at 11:43

## 2017-03-10 RX ADMIN — ASPIRIN 81 MG 81 MG: 81 TABLET ORAL at 09:30

## 2017-03-10 RX ADMIN — MAGNESIUM SULFATE HEPTAHYDRATE 2 G: 40 INJECTION, SOLUTION INTRAVENOUS at 09:25

## 2017-03-10 RX ADMIN — GABAPENTIN 300 MG: 300 CAPSULE ORAL at 20:40

## 2017-03-10 RX ADMIN — DRONABINOL 5 MG: 2.5 CAPSULE ORAL at 11:44

## 2017-03-10 RX ADMIN — METHYLPREDNISOLONE SODIUM SUCCINATE 40 MG: 40 INJECTION, POWDER, FOR SOLUTION INTRAMUSCULAR; INTRAVENOUS at 11:45

## 2017-03-10 RX ADMIN — INSULIN LISPRO 2 UNITS: 100 INJECTION, SOLUTION INTRAVENOUS; SUBCUTANEOUS at 05:35

## 2017-03-10 RX ADMIN — FUROSEMIDE 40 MG: 10 INJECTION, SOLUTION INTRAMUSCULAR; INTRAVENOUS at 22:08

## 2017-03-10 RX ADMIN — INSULIN GLARGINE 10 UNITS: 100 INJECTION, SOLUTION SUBCUTANEOUS at 11:43

## 2017-03-10 RX ADMIN — FUROSEMIDE 40 MG: 10 INJECTION, SOLUTION INTRAMUSCULAR; INTRAVENOUS at 05:28

## 2017-03-10 RX ADMIN — Medication: at 15:15

## 2017-03-10 RX ADMIN — INSULIN LISPRO 2 UNITS: 100 INJECTION, SOLUTION INTRAVENOUS; SUBCUTANEOUS at 11:43

## 2017-03-10 RX ADMIN — MIDAZOLAM HYDROCHLORIDE 2 MG: 1 INJECTION, SOLUTION INTRAMUSCULAR; INTRAVENOUS at 05:00

## 2017-03-10 RX ADMIN — ESCITALOPRAM OXALATE 20 MG: 10 TABLET ORAL at 09:30

## 2017-03-10 RX ADMIN — GABAPENTIN 300 MG: 300 CAPSULE ORAL at 11:45

## 2017-03-10 RX ADMIN — FOLIC ACID 1 MG: 1 TABLET ORAL at 12:52

## 2017-03-10 RX ADMIN — ARFORMOTEROL TARTRATE 15 MCG: 15 SOLUTION RESPIRATORY (INHALATION) at 08:15

## 2017-03-10 RX ADMIN — METHYLPREDNISOLONE SODIUM SUCCINATE 40 MG: 40 INJECTION, POWDER, FOR SOLUTION INTRAMUSCULAR; INTRAVENOUS at 05:28

## 2017-03-10 RX ADMIN — POTASSIUM CHLORIDE 10 MEQ: 10 INJECTION, SOLUTION INTRAVENOUS at 20:41

## 2017-03-10 RX ADMIN — POTASSIUM CHLORIDE 10 MEQ: 10 INJECTION, SOLUTION INTRAVENOUS at 09:36

## 2017-03-10 RX ADMIN — Medication 100 MG: at 11:44

## 2017-03-10 RX ADMIN — MIDAZOLAM HYDROCHLORIDE 2 MG: 1 INJECTION, SOLUTION INTRAMUSCULAR; INTRAVENOUS at 17:34

## 2017-03-10 RX ADMIN — BUDESONIDE 500 MCG: 0.5 INHALANT RESPIRATORY (INHALATION) at 08:15

## 2017-03-10 NOTE — PROGRESS NOTES
Hospitalist Progress Note    Patient: Ima Habermann MRN: [id number]  CSN: [id number]    YOB: 1955  Age: 64 y.o. Sex: female    DOA: 3/4/2017 LOS:  LOS: 6 days          Chief Complaint:    Shortness of Breath    Assessment/Plan     Hospital Problems  Date Reviewed: 3/6/2017          Codes Class Noted POA    PTSD (post-traumatic stress disorder) ICD-10-CM: F43.10  ICD-9-CM: 309.81  3/9/2017 Yes        COPD with acute exacerbation ([facility] ICD-10-CM: J44.1  ICD-9-CM: 491.21  3/6/2017 Yes        Respiratory insufficiency ICD-10-CM: R06.89  ICD-9-CM: 786.09  3/4/2017 Yes        Sinusitis ICD-10-CM: J32.9  ICD-9-CM: 473.9  3/4/2017 Unknown        Mass of sinus ICD-10-CM: R22.0  ICD-9-CM: 784.2  3/4/2017 Yes        * (Principal)COPD (chronic obstructive pulmonary disease) with emphysema ([facility] ICD-10-CM: J43.9  ICD-9-CM: 492.8  3/4/2017 Yes        Hypokalemia ICD-10-CM: E87.6  ICD-9-CM: 276.8  3/4/2017 Yes        Transaminitis ICD-10-CM: R74.0  ICD-9-CM: 790.4  3/4/2017 Yes        Nodule of kidney ICD-10-CM: N28.89  ICD-9-CM: 593.9  3/4/2017 Yes            Extubated yesterday  - Now on high flow oxygen  - Respiratory distress seems mostly to be panic attacks  - Wean O2 as able    Fluid and electrolyte management protocols    BG meeting inpatient goal: 140-180mg/dL  - Lantus 20 units added this AM  - Humalog 6 units + SSI q6h    Steroids and antibiotics for COPD exacerbation continue    Long conversation with the  today to try to explain why her pre-hospital medications are likely playing a role in her current condition. He is very resistant to this but seems to understand how it is relevant to formulating a plan. Given her long term benzodiazepine and baclofen use; I suspect that this is a LORNA mediated withdrawal syndrome. Trial of gabapentin per pulm/ccm today. If this does not work, I would add back the PO baclofen at 5mg TID.       Patient is +5.7L since admission; judicious fluid managment    Dispo: continue inpatient; ICU level care; wean O2 as tolerated; step down when able      Subjective:    Extubated yesterday; afebrile overnight; variable HR and BP; episodes of panic/anxiety attacks throughout the night morning    Review of systems:    Review of Systems   Constitutional: Negative. HENT: Negative. Respiratory: Positive for cough and shortness of breath. Negative for hemoptysis, sputum production and wheezing. Cardiovascular: Negative. Gastrointestinal: Negative. Musculoskeletal: Positive for back pain. Negative for joint pain, myalgias and neck pain. Skin: Negative. Neurological: Negative. Psychiatric/Behavioral: Positive for hallucinations. Negative for suicidal ideas. The patient is nervous/anxious. Vital signs/Intake and Output:  Visit Vitals    /70    Pulse (!) 58    Temp 98.3 °F (36.8 °C)    Resp 21    Ht 5' 8\" (1.727 m)    Wt 62.6 kg (138 lb)    SpO2 92%    BMI 20.98 kg/m2     Current Shift:  03/10 1901 - 03/11 0700  In: -   Out: 300 [Urine:300]  Last three shifts:  03/09 0701 - 03/10 1900  In: 1918.7 [P.O.:690;  I.V.:548.7]  Out: 9050 [Urine:9050]    Exam:    General: Well developed, awake and alert  Head/Neck: NCAT, supple, No masses, No lymphadenopathy  CVS:bradycardia, no M/R/G, S1/S2 heard, no thrill  Lungs:Clear to auscultation bilaterally, no wheezes, rhonchi, or rales  Abdomen: Soft, Nontender, No distention, Normal Bowel sounds, No hepatomegaly  Extremities: No C/C/E, pulses palpable 2+  Skin:normal texture and turgor, no rashes, no lesions  Neuro/ Psych: awake and alert, hypervigilant, VERY anxious                Labs: Results:       Chemistry Recent Labs      03/10/17   1930  03/10/17   0451  03/09/17   0355  03/08/17   0450   GLU   --   164*  238*  260*   NA   --   148*  143  149*   K  3.5  3.7  3.7  5.6*   CL   --   106  108  115*   CO2   --   35*  29  25   BUN   --   32*  32*  31*   CREA   --   0.75  0.87  0.84   CA   -- 8.7  8.4*  8.4*   AGAP   --   7  6  9   BUCR   --   43*  37*  37*   AP   --   71  101  69   TP   --   5.5*  5.3*  5.4*   ALB   --   2.7*  2.5*  2.4*   GLOB   --   2.8  2.8  3.0   AGRAT   --   1.0  0.9  0.8      CBC w/Diff Recent Labs      03/10/17   0451  03/09/17   0355  03/08/17   0450   WBC  12.8  13.6*  8.0   RBC  3.94*  3.68*  3.63*   HGB  12.4  11.9*  11.6*   HCT  38.0  36.2  36.2   PLT  228  231  239   GRANS  92*  90*  87*   LYMPH  5*  5*  6*   EOS  0  0  0      Cardiac Enzymes No results for input(s): CPK, CKND1, NAUN in the last 72 hours. No lab exists for component: CKRMB, TROIP   Coagulation No results for input(s): PTP, INR, APTT in the last 72 hours. No lab exists for component: INREXT, INREXT    Lipid Panel No results found for: CHOL, CHOLPOCT, CHOLX, CHLST, CHOLV, [id number], HDL, LDL, NLDLCT, DLDL, LDLC, DLDLP, 309925, VLDLC, VLDL, TGL, TGLX, TRIGL, [id number], TRIGP, TGLPOCT, [id number], CHHD, CHHDX   BNP No results for input(s): BNPP in the last 72 hours. Liver Enzymes Recent Labs      03/10/17   0451   TP  5.5*   ALB  2.7*   AP  71   SGOT  27      Thyroid Studies Lab Results   Component Value Date/Time    TSH 0.05 03/04/2017 04:25 PM        Procedures/imaging: see electronic medical records for all procedures/Xrays and details which were not copied into this note but were reviewed prior to creation of Plan    50 minutes of critical care time spent in the direct evaluation and treatment of this high risk patient. The reason for providing this level of medical care for this critically ill patient was due a critical illness that impaired one or more vital organ systems such that there was a high probability of imminent or life threatening deterioration in the patients condition.  This care involved high complexity decision making to assess, manipulate, and support vital system functions, to treat this degreee vital organ system failure and to prevent further life threatening deterioration of the patients condition.       Isaac Liu, DO

## 2017-03-10 NOTE — PROGRESS NOTES
0715-Report received from Clarice Anaya RN. Veronique, Mar, labs, and patient status reviewed. Patient's panic attacks addressed. Assumed care of patient. 0800-Gave 2 rounds of K per protocol. 0840-Spoke to Amalia Renae RN from diabetic control. Her recommendations for patient's diabetic control are to D/C Lantus 20Units, and 6Units Humolog. To start Lantus 20Units scheduled. Passed Dysphasia Screening. Tolerated all oral meds without difficulty. 0853-Kassie Simmons on unit and said to D/C all scheduled Insulins and begin only sliding scale insulin with meals. 0930-Passed swallow eval. All oral meds well tolerated and monitored closely at bedside. 0940-Old Lidocaine patch removed and replaced with new Lidocaine patch applied to lower, mid back. Reports back pain as a 3 of 10 but manageable at this time. 0950-Dr. Pugh at bedside discussing treatment options for panic attacks at bedside. 1100-Beginning of panic attack. Able to use guided imagery to calm and redirect. IDR rounds done. See MARS/Kardex for N/O.   1144-Gave first dose of Marinol 5mg.  1230-0 Adverse effects noted from new medication. 1331-Patient began getting anxious. Guided imagery and humor used to talk patient down. 1900-Report given to Clarice Anaya RN. VERONIQUE, mars,  And patient status reviewed. Relinquished care to Clarice Anaya RN.

## 2017-03-10 NOTE — PROGRESS NOTES
[facility]  Pulmonary, Critical Care, and Sleep Medicine      Name: Hunter Bryant MRN: [id number]   : 1955 Hospital: [facility] [place]   Date: 3/10/2017               Requesting MD: Dr. Sheri Frost                                  Reason for CC Consult: COPD exacerbation    IMPRESSION:   · Acute hypoxemic respiratory failure, now extubated on HFNC  · Volume overload, improving with diurese  · Severe anxiety disorder further exacerbated by excessive b2 agonist, now on brovoana and prn xopinex  · Chronic pain  · PTSD per  from past interactions with physicians in past  · Possible left upper pole renal mass, as per CTA chest   · Multiple pulmonary nodules from CTA chest, will need outpatient follow up   · Hx HTN      RECOMMENDATIONS:   · Resp - will wean fio2 on HFNC, possibly transition to nasal cannula later today with ETCO2  · ID - No clear infectious etiology;   · CVS - HD stable. Troponin elevated on admission - cardiology following. Echo with EF 50%. Monitor hemodynamics. Possible ischemic work up by cardiology  When stable  · Heme/Onc- H&H and platelets stable; monitor. · Metabolic -  Monitor electrolytes and replace per electrolyte replacement protocol. Will ask speech for swallow eval.  Will start the clear if possible today  · Renal - Trend renal indices and monitor UOP;     · Endocrine - SSI for glycemic control given steroids;    · Neuro/ Pain/ Sedation -  will wean precedex and fentanyl, will add gabapentin  · Prophylaxis - DVT (lovenox), GI (protonix)     Subjective/History:   3/10  Pt was extubated yesterday. Post extubation had few anxiety/panic attacks requiring sig anxiolytic and remain on fentanyl drip. Pt currently on HFNC. On precedex/fentanyl drip. Pt resting comfortably. Per RN every time patient is turned she seemed to panic. Otherwise resting comfortably. Attempt to wean off precedex.   Overnight due to bradycardia started on low dose dopamine drip. HR in 50s. HD stable otherwise. I have stopped all scheduled insulin as patient is NPO for now. I have discussed in length with family yesterday including daughter, son and . I suspect that patient has high degree of anxiety disorder which was further exacerbated with excessive bronchodilators. I also believe that she is going through some form of chemical withdrawal.  I have reviewed the out patient medications with patients pain specialists. Also discussed with pharmacy.  denies that patient is alcohol user. He notes that he controls the finances, and he knows what is purchased and what is not. 3/8  Pt off precedex due to bradycardia. Started to wean versed and fentanyl drip. Pt bradycardic in 40s to 50s. Easily arouseable however,  Hypotensive. Started on levophed. Spoke with Dr. Giovani Beckwith patient's pain specialist.  Pt has been on norco 4x per day for several years. Discussed about possible withdraw symptoms as patient for a time required max dose of precedex, versed drip at 7mg per hour and 150mcg of fentanyl. past 2 days. Yesterday daugther alluded that she may have taken more in time. Patient also see another physican for ptsd and apparently been on klonopin for prolonged period. .        3/5  This patient has been seen and evaluated at the request of Dr. Wes Mott for COPD exacerbation. Patient is a 64 y.o. female with history of COPD, PTSD, chronic sinusitis and HTN who presented to the ED with progressive shortness of breath for two weeks. She has had wheezing and a non-productive cough. Her  states she had a \"cold\" about 2 weeks ago and everything seems to have gotten worse since. She also recently traveled to [place] and stayed in a hotel but according to family it was clean hotel with no mold or dust.  No new exposure to pets. Daughter states she does get bad seasonal allergies which does typically flare her COPD.   She is currently still smoking, which the daughter reports is down to less than 1 pack per day. In the ED, she was tachypneic and tachycardic and was brought to the ICU for further management under the hospitalist service. She also had a CT chest in the ED which was was negative for a PE but did show emphysematous changes. She also had a CT of the head which showed ethmoid mucosal thickening with partial destruction of the medial wall of the left orbit - a mass or abscess could not be ruled out. ENT was consulted but felt as if this could be followed up on an outpatient basis. Additionally, she had a mild elevated troponin for which cardiology was consulted and is following. Pulmonary was then consulted on 3/5 for assistance with management. She was treated overnight with Ativan, Klonopin, and Lexapro. She was maintained on HFNC as she was unable to tolerate the BiPAP. During my evaluation, she is minimally responsive on HFNC, with RR 38-42, 's, and hypertensive. She was given multiple duonebs along with solumedrol and placed back on BiPAP without improvement. She was the intubated for respiratory failure. The patient is critically ill and can not provide additional history due to Ventilated. Past Medical History:   Diagnosis Date    Back pain     COPD (chronic obstructive pulmonary disease) (HCC)     Hypertension     PTSD (post-traumatic stress disorder)     Shingles       Past Surgical History:   Procedure Laterality Date    HX TUBAL LIGATION      HX TYMPANOSTOMY     Rg Deras        Prior to Admission medications    Medication Sig Start Date End Date Taking? Authorizing Provider   valsartan-hydroCHLOROthiazide (DIOVAN HCT) 160-25 mg per tablet Take 1 Tab by mouth daily. Yes Historical Provider   HYDROcodone-acetaminophen (NORCO)  mg tablet Take 1 Tab by mouth four (4) times daily.    Yes Historical Provider   baclofen (LIORESAL) 10 mg tablet Take 10 mg by mouth three (3) times daily. Yes Marissa Son MD   albuterol (PROVENTIL HFA, VENTOLIN HFA, PROAIR HFA) 90 mcg/actuation inhaler Take 2 Puffs by inhalation every four (4) hours as needed for Wheezing. Yes Marissa Son MD   escitalopram (LEXAPRO) 20 mg tablet Take 40 mg by mouth daily. Yes Marissa Son MD   clonazePAM (KLONOPIN) 0.5 mg tablet Take 0.5-1.5 mg by mouth daily as needed. Yes Marissa Son MD   fluticasone-salmeterol (ADVAIR DISKUS) 100-50 mcg/dose diskus inhaler Take 1 Puff by inhalation every twelve (12) hours. Yes Marissa Son MD   ibuprofen (MOTRIN) 800 mg tablet Take 800 mg by mouth every six (6) hours as needed.       Marissa Son MD     Current Facility-Administered Medications   Medication Dose Route Frequency    DOPamine in 5 % dextrose (INTROPIN) 800 mg/500 mL (1,600 mcg/mL) infusion  3-20 mcg/kg/min IntraVENous TITRATE    potassium chloride 10 mEq in 100 ml IVPB  10 mEq IntraVENous Q1H    potassium chloride 10 mEq in 100 ml IVPB  10 mEq IntraVENous Q1H    magnesium sulfate 2 g/50 ml IVPB (premix or compounded)  2 g IntraVENous ONCE    propofol (DIPRIVAN) infusion  0-50 mcg/kg/min IntraVENous TITRATE    lidocaine (LIDODERM) 5 % patch 1 Patch  1 Patch TransDERmal Q12H    furosemide (LASIX) injection 40 mg  40 mg IntraVENous Q8H    insulin lispro (HUMALOG) injection 6 Units  6 Units SubCUTAneous Q6H    insulin glargine (LANTUS) injection 20 Units  20 Units SubCUTAneous DAILY    dexmedetomidine (PRECEDEX) 400 mcg in 0.9% sodium chloride 100 mL infusion  0.2-0.7 mcg/kg/hr IntraVENous TITRATE    methylPREDNISolone (PF) (SOLU-MEDROL) injection 40 mg  40 mg IntraVENous Q6H    NOREPINephrine (LEVOPHED) 8,000 mcg in dextrose 5% 250 mL infusion  0-16 mcg/min IntraVENous TITRATE    aspirin chewable tablet 81 mg  81 mg Oral DAILY    OLANZapine (ZyPREXA) tablet 10 mg  10 mg Per NG tube QPM    midazolam in normal saline (VERSED) 1 mg/mL infusion  1 mg/hr IntraVENous TITRATE    atorvastatin (LIPITOR) tablet 20 mg  20 mg Oral DAILY    escitalopram oxalate (LEXAPRO) tablet 20 mg  20 mg Oral DAILY    chlorhexidine (PERIDEX) 0.12 % mouthwash 10 mL  10 mL Oral Q12H    insulin lispro (HUMALOG) injection   SubCUTAneous Q6H    pantoprazole (PROTONIX) 40 mg in sodium chloride 0.9 % 10 mL injection  40 mg IntraVENous DAILY    fentaNYL (PF)  mcg/30 ml   IntraVENous TITRATE    arformoterol (BROVANA) neb solution 15 mcg  15 mcg Nebulization BID RT    nicotine (NICODERM CQ) 14 mg/24 hr patch 1 Patch  1 Patch TransDERmal DAILY    enoxaparin (LOVENOX) injection 40 mg  40 mg SubCUTAneous Q24H    budesonide (PULMICORT) 500 mcg/2 ml nebulizer suspension  500 mcg Nebulization BID RT    levoFLOXacin (LEVAQUIN) 750 mg in D5W IVPB  750 mg IntraVENous Q24H     Allergies   Allergen Reactions    Sulfacetamide Anaphylaxis      Social History   Substance Use Topics    Smoking status: Current Some Day Smoker     Packs/day: 0.50    Smokeless tobacco: Not on file    Alcohol use Not on file      History reviewed. No pertinent family history. Review of Systems:  A comprehensive review of systems was negative except for that written in the HPI.     Objective:   Vital Signs:    Visit Vitals    /55    Pulse (!) 52    Temp 98.8 °F (37.1 °C)    Resp 10    Ht 5' 8\" (1.727 m)    Wt 62.6 kg (138 lb)    SpO2 96%    BMI 20.98 kg/m2       O2 Device: Hi flow nasal cannula   O2 Flow Rate (L/min): 50 l/min   Temp (24hrs), Av.1 °F (36.7 °C), Min:97.7 °F (36.5 °C), Max:98.8 °F (37.1 °C)       Intake/Output:   Last shift:      03/10 0701 - 03/10 1900  In: -   Out: 1800 [Urine:1800]  Last 3 shifts: 1901 - 03/10 0700  In: 4333.2 [I.V.:1985.2]  Out: 9000 [Urine:9000]    Intake/Output Summary (Last 24 hours) at 03/10/17 09  Last data filed at 03/10/17 0803   Gross per 24 hour   Intake           633.12 ml   Output             7700 ml   Net         -7066.88 ml       Ventilator Settings:  Mode Rate Tidal Volume Pressure FiO2 PEEP   Pressure control   400 ml    40 % 5 cm H20     Peak airway pressure: 20 cm H2O    Minute ventilation: 11.4 l/min      ARDS network Guidelines: Lung protective strategy and Pl pressure goals____________      Physical Exam:    General:  Extubated. Head:  Normocephalic, without obvious abnormality, atraumatic. Eyes:  Conjunctivae/corneas clear. Nose: Nares normal. Septum midline. Mucosa normal.   Throat: Lips and tongue normal.  Teeth present. Mucosa moist.    Neck: Supple, symmetrical, trachea midline, no adenopathy, no JVD. Lungs:   Exp wheezing and insp crackles       Heart:  Tachycardic rate and regular rhythm; no audible mrg   Abdomen:   Hypoactive bowel sounds; soft, non-tender   Extremities: Extremities without edema. Cyanosis to fingers and toes with mottling on lower extremities. Pulses: 2+ and symmetric all extremities. Skin: Warm, dry. Neurologic: No focal deficit       Data:     Recent Results (from the past 24 hour(s))   GLUCOSE, POC    Collection Time: 03/09/17 11:27 AM   Result Value Ref Range    Glucose (POC) 194 (H) 70 - 110 mg/dL   POC VENOUS BLOOD GAS    Collection Time: 03/09/17  3:41 PM   Result Value Ref Range    Device: High Flow Nasal Cannula      Flow rate (POC) 50 L/M    FIO2 (POC) 40 %    pH, venous (POC) 7.367 7.32 - 7.42      pCO2, venous (POC) 51.6 (H) 41 - 51 MMHG    pO2, venous (POC) 34 25 - 40 mmHg    HCO3, venous (POC) 29.6 (H) 23.0 - 28.0 MMOL/L    sO2, venous (POC) 63 (L) 65 - 88 %    Base excess, venous (POC) 4 mmol/L    Allens test (POC) N/A      Total resp.  rate 22      Site OTHER      Specimen type (POC) VENOUS BLOOD      Performed by Harrison Greene    GLUCOSE, POC    Collection Time: 03/09/17  5:54 PM   Result Value Ref Range    Glucose (POC) 141 (H) 70 - 110 mg/dL   GLUCOSE, POC    Collection Time: 03/09/17 11:42 PM   Result Value Ref Range    Glucose (POC) 166 (H) 70 - 110 mg/dL   POC G3    Collection Time: 03/10/17  4:43 AM   Result Value Ref Range    Device: High Flow Nasal Cannula      Flow rate (POC) 50 L/M    FIO2 (POC) 40 %    pH (POC) 7.442 7.35 - 7.45      pCO2 (POC) 49.4 (H) 35.0 - 45.0 MMHG    pO2 (POC) 59 (L) 80 - 100 MMHG    HCO3 (POC) 33.8 (H) 22 - 26 MMOL/L    sO2 (POC) 91 (L) 92 - 97 %    Base excess (POC) 10 mmol/L    Allens test (POC) YES      Site RIGHT RADIAL      Patient temp. 98.0      Specimen type (POC) ARTERIAL      Performed by Cherry Vidal    MAGNESIUM    Collection Time: 03/10/17  4:51 AM   Result Value Ref Range    Magnesium 2.1 1.8 - 2.4 mg/dL   METABOLIC PANEL, COMPREHENSIVE    Collection Time: 03/10/17  4:51 AM   Result Value Ref Range    Sodium 148 (H) 136 - 145 mmol/L    Potassium 3.7 3.5 - 5.5 mmol/L    Chloride 106 100 - 108 mmol/L    CO2 35 (H) 21 - 32 mmol/L    Anion gap 7 3.0 - 18 mmol/L    Glucose 164 (H) 74 - 99 mg/dL    BUN 32 (H) 7.0 - 18 MG/DL    Creatinine 0.75 0.6 - 1.3 MG/DL    BUN/Creatinine ratio 43 (H) 12 - 20      GFR est AA >60 >60 ml/min/1.73m2    GFR est non-AA >60 >60 ml/min/1.73m2    Calcium 8.7 8.5 - 10.1 MG/DL    Bilirubin, total 0.8 0.2 - 1.0 MG/DL    ALT (SGPT) 59 (H) 13 - 56 U/L    AST (SGOT) 27 15 - 37 U/L    Alk. phosphatase 71 45 - 117 U/L    Protein, total 5.5 (L) 6.4 - 8.2 g/dL    Albumin 2.7 (L) 3.4 - 5.0 g/dL    Globulin 2.8 2.0 - 4.0 g/dL    A-G Ratio 1.0 0.8 - 1.7     CBC WITH AUTOMATED DIFF    Collection Time: 03/10/17  4:51 AM   Result Value Ref Range    WBC 12.8 4.6 - 13.2 K/uL    RBC 3.94 (L) 4.20 - 5.30 M/uL    HGB 12.4 12.0 - 16.0 g/dL    HCT 38.0 35.0 - 45.0 %    MCV 96.4 74.0 - 97.0 FL    MCH 31.5 24.0 - 34.0 PG    MCHC 32.6 31.0 - 37.0 g/dL    RDW 14.5 11.6 - 14.5 %    PLATELET 231 367 - 864 K/uL    MPV 11.2 9.2 - 11.8 FL    NEUTROPHILS 92 (H) 42 - 75 %    LYMPHOCYTES 5 (L) 20 - 51 %    MONOCYTES 3 2 - 9 %    EOSINOPHILS 0 0 - 5 %    BASOPHILS 0 0 - 3 %    ABS. NEUTROPHILS 11.8 (H) 1.8 - 8.0 K/UL    ABS. LYMPHOCYTES 0.6 (L) 0.8 - 3.5 K/UL    ABS.  MONOCYTES 0.4 0 - 1.0 K/UL    ABS. EOSINOPHILS 0.0 0.0 - 0.4 K/UL    ABS. BASOPHILS 0.0 0.0 - 0.1 K/UL    RBC COMMENTS NORMOCYTIC, NORMOCHROMIC      DF MANUAL     GLUCOSE, POC    Collection Time: 03/10/17  5:26 AM   Result Value Ref Range    Glucose (POC) 163 (H) 70 - 110 mg/dL             Telemetry:Sinus tachycardia    Imaging:  CXR 3/5  The cardiomediastinal silhouette is stable. Bilateral mid to lower lung field  increased markings are again seen. The lung fields are hyperinflated. There is  no focal consolidation. There is no significant pleural effusion. There is no  acute osseous abnormality.        --------------  IMPRESSION  Impression:  --------------  COPD.     Bilateral mid to lower lung field increased interstitial markings similar to  prior which could represent bronchitis/interstitial pneumonitis or a chronic  interstitial process.      No focal consolidation.     No significant interval change. CTA Chest   FINDINGS:     EXAM QUALITY: Overall exam quality is suboptimal. Opacification of the pulmonary  arteries is somewhat suboptimal although diagnostic. There is significant  respiratory motion artifact which significantly degrades the images particularly  in the lower lung fields.     PULMONARY ARTERIES: No evidence of pulmonary embolism.     MEDIASTINUM: Heart size is normal. No evidence of right heart strain. No  pericardial effusion. Mild atherosclerotic changes of the aorta. Esophagus is  unremarkable.     LYMPH NODES: No enlarged nodes.     AIRWAY: Normal.     LUNGS: Significant emphysematous changes most pronounced in the upper lobes. Bilateral pulmonary nodules. Largest is in the left lower lobe on image 86  measuring 11 x 11 mm. A second possible left lower lobe nodule on image 91  measures 7 x 6 mm.  Right lower lobe nodule image 83 measures 12 x 6 mm.     PLEURA: Normal. Specifically, no pneumothorax or pleural effusion.     UPPER ABDOMEN: There is an exophytic possibly solid nodule arising from the  upper pole of the left kidney. This measures 11 x 12 mm. Upper abdominal  structures are otherwise unremarkable. .     OTHER: No acute or aggressive osseous abnormalities identified.     _______________     IMPRESSION  IMPRESSION:     1. Study is degraded by respiratory motion artifact however there are no  definite pulmonary emboli.     2. Emphysema. 3. Bilateral pulmonary nodules as described above with the largest in the left  lower lobe measuring 11 x 11 mm.  4. Possible solid exophytic left upper pole renal mass.     Recommend renal ultrasound for further evaluation of the kidneys.        Total critical care time exclusive of procedures: 55minutes  Rickey Canales MD

## 2017-03-10 NOTE — PROGRESS NOTES
NUTRITION UPDATE    - Discussed in IDR, pt extubated yesterday and passed swallow eval this AM  - Diet initiated- Consistent Carb 1600 kcal  - Lower CHO content desired by MD to minimize CO2 production  - To begin thiamin & folate supplementation    Will continue to follow per policy  Cortney Hallman, RD  PAGER:  [phone]

## 2017-03-10 NOTE — PROGRESS NOTES
@8864 pt taken over awake alert oriented to person and place, confused at times. Vital signs stable at present, denies pain, remains on high flow nasal cannula, SPO2 97%. Broderick remains in situ draining clear yellow urine. SCD's on bilateral feet. Pt remains on fentanyl and precedex drips. Nursing assessment done and documented in appropriate flow sheets. Nursing management continues. @2011 relatives allowed in with pt , no new changes. Vital signs stable at present. Pt HR drops into the 50's care continues. Dr Jose Smallwood was called and informed precedex reduced to 0.4mcg/kg/hr as ordered care continues. @2210 pt very anxious, sitting up high in bed, hyperventilating, face red.  at bedside, Versed 2mg administered as prescribed, cold cloth placed on forehead and support given to pt and relative. Pt given guided breathing. Pt finally calmed  , respiration now 20 , HR and BP back to normal . Nursing observation continues. @0017 pt Precedex reduced to 0.2 mcg/kg/hr as HR continued to trend down. Pt asleep at tis time. Whenever is aroused HR immediately goes into 80's nursing care continues. @0130 Precedex was stopped due to low heart rate , pt continues to be observed. @4054 Dr. Jose Smallwood was called and ordered Dopamine drip to titrate to maintain HR above 50, same initiated nursing care continues. @3858. Pt sitting high up in bed saying she is going to die. Pt hyperventilating, face red , pulling body forward , versed administered as prescribed , guided breathing, pt was still agitated, Precedex restarted at 0.2 mcg/kg/hr , pt is now calm vital signs returned to normal care continues. @0500 pt anxious  at bedside vital signs elevated versed administered as prescribed and is very effective observation continues. @0800 Bedside and Verbal shift change report given to MARCIA Gauthier and Lanny Olsen (oncoming nurse) by Subhash Sanchez (offgoing nurse).  Report included the following information SBAR, Kardex, Intake/Output, MAR, Recent Results and Med Rec Status.    Alarm parameters reviewed, on and audible Appropriate for patient clinical condition

## 2017-03-10 NOTE — INTERDISCIPLINARY ROUNDS
CRITICAL CARE INTERDISCIPLINARY ROUNDS    Patient Information:    Name:   Feliciano Sorto    Age:   64 y.o. Admission Date:   3/4/2017    Critical Care Day: 6    Surgery Date:      Attending Provider:   Germania Duarte DO    Surgeon:        Consultant(s):   [address] Physician:  Dakota Ku    Code Status: Full Code    Problem List:     Patient Active Problem List   Diagnosis Code    Respiratory insufficiency R06.89    Sinusitis J32.9    Mass of sinus R22.0    COPD (chronic obstructive pulmonary disease) with emphysema ([address] J43.9    Hypokalemia E87.6    Transaminitis R74.0    Nodule of kidney N28.89    COPD with acute exacerbation ([address] J44.1    PTSD (post-traumatic stress disorder) F43.10       Principal Problem:  COPD (chronic obstructive pulmonary disease) with emphysema ([address]    During rounds the following quality care indicators and evidence based practices were addressed :  DVT Prophylaxis and PUD Prophylaxis Broderick Day 5([facility] y) ; Central Line Day: 3  Isolation:; Antibiotic Stewardship: reviewed; Probiotics Necessary:  y    Ventilator Bundle:  Extubated 3/9    Sepsis Order Set:  or Elements of Sepsis Bundle Reviewed (Fluids, Antibiotics, Lactic Acid, Cultures, Vasopressors, Steriods, Glycemic control, Peak Plateau)    Glycemic Control:   Recent Labs      03/10/17   0451  03/09/17   0355  03/08/17   0450   GLU  164*  238*  260*   ; Recent Labs      03/10/17   0443  03/09/17   0440  03/08/17   0422   PHI  7.442  7.340*  7.285*   PCO2I  49.4*  47.7*  44.5   PO2I  59*  75*  73*    Adjustments     Acute MI/PCI:   Not applicable    Door to Balloon: Admission Time: 0737      Heart Failure:    Not applicable     SCIP Measures for other Surgeries:   Not applicable     Pneumonia:    Not applicable    Interdisciplinary team rounds were held with the following team members Care Management, Diabetes Treatment Specialist, Nursing, Nutrition,  Pharmacy, Physician and Respiratory Therapy.  Plan of care discussed. Goals of Care/ Recommendations: get diet recommendation (consistant carb 1600). Remain on precedex. Marinol started. If Bo Eli is working for her, q6hr. Reduce fentanyl and give give Dilaudid prn . See clinical pathway and/or care plan for interventions and desired outcomes.     Critical Care Discharge Status:  Red

## 2017-03-10 NOTE — PROGRESS NOTES
RN informed of HR 46. Pt comfortable on HFNC, 40% and 50L. Neb tx given.  Will not make any changes until seen by pulmonary

## 2017-03-11 PROBLEM — J96.00 ACUTE RESPIRATORY FAILURE (HCC): Status: ACTIVE | Noted: 2017-03-11

## 2017-03-11 LAB
ALBUMIN SERPL BCP-MCNC: 2.7 G/DL (ref 3.4–5)
ALBUMIN/GLOB SERPL: 1 {RATIO} (ref 0.8–1.7)
ALP SERPL-CCNC: 66 U/L (ref 45–117)
ALT SERPL-CCNC: 72 U/L (ref 13–56)
ANION GAP BLD CALC-SCNC: 1 MMOL/L (ref 3–18)
ARTERIAL PATENCY WRIST A: YES
AST SERPL W P-5'-P-CCNC: 29 U/L (ref 15–37)
BASE EXCESS BLD CALC-SCNC: 18 MMOL/L
BASOPHILS # BLD AUTO: 0 K/UL (ref 0–0.06)
BASOPHILS # BLD: 0 % (ref 0–2)
BDY SITE: ABNORMAL
BILIRUB SERPL-MCNC: 1 MG/DL (ref 0.2–1)
BODY TEMPERATURE: 98
BUN SERPL-MCNC: 26 MG/DL (ref 7–18)
BUN/CREAT SERPL: 37 (ref 12–20)
CALCIUM SERPL-MCNC: 8.3 MG/DL (ref 8.5–10.1)
CHLORIDE SERPL-SCNC: 102 MMOL/L (ref 100–108)
CO2 SERPL-SCNC: 42 MMOL/L (ref 21–32)
CREAT SERPL-MCNC: 0.71 MG/DL (ref 0.6–1.3)
DIFFERENTIAL METHOD BLD: ABNORMAL
EOSINOPHIL # BLD: 0 K/UL (ref 0–0.4)
EOSINOPHIL NFR BLD: 0 % (ref 0–5)
ERYTHROCYTE [DISTWIDTH] IN BLOOD BY AUTOMATED COUNT: 14.1 % (ref 11.6–14.5)
GAS FLOW.O2 O2 DELIVERY SYS: ABNORMAL L/MIN
GAS FLOW.O2 SETTING OXYMISER: 50 L/M
GLOBULIN SER CALC-MCNC: 2.8 G/DL (ref 2–4)
GLUCOSE BLD STRIP.AUTO-MCNC: 126 MG/DL (ref 70–110)
GLUCOSE BLD STRIP.AUTO-MCNC: 212 MG/DL (ref 70–110)
GLUCOSE BLD STRIP.AUTO-MCNC: 222 MG/DL (ref 70–110)
GLUCOSE BLD STRIP.AUTO-MCNC: 248 MG/DL (ref 70–110)
GLUCOSE SERPL-MCNC: 191 MG/DL (ref 74–99)
HCO3 BLD-SCNC: 41.5 MMOL/L (ref 22–26)
HCT VFR BLD AUTO: 38.9 % (ref 35–45)
HGB BLD-MCNC: 12.9 G/DL (ref 12–16)
LYMPHOCYTES # BLD AUTO: 4 % (ref 21–52)
LYMPHOCYTES # BLD: 0.5 K/UL (ref 0.9–3.6)
MAGNESIUM SERPL-MCNC: 2.2 MG/DL (ref 1.8–2.4)
MCH RBC QN AUTO: 32 PG (ref 24–34)
MCHC RBC AUTO-ENTMCNC: 33.2 G/DL (ref 31–37)
MCV RBC AUTO: 96.5 FL (ref 74–97)
MONOCYTES # BLD: 0.6 K/UL (ref 0.05–1.2)
MONOCYTES NFR BLD AUTO: 4 % (ref 3–10)
NEUTS SEG # BLD: 12.8 K/UL (ref 1.8–8)
NEUTS SEG NFR BLD AUTO: 92 % (ref 40–73)
O2/TOTAL GAS SETTING VFR VENT: 40 %
PCO2 BLD: 56.2 MMHG (ref 35–45)
PH BLD: 7.47 [PH] (ref 7.35–7.45)
PLATELET # BLD AUTO: 216 K/UL (ref 135–420)
PMV BLD AUTO: 11.3 FL (ref 9.2–11.8)
PO2 BLD: 78 MMHG (ref 80–100)
POTASSIUM SERPL-SCNC: 3.6 MMOL/L (ref 3.5–5.5)
POTASSIUM SERPL-SCNC: 3.8 MMOL/L (ref 3.5–5.5)
POTASSIUM SERPL-SCNC: 3.8 MMOL/L (ref 3.5–5.5)
PROT SERPL-MCNC: 5.5 G/DL (ref 6.4–8.2)
RBC # BLD AUTO: 4.03 M/UL (ref 4.2–5.3)
SAO2 % BLD: 96 % (ref 92–97)
SERVICE CMNT-IMP: ABNORMAL
SODIUM SERPL-SCNC: 145 MMOL/L (ref 136–145)
SPECIMEN TYPE: ABNORMAL
WBC # BLD AUTO: 13.9 K/UL (ref 4.6–13.2)

## 2017-03-11 PROCEDURE — 74011250637 HC RX REV CODE- 250/637: Performed by: INTERNAL MEDICINE

## 2017-03-11 PROCEDURE — 97535 SELF CARE MNGMENT TRAINING: CPT

## 2017-03-11 PROCEDURE — 83735 ASSAY OF MAGNESIUM: CPT | Performed by: PHYSICIAN ASSISTANT

## 2017-03-11 PROCEDURE — 97162 PT EVAL MOD COMPLEX 30 MIN: CPT

## 2017-03-11 PROCEDURE — 77010033711 HC HIGH FLOW OXYGEN

## 2017-03-11 PROCEDURE — 82962 GLUCOSE BLOOD TEST: CPT

## 2017-03-11 PROCEDURE — C9113 INJ PANTOPRAZOLE SODIUM, VIA: HCPCS | Performed by: PHYSICIAN ASSISTANT

## 2017-03-11 PROCEDURE — 74011000250 HC RX REV CODE- 250: Performed by: PHYSICIAN ASSISTANT

## 2017-03-11 PROCEDURE — 97530 THERAPEUTIC ACTIVITIES: CPT

## 2017-03-11 PROCEDURE — 74011000250 HC RX REV CODE- 250: Performed by: INTERNAL MEDICINE

## 2017-03-11 PROCEDURE — 77030032490 HC SLV COMPR SCD KNE COVD -B

## 2017-03-11 PROCEDURE — 84132 ASSAY OF SERUM POTASSIUM: CPT | Performed by: INTERNAL MEDICINE

## 2017-03-11 PROCEDURE — 74011000258 HC RX REV CODE- 258: Performed by: INTERNAL MEDICINE

## 2017-03-11 PROCEDURE — 74011250636 HC RX REV CODE- 250/636: Performed by: INTERNAL MEDICINE

## 2017-03-11 PROCEDURE — 85025 COMPLETE CBC W/AUTO DIFF WBC: CPT | Performed by: PHYSICIAN ASSISTANT

## 2017-03-11 PROCEDURE — 94640 AIRWAY INHALATION TREATMENT: CPT

## 2017-03-11 PROCEDURE — 82803 BLOOD GASES ANY COMBINATION: CPT

## 2017-03-11 PROCEDURE — 36600 WITHDRAWAL OF ARTERIAL BLOOD: CPT

## 2017-03-11 PROCEDURE — 97166 OT EVAL MOD COMPLEX 45 MIN: CPT

## 2017-03-11 PROCEDURE — 74011250637 HC RX REV CODE- 250/637: Performed by: PHYSICIAN ASSISTANT

## 2017-03-11 PROCEDURE — 84132 ASSAY OF SERUM POTASSIUM: CPT | Performed by: PHYSICIAN ASSISTANT

## 2017-03-11 PROCEDURE — 74011250636 HC RX REV CODE- 250/636: Performed by: PHYSICIAN ASSISTANT

## 2017-03-11 PROCEDURE — 65610000006 HC RM INTENSIVE CARE

## 2017-03-11 PROCEDURE — 74011250637 HC RX REV CODE- 250/637: Performed by: FAMILY MEDICINE

## 2017-03-11 RX ORDER — HYDROCODONE BITARTRATE AND ACETAMINOPHEN 5; 325 MG/1; MG/1
1 TABLET ORAL EVERY 8 HOURS
Status: DISCONTINUED | OUTPATIENT
Start: 2017-03-11 | End: 2017-03-13

## 2017-03-11 RX ORDER — HYDROCODONE BITARTRATE AND ACETAMINOPHEN 5; 325 MG/1; MG/1
1 TABLET ORAL EVERY 8 HOURS
Status: DISCONTINUED | OUTPATIENT
Start: 2017-03-11 | End: 2017-03-11

## 2017-03-11 RX ORDER — POTASSIUM CHLORIDE 20 MEQ/1
40 TABLET, EXTENDED RELEASE ORAL
Status: COMPLETED | OUTPATIENT
Start: 2017-03-11 | End: 2017-03-11

## 2017-03-11 RX ORDER — POTASSIUM CHLORIDE 7.45 MG/ML
10 INJECTION INTRAVENOUS
Status: COMPLETED | OUTPATIENT
Start: 2017-03-11 | End: 2017-03-11

## 2017-03-11 RX ORDER — PANTOPRAZOLE SODIUM 40 MG/1
40 TABLET, DELAYED RELEASE ORAL
Status: DISCONTINUED | OUTPATIENT
Start: 2017-03-12 | End: 2017-03-16 | Stop reason: HOSPADM

## 2017-03-11 RX ORDER — POTASSIUM CHLORIDE 20 MEQ/1
20 TABLET, EXTENDED RELEASE ORAL
Status: COMPLETED | OUTPATIENT
Start: 2017-03-12 | End: 2017-03-11

## 2017-03-11 RX ORDER — INSULIN GLARGINE 100 [IU]/ML
12 INJECTION, SOLUTION SUBCUTANEOUS DAILY
Status: DISCONTINUED | OUTPATIENT
Start: 2017-03-12 | End: 2017-03-15

## 2017-03-11 RX ADMIN — POTASSIUM CHLORIDE 10 MEQ: 10 INJECTION, SOLUTION INTRAVENOUS at 08:44

## 2017-03-11 RX ADMIN — GABAPENTIN 300 MG: 300 CAPSULE ORAL at 21:36

## 2017-03-11 RX ADMIN — Medication 100 MG: at 08:49

## 2017-03-11 RX ADMIN — BACLOFEN 5 MG: 10 TABLET ORAL at 21:36

## 2017-03-11 RX ADMIN — LEVOFLOXACIN 750 MG: 5 INJECTION, SOLUTION INTRAVENOUS at 11:38

## 2017-03-11 RX ADMIN — POTASSIUM CHLORIDE 10 MEQ: 10 INJECTION, SOLUTION INTRAVENOUS at 06:46

## 2017-03-11 RX ADMIN — DRONABINOL 5 MG: 2.5 CAPSULE ORAL at 21:35

## 2017-03-11 RX ADMIN — ARFORMOTEROL TARTRATE 15 MCG: 15 SOLUTION RESPIRATORY (INHALATION) at 19:46

## 2017-03-11 RX ADMIN — Medication: at 03:48

## 2017-03-11 RX ADMIN — ATORVASTATIN CALCIUM 20 MG: 20 TABLET, FILM COATED ORAL at 08:48

## 2017-03-11 RX ADMIN — BACLOFEN 5 MG: 10 TABLET ORAL at 15:31

## 2017-03-11 RX ADMIN — HYDROMORPHONE HYDROCHLORIDE 1 MG: 2 INJECTION INTRAMUSCULAR; INTRAVENOUS; SUBCUTANEOUS at 22:04

## 2017-03-11 RX ADMIN — HYDROMORPHONE HYDROCHLORIDE 1 MG: 2 INJECTION INTRAMUSCULAR; INTRAVENOUS; SUBCUTANEOUS at 11:47

## 2017-03-11 RX ADMIN — ENOXAPARIN SODIUM 40 MG: 40 INJECTION SUBCUTANEOUS at 11:37

## 2017-03-11 RX ADMIN — INSULIN GLARGINE 10 UNITS: 100 INJECTION, SOLUTION SUBCUTANEOUS at 08:49

## 2017-03-11 RX ADMIN — INSULIN LISPRO 4 UNITS: 100 INJECTION, SOLUTION INTRAVENOUS; SUBCUTANEOUS at 11:37

## 2017-03-11 RX ADMIN — ESCITALOPRAM OXALATE 20 MG: 10 TABLET ORAL at 08:48

## 2017-03-11 RX ADMIN — METHYLPREDNISOLONE SODIUM SUCCINATE 40 MG: 40 INJECTION, POWDER, FOR SOLUTION INTRAMUSCULAR; INTRAVENOUS at 05:45

## 2017-03-11 RX ADMIN — INSULIN LISPRO 4 UNITS: 100 INJECTION, SOLUTION INTRAVENOUS; SUBCUTANEOUS at 06:50

## 2017-03-11 RX ADMIN — INSULIN LISPRO 4 UNITS: 100 INJECTION, SOLUTION INTRAVENOUS; SUBCUTANEOUS at 22:00

## 2017-03-11 RX ADMIN — HYDROCODONE BITARTRATE AND ACETAMINOPHEN 1 TABLET: 5; 325 TABLET ORAL at 23:57

## 2017-03-11 RX ADMIN — POTASSIUM CHLORIDE 40 MEQ: 20 TABLET, EXTENDED RELEASE ORAL at 16:08

## 2017-03-11 RX ADMIN — BUDESONIDE 500 MCG: 0.5 INHALANT RESPIRATORY (INHALATION) at 19:46

## 2017-03-11 RX ADMIN — FOLIC ACID 1 MG: 1 TABLET ORAL at 08:49

## 2017-03-11 RX ADMIN — DEXMEDETOMIDINE HYDROCHLORIDE 0.2 MCG/KG/HR: 100 INJECTION, SOLUTION INTRAVENOUS at 01:09

## 2017-03-11 RX ADMIN — HYDROCODONE BITARTRATE AND ACETAMINOPHEN 1 TABLET: 5; 325 TABLET ORAL at 07:48

## 2017-03-11 RX ADMIN — DOPAMINE HYDROCHLORIDE 3 MCG/KG/MIN: 160 INJECTION, SOLUTION INTRAVENOUS at 01:47

## 2017-03-11 RX ADMIN — METHYLPREDNISOLONE SODIUM SUCCINATE 40 MG: 40 INJECTION, POWDER, FOR SOLUTION INTRAMUSCULAR; INTRAVENOUS at 17:56

## 2017-03-11 RX ADMIN — METHYLPREDNISOLONE SODIUM SUCCINATE 40 MG: 40 INJECTION, POWDER, FOR SOLUTION INTRAMUSCULAR; INTRAVENOUS at 23:57

## 2017-03-11 RX ADMIN — FUROSEMIDE 40 MG: 10 INJECTION, SOLUTION INTRAMUSCULAR; INTRAVENOUS at 05:45

## 2017-03-11 RX ADMIN — BUDESONIDE 500 MCG: 0.5 INHALANT RESPIRATORY (INHALATION) at 07:53

## 2017-03-11 RX ADMIN — HYDROCODONE BITARTRATE AND ACETAMINOPHEN 1 TABLET: 5; 325 TABLET ORAL at 15:31

## 2017-03-11 RX ADMIN — GABAPENTIN 300 MG: 300 CAPSULE ORAL at 08:49

## 2017-03-11 RX ADMIN — POTASSIUM CHLORIDE 20 MEQ: 20 TABLET, EXTENDED RELEASE ORAL at 23:57

## 2017-03-11 RX ADMIN — POTASSIUM CHLORIDE 10 MEQ: 10 INJECTION, SOLUTION INTRAVENOUS at 00:25

## 2017-03-11 RX ADMIN — METHYLPREDNISOLONE SODIUM SUCCINATE 40 MG: 40 INJECTION, POWDER, FOR SOLUTION INTRAMUSCULAR; INTRAVENOUS at 11:36

## 2017-03-11 RX ADMIN — BACLOFEN 5 MG: 10 TABLET ORAL at 09:00

## 2017-03-11 RX ADMIN — DRONABINOL 5 MG: 2.5 CAPSULE ORAL at 08:47

## 2017-03-11 RX ADMIN — SODIUM CHLORIDE 40 MG: 9 INJECTION INTRAMUSCULAR; INTRAVENOUS; SUBCUTANEOUS at 08:48

## 2017-03-11 RX ADMIN — ASPIRIN 81 MG 81 MG: 81 TABLET ORAL at 08:49

## 2017-03-11 RX ADMIN — HYDROMORPHONE HYDROCHLORIDE 1 MG: 2 INJECTION INTRAMUSCULAR; INTRAVENOUS; SUBCUTANEOUS at 13:27

## 2017-03-11 RX ADMIN — ARFORMOTEROL TARTRATE 15 MCG: 15 SOLUTION RESPIRATORY (INHALATION) at 07:53

## 2017-03-11 NOTE — PROGRESS NOTES
Problem: Mobility Impaired (Adult and Pediatric)  Goal: *Acute Goals and Plan of Care (Insert Text)  Physical Therapy Goals  Initiated 3/11/2017 and to be accomplished within 7 day(s)  1. Patient will move from supine to sit and sit to supine in bed with supervision/set-up. 2. Patient will transfer from bed to chair and chair to bed with supervision/set-up using the least restrictive device. 3. Patient will perform sit to stand with supervision/set-up. 4. Patient will ambulate with CGA for >50 feet with the least restrictive device. Outcome: Progressing Towards Goal  PHYSICAL THERAPY EVALUATION     Patient: Gwen Odell (16 y.o. female)  Date: 3/11/2017  Primary Diagnosis: Respiratory insufficiency  Abnormal EKG  Mass of sinus  Sinusitis  Respiratory insufficiency  Abnormal EKG  Mass of sinus  Sinusitis  Precautions:   Fall      ASSESSMENT :  Based on the objective data described below, Patient present to PT with functional mobility impairments with regard to bed mobility, transfers, gait, stair negotiation, balance, weakness, and overall tolerance for activity. Pt on hiflow O2 which limited her ability to fully participate with PT at this time; pt also has high level of anxiety requiring frequent verbal cuing to breathe in through her nose and out through her mouth. During gait training pt ambulated a total of 2 feet with Luna (HHA); Would recommend future PT sessions include RW use as pt demonstrated fair/poor balance. Left pt in bed as requested with all needs met and call bell within reach. Recommend HHPT vs. Rehab at time of discharge depending on pt progress with PT. Patient will benefit from skilled intervention to address the above impairments.   Patients rehabilitation potential is considered to be Good  Factors which may influence rehabilitation potential include:   [ ]         None noted  [ ]         Mental ability/status  [X]         Medical condition  [ ]         Home/family situation and support systems  [ ]         Safety awareness  [ ]         Pain tolerance/management  [ ]         Other:        PLAN :  Recommendations and Planned Interventions:  [X]           Bed Mobility Training             [X]    Neuromuscular Re-Education  [X]           Transfer Training                   [ ]    Orthotic/Prosthetic Training  [X]           Gait Training                          [ ]    Modalities  [X]           Therapeutic Exercises          [ ]    Edema Management/Control  [X]           Therapeutic Activities            [X]    Patient and Family Training/Education  [ ]           Other (comment):     Frequency/Duration: Patient will be followed by physical therapy daily to address goals. Discharge Recommendations: To Be Determined HHPT vs. Rehab  Further Equipment Recommendations for Discharge: TBD       SUBJECTIVE:   Patient stated It's my anniversary tomorrow.       OBJECTIVE DATA SUMMARY:       Past Medical History:   Diagnosis Date    Back pain      COPD [facility]chronic obstructive [facility] disease) ([facility] [address]      Hypertension      PTSD (post-traumatic stress disorder)      Shingles       Past Surgical History:   Procedure Laterality Date    HX TUBAL LIGATION        HX TYMPANOSTOMY        LAMINECTOMY,LUMBAR         Barriers to Learning/Limitations: yes;  emotional  Compensate with: visual, verbal, tactile, kinesthetic cues/model  Prior Level of Function/Home Situation: Pt stated she was independent with ADLs and mobility. Home Situation  Home Environment: Private residence  One/Two Story Residence: Two story  Living Alone: No  Support Systems: Child(ramesh), Family member(s), Spouse/Significant Other/Partner  Patient Expects to be Discharged to[de-identified] Private residence  Current DME Used/Available at Home: None  Critical Behavior:  Neurologic State: Alert; Appropriate for age  Orientation Level: Oriented X4  Cognition: Appropriate decision making; Appropriate for age attention/concentration; Appropriate safety awareness; Follows commands  Safety/Judgement: Awareness of environment  Psychosocial  Patient Behaviors: Calm; Cooperative  Family  Behaviors: Calm; Cooperative;Supportive  Purposeful Interaction: Yes  Pt Identified Daily Priority: Clinical issues (comment)  Caritas Process: Nurture loving kindness; Teaching/learning  Caring Interventions: Reassure  Reassure: Therapeutic listening  Therapeutic Modalities: Intentional therapeutic touch   Family  Behaviors: Calm; Cooperative;Supportive   Strength:    Strength: Generally decreased, functional   Tone & Sensation:   Tone: Normal   Sensation: Intact   Range Of Motion:  AROM: Generally decreased, functional   PROM: Within functional limits   Functional Mobility:  Bed Mobility:  Rolling: Contact guard assistance  Supine to Sit: Contact guard assistance; Additional time (extended time to focus on breathing)  Sit to Supine: Contact guard assistance; Additional time  Scooting: Contact guard assistance  Transfers:  Sit to Stand: Contact guard assistance  Stand to Sit: Contact guard assistance   Balance:   Sitting: Intact  Standing: Impaired; With support  Standing - Static: Fair  Standing - Dynamic : Fair  Ambulation/Gait Training:  Distance (ft): 2 Feet (ft)  Assistive Device: Gait belt  Ambulation - Level of Assistance: Minimal assistance   Gait Description (WDL): Exceptions to WDL  Gait Abnormalities: Decreased step clearance;Shuffling gait   Base of Support: Narrowed  Stance: Time  Speed/Aliya: Slow;Shuffled  Step Length: Right shortened;Left shortened  Swing Pattern: Right asymmetrical;Left asymmetrical   Interventions: Visual/Demos; Verbal cues; Tactile cues; Safety awareness training   Therapeutic Exercises:   HEP included ankle pumps, heel slides, LAQ x 10 reps  Pain:  Pain Scale 1: Numeric (0 - 10)  Pain Intensity 1: 7  Pain Location 1: Back  Pain Orientation 1: Lower   Activity Tolerance:   Fair+  Please refer to the flowsheet for vital signs taken during this treatment. After treatment:   [ ]         Patient left in no apparent distress sitting up in chair  [X]         Patient left in no apparent distress in bed  [X]         Call bell left within reach  [X]         Nursing notified  [X]         Caregiver present  [ ]         Bed alarm activated      COMMUNICATION/EDUCATION:   [X]         Fall prevention education was provided and the patient/caregiver indicated understanding. [X]         Patient/family have participated as able in goal setting and plan of care. [X]         Patient/family agree to work toward stated goals and plan of care. [ ]         Patient understands intent and goals of therapy, but is neutral about his/her participation. [ ]         Patient is unable to participate in goal setting and plan of care.      Thank you for this referral.  Everardo Aviles PT, DPT         Time Calculation: 23 mins

## 2017-03-11 NOTE — PROGRESS NOTES
1000  Dr Abilio Dangelo requests to wean HFNC today if possible. Decreased from 50L to 30L and monitor tolerance. Not changing FiO2 at this time. 1400  Was able to decrease from 30L to 20L at 40%, but pt did not tolerate further wean. Explained to pt and family that we will try again tomorrow to go to regular NC. They seem to understand. Also instructed in use of flutter valve, and encouraged alternating use with Incentive Spirometer.

## 2017-03-11 NOTE — WOUND CARE
Pt turned and assessed by wound care. No skin issues noted at this time. Wound care will continue to follow pt daily while in ICU.

## 2017-03-11 NOTE — PROGRESS NOTES
1500 Assumed care of patient. Pt sitting up in bed watching TV with family present. Assessment completed and charted. 1900 No issues on this writers shift. Pt sat up on side of bed for dinner. Family present at bedside at all times. 1910 Bedside shift change report given to [address] [place] [address] (oncoming nurse) by Thanh Larsen RN   (offgoing nurse). Report included the following information SBAR, Kardex and MAR.

## 2017-03-11 NOTE — PROGRESS NOTES
Problem: Self Care Deficits Care Plan (Adult)  Goal: *Acute Goals and Plan of Care (Insert Text)  Occupational Therapy Goals  Initiated 3/11/2017 within 7 day(s). 1. Patient will perform lower body dressing with minimal assistance assist while sitting on EOB  2. Patient will perform grooming with contact guard assist while standing at EOB while maintaining oxygen sat above 89%. 3. Patient will perform toilet transfers with contact guard assist.  4. Patient will perform all aspects of toileting with minimal assistance. 5. Patient will participate in upper extremity therapeutic exercise/activities with supervision/set-up for 15 minutes. 6. Patient will utilize energy conservation techniques during functional activities with verbal cues. OCCUPATIONAL THERAPY EVALUATION     Patient: Miguelangel Barboza (87 y.o. female)  Date: 3/11/2017  Primary Diagnosis: Respiratory insufficiency  Abnormal EKG  Mass of sinus  Sinusitis  Respiratory insufficiency  Abnormal EKG  Mass of sinus  Sinusitis        Precautions: fall risk      ASSESSMENT :  Based on the objective data described below, the patient presents with decrease strength, endurance, SOB, and pain that impacts pt's ability to complete ADLs and functional mobility/transfers. Pt was seen with PT to maximize safety. Pt is highly motivated and cooperative to participate in therapy. Pt had family members in room that were supportive and encouraging. Pt did require Min A to don socks while in supine; pt performed half-tailor position to don socks. Pt performed supine to sit EOB with CGA and extended time to focus on her breathing. Pt required CGA for sit to stand and HHA x2 in standing. Pt required max cueing to relax and to focus-slow down breathing. Pt performed a few side-way steps toward [facility] with extended time. Pt transferred back to supine with CGA. Pt was left with all needs met. Pt's oxygen sat ranged approx 83%-100% during session.  Pt was up to 98% prior to leaving room. Pt was encouraged to sit on EOB for all meals in order to build up strength and endurance. Pt would benefit from skilled OT to work on above impairments in order to work towards PLOF in being as independent as possible and improve overall QOL. Patient will benefit from skilled intervention to address the above impairments. Patients rehabilitation potential is considered to be Excellent  Factors which may influence rehabilitation potential include:   [X]             None noted  [ ]             Mental ability/status  [ ]             Medical condition  [ ]             Home/family situation and support systems  [ ]             Safety awareness  [ ]             Pain tolerance/management  [ ]             Other:        PLAN :  Recommendations and Planned Interventions:  [X]               Self Care Training                  [X]        Therapeutic Activities  [X]               Functional Mobility Training    [ ]        Cognitive Retraining  [ ]               Therapeutic Exercises           [ ]        Endurance Activities  [ ]               Balance Training                   [ ]        Neuromuscular Re-Education  [ ]               Visual/Perceptual Training     [X]   Home Safety Training  [X]               Patient Education                 [X]        Family Training/Education  [ ]               Other (comment):     Frequency/Duration: Patient will be followed by occupational therapy 3-5x/wk for 1week   to address goals.   Discharge Recommendations: Rehab vs HH based on progress in therapy during stay  Further Equipment Recommendations for Discharge: TBD       SUBJECTIVE:   Patient stated \"I'm doing better      OBJECTIVE DATA SUMMARY:       Past Medical History:   Diagnosis Date    Back pain      COPD [facility]chronic obstructive pulmonary disease) ([facility] [address]      Hypertension      PTSD (post-traumatic stress disorder)      Shingles       Past Surgical History:   Procedure Laterality Date    HX TUBAL LIGATION        HX TYMPANOSTOMY        LAMINECTOMY,LUMBAR         Barriers to Learning/Limitations: None  Compensate with: visual, verbal, tactile, kinesthetic cues/model  Prior Level of Function/Home Situation: Ind with ADls/IADLs prior  Home Situation  Home Environment: Private residence  One/Two Story Residence: Two story  Living Alone: No  Support Systems: Child(ramesh), Family member(s), Spouse/Significant Other/Partner  Patient Expects to be Discharged to[de-identified] Private residence  Current DME Used/Available at Home: None  Cognitive/Behavioral Status:  Neurologic State: Alert; Appropriate for age  Orientation Level: Oriented X4  Cognition: Appropriate decision making; Appropriate for age attention/concentration; Appropriate safety awareness; Follows commands  Safety/Judgement: Awareness of environment  Skin: BUE skin intact  Edema: none noted  Vision/Perceptual:    Corrective Lenses: Reading glasses  Coordination:  Coordination: Generally decreased, functional  Fine Motor Skills-Upper: Left Intact; Right Intact    Gross Motor Skills-Upper: Left Intact; Right Intact  Strength:(B) UE   Strength: Generally decreased, functional   Tone & Sensation:(B) UE  Tone: Normal  Sensation: Intact  Range of Motion:(B) UE  AROM: Generally decreased, functional  PROM: Within functional limits   Functional Mobility and Transfers for ADLs:  Bed Mobility:   Supine to Sit: Contact guard assistance; Additional time (extended time to focus on breathing)  Sit to Supine: Contact guard assistance; Additional time  Scooting: Contact guard assistance  Transfers:  Sit to Stand: Contact guard assistance   ADL Assessment/Intervention:   Lower Body Dressing: Minimum assistance (assistance to tread sock over R toes while supine)   Cognitive Retraining  Safety/Judgement: Awareness of environment  Pain:  Pain Scale 1: Numeric (0 - 10)  Pain Intensity 1: 4  Pain Location 1: Back  Pain Orientation 1: Lower  Activity Tolerance:   Pt tolerated tx/eval well with no signs of fatigue or increase in pain after treatment. Please refer to the flowsheet for vital signs taken during this treatment. After treatment:   [ ] Patient left in no apparent distress sitting up in chair  [ ] Patient left sitting on EOB  [X] Patient left in no apparent distress in bed  [ ] Patient declined to be OOB at this time due to   [X] Call bell left within reach  [ ] Nursing notified  [X] Caregiver present  [ ] Bed alarm activated  [ ] CPM placed on  knee  COMMUNICATION/EDUCATION:   [ ] Home safety education was provided and the patient/caregiver indicated understanding. [X] Patient/family have participated as able in goal setting and plan of care. [X] Patient/family agree to work toward stated goals and plan of care. [ ] Patient understands intent and goals of therapy, but is neutral about his/her participation. [ ] Patient is unable to participate in goal setting and plan of care.      Thank you for this referral.  Janet Montgomery, OT  Time Calculation: 23 mins

## 2017-03-11 NOTE — PROGRESS NOTES
Hospitalist Progress Note    Patient: Alondra Monsivais MRN: [id number]  CSN: [id number]    YOB: 1955  Age: 64 y.o. Sex: female    DOA: 3/4/2017 LOS:  LOS: 7 days          Chief Complaint:          Assessment/Plan     Ac resp failure  COPD with acute exacerbation  Panic attack and severe anxiety disorder/PTSD  pulm nodules  Nodule of kidney  Chronic pain    Change PPI to PO  Doing well on HFNC, wean to NC as tolerated  Broderick still in, should be removed shortly  Start mobility, had PT today already  DVT prophylaxis  anxxiolytics judiciously and norco ordered regularly every 8 H  Empiric IV abx  Nebs as needed  IV steroids  lantus plus SSI for hypergl;ycemia due to steroids    Reviewed case with nurse, pt, and dtr at bedside    Patient Active Problem List   Diagnosis Code    Respiratory insufficiency R06.89    Sinusitis J32.9    Mass of sinus R22.0    COPD (chronic obstructive pulmonary disease) with emphysema ([address] J43.9    Hypokalemia E87.6    Transaminitis R74.0    Nodule of kidney N28.89    COPD with acute exacerbation ([address] J44.1    PTSD (post-traumatic stress disorder) F43.10       Subjective:    Feeling better  No cough  Ate BF  Weak but sat on  Side of bed  No new issues    Review of systems:    Constitutional: denies fevers, chills, myalgias  Respiratory: denies SOB, cough  Cardiovascular: denies chest pain, palpitations  Gastrointestinal: denies nausea, vomiting, diarrhea      Vital signs/Intake and Output:  Visit Vitals    /73    Pulse 74    Temp 98 °F (36.7 °C)    Resp 18    Ht 5' 8\" (1.727 m)    Wt 62.6 kg (138 lb)    SpO2 94%    BMI 20.98 kg/m2     Current Shift:  03/11 0701 - 03/11 1900  In: 638 [P.O.:560; I.V.:78]  Out: 2000 [Urine:2000]  Last three shifts:  03/09 1901 - 03/11 0700  In: 985.5 [P.O.:690;  I.V.:295.5]  Out: 8175 [Urine:8175]    Exam:    General: Well developed, alert, NAD, OX3  Head/Neck: NCAT, supple, No masses, No lymphadenopathy  CVS:Regular rate and rhythm, no M/R/G, S1/S2 heard, no thrill  Lungs:Clear to auscultation bilaterally, no wheezes, rhonchi, or rales  Abdomen: Soft, Nontender, No distention, Normal Bowel sounds, No hepatomegaly  Extremities: No C/C/E, pulses palpable 2+  Skin:normal texture and turgor, no rashes, no lesions  Neuro:grossly normal , follows commands  Psych:appropriate                Labs: Results:       Chemistry Recent Labs      03/11/17   0533  03/10/17   1930  03/10/17   0451  03/09/17   0355   GLU  191*   --   164*  238*   NA  145   --   148*  143   K  3.8  3.5  3.7  3.7   CL  102   --   106  108   CO2  42*   --   35*  29   BUN  26*   --   32*  32*   CREA  0.71   --   0.75  0.87   CA  8.3*   --   8.7  8.4*   AGAP  1*   --   7  6   BUCR  37*   --   43*  37*   AP  66   --   71  101   TP  5.5*   --   5.5*  5.3*   ALB  2.7*   --   2.7*  2.5*   GLOB  2.8   --   2.8  2.8   AGRAT  1.0   --   1.0  0.9      CBC w/Diff Recent Labs      03/11/17   0533  03/10/17   0451 03/09/17   0355   WBC  13.9*  12.8  13.6*   RBC  4.03*  3.94*  3.68*   HGB  12.9  12.4  11.9*   HCT  38.9  38.0  36.2   PLT  216  228  231   GRANS  92*  92*  90*   LYMPH  4*  5*  5*   EOS  0  0  0      Cardiac Enzymes No results for input(s): CPK, CKND1, NAUN in the last 72 hours. No lab exists for component: CKRMB, TROIP   Coagulation No results for input(s): PTP, INR, APTT in the last 72 hours. No lab exists for component: INREXT    Lipid Panel No results found for: CHOL, CHOLPOCT, CHOLX, CHLST, CHOLV, [id number], HDL, LDL, NLDLCT, DLDL, LDLC, DLDLP, [id number], VLDLC, VLDL, TGL, TGLX, TRIGL, [id number], TRIGP, TGLPOCT, [id number], CHHD, CHHDX   BNP No results for input(s): BNPP in the last 72 hours.    Liver Enzymes Recent Labs      03/11/17   0533   TP  5.5*   ALB  2.7*   AP  66   SGOT  29      Thyroid Studies Lab Results   Component Value Date/Time    TSH 0.05 03/04/2017 04:25 PM        Procedures/imaging: see electronic medical records for all procedures/Xrays and details which were not copied into this note but were reviewed prior to creation of Solitario Martinez MD

## 2017-03-11 NOTE — PROGRESS NOTES
Bedside and Verbal shift change report given to Mason Garibay (oncoming nurse) by Magaly Wiggins rn (offgoing nurse).  Report included the following information SBAR, Kardex, Procedure Summary, Intake/Output, MAR, Accordion, Recent Results, Med Rec Status and Cardiac Rhythm NSR-Sb.

## 2017-03-11 NOTE — PROGRESS NOTES
@1900 pt taken over awake alert oriented x4 , on high flow nasal cannula spo2 98%. Vital signs stable. Remains on precedex, and fentanyl drips. Relatives at bedside. Broderick maintained ,draining large amounts of clear yellow urine. SCD's replaced well tolerated. Assessment done and documented in relevant flow sheets . @2100 no new developments observation continues. @2300 no changes , vital signs fluctuates, pt watching tv, Pt care continues. @0100 pt resting comfortably in bed ,continues to deny pain, easily aroused care continues. @0300 no changes sleeping comfortably. @0500 pt awake alert , continue to deny pain .  remains at bedside. Nursing management continues. @8298 Bedside and Verbal shift change report given to Priyanka (oncoming nurse) by Franklin Ray (offgoing nurse). Report included the following information SBAR, Kardex, Intake/Output, MAR, Recent Results and Med Rec Status.

## 2017-03-11 NOTE — DIABETES MGMT
GLYCEMIC CONTROL & NUTRITION:    - noted A1C meets criteria for pre-DM, no known documentation of previous dx, BG out of target  - steroids to continue (IV Solu-medrol continue at 40mg q 6 hours today)  - pt extubated, TF has been d/c'd, passed swallow eval, diet advanced to Consistent CHO 1600 kcal   - recommend decrease basal to Lantus 10 units every day, continue corrective coverage and d/c scheduled dose of Humalog 6 units q 6 hours (orders entered per Dr. Karen Olsen)  - recommend follow-up with OP PCM re: BG monitoring and discuss treatment options  - encourage OP DM Self Management Class (schedule given)  - see Clinical RD note for full nutrition assessment    Recent Glucose Results:   Lab Results   Component Value Date/Time     (H) 03/10/2017 04:51 AM    GLUCPOC 158 (H) 03/10/2017 09:36 PM    GLUCPOC 140 (H) 03/10/2017 04:20 PM    GLUCPOC 157 (H) 03/10/2017 12:03 PM       Lab Results   Component Value Date/Time    Hemoglobin A1c 6.2 03/04/2017 04:25 PM             Dearbharathi Escalante, MPH, RD

## 2017-03-11 NOTE — PROGRESS NOTES
[facility]  Pulmonary, Critical Care, and Sleep Medicine      Name: Lorin Nathan MRN: [id number]   : 1955 Hospital: [facility] [place]   Date: 3/11/2017               Requesting MD: Dr. aJke Carranza                                  Reason for CC Consult: COPD exacerbation    IMPRESSION:   · Acute hypoxemic respiratory failure, now extubated on HFNC, resolving  · Contraction alkalosis from diureses with erspiratory compesation  · Severe anxiety disorder further exacerbated by excessive b2 agonist, now on brovoana and prn xopinex,   · Chronic pain,   · COPD  · PTSD per  from past interactions with physicians in past  · Possible left upper pole renal mass, as per CTA chest   · Multiple pulmonary nodules from CTA chest, will need outpatient follow up   · Hx HTN      RECOMMENDATIONS:   · Resp - will wean fio2 on HFNC, possibly transition to nasal cannula later today with ETCO2  · ID - No clear infectious etiology;   · CVS - HD stable. Troponin elevated on admission - cardiology following. Echo with EF 50%. Monitor hemodynamics. Possible ischemic work up by cardiology  When stable  · Heme/Onc- H&H and platelets stable; monitor. · Metabolic -  Advance diet,   · Renal - Trend renal indices and monitor UOP;     · Endocrine - SSI for glycemic control given steroids;    · Neuro/ Pain/ Sedation -  will wean precedex and fentanyl, will add gabapentin  · Prophylaxis - DVT (lovenox), GI (protonix)     Subjective/History:   3/11  Lengthy conversation again yesterday and today. Suspected possible cele agonist pt has been on klonopin and beclofen for years)  induced withdraw symptoms vs alcohol . Pt was started on gabapentin yesterday and marinol day prior. Seemed to keep patient calm. No withdraw symptoms. HR and rr controlled overnight. Still on precedex and fentanyl drip. Pain controlled. Tolerating po. Baclofen is scheduled to start today.     I have discussed in length with family yesterday including daughter, son and . I suspect that patient has high degree of anxiety disorder which was further exacerbated with excessive bronchodilators. I also believe that she is going through some form of chemical withdrawal.  I have reviewed the out patient medications with patients pain specialists. Also discussed with pharmacy.  denies that patient is alcohol user. He notes that he controls the finances, and he knows what is purchased and what is not. 3/8  Pt off precedex due to bradycardia. Started to wean versed and fentanyl drip. Pt bradycardic in 40s to 50s. Easily arouseable however,  Hypotensive. Started on levophed. Spoke with Dr. Adriana Ellison patient's pain specialist.  Pt has been on norco 4x per day for several years. Discussed about possible withdraw symptoms as patient for a time required max dose of precedex, versed drip at 7mg per hour and 150mcg of fentanyl. past 2 days. Yesterday daugther alluded that she may have taken more in time. Patient also see another physican for ptsd and apparently been on klonopin for prolonged period. .        3/5  This patient has been seen and evaluated at the request of Dr. Beata Steele for COPD exacerbation. Patient is a 64 y.o. female with history of COPD, PTSD, chronic sinusitis and HTN who presented to the ED with progressive shortness of breath for two weeks. She has had wheezing and a non-productive cough. Her  states she had a \"cold\" about 2 weeks ago and everything seems to have gotten worse since. She also recently traveled to [place] and stayed in a hotel but according to family it was clean hotel with no mold or dust.  No new exposure to pets. Daughter states she does get bad seasonal allergies which does typically flare her COPD. She is currently still smoking, which the daughter reports is down to less than 1 pack per day.      In the ED, she was tachypneic and tachycardic and was brought to the ICU for further management under the hospitalist service. She also had a CT chest in the ED which was was negative for a PE but did show emphysematous changes. She also had a CT of the head which showed ethmoid mucosal thickening with partial destruction of the medial wall of the left orbit - a mass or abscess could not be ruled out. ENT was consulted but felt as if this could be followed up on an outpatient basis. Additionally, she had a mild elevated troponin for which cardiology was consulted and is following. Pulmonary was then consulted on 3/5 for assistance with management. She was treated overnight with Ativan, Klonopin, and Lexapro. She was maintained on HFNC as she was unable to tolerate the BiPAP. During my evaluation, she is minimally responsive on HFNC, with RR 38-42, 's, and hypertensive. She was given multiple duonebs along with solumedrol and placed back on BiPAP without improvement. She was the intubated for respiratory failure. The patient is critically ill and can not provide additional history due to Ventilated. Past Medical History:   Diagnosis Date    Back pain     COPD (chronic obstructive pulmonary disease) (HCC)     Hypertension     PTSD (post-traumatic stress disorder)     Shingles       Past Surgical History:   Procedure Laterality Date    HX TUBAL LIGATION      HX TYMPANOSTOMY     Catia Pee        Prior to Admission medications    Medication Sig Start Date End Date Taking? Authorizing Provider   valsartan-hydroCHLOROthiazide (DIOVAN HCT) 160-25 mg per tablet Take 1 Tab by mouth daily. Yes Historical Provider   HYDROcodone-acetaminophen (NORCO)  mg tablet Take 1 Tab by mouth four (4) times daily. Yes Historical Provider   baclofen (LIORESAL) 10 mg tablet Take 10 mg by mouth three (3) times daily.    Yes Marissa Son, MD   albuterol (PROVENTIL HFA, VENTOLIN HFA, PROAIR HFA) 90 mcg/actuation inhaler Take 2 Puffs by inhalation every four (4) hours as needed for Wheezing. Yes Marissa Son MD   escitalopram (LEXAPRO) 20 mg tablet Take 40 mg by mouth daily. Yes Marissa Son MD   clonazePAM (KLONOPIN) 0.5 mg tablet Take 0.5-1.5 mg by mouth daily as needed. Yes Marissa Son MD   fluticasone-salmeterol (ADVAIR DISKUS) 100-50 mcg/dose diskus inhaler Take 1 Puff by inhalation every twelve (12) hours. Yes Marissa Son MD   ibuprofen (MOTRIN) 800 mg tablet Take 800 mg by mouth every six (6) hours as needed.       Marissa Son MD     Current Facility-Administered Medications   Medication Dose Route Frequency    potassium chloride 10 mEq in 100 ml IVPB  10 mEq IntraVENous Q1H    [START ON 3/12/2017] insulin glargine (LANTUS) injection 12 Units  12 Units SubCUTAneous DAILY    HYDROcodone-acetaminophen (NORCO) 5-325 mg per tablet 1 Tab  1 Tab Oral Q8H    gabapentin (NEURONTIN) capsule 300 mg  300 mg Oral BID    dronabinol (MARINOL) capsule 5 mg  5 mg Oral BID    folic acid (FOLVITE) tablet 1 mg  1 mg Oral DAILY    Thiamine Mononitrate (B-1) tablet 100 mg  100 mg Oral DAILY    insulin lispro (HUMALOG) injection   SubCUTAneous AC&HS    baclofen (LIORESAL) tablet 5 mg  5 mg Oral TID    lidocaine (LIDODERM) 5 % patch 1 Patch  1 Patch TransDERmal Q12H    methylPREDNISolone (PF) (SOLU-MEDROL) injection 40 mg  40 mg IntraVENous Q6H    aspirin chewable tablet 81 mg  81 mg Oral DAILY    atorvastatin (LIPITOR) tablet 20 mg  20 mg Oral DAILY    escitalopram oxalate (LEXAPRO) tablet 20 mg  20 mg Oral DAILY    chlorhexidine (PERIDEX) 0.12 % mouthwash 10 mL  10 mL Oral Q12H    pantoprazole (PROTONIX) 40 mg in sodium chloride 0.9 % 10 mL injection  40 mg IntraVENous DAILY    arformoterol (BROVANA) neb solution 15 mcg  15 mcg Nebulization BID RT    nicotine (NICODERM CQ) 14 mg/24 hr patch 1 Patch  1 Patch TransDERmal DAILY    enoxaparin (LOVENOX) injection 40 mg  40 mg SubCUTAneous Q24H    budesonide (PULMICORT) 500 mcg/2 ml nebulizer suspension  500 mcg Nebulization BID RT    levoFLOXacin (LEVAQUIN) 750 mg in D5W IVPB  750 mg IntraVENous Q24H     Allergies   Allergen Reactions    Sulfacetamide Anaphylaxis      Social History   Substance Use Topics    Smoking status: Current Some Day Smoker     Packs/day: 0.50    Smokeless tobacco: Not on file    Alcohol use Not on file      History reviewed. No pertinent family history. Review of Systems:  A comprehensive review of systems was negative except for that written in the HPI. Objective:   Vital Signs:    Visit Vitals    /49    Pulse 64    Temp 98 °F (36.7 °C)    Resp 15    Ht 5' 8\" (1.727 m)    Wt 62.6 kg (138 lb)    SpO2 93%    BMI 20.98 kg/m2       O2 Device: Hi flow nasal cannula   O2 Flow Rate (L/min): 50 l/min   Temp (24hrs), Av.1 °F (36.7 °C), Min:97.9 °F (36.6 °C), Max:98.3 °F (36.8 °C)       Intake/Output:   Last shift:      701 - 1900  In: 78 [I.V.:78]  Out: -   Last 3 shifts: 1901 -  07  In: 985.5 [P.O.:690; I.V.:295.5]  Out: 8175 [Urine:8175]    Intake/Output Summary (Last 24 hours) at 17 0943  Last data filed at 17 0740   Gross per 24 hour   Intake          1063.49 ml   Output             3975 ml   Net         -2911.51 ml       Ventilator Settings:  Mode Rate Tidal Volume Pressure FiO2 PEEP   Pressure control   400 ml    40 % 5 cm H20     Peak airway pressure: 20 cm H2O    Minute ventilation: 11.4 l/min      ARDS network Guidelines: Lung protective strategy and Pl pressure goals____________      Physical Exam:    General:  Alert and appropriate. Head:  Normocephalic, without obvious abnormality, atraumatic. Eyes:  Conjunctivae/corneas clear. Nose: Nares normal. Septum midline. Mucosa normal.   Throat: Lips and tongue normal.  Teeth present. Mucosa moist.    Neck: Supple, symmetrical, trachea midline, no adenopathy, no JVD.        Lungs:   Exp wheezing and insp crackles       Heart:  Tachycardic rate and regular rhythm; no audible mrg   Abdomen:   Hypoactive bowel sounds; soft, non-tender   Extremities: Extremities without edema. Cyanosis to fingers and toes with mottling on lower extremities. Pulses: 2+ and symmetric all extremities. Skin: Warm, dry. Neurologic: No focal deficit       Data:     Recent Results (from the past 24 hour(s))   GLUCOSE, POC    Collection Time: 03/10/17 12:03 PM   Result Value Ref Range    Glucose (POC) 157 (H) 70 - 110 mg/dL   GLUCOSE, POC    Collection Time: 03/10/17  4:20 PM   Result Value Ref Range    Glucose (POC) 140 (H) 70 - 110 mg/dL   MAGNESIUM    Collection Time: 03/10/17  7:30 PM   Result Value Ref Range    Magnesium 2.4 1.8 - 2.4 mg/dL   POTASSIUM    Collection Time: 03/10/17  7:30 PM   Result Value Ref Range    Potassium 3.5 3.5 - 5.5 mmol/L   GLUCOSE, POC    Collection Time: 03/10/17  9:36 PM   Result Value Ref Range    Glucose (POC) 158 (H) 70 - 110 mg/dL   POC G3    Collection Time: 03/11/17  5:00 AM   Result Value Ref Range    Device: High Flow Nasal Cannula      Flow rate (POC) 50 L/M    FIO2 (POC) 40 %    pH (POC) 7.475 (H) 7.35 - 7.45      pCO2 (POC) 56.2 (H) 35.0 - 45.0 MMHG    pO2 (POC) 78 (L) 80 - 100 MMHG    HCO3 (POC) 41.5 (H) 22 - 26 MMOL/L    sO2 (POC) 96 92 - 97 %    Base excess (POC) 18 mmol/L    Allens test (POC) YES      Site RIGHT RADIAL      Patient temp.  98.0      Specimen type (POC) ARTERIAL      Performed by Adelina Cordoba    MAGNESIUM    Collection Time: 03/11/17  5:33 AM   Result Value Ref Range    Magnesium 2.2 1.8 - 2.4 mg/dL   METABOLIC PANEL, COMPREHENSIVE    Collection Time: 03/11/17  5:33 AM   Result Value Ref Range    Sodium 145 136 - 145 mmol/L    Potassium 3.8 3.5 - 5.5 mmol/L    Chloride 102 100 - 108 mmol/L    CO2 42 (HH) 21 - 32 mmol/L    Anion gap 1 (L) 3.0 - 18 mmol/L    Glucose 191 (H) 74 - 99 mg/dL    BUN 26 (H) 7.0 - 18 MG/DL    Creatinine 0.71 0.6 - 1.3 MG/DL    BUN/Creatinine ratio 37 (H) 12 - 20      GFR est AA >60 >60 ml/min/1.73m2    GFR est non-AA >60 >60 ml/min/1.73m2    Calcium 8.3 (L) 8.5 - 10.1 MG/DL    Bilirubin, total 1.0 0.2 - 1.0 MG/DL    ALT (SGPT) 72 (H) 13 - 56 U/L    AST (SGOT) 29 15 - 37 U/L    Alk. phosphatase 66 45 - 117 U/L    Protein, total 5.5 (L) 6.4 - 8.2 g/dL    Albumin 2.7 (L) 3.4 - 5.0 g/dL    Globulin 2.8 2.0 - 4.0 g/dL    A-G Ratio 1.0 0.8 - 1.7     CBC WITH AUTOMATED DIFF    Collection Time: 03/11/17  5:33 AM   Result Value Ref Range    WBC 13.9 (H) 4.6 - 13.2 K/uL    RBC 4.03 (L) 4.20 - 5.30 M/uL    HGB 12.9 12.0 - 16.0 g/dL    HCT 38.9 35.0 - 45.0 %    MCV 96.5 74.0 - 97.0 FL    MCH 32.0 24.0 - 34.0 PG    MCHC 33.2 31.0 - 37.0 g/dL    RDW 14.1 11.6 - 14.5 %    PLATELET 007 202 - 087 K/uL    MPV 11.3 9.2 - 11.8 FL    NEUTROPHILS 92 (H) 40 - 73 %    LYMPHOCYTES 4 (L) 21 - 52 %    MONOCYTES 4 3 - 10 %    EOSINOPHILS 0 0 - 5 %    BASOPHILS 0 0 - 2 %    ABS. NEUTROPHILS 12.8 (H) 1.8 - 8.0 K/UL    ABS. LYMPHOCYTES 0.5 (L) 0.9 - 3.6 K/UL    ABS. MONOCYTES 0.6 0.05 - 1.2 K/UL    ABS. EOSINOPHILS 0.0 0.0 - 0.4 K/UL    ABS. BASOPHILS 0.0 0.0 - 0.06 K/UL    DF AUTOMATED     GLUCOSE, POC    Collection Time: 03/11/17  6:47 AM   Result Value Ref Range    Glucose (POC) 248 (H) 70 - 110 mg/dL             Telemetry:Sinus tachycardia    Imaging:  CXR 3/5  The cardiomediastinal silhouette is stable. Bilateral mid to lower lung field  increased markings are again seen. The lung fields are hyperinflated. There is  no focal consolidation. There is no significant pleural effusion. There is no  acute osseous abnormality.        --------------  IMPRESSION  Impression:  --------------  COPD.     Bilateral mid to lower lung field increased interstitial markings similar to  prior which could represent bronchitis/interstitial pneumonitis or a chronic  interstitial process.      No focal consolidation.     No significant interval change.     CTA Chest   FINDINGS:     EXAM QUALITY: Overall exam quality is suboptimal. Opacification of the pulmonary  arteries is somewhat suboptimal although diagnostic. There is significant  respiratory motion artifact which significantly degrades the images particularly  in the lower lung fields.     PULMONARY ARTERIES: No evidence of pulmonary embolism.     MEDIASTINUM: Heart size is normal. No evidence of right heart strain. No  pericardial effusion. Mild atherosclerotic changes of the aorta. Esophagus is  unremarkable.     LYMPH NODES: No enlarged nodes.     AIRWAY: Normal.     LUNGS: Significant emphysematous changes most pronounced in the upper lobes. Bilateral pulmonary nodules. Largest is in the left lower lobe on image 86  measuring 11 x 11 mm. A second possible left lower lobe nodule on image 91  measures 7 x 6 mm. Right lower lobe nodule image 83 measures 12 x 6 mm.     PLEURA: Normal. Specifically, no pneumothorax or pleural effusion.     UPPER ABDOMEN: There is an exophytic possibly solid nodule arising from the  upper pole of the left kidney. This measures 11 x 12 mm. Upper abdominal  structures are otherwise unremarkable. .     OTHER: No acute or aggressive osseous abnormalities identified.     _______________     IMPRESSION  IMPRESSION:     1. Study is degraded by respiratory motion artifact however there are no  definite pulmonary emboli.     2. Emphysema. 3. Bilateral pulmonary nodules as described above with the largest in the left  lower lobe measuring 11 x 11 mm.  4. Possible solid exophytic left upper pole renal mass.     Recommend renal ultrasound for further evaluation of the kidneys.        Total critical care time exclusive of procedures: 45minutes  Derick Hill MD

## 2017-03-12 LAB
ALBUMIN SERPL BCP-MCNC: 2.4 G/DL (ref 3.4–5)
ALBUMIN/GLOB SERPL: 1 {RATIO} (ref 0.8–1.7)
ALP SERPL-CCNC: 60 U/L (ref 45–117)
ALT SERPL-CCNC: 60 U/L (ref 13–56)
ANION GAP BLD CALC-SCNC: ABNORMAL MMOL/L (ref 3–18)
AST SERPL W P-5'-P-CCNC: 22 U/L (ref 15–37)
BASOPHILS # BLD AUTO: 0 K/UL (ref 0–0.1)
BASOPHILS # BLD: 0 % (ref 0–3)
BILIRUB SERPL-MCNC: 0.9 MG/DL (ref 0.2–1)
BUN SERPL-MCNC: 27 MG/DL (ref 7–18)
BUN/CREAT SERPL: 39 (ref 12–20)
CALCIUM SERPL-MCNC: 8.4 MG/DL (ref 8.5–10.1)
CHLORIDE SERPL-SCNC: 106 MMOL/L (ref 100–108)
CO2 SERPL-SCNC: 43 MMOL/L (ref 21–32)
CREAT SERPL-MCNC: 0.7 MG/DL (ref 0.6–1.3)
DIFFERENTIAL METHOD BLD: ABNORMAL
EOSINOPHIL # BLD: 0.1 K/UL (ref 0–0.4)
EOSINOPHIL NFR BLD: 1 % (ref 0–5)
ERYTHROCYTE [DISTWIDTH] IN BLOOD BY AUTOMATED COUNT: 14.1 % (ref 11.6–14.5)
GLOBULIN SER CALC-MCNC: 2.5 G/DL (ref 2–4)
GLUCOSE BLD STRIP.AUTO-MCNC: 134 MG/DL (ref 70–110)
GLUCOSE BLD STRIP.AUTO-MCNC: 161 MG/DL (ref 70–110)
GLUCOSE BLD STRIP.AUTO-MCNC: 172 MG/DL (ref 70–110)
GLUCOSE BLD STRIP.AUTO-MCNC: 231 MG/DL (ref 70–110)
GLUCOSE SERPL-MCNC: 161 MG/DL (ref 74–99)
HCT VFR BLD AUTO: 37.4 % (ref 35–45)
HGB BLD-MCNC: 12.4 G/DL (ref 12–16)
LYMPHOCYTES # BLD AUTO: 3 % (ref 20–51)
LYMPHOCYTES # BLD: 0.3 K/UL (ref 0.8–3.5)
MAGNESIUM SERPL-MCNC: 2.3 MG/DL (ref 1.8–2.4)
MCH RBC QN AUTO: 32.1 PG (ref 24–34)
MCHC RBC AUTO-ENTMCNC: 33.2 G/DL (ref 31–37)
MCV RBC AUTO: 96.9 FL (ref 74–97)
MONOCYTES # BLD: 0.3 K/UL (ref 0–1)
MONOCYTES NFR BLD AUTO: 3 % (ref 2–9)
NEUTS SEG # BLD: 10.8 K/UL (ref 1.8–8)
NEUTS SEG NFR BLD AUTO: 93 % (ref 42–75)
PLATELET # BLD AUTO: 196 K/UL (ref 135–420)
PLATELET COMMENTS,PCOM: ABNORMAL
PMV BLD AUTO: 11.7 FL (ref 9.2–11.8)
POTASSIUM SERPL-SCNC: 4.1 MMOL/L (ref 3.5–5.5)
PROT SERPL-MCNC: 4.9 G/DL (ref 6.4–8.2)
RBC # BLD AUTO: 3.86 M/UL (ref 4.2–5.3)
RBC MORPH BLD: ABNORMAL
SODIUM SERPL-SCNC: 147 MMOL/L (ref 136–145)
WBC # BLD AUTO: 11.5 K/UL (ref 4.6–13.2)

## 2017-03-12 PROCEDURE — 94640 AIRWAY INHALATION TREATMENT: CPT

## 2017-03-12 PROCEDURE — 97116 GAIT TRAINING THERAPY: CPT

## 2017-03-12 PROCEDURE — 82962 GLUCOSE BLOOD TEST: CPT

## 2017-03-12 PROCEDURE — 97530 THERAPEUTIC ACTIVITIES: CPT

## 2017-03-12 PROCEDURE — 74011250637 HC RX REV CODE- 250/637: Performed by: FAMILY MEDICINE

## 2017-03-12 PROCEDURE — 74011250637 HC RX REV CODE- 250/637: Performed by: INTERNAL MEDICINE

## 2017-03-12 PROCEDURE — 74011000250 HC RX REV CODE- 250: Performed by: PHYSICIAN ASSISTANT

## 2017-03-12 PROCEDURE — 83735 ASSAY OF MAGNESIUM: CPT | Performed by: PHYSICIAN ASSISTANT

## 2017-03-12 PROCEDURE — 74011250636 HC RX REV CODE- 250/636: Performed by: HOSPITALIST

## 2017-03-12 PROCEDURE — 74011250636 HC RX REV CODE- 250/636: Performed by: INTERNAL MEDICINE

## 2017-03-12 PROCEDURE — 74011250637 HC RX REV CODE- 250/637: Performed by: HOSPITALIST

## 2017-03-12 PROCEDURE — 74011250637 HC RX REV CODE- 250/637: Performed by: PHYSICIAN ASSISTANT

## 2017-03-12 PROCEDURE — 85025 COMPLETE CBC W/AUTO DIFF WBC: CPT | Performed by: PHYSICIAN ASSISTANT

## 2017-03-12 PROCEDURE — 77010033711 HC HIGH FLOW OXYGEN

## 2017-03-12 PROCEDURE — 65610000006 HC RM INTENSIVE CARE

## 2017-03-12 PROCEDURE — 74011000250 HC RX REV CODE- 250: Performed by: INTERNAL MEDICINE

## 2017-03-12 PROCEDURE — 80053 COMPREHEN METABOLIC PANEL: CPT | Performed by: PHYSICIAN ASSISTANT

## 2017-03-12 RX ADMIN — GABAPENTIN 300 MG: 300 CAPSULE ORAL at 22:07

## 2017-03-12 RX ADMIN — ARFORMOTEROL TARTRATE 15 MCG: 15 SOLUTION RESPIRATORY (INHALATION) at 07:56

## 2017-03-12 RX ADMIN — ARFORMOTEROL TARTRATE 15 MCG: 15 SOLUTION RESPIRATORY (INHALATION) at 20:58

## 2017-03-12 RX ADMIN — DRONABINOL 5 MG: 2.5 CAPSULE ORAL at 22:10

## 2017-03-12 RX ADMIN — PANTOPRAZOLE SODIUM 40 MG: 40 TABLET, DELAYED RELEASE ORAL at 06:51

## 2017-03-12 RX ADMIN — ATORVASTATIN CALCIUM 20 MG: 20 TABLET, FILM COATED ORAL at 09:22

## 2017-03-12 RX ADMIN — INSULIN GLARGINE 12 UNITS: 100 INJECTION, SOLUTION SUBCUTANEOUS at 09:27

## 2017-03-12 RX ADMIN — METHYLPREDNISOLONE SODIUM SUCCINATE 40 MG: 40 INJECTION, POWDER, FOR SOLUTION INTRAMUSCULAR; INTRAVENOUS at 11:04

## 2017-03-12 RX ADMIN — ASPIRIN 81 MG 81 MG: 81 TABLET ORAL at 09:21

## 2017-03-12 RX ADMIN — INSULIN LISPRO 4 UNITS: 100 INJECTION, SOLUTION INTRAVENOUS; SUBCUTANEOUS at 11:36

## 2017-03-12 RX ADMIN — ESCITALOPRAM OXALATE 20 MG: 10 TABLET ORAL at 09:22

## 2017-03-12 RX ADMIN — HYDROMORPHONE HYDROCHLORIDE 1 MG: 2 INJECTION INTRAMUSCULAR; INTRAVENOUS; SUBCUTANEOUS at 18:40

## 2017-03-12 RX ADMIN — FOLIC ACID 1 MG: 1 TABLET ORAL at 09:22

## 2017-03-12 RX ADMIN — METHYLPREDNISOLONE SODIUM SUCCINATE 30 MG: 40 INJECTION, POWDER, FOR SOLUTION INTRAMUSCULAR; INTRAVENOUS at 22:09

## 2017-03-12 RX ADMIN — ENOXAPARIN SODIUM 40 MG: 40 INJECTION SUBCUTANEOUS at 11:04

## 2017-03-12 RX ADMIN — Medication 100 MG: at 09:22

## 2017-03-12 RX ADMIN — INSULIN LISPRO 2 UNITS: 100 INJECTION, SOLUTION INTRAVENOUS; SUBCUTANEOUS at 06:52

## 2017-03-12 RX ADMIN — BACLOFEN 5 MG: 10 TABLET ORAL at 22:07

## 2017-03-12 RX ADMIN — GABAPENTIN 300 MG: 300 CAPSULE ORAL at 09:21

## 2017-03-12 RX ADMIN — HYDROCODONE BITARTRATE AND ACETAMINOPHEN 1 TABLET: 5; 325 TABLET ORAL at 09:22

## 2017-03-12 RX ADMIN — BUDESONIDE 500 MCG: 0.5 INHALANT RESPIRATORY (INHALATION) at 07:56

## 2017-03-12 RX ADMIN — BUDESONIDE 500 MCG: 0.5 INHALANT RESPIRATORY (INHALATION) at 20:59

## 2017-03-12 RX ADMIN — HYDROCODONE BITARTRATE AND ACETAMINOPHEN 1 TABLET: 5; 325 TABLET ORAL at 16:16

## 2017-03-12 RX ADMIN — BACLOFEN 5 MG: 10 TABLET ORAL at 09:21

## 2017-03-12 RX ADMIN — LEVOFLOXACIN 750 MG: 5 INJECTION, SOLUTION INTRAVENOUS at 11:04

## 2017-03-12 RX ADMIN — HYDROMORPHONE HYDROCHLORIDE 1 MG: 2 INJECTION INTRAMUSCULAR; INTRAVENOUS; SUBCUTANEOUS at 11:04

## 2017-03-12 RX ADMIN — CHLORHEXIDINE GLUCONATE 10 ML: 1.2 RINSE ORAL at 21:00

## 2017-03-12 RX ADMIN — BACLOFEN 5 MG: 10 TABLET ORAL at 16:17

## 2017-03-12 RX ADMIN — HYDROMORPHONE HYDROCHLORIDE 1 MG: 2 INJECTION INTRAMUSCULAR; INTRAVENOUS; SUBCUTANEOUS at 22:12

## 2017-03-12 RX ADMIN — INSULIN LISPRO 2 UNITS: 100 INJECTION, SOLUTION INTRAVENOUS; SUBCUTANEOUS at 17:38

## 2017-03-12 RX ADMIN — HYDROMORPHONE HYDROCHLORIDE 1 MG: 2 INJECTION INTRAMUSCULAR; INTRAVENOUS; SUBCUTANEOUS at 14:00

## 2017-03-12 RX ADMIN — METHYLPREDNISOLONE SODIUM SUCCINATE 40 MG: 40 INJECTION, POWDER, FOR SOLUTION INTRAMUSCULAR; INTRAVENOUS at 06:51

## 2017-03-12 RX ADMIN — DRONABINOL 5 MG: 2.5 CAPSULE ORAL at 09:22

## 2017-03-12 NOTE — PROGRESS NOTES
Report rec'd from Luh Haas RN.     2003 Shift assessment done, NAD VSS    0730 Bedside and Verbal shift change report given to Luh Haas RN (oncoming nurse) by Arslan Rangel RN (offgoing nurse). Report included the following information SBAR, Kardex, Intake/Output, MAR, Recent Results, Med Rec Status and Cardiac Rhythm NSR.

## 2017-03-12 NOTE — WOUND CARE
Pt seen by wound care. No changes in skin assessment. Wound care will continue to follow pt daily while in ICU.

## 2017-03-12 NOTE — PROGRESS NOTES
0730- Bedside and Verbal shift change report given to NOEMÍ Cash (oncoming nurse) by Lacey Snider, RETA (offgoing nurse). Report included the following information SBAR, Kardex, Intake/Output, MAR, Recent Results and Cardiac Rhythm SR.     0800- Initial shift assessment complete. Will continue to monitor. 1200- Reassessment complete. Will continue to monitor. Family at bedside. 1600-Reassessment complete. Will continue to monitor. Family remains at bedside. 1930-Bedside and Verbal shift change report given to LILLIAN Barrios RN (oncoming nurse) by Mohamud Patiño. Alton Cash (offgoing nurse). Report included the following information SBAR, Kardex, Intake/Output, MAR, Recent Results and Cardiac Rhythm SR/ST.

## 2017-03-12 NOTE — PROGRESS NOTES
Problem: Mobility Impaired (Adult and Pediatric)  Goal: *Acute Goals and Plan of Care (Insert Text)  Physical Therapy Goals  Initiated 3/11/2017 and to be accomplished within 7 day(s)  1. Patient will move from supine to sit and sit to supine in bed with supervision/set-up. 2. Patient will transfer from bed to chair and chair to bed with supervision/set-up using the least restrictive device. 3. Patient will perform sit to stand with supervision/set-up. 4. Patient will ambulate with CGA for >50 feet with the least restrictive device. Outcome: Progressing Towards Goal  PHYSICAL THERAPY TREATMENT     Patient: Feliciano Sorto (39 y.o. female)  Date: 3/12/2017  Diagnosis: Respiratory insufficiency  Abnormal EKG  Mass of sinus  Sinusitis  Respiratory insufficiency  Abnormal EKG  Mass of sinus  Sinusitis COPD (chronic obstructive pulmonary disease) with emphysema (HCC)       Precautions: Fall   Chart, physical therapy assessment, plan of care and goals were reviewed. ASSESSMENT:  Pt being progressed slowly, due to intense anxiety about wanting to be stronger and faster than current capabilities. Pt given breaks after each advancement, as pt starts to accessory breath. Pt ambulate 30 feet in ~10 min and was able F/u with 40 in ~5 min. RW in appropriate at this time. Progression toward goals:  [ ]      Improving appropriately and progressing toward goals  [X]      Improving slowly and progressing toward goals  [ ]      Not making progress toward goals and plan of care will be adjusted       PLAN:  Patient continues to benefit from skilled intervention to address the above impairments. Continue treatment per established plan of care. Discharge Recommendations: To Be Determined  Further Equipment Recommendations for Discharge:  bedside commode and rolling walker       SUBJECTIVE:   Patient stated I should be farther along than this.       OBJECTIVE DATA SUMMARY:   Critical Behavior:  Neurologic State: Alert, Eyes open spontaneously  Orientation Level: Appropriate for age, Oriented X4  Cognition: Appropriate for age attention/concentration, Appropriate decision making, Appropriate safety awareness, Follows commands, Recognition of people/places  Safety/Judgement: Awareness of environment  Functional Mobility Training:  Bed Mobility:  Rolling: Contact guard assistance  Supine to Sit: Contact guard assistance;Minimum assistance  Sit to Supine: Contact guard assistance;Minimum assistance  Scooting: Contact guard assistance  Transfers:  Sit to Stand: Supervision; Moderate assistance  Stand to Sit: Supervision  Balance:  Sitting: Intact  Standing: Impaired; With support  Standing - Static: Fair  Standing - Dynamic : Fair  Ambulation/Gait Training:  Distance (ft): 70 Feet (ft) (30+40)  Assistive Device: Gait belt  Ambulation - Level of Assistance: Contact guard assistance  Gait Abnormalities: Decreased step clearance;Shuffling gait  Base of Support: Widened  Speed/Aliya: Slow;Shuffled  Step Length: Right shortened;Left shortened  Swing Pattern: Right asymmetrical;Left asymmetrical  Interventions: Visual/Demos; Verbal cues; Tactile cues; Safety awareness training  Pain:  Pain Scale 1: Numeric (0 - 10)  Pain Intensity 1: 0  Activity Tolerance:   Fair  Please refer to the flowsheet for vital signs taken during this treatment.   After treatment:   [ ] Patient left in no apparent distress sitting up in chair  [X] Patient left in no apparent distress in bed  [X] Call bell left within reach  [X] Nursing notified  [ ] Caregiver present  [ ] Bed alarm activated      Mario Vásquez PTA   Time Calculation: 28 mins

## 2017-03-12 NOTE — PROGRESS NOTES
[facility]  Pulmonary, Critical Care, and Sleep Medicine      Name: Katie Schuster MRN: [id number]   : 1955 Hospital: [facility]   Date: 3/12/2017               Requesting MD: Dr. Yared Canales                                  Reason for CC Consult: COPD exacerbation    IMPRESSION:   · Acute hypoxemic respiratory failure,  Resolving, now on nasal cannula  · Severe anxiety disorder   · Chronic pain,   · COPD  · PTSD per  from past interactions with physicians in past  · Possible left upper pole renal mass, as per CTA chest   · Multiple pulmonary nodules from CTA chest, will need outpatient follow up   · Hx HTN  · Elevated TnI. At time of admission,   Ischemic work up pending,  Cardiology on the case. RECOMMENDATIONS:   · Resp - will wean fio2 on HFNC, possibly transition to nasal cannula later today with ETCO2  · ID - No clear infectious etiology;   · CVS - HD stable. Troponin elevated on admission - cardiology following. Echo with EF 50%. Monitor hemodynamics. Possible ischemic work up by cardiology  When stable  · Heme/Onc- H&H and platelets stable; monitor. · Metabolic -  Advance diet,   · Renal - dc lynn   · Endocrine - SSI for glycemic control given steroids;    · Neuro/ Pain/ Sedation -  will wean precedex and fentanyl, will add gabapentin  · Prophylaxis - DVT (lovenox), GI (protonix)     Subjective/History:   3/12  No issues overnight  Was able to get out of bed  Yesterday  Felt weak  Tolerating po  Off precedex, fentanyl pca yesterday. 3/11  Lengthy conversation again yesterday and today. Suspected possible cele agonist pt has been on klonopin and beclofen for years)  induced withdraw symptoms vs alcohol . Pt was started on gabapentin yesterday and marinol day prior. Seemed to keep patient calm. No withdraw symptoms. HR and rr controlled overnight. Still on precedex and fentanyl drip. Pain controlled. Tolerating po.    Baclofen is scheduled to start today. I have discussed in length with family yesterday including daughter, son and . I suspect that patient has high degree of anxiety disorder which was further exacerbated with excessive bronchodilators. I also believe that she is going through some form of chemical withdrawal.  I have reviewed the out patient medications with patients pain specialists. Also discussed with pharmacy.  denies that patient is alcohol user. He notes that he controls the finances, and he knows what is purchased and what is not. 3/8  Pt off precedex due to bradycardia. Started to wean versed and fentanyl drip. Pt bradycardic in 40s to 50s. Easily arouseable however,  Hypotensive. Started on levophed. Spoke with Dr. Adriana Ellison patient's pain specialist.  Pt has been on norco 4x per day for several years. Discussed about possible withdraw symptoms as patient for a time required max dose of precedex, versed drip at 7mg per hour and 150mcg of fentanyl. past 2 days. Yesterday daugther alluded that she may have taken more in time. Patient also see another physican for ptsd and apparently been on klonopin for prolonged period. .        3/5  This patient has been seen and evaluated at the request of Dr. Beata Steele for COPD exacerbation. Patient is a 64 y.o. female with history of COPD, PTSD, chronic sinusitis and HTN who presented to the ED with progressive shortness of breath for two weeks. She has had wheezing and a non-productive cough. Her  states she had a \"cold\" about 2 weeks ago and everything seems to have gotten worse since. She also recently traveled to [place] and stayed in a hotel but according to family it was clean hotel with no mold or dust.  No new exposure to pets. Daughter states she does get bad seasonal allergies which does typically flare her COPD. She is currently still smoking, which the daughter reports is down to less than 1 pack per day.      In the ED, she was tachypneic and tachycardic and was brought to the ICU for further management under the hospitalist service. She also had a CT chest in the ED which was was negative for a PE but did show emphysematous changes. She also had a CT of the head which showed ethmoid mucosal thickening with partial destruction of the medial wall of the left orbit - a mass or abscess could not be ruled out. ENT was consulted but felt as if this could be followed up on an outpatient basis. Additionally, she had a mild elevated troponin for which cardiology was consulted and is following. Pulmonary was then consulted on 3/5 for assistance with management. She was treated overnight with Ativan, Klonopin, and Lexapro. She was maintained on HFNC as she was unable to tolerate the BiPAP. During my evaluation, she is minimally responsive on HFNC, with RR 38-42, 's, and hypertensive. She was given multiple duonebs along with solumedrol and placed back on BiPAP without improvement. She was the intubated for respiratory failure. The patient is critically ill and can not provide additional history due to Ventilated. Past Medical History:   Diagnosis Date    Back pain     COPD (chronic obstructive pulmonary disease) (HCC)     Hypertension     PTSD (post-traumatic stress disorder)     Shingles       Past Surgical History:   Procedure Laterality Date    HX TUBAL LIGATION      HX TYMPANOSTOMY     Rich Sis        Prior to Admission medications    Medication Sig Start Date End Date Taking? Authorizing Provider   valsartan-hydroCHLOROthiazide (DIOVAN HCT) 160-25 mg per tablet Take 1 Tab by mouth daily. Yes Historical Provider   HYDROcodone-acetaminophen (NORCO)  mg tablet Take 1 Tab by mouth four (4) times daily. Yes Historical Provider   baclofen (LIORESAL) 10 mg tablet Take 10 mg by mouth three (3) times daily.    Yes Marissa Son, MD   albuterol (PROVENTIL HFA, VENTOLIN HFA, PROAIR HFA) 90 mcg/actuation inhaler Take 2 Puffs by inhalation every four (4) hours as needed for Wheezing. Yes Marissa Son, MD   escitalopram (LEXAPRO) 20 mg tablet Take 40 mg by mouth daily. Yes Marissa Son MD   clonazePAM (KLONOPIN) 0.5 mg tablet Take 0.5-1.5 mg by mouth daily as needed. Yes Marissa Son MD   fluticasone-salmeterol (ADVAIR DISKUS) 100-50 mcg/dose diskus inhaler Take 1 Puff by inhalation every twelve (12) hours. Yes Marissa Son MD   ibuprofen (MOTRIN) 800 mg tablet Take 800 mg by mouth every six (6) hours as needed.       Marissa Son, MD     Current Facility-Administered Medications   Medication Dose Route Frequency    insulin glargine (LANTUS) injection 12 Units  12 Units SubCUTAneous DAILY    HYDROcodone-acetaminophen (NORCO) 5-325 mg per tablet 1 Tab  1 Tab Oral Q8H    pantoprazole (PROTONIX) tablet 40 mg  40 mg Oral ACB    gabapentin (NEURONTIN) capsule 300 mg  300 mg Oral BID    dronabinol (MARINOL) capsule 5 mg  5 mg Oral BID    folic acid (FOLVITE) tablet 1 mg  1 mg Oral DAILY    Thiamine Mononitrate (B-1) tablet 100 mg  100 mg Oral DAILY    insulin lispro (HUMALOG) injection   SubCUTAneous AC&HS    baclofen (LIORESAL) tablet 5 mg  5 mg Oral TID    lidocaine (LIDODERM) 5 % patch 1 Patch  1 Patch TransDERmal Q12H    methylPREDNISolone (PF) (SOLU-MEDROL) injection 40 mg  40 mg IntraVENous Q6H    aspirin chewable tablet 81 mg  81 mg Oral DAILY    atorvastatin (LIPITOR) tablet 20 mg  20 mg Oral DAILY    escitalopram oxalate (LEXAPRO) tablet 20 mg  20 mg Oral DAILY    chlorhexidine (PERIDEX) 0.12 % mouthwash 10 mL  10 mL Oral Q12H    arformoterol (BROVANA) neb solution 15 mcg  15 mcg Nebulization BID RT    nicotine (NICODERM CQ) 14 mg/24 hr patch 1 Patch  1 Patch TransDERmal DAILY    enoxaparin (LOVENOX) injection 40 mg  40 mg SubCUTAneous Q24H    budesonide (PULMICORT) 500 mcg/2 ml nebulizer suspension  500 mcg Nebulization BID RT    levoFLOXacin (LEVAQUIN) 750 mg in D5W IVPB  750 mg IntraVENous Q24H     Allergies   Allergen Reactions    Sulfacetamide Anaphylaxis      Social History   Substance Use Topics    Smoking status: Current Some Day Smoker     Packs/day: 0.50    Smokeless tobacco: Not on file    Alcohol use Not on file      History reviewed. No pertinent family history. Review of Systems:  A comprehensive review of systems was negative except for that written in the HPI. Objective:   Vital Signs:    Visit Vitals    /88    Pulse (!) 56    Temp 98.2 °F (36.8 °C)    Resp 23    Ht 5' 8\" (1.727 m)    Wt 62.6 kg (138 lb)    SpO2 98%    BMI 20.98 kg/m2       O2 Device: Nasal cannula   O2 Flow Rate (L/min): 4 l/min   Temp (24hrs), Av.2 °F (36.8 °C), Min:97.8 °F (36.6 °C), Max:98.5 °F (36.9 °C)       Intake/Output:   Last shift:       0701 -  190  In: 640 [P.O.:640]  Out: -   Last 3 shifts: 03/10 190 -  0700  In: 831 [P.O.:920; I.V.:78]  Out: 5950 [Urine:5950]    Intake/Output Summary (Last 24 hours) at 17 1055  Last data filed at 17 0919   Gross per 24 hour   Intake             1360 ml   Output             1325 ml   Net               35 ml       Ventilator Settings:  Mode Rate Tidal Volume Pressure FiO2 PEEP   Pressure control   400 ml    40 % 5 cm H20     Peak airway pressure: 20 cm H2O    Minute ventilation: 11.4 l/min      ARDS network Guidelines: Lung protective strategy and Pl pressure goals____________      Physical Exam:    General:  Alert and appropriate. Head:  Normocephalic, without obvious abnormality, atraumatic. Eyes:  Conjunctivae/corneas clear. Nose: Nares normal. Septum midline. Mucosa normal.   Throat: Lips and tongue normal.  Teeth present. Mucosa moist.    Neck: Supple, symmetrical, trachea midline, no adenopathy, no JVD.        Lungs:   Exp wheezing and insp crackles       Heart:  Tachycardic rate and regular rhythm; no audible mrg   Abdomen:   Hypoactive bowel sounds; soft, non-tender   Extremities: Extremities without edema. Cyanosis to fingers and toes with mottling on lower extremities. Pulses: 2+ and symmetric all extremities. Skin: Warm, dry. Neurologic: No focal deficit       Data:     Recent Results (from the past 24 hour(s))   GLUCOSE, POC    Collection Time: 03/11/17 11:32 AM   Result Value Ref Range    Glucose (POC) 212 (H) 70 - 110 mg/dL   POTASSIUM    Collection Time: 03/11/17  1:30 PM   Result Value Ref Range    Potassium 3.6 3.5 - 5.5 mmol/L   GLUCOSE, POC    Collection Time: 03/11/17  4:31 PM   Result Value Ref Range    Glucose (POC) 126 (H) 70 - 110 mg/dL   GLUCOSE, POC    Collection Time: 03/11/17  9:37 PM   Result Value Ref Range    Glucose (POC) 222 (H) 70 - 110 mg/dL   POTASSIUM    Collection Time: 03/11/17 10:00 PM   Result Value Ref Range    Potassium 3.8 3.5 - 5.5 mmol/L   MAGNESIUM    Collection Time: 03/12/17  5:00 AM   Result Value Ref Range    Magnesium 2.3 1.8 - 2.4 mg/dL   METABOLIC PANEL, COMPREHENSIVE    Collection Time: 03/12/17  5:00 AM   Result Value Ref Range    Sodium 147 (H) 136 - 145 mmol/L    Potassium 4.1 3.5 - 5.5 mmol/L    Chloride 106 100 - 108 mmol/L    CO2 43 (HH) 21 - 32 mmol/L    Anion gap NEG 2 3.0 - 18 mmol/L    Glucose 161 (H) 74 - 99 mg/dL    BUN 27 (H) 7.0 - 18 MG/DL    Creatinine 0.70 0.6 - 1.3 MG/DL    BUN/Creatinine ratio 39 (H) 12 - 20      GFR est AA >60 >60 ml/min/1.73m2    GFR est non-AA >60 >60 ml/min/1.73m2    Calcium 8.4 (L) 8.5 - 10.1 MG/DL    Bilirubin, total 0.9 0.2 - 1.0 MG/DL    ALT (SGPT) 60 (H) 13 - 56 U/L    AST (SGOT) 22 15 - 37 U/L    Alk.  phosphatase 60 45 - 117 U/L    Protein, total 4.9 (L) 6.4 - 8.2 g/dL    Albumin 2.4 (L) 3.4 - 5.0 g/dL    Globulin 2.5 2.0 - 4.0 g/dL    A-G Ratio 1.0 0.8 - 1.7     CBC WITH AUTOMATED DIFF    Collection Time: 03/12/17  5:00 AM   Result Value Ref Range    WBC 11.5 4.6 - 13.2 K/uL    RBC 3.86 (L) 4.20 - 5.30 M/uL    HGB 12.4 12.0 - 16.0 g/dL    HCT 37.4 35.0 - 45.0 %    MCV 96.9 74.0 - 97.0 FL MCH 32.1 24.0 - 34.0 PG    MCHC 33.2 31.0 - 37.0 g/dL    RDW 14.1 11.6 - 14.5 %    PLATELET 419 457 - 908 K/uL    MPV 11.7 9.2 - 11.8 FL    NEUTROPHILS 93 (H) 42 - 75 %    LYMPHOCYTES 3 (L) 20 - 51 %    MONOCYTES 3 2 - 9 %    EOSINOPHILS 1 0 - 5 %    BASOPHILS 0 0 - 3 %    ABS. NEUTROPHILS 10.8 (H) 1.8 - 8.0 K/UL    ABS. LYMPHOCYTES 0.3 (L) 0.8 - 3.5 K/UL    ABS. MONOCYTES 0.3 0 - 1.0 K/UL    ABS. EOSINOPHILS 0.1 0.0 - 0.4 K/UL    ABS. BASOPHILS 0.0 0.0 - 0.1 K/UL    PLATELET COMMENTS LARGE PLATELETS      RBC COMMENTS NORMOCYTIC, NORMOCHROMIC      DF MANUAL     GLUCOSE, POC    Collection Time: 03/12/17  6:50 AM   Result Value Ref Range    Glucose (POC) 161 (H) 70 - 110 mg/dL             Telemetry:Sinus tachycardia    Imaging:  CXR 3/5  The cardiomediastinal silhouette is stable. Bilateral mid to lower lung field  increased markings are again seen. The lung fields are hyperinflated. There is  no focal consolidation. There is no significant pleural effusion. There is no  acute osseous abnormality.        --------------  IMPRESSION  Impression:  --------------  COPD.     Bilateral mid to lower lung field increased interstitial markings similar to  prior which could represent bronchitis/interstitial pneumonitis or a chronic  interstitial process.      No focal consolidation.     No significant interval change. CTA Chest   FINDINGS:     EXAM QUALITY: Overall exam quality is suboptimal. Opacification of the pulmonary  arteries is somewhat suboptimal although diagnostic. There is significant  respiratory motion artifact which significantly degrades the images particularly  in the lower lung fields.     PULMONARY ARTERIES: No evidence of pulmonary embolism.     MEDIASTINUM: Heart size is normal. No evidence of right heart strain. No  pericardial effusion. Mild atherosclerotic changes of the aorta.  Esophagus is  unremarkable.     LYMPH NODES: No enlarged nodes.     AIRWAY: Normal.     LUNGS: Significant emphysematous changes most pronounced in the upper lobes. Bilateral pulmonary nodules. Largest is in the left lower lobe on image 86  measuring 11 x 11 mm. A second possible left lower lobe nodule on image 91  measures 7 x 6 mm. Right lower lobe nodule image 83 measures 12 x 6 mm.     PLEURA: Normal. Specifically, no pneumothorax or pleural effusion.     UPPER ABDOMEN: There is an exophytic possibly solid nodule arising from the  upper pole of the left kidney. This measures 11 x 12 mm. Upper abdominal  structures are otherwise unremarkable. .     OTHER: No acute or aggressive osseous abnormalities identified.     _______________     IMPRESSION  IMPRESSION:     1. Study is degraded by respiratory motion artifact however there are no  definite pulmonary emboli.     2. Emphysema. 3. Bilateral pulmonary nodules as described above with the largest in the left  lower lobe measuring 11 x 11 mm.  4. Possible solid exophytic left upper pole renal mass.     Recommend renal ultrasound for further evaluation of the kidneys.        Total critical care time exclusive of procedures: 35minutes  Krista Guerrero MD

## 2017-03-12 NOTE — PROGRESS NOTES
Hospitalist Progress Note    Patient: Raiza Berrios MRN: [id number]  CSN: [id number]    YOB: 1955  Age: 64 y.o. Sex: female    DOA: 3/4/2017 LOS:  LOS: 8 days          Chief Complaint: Ac resp failure      Assessment/Plan   Ac resp failure, was intubated, now off vent, NC 02 to be tried today, HFNC this am, had uneventful night, nebs PRN  COPD with acute exacerbation-improved bronchospasm, wean IV steroid dosing  NIDDM-a1c 6.2%, lantus plus SSI PRN  Panic attack and severe anxiety disorder/PTSD-on scheduled meds for this  pulm nodules-further workup after acute illness resolved  Nodule of kidney  Chronic pain-scheduled norco seems to be helping her    D/c lynn today  Wean to NC 02  DVT rpoph-lovenox  OOB as tolerated  Empiric IV abx          Patient Active Problem List   Diagnosis Code    Respiratory insufficiency R06.89    Sinusitis J32.9    Mass of sinus R22.0    COPD (chronic obstructive pulmonary disease) with emphysema ([facility] [address] J43.9    Hypokalemia E87.6    Transaminitis R74.0    Nodule of kidney N28.89    COPD with acute exacerbation (HCC) J44.1    PTSD (post-traumatic stress disorder) F43.10    Acute respiratory failure (HCC) J96.00       Subjective:  i feel better  Uneventful night  Did sleep  No worsened SOB or cough      Review of systems:    Constitutional: denies fevers, chills, myalgias  Respiratory: denies SOB, cough  Cardiovascular: denies chest pain, palpitations  Gastrointestinal: denies nausea, vomiting, diarrhea      Vital signs/Intake and Output:  Visit Vitals    /72    Pulse 82    Temp 98.6 °F (37 °C)    Resp 15    Ht 5' 8\" (1.727 m)    Wt 62.6 kg (138 lb)    SpO2 99%    BMI 20.98 kg/m2     Current Shift:  03/12 0701 - 03/12 1900  In: 640 [P.O.:640]  Out: -   Last three shifts:  03/10 1901 - 03/12 0700  In: 998 [P.O.:920;  I.V.:78]  Out: 5950 [Urine:5950]    Exam:    General: Well developed, alert, NAD, OX3  Head/Neck: NCAT, supple, No masses, No lymphadenopathy  CVS:Regular rate and rhythm, no M/R/G, S1/S2 heard, no thrill  Lungs:Clear to auscultation bilaterally, no wheezes, rhonchi, or rales  Abdomen: Soft, Nontender, No distention, Normal Bowel sounds, No hepatomegaly  Extremities: No C/C/E, pulses palpable 2+  Skin:normal texture and turgor, no rashes, no lesions  Neuro:grossly normal , follows commands  Psych:appropriate                Labs: Results:       Chemistry Recent Labs      03/12/17   0500  03/11/17   2200  03/11/17   1330  03/11/17   0533   03/10/17   0451   GLU  161*   --    --   191*   --   164*   NA  147*   --    --   145   --   148*   K  4.1  3.8  3.6  3.8   < >  3.7   CL  106   --    --   102   --   106   CO2  43*   --    --   42*   --   35*   BUN  27*   --    --   26*   --   32*   CREA  0.70   --    --   0.71   --   0.75   CA  8.4*   --    --   8.3*   --   8.7   AGAP  NEG 2   --    --   1*   --   7   BUCR  39*   --    --   37*   --   43*   AP  60   --    --   66   --   71   TP  4.9*   --    --   5.5*   --   5.5*   ALB  2.4*   --    --   2.7*   --   2.7*   GLOB  2.5   --    --   2.8   --   2.8   AGRAT  1.0   --    --   1.0   --   1.0    < > = values in this interval not displayed. CBC w/Diff Recent Labs      03/12/17   0500  03/11/17   0533  03/10/17   0451   WBC  11.5  13.9*  12.8   RBC  3.86*  4.03*  3.94*   HGB  12.4  12.9  12.4   HCT  37.4  38.9  38.0   PLT  196  216  228   GRANS  93*  92*  92*   LYMPH  3*  4*  5*   EOS  1  0  0      Cardiac Enzymes No results for input(s): CPK, CKND1, NAUN in the last 72 hours. No lab exists for component: CKRMB, TROIP   Coagulation No results for input(s): PTP, INR, APTT in the last 72 hours. No lab exists for component: INREXT    Lipid Panel No results found for: CHOL, CHOLPOCT, CHOLX, CHLST, CHOLV, [id number], HDL, LDL, NLDLCT, DLDL, LDLC, DLDLP, [id number], VLDLC, VLDL, TGL, TGLX, TRIGL, [id number], TRIGP, TGLPOCT, [id number], CHHD, CHHDX   BNP No results for input(s): BNPP in the last 72 hours. Liver Enzymes Recent Labs      03/12/17   0500   TP  4.9*   ALB  2.4*   AP  60   SGOT  22      Thyroid Studies Lab Results   Component Value Date/Time    TSH 0.05 03/04/2017 04:25 PM        Procedures/imaging: see electronic medical records for all procedures/Xrays and details which were not copied into this note but were reviewed prior to creation of Eze Kirkland MD

## 2017-03-12 NOTE — PROGRESS NOTES
@1910 pt taken over awake alert oriented x4 in bed on high flow nasal cannula spo2 95%. Denies pain at present , vital signs stable. Broderick remains in situ . Broderick draining clear yellow urine. Assessment done and charted in relevant flow sheets. Daughter remains at bedside . Nursing management continues. @2110 no changes care continues. @3731 pt complaint of lower back pain, repositioned no relief. Analgesia administered as prescribed . Pt remains stable at this time. @2300  Pt asleep intermittently , denies pain at present. Vital signs WNL. Nursing observation continues. @0100 no changes. @0200 clock went forward to 0300 no new changes. @0400 pt asleep denies pain when awake. Vital signs stable nursing management continues. @0600 no changes care continues. @8031 Bedside and Verbal shift change report given to Agatha Sage (oncoming nurse) by Heladio Arguelles (offgoing nurse). Report included the following information.

## 2017-03-13 LAB
ALBUMIN SERPL BCP-MCNC: 3 G/DL (ref 3.4–5)
ALBUMIN/GLOB SERPL: 1 {RATIO} (ref 0.8–1.7)
ALP SERPL-CCNC: 77 U/L (ref 45–117)
ALT SERPL-CCNC: 91 U/L (ref 13–56)
ANION GAP BLD CALC-SCNC: ABNORMAL MMOL/L (ref 3–18)
AST SERPL W P-5'-P-CCNC: 34 U/L (ref 15–37)
BASOPHILS # BLD AUTO: 0 K/UL (ref 0–0.1)
BASOPHILS # BLD: 0 % (ref 0–3)
BILIRUB SERPL-MCNC: 1 MG/DL (ref 0.2–1)
BUN SERPL-MCNC: 20 MG/DL (ref 7–18)
BUN/CREAT SERPL: 29 (ref 12–20)
CALCIUM SERPL-MCNC: 9 MG/DL (ref 8.5–10.1)
CHLORIDE SERPL-SCNC: 105 MMOL/L (ref 100–108)
CO2 SERPL-SCNC: 40 MMOL/L (ref 21–32)
CREAT SERPL-MCNC: 0.68 MG/DL (ref 0.6–1.3)
DIFFERENTIAL METHOD BLD: ABNORMAL
EOSINOPHIL # BLD: 0 K/UL (ref 0–0.4)
EOSINOPHIL NFR BLD: 0 % (ref 0–5)
ERYTHROCYTE [DISTWIDTH] IN BLOOD BY AUTOMATED COUNT: 14.2 % (ref 11.6–14.5)
GLOBULIN SER CALC-MCNC: 2.9 G/DL (ref 2–4)
GLUCOSE BLD STRIP.AUTO-MCNC: 107 MG/DL (ref 70–110)
GLUCOSE BLD STRIP.AUTO-MCNC: 125 MG/DL (ref 70–110)
GLUCOSE BLD STRIP.AUTO-MCNC: 180 MG/DL (ref 70–110)
GLUCOSE BLD STRIP.AUTO-MCNC: 183 MG/DL (ref 70–110)
GLUCOSE SERPL-MCNC: 110 MG/DL (ref 74–99)
HCT VFR BLD AUTO: 43 % (ref 35–45)
HGB BLD-MCNC: 14.1 G/DL (ref 12–16)
LYMPHOCYTES # BLD AUTO: 12 % (ref 20–51)
LYMPHOCYTES # BLD: 2.3 K/UL (ref 0.8–3.5)
MAGNESIUM SERPL-MCNC: 2.3 MG/DL (ref 1.8–2.4)
MCH RBC QN AUTO: 32 PG (ref 24–34)
MCHC RBC AUTO-ENTMCNC: 32.8 G/DL (ref 31–37)
MCV RBC AUTO: 97.7 FL (ref 74–97)
MONOCYTES # BLD: 2.3 K/UL (ref 0–1)
MONOCYTES NFR BLD AUTO: 12 % (ref 2–9)
NEUTS SEG # BLD: 14.6 K/UL (ref 1.8–8)
NEUTS SEG NFR BLD AUTO: 76 % (ref 42–75)
PLATELET # BLD AUTO: 233 K/UL (ref 135–420)
PMV BLD AUTO: 12 FL (ref 9.2–11.8)
POTASSIUM SERPL-SCNC: 3.9 MMOL/L (ref 3.5–5.5)
PROT SERPL-MCNC: 5.9 G/DL (ref 6.4–8.2)
RBC # BLD AUTO: 4.4 M/UL (ref 4.2–5.3)
RBC MORPH BLD: ABNORMAL
SODIUM SERPL-SCNC: 144 MMOL/L (ref 136–145)
WBC # BLD AUTO: 19.2 K/UL (ref 4.6–13.2)

## 2017-03-13 PROCEDURE — 97116 GAIT TRAINING THERAPY: CPT

## 2017-03-13 PROCEDURE — 74011250636 HC RX REV CODE- 250/636: Performed by: INTERNAL MEDICINE

## 2017-03-13 PROCEDURE — 74011250637 HC RX REV CODE- 250/637: Performed by: INTERNAL MEDICINE

## 2017-03-13 PROCEDURE — 77010033678 HC OXYGEN DAILY

## 2017-03-13 PROCEDURE — 74011636637 HC RX REV CODE- 636/637: Performed by: INTERNAL MEDICINE

## 2017-03-13 PROCEDURE — 94760 N-INVAS EAR/PLS OXIMETRY 1: CPT

## 2017-03-13 PROCEDURE — 74011250637 HC RX REV CODE- 250/637: Performed by: FAMILY MEDICINE

## 2017-03-13 PROCEDURE — 74011000250 HC RX REV CODE- 250: Performed by: PHYSICIAN ASSISTANT

## 2017-03-13 PROCEDURE — 85025 COMPLETE CBC W/AUTO DIFF WBC: CPT | Performed by: PHYSICIAN ASSISTANT

## 2017-03-13 PROCEDURE — 82962 GLUCOSE BLOOD TEST: CPT

## 2017-03-13 PROCEDURE — 65660000000 HC RM CCU STEPDOWN

## 2017-03-13 PROCEDURE — 80053 COMPREHEN METABOLIC PANEL: CPT | Performed by: PHYSICIAN ASSISTANT

## 2017-03-13 PROCEDURE — 74011250636 HC RX REV CODE- 250/636: Performed by: HOSPITALIST

## 2017-03-13 PROCEDURE — 97530 THERAPEUTIC ACTIVITIES: CPT

## 2017-03-13 PROCEDURE — 74011250637 HC RX REV CODE- 250/637: Performed by: PHYSICIAN ASSISTANT

## 2017-03-13 PROCEDURE — 83735 ASSAY OF MAGNESIUM: CPT | Performed by: PHYSICIAN ASSISTANT

## 2017-03-13 PROCEDURE — 74011250637 HC RX REV CODE- 250/637: Performed by: HOSPITALIST

## 2017-03-13 PROCEDURE — 94640 AIRWAY INHALATION TREATMENT: CPT

## 2017-03-13 PROCEDURE — 74011000250 HC RX REV CODE- 250: Performed by: INTERNAL MEDICINE

## 2017-03-13 RX ORDER — PREDNISONE 20 MG/1
20 TABLET ORAL ONCE
Status: COMPLETED | OUTPATIENT
Start: 2017-03-13 | End: 2017-03-13

## 2017-03-13 RX ORDER — HYDROCODONE BITARTRATE AND ACETAMINOPHEN 5; 325 MG/1; MG/1
1 TABLET ORAL EVERY 8 HOURS
Status: DISCONTINUED | OUTPATIENT
Start: 2017-03-13 | End: 2017-03-16 | Stop reason: HOSPADM

## 2017-03-13 RX ORDER — HYDROMORPHONE HYDROCHLORIDE 1 MG/ML
1 INJECTION, SOLUTION INTRAMUSCULAR; INTRAVENOUS; SUBCUTANEOUS
Status: DISCONTINUED | OUTPATIENT
Start: 2017-03-13 | End: 2017-03-16 | Stop reason: HOSPADM

## 2017-03-13 RX ADMIN — INSULIN LISPRO 2 UNITS: 100 INJECTION, SOLUTION INTRAVENOUS; SUBCUTANEOUS at 16:46

## 2017-03-13 RX ADMIN — HYDROMORPHONE HYDROCHLORIDE 1 MG: 2 INJECTION INTRAMUSCULAR; INTRAVENOUS; SUBCUTANEOUS at 08:06

## 2017-03-13 RX ADMIN — BACLOFEN 5 MG: 10 TABLET ORAL at 08:39

## 2017-03-13 RX ADMIN — PANTOPRAZOLE SODIUM 40 MG: 40 TABLET, DELAYED RELEASE ORAL at 08:39

## 2017-03-13 RX ADMIN — ATORVASTATIN CALCIUM 20 MG: 20 TABLET, FILM COATED ORAL at 08:39

## 2017-03-13 RX ADMIN — ASPIRIN 81 MG 81 MG: 81 TABLET ORAL at 08:38

## 2017-03-13 RX ADMIN — DRONABINOL 5 MG: 2.5 CAPSULE ORAL at 21:59

## 2017-03-13 RX ADMIN — HYDROCODONE BITARTRATE AND ACETAMINOPHEN 1 TABLET: 5; 325 TABLET ORAL at 03:54

## 2017-03-13 RX ADMIN — BUDESONIDE 500 MCG: 0.5 INHALANT RESPIRATORY (INHALATION) at 21:35

## 2017-03-13 RX ADMIN — INSULIN GLARGINE 12 UNITS: 100 INJECTION, SOLUTION SUBCUTANEOUS at 08:40

## 2017-03-13 RX ADMIN — Medication 100 MG: at 08:39

## 2017-03-13 RX ADMIN — ENOXAPARIN SODIUM 40 MG: 40 INJECTION SUBCUTANEOUS at 11:05

## 2017-03-13 RX ADMIN — BACLOFEN 5 MG: 10 TABLET ORAL at 16:46

## 2017-03-13 RX ADMIN — PREDNISONE 20 MG: 20 TABLET ORAL at 18:13

## 2017-03-13 RX ADMIN — HYDROMORPHONE HYDROCHLORIDE 1 MG: 1 INJECTION, SOLUTION INTRAMUSCULAR; INTRAVENOUS; SUBCUTANEOUS at 19:38

## 2017-03-13 RX ADMIN — GABAPENTIN 300 MG: 300 CAPSULE ORAL at 08:39

## 2017-03-13 RX ADMIN — BUDESONIDE 500 MCG: 0.5 INHALANT RESPIRATORY (INHALATION) at 08:25

## 2017-03-13 RX ADMIN — ESCITALOPRAM OXALATE 20 MG: 10 TABLET ORAL at 08:38

## 2017-03-13 RX ADMIN — METHYLPREDNISOLONE SODIUM SUCCINATE 30 MG: 40 INJECTION, POWDER, FOR SOLUTION INTRAMUSCULAR; INTRAVENOUS at 08:40

## 2017-03-13 RX ADMIN — HYDROCODONE BITARTRATE AND ACETAMINOPHEN 1 TABLET: 5; 325 TABLET ORAL at 22:01

## 2017-03-13 RX ADMIN — DRONABINOL 5 MG: 2.5 CAPSULE ORAL at 09:39

## 2017-03-13 RX ADMIN — GABAPENTIN 300 MG: 300 CAPSULE ORAL at 22:01

## 2017-03-13 RX ADMIN — HYDROCODONE BITARTRATE AND ACETAMINOPHEN 1 TABLET: 5; 325 TABLET ORAL at 11:05

## 2017-03-13 RX ADMIN — HYDROMORPHONE HYDROCHLORIDE 1 MG: 2 INJECTION INTRAMUSCULAR; INTRAVENOUS; SUBCUTANEOUS at 13:40

## 2017-03-13 RX ADMIN — LEVOFLOXACIN 750 MG: 5 INJECTION, SOLUTION INTRAVENOUS at 11:05

## 2017-03-13 RX ADMIN — ARFORMOTEROL TARTRATE 15 MCG: 15 SOLUTION RESPIRATORY (INHALATION) at 21:35

## 2017-03-13 RX ADMIN — INSULIN LISPRO 2 UNITS: 100 INJECTION, SOLUTION INTRAVENOUS; SUBCUTANEOUS at 11:34

## 2017-03-13 RX ADMIN — ARFORMOTEROL TARTRATE 15 MCG: 15 SOLUTION RESPIRATORY (INHALATION) at 08:25

## 2017-03-13 RX ADMIN — FOLIC ACID 1 MG: 1 TABLET ORAL at 08:39

## 2017-03-13 RX ADMIN — BACLOFEN 5 MG: 10 TABLET ORAL at 22:00

## 2017-03-13 NOTE — PROGRESS NOTES
Problem: Mobility Impaired (Adult and Pediatric)  Goal: *Acute Goals and Plan of Care (Insert Text)  Physical Therapy Goals  Initiated 3/11/2017 and to be accomplished within 7 day(s)  1. Patient will move from supine to sit and sit to supine in bed with supervision/set-up. 2. Patient will transfer from bed to chair and chair to bed with supervision/set-up using the least restrictive device. 3. Patient will perform sit to stand with supervision/set-up. 4. Patient will ambulate with CGA for >50 feet with the least restrictive device. Outcome: Progressing Towards Goal  PHYSICAL THERAPY TREATMENT     Patient: Miguel Guillen (37 y.o. female)  Date: 3/13/2017  Diagnosis: Respiratory insufficiency  Abnormal EKG  Mass of sinus  Sinusitis  Respiratory insufficiency  Abnormal EKG  Mass of sinus  Sinusitis COPD (chronic obstructive pulmonary disease) with emphysema (HCC)       Precautions: Fall   Chart, physical therapy assessment, plan of care and goals were reviewed. ASSESSMENT:  Pt continues to have anxiety about current strength/endurance vs Where she believes she should be in recovery process. Pt show great improvement and tolerance to activity. Fatigue is less visible, but LE weakness is still present with activity. Pt able to recover in chair (10 min), between ambulation trials and increases distance on second. Pt with report of LE pain, which appears to be muscular soreness. This dissipates once in supine. Pt agrees to OOB for meals. RN informed. Progression toward goals:  [ ]      Improving appropriately and progressing toward goals  [X]      Improving slowly and progressing toward goals  [ ]      Not making progress toward goals and plan of care will be adjusted       PLAN:  Patient continues to benefit from skilled intervention to address the above impairments. Continue treatment per established plan of care.   Discharge Recommendations:  Home Health  Further Equipment Recommendations for Discharge: bedside commode and rolling walker       SUBJECTIVE:   Patient stated Do I have to go always around the building?       OBJECTIVE DATA SUMMARY:   Critical Behavior:  Neurologic State: (P) Alert  Orientation Level: (P) Oriented X4  Cognition: (P) Follows commands  Safety/Judgement: Awareness of environment  Functional Mobility Training:  Bed Mobility:  Rolling: Stand-by asssistance  Supine to Sit: Stand-by asssistance  Sit to Supine: Stand-by asssistance  Scooting: Stand-by asssistance  Transfers:  Sit to Stand: Contact guard assistance;Minimum assistance  Stand to Sit: Supervision  Balance:  Sitting: Intact  Standing: Impaired; With support  Standing - Static: Fair  Standing - Dynamic : Fair  Ambulation/Gait Training:  Distance (ft): 220 Feet (ft) (100+120)  Assistive Device: Gait belt;Walker, rolling  Ambulation - Level of Assistance: Contact guard assistance  Gait Abnormalities: Decreased step clearance;Shuffling gait  Base of Support: Widened  Speed/Aliya: Slow;Shuffled  Step Length: Right shortened;Left shortened  Swing Pattern: Right asymmetrical;Left asymmetrical  Interventions: Visual/Demos; Verbal cues; Tactile cues; Safety awareness training  Pain:  C/o LBP at end of session 3/10  Activity Tolerance:   Good  Please refer to the flowsheet for vital signs taken during this treatment.   After treatment:   [ ] Patient left in no apparent distress sitting up in chair  [X] Patient left in no apparent distress in bed  [X] Call bell left within reach  [X] Nursing notified  [X] Caregiver present  [ ] Bed alarm activated      Velasquez Larios PTA   Time Calculation: 35 mins

## 2017-03-13 NOTE — ROUTINE PROCESS
Bedside and Verbal shift change report given to NICCI Goel Mai (oncoming nurse) by José Miguel (offgoing nurse). Report included the following information SBAR, Kardex, Intake/Output, MAR and Recent Results. Dilaudid 1 mg iv push given for complaints of lower back pain. Endorsed to oncoming nurse to reassess pain.

## 2017-03-13 NOTE — ROUTINE PROCESS
TRANSFER - IN REPORT:    Verbal report received from NOEMÍ Benavides (name) on Marquita Deleon  being received from ICU (unit) for routine progression of care      Report consisted of patients Situation, Background, Assessment and   Recommendations(SBAR). Information from the following report(s) SBAR, Kardex, Intake/Output, MAR and Cardiac Rhythm NSR was reviewed with the receiving nurse. Opportunity for questions and clarification was provided. Assessment completed upon patients arrival to unit and care assumed.

## 2017-03-13 NOTE — PROGRESS NOTES
NUTRITION FOLLOW-UP    RECOMMENDATIONS/PLAN:   - Glucerna Shake once daily to meet energy needs (variable PO intake 25-75% of meals)  - Continue Consistent Carb 1600 kcal diet    NUTRITION ASSESSMENT:   Client Update: 64 yrs old Female with acute respiratory failure, COPD exacerbation, possible renal mass, anxiety, deconditioning, chronic pain, HLD. Fair appetite and PO intake since diet initiated 3/10.      FOOD/NUTRITION INTAKE   Diet Order:  Consistent Carb 1600 kcal   Supplements: none   Food Allergies: NKFA  Average PO Intake:      Patient Vitals for the past 100 hrs:   % Diet Eaten   03/12/17 0919 75 %   03/11/17 1800 75 %   03/11/17 1223 50 %   03/11/17 0800 50 %   03/10/17 1200 25 %   Pertinent Medications:  [x] Reviewed; marinol, lexapro, folate, neurontin, norco, dilaudid, solu-medrol, ppi, thiamine  Electrolyte Replacement Protocol: [x]K [x]Mg []PO4  Insulin:  [x]SSI  []Pre-meal   [x]Basal    []Drip  []None  Cultural/Synagogue Food Preferences: None Identified ANTHROPOMETRICS  Height: 5' 8\" (172.7 cm)       Weight: 62.6 kg (138 lb)         BMI: 21 kg/m^2 normal weight (18.5%-24.9% BMI)   Adm Weight: 138 lbs                Weight change: N/A     NUTRITION-FOCUSED PHYSICAL ASSESSMENT  Skin: intact      GI: no N/V/D, last BM 3/12- formed    BIOCHEMICAL DATA & MEDICAL TESTS  Pertinent Labs:  [x] Reviewed; POC -231, BUN 20, CO2 40     NUTRITION PRESCRIPTION  Calories: 1250-5250 kcal/day based on 25-30 kcal/kg  Protein: 63-82 g/day based on 1-1.3 g/kg  CHO: 197 g/day based on 50% of total energy  Fluid: 7164-1629 ml/day based on 1 kcal/ml      NUTRITION DIAGNOSES:   1- Unintended weight loss related to COPD as evidenced by pt reports unintended loss of >33 lbs in unknown time frame. - improving  2- Altered nutrition related lab values related to pre-DM as evidenced by POC -205 and HbA1c 6.2% -ongoing  3- Inadequate oral nutrition intake - resolved    NUTRITION INTERVENTIONS: INTERVENTIONS:        GOALS:  1. Glucerna shake once daily, continue diet 1. >50% PO intake of meals, maintain wt, prevent skin breakdown, POC -180 mg/dl in ICU by next review 3-5 days     LEARNING NEEDS (Diet, Supplementation, Food/Nutrient-Drug Interaction):   [] None Identified   [] Education provided/documented      Identified and patient: [] Declined   [x] Was not appropriate/indicated        NUTRITION MONITORING /EVALUATION:   Follow PO intake  Monitor wt  Monitor for additional supplement needs     Previous Recommendations Implemented: yes        Previous Goals Met:  yes -progressing      [] Participated in Interdisciplinary Rounds    [x] Interdisciplinary Care Plan Reviewed  DISCHARGE NUTRITION RECOMMENDATIONS ADDRESSED:     [x] Yes- recommended consistent carb diet (see nutrition d/c instructions)     [] To be determined closer to discharge    NUTRITION RISK:           [x] At risk                        [] Not currently at risk        Will follow-up per policy.   Shruti Blackwood RD  PAGER:  [phone]

## 2017-03-13 NOTE — PROGRESS NOTES
0730-Bedside and Verbal shift change report given to LILLIAN Granados RN (oncoming nurse) by Favio Godfrey. Charlotte Siemens (offgoing nurse). Report included the following information SBAR, Kardex, Intake/Output, MAR, Recent Results and Cardiac Rhythm SR.    0800- Initial shift assessment complete. Patient up to bedside commode with assist. Tolerated well. Family at bedside. Patient returned to bed. Will continue to monitor. 1200- Reassessment complete. Family remains at the bedside. Will continue to monitor. 1230- Patient up with PT.     1600- Reassessment complete. Will continue to monitor. 8515 Replaced by [facility] with Robert Sanchez MD, gave SBAR and received orders. Telephone orders for PRN Dilaudid to be modified to 1mg IV push every 6 hours as needed for pain 7-10.     1700- Gave handoff report to Rachael Ugarte RN    5616- Patient transported to room 340 via wheelchair with all belongings on cardiac monitor, o2, and RN.

## 2017-03-13 NOTE — PROGRESS NOTES
conducted a Follow up consultation and Spiritual Assessment for Katie Schuster, who is a 64 y.o.,female. Patient stated that she is [address]. Will add her to the [address] list so she will receive Eucharist and a visit from the . Daughter was at the bedside. [facility] support system. Fili Marino was patient's 29 anniversary. Room was decorated with balloons and pictures of the grandchildren. Very upbeat and hopeful. The  provided the following Interventions:  Continued the relationship of care and support. Listened empathically. Offered prayer and assurance of continued prayer on patients behalf. Chart reviewed. The following outcomes were achieved:  Patient and daughter expressed gratitude for Spiritual Care visit. Assessment:  There are no further spiritual or Caodaism issues which require Spiritual Care Services intervention at this time. Plan:  Chaplains will continue to follow and will provide pastoral care as needed or requested.  recommends bedside caregivers page the  on duty if patient shows signs of acute spiritual or emotional distress. [address], [place].    Board Certified   [phone] - Office

## 2017-03-13 NOTE — PROGRESS NOTES
Met with pt and daughter to discuss discharge plan- home health vs rehab. Pt would like home health, [facility] offered and pt chose [facility] [phone] for follow up; referral placed with CMS. Pt will need RW for use at home, has rollator. Daughter states that she will be home with her mother during the day, and pt  will be with her in the evening. CM will follow to finalize discharge plan. If pt needs home oxygen, will need qualifying testing 24-48hrs prior to discharge. Plan:  Home with [facility], needs RW, possibly 3:1 and home oxygen if needed. Care Management Interventions  PCP Verified by CM:  Yes  Transition of Care Consult (CM Consult): Discharge Planning, [address]: No  Reason Outside [place]: Patient already serviced by other home care/hospice agency ([facility])  Discharge Durable Medical Equipment: Yes (Pt needs RW)  Physical Therapy Consult: Yes  Occupational Therapy Consult: Yes  Current Support Network: Lives with Spouse, Own Home  Confirm Follow Up Transport: Family  Plan discussed with Pt/Family/Caregiver: Yes  Freedom of Choice Offered: Yes  Discharge Location  Discharge Placement: Home with home health

## 2017-03-13 NOTE — PROGRESS NOTES
Problem: Self Care Deficits Care Plan (Adult)  Goal: *Acute Goals and Plan of Care (Insert Text)  Occupational Therapy Goals  Initiated 3/11/2017 within 7 day(s). 1. Patient will perform lower body dressing with minimal assistance assist while sitting on EOB  2. Patient will perform grooming with contact guard assist while standing at EOB while maintaining oxygen sat above 89%. 3. Patient will perform toilet transfers with contact guard assist.  4. Patient will perform all aspects of toileting with minimal assistance. 5. Patient will participate in upper extremity therapeutic exercise/activities with supervision/set-up for 15 minutes. 6. Patient will utilize energy conservation techniques during functional activities with verbal cues. OCCUPATIONAL THERAPY TREATMENT     Patient: José Miguel Murray (75 y.o. female)  Date: 3/13/2017  Diagnosis: Respiratory insufficiency  Abnormal EKG  Mass of sinus  Sinusitis  Respiratory insufficiency  Abnormal EKG  Mass of sinus  Sinusitis COPD (chronic obstructive pulmonary disease) with emphysema (HCC)       Precautions: Fall  Chart, occupational therapy assessment, plan of care, and goals were reviewed. ASSESSMENT:  Pt completed supine to sit EOB with supervision. Sit to stand with CGA using RW. Functional mobility with RW and CGA. Verbal cues for deep breathing technique. Pt reporting B LE weakness/soreness today with mobility. Pt completed LB dressing seated in chair with SBA/CGA for standing balance. Unable to get accurate consistent reading of O2 stats with mobility but pt on 3 Liters of nasal canula and HR up to 123 with mobility. Progression toward goals:  [X]          Improving appropriately and progressing toward goals  [ ]          Improving slowly and progressing toward goals  [ ]          Not making progress toward goals and plan of care will be adjusted       PLAN:  Patient continues to benefit from skilled intervention to address the above impairments. Continue treatment per established plan of care. Discharge Recommendations:  Home Health  Further Equipment Recommendations for Discharge:  N/A       SUBJECTIVE:   Patient stated My legs are sore and weak today.       OBJECTIVE DATA SUMMARY:   Cognitive/Behavioral Status:  Neurologic State: Alert  Orientation Level: Oriented X4  Cognition: Follows commands  Safety/Judgement: Awareness of environment, Fall prevention  Functional Mobility and Transfers for ADLs:              Bed Mobility:  Rolling: Stand-by asssistance  Supine to Sit: Stand-by asssistance  Sit to Supine: Stand-by asssistance  Scooting: Stand-by asssistance              Transfers:  Sit to Stand: Contact guard assistance;Minimum assistance  Balance:  Sitting: Intact  Standing: Impaired; With support  Standing - Static: Fair  Standing - Dynamic : Fair  ADL Intervention:  Cognitive Retraining  Safety/Judgement: Awareness of environment; Fall prevention     LB dressing: SBA/CGA     Pain:  Pain Scale 1: Numeric (0 - 10)     Activity Tolerance:    fair  Please refer to the flowsheet for vital signs taken during this treatment.   After treatment:   [ ]  Patient left in no apparent distress sitting up in chair  [X]  Patient left in no apparent distress in bed  [X]  Call bell left within reach  [X]  Nursing notified  [X]  Caregiver present  [ ]  Bed alarm activated     HIRO Oates/ARMANDO  Time Calculation: 35 mins

## 2017-03-13 NOTE — PROGRESS NOTES
Hospitalist Progress Note    Patient: Marquita Deleon MRN: [id number]  CSN: [id number]    YOB: 1955  Age: 64 y.o. Sex: female    DOA: 3/4/2017 LOS:  LOS: 9 days          Chief Complaint: copd exacerbation acute. Assessment/Plan     Acute respiratory Failure still in need of O2. COPD acute Exacerbation. Possible L renal mass on CTA. Elevated TnI on admission - Cariology following. Anxiety/ PTSD. Deconditioning. Hyperlipidemia. Chronic pain/Post laminectomy sx. - Dr. Karlton Fabry. Hx L1-L5 laminectomy Dr. Talita Fletcher. Hx tobacco use. 65 y/o female with acute respiratory failure requiring intubation from copd exacerbation now s/p extubation doing well on 3 LPM O2 via nasal canula. Deconditioned and working with PT. Elevation in TnI on admission which Cardiology is checking into. Incidental finding of possible mass on left upper pole of left kidney that will need to be followed. Continue / wean o2. Continue bronchodilators. Complete abx. Today day 10 abx. Continue pain meds. Not sedated. Awake and very alert. Follows with pain mgmt. Continue PT/OT. Seems motivated. Will do well. DVT px enoxaparin. Dispo - Suspect patient may be ready for transfer out of icu. Defer to Pulm critical care team.      Patient Active Problem List   Diagnosis Code    Respiratory insufficiency R06.89    Sinusitis J32.9    Mass of sinus R22.0    COPD (chronic obstructive pulmonary disease) with emphysema (HCC) J43.9    Hypokalemia E87.6    Transaminitis R74.0    Nodule of kidney N28.89    COPD with acute exacerbation (HCC) J44.1    PTSD (post-traumatic stress disorder) F43.10    Acute respiratory failure ([facility]) J96.00       Subjective:    Seen and examined. Feeling well. Breathing improving. Cough remains. Feels she is expectorating material.      Working with PT.         Review of systems:    Constitutional: denies fevers, chills, myalgias  Respiratory: denies SOB, cough  Cardiovascular: denies chest pain, palpitations  Gastrointestinal: denies nausea, vomiting, diarrhea      Vital signs/Intake and Output:  Visit Vitals    /60    Pulse (!) 57    Temp 98.3 °F (36.8 °C)    Resp 20    Ht 5' 8\" (1.727 m)    Wt 62.6 kg (138 lb)    SpO2 100%    BMI 20.98 kg/m2     Current Shift:     Last three shifts:  03/11 1901 - 03/13 0700  In: 760 [P.O.:760]  Out: 1675 [Urine:1675]    Exam:    General: nad  Head/Neck: mmm  CVS:s1s2  Lungs:Decreased tanesha movement. Prolonged expiratory phase. No wheezing. A few coughs. Extremities: No edema. Labs: Results:       Chemistry Recent Labs      03/12/17   0500  03/11/17   2200  03/11/17   1330  03/11/17   0533   GLU  161*   --    --   191*   NA  147*   --    --   145   K  4.1  3.8  3.6  3.8   CL  106   --    --   102   CO2  43*   --    --   42*   BUN  27*   --    --   26*   CREA  0.70   --    --   0.71   CA  8.4*   --    --   8.3*   AGAP  NEG 2   --    --   1*   BUCR  39*   --    --   37*   AP  60   --    --   66   TP  4.9*   --    --   5.5*   ALB  2.4*   --    --   2.7*   GLOB  2.5   --    --   2.8   AGRAT  1.0   --    --   1.0      CBC w/Diff Recent Labs      03/12/17   0500  03/11/17   0533   WBC  11.5  13.9*   RBC  3.86*  4.03*   HGB  12.4  12.9   HCT  37.4  38.9   PLT  196  216   GRANS  93*  92*   LYMPH  3*  4*   EOS  1  0      Cardiac Enzymes No results for input(s): CPK, CKND1, NAUN in the last 72 hours. No lab exists for component: CKRMB, TROIP   Coagulation No results for input(s): PTP, INR, APTT in the last 72 hours. No lab exists for component: INREXT    Lipid Panel No results found for: CHOL, CHOLPOCT, CHOLX, CHLST, CHOLV, [id number], HDL, LDL, NLDLCT, DLDL, LDLC, DLDLP, [id number], VLDLC, VLDL, TGL, TGLX, TRIGL, [id number], TRIGP, TGLPOCT, [id number], CHHD, CHHDX   BNP No results for input(s): BNPP in the last 72 hours.    Liver Enzymes Recent Labs      03/12/17   0500   TP  4.9*   ALB  2.4*   AP  60   SGOT  22 Thyroid Studies Lab Results   Component Value Date/Time    TSH 0.05 03/04/2017 04:25 PM        Procedures/imaging: see electronic medical records for all procedures/Xrays and details which were not copied into this note but were reviewed prior to creation of Andrew Mcclelland MD

## 2017-03-13 NOTE — PROGRESS NOTES
[facility]  Pulmonary, Critical Care, and Sleep Medicine      Name: Lorin Nathan MRN: [id number]   : 1955 Hospital: [facility] [place]   Date: 3/13/2017               Requesting MD: Dr. Jake Carranza                                  Reason for CC Consult: COPD exacerbation    IMPRESSION:   · Acute hypoxemic respiratory failure,  Improved severe bronchospasm . Now on nasal cannula  · Severe anxiety disorder   · Chronic pain,   · COPD  · PTSD per  from past interactions with physicians in past  · Possible left upper pole renal mass, as per CTA chest   · Multiple pulmonary nodules from CTA chest, will need outpatient follow up   · Hx HTN  · Elevated TnI. At time of admission,   Ischemic work up pending,  Cardiology on the case. RECOMMENDATIONS:   · Resp - will wean fio2 further as tolerated  · ID - No clear infectious etiology; On Levaquin can d/c  · CVS - HD stable. Troponin elevated on admission - cardiology following. Echo with EF 50%. Monitor hemodynamics. Possible ischemic work up by cardiology  When stable  · Heme/Onc- H&H and platelets stable; monitor. · Metabolic -  Advance diet,   · Renal - dc lynn   · Endocrine - SSI for glycemic control given steroids;    · Neuro/ Pain/ Sedation -  continue gabapentin  · Prophylaxis - DVT (lovenox), GI (protonix)     Subjective/History:   17     No issues overnight  Was able to get out of bed and ambulating with PT  Coker weak but feels positive and appreciative of all care  Denies any cough, chest  pain. Denies any new complaints. HPI:  This patient has been seen and evaluated at the request of Dr. Jake Carranza for COPD exacerbation. Patient is a 64 y.o. female with history of COPD, PTSD, chronic sinusitis and HTN who presented to the ED with progressive shortness of breath for two weeks. She has had wheezing and a non-productive cough.   Her  states she had a \"cold\" about 2 weeks ago and everything seems to have gotten worse since. She also recently traveled to [place] and stayed in a hotel but according to family it was clean hotel with no mold or dust.  No new exposure to pets. Daughter states she does get bad seasonal allergies which does typically flare her COPD. She is currently still smoking, which the daughter reports is down to less than 1 pack per day. In the ED, she was tachypneic and tachycardic and was brought to the ICU for further management under the hospitalist service. She also had a CT chest in the ED which was was negative for a PE but did show emphysematous changes. She also had a CT of the head which showed ethmoid mucosal thickening with partial destruction of the medial wall of the left orbit - a mass or abscess could not be ruled out. ENT was consulted but felt as if this could be followed up on an outpatient basis. Additionally, she had a mild elevated troponin for which cardiology was consulted and is following. Pulmonary was then consulted on 3/5 for assistance with management. She was treated overnight with Ativan, Klonopin, and Lexapro. She was maintained on HFNC as she was unable to tolerate the BiPAP. During my evaluation, she is minimally responsive on HFNC, with RR 38-42, 's, and hypertensive. She was given multiple duonebs along with solumedrol and placed back on BiPAP without improvement. She was the intubated for respiratory failure. Past Medical History:   Diagnosis Date    Back pain     COPD (chronic obstructive pulmonary disease) (HCC)     Hypertension     PTSD (post-traumatic stress disorder)     Shingles       Past Surgical History:   Procedure Laterality Date    HX TUBAL LIGATION      HX TYMPANOSTOMY     Collin Rueda        Prior to Admission medications    Medication Sig Start Date End Date Taking? Authorizing Provider   valsartan-hydroCHLOROthiazide (DIOVAN HCT) 160-25 mg per tablet Take 1 Tab by mouth daily.    Yes Historical Provider   HYDROcodone-acetaminophen (NORCO)  mg tablet Take 1 Tab by mouth four (4) times daily. Yes Historical Provider   baclofen (LIORESAL) 10 mg tablet Take 10 mg by mouth three (3) times daily. Yes Marissa Son MD   albuterol (PROVENTIL HFA, VENTOLIN HFA, PROAIR HFA) 90 mcg/actuation inhaler Take 2 Puffs by inhalation every four (4) hours as needed for Wheezing. Yes Marissa Son MD   escitalopram (LEXAPRO) 20 mg tablet Take 40 mg by mouth daily. Yes Marissa Son MD   clonazePAM (KLONOPIN) 0.5 mg tablet Take 0.5-1.5 mg by mouth daily as needed. Yes Marissa Son MD   fluticasone-salmeterol (ADVAIR DISKUS) 100-50 mcg/dose diskus inhaler Take 1 Puff by inhalation every twelve (12) hours. Yes Marissa Son MD   ibuprofen (MOTRIN) 800 mg tablet Take 800 mg by mouth every six (6) hours as needed.       Marissa Son MD     Current Facility-Administered Medications   Medication Dose Route Frequency    HYDROcodone-acetaminophen (NORCO) 5-325 mg per tablet 1 Tab  1 Tab Oral Q8H    [START ON 3/14/2017] predniSONE (DELTASONE) tablet 30 mg  30 mg Oral DAILY WITH BREAKFAST    predniSONE (DELTASONE) tablet 20 mg  20 mg Oral ONCE    insulin glargine (LANTUS) injection 12 Units  12 Units SubCUTAneous DAILY    pantoprazole (PROTONIX) tablet 40 mg  40 mg Oral ACB    gabapentin (NEURONTIN) capsule 300 mg  300 mg Oral BID    dronabinol (MARINOL) capsule 5 mg  5 mg Oral BID    folic acid (FOLVITE) tablet 1 mg  1 mg Oral DAILY    Thiamine Mononitrate (B-1) tablet 100 mg  100 mg Oral DAILY    insulin lispro (HUMALOG) injection   SubCUTAneous AC&HS    baclofen (LIORESAL) tablet 5 mg  5 mg Oral TID    lidocaine (LIDODERM) 5 % patch 1 Patch  1 Patch TransDERmal Q12H    aspirin chewable tablet 81 mg  81 mg Oral DAILY    atorvastatin (LIPITOR) tablet 20 mg  20 mg Oral DAILY    escitalopram oxalate (LEXAPRO) tablet 20 mg  20 mg Oral DAILY    chlorhexidine (PERIDEX) 0.12 % mouthwash 10 mL  10 mL Oral Q12H    arformoterol (BROVANA) neb solution 15 mcg  15 mcg Nebulization BID RT    nicotine (NICODERM CQ) 14 mg/24 hr patch 1 Patch  1 Patch TransDERmal DAILY    enoxaparin (LOVENOX) injection 40 mg  40 mg SubCUTAneous Q24H    budesonide (PULMICORT) 500 mcg/2 ml nebulizer suspension  500 mcg Nebulization BID RT    levoFLOXacin (LEVAQUIN) 750 mg in D5W IVPB  750 mg IntraVENous Q24H     Allergies   Allergen Reactions    Sulfacetamide Anaphylaxis      Social History   Substance Use Topics    Smoking status: Current Some Day Smoker     Packs/day: 0.50    Smokeless tobacco: Not on file    Alcohol use Not on file      History reviewed. No pertinent family history. Review of Systems:  A comprehensive review of systems was negative except for that written in the HPI. Objective:   Vital Signs:    Visit Vitals    /81    Pulse 86    Temp 98.3 °F (36.8 °C)    Resp 19    Ht 5' 8\" (1.727 m)    Wt 62.6 kg (138 lb)    SpO2 97%    BMI 20.98 kg/m2       O2 Device: Nasal cannula   O2 Flow Rate (L/min): 3 l/min   Temp (24hrs), Av.8 °F (37.1 °C), Min:98.3 °F (36.8 °C), Max:99.1 °F (37.3 °C)       Intake/Output:   Last shift:         Last 3 shifts:  1901 -  0700  In: 760 [P.O.:760]  Out: 1675 [Urine:1675]    Intake/Output Summary (Last 24 hours) at 17 1400  Last data filed at 17   Gross per 24 hour   Intake              120 ml   Output             1100 ml   Net             -980 ml     Physical Exam:    General:  Alert and appropriate. Head:  Normocephalic, without obvious abnormality, atraumatic. Eyes:  Conjunctivae/corneas clear. Nose: Nares normal. Septum midline. Mucosa normal.   Throat: Lips and tongue normal.  Teeth present. Mucosa moist.    Neck: Supple, symmetrical, trachea midline, no adenopathy, no JVD.        Lungs:   Clear to auscultation       Heart:  Tachycardic rate and regular rhythm; no audible mrg   Abdomen:   Hypoactive bowel sounds; soft, non-tender Extremities: Extremities without edema. Cyanosis to fingers and toes with mottling on lower extremities. Pulses: 2+ and symmetric all extremities. Skin: Warm, dry. Neurologic: No focal deficit       Data:     Recent Results (from the past 24 hour(s))   GLUCOSE, POC    Collection Time: 03/12/17  4:31 PM   Result Value Ref Range    Glucose (POC) 172 (H) 70 - 110 mg/dL   GLUCOSE, POC    Collection Time: 03/12/17  9:35 PM   Result Value Ref Range    Glucose (POC) 134 (H) 70 - 110 mg/dL   GLUCOSE, POC    Collection Time: 03/13/17  7:13 AM   Result Value Ref Range    Glucose (POC) 125 (H) 70 - 110 mg/dL   MAGNESIUM    Collection Time: 03/13/17  7:30 AM   Result Value Ref Range    Magnesium 2.3 1.8 - 2.4 mg/dL   METABOLIC PANEL, COMPREHENSIVE    Collection Time: 03/13/17  7:30 AM   Result Value Ref Range    Sodium 144 136 - 145 mmol/L    Potassium 3.9 3.5 - 5.5 mmol/L    Chloride 105 100 - 108 mmol/L    CO2 40 (H) 21 - 32 mmol/L    Anion gap NEG 1 3.0 - 18 mmol/L    Glucose 110 (H) 74 - 99 mg/dL    BUN 20 (H) 7.0 - 18 MG/DL    Creatinine 0.68 0.6 - 1.3 MG/DL    BUN/Creatinine ratio 29 (H) 12 - 20      GFR est AA >60 >60 ml/min/1.73m2    GFR est non-AA >60 >60 ml/min/1.73m2    Calcium 9.0 8.5 - 10.1 MG/DL    Bilirubin, total 1.0 0.2 - 1.0 MG/DL    ALT (SGPT) 91 (H) 13 - 56 U/L    AST (SGOT) 34 15 - 37 U/L    Alk.  phosphatase 77 45 - 117 U/L    Protein, total 5.9 (L) 6.4 - 8.2 g/dL    Albumin 3.0 (L) 3.4 - 5.0 g/dL    Globulin 2.9 2.0 - 4.0 g/dL    A-G Ratio 1.0 0.8 - 1.7     CBC WITH AUTOMATED DIFF    Collection Time: 03/13/17  7:30 AM   Result Value Ref Range    WBC 19.2 (H) 4.6 - 13.2 K/uL    RBC 4.40 4.20 - 5.30 M/uL    HGB 14.1 12.0 - 16.0 g/dL    HCT 43.0 35.0 - 45.0 %    MCV 97.7 (H) 74.0 - 97.0 FL    MCH 32.0 24.0 - 34.0 PG    MCHC 32.8 31.0 - 37.0 g/dL    RDW 14.2 11.6 - 14.5 %    PLATELET 200 215 - 012 K/uL    MPV 12.0 (H) 9.2 - 11.8 FL    NEUTROPHILS 76 (H) 42 - 75 %    LYMPHOCYTES 12 (L) 20 - 51 % MONOCYTES 12 (H) 2 - 9 %    EOSINOPHILS 0 0 - 5 %    BASOPHILS 0 0 - 3 %    ABS. NEUTROPHILS 14.6 (H) 1.8 - 8.0 K/UL    ABS. LYMPHOCYTES 2.3 0.8 - 3.5 K/UL    ABS. MONOCYTES 2.3 (H) 0 - 1.0 K/UL    ABS. EOSINOPHILS 0.0 0.0 - 0.4 K/UL    ABS. BASOPHILS 0.0 0.0 - 0.1 K/UL    RBC COMMENTS NORMOCYTIC, NORMOCHROMIC      DF MANUAL     GLUCOSE, POC    Collection Time: 03/13/17 11:28 AM   Result Value Ref Range    Glucose (POC) 180 (H) 70 - 110 mg/dL             Telemetry:Sinus tachycardia    Imaging:  CXR 3/5  The cardiomediastinal silhouette is stable. Bilateral mid to lower lung field  increased markings are again seen. The lung fields are hyperinflated. There is  no focal consolidation. There is no significant pleural effusion. There is no  acute osseous abnormality.        --------------  IMPRESSION  Impression:  --------------  COPD.     Bilateral mid to lower lung field increased interstitial markings similar to  prior which could represent bronchitis/interstitial pneumonitis or a chronic  interstitial process.      No focal consolidation.     No significant interval change. CTA Chest   FINDINGS:     EXAM QUALITY: Overall exam quality is suboptimal. Opacification of the pulmonary  arteries is somewhat suboptimal although diagnostic. There is significant  respiratory motion artifact which significantly degrades the images particularly  in the lower lung fields.     PULMONARY ARTERIES: No evidence of pulmonary embolism.     MEDIASTINUM: Heart size is normal. No evidence of right heart strain. No  pericardial effusion. Mild atherosclerotic changes of the aorta. Esophagus is  unremarkable.     LYMPH NODES: No enlarged nodes.     AIRWAY: Normal.     LUNGS: Significant emphysematous changes most pronounced in the upper lobes. Bilateral pulmonary nodules. Largest is in the left lower lobe on image 86  measuring 11 x 11 mm. A second possible left lower lobe nodule on image 91  measures 7 x 6 mm.  Right lower lobe nodule image 83 measures 12 x 6 mm.     PLEURA: Normal. Specifically, no pneumothorax or pleural effusion.     UPPER ABDOMEN: There is an exophytic possibly solid nodule arising from the  upper pole of the left kidney. This measures 11 x 12 mm. Upper abdominal  structures are otherwise unremarkable. .     OTHER: No acute or aggressive osseous abnormalities identified.     _______________     IMPRESSION  IMPRESSION:     1. Study is degraded by respiratory motion artifact however there are no  definite pulmonary emboli.     2. Emphysema. 3. Bilateral pulmonary nodules as described above with the largest in the left  lower lobe measuring 11 x 11 mm.  4. Possible solid exophytic left upper pole renal mass.     Recommend renal ultrasound for further evaluation of the kidneys.          Enrique Mcguire MD

## 2017-03-14 LAB
ALBUMIN SERPL BCP-MCNC: 2.8 G/DL (ref 3.4–5)
ALBUMIN/GLOB SERPL: 1 {RATIO} (ref 0.8–1.7)
ALP SERPL-CCNC: 93 U/L (ref 45–117)
ALT SERPL-CCNC: 87 U/L (ref 13–56)
ANION GAP BLD CALC-SCNC: 0 MMOL/L (ref 3–18)
AST SERPL W P-5'-P-CCNC: 33 U/L (ref 15–37)
BASOPHILS # BLD AUTO: 0 K/UL (ref 0–0.06)
BASOPHILS # BLD: 0 % (ref 0–2)
BILIRUB SERPL-MCNC: 0.8 MG/DL (ref 0.2–1)
BUN SERPL-MCNC: 17 MG/DL (ref 7–18)
BUN/CREAT SERPL: 24 (ref 12–20)
CALCIUM SERPL-MCNC: 8.7 MG/DL (ref 8.5–10.1)
CHLORIDE SERPL-SCNC: 104 MMOL/L (ref 100–108)
CO2 SERPL-SCNC: 40 MMOL/L (ref 21–32)
CREAT SERPL-MCNC: 0.71 MG/DL (ref 0.6–1.3)
DIFFERENTIAL METHOD BLD: ABNORMAL
EOSINOPHIL # BLD: 0 K/UL (ref 0–0.4)
EOSINOPHIL NFR BLD: 0 % (ref 0–5)
ERYTHROCYTE [DISTWIDTH] IN BLOOD BY AUTOMATED COUNT: 13.8 % (ref 11.6–14.5)
GLOBULIN SER CALC-MCNC: 2.8 G/DL (ref 2–4)
GLUCOSE BLD STRIP.AUTO-MCNC: 167 MG/DL (ref 70–110)
GLUCOSE BLD STRIP.AUTO-MCNC: 193 MG/DL (ref 70–110)
GLUCOSE BLD STRIP.AUTO-MCNC: 214 MG/DL (ref 70–110)
GLUCOSE BLD STRIP.AUTO-MCNC: 243 MG/DL (ref 70–110)
GLUCOSE SERPL-MCNC: 212 MG/DL (ref 74–99)
HCT VFR BLD AUTO: 39.7 % (ref 35–45)
HGB BLD-MCNC: 13.1 G/DL (ref 12–16)
LYMPHOCYTES # BLD AUTO: 10 % (ref 21–52)
LYMPHOCYTES # BLD: 1.3 K/UL (ref 0.9–3.6)
MAGNESIUM SERPL-MCNC: 2.1 MG/DL (ref 1.8–2.4)
MCH RBC QN AUTO: 32.2 PG (ref 24–34)
MCHC RBC AUTO-ENTMCNC: 33 G/DL (ref 31–37)
MCV RBC AUTO: 97.5 FL (ref 74–97)
MONOCYTES # BLD: 0.3 K/UL (ref 0.05–1.2)
MONOCYTES NFR BLD AUTO: 3 % (ref 3–10)
NEUTS SEG # BLD: 11.1 K/UL (ref 1.8–8)
NEUTS SEG NFR BLD AUTO: 87 % (ref 40–73)
PLATELET # BLD AUTO: 185 K/UL (ref 135–420)
PMV BLD AUTO: 12 FL (ref 9.2–11.8)
POTASSIUM SERPL-SCNC: 3.8 MMOL/L (ref 3.5–5.5)
PROT SERPL-MCNC: 5.6 G/DL (ref 6.4–8.2)
RBC # BLD AUTO: 4.07 M/UL (ref 4.2–5.3)
SODIUM SERPL-SCNC: 144 MMOL/L (ref 136–145)
WBC # BLD AUTO: 12.7 K/UL (ref 4.6–13.2)

## 2017-03-14 PROCEDURE — 97530 THERAPEUTIC ACTIVITIES: CPT

## 2017-03-14 PROCEDURE — 74011250637 HC RX REV CODE- 250/637: Performed by: HOSPITALIST

## 2017-03-14 PROCEDURE — 85025 COMPLETE CBC W/AUTO DIFF WBC: CPT | Performed by: PHYSICIAN ASSISTANT

## 2017-03-14 PROCEDURE — 80053 COMPREHEN METABOLIC PANEL: CPT | Performed by: PHYSICIAN ASSISTANT

## 2017-03-14 PROCEDURE — 65660000000 HC RM CCU STEPDOWN

## 2017-03-14 PROCEDURE — 74011250636 HC RX REV CODE- 250/636: Performed by: INTERNAL MEDICINE

## 2017-03-14 PROCEDURE — 74011250637 HC RX REV CODE- 250/637: Performed by: FAMILY MEDICINE

## 2017-03-14 PROCEDURE — 83735 ASSAY OF MAGNESIUM: CPT | Performed by: PHYSICIAN ASSISTANT

## 2017-03-14 PROCEDURE — 94640 AIRWAY INHALATION TREATMENT: CPT

## 2017-03-14 PROCEDURE — 97116 GAIT TRAINING THERAPY: CPT

## 2017-03-14 PROCEDURE — 74011000250 HC RX REV CODE- 250: Performed by: PHYSICIAN ASSISTANT

## 2017-03-14 PROCEDURE — 74011636637 HC RX REV CODE- 636/637: Performed by: INTERNAL MEDICINE

## 2017-03-14 PROCEDURE — 74011250637 HC RX REV CODE- 250/637: Performed by: INTERNAL MEDICINE

## 2017-03-14 PROCEDURE — 74011000250 HC RX REV CODE- 250: Performed by: INTERNAL MEDICINE

## 2017-03-14 PROCEDURE — 94760 N-INVAS EAR/PLS OXIMETRY 1: CPT

## 2017-03-14 PROCEDURE — 74011250637 HC RX REV CODE- 250/637: Performed by: PHYSICIAN ASSISTANT

## 2017-03-14 PROCEDURE — 82962 GLUCOSE BLOOD TEST: CPT

## 2017-03-14 PROCEDURE — 77010033678 HC OXYGEN DAILY

## 2017-03-14 PROCEDURE — 77030027138 HC INCENT SPIROMETER -A

## 2017-03-14 PROCEDURE — 77030033263 HC DRSG MEPILEX 16-48IN BORD MOLN -B

## 2017-03-14 RX ORDER — INSULIN LISPRO 100 [IU]/ML
INJECTION, SOLUTION INTRAVENOUS; SUBCUTANEOUS
Status: DISCONTINUED | OUTPATIENT
Start: 2017-03-14 | End: 2017-03-16 | Stop reason: HOSPADM

## 2017-03-14 RX ADMIN — HYDROMORPHONE HYDROCHLORIDE 1 MG: 1 INJECTION, SOLUTION INTRAMUSCULAR; INTRAVENOUS; SUBCUTANEOUS at 03:47

## 2017-03-14 RX ADMIN — DIPHENHYDRAMINE HYDROCHLORIDE AND LIDOCAINE HYDROCHLORIDE AND ALUMINUM HYDROXIDE AND MAGNESIUM HYDRO 5 ML: KIT at 21:53

## 2017-03-14 RX ADMIN — ARFORMOTEROL TARTRATE 15 MCG: 15 SOLUTION RESPIRATORY (INHALATION) at 19:22

## 2017-03-14 RX ADMIN — BUDESONIDE 500 MCG: 0.5 INHALANT RESPIRATORY (INHALATION) at 19:22

## 2017-03-14 RX ADMIN — ESCITALOPRAM OXALATE 20 MG: 10 TABLET ORAL at 08:41

## 2017-03-14 RX ADMIN — INSULIN LISPRO 4 UNITS: 100 INJECTION, SOLUTION INTRAVENOUS; SUBCUTANEOUS at 12:03

## 2017-03-14 RX ADMIN — BUDESONIDE 500 MCG: 0.5 INHALANT RESPIRATORY (INHALATION) at 07:59

## 2017-03-14 RX ADMIN — ENOXAPARIN SODIUM 40 MG: 40 INJECTION SUBCUTANEOUS at 12:03

## 2017-03-14 RX ADMIN — HYDROMORPHONE HYDROCHLORIDE 1 MG: 1 INJECTION, SOLUTION INTRAMUSCULAR; INTRAVENOUS; SUBCUTANEOUS at 15:49

## 2017-03-14 RX ADMIN — ASPIRIN 81 MG 81 MG: 81 TABLET ORAL at 08:41

## 2017-03-14 RX ADMIN — Medication 100 MG: at 08:41

## 2017-03-14 RX ADMIN — FOLIC ACID 1 MG: 1 TABLET ORAL at 08:41

## 2017-03-14 RX ADMIN — HYDROCODONE BITARTRATE AND ACETAMINOPHEN 1 TABLET: 5; 325 TABLET ORAL at 14:17

## 2017-03-14 RX ADMIN — GABAPENTIN 300 MG: 300 CAPSULE ORAL at 21:41

## 2017-03-14 RX ADMIN — HYDROCODONE BITARTRATE AND ACETAMINOPHEN 1 TABLET: 5; 325 TABLET ORAL at 06:40

## 2017-03-14 RX ADMIN — INSULIN LISPRO 6 UNITS: 100 INJECTION, SOLUTION INTRAVENOUS; SUBCUTANEOUS at 17:04

## 2017-03-14 RX ADMIN — INSULIN LISPRO 2 UNITS: 100 INJECTION, SOLUTION INTRAVENOUS; SUBCUTANEOUS at 06:40

## 2017-03-14 RX ADMIN — GABAPENTIN 300 MG: 300 CAPSULE ORAL at 08:41

## 2017-03-14 RX ADMIN — DRONABINOL 5 MG: 2.5 CAPSULE ORAL at 21:53

## 2017-03-14 RX ADMIN — PREDNISONE 30 MG: 10 TABLET ORAL at 08:41

## 2017-03-14 RX ADMIN — INSULIN LISPRO 3 UNITS: 100 INJECTION, SOLUTION INTRAVENOUS; SUBCUTANEOUS at 21:54

## 2017-03-14 RX ADMIN — CHLORHEXIDINE GLUCONATE 10 ML: 1.2 RINSE ORAL at 08:41

## 2017-03-14 RX ADMIN — ATORVASTATIN CALCIUM 20 MG: 20 TABLET, FILM COATED ORAL at 08:41

## 2017-03-14 RX ADMIN — INSULIN GLARGINE 12 UNITS: 100 INJECTION, SOLUTION SUBCUTANEOUS at 08:42

## 2017-03-14 RX ADMIN — HYDROMORPHONE HYDROCHLORIDE 1 MG: 1 INJECTION, SOLUTION INTRAMUSCULAR; INTRAVENOUS; SUBCUTANEOUS at 09:40

## 2017-03-14 RX ADMIN — PANTOPRAZOLE SODIUM 40 MG: 40 TABLET, DELAYED RELEASE ORAL at 06:39

## 2017-03-14 RX ADMIN — BACLOFEN 5 MG: 10 TABLET ORAL at 08:41

## 2017-03-14 RX ADMIN — DRONABINOL 5 MG: 2.5 CAPSULE ORAL at 09:37

## 2017-03-14 RX ADMIN — BACLOFEN 5 MG: 10 TABLET ORAL at 21:40

## 2017-03-14 RX ADMIN — BACLOFEN 5 MG: 10 TABLET ORAL at 15:49

## 2017-03-14 RX ADMIN — HYDROMORPHONE HYDROCHLORIDE 1 MG: 1 INJECTION, SOLUTION INTRAMUSCULAR; INTRAVENOUS; SUBCUTANEOUS at 21:42

## 2017-03-14 RX ADMIN — ARFORMOTEROL TARTRATE 15 MCG: 15 SOLUTION RESPIRATORY (INHALATION) at 07:59

## 2017-03-14 NOTE — PROGRESS NOTES
[facility]  Pulmonary, Critical Care, and Sleep Medicine  Progress note     Name: Lazaro Chowdary MRN: [id number]   : 1955 Hospital: [facility] [place]   Date: 3/14/2017        IMPRESSION:   · Acute hypoxemic respiratory failure,  Improved severe bronchospasm . Now on nasal cannula and titrating down O2 flow. · Severe anxiety disorder   · Chronic pain,   · COPD  · PTSD per  from past interactions with physicians in past  · Possible left upper pole renal mass, as per CTA chest   · Multiple pulmonary nodules from CTA chest, will need outpatient follow up   · Hx HTN  · Elevated TnI. At time of admission,   Ischemic work up pending,  Cardiology following      RECOMMENDATIONS:   · Resp - will wean fio2 further as tolerated  · ID - No clear infectious etiology; On Levaquin was stopped, d/c CVL today  · CVS - HD stable. Troponin elevated on admission - cardiology following. Echo with EF 50%. Monitor hemodynamics. Possible ischemic work up by cardiology   · Heme/Onc- H&H and platelets stable; monitor. · Metabolic -  Advance diet,   · Renal - dc lynn   · Endocrine - SSI for glycemic control given steroids;    · Neuro/ Pain/ Sedation -  continue gabapentin  · Prophylaxis - DVT (lovenox), GI (protonix)  · Increase activity- OOB and ambulate  · Discussed with patient and family at bedside     Subjective/History:   17     No issues overnight. Coughing effectively- using [place]. O2 decreased to 2.5 L  Was able to get out of bed and ambulating with PT  Capac weak but feels positive and appreciative of all care  Denies any cough, chest  pain. Denies any new complaints. HPI:  This patient has been seen and evaluated at the request of Dr. Gianna Rosenthal for COPD exacerbation. Patient is a 64 y.o. female with history of COPD, PTSD, chronic sinusitis and HTN who presented to the ED with progressive shortness of breath for two weeks.   She has had wheezing and a non-productive cough.  Her  states she had a \"cold\" about 2 weeks ago and everything seems to have gotten worse since. She also recently traveled to [place] and stayed in a hotel but according to family it was clean hotel with no mold or dust.  No new exposure to pets. Daughter states she does get bad seasonal allergies which does typically flare her COPD. She is currently still smoking, which the daughter reports is down to less than 1 pack per day. In the ED, she was tachypneic and tachycardic and was brought to the ICU for further management under the hospitalist service. She also had a CT chest in the ED which was was negative for a PE but did show emphysematous changes. She also had a CT of the head which showed ethmoid mucosal thickening with partial destruction of the medial wall of the left orbit - a mass or abscess could not be ruled out. ENT was consulted but felt as if this could be followed up on an outpatient basis. Additionally, she had a mild elevated troponin for which cardiology was consulted and is following. Pulmonary was then consulted on 3/5 for assistance with management. She was treated overnight with Ativan, Klonopin, and Lexapro. She was maintained on HFNC as she was unable to tolerate the BiPAP. During my evaluation, she is minimally responsive on HFNC, with RR 38-42, 's, and hypertensive. She was given multiple duonebs along with solumedrol and placed back on BiPAP without improvement. She was the intubated for respiratory failure.      Current Facility-Administered Medications   Medication Dose Route Frequency    HYDROcodone-acetaminophen (NORCO) 5-325 mg per tablet 1 Tab  1 Tab Oral Q8H    predniSONE (DELTASONE) tablet 30 mg  30 mg Oral DAILY WITH BREAKFAST    insulin glargine (LANTUS) injection 12 Units  12 Units SubCUTAneous DAILY    pantoprazole (PROTONIX) tablet 40 mg  40 mg Oral ACB    gabapentin (NEURONTIN) capsule 300 mg  300 mg Oral BID    dronabinol (MARINOL) capsule 5 mg  5 mg Oral BID    folic acid (FOLVITE) tablet 1 mg  1 mg Oral DAILY    Thiamine Mononitrate (B-1) tablet 100 mg  100 mg Oral DAILY    insulin lispro (HUMALOG) injection   SubCUTAneous AC&HS    baclofen (LIORESAL) tablet 5 mg  5 mg Oral TID    lidocaine (LIDODERM) 5 % patch 1 Patch  1 Patch TransDERmal Q12H    aspirin chewable tablet 81 mg  81 mg Oral DAILY    atorvastatin (LIPITOR) tablet 20 mg  20 mg Oral DAILY    escitalopram oxalate (LEXAPRO) tablet 20 mg  20 mg Oral DAILY    chlorhexidine (PERIDEX) 0.12 % mouthwash 10 mL  10 mL Oral Q12H    arformoterol (BROVANA) neb solution 15 mcg  15 mcg Nebulization BID RT    nicotine (NICODERM CQ) 14 mg/24 hr patch 1 Patch  1 Patch TransDERmal DAILY    enoxaparin (LOVENOX) injection 40 mg  40 mg SubCUTAneous Q24H    budesonide (PULMICORT) 500 mcg/2 ml nebulizer suspension  500 mcg Nebulization BID RT     Allergies   Allergen Reactions    Sulfacetamide Anaphylaxis      Social History   Substance Use Topics    Smoking status: Current Some Day Smoker     Packs/day: 0.50    Smokeless tobacco: Not on file    Alcohol use Not on file      History reviewed. No pertinent family history. Review of Systems:  A comprehensive review of systems was negative except for that written in the HPI. Objective:   Vital Signs:    Visit Vitals    /73    Pulse 78    Temp 98.7 °F (37.1 °C)    Resp 18    Ht 5' 8\" (1.727 m)    Wt 68.1 kg (150 lb 2.1 oz)    SpO2 100%    BMI 22.83 kg/m2       O2 Device: Nasal cannula   O2 Flow Rate (L/min): 2.5 l/min   Temp (24hrs), Av.3 °F (36.8 °C), Min:97.8 °F (36.6 °C), Max:98.7 °F (37.1 °C)       Intake/Output:   Last shift:       07 -  1900  In: 480 [P.O.:480]  Out: 250 [Urine:250]  Last 3 shifts:  1901 -  0700  In: 750 [P.O.:600;  I.V.:150]  Out: 3975 [Urine:3975]    Intake/Output Summary (Last 24 hours) at 17 1122  Last data filed at 17 0816   Gross per 24 hour Intake             1110 ml   Output             3075 ml   Net            -1965 ml     Physical Exam:    General:  Alert and appropriate. Head:  Normocephalic, without obvious abnormality, atraumatic. Eyes:  Conjunctivae/corneas clear. Nose: Nares normal. Septum midline. Mucosa normal.   Throat: Lips and tongue normal.  Teeth present. Mucosa moist.    Neck: Supple, symmetrical, trachea midline, no adenopathy, no JVD. Lungs:   Clear to auscultation       Heart:  Tachycardic rate and regular rhythm; no audible mrg   Abdomen:   Hypoactive bowel sounds; soft, non-tender   Extremities: Extremities without edema. Cyanosis to fingers and toes with mottling on lower extremities. Pulses: 2+ and symmetric all extremities. Skin: Warm, dry. Neurologic: No focal deficit       Data:     Recent Results (from the past 24 hour(s))   GLUCOSE, POC    Collection Time: 03/13/17 11:28 AM   Result Value Ref Range    Glucose (POC) 180 (H) 70 - 110 mg/dL   GLUCOSE, POC    Collection Time: 03/13/17  4:26 PM   Result Value Ref Range    Glucose (POC) 183 (H) 70 - 110 mg/dL   GLUCOSE, POC    Collection Time: 03/13/17  9:14 PM   Result Value Ref Range    Glucose (POC) 107 70 - 110 mg/dL   MAGNESIUM    Collection Time: 03/14/17  4:30 AM   Result Value Ref Range    Magnesium 2.1 1.8 - 2.4 mg/dL   METABOLIC PANEL, COMPREHENSIVE    Collection Time: 03/14/17  4:30 AM   Result Value Ref Range    Sodium 144 136 - 145 mmol/L    Potassium 3.8 3.5 - 5.5 mmol/L    Chloride 104 100 - 108 mmol/L    CO2 40 (H) 21 - 32 mmol/L    Anion gap 0 (L) 3.0 - 18 mmol/L    Glucose 212 (H) 74 - 99 mg/dL    BUN 17 7.0 - 18 MG/DL    Creatinine 0.71 0.6 - 1.3 MG/DL    BUN/Creatinine ratio 24 (H) 12 - 20      GFR est AA >60 >60 ml/min/1.73m2    GFR est non-AA >60 >60 ml/min/1.73m2    Calcium 8.7 8.5 - 10.1 MG/DL    Bilirubin, total 0.8 0.2 - 1.0 MG/DL    ALT (SGPT) 87 (H) 13 - 56 U/L    AST (SGOT) 33 15 - 37 U/L    Alk.  phosphatase 93 45 - 117 U/L    Protein, total 5.6 (L) 6.4 - 8.2 g/dL    Albumin 2.8 (L) 3.4 - 5.0 g/dL    Globulin 2.8 2.0 - 4.0 g/dL    A-G Ratio 1.0 0.8 - 1.7     CBC WITH AUTOMATED DIFF    Collection Time: 03/14/17  4:30 AM   Result Value Ref Range    WBC 12.7 4.6 - 13.2 K/uL    RBC 4.07 (L) 4.20 - 5.30 M/uL    HGB 13.1 12.0 - 16.0 g/dL    HCT 39.7 35.0 - 45.0 %    MCV 97.5 (H) 74.0 - 97.0 FL    MCH 32.2 24.0 - 34.0 PG    MCHC 33.0 31.0 - 37.0 g/dL    RDW 13.8 11.6 - 14.5 %    PLATELET 952 311 - 319 K/uL    MPV 12.0 (H) 9.2 - 11.8 FL    NEUTROPHILS 87 (H) 40 - 73 %    LYMPHOCYTES 10 (L) 21 - 52 %    MONOCYTES 3 3 - 10 %    EOSINOPHILS 0 0 - 5 %    BASOPHILS 0 0 - 2 %    ABS. NEUTROPHILS 11.1 (H) 1.8 - 8.0 K/UL    ABS. LYMPHOCYTES 1.3 0.9 - 3.6 K/UL    ABS. MONOCYTES 0.3 0.05 - 1.2 K/UL    ABS. EOSINOPHILS 0.0 0.0 - 0.4 K/UL    ABS. BASOPHILS 0.0 0.0 - 0.06 K/UL    DF AUTOMATED     GLUCOSE, POC    Collection Time: 03/14/17  6:07 AM   Result Value Ref Range    Glucose (POC) 167 (H) 70 - 110 mg/dL             Telemetry:Sinus tachycardia    Imaging:  CXR 3/5  The cardiomediastinal silhouette is stable. Bilateral mid to lower lung field  increased markings are again seen. The lung fields are hyperinflated. There is  no focal consolidation. There is no significant pleural effusion. There is no  acute osseous abnormality.        --------------  IMPRESSION  Impression:  --------------  COPD.     Bilateral mid to lower lung field increased interstitial markings similar to  prior which could represent bronchitis/interstitial pneumonitis or a chronic  interstitial process.      No focal consolidation.     No significant interval change. CTA Chest   FINDINGS:     EXAM QUALITY: Overall exam quality is suboptimal. Opacification of the pulmonary  arteries is somewhat suboptimal although diagnostic.  There is significant  respiratory motion artifact which significantly degrades the images particularly  in the lower lung fields.     PULMONARY ARTERIES: No evidence of pulmonary embolism.     MEDIASTINUM: Heart size is normal. No evidence of right heart strain. No  pericardial effusion. Mild atherosclerotic changes of the aorta. Esophagus is  unremarkable.     LYMPH NODES: No enlarged nodes.     AIRWAY: Normal.     LUNGS: Significant emphysematous changes most pronounced in the upper lobes. Bilateral pulmonary nodules. Largest is in the left lower lobe on image 86  measuring 11 x 11 mm. A second possible left lower lobe nodule on image 91  measures 7 x 6 mm. Right lower lobe nodule image 83 measures 12 x 6 mm.     PLEURA: Normal. Specifically, no pneumothorax or pleural effusion.     UPPER ABDOMEN: There is an exophytic possibly solid nodule arising from the  upper pole of the left kidney. This measures 11 x 12 mm. Upper abdominal  structures are otherwise unremarkable. .     OTHER: No acute or aggressive osseous abnormalities identified.     _______________     IMPRESSION:  1. Study is degraded by respiratory motion artifact however there are no  definite pulmonary emboli. 2. Emphysema.   3. Bilateral pulmonary nodules as described above with the largest in the left  lower lobe measuring 11 x 11 mm.  4. Possible solid exophytic left upper pole renal mass.            Rip Gates MD

## 2017-03-14 NOTE — PROGRESS NOTES
Hospitalist Progress Note    Patient: Feliciano Sorto MRN: [id number]  CSN: [id number]    YOB: 1955  Age: 64 y.o. Sex: female    DOA: 3/4/2017 LOS:  LOS: 10 days            Assessment/Plan     1. Acute respiratory failure due to COPD exacerbation, improving  2. Elevated troponin on admission  3. Anxiety  4. Pulmonary nodules  5. Kidney lesion  6. PTSD  7. 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 Cardiovascular: No chest pain or pressure. No palpitations. Pulmonary: No shortness of breath. Gastrointestinal: No nausea, vomiting. Objective:        Vital signs/Intake and Output:  Visit Vitals    /76 (BP 1 Location: Left arm, BP Patient Position: At rest)    Pulse 63    Temp 97.8 °F (36.6 °C)    Resp 17    Ht 5' 8\" (1.727 m)    Wt 68.1 kg (150 lb 2.1 oz)    SpO2 97%    BMI 22.83 kg/m2     Current Shift:  03/13 1901 - 03/14 0700  In: 240 [P.O.:240]  Out: 2781 [B:9880]  Last three shifts:  03/12 0701 - 03/13 1900  In: 1150 [P.O.:1000; I.V.:150]  Out: 2850 [Urine:2850]    Body mass index is 22.83 kg/(m^2).     Physical Exam:  GEN: Alert and oriented times three NAD  EYES: conjunctiva normal, lids with out lesions  HEENT: MMM, No thyromegaly, no lymphadenopathy  HEART: RRR +S1 +S2, no JVD, pulses 2+ distally  LUNGS: scattered expiratory wheezes, no rales or rhonchi  ABDOMEN: + BS, soft NT/ND no organomegaly,  No rebound  EXTREMITIES: No edema cyanosis, cap refill normal   SKIN: no rashes or skin breakdown, no nodules, normal turgor  Current Facility-Administered Medications   Medication Dose Route Frequency    HYDROcodone-acetaminophen (NORCO) 5-325 mg per tablet 1 Tab  1 Tab Oral Q8H    predniSONE (DELTASONE) tablet 30 mg  30 mg Oral DAILY WITH BREAKFAST    HYDROmorphone (PF) (DILAUDID) injection 1 mg  1 mg IntraVENous Q6H PRN    insulin glargine (LANTUS) injection 12 Units  12 Units SubCUTAneous DAILY    pantoprazole (PROTONIX) tablet 40 mg  40 mg Oral ACB    lip protectant with sunscreen (CARMEX)   Topical PRN    gabapentin (NEURONTIN) capsule 300 mg  300 mg Oral BID    dronabinol (MARINOL) capsule 5 mg  5 mg Oral BID    folic acid (FOLVITE) tablet 1 mg  1 mg Oral DAILY    Thiamine Mononitrate (B-1) tablet 100 mg  100 mg Oral DAILY    insulin lispro (HUMALOG) injection   SubCUTAneous AC&HS    baclofen (LIORESAL) tablet 5 mg  5 mg Oral TID    lidocaine (LIDODERM) 5 % patch 1 Patch  1 Patch TransDERmal Q12H    ELECTROLYTE REPLACEMENT PROTOCOL - POTASSIUM  1 Each Other PRN    ELECTROLYTE REPLACEMENT PROTOCOL - MAGNESIUM  1 Each Other PRN    midazolam (VERSED) injection 1-2 mg  1-2 mg IntraVENous Q2H PRN    levalbuterol (XOPENEX) nebulizer soln 1.25 mg/0.5 ml  1.25 mg Nebulization Q4H PRN    haloperidol lactate (HALDOL) injection 5 mg  5 mg IntraVENous Q6H PRN    aspirin chewable tablet 81 mg  81 mg Oral DAILY    atorvastatin (LIPITOR) tablet 20 mg  20 mg Oral DAILY    escitalopram oxalate (LEXAPRO) tablet 20 mg  20 mg Oral DAILY    chlorhexidine (PERIDEX) 0.12 % mouthwash 10 mL  10 mL Oral Q12H    arformoterol (BROVANA) neb solution 15 mcg  15 mcg Nebulization BID RT    nicotine (NICODERM CQ) 14 mg/24 hr patch 1 Patch  1 Patch TransDERmal DAILY    acetaminophen (TYLENOL) tablet 650 mg  650 mg Oral Q4H PRN    enoxaparin (LOVENOX) injection 40 mg  40 mg SubCUTAneous Q24H    glucose chewable tablet 16 g  4 Tab Oral PRN    glucagon (GLUCAGEN) injection 1 mg  1 mg IntraMUSCular PRN    dextrose (D50W) injection syrg 12.5-25 g  25-50 mL IntraVENous PRN    budesonide (PULMICORT) 500 mcg/2 ml nebulizer suspension  500 mcg Nebulization BID RT    sodium chloride (OCEAN) 0.65 % nasal spray 2 Spray  2 Spray Both Nostrils Q2H PRN         All the patient's labs over the past 24 hours were reviewed both during my initial daily workflow process and at the time notated as \"note time\" in [facility]. (It is not time stamped separately in this workflow.)  Select labs are listed below.         Labs: Results:       Chemistry Recent Labs      03/14/17   0430 03/13/17   0730  03/12/17   0500   GLU  212*  110*  161*   NA  144  144  147*   K  3.8  3.9  4.1   CL  104  105  106   CO2  40*  40*  43*   BUN  17  20*  27*   CREA  0.71  0.68  0.70   CA  8.7  9.0  8.4*   AGAP  0*  NEG 1  NEG 2   BUCR  24*  29*  39*   AP  93  77  60   TP  5.6*  5.9*  4.9*   ALB  2.8*  3.0*  2.4*   GLOB  2.8  2.9  2.5   AGRAT  1.0  1.0  1.0      CBC w/Diff Recent Labs      03/14/17   0430  03/13/17   0730  03/12/17   0500   WBC  12.7  19.2*  11.5   RBC  4.07*  4.40  3.86*   HGB  13.1  14.1  12.4   HCT  39.7  43.0  37.4   PLT  185  233  196   GRANS  87*  76*  93*   LYMPH  10*  12*  3*   EOS  0  0  1                      Liver Enzymes Recent Labs      03/14/17   0430   TP  5.6*   ALB  2.8*   AP  93   SGOT  33      Thyroid Studies Lab Results   Component Value Date/Time    TSH 0.05 03/04/2017 04:25 PM        Procedures/imaging: see electronic medical records for all procedures/Xrays and details which were not copied into this note but were reviewed prior to creation of David 98, DO  Internal Medicine/Geriatrics

## 2017-03-14 NOTE — ROUTINE PROCESS
Bedside shift change report given to NICCI Styles (oncoming nurse) by Rossi See. Rosa Dacosta (offgoing nurse). Report included the following information SBAR and Kardex.

## 2017-03-14 NOTE — INTERDISCIPLINARY ROUNDS
The Interdisciplinary Team attempted to round in the patient's room to discuss the plan of care but found that the spa team was working with the patient.

## 2017-03-14 NOTE — ROUTINE PROCESS
Bedside and Verbal shift change report given to Lexa Hargrove RN (oncoming nurse) by Hanane Delaney RN   (offgoing nurse). Report included the following information SBAR, Kardex, Intake/Output, MAR and Recent Results.

## 2017-03-14 NOTE — PROGRESS NOTES
0800 Assumed care of patient from [facility] Vjrola Styles. 0940 Dilaudid 1 mg iv push prn given for complaints of lower back pain. See doc-flow sheet for follow up. 1435 Left IJ triple lumen CVL removed per md request. PIV 22g obtained to right AC. CVL tip intact, no signs of bleeding or hematoma, daughter at bedside, instructed patient not to get out of bed unit 1510.    1553 Dilaudid 1 mg iv push prn given for complaints of lower back pain. See doc-flow sheet for follow up. 1830 Patient had a uneventful shift.

## 2017-03-14 NOTE — PROGRESS NOTES
Cm met and spoke with patient regarding discharge plan,pt decided to use [facility] instead of [facility] [address] completed, when discharge pt plans to follow up with Barbara Harden.

## 2017-03-14 NOTE — PROGRESS NOTES
Shift summary: on 3 li O2, no dyspnea noted, slept most of the night, ambulated to bathroom with assist from . Complaint of back pain managed with Dilaudid IV prn and scheduled Norco. Blood drawn from TLC on L IJ. Encouraged use of IS.     Patient Vitals for the past 8 hrs:   Temp Pulse Resp BP SpO2   03/14/17 0816 98.7 °F (37.1 °C) 74 18 129/72 100 %   03/14/17 0759 - - - - 97 %   03/14/17 0319 97.8 °F (36.6 °C) 63 17 134/76 97 %

## 2017-03-14 NOTE — PROGRESS NOTES
Problem: Mobility Impaired (Adult and Pediatric)  Goal: *Acute Goals and Plan of Care (Insert Text)  Physical Therapy Goals  Initiated 3/11/2017 and to be accomplished within 7 day(s)  1. Patient will move from supine to sit and sit to supine in bed with supervision/set-up. 2. Patient will transfer from bed to chair and chair to bed with supervision/set-up using the least restrictive device. 3. Patient will perform sit to stand with supervision/set-up. 4. Patient will ambulate with CGA for >50 feet with the least restrictive device. Outcome: Progressing Towards Goal  PHYSICAL THERAPY TREATMENT     Patient: Marquita Deleon (1 y.o. female)  Date: 3/14/2017  Diagnosis: Respiratory insufficiency  Abnormal EKG  Mass of sinus  Sinusitis  Respiratory insufficiency  Abnormal EKG  Mass of sinus  Sinusitis COPD (chronic obstructive pulmonary disease) with emphysema (HCC)       Precautions: Fall   Chart, physical therapy assessment, plan of care and goals were reviewed. ASSESSMENT:  Pt progressing well with transfer strength and ambulation quality. Pt maintain 99% SPO2 on 3L NC and requires only 4 standing rest breaks to complete 400' ambulation  Progression toward goals:  [ ]      Improving appropriately and progressing toward goals  [X]      Improving slowly and progressing toward goals  [ ]      Not making progress toward goals and plan of care will be adjusted       PLAN:  Patient continues to benefit from skilled intervention to address the above impairments. Continue treatment per established plan of care. Discharge Recommendations:  Home Health  Further Equipment Recommendations for Discharge:  bedside commode and rolling walker       SUBJECTIVE:   Patient stated I'm ready to go now.       OBJECTIVE DATA SUMMARY:   Critical Behavior:  Neurologic State: Alert  Orientation Level: Oriented X4  Cognition: Appropriate decision making, Appropriate for age attention/concentration, Appropriate safety awareness, Follows commands  Safety/Judgement: Awareness of environment, Fall prevention  Functional Mobility Training:  Transfers:  Sit to Stand: Stand-by asssistance  Stand to Sit: Supervision  Balance:  Sitting: Intact  Standing: Impaired; With support  Standing - Static: Good  Standing - Dynamic : Fair  Ambulation/Gait Training:  Distance (ft): 400 Feet (ft)  Assistive Device: Gait belt;Walker, rolling  Ambulation - Level of Assistance: Supervision  Gait Abnormalities: Decreased step clearance;Shuffling gait  Base of Support: Widened  Speed/Aliya: Slow;Shuffled  Step Length: Right shortened;Left shortened  Swing Pattern: Right asymmetrical;Left asymmetrical  Pain:  Pain Scale 1: Numeric (0 - 10)  Pain Intensity 1: 7  Pain Location 1: Back  Pain Orientation 1: Lower  Pain Intervention(s) 1: Medication (see MAR)  Activity Tolerance:   Fair  Please refer to the flowsheet for vital signs taken during this treatment.   After treatment:   [ ] Patient left in no apparent distress sitting up in chair  [X] Patient left in no apparent distress in bed  [X] Call bell left within reach  [X] Nursing notified  [ ] Caregiver present  [ ] Bed alarm activated      Amber Freitas PTA   Time Calculation: 44 mins

## 2017-03-15 LAB
ALBUMIN SERPL BCP-MCNC: 2.8 G/DL (ref 3.4–5)
ALBUMIN/GLOB SERPL: 1 {RATIO} (ref 0.8–1.7)
ALP SERPL-CCNC: 88 U/L (ref 45–117)
ALT SERPL-CCNC: 86 U/L (ref 13–56)
ANION GAP BLD CALC-SCNC: 3 MMOL/L (ref 3–18)
AST SERPL W P-5'-P-CCNC: 40 U/L (ref 15–37)
BASOPHILS # BLD AUTO: 0 K/UL (ref 0–0.06)
BASOPHILS # BLD: 0 % (ref 0–2)
BILIRUB SERPL-MCNC: 0.9 MG/DL (ref 0.2–1)
BUN SERPL-MCNC: 15 MG/DL (ref 7–18)
BUN/CREAT SERPL: 25 (ref 12–20)
CALCIUM SERPL-MCNC: 8.6 MG/DL (ref 8.5–10.1)
CHLORIDE SERPL-SCNC: 104 MMOL/L (ref 100–108)
CO2 SERPL-SCNC: 37 MMOL/L (ref 21–32)
CREAT SERPL-MCNC: 0.61 MG/DL (ref 0.6–1.3)
DIFFERENTIAL METHOD BLD: ABNORMAL
EOSINOPHIL # BLD: 0.2 K/UL (ref 0–0.4)
EOSINOPHIL NFR BLD: 1 % (ref 0–5)
ERYTHROCYTE [DISTWIDTH] IN BLOOD BY AUTOMATED COUNT: 13.8 % (ref 11.6–14.5)
GLOBULIN SER CALC-MCNC: 2.9 G/DL (ref 2–4)
GLUCOSE BLD STRIP.AUTO-MCNC: 164 MG/DL (ref 70–110)
GLUCOSE BLD STRIP.AUTO-MCNC: 273 MG/DL (ref 70–110)
GLUCOSE BLD STRIP.AUTO-MCNC: 320 MG/DL (ref 70–110)
GLUCOSE BLD STRIP.AUTO-MCNC: 476 MG/DL (ref 70–110)
GLUCOSE BLD STRIP.AUTO-MCNC: 73 MG/DL (ref 70–110)
GLUCOSE SERPL-MCNC: 107 MG/DL (ref 74–99)
HCT VFR BLD AUTO: 40.1 % (ref 35–45)
HGB BLD-MCNC: 13.2 G/DL (ref 12–16)
LYMPHOCYTES # BLD AUTO: 12 % (ref 21–52)
LYMPHOCYTES # BLD: 1.5 K/UL (ref 0.9–3.6)
MAGNESIUM SERPL-MCNC: 2 MG/DL (ref 1.8–2.4)
MCH RBC QN AUTO: 31.9 PG (ref 24–34)
MCHC RBC AUTO-ENTMCNC: 32.9 G/DL (ref 31–37)
MCV RBC AUTO: 96.9 FL (ref 74–97)
MONOCYTES # BLD: 1.4 K/UL (ref 0.05–1.2)
MONOCYTES NFR BLD AUTO: 11 % (ref 3–10)
NEUTS SEG # BLD: 9.5 K/UL (ref 1.8–8)
NEUTS SEG NFR BLD AUTO: 76 % (ref 40–73)
PLATELET # BLD AUTO: 184 K/UL (ref 135–420)
PMV BLD AUTO: 12.3 FL (ref 9.2–11.8)
POTASSIUM SERPL-SCNC: 3.5 MMOL/L (ref 3.5–5.5)
PROT SERPL-MCNC: 5.7 G/DL (ref 6.4–8.2)
RBC # BLD AUTO: 4.14 M/UL (ref 4.2–5.3)
SODIUM SERPL-SCNC: 144 MMOL/L (ref 136–145)
WBC # BLD AUTO: 12.5 K/UL (ref 4.6–13.2)

## 2017-03-15 PROCEDURE — 94640 AIRWAY INHALATION TREATMENT: CPT

## 2017-03-15 PROCEDURE — 74011250637 HC RX REV CODE- 250/637: Performed by: INTERNAL MEDICINE

## 2017-03-15 PROCEDURE — 36415 COLL VENOUS BLD VENIPUNCTURE: CPT | Performed by: PHYSICIAN ASSISTANT

## 2017-03-15 PROCEDURE — 74011250636 HC RX REV CODE- 250/636: Performed by: INTERNAL MEDICINE

## 2017-03-15 PROCEDURE — 85025 COMPLETE CBC W/AUTO DIFF WBC: CPT | Performed by: PHYSICIAN ASSISTANT

## 2017-03-15 PROCEDURE — 74011250637 HC RX REV CODE- 250/637: Performed by: FAMILY MEDICINE

## 2017-03-15 PROCEDURE — 94760 N-INVAS EAR/PLS OXIMETRY 1: CPT

## 2017-03-15 PROCEDURE — 82962 GLUCOSE BLOOD TEST: CPT

## 2017-03-15 PROCEDURE — 74011250637 HC RX REV CODE- 250/637: Performed by: PHYSICIAN ASSISTANT

## 2017-03-15 PROCEDURE — 74011636637 HC RX REV CODE- 636/637: Performed by: HOSPITALIST

## 2017-03-15 PROCEDURE — 83735 ASSAY OF MAGNESIUM: CPT | Performed by: PHYSICIAN ASSISTANT

## 2017-03-15 PROCEDURE — 80053 COMPREHEN METABOLIC PANEL: CPT | Performed by: PHYSICIAN ASSISTANT

## 2017-03-15 PROCEDURE — 74011000250 HC RX REV CODE- 250: Performed by: HOSPITALIST

## 2017-03-15 PROCEDURE — 65660000000 HC RM CCU STEPDOWN

## 2017-03-15 PROCEDURE — 74011000250 HC RX REV CODE- 250: Performed by: INTERNAL MEDICINE

## 2017-03-15 PROCEDURE — 97116 GAIT TRAINING THERAPY: CPT

## 2017-03-15 PROCEDURE — 77010033678 HC OXYGEN DAILY

## 2017-03-15 PROCEDURE — 74011000250 HC RX REV CODE- 250: Performed by: PHYSICIAN ASSISTANT

## 2017-03-15 PROCEDURE — 74011250637 HC RX REV CODE- 250/637: Performed by: HOSPITALIST

## 2017-03-15 RX ORDER — LIDOCAINE HYDROCHLORIDE 20 MG/ML
15 SOLUTION OROPHARYNGEAL AS NEEDED
Status: DISCONTINUED | OUTPATIENT
Start: 2017-03-15 | End: 2017-03-16 | Stop reason: HOSPADM

## 2017-03-15 RX ORDER — NYSTATIN 100000 [USP'U]/ML
500000 SUSPENSION ORAL 4 TIMES DAILY
Status: DISCONTINUED | OUTPATIENT
Start: 2017-03-15 | End: 2017-03-16 | Stop reason: HOSPADM

## 2017-03-15 RX ORDER — ACYCLOVIR 50 MG/G
OINTMENT TOPICAL
Status: DISCONTINUED | OUTPATIENT
Start: 2017-03-15 | End: 2017-03-16 | Stop reason: HOSPADM

## 2017-03-15 RX ORDER — PREDNISONE 20 MG/1
20 TABLET ORAL
Status: DISCONTINUED | OUTPATIENT
Start: 2017-03-15 | End: 2017-03-16 | Stop reason: HOSPADM

## 2017-03-15 RX ADMIN — CHLORHEXIDINE GLUCONATE 10 ML: 1.2 RINSE ORAL at 09:40

## 2017-03-15 RX ADMIN — ACYCLOVIR: 50 OINTMENT TOPICAL at 19:07

## 2017-03-15 RX ADMIN — HYDROCODONE BITARTRATE AND ACETAMINOPHEN 1 TABLET: 5; 325 TABLET ORAL at 14:36

## 2017-03-15 RX ADMIN — ARFORMOTEROL TARTRATE 15 MCG: 15 SOLUTION RESPIRATORY (INHALATION) at 07:14

## 2017-03-15 RX ADMIN — FOLIC ACID 1 MG: 1 TABLET ORAL at 09:41

## 2017-03-15 RX ADMIN — NYSTATIN 500000 UNITS: 100000 SUSPENSION ORAL at 12:38

## 2017-03-15 RX ADMIN — GABAPENTIN 300 MG: 300 CAPSULE ORAL at 09:41

## 2017-03-15 RX ADMIN — DRONABINOL 5 MG: 2.5 CAPSULE ORAL at 11:42

## 2017-03-15 RX ADMIN — HYDROCODONE BITARTRATE AND ACETAMINOPHEN 1 TABLET: 5; 325 TABLET ORAL at 21:56

## 2017-03-15 RX ADMIN — PANTOPRAZOLE SODIUM 40 MG: 40 TABLET, DELAYED RELEASE ORAL at 07:30

## 2017-03-15 RX ADMIN — BUDESONIDE 500 MCG: 0.5 INHALANT RESPIRATORY (INHALATION) at 07:14

## 2017-03-15 RX ADMIN — HYDROMORPHONE HYDROCHLORIDE 1 MG: 1 INJECTION, SOLUTION INTRAMUSCULAR; INTRAVENOUS; SUBCUTANEOUS at 12:38

## 2017-03-15 RX ADMIN — ENOXAPARIN SODIUM 40 MG: 40 INJECTION SUBCUTANEOUS at 12:38

## 2017-03-15 RX ADMIN — NYSTATIN 500000 UNITS: 100000 SUSPENSION ORAL at 21:56

## 2017-03-15 RX ADMIN — NYSTATIN 500000 UNITS: 100000 SUSPENSION ORAL at 09:51

## 2017-03-15 RX ADMIN — DIPHENHYDRAMINE HYDROCHLORIDE AND LIDOCAINE HYDROCHLORIDE AND ALUMINUM HYDROXIDE AND MAGNESIUM HYDRO 5 ML: KIT at 10:03

## 2017-03-15 RX ADMIN — ASPIRIN 81 MG 81 MG: 81 TABLET ORAL at 09:41

## 2017-03-15 RX ADMIN — HYDROMORPHONE HYDROCHLORIDE 1 MG: 1 INJECTION, SOLUTION INTRAMUSCULAR; INTRAVENOUS; SUBCUTANEOUS at 19:07

## 2017-03-15 RX ADMIN — BACLOFEN 5 MG: 10 TABLET ORAL at 09:41

## 2017-03-15 RX ADMIN — BACLOFEN 5 MG: 10 TABLET ORAL at 21:56

## 2017-03-15 RX ADMIN — INSULIN LISPRO 3 UNITS: 100 INJECTION, SOLUTION INTRAVENOUS; SUBCUTANEOUS at 12:38

## 2017-03-15 RX ADMIN — DRONABINOL 5 MG: 2.5 CAPSULE ORAL at 22:27

## 2017-03-15 RX ADMIN — INSULIN LISPRO 15 UNITS: 100 INJECTION, SOLUTION INTRAVENOUS; SUBCUTANEOUS at 19:22

## 2017-03-15 RX ADMIN — LIDOCAINE HYDROCHLORIDE 15 ML: 20 SOLUTION ORAL; TOPICAL at 10:44

## 2017-03-15 RX ADMIN — INSULIN LISPRO 9 UNITS: 100 INJECTION, SOLUTION INTRAVENOUS; SUBCUTANEOUS at 21:51

## 2017-03-15 RX ADMIN — ACYCLOVIR: 50 OINTMENT TOPICAL at 14:37

## 2017-03-15 RX ADMIN — NYSTATIN 500000 UNITS: 100000 SUSPENSION ORAL at 19:07

## 2017-03-15 RX ADMIN — DIPHENHYDRAMINE HYDROCHLORIDE AND LIDOCAINE HYDROCHLORIDE AND ALUMINUM HYDROXIDE AND MAGNESIUM HYDRO 5 ML: KIT at 02:27

## 2017-03-15 RX ADMIN — Medication 100 MG: at 09:41

## 2017-03-15 RX ADMIN — ESCITALOPRAM OXALATE 20 MG: 10 TABLET ORAL at 09:41

## 2017-03-15 RX ADMIN — PREDNISONE 20 MG: 20 TABLET ORAL at 11:42

## 2017-03-15 RX ADMIN — BACLOFEN 5 MG: 10 TABLET ORAL at 19:07

## 2017-03-15 RX ADMIN — ATORVASTATIN CALCIUM 20 MG: 20 TABLET, FILM COATED ORAL at 09:41

## 2017-03-15 RX ADMIN — HYDROCODONE BITARTRATE AND ACETAMINOPHEN 1 TABLET: 5; 325 TABLET ORAL at 02:26

## 2017-03-15 RX ADMIN — ACYCLOVIR: 50 OINTMENT TOPICAL at 22:28

## 2017-03-15 RX ADMIN — GABAPENTIN 300 MG: 300 CAPSULE ORAL at 21:56

## 2017-03-15 RX ADMIN — HYDROMORPHONE HYDROCHLORIDE 1 MG: 1 INJECTION, SOLUTION INTRAMUSCULAR; INTRAVENOUS; SUBCUTANEOUS at 06:29

## 2017-03-15 RX ADMIN — CHLORHEXIDINE GLUCONATE 10 ML: 1.2 RINSE ORAL at 21:57

## 2017-03-15 NOTE — PROGRESS NOTES
Alarm parameters reviewed, on and audible Appropriate for patient clinical condition     Pt's heart rate goes up into the 140s with ambulation, but pt remains sinus rhythm at rest. Pt is now completely off of supplemental oxygen. She remains on room air with acceptable saturation. Pt's pain well controlled with dilaudid. Family at the bedside for much of the day. Pt planning on discharge tomorrow. 1900: Pt's blood glucose is 476. Pt admits that she had some jellybeans around 1600. Pt educated on the importance of avoiding extra carbs as her blood glucose needs to stay within appropriate levels. Dr. Maki Delcid paged for orders. Dr. Maki Delcid returned phone call and stated that pt's sliding scale of 15 units will be appropriate for pt's glucose at this time.

## 2017-03-15 NOTE — CONSULTS
Patient was seen. Will dictate consult note.  May discharge and I will see in office and arrange further cardiac eval.    Sujata Crystal MD

## 2017-03-15 NOTE — PROGRESS NOTES
2005: Assumed care of pt; Arrived to find pt. visiting with son and spouse; No acute distress noted; status stable    2015: Pt. C/o painful sores to lips and oral cavity; No thrush noted on assessment; Pt. Requesting magic mouth; Dr. Syd Negro made aware, states she will place order for magic mouth; will administer will available    0601: Shift Summary: Shift otherwise uneventful; Vital signs remained at pt.'s baseline throughout shift; Pain addressed with scheduled and PRN meds; Minimal dyspnea present on exertion, pt. tolerating 1.5L via NC; bilateral lung sounds diminished on auscultation; Call bell left at reach; bed at lowest position; and wheels locked    Bedside shift change report given to CORA Arreola (oncoming nurse) by NICCI Martinez (offgoing nurse). Report included the following information SBAR and Kardex.

## 2017-03-15 NOTE — PROGRESS NOTES
Hospitalist Progress Note    Patient: Miguelangel Barboza MRN: [id number]  CSN: [id number]    YOB: 1955  Age: 64 y.o. Sex: female    DOA: 3/4/2017 LOS:  LOS: 11 days          Chief Complaint:    Acute resp failure    Assessment/Plan     1. Acute respiratory failure due to COPD exacerbation, improved-stop reg nebs, wean off 02, ambulate w/o 02  2. Elevated troponin on admission-will ask cardiology if stress can be done as outpatient or complete here prior to d/c?  3. Anxiety-stable for now  4. Pulmonary nodules-further repeat scan will be needed in next 4-6 weeks  5. Kidney lesion-follow up also as outpatient with urology  6. PTSD  7.  Chronic pain    NEW:oral lesions, herpetic on lip, canker sore in mouth-add viscous lidocaine PRN, nystatin S&S for prob thrush, as well as continue magic mouthwash as needed    Today, will do PT w/o 02, ambulate, treat oral lesions, encourage PO, stop lantus as pred is weaned and BS are down-A1C 6.2%-check BS off insulin    D/c planing for 3/16    Patient Active Problem List   Diagnosis Code    Respiratory insufficiency R06.89    Sinusitis J32.9    Mass of sinus R22.0    COPD (chronic obstructive pulmonary disease) with emphysema ([facility] J43.9    Hypokalemia E87.6    Transaminitis R74.0    Nodule of kidney N28.89    COPD with acute exacerbation (HCC) J44.1    PTSD (post-traumatic stress disorder) F43.10    Acute respiratory failure (HCC) J96.00       Subjective:    Feeling better except for mouth pain, sores on lip and inside mouth, pain with swallowing  Denies new resp issue  Breathing much better  Pain in back relieved with norco    Review of systems:    Constitutional: denies fevers, chills, myalgias  Respiratory: denies SOB, cough  Cardiovascular: denies chest pain, palpitations  Gastrointestinal: denies nausea, vomiting, diarrhea      Vital signs/Intake and Output:  Visit Vitals    /72 (BP 1 Location: Left arm, BP Patient Position: Sitting)    Pulse 94    Temp 99 °F (37.2 °C)    Resp 14    Ht 5' 8\" (1.727 m)    Wt 66.4 kg (146 lb 6.4 oz)    SpO2 100%    BMI 22.26 kg/m2     Current Shift:  03/15 0701 - 03/15 1900  In: 420 [P.O.:420]  Out: -   Last three shifts:  03/13 1901 - 03/15 0700  In: 1440 [P.O.:1440]  Out: 4375 [Urine:4375]    Exam:    General: Well developed, alert, NAD, OX3  Canker sore right tongue, ulcer lower left lip  CVS:Regular rate and rhythm, no M/R/G, S1/S2 heard, no thrill  Lungs:Clear to auscultation bilaterally, no wheezes, rhonchi, or rales  Abdomen: Soft, Nontender, No distention, Normal Bowel sounds, No hepatomegaly  Extremities: No C/C/E, pulses palpable 2+  Neuro:grossly normal , follows commands  Psych:appropriate                Labs: Results:       Chemistry Recent Labs      03/15/17   0716  03/14/17   0430  03/13/17   0730   GLU  107*  212*  110*   NA  144  144  144   K  3.5  3.8  3.9   CL  104  104  105   CO2  37*  40*  40*   BUN  15  17  20*   CREA  0.61  0.71  0.68   CA  8.6  8.7  9.0   AGAP  3  0*  NEG 1   BUCR  25*  24*  29*   AP  88  93  77   TP  5.7*  5.6*  5.9*   ALB  2.8*  2.8*  3.0*   GLOB  2.9  2.8  2.9   AGRAT  1.0  1.0  1.0      CBC w/Diff Recent Labs      03/15/17   0716  03/14/17   0430  03/13/17   0730   WBC  12.5  12.7  19.2*   RBC  4.14*  4.07*  4.40   HGB  13.2  13.1  14.1   HCT  40.1  39.7  43.0   PLT  184  185  233   GRANS  76*  87*  76*   LYMPH  12*  10*  12*   EOS  1  0  0      Cardiac Enzymes No results for input(s): CPK, CKND1, NAUN in the last 72 hours. No lab exists for component: CKRMB, TROIP   Coagulation No results for input(s): PTP, INR, APTT in the last 72 hours. No lab exists for component: INREXT    Lipid Panel No results found for: CHOL, CHOLPOCT, CHOLX, CHLST, CHOLV, [id number], HDL, LDL, NLDLCT, DLDL, LDLC, DLDLP, [id number], VLDLC, VLDL, TGL, TGLX, TRIGL, [id number], TRIGP, TGLPOCT, [id number], CHHD, CHHDX   BNP No results for input(s): BNPP in the last 72 hours.    Liver Enzymes Recent Labs 03/15/17   0716   TP  5.7*   ALB  2.8*   AP  88   SGOT  40*      Thyroid Studies Lab Results   Component Value Date/Time    TSH 0.05 03/04/2017 04:25 PM        Procedures/imaging: see electronic medical records for all procedures/Xrays and details which were not copied into this note but were reviewed prior to creation of Mc Camara MD

## 2017-03-15 NOTE — PROGRESS NOTES
0940  Assumed care of pt at this time. Assessment complete. Pt alert and oriented x 4 resting in bed. Denies SOB and chest pain. Pt lungs clear but diminshed bilaterally. Pt on NC 2L with no productive cough. Telebox 21 and cont pulse ox in place. Pt reading NSR. Cap refill less than 3 seconds. Pt denies numbness and tingling to all extremities. Stated pain 5/10 to sore in mouth and blister to lip. Pt has 22 G IV to R AC.  2x2 gauze with transparent film to left neck from previous IJ. Pt encouraged to continue use of IS. Pt verbalized understanding. Call light and possessions within reach. Bed in low position. Will continue to monitor. Plan for pt is to work with PT, titrating NC 2L O2 down to RA without complication. Pt will possibly d/c home tomm      1048  Pt sitting up in chair. Pt declines any needs at this time. 1130  Monitor tech stated pt is currently reading sinus rhythm on telebox 21.

## 2017-03-15 NOTE — PROGRESS NOTES
Problem: Mobility Impaired (Adult and Pediatric)  Goal: *Acute Goals and Plan of Care (Insert Text)  Physical Therapy Goals  Initiated 3/11/2017 and to be accomplished within 7 day(s)  1. Patient will move from supine to sit and sit to supine in bed with supervision/set-up. 2. Patient will transfer from bed to chair and chair to bed with supervision/set-up using the least restrictive device. 3. Patient will perform sit to stand with supervision/set-up. 4. Patient will ambulate with CGA for >50 feet with the least restrictive device. Outcome: Progressing Towards Goal  PHYSICAL THERAPY TREATMENT     Patient: Marquita Deleon (39 y.o. female)  Date: 3/15/2017  Diagnosis: Respiratory insufficiency  Abnormal EKG  Mass of sinus  Sinusitis  Respiratory insufficiency  Abnormal EKG  Mass of sinus  Sinusitis COPD (chronic obstructive pulmonary disease) with emphysema (HCC)       Precautions: Fall   Chart, physical therapy assessment, plan of care and goals were reviewed. ASSESSMENT:  The patient participated in ambulation trial while on room air. Patient ambulated about 500 feet using a rolling walker with supervision. Patient becomes fatigued and requires a standing rest break about every 100 feet. Patient is notably short of breath and breaths heavily but appears to recover fairly quickly. O2 saturations averaged from 87-93% but dropped as low as 79% at one point. Patient appears to do well with shorter distances with regards to maintaining O2 saturations. Patient was returned to her room, seated up in a chair. Will continue PT to maximize independence and activity tolerance. Recommend home health at discharge.   Progression toward goals:  [X]      Improving appropriately and progressing toward goals  [ ]      Improving slowly and progressing toward goals  [ ]      Not making progress toward goals and plan of care will be adjusted       PLAN:  Patient continues to benefit from skilled intervention to address the above impairments. Continue treatment per established plan of care. Discharge Recommendations:  Home Health  Further Equipment Recommendations for Discharge:  N/A       SUBJECTIVE:   Patient stated Brian Jarquin told me that the batteries in the monitor needed to be changed.       OBJECTIVE DATA SUMMARY:   Critical Behavior:  Neurologic State: Alert, Appropriate for age  Orientation Level: Oriented X4  Cognition: Appropriate decision making, Appropriate for age attention/concentration, Appropriate safety awareness, Follows commands  Safety/Judgement: Awareness of environment, Fall prevention  Functional Mobility Training:  Bed Mobility:  Transfers:  Sit to Stand: Supervision  Stand to Sit: Supervision  Balance:  Sitting: Intact  Standing: Intact; With support  Standing - Static: Good  Standing - Dynamic : Good  Ambulation/Gait Training:  Distance (ft): 500 Feet (ft)  Assistive Device: Gait belt;Walker, rolling  Ambulation - Level of Assistance: Supervision  Gait Abnormalities: Decreased step clearance;Shuffling gait  Base of Support: Widened  Speed/Aliya: Slow  Step Length: Right shortened;Left shortened  Swing Pattern: Right asymmetrical;Left asymmetrical     Pain:  Pain Scale 1: Numeric (0 - 10)  Pain Intensity 1: 5  Pain Location 1: Back  Pain Orientation 1: Lower  Pain Description 1: Aching  Pain Intervention(s) 1: Medication (see MAR)  Activity Tolerance:   Good/fair   Please refer to the flowsheet for vital signs taken during this treatment.   After treatment:   [ ] Patient left in no apparent distress sitting up in chair  [X] Patient left in no apparent distress in bed  [X] Call bell left within reach  [X] Nursing notified  [ ] Caregiver present  [ ] Bed alarm activated      Humaira Zaragoza   Time Calculation: 33 mins

## 2017-03-15 NOTE — PROGRESS NOTES
Bedside and Verbal shift change report given to NICCI Ribeiro (oncoming nurse) by José Miguel (offgoing nurse).  Report included the following information SBAR, Kardex, Intake/Output, MAR, Recent Results and Cardiac Rhythm NSr.

## 2017-03-15 NOTE — ROUTINE PROCESS
1153  Bedside and Verbal shift change report given to [address] (oncoming nurse) by Kalpesh Ulrich RN (offgoing nurse). Report included the following information SBAR, Kardex, MAR and Recent Results.

## 2017-03-16 VITALS
BODY MASS INDEX: 22.43 KG/M2 | DIASTOLIC BLOOD PRESSURE: 78 MMHG | RESPIRATION RATE: 16 BRPM | SYSTOLIC BLOOD PRESSURE: 127 MMHG | OXYGEN SATURATION: 99 % | WEIGHT: 148 LBS | HEIGHT: 68 IN | TEMPERATURE: 98.5 F | HEART RATE: 94 BPM

## 2017-03-16 LAB
ALBUMIN SERPL BCP-MCNC: 2.9 G/DL (ref 3.4–5)
ALBUMIN/GLOB SERPL: 0.9 {RATIO} (ref 0.8–1.7)
ALP SERPL-CCNC: 106 U/L (ref 45–117)
ALT SERPL-CCNC: 90 U/L (ref 13–56)
ANION GAP BLD CALC-SCNC: 4 MMOL/L (ref 3–18)
AST SERPL W P-5'-P-CCNC: 38 U/L (ref 15–37)
ATRIAL RATE: 101 BPM
BASOPHILS # BLD AUTO: 0 K/UL (ref 0–0.06)
BASOPHILS # BLD: 0 % (ref 0–2)
BILIRUB SERPL-MCNC: 0.9 MG/DL (ref 0.2–1)
BUN SERPL-MCNC: 11 MG/DL (ref 7–18)
BUN/CREAT SERPL: 15 (ref 12–20)
CALCIUM SERPL-MCNC: 9 MG/DL (ref 8.5–10.1)
CALCULATED P AXIS, ECG09: 82 DEGREES
CALCULATED R AXIS, ECG10: 74 DEGREES
CALCULATED T AXIS, ECG11: 97 DEGREES
CHLORIDE SERPL-SCNC: 106 MMOL/L (ref 100–108)
CO2 SERPL-SCNC: 35 MMOL/L (ref 21–32)
CREAT SERPL-MCNC: 0.71 MG/DL (ref 0.6–1.3)
DIAGNOSIS, 93000: NORMAL
DIFFERENTIAL METHOD BLD: ABNORMAL
EOSINOPHIL # BLD: 0.2 K/UL (ref 0–0.4)
EOSINOPHIL NFR BLD: 1 % (ref 0–5)
ERYTHROCYTE [DISTWIDTH] IN BLOOD BY AUTOMATED COUNT: 13.9 % (ref 11.6–14.5)
GLOBULIN SER CALC-MCNC: 3.1 G/DL (ref 2–4)
GLUCOSE BLD STRIP.AUTO-MCNC: 192 MG/DL (ref 70–110)
GLUCOSE BLD STRIP.AUTO-MCNC: 75 MG/DL (ref 70–110)
GLUCOSE SERPL-MCNC: 84 MG/DL (ref 74–99)
HCT VFR BLD AUTO: 42.8 % (ref 35–45)
HGB BLD-MCNC: 14.2 G/DL (ref 12–16)
LYMPHOCYTES # BLD AUTO: 13 % (ref 21–52)
LYMPHOCYTES # BLD: 1.8 K/UL (ref 0.9–3.6)
MAGNESIUM SERPL-MCNC: 2.2 MG/DL (ref 1.8–2.4)
MCH RBC QN AUTO: 32.3 PG (ref 24–34)
MCHC RBC AUTO-ENTMCNC: 33.2 G/DL (ref 31–37)
MCV RBC AUTO: 97.5 FL (ref 74–97)
MONOCYTES # BLD: 1.2 K/UL (ref 0.05–1.2)
MONOCYTES NFR BLD AUTO: 9 % (ref 3–10)
NEUTS SEG # BLD: 10.9 K/UL (ref 1.8–8)
NEUTS SEG NFR BLD AUTO: 77 % (ref 40–73)
P-R INTERVAL, ECG05: 144 MS
PLATELET # BLD AUTO: 197 K/UL (ref 135–420)
PMV BLD AUTO: 12.4 FL (ref 9.2–11.8)
POTASSIUM SERPL-SCNC: 3.7 MMOL/L (ref 3.5–5.5)
PROT SERPL-MCNC: 6 G/DL (ref 6.4–8.2)
Q-T INTERVAL, ECG07: 320 MS
QRS DURATION, ECG06: 74 MS
QTC CALCULATION (BEZET), ECG08: 414 MS
RBC # BLD AUTO: 4.39 M/UL (ref 4.2–5.3)
SODIUM SERPL-SCNC: 145 MMOL/L (ref 136–145)
VENTRICULAR RATE, ECG03: 101 BPM
WBC # BLD AUTO: 14.2 K/UL (ref 4.6–13.2)

## 2017-03-16 PROCEDURE — 74011250637 HC RX REV CODE- 250/637: Performed by: INTERNAL MEDICINE

## 2017-03-16 PROCEDURE — 97535 SELF CARE MNGMENT TRAINING: CPT

## 2017-03-16 PROCEDURE — 97116 GAIT TRAINING THERAPY: CPT

## 2017-03-16 PROCEDURE — 74011250637 HC RX REV CODE- 250/637: Performed by: HOSPITALIST

## 2017-03-16 PROCEDURE — 97530 THERAPEUTIC ACTIVITIES: CPT

## 2017-03-16 PROCEDURE — 80053 COMPREHEN METABOLIC PANEL: CPT | Performed by: PHYSICIAN ASSISTANT

## 2017-03-16 PROCEDURE — 85025 COMPLETE CBC W/AUTO DIFF WBC: CPT | Performed by: PHYSICIAN ASSISTANT

## 2017-03-16 PROCEDURE — 74011000250 HC RX REV CODE- 250: Performed by: HOSPITALIST

## 2017-03-16 PROCEDURE — 74011636637 HC RX REV CODE- 636/637: Performed by: HOSPITALIST

## 2017-03-16 PROCEDURE — 82962 GLUCOSE BLOOD TEST: CPT

## 2017-03-16 PROCEDURE — 74011250637 HC RX REV CODE- 250/637: Performed by: FAMILY MEDICINE

## 2017-03-16 PROCEDURE — 74011250636 HC RX REV CODE- 250/636: Performed by: INTERNAL MEDICINE

## 2017-03-16 PROCEDURE — 74011250637 HC RX REV CODE- 250/637: Performed by: PHYSICIAN ASSISTANT

## 2017-03-16 PROCEDURE — 83735 ASSAY OF MAGNESIUM: CPT | Performed by: PHYSICIAN ASSISTANT

## 2017-03-16 PROCEDURE — 36415 COLL VENOUS BLD VENIPUNCTURE: CPT | Performed by: PHYSICIAN ASSISTANT

## 2017-03-16 RX ORDER — IBUPROFEN 200 MG
1 TABLET ORAL DAILY
Qty: 30 PATCH | Refills: 1 | Status: SHIPPED | OUTPATIENT
Start: 2017-03-16 | End: 2017-04-15

## 2017-03-16 RX ORDER — DRONABINOL 5 MG/1
5 CAPSULE ORAL 2 TIMES DAILY
Qty: 60 CAP | Refills: 1 | Status: SHIPPED | OUTPATIENT
Start: 2017-03-16 | End: 2022-01-31

## 2017-03-16 RX ORDER — ATORVASTATIN CALCIUM 20 MG/1
20 TABLET, FILM COATED ORAL DAILY
Qty: 30 TAB | Refills: 1 | Status: SHIPPED | OUTPATIENT
Start: 2017-03-16 | End: 2022-01-31

## 2017-03-16 RX ORDER — LIDOCAINE HYDROCHLORIDE 20 MG/ML
15 SOLUTION OROPHARYNGEAL AS NEEDED
Qty: 1 BOTTLE | Refills: 0 | Status: ON HOLD | OUTPATIENT
Start: 2017-03-16 | End: 2022-02-07 | Stop reason: CLARIF

## 2017-03-16 RX ORDER — PANTOPRAZOLE SODIUM 40 MG/1
40 TABLET, DELAYED RELEASE ORAL
Qty: 30 TAB | Refills: 0 | Status: SHIPPED | OUTPATIENT
Start: 2017-03-16 | End: 2022-01-31

## 2017-03-16 RX ORDER — ESCITALOPRAM OXALATE 20 MG/1
20 TABLET ORAL DAILY
Qty: 30 TAB | Refills: 1 | Status: SHIPPED | OUTPATIENT
Start: 2017-03-16 | End: 2022-01-31

## 2017-03-16 RX ORDER — PREDNISONE 20 MG/1
20 TABLET ORAL
Qty: 5 TAB | Refills: 0 | Status: ON HOLD | OUTPATIENT
Start: 2017-03-16 | End: 2022-02-07 | Stop reason: CLARIF

## 2017-03-16 RX ORDER — NYSTATIN 100000 [USP'U]/ML
500000 SUSPENSION ORAL 4 TIMES DAILY
Qty: 120 ML | Refills: 0 | Status: SHIPPED | OUTPATIENT
Start: 2017-03-16 | End: 2017-03-23

## 2017-03-16 RX ORDER — GABAPENTIN 300 MG/1
300 CAPSULE ORAL 2 TIMES DAILY
Qty: 60 CAP | Refills: 0 | Status: SHIPPED | OUTPATIENT
Start: 2017-03-16 | End: 2022-01-31

## 2017-03-16 RX ORDER — ASPIRIN 325 MG/1
100 TABLET, FILM COATED ORAL DAILY
Qty: 30 TAB | Refills: 1 | Status: ON HOLD | OUTPATIENT
Start: 2017-03-16 | End: 2022-02-07 | Stop reason: CLARIF

## 2017-03-16 RX ORDER — FOLIC ACID 1 MG/1
1 TABLET ORAL DAILY
Qty: 30 TAB | Refills: 1 | Status: SHIPPED | OUTPATIENT
Start: 2017-03-16 | End: 2022-01-31

## 2017-03-16 RX ORDER — BACLOFEN 10 MG/1
5 TABLET ORAL 3 TIMES DAILY
Qty: 30 TAB | Refills: 0 | Status: SHIPPED | OUTPATIENT
Start: 2017-03-16 | End: 2022-01-31

## 2017-03-16 RX ORDER — HYDROCODONE BITARTRATE AND ACETAMINOPHEN 5; 325 MG/1; MG/1
1 TABLET ORAL
Qty: 30 TAB | Refills: 0 | Status: ON HOLD | OUTPATIENT
Start: 2017-03-16 | End: 2022-02-07 | Stop reason: CLARIF

## 2017-03-16 RX ORDER — GUAIFENESIN 100 MG/5ML
81 LIQUID (ML) ORAL DAILY
Qty: 30 TAB | Refills: 1 | Status: SHIPPED | OUTPATIENT
Start: 2017-03-16 | End: 2022-01-31

## 2017-03-16 RX ADMIN — PANTOPRAZOLE SODIUM 40 MG: 40 TABLET, DELAYED RELEASE ORAL at 06:39

## 2017-03-16 RX ADMIN — HYDROMORPHONE HYDROCHLORIDE 1 MG: 1 INJECTION, SOLUTION INTRAMUSCULAR; INTRAVENOUS; SUBCUTANEOUS at 12:08

## 2017-03-16 RX ADMIN — ATORVASTATIN CALCIUM 20 MG: 20 TABLET, FILM COATED ORAL at 09:01

## 2017-03-16 RX ADMIN — ESCITALOPRAM OXALATE 20 MG: 10 TABLET ORAL at 09:01

## 2017-03-16 RX ADMIN — HYDROCODONE BITARTRATE AND ACETAMINOPHEN 1 TABLET: 5; 325 TABLET ORAL at 05:34

## 2017-03-16 RX ADMIN — PREDNISONE 20 MG: 20 TABLET ORAL at 09:02

## 2017-03-16 RX ADMIN — FOLIC ACID 1 MG: 1 TABLET ORAL at 09:01

## 2017-03-16 RX ADMIN — ENOXAPARIN SODIUM 40 MG: 40 INJECTION SUBCUTANEOUS at 12:08

## 2017-03-16 RX ADMIN — Medication 100 MG: at 09:01

## 2017-03-16 RX ADMIN — DRONABINOL 5 MG: 2.5 CAPSULE ORAL at 09:00

## 2017-03-16 RX ADMIN — CHLORHEXIDINE GLUCONATE 10 ML: 1.2 RINSE ORAL at 09:01

## 2017-03-16 RX ADMIN — GABAPENTIN 300 MG: 300 CAPSULE ORAL at 09:01

## 2017-03-16 RX ADMIN — ACYCLOVIR: 50 OINTMENT TOPICAL at 05:30

## 2017-03-16 RX ADMIN — BACLOFEN 5 MG: 10 TABLET ORAL at 12:07

## 2017-03-16 RX ADMIN — INSULIN LISPRO 2 UNITS: 100 INJECTION, SOLUTION INTRAVENOUS; SUBCUTANEOUS at 12:07

## 2017-03-16 RX ADMIN — HYDROMORPHONE HYDROCHLORIDE 1 MG: 1 INJECTION, SOLUTION INTRAMUSCULAR; INTRAVENOUS; SUBCUTANEOUS at 05:34

## 2017-03-16 RX ADMIN — ACYCLOVIR: 50 OINTMENT TOPICAL at 09:00

## 2017-03-16 RX ADMIN — ASPIRIN 81 MG 81 MG: 81 TABLET ORAL at 09:02

## 2017-03-16 RX ADMIN — NYSTATIN 500000 UNITS: 100000 SUSPENSION ORAL at 09:02

## 2017-03-16 RX ADMIN — LIDOCAINE HYDROCHLORIDE 15 ML: 20 SOLUTION ORAL; TOPICAL at 10:20

## 2017-03-16 NOTE — ROUTINE PROCESS
Bedside and Verbal shift change report given to REJI Hernandez RN (oncoming nurse) by Citlalli lagos. Senia Nicolas RN (offgoing nurse). Report included the following information SBAR.

## 2017-03-16 NOTE — PROGRESS NOTES
Problem: Self Care Deficits Care Plan (Adult)  Goal: *Acute Goals and Plan of Care (Insert Text)  Occupational Therapy Goals  Initiated 3/11/2017 within 7 day(s). 1. Patient will perform lower body dressing with minimal assistance assist while sitting on EOB  2. Patient will perform grooming with contact guard assist while standing at EOB while maintaining oxygen sat above 89%. 3. Patient will perform toilet transfers with contact guard assist.  4. Patient will perform all aspects of toileting with minimal assistance. 5. Patient will participate in upper extremity therapeutic exercise/activities with supervision/set-up for 15 minutes. 6. Patient will utilize energy conservation techniques during functional activities with verbal cues. OCCUPATIONAL THERAPY TREATMENT     Patient: Hunter Bryant (11 y.o. female)  Date: 3/16/2017  Diagnosis: Respiratory insufficiency  Abnormal EKG  Mass of sinus  Sinusitis  Respiratory insufficiency  Abnormal EKG  Mass of sinus  Sinusitis COPD (chronic obstructive pulmonary disease) with emphysema (HCC)       Precautions: Fall  Chart, occupational therapy assessment, plan of care, and goals were reviewed. ASSESSMENT:  Pt completed LB ADL with supervision sitting in chair. Pt/family education on home safety, fall prevention, energy conservation tech this session. Pt/family demonstrated understanding through verbal feedback. Pt to d/c home today with family to assist if needed. Progression toward goals:  [X]          Improving appropriately and progressing toward goals  [ ]          Improving slowly and progressing toward goals  [ ]          Not making progress toward goals and plan of care will be adjusted       PLAN:  Patient continues to benefit from skilled intervention to address the above impairments. Continue treatment per established plan of care.   Discharge Recommendations:  Home Health  Further Equipment Recommendations for Discharge:  N/A       SUBJECTIVE: Patient stated I'm ready to go home.       OBJECTIVE DATA SUMMARY:   Cognitive/Behavioral Status:  Neurologic State: Alert, Appropriate for age  Orientation Level: Oriented X4  Cognition: Appropriate decision making, Appropriate for age attention/concentration, Appropriate safety awareness, Follows commands  Safety/Judgement: Awareness of environment, Fall prevention  Functional Mobility and Transfers for ADLs:              Bed Mobility: N/A              Transfers:  Sit to Stand: Supervision  Balance:  Sitting: Intact  Standing: Intact; With support  Standing - Static: Good  Standing - Dynamic : Good  ADL Intervention:     LB dressing: supervision     Pain:  Pain Scale 1: Numeric (0 - 10)  Pain Location 1: Back;Mouth; Other (comment) (tongue and lip)  Pain Description 1: Aching     Activity Tolerance:    Fair +  Please refer to the flowsheet for vital signs taken during this treatment.   After treatment:   [X]  Patient left in no apparent distress sitting up in chair  [ ]  Patient left in no apparent distress in bed  [X]  Call bell left within reach  [ ]  Nursing notified  [X]  Caregiver present  [ ]  Bed alarm activated     ANNITA Christine  Time Calculation: 11 mins

## 2017-03-16 NOTE — PROGRESS NOTES
Cardiology Progress Note      3/16/2017 2:35 PM    Admit Date: 3/4/2017    Admit Diagnosis: Respiratory insufficiency  Abnormal EKG  Mass of sinus  Sinusitis  Respiratory insufficiency  Abnormal EKG  Mass of sinus  Sinusitis      Subjective:     Rose Roberts denies chest pain, chest pressure/discomfort, dyspnea.     Visit Vitals    /78    Pulse 94    Temp 98.5 °F (36.9 °C)    Resp 16    Ht 5' 8\" (1.727 m)    Wt 67.1 kg (148 lb)    SpO2 99%    BMI 22.5 kg/m2     Current Facility-Administered Medications   Medication Dose Route Frequency    nystatin (MYCOSTATIN) 100,000 unit/mL oral suspension 500,000 Units  500,000 Units Oral QID    acyclovir (ZOVIRAX) 5 % ointment   Topical Q3H    lidocaine (XYLOCAINE) 2 % viscous solution 15 mL  15 mL Mouth/Throat PRN    predniSONE (DELTASONE) tablet 20 mg  20 mg Oral DAILY WITH BREAKFAST    insulin lispro (HUMALOG) injection   SubCUTAneous AC&HS    magic mouthwash (FIRST-MOUTHWASH BLM) oral suspension 5 mL  5 mL Oral Q4H PRN    HYDROcodone-acetaminophen (NORCO) 5-325 mg per tablet 1 Tab  1 Tab Oral Q8H    HYDROmorphone (PF) (DILAUDID) injection 1 mg  1 mg IntraVENous Q6H PRN    pantoprazole (PROTONIX) tablet 40 mg  40 mg Oral ACB    lip protectant with sunscreen (CARMEX)   Topical PRN    gabapentin (NEURONTIN) capsule 300 mg  300 mg Oral BID    dronabinol (MARINOL) capsule 5 mg  5 mg Oral BID    folic acid (FOLVITE) tablet 1 mg  1 mg Oral DAILY    Thiamine Mononitrate (B-1) tablet 100 mg  100 mg Oral DAILY    baclofen (LIORESAL) tablet 5 mg  5 mg Oral TID    lidocaine (LIDODERM) 5 % patch 1 Patch  1 Patch TransDERmal Q12H    levalbuterol (XOPENEX) nebulizer soln 1.25 mg/0.5 ml  1.25 mg Nebulization Q4H PRN    aspirin chewable tablet 81 mg  81 mg Oral DAILY    atorvastatin (LIPITOR) tablet 20 mg  20 mg Oral DAILY    escitalopram oxalate (LEXAPRO) tablet 20 mg  20 mg Oral DAILY    chlorhexidine (PERIDEX) 0.12 % mouthwash 10 mL  10 mL Oral Q12H  nicotine (NICODERM CQ) 14 mg/24 hr patch 1 Patch  1 Patch TransDERmal DAILY    acetaminophen (TYLENOL) tablet 650 mg  650 mg Oral Q4H PRN    enoxaparin (LOVENOX) injection 40 mg  40 mg SubCUTAneous Q24H    glucose chewable tablet 16 g  4 Tab Oral PRN    glucagon (GLUCAGEN) injection 1 mg  1 mg IntraMUSCular PRN    dextrose (D50W) injection syrg 12.5-25 g  25-50 mL IntraVENous PRN    sodium chloride (OCEAN) 0.65 % nasal spray 2 Spray  2 Spray Both Nostrils Q2H PRN         Objective:      Physical Exam:  Visit Vitals    /78    Pulse 94    Temp 98.5 °F (36.9 °C)    Resp 16    Ht 5' 8\" (1.727 m)    Wt 67.1 kg (148 lb)    SpO2 99%    BMI 22.5 kg/m2     General Appearance:  Well developed, well nourished,alert and oriented x 3, and individual in no acute distress. Ears/Nose/Mouth/Throat:   Hearing grossly normal.         Neck: Supple. Chest:   Lungs clear to auscultation bilaterally. Cardiovascular:  Regular rate and rhythm, S1, S2 normal, no murmur. Abdomen:   Soft, non-tender, bowel sounds are active. Extremities: No edema bilaterally. Skin: Warm and dry.                Data Review:   Labs:    Recent Results (from the past 24 hour(s))   GLUCOSE, POC    Collection Time: 03/15/17  4:10 PM   Result Value Ref Range    Glucose (POC) 320 (H) 70 - 110 mg/dL   GLUCOSE, POC    Collection Time: 03/15/17  7:06 PM   Result Value Ref Range    Glucose (POC) 476 (HH) 70 - 110 mg/dL   GLUCOSE, POC    Collection Time: 03/15/17  9:14 PM   Result Value Ref Range    Glucose (POC) 273 (H) 70 - 110 mg/dL   MAGNESIUM    Collection Time: 03/16/17  5:15 AM   Result Value Ref Range    Magnesium 2.2 1.8 - 2.4 mg/dL   METABOLIC PANEL, COMPREHENSIVE    Collection Time: 03/16/17  5:15 AM   Result Value Ref Range    Sodium 145 136 - 145 mmol/L    Potassium 3.7 3.5 - 5.5 mmol/L    Chloride 106 100 - 108 mmol/L    CO2 35 (H) 21 - 32 mmol/L    Anion gap 4 3.0 - 18 mmol/L    Glucose 84 74 - 99 mg/dL    BUN 11 7.0 - 18 MG/DL    Creatinine 0.71 0.6 - 1.3 MG/DL    BUN/Creatinine ratio 15 12 - 20      GFR est AA >60 >60 ml/min/1.73m2    GFR est non-AA >60 >60 ml/min/1.73m2    Calcium 9.0 8.5 - 10.1 MG/DL    Bilirubin, total 0.9 0.2 - 1.0 MG/DL    ALT (SGPT) 90 (H) 13 - 56 U/L    AST (SGOT) 38 (H) 15 - 37 U/L    Alk. phosphatase 106 45 - 117 U/L    Protein, total 6.0 (L) 6.4 - 8.2 g/dL    Albumin 2.9 (L) 3.4 - 5.0 g/dL    Globulin 3.1 2.0 - 4.0 g/dL    A-G Ratio 0.9 0.8 - 1.7     CBC WITH AUTOMATED DIFF    Collection Time: 03/16/17  5:15 AM   Result Value Ref Range    WBC 14.2 (H) 4.6 - 13.2 K/uL    RBC 4.39 4.20 - 5.30 M/uL    HGB 14.2 12.0 - 16.0 g/dL    HCT 42.8 35.0 - 45.0 %    MCV 97.5 (H) 74.0 - 97.0 FL    MCH 32.3 24.0 - 34.0 PG    MCHC 33.2 31.0 - 37.0 g/dL    RDW 13.9 11.6 - 14.5 %    PLATELET 062 728 - 768 K/uL    MPV 12.4 (H) 9.2 - 11.8 FL    NEUTROPHILS 77 (H) 40 - 73 %    LYMPHOCYTES 13 (L) 21 - 52 %    MONOCYTES 9 3 - 10 %    EOSINOPHILS 1 0 - 5 %    BASOPHILS 0 0 - 2 %    ABS. NEUTROPHILS 10.9 (H) 1.8 - 8.0 K/UL    ABS. LYMPHOCYTES 1.8 0.9 - 3.6 K/UL    ABS. MONOCYTES 1.2 0.05 - 1.2 K/UL    ABS. EOSINOPHILS 0.2 0.0 - 0.4 K/UL    ABS.  BASOPHILS 0.0 0.0 - 0.06 K/UL    DF AUTOMATED     GLUCOSE, POC    Collection Time: 03/16/17  6:35 AM   Result Value Ref Range    Glucose (POC) 75 70 - 110 mg/dL   GLUCOSE, POC    Collection Time: 03/16/17 11:07 AM   Result Value Ref Range    Glucose (POC) 192 (H) 70 - 110 mg/dL       Telemetry: normal sinus rhythm      Assessment:     Principal Problem:    COPD (chronic obstructive pulmonary disease) with emphysema (HCC) (3/4/2017)    Active Problems:    Respiratory insufficiency (3/4/2017)      Sinusitis (3/4/2017)      Mass of sinus (3/4/2017)      Hypokalemia (3/4/2017)      Transaminitis (3/4/2017)      Nodule of kidney (3/4/2017)      COPD with acute exacerbation (HCC) (3/6/2017)      PTSD (post-traumatic stress disorder) (3/9/2017)      Acute respiratory failure ([facility] [address] (3/11/2017)        Plan:     Pain free and stable. For discharge. F/U in office.     Harman Way MD

## 2017-03-16 NOTE — PROGRESS NOTES
1915: Assumed patient care, she was watching television in bed with no signs of distress noted. Patient was alert and oriented to person, place, time and situation. Respiratory status remained stable on room air. Vital signs were stable. MEWS score was a zero. Patient denied any pain, discomfort, nausea, vomiting, dizziness or anxiety. White board was updated and explained. Bed was locked and in lowest position. Personal belongings were within reach. Family was at the bedside. Neither the patient, nor the family had any questions, comments or concerns after bedside shift report. 0700: Patient had an uneventful night and was resting quietly in bed with no signs of distress noted. Respiratory status, vital signs and MEWS score remain stable. Bed locked and in lowest position. Patient had no questions, comments or concerns after bedside shift report.

## 2017-03-16 NOTE — DISCHARGE INSTRUCTIONS
Lab Results   Component Value Date/Time    Hemoglobin A1c 6.2 03/04/2017 04:25 PM       Repeat ct scan 3 months  Diabetic diet       Chronic Obstructive Pulmonary Disease (COPD): Care Instructions  Your Care Instructions    Chronic obstructive pulmonary disease (COPD) is a general term for a group of lung diseases, including emphysema and chronic bronchitis. People with COPD have decreased airflow in and out of the lungs, which makes it hard to breathe. The airways also can get clogged with thick mucus. Cigarette smoking is a major cause of COPD. Although there is no cure for COPD, you can slow its progress. Following your treatment plan and taking care of yourself can help you feel better and live longer. Follow-up care is a key part of your treatment and safety. Be sure to make and go to all appointments, and call your doctor if you are having problems. It's also a good idea to know your test results and keep a list of the medicines you take. How can you care for yourself at home? Staying healthy  · Do not smoke. This is the most important step you can take to prevent more damage to your lungs. If you need help quitting, talk to your doctor about stop-smoking programs and medicines. These can increase your chances of quitting for good. · Avoid colds and flu. Get a pneumococcal vaccine shot. If you have had one before, ask your doctor whether you need a second dose. Get the flu vaccine every fall. If you must be around people with colds or the flu, wash your hands often. · Avoid secondhand smoke, air pollution, and high altitudes. Also avoid cold, dry air and hot, humid air. Stay at home with your windows closed when air pollution is bad. Medicines and oxygen therapy  · Take your medicines exactly as prescribed. Call your doctor if you think you are having a problem with your medicine. · You may be taking medicines such as:  ¨ Bronchodilators. These help open your airways and make breathing easier. Bronchodilators are either short-acting (work for 6 to 9 hours) or long-acting (work for 24 hours). You inhale most bronchodilators, so they start to act quickly. Always carry your quick-relief inhaler with you in case you need it while you are away from home. ¨ Corticosteroids (prednisone, budesonide). These reduce airway inflammation. They come in pill or inhaled form. You must take these medicines every day for them to work well. · A spacer may help you get more inhaled medicine to your lungs. Ask your doctor or pharmacist if a spacer is right for you. If it is, ask how to use it properly. · Do not take any vitamins, over-the-counter medicine, or herbal products without talking to your doctor first.  · If your doctor prescribed antibiotics, take them as directed. Do not stop taking them just because you feel better. You need to take the full course of antibiotics. · Oxygen therapy boosts the amount of oxygen in your blood and helps you breathe easier. Use the flow rate your doctor has recommended, and do not change it without talking to your doctor first.  Activity  · Get regular exercise. Walking is an easy way to get exercise. Start out slowly, and walk a little more each day. · Pay attention to your breathing. You are exercising too hard if you cannot talk while you are exercising. · Take short rest breaks when doing household chores and other activities. · Learn breathing methods--such as breathing through pursed lips--to help you become less short of breath. · If your doctor has not set you up with a pulmonary rehabilitation program, talk to him or her about whether rehab is right for you. Rehab includes exercise programs, education about your disease and how to manage it, help with diet and other changes, and emotional support. Diet  · Eat regular, healthy meals. Use bronchodilators about 1 hour before you eat to make it easier to eat. Eat several small meals instead of three large ones.  Drink beverages at the end of the meal. Avoid foods that are hard to chew. · Eat foods that contain protein so that you do not lose muscle mass. Mental health  · Talk to your family, friends, or a therapist about your feelings. It is normal to feel frightened, angry, hopeless, helpless, and even guilty. Talking openly about bad feelings can help you cope. If these feelings last, talk to your doctor. When should you call for help? Call 911 anytime you think you may need emergency care. For example, call if:  · You have severe trouble breathing. Call your doctor now or seek immediate medical care if:  · You have new or worse trouble breathing. · You cough up blood. · You have a fever. Watch closely for changes in your health, and be sure to contact your doctor if:  · You cough more deeply or more often, especially if you notice more mucus or a change in the color of your mucus. · You have new or worse swelling in your legs or belly. · You are not getting better as expected. Where can you learn more? Go to http://norah-rush.info/. Linnette Espinoza in the search box to learn more about \"Chronic Obstructive Pulmonary Disease (COPD): Care Instructions. \"  Current as of: May 23, 2016  Content Version: 11.1  © 7052-0189 [employer]. Care instructions adapted under license by [employer] (which disclaims liability or warranty for this information). If you have questions about a medical condition or this instruction, always ask your healthcare professional. [place] [address] any warranty or liability for your use of this information. COPD Exacerbation Plan: Care Instructions  Your Care Instructions  If you have chronic obstructive pulmonary disease (COPD), your usual shortness of breath could suddenly get worse. You may start coughing more and have more mucus. This flare-up is called a COPD exacerbation (say \"qe-EXQ-tq-BAY-shun\").   A lung infection or air pollution could set off an exacerbation. Sometimes it can happen after a quick change in temperature or being around chemicals. Work with your doctor to make a plan for dealing with an exacerbation. You can better manage it if you plan ahead. Follow-up care is a key part of your treatment and safety. Be sure to make and go to all appointments, and call your doctor if you are having problems. It's also a good idea to know your test results and keep a list of the medicines you take. How can you care for yourself at home? During an exacerbation  · Do not panic if you start to have one. Quick treatment at home may help you prevent serious breathing problems. If you have a COPD exacerbation plan that you developed with your doctor, follow it. · Take your medicines exactly as your doctor tells you. ¨ Use your inhaler as directed by your doctor. If your symptoms do not get better after you use your medicine, have someone take you to the emergency room. Call an ambulance if necessary. ¨ With inhaled medicines, a spacer or a nebulizer may help you get more medicine to your lungs. Ask your doctor or pharmacist how to use them properly. Practice using the spacer in front of a mirror before you have an exacerbation. This may help you get the medicine into your lungs quickly. ¨ If your doctor has given you steroid pills, take them as directed. ¨ Your doctor may have given you a prescription for antibiotics, which you can fill if you need it. ¨ Talk to your doctor if you have any problems with your medicine. And call your doctor if you have to use your antibiotic or steroid pills. Preventing an exacerbation  · Do not smoke. This is the most important step you can take to prevent more damage to your lungs and prevent problems. If you already smoke, it is never too late to stop. If you need help quitting, talk to your doctor about stop-smoking programs and medicines.  These can increase your chances of quitting for good.  · Take your daily medicines as prescribed. · Avoid colds and flu. ¨ Get a pneumococcal vaccine. ¨ Get a flu vaccine each year, as soon as it is available. Ask those you live or work with to do the same, so they will not get the flu and infect you. ¨ Try to stay away from people with colds or the flu. ¨ Wash your hands often. · Avoid secondhand smoke; air pollution; cold, dry air; hot, humid air; and high altitudes. Stay at home with your windows closed when air pollution is bad. · Learn breathing techniques for COPD, such as breathing through pursed lips. These techniques can help you breathe easier during an exacerbation. When should you call for help? Call 911 anytime you think you may need emergency care. For example, call if:  · You have severe trouble breathing. · You have severe chest pain. Call your doctor now or seek immediate medical care if:  · You have new or worse shortness of breath. · You develop new chest pain. · You are coughing more deeply or more often, especially if you notice more mucus or a change in the color of your mucus. · You cough up blood. · You have new or increased swelling in your legs or belly. · You have a fever. Watch closely for changes in your health, and be sure to contact your doctor if:  · You need to use your antibiotic or steroid pills. · Your symptoms are getting worse. Where can you learn more? Go to http://norah-rush.info/. Enter E506 in the search box to learn more about \"COPD Exacerbation Plan: Care Instructions. \"  Current as of: July 21, 2016  Content Version: 11.1  © 0097-7093 [employer]. Care instructions adapted under license by [employer] (which disclaims liability or warranty for this information).  If you have questions about a medical condition or this instruction, always ask your healthcare professional. [place] [address] any warranty or liability for your use of this information. Learning About COPD, Asthma, and Air Pollution  How does air pollution affect COPD and asthma? When you have COPD or asthma, air pollution may make your symptoms worse. If it does, it means that air pollution is a trigger for you. It is important to know what your triggers are and how to deal with them. If air pollution is a trigger for you, you need to learn about air quality and pay attention to weather forecasts that include how bad the air is expected to be. How can you manage a flare-up caused by air pollution? · Do not panic. Quick treatment at home may help you prevent serious breathing problems. · Take your medicines exactly as your doctor tells you. ¨ Use your quick-relief inhaler as directed by your doctor. If your symptoms do not get better after you use your medicine, have someone take you to the emergency room. Call an ambulance if necessary. ¨ With inhaled medicines, a spacer or a nebulizer may help you get more medicine to your lungs. Ask your doctor or pharmacist how to use them properly. Practice using the spacer in front of a mirror before you have a flare-up. This may help you get the medicine into your lungs quickly. ¨ If your doctor has given you steroid pills, take them as directed. ¨ Talk to your doctor if you have any problems with your medicine. What can you do to prevent flare-ups? · Try not to be outside when air pollution levels are high. Stay at home with your windows closed. · Do not smoke. This is the most important step you can take to prevent more damage to your lungs and prevent problems. If you already smoke, it is never too late to stop. If you need help quitting, talk to your doctor about stop-smoking programs and medicines. These can increase your chances of quitting for good. · Avoid secondhand smoke; cold, dry air; and high altitudes. · Take your daily medicines as prescribed. · Avoid colds and flu. ¨ Get a pneumococcal vaccine.   ¨ Get a flu vaccine each year, as soon as it is available. Ask those you live or work with to do the same, so they will not get the flu and infect you. ¨ Try to stay away from people with colds or the flu. ¨ Wash your hands often. Follow-up care is a key part of your treatment and safety. Be sure to make and go to all appointments, and call your doctor if you are having problems. It's also a good idea to know your test results and keep a list of the medicines you take. Where can you learn more? Go to http://norah-rush.info/. Enter  in the search box to learn more about \"Learning About COPD, Asthma, and Air Pollution. \"  Current as of: May 23, 2016  Content Version: 11.1  © 4312-6581 [employer]. Care instructions adapted under license by [employer] (which disclaims liability or warranty for this information). If you have questions about a medical condition or this instruction, always ask your healthcare professional. [place] [address] any warranty or liability for your use of this information. Learning About Emphysema  What is emphysema? Emphysema is damage to the air sacs in your lungs. In a healthy person, the tiny air sacs in the lungs are like balloons. As you breathe in and out, they get bigger and smaller to move air through your lungs. With emphysema, these air sacs lose their stretch. Less oxygen gets into your blood and you feel short of breath. Emphysema is a form of COPD (chronic obstructive pulmonary disease). Emphysema is usually caused by smoking. But chemical fumes, dust, or air pollution also can cause it over time. People who get it in their 35s or 45s may have a disorder that runs in families, called alpha-1 antitrypsin deficiency. But this is rare. What can you expect when you have emphysema? Emphysema gets worse over time. You cannot undo the damage to your lungs.   Over time, you may find that:  · You get short of breath even when you do simple things like get dressed or fix a meal.  · It is hard to eat or exercise. · You lose weight and feel weaker. But there are things you can do to prevent more damage and feel better. What are the symptoms? The main symptoms of emphysema are:  · A cough that will not go away. · Mucus that comes up when you cough. · Shortness of breath that gets worse when you exercise. At times, your symptoms may suddenly flare up and get much worse. This is a called an exacerbation (say \"egg-MARTHA-er-BAY-nohemi\"). When this happens, your usual symptoms quickly get worse and stay bad. This can be dangerous. You may have to go to the hospital.  How can you keep emphysema from getting worse? Don't smoke. That is the best way to keep emphysema from getting worse. If you already smoke, it is never too late to stop. If you need help quitting, talk to your doctor about stop-smoking programs and medicines. These can increase your chances of quitting for good. You can do other things to keep emphysema from getting worse:  · Avoid bad air. Air pollution, chemical fumes, and dust also can make emphysema worse. · Get a flu shot every year. A shot may keep the flu from turning into something more serious, like pneumonia. A flu shot also may lower your chances of having a flare-up. · Get a pneumococcal shot. A shot can prevent some of the serious complications of pneumonia. Ask your doctor how often you should get this shot. How is emphysema treated? Emphysema is treated with medicines and oxygen. You also can take steps at home to stay healthy and keep your condition from getting worse. Medicines and oxygen therapy  · You may be taking medicines such as:  ¨ Bronchodilators. These help open your airways and make breathing easier. Bronchodilators are either short-acting (work for 6 to 9 hours) or long-acting (work for 24 hours). You inhale most bronchodilators, so they start to act quickly.  Always carry your quick-relief inhaler with you in case you need it while you are away from home. ¨ Corticosteroids. These reduce airway inflammation. They come in pill or inhaled form. You must take these medicines every day for them to work well. ¨ Antibiotics. These medicines are used when you have a bacterial lung infection. · Take your medicines exactly as prescribed. Call your doctor if you think you are having a problem with your medicine. · Oxygen therapy boosts the amount of oxygen in your blood and helps you breathe easier. Use the flow rate your doctor has recommended, and do not change it without talking to your doctor first.  Other care at home  · If your doctor recommends it, get more exercise. Walking is a good choice. Bit by bit, increase the amount you walk every day. Try for at least 30 minutes on most days of the week. · Learn breathing methods--such as breathing through pursed lips--to help you become less short of breath. · If your doctor has not set you up with a pulmonary rehabilitation program, talk to him or her about whether rehab is right for you. Rehab includes exercise programs, education about your disease and how to manage it, help with diet and other changes, and emotional support. · Eat regular, healthy meals. Use bronchodilators about 1 hour before you eat to make it easier to eat. Eat several small meals instead of three large ones. Drink beverages at the end of the meal. Avoid foods that are hard to chew. Follow-up care is a key part of your treatment and safety. Be sure to make and go to all appointments, and call your doctor if you are having problems. It's also a good idea to know your test results and keep a list of the medicines you take. Where can you learn more? Go to http://norah-rush.info/. Enter G455 in the search box to learn more about \"Learning About Emphysema. \"  Current as of: May 23, 2016  Content Version: 11.1  © 3681-8666 [employer]. Care instructions adapted under license by [employer] (which disclaims liability or warranty for this information). If you have questions about a medical condition or this instruction, always ask your healthcare professional. [address] any warranty or liability for your use of this information. Sinusitis: Care Instructions  Your Care Instructions    Sinusitis is an infection of the lining of the sinus cavities in your head. Sinusitis often follows a cold. It causes pain and pressure in your head and face. In most cases, sinusitis gets better on its own in 1 to 2 weeks. But some mild symptoms may last for several weeks. Sometimes antibiotics are needed. Follow-up care is a key part of your treatment and safety. Be sure to make and go to all appointments, and call your doctor if you are having problems. It's also a good idea to know your test results and keep a list of the medicines you take. How can you care for yourself at home? · Take an over-the-counter pain medicine, such as acetaminophen (Tylenol), ibuprofen (Advil, Motrin), or naproxen (Aleve). Read and follow all instructions on the label. · If the doctor prescribed antibiotics, take them as directed. Do not stop taking them just because you feel better. You need to take the full course of antibiotics. · Be careful when taking over-the-counter cold or flu medicines and Tylenol at the same time. Many of these medicines have acetaminophen, which is Tylenol. Read the labels to make sure that you are not taking more than the recommended dose. Too much acetaminophen (Tylenol) can be harmful. · Breathe warm, moist air from a steamy shower, a hot bath, or a sink filled with hot water. Avoid cold, dry air. Using a humidifier in your home may help. Follow the directions for cleaning the machine. · Use saline (saltwater) nasal washes to help keep your nasal passages open and wash out mucus and bacteria.  You can buy saline nose drops at a grocery store or drugstore. Or you can make your own at home by adding 1 teaspoon of salt and 1 teaspoon of baking soda to 2 cups of distilled water. If you make your own, fill a bulb syringe with the solution, insert the tip into your nostril, and squeeze gently. Nathalie Males your nose. · Put a hot, wet towel or a warm gel pack on your face 3 or 4 times a day for 5 to 10 minutes each time. · Try a decongestant nasal spray like oxymetazoline (Afrin). Do not use it for more than 3 days in a row. Using it for more than 3 days can make your congestion worse. When should you call for help? Call your doctor now or seek immediate medical care if:  · You have new or worse swelling or redness in your face or around your eyes. · You have a new or higher fever. Watch closely for changes in your health, and be sure to contact your doctor if:  · You have new or worse facial pain. · The mucus from your nose becomes thicker (like pus) or has new blood in it. · You are not getting better as expected. Where can you learn more? Go to http://norah-rush.info/. Enter H486 in the search box to learn more about \"Sinusitis: Care Instructions. \"  Current as of: July 29, 2016  Content Version: 11.1  © 5775-1864 [employer]. Care instructions adapted under license by [facility] (which disclaims liability or warranty for this information). If you have questions about a medical condition or this instruction, always ask your healthcare professional. [place] [address] any warranty or liability for your use of this information. Post-Traumatic Stress Disorder (PTSD): Care Instructions  Your Care Instructions  Post-traumatic stress disorder (PTSD) is a mental condition that can result from being in or seeing a traumatic or terrifying event. These events can include combat, a terrorist attack, a natural disaster, a serious accident, an assault, or a rape. If you have PTSD, you may often relive the experience in nightmares or flashbacks. These are clear and frightening memories of the event. You may also have trouble sleeping. PTSD affects people in very different ways. It can interfere with daily activities such as work or school, and it can make you withdraw from friends or loved ones. Follow-up care is a key part of your treatment and safety. Be sure to make and go to all appointments, and call your doctor if you are having problems. It's also a good idea to know your test results and keep a list of the medicines you take. How can you care for yourself at home? · Take medicines exactly as directed. Call your doctor if you think you are having a problem with your medicine. · Go to your counseling sessions and follow-up appointments. · Recognize and accept your anxiety. Then, when you are in a situation that makes you anxious, say to yourself, \"This is not an emergency. I feel uncomfortable, but I am not in danger. I can keep going even if I feel anxious. \"  · Be kind to your body:  ¨ Relieve tension with exercise or a massage. ¨ Get enough rest.  ¨ Avoid alcohol, caffeine, nicotine, and illegal drugs. They can increase your anxiety level and cause sleep problems. ¨ Learn and do relaxation techniques. See below for more about these techniques. · Engage your mind. Get out and do something you enjoy. Go to a funny movie, or take a walk or hike. Plan your day. Having too much or too little to do can make you anxious. · Keep a record of your symptoms. Discuss your fears with a good friend or family member, or join a support group for people with similar problems. Talking to others sometimes relieves stress. · Get involved in social groups, or volunteer to help others. Being alone sometimes makes things seem worse than they are. · Get at least 30 minutes of exercise on most days of the week. Walking is a good choice.  You also may want to do other activities, such as running, swimming, cycling, or playing tennis or team sports. · Keep the numbers for these national suicide hotlines: 7-669-093-TALK (3-274.375.4124) and 6-036-IIZUKCH (1-724.910.6963). If you or someone you know talks about suicide or feeling hopeless, get help right away. Relaxation techniques  Do relaxation exercises 10 to 20 minutes a day. You can play soothing, relaxing music while you do them, if you wish. · Tell others in your house that you are going to do your relaxation exercises. Ask them not to disturb you. · Find a comfortable place, away from all distractions and noise. · Lie down on your back, or sit with your back straight. · Focus on your breathing. Make it slow and steady. · Breathe in through your nose. Breathe out through either your nose or mouth. · Breathe deeply, filling up the area between your navel and your rib cage. Breathe so that your belly goes up and down. · Do not hold your breath. · Breathe like this for 5 to 10 minutes. Notice the feeling of calmness throughout your whole body. As you continue to breathe slowly and deeply, relax by doing the following for another 5 to 10 minutes:  · Tighten and relax each muscle group in your body. You can begin at your toes and work your way up to your head. · Imagine your muscle groups relaxing and becoming heavy. · Empty your mind of all thoughts. · Let yourself relax more and more deeply. · Become aware of the state of calmness that surrounds you. · When your relaxation time is over, you can bring yourself back to alertness by moving your fingers and toes and then your hands and feet and then stretching and moving your entire body. Sometimes people fall asleep during relaxation, but they usually wake up shortly afterward. · Always give yourself time to return to full alertness before you drive a car or do anything that might cause an accident if you are not fully alert.  Never play a relaxation tape while you drive a car.  When should you call for help? Call 911 anytime you think you may need emergency care. For example, call if:  · You feel you cannot stop from hurting yourself or someone else. Watch closely for changes in your health, and be sure to contact your doctor if:  · Your PTSD symptoms are getting worse. · You have new or worsening symptoms of anxiety. · You are not getting better as expected. Where can you learn more? Go to http://norah-rush.info/. Toney Marquez in the search box to learn more about \"Post-Traumatic Stress Disorder (PTSD): Care Instructions. \"  Current as of: July 26, 2016  Content Version: 11.1  © 4519-9403 [employer]. Care instructions adapted under license by [employer] (which disclaims liability or warranty for this information). If you have questions about a medical condition or this instruction, always ask your healthcare professional. [place] [address] any warranty or liability for your use of this information. DISCHARGE SUMMARY from Nurse    The following personal items are in your possession at time of discharge:    Dental Appliances: None  Visual Aid: None     Home Medications: None  Jewelry: None  Clothing: None  Other Valuables: None             PATIENT INSTRUCTIONS:    After general anesthesia or intravenous sedation, for 24 hours or while taking prescription Narcotics:  · Limit your activities  · Do not drive and operate hazardous machinery  · Do not make important personal or business decisions  · Do  not drink alcoholic beverages  · If you have not urinated within 8 hours after discharge, please contact your surgeon on call.     Report the following to your surgeon:  · Excessive pain, swelling, redness or odor of or around the surgical area  · Temperature over 100.5  · Nausea and vomiting lasting longer than 4 hours or if unable to take medications  · Any signs of decreased circulation or nerve impairment to extremity: change in color, persistent  numbness, tingling, coldness or increase pain  · Any questions        What to do at Home:  Recommended activity: Activity as tolerated,         *  Please give a list of your current medications to your Primary Care Provider. *  Please update this list whenever your medications are discontinued, doses are      changed, or new medications (including over-the-counter products) are added. *  Please carry medication information at all times in case of emergency situations. These are general instructions for a healthy lifestyle:    No smoking/ No tobacco products/ Avoid exposure to second hand smoke    Surgeon General's Warning:  Quitting smoking now greatly reduces serious risk to your health. Obesity, smoking, and sedentary lifestyle greatly increases your risk for illness    A healthy diet, regular physical exercise & weight monitoring are important for maintaining a healthy lifestyle    You may be retaining fluid if you have a history of heart failure or if you experience any of the following symptoms:  Weight gain of 3 pounds or more overnight or 5 pounds in a week, increased swelling in our hands or feet or shortness of breath while lying flat in bed. Please call your doctor as soon as you notice any of these symptoms; do not wait until your next office visit. Recognize signs and symptoms of STROKE:    F-face looks uneven    A-arms unable to move or move unevenly    S-speech slurred or non-existent    T-time-call 911 as soon as signs and symptoms begin-DO NOT go       Back to bed or wait to see if you get better-TIME IS BRAIN. Warning Signs of HEART ATTACK     Call 911 if you have these symptoms:   Chest discomfort. Most heart attacks involve discomfort in the center of the chest that lasts more than a few minutes, or that goes away and comes back. It can feel like uncomfortable pressure, squeezing, fullness, or pain.    Discomfort in other areas of the upper body. Symptoms can include pain or discomfort in one or both arms, the back, neck, jaw, or stomach.  Shortness of breath with or without chest discomfort.  Other signs may include breaking out in a cold sweat, nausea, or lightheadedness. Don't wait more than five minutes to call 911 - MINUTES MATTER! Fast action can save your life. Calling 911 is almost always the fastest way to get lifesaving treatment. Emergency Medical Services staff can begin treatment when they arrive -- up to an hour sooner than if someone gets to the hospital by car. The discharge information has been reviewed with the patient. The patient verbalized understanding. Discharge medications reviewed with the patient and appropriate educational materials and side effects teaching were provided. This lab test reflects that your blood sugar has been slightly elevated over the past 3 months and should be evaluated by your primary care provider. An A1C of 5.7-6.4% meets the criteria for pre-diabetes; an A1C of 6.5% or higher meets the criteria for diabetes. Steroids can increase your blood sugar so it is important to follow up with your provider to determine if your blood sugar has returned to normal or needs further treatment. It is important to follow up with your provider on a routine basis to continue to evaluate your blood sugar and discuss any necessary treatment. Nutrition recommendations for discharge: Consistent Carbohydrate diet.    Patient armband removed and shredded

## 2017-03-16 NOTE — CONSULTS
[address] [place] [address] Rd    Name:  Jensen Medina  MR#:  [id number]  :  1955  Account #:  [de-identified]  Date of Adm:  2017  Date of Consultation:  03/15/2017      CONCLUSIONS  1. Chronic obstructive pulmonary disease. 2. Elevated troponin this hospitalization with no evidence of acute  coronary syndrome. 3. Acute chronic obstructive pulmonary disease exacerbation this  admission. 4. Sinusitis. 5. Chronic pain. 6. Transaminitis. 7. Hypokalemia, which has been corrected. RECOMMENDATIONS: The patient is to be discharged shortly. I think  it is reasonable to discharge her. I will see her in the office and arrange  further evaluation for ischemic heart disease. DISCUSSION: The patient's records were reviewed. The patient was  interviewed and examined. The patient is a 59-year-old lady who was  admitted on the , I believe, with shortness of breath was  worsening. She was seen in the ED, evaluated and admitted. She was  initially seen by Dr. Mello Garcia, who had an echo done and the echo was  unremarkable for the most part. He felt that she had no evidence of  recurrent acute coronary syndrome. The patient had no chest pain. Subsequently, the patient has been treated with antibiotics, pulmonary  management, etc. and she is being discharged. We were asked to see  her at this time. PAST MEDICAL HISTORY: Usual childhood diseases without  sequelae. No rheumatic fever, scarlet fever or whooping cough. She  has COPD and has been a cigarette smoker, hypertension, PTSD,  history of shingles, status post bilateral tubal ligation prior back surgery. HOME MEDICATIONS  1. Diovan. 2. Lexapro. 3. Klonopin. 4. Advair Diskus. 5. Hydrocodone. 6. BuSpar. 7. Lipitor. 8. Combivent inhaler. 9. Proventil inhaler. 10. Baclofen. DRUG ALLERGIES: SULFACETAMIDE. FAMILY HISTORY: Noncontributory. SOCIAL HISTORY: The patient is , lives with her .   A long history of cigarette smoking. No alcohol abuse or illegal drug  use. FAMILY HISTORY: Noncontributory. REVIEW OF SYSTEMS: A 10-system review negative, except as  noted. PHYSICAL EXAMINATION  GENERAL: Reveals a well-developed, well-nourished woman in no  acute distress. VITAL SIGNS: Blood pressure 140/65. HEENT: Pupils are equal and reactive to light and accommodation. Sclerae and conjunctivae are clear. There is no scleral icterus. Mouth,  nose and throat appear normal. Oropharynx appears normal.  NECK: Supple. Thyroid not enlarged. There is no JVD. Carotid pulses  are present bilaterally. I do not hear any bruits. CHEST: Decreased breath sounds bilaterally. No rales, rhonchi, or  wheezes. CARDIAC: Precordium is normal to palpation. There are no heaves,  thrills, or ectopic impulses. First and second heart sounds are normal. I  do not hear any murmurs, gallops, or rubs. ABDOMEN: Soft. Liver and spleen are not enlarged. No abdominal  masses or bruits appreciated. Bowel sounds are present. EXTREMITIES: No cyanosis or clubbing. Peripheral pulses are  present. No edema. NEUROLOGIC: The patient is alert and oriented. Cranial nerves 2-12  appear intact. Motor and sensation are grossly intact. Gait and station  are not tested. SKIN: Normal turgor. No lesions. ASSESSMENT/PLAN: At this juncture, the patient has been treated for  chronic obstructive pulmonary disease exacerbation and has  improved. Will continue current medications. I understand she is to be  discharged either today or tomorrow. I will see her in the office  in followup as noted. Thank you for asking me to see her.         MD Morena Islas / Nae Sheffield  D:  03/15/2017   22:31  T:  03/15/2017   23:15  Job #:  [id number]

## 2017-03-16 NOTE — PROGRESS NOTES
Problem: Mobility Impaired (Adult and Pediatric)  Goal: *Acute Goals and Plan of Care (Insert Text)  Physical Therapy Goals  Initiated 3/11/2017 and to be accomplished within 7 day(s)  1. Patient will move from supine to sit and sit to supine in bed with supervision/set-up. 2. Patient will transfer from bed to chair and chair to bed with supervision/set-up using the least restrictive device. 3. Patient will perform sit to stand with supervision/set-up. 4. Patient will ambulate with CGA for >50 feet with the least restrictive device. New Goals as pt has met above goals as of 03/16/2017  Established 03/16/2017  1. Supine <> sit <> stand mod I.  2. Pt will be able to ambulate mod I w/ SPC x >150 w/ no rest period required. 3. Pt will be able to perform pelvic stability exercises to improve gt quality mod I. Outcome: Progressing Towards Goal  PHYSICAL THERAPY TREATMENT     Patient: Michael Cassidy (49 y.o. female)  Date: 3/16/2017  Diagnosis: Respiratory insufficiency  Abnormal EKG  Mass of sinus  Sinusitis  Respiratory insufficiency  Abnormal EKG  Mass of sinus  Sinusitis COPD (chronic obstructive pulmonary disease) with emphysema (HCC)       Precautions: Fall  Chart, physical therapy assessment, plan of care and goals were reviewed. ASSESSMENT:  Pt eager to participate w/ PT. Pt c/o LE fatigue, hip instability and calf tightness. Instructed pt in calf stretches, and pelvic girdle stability therapeutic exercises. Pt is S w/ gt training w/o O2 support but does require standing rest periods <15 sec due to fatigue. Pt is not YUN and cued pt for proper breathing techniques during activity. Pt gt quality remains dec at pelvis due to weakness and fatigue w/ RW, GB and S. Pt has met goals to date and therefore updated goals appropriately. Pt returned to sitting up in chair w/ all needs within reach and  present. Recommend d/c to HHPT.   Progression toward goals:  [X]      Improving appropriately and progressing toward goals  [ ]      Improving slowly and progressing toward goals  [ ]      Not making progress toward goals and plan of care will be adjusted       PLAN:  Patient continues to benefit from skilled intervention to address the above impairments. Continue treatment per established plan of care. Discharge Recommendations:  Home Health  Further Equipment Recommendations for Discharge:  rolling walker       SUBJECTIVE:   Patient stated Judith Dotter let's do it.       OBJECTIVE DATA SUMMARY:   Critical Behavior:  Neurologic State: Alert, Appropriate for age  Orientation Level: Oriented X4  Cognition: Appropriate decision making, Appropriate for age attention/concentration, Appropriate safety awareness, Follows commands  Safety/Judgement: Awareness of environment, Fall prevention  Functional Mobility Training:  Bed Mobility:  Supine to Sit: Supervision  Sit to Supine: Supervision  Transfers:  Sit to Stand: Supervision  Stand to Sit: Supervision  Balance:  Sitting: Intact  Standing: Intact; With support  Standing - Static: Good  Standing - Dynamic : Good              Range Of Motion:  Ambulation/Gait Training:  Distance (ft): 500 Feet (ft)  Assistive Device: Walker, rolling;Gait belt  Ambulation - Level of Assistance: Supervision  Gait Abnormalities: Decreased step clearance (pelvic instability)  Base of Support: Widened  Speed/Aliya: Slow  Step Length: Left shortened;Right shortened  Swing Pattern: Left asymmetrical;Right asymmetrical  Neuro Re-Education:  Therapeutic Exercises:   Instructed and demonstrated seating pillow add squeeze, hook lying pillow add, hook lying pillow add w/ bridge and calf stretches. Instructed to perform 3x10 reps daily. Pain:  Pain Scale 1: Numeric (0 - 10)  Pain Intensity 1: 7  Pain Location 1: Back;Mouth; Other (comment) (tongue and lip)  Pain Description 1: Aching  Activity Tolerance:   fair  Please refer to the flowsheet for vital signs taken during this treatment.   After treatment:   [X] Patient left in no apparent distress sitting up in chair  [ ] Patient left in no apparent distress in bed  [X] Call bell left within reach  [X] Nursing notified  [ ] Caregiver present  [ ] Bed alarm activated      Funmilayo Baron, PT   Time Calculation: 25 mins

## 2017-03-16 NOTE — PROGRESS NOTES
[facility]  Pulmonary, Critical Care, and Sleep Medicine  Progress note     Name: Miguel Guillen MRN: [id number]   : 1955 Hospital: [facility] [place]   Date: 3/16/2017        IMPRESSION:   · S/P Acute hypoxemic respiratory failure,  Improved severe bronchospasm . · Severe anxiety disorder   · Chronic pain,   · COPD  · PTSD per  from past interactions with physicians in past  · Possible left upper pole renal mass, as per CTA chest   · Multiple pulmonary nodules from CTA chest, will need outpatient follow up   · Hx HTN  · Elevated TnI. At time of admission,   Ischemic work up pending,  Cardiology following- out patient work up planned      RECOMMENDATIONS:   · Continue bronchodilators and monitor symptoms, action plan  · Prednisone taper  · Discussed with patient and family at bedside  · Will follow up as outpatient- Asthma, Pulmonary Nodules      Subjective/History:   17     No issues overnight. On room air- ready for discharge. Denies any cough, chest  pain. Denies any new complaints.     Current Facility-Administered Medications   Medication Dose Route Frequency    nystatin (MYCOSTATIN) 100,000 unit/mL oral suspension 500,000 Units  500,000 Units Oral QID    acyclovir (ZOVIRAX) 5 % ointment   Topical Q3H    predniSONE (DELTASONE) tablet 20 mg  20 mg Oral DAILY WITH BREAKFAST    insulin lispro (HUMALOG) injection   SubCUTAneous AC&HS    HYDROcodone-acetaminophen (NORCO) 5-325 mg per tablet 1 Tab  1 Tab Oral Q8H    pantoprazole (PROTONIX) tablet 40 mg  40 mg Oral ACB    gabapentin (NEURONTIN) capsule 300 mg  300 mg Oral BID    dronabinol (MARINOL) capsule 5 mg  5 mg Oral BID    folic acid (FOLVITE) tablet 1 mg  1 mg Oral DAILY    Thiamine Mononitrate (B-1) tablet 100 mg  100 mg Oral DAILY    baclofen (LIORESAL) tablet 5 mg  5 mg Oral TID    lidocaine (LIDODERM) 5 % patch 1 Patch  1 Patch TransDERmal Q12H    aspirin chewable tablet 81 mg  81 mg Oral DAILY    atorvastatin (LIPITOR) tablet 20 mg  20 mg Oral DAILY    escitalopram oxalate (LEXAPRO) tablet 20 mg  20 mg Oral DAILY    chlorhexidine (PERIDEX) 0.12 % mouthwash 10 mL  10 mL Oral Q12H    nicotine (NICODERM CQ) 14 mg/24 hr patch 1 Patch  1 Patch TransDERmal DAILY    enoxaparin (LOVENOX) injection 40 mg  40 mg SubCUTAneous Q24H     Allergies   Allergen Reactions    Sulfacetamide Anaphylaxis      Social History   Substance Use Topics    Smoking status: Current Some Day Smoker     Packs/day: 0.50    Smokeless tobacco: Not on file    Alcohol use Not on file      History reviewed. No pertinent family history. Review of Systems:  A comprehensive review of systems was negative except for that written in the HPI. Objective:   Vital Signs:    Visit Vitals    /78    Pulse 94    Temp 98.5 °F (36.9 °C)    Resp 16    Ht 5' 8\" (1.727 m)    Wt 67.1 kg (148 lb)    SpO2 99%    BMI 22.5 kg/m2       O2 Device: Room air   O2 Flow Rate (L/min): 2 l/min   Temp (24hrs), Av.6 °F (37 °C), Min:97.8 °F (36.6 °C), Max:99.3 °F (37.4 °C)       Intake/Output:   Last shift:       0701 -  1900  In: 240 [P.O.:240]  Out: -   Last 3 shifts:  1901 -  0700  In: 8647 [P.O.:1620]  Out: 4100 [Urine:4100]    Intake/Output Summary (Last 24 hours) at 17 1424  Last data filed at 17 0843   Gross per 24 hour   Intake              840 ml   Output             1400 ml   Net             -560 ml     Physical Exam:    General:  Alert and appropriate. Head:  Normocephalic, without obvious abnormality, atraumatic. Eyes:  Conjunctivae/corneas clear. Nose: Nares normal. Septum midline. Mucosa normal.   Throat: Lips and tongue normal.  Teeth present. Mucosa moist.    Neck: Supple, symmetrical, trachea midline, no adenopathy, no JVD.        Lungs:   Clear to auscultation       Heart:  Tachycardic rate and regular rhythm; no audible mrg   Abdomen:   Hypoactive bowel sounds; soft, non-tender Extremities: Extremities without edema. Cyanosis to fingers and toes with mottling on lower extremities. Pulses: 2+ and symmetric all extremities. Skin: Warm, dry. Neurologic: No focal deficit       Data:     Recent Results (from the past 24 hour(s))   GLUCOSE, POC    Collection Time: 03/15/17  4:10 PM   Result Value Ref Range    Glucose (POC) 320 (H) 70 - 110 mg/dL   GLUCOSE, POC    Collection Time: 03/15/17  7:06 PM   Result Value Ref Range    Glucose (POC) 476 (HH) 70 - 110 mg/dL   GLUCOSE, POC    Collection Time: 03/15/17  9:14 PM   Result Value Ref Range    Glucose (POC) 273 (H) 70 - 110 mg/dL   MAGNESIUM    Collection Time: 03/16/17  5:15 AM   Result Value Ref Range    Magnesium 2.2 1.8 - 2.4 mg/dL   METABOLIC PANEL, COMPREHENSIVE    Collection Time: 03/16/17  5:15 AM   Result Value Ref Range    Sodium 145 136 - 145 mmol/L    Potassium 3.7 3.5 - 5.5 mmol/L    Chloride 106 100 - 108 mmol/L    CO2 35 (H) 21 - 32 mmol/L    Anion gap 4 3.0 - 18 mmol/L    Glucose 84 74 - 99 mg/dL    BUN 11 7.0 - 18 MG/DL    Creatinine 0.71 0.6 - 1.3 MG/DL    BUN/Creatinine ratio 15 12 - 20      GFR est AA >60 >60 ml/min/1.73m2    GFR est non-AA >60 >60 ml/min/1.73m2    Calcium 9.0 8.5 - 10.1 MG/DL    Bilirubin, total 0.9 0.2 - 1.0 MG/DL    ALT (SGPT) 90 (H) 13 - 56 U/L    AST (SGOT) 38 (H) 15 - 37 U/L    Alk.  phosphatase 106 45 - 117 U/L    Protein, total 6.0 (L) 6.4 - 8.2 g/dL    Albumin 2.9 (L) 3.4 - 5.0 g/dL    Globulin 3.1 2.0 - 4.0 g/dL    A-G Ratio 0.9 0.8 - 1.7     CBC WITH AUTOMATED DIFF    Collection Time: 03/16/17  5:15 AM   Result Value Ref Range    WBC 14.2 (H) 4.6 - 13.2 K/uL    RBC 4.39 4.20 - 5.30 M/uL    HGB 14.2 12.0 - 16.0 g/dL    HCT 42.8 35.0 - 45.0 %    MCV 97.5 (H) 74.0 - 97.0 FL    MCH 32.3 24.0 - 34.0 PG    MCHC 33.2 31.0 - 37.0 g/dL    RDW 13.9 11.6 - 14.5 %    PLATELET 176 972 - 767 K/uL    MPV 12.4 (H) 9.2 - 11.8 FL    NEUTROPHILS 77 (H) 40 - 73 %    LYMPHOCYTES 13 (L) 21 - 52 %    MONOCYTES 9 3 - 10 %    EOSINOPHILS 1 0 - 5 %    BASOPHILS 0 0 - 2 %    ABS. NEUTROPHILS 10.9 (H) 1.8 - 8.0 K/UL    ABS. LYMPHOCYTES 1.8 0.9 - 3.6 K/UL    ABS. MONOCYTES 1.2 0.05 - 1.2 K/UL    ABS. EOSINOPHILS 0.2 0.0 - 0.4 K/UL    ABS. BASOPHILS 0.0 0.0 - 0.06 K/UL    DF AUTOMATED     GLUCOSE, POC    Collection Time: 03/16/17  6:35 AM   Result Value Ref Range    Glucose (POC) 75 70 - 110 mg/dL   GLUCOSE, POC    Collection Time: 03/16/17 11:07 AM   Result Value Ref Range    Glucose (POC) 192 (H) 70 - 110 mg/dL             Telemetry:Sinus tachycardia    Imaging:  CXR 3/5  The cardiomediastinal silhouette is stable. Bilateral mid to lower lung field  increased markings are again seen. The lung fields are hyperinflated. There is  no focal consolidation. There is no significant pleural effusion. There is no  acute osseous abnormality.        --------------  IMPRESSION  Impression:  --------------  COPD.     Bilateral mid to lower lung field increased interstitial markings similar to  prior which could represent bronchitis/interstitial pneumonitis or a chronic  interstitial process.      No focal consolidation.     No significant interval change. CTA Chest   FINDINGS:     EXAM QUALITY: Overall exam quality is suboptimal. Opacification of the pulmonary  arteries is somewhat suboptimal although diagnostic. There is significant  respiratory motion artifact which significantly degrades the images particularly  in the lower lung fields.     PULMONARY ARTERIES: No evidence of pulmonary embolism.     MEDIASTINUM: Heart size is normal. No evidence of right heart strain. No  pericardial effusion. Mild atherosclerotic changes of the aorta. Esophagus is  unremarkable.     LYMPH NODES: No enlarged nodes.     AIRWAY: Normal.     LUNGS: Significant emphysematous changes most pronounced in the upper lobes. Bilateral pulmonary nodules. Largest is in the left lower lobe on image 86  measuring 11 x 11 mm.  A second possible left lower lobe nodule on image 91  measures 7 x 6 mm. Right lower lobe nodule image 83 measures 12 x 6 mm.     PLEURA: Normal. Specifically, no pneumothorax or pleural effusion.     UPPER ABDOMEN: There is an exophytic possibly solid nodule arising from the  upper pole of the left kidney. This measures 11 x 12 mm. Upper abdominal  structures are otherwise unremarkable. .     OTHER: No acute or aggressive osseous abnormalities identified.     _______________     IMPRESSION:  1. Study is degraded by respiratory motion artifact however there are no  definite pulmonary emboli. 2. Emphysema.   3. Bilateral pulmonary nodules as described above with the largest in the left  lower lobe measuring 11 x 11 mm.  4. Possible solid exophytic left upper pole renal mass.            Kaye Christina MD

## 2017-03-17 NOTE — DISCHARGE SUMMARY
[facility] [address] SUMMARY    Name:  Sammie Friend  MR#:  [id number]  :  1955  Account #:  [de-identified]  Date of Adm:  2017  Date of Discharge:  2017      DIAGNOSES AT DISCHARGE  1. Acute respiratory failure with chronic obstructive pulmonary disease  exacerbation. 2. Severe anxiety disorder. 3. Elevated troponin on admission. 4. Pulmonary nodules. 5. Kidney lesion. 6. Post traumatic stress disorder. 7. Chronic pain. HOSPITAL CONSULTANTS: Critical Care Pulmonology, Dr. Cherise Arrieta,  Cardiology. HOSPITAL SUMMARY: This patient is a smoker. She has a history of  PTSD, anxiety disorder, and chronic pain. She came into the hospital  on the  and, at that point in time, had worsening shortness  of breath, which had started about 2 weeks ago, but prompted her to  seek emergency care that day. Her shortness of breath was  associated with wheezing and cough which was nonproductive. As  noted, her history includes chronic neck and back pain, hypertension,  chronic sinusitis, COPD, anxiety, and PTSD. She was hypotensive,  lethargic, tachypneic. ABG showed hypoxia, but no significant  hypercarbia. She was admitted to the Intensive Care Unit and, shortly  thereafter, was intubated. Critical Care Pulmonology was involved, as  well as Cardiology. She had an elevated troponin level, but she was in  acute respiratory distress. She had a CT scan of her chest,  that showed no evidence of pulmonary embolism. She had serial  cardiac markers, but she had normal EKG changes. The focus was on  her respiratory status. She required intubation and was seen by  Pulmonology. She was managed on sedatives and given fluid  resuscitation, empiric antibiotics, steroids and breathing treatments. She subsequently was able to be weaned from the ventilator.   Then, after improvement, she was able to be transferred to the floor,  where she has been doing well in the last few days. She has been  weaned off oxygen at this point in time. She has had some mouth  lesions, for which she is taking topical and oral medications, but overall  is feeling much better. She has passed her physical therapy test.  She is weaned off oxygen completely, and is stable for discharge to  home with home health. Today, the patient, 64years old, is awake and  alert, in good spirits. In the room, her blood pressure is 127/78, pulse  94, temperature 98.5, respiratory rate 16, SaO2 99% on room air. Her  exam is unchanged from yesterday, and no new issues or concerns. Her most recent labs show a glucose of 192. She does have non-  insulin-dependent diabetes mellitus, usually controlled with diet,  exacerbated here with steroids. It looks like she had a high blood sugar  of 476 in the last evening, but then it came down to 75 this morning, so  it is fairly variable at this point. Her last hemoglobin and hematocrit  were 14.2 and 42.8, platelets 994, and white count 14.2. No notable  infection here. Urine culture with no growth. Respiratory culture  with normal respiratory cristino, a few Candida albicans. She was started  on nystatin swish and swallow. Her testing included a CT angiogram of  the chest. There was no evidence for pulmonary emboli. There were  bilateral pulmonary nodules, with the largest in the left lower lobe,  measuring 11 x 11 mm, and a possible solid exophytic left upper pole  renal mass. They recommended a renal ultrasound for further  evaluation of the kidneys and short-term CT in 3-4 months to evaluate  possible lung nodules. 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 Exam was otherwise unremarkable. Her echocardiogram here showed an EF of 50%.  Cardiology has  finalized their recommendations and they recommend, at this point in  time, an outpatient stress test when she has recovered from her  respiratory illness. So, with that said, she needs close followup with Dr. Kofi Boucher, with whom she has chosen to see as an outpatient. She has a  followup appointment on 03/22/2017 with him. At that point, she will  review her medications that she is on here. Continue her nicotine  cessation plan. Follow up with Dr. Fani Dave in 2 weeks. She has an  appointment on 04/03/2016. Then, she should have a repeat CT scan  in 3 months, considering her smoking history. She needs to be on a  diabetic diet at home. MEDICATIONS FOR DISCHARGE  1. Aspirin 81 mg daily. 2. Lipitor 20 mg at night. 3. Marinol 5 mg twice daily. 4. Folic acid 1 mg daily. 5. Neurontin 300 mg twice daily. 6. Hydrocodone/acetaminophen 1 tablet every 8 hours as needed for  pain. 7. Lidocaine solution 15 mL in her mouth or on lip lesions as needed  for pain. 8. Magic mouthwash 5 mL every 4 hours as needed for stomatitis. 9. Nicoderm patch 1 patch every 24 hours. 10. Nystatin swish and swallow 5 mL 4 times daily for 7 days. 11. Protonix 40 mg daily. 12. Prednisone 20 mg daily for 5 days. 13. Thiamine 100 mg daily. 14. Continue Baclofen 5 mg 3 times daily as needed for muscle  spasm. 15. Lexapro 20 mg daily. 16. Advair 1 puff twice daily. 16. Albuterol 2 puffs every 4 hours as needed for rescue inhaler. 17. Diovan HCT 1 tablet by mouth daily. She is stable to discharge from the hospital today. Adhere to a diabetic  diet as she weans off the prednisone. Close monitoring and followup  with her doctors as listed above. Time: 45 minutes on discharge time.         MD GILBERT Ngo / JULIAN  D:  03/16/2017   13:14  T:  03/17/2017   05:32  Job #:  [id number]

## 2017-04-17 ENCOUNTER — OFFICE VISIT (OUTPATIENT)
Dept: PULMONOLOGY | Age: 62
End: 2017-04-17

## 2017-04-17 VITALS
OXYGEN SATURATION: 98 % | WEIGHT: 132 LBS | SYSTOLIC BLOOD PRESSURE: 92 MMHG | BODY MASS INDEX: 20 KG/M2 | HEIGHT: 68 IN | RESPIRATION RATE: 18 BRPM | HEART RATE: 85 BPM | DIASTOLIC BLOOD PRESSURE: 60 MMHG | TEMPERATURE: 98.4 F

## 2017-04-17 DIAGNOSIS — J43.1 PANLOBULAR EMPHYSEMA (HCC): ICD-10-CM

## 2017-04-17 DIAGNOSIS — J32.8 OTHER CHRONIC SINUSITIS: ICD-10-CM

## 2017-04-17 DIAGNOSIS — J44.9 ASTHMA WITH COPD (HCC): Primary | ICD-10-CM

## 2017-04-17 DIAGNOSIS — R91.8 LUNG NODULES: ICD-10-CM

## 2017-04-17 RX ORDER — ROSUVASTATIN CALCIUM 10 MG/1
10 TABLET, COATED ORAL
COMMUNITY

## 2017-04-17 NOTE — PROGRESS NOTES
[facility]  Pulmonary, Critical Care, and Sleep Medicine      Pulmonary Office Progress Notes    Name: Klarissa Judge     : 1955     Date: 2017        Subjective:     Patient is a 64 y.o. female is here for follow up for: Problems-Hospital follow up: acute respiratory failure- severe COPD/Asthma. She has a history of PTSD, anxiety disorder, and chronic pain. She came into the hospital on the  and, at that point in time, had worsening shortness of breath, which had started about 2 weeks ago, but prompted her to seek emergency care that day. Her shortness of breath was associated with wheezing and cough which was nonproductive. As noted, her history includes chronic neck and back pain, hypertension, chronic sinusitis, COPD, anxiety, and PTSD. She was hypotensive, lethargic, tachypneic. ABG showed hypoxia, but no significant  hypercarbia. She was admitted to the Intensive Care Unit and, shortly thereafter, was intubated , maintained on ventilator support, steroids, sedatives. Eventually liberated from ventilator support. Her testing included a CT angiogram of the chest. There was no evidence for pulmonary emboli. There were bilateral pulmonary nodules, with the largest in the left lower lobe,measuring 11 x 11 mm, and a possible solid exophytic left upper pole renal mass. 17  Feels better- no cough, wheezing, SOB, chest pain at present. Using Advair daily with Prn albuterol- has not needed. Some nasal congestion. Feels weak due to let leg being weak . Also admits to feeling depressed. Has not smoked since admission. Denies fever/chills. Did follow up with cardiology- stress test planned at later date. No other concerns.     Past Medical History:   Diagnosis Date    Back pain     COPD (chronic obstructive pulmonary disease) (HCC)     Hypertension     PTSD (post-traumatic stress disorder)     Shingles        Allergies   Allergen Reactions    Sulfacetamide Anaphylaxis       Current Outpatient Prescriptions   Medication Sig Dispense Refill    Thiamine Mononitrate (B-1) 100 mg tablet Take 1 Tab by mouth daily. 30 Tab 1    predniSONE (DELTASONE) 20 mg tablet Take 1 Tab by mouth daily (with breakfast). 5 Tab 0    magic mouthwash (FIRST-MOUTHWASH BLM) 604--40 mg/30 mL mwsh oral suspension Take 5 mL by mouth every four (4) hours as needed for Stomatitis. 237 mL 0    lidocaine (XYLOCAINE) 2 % solution Take 15 mL by mouth as needed for Pain. 1 Bottle 0    pantoprazole (PROTONIX) 40 mg tablet Take 1 Tab by mouth Daily (before breakfast). 30 Tab 0    HYDROcodone-acetaminophen (NORCO) 5-325 mg per tablet Take 1 Tab by mouth every eight (8) hours as needed for Pain. Max Daily Amount: 3 Tabs. 30 Tab 0    dronabinol (MARINOL) 5 mg capsule Take 1 Cap by mouth two (2) times a day. Max Daily Amount: 10 mg. 60 Cap 1    escitalopram oxalate (LEXAPRO) 20 mg tablet Take 1 Tab by mouth daily. 30 Tab 1    folic acid (FOLVITE) 1 mg tablet Take 1 Tab by mouth daily. 30 Tab 1    gabapentin (NEURONTIN) 300 mg capsule Take 1 Cap by mouth two (2) times a day. 60 Cap 0    baclofen (LIORESAL) 10 mg tablet Take 0.5 Tabs by mouth three (3) times daily. 30 Tab 0    aspirin 81 mg chewable tablet Take 1 Tab by mouth daily. 30 Tab 1    atorvastatin (LIPITOR) 20 mg tablet Take 1 Tab by mouth daily. 30 Tab 1    valsartan-hydroCHLOROthiazide (DIOVAN HCT) 160-25 mg per tablet Take 1 Tab by mouth daily.  albuterol (PROVENTIL HFA, VENTOLIN HFA, PROAIR HFA) 90 mcg/actuation inhaler Take 2 Puffs by inhalation every four (4) hours as needed for Wheezing.  fluticasone-salmeterol (ADVAIR DISKUS) 100-50 mcg/dose diskus inhaler Take 1 Puff by inhalation every twelve (12) hours.            Review of Systems:  HEENT: No epistaxis, no nasal drainage, no difficulty in swallowing, no redness in eyes  Respiratory: as above  Cardiovascular: no chest pain, no palpitations, no chronic leg edema, no syncope  Gastrointestinal: no abd pain, no vomiting, no diarrhea, no bleeding symptoms  Genitourinary: No urinary symptoms or hematuria  Integument/breast: No ulcers or rashes  Musculoskeletal:Neg  Neurological: No focal weakness, no seizures, no headaches  Behvioral/Psych: No anxiety, no depression  Constitutional: No fever, no chills, no weight loss, no night sweats     Objective: There were no vitals taken for this visit. Physical Exam:   General: comfortable, no acute distress  HEENT: pupils reactive, sclera anicteric, EOM intact  Neck: No adenopathy or thyroid swelling, no JVD, supple  CVS: S1S2 no murmurs  RS: Mod AE bilaterally, no tactile fremitus or egophony, no accessory muscle use  Abd: soft, non tender, no hepatosplenomegaly  Neuro: non focal, awake, alert  Extrm: no leg edema, clubbing or cyanosis  Skin: no rash    Data review:     Admission on 03/04/2017, Discharged on 03/16/2017   No results displayed because visit has over 200 results. Date FEV1/FVC FEV1 Best FVC best TLC RV VC DLCO                                               Imaging:  I have personally reviewed the patients radiographs and have reviewed the reports:  3/4/17:  1. Study is degraded by respiratory motion artifact however there are no  definite pulmonary emboli.     2. Emphysema. 3. Bilateral pulmonary nodules as described above with the largest in the left  lower lobe measuring 11 x 11 mm.  4. Possible solid exophytic left upper pole renal mass.     Recommend renal ultrasound for further evaluation of the kidneys.     Recommend short-term follow-up chest CT in 3-4 months to evaluate possible lung  nodules.  The nodule seen on the lower lobes with there is significant  respiratory motion artifact         Patient Active Problem List   Diagnosis Code    Respiratory insufficiency R06.89    Sinusitis J32.9    Mass of sinus R22.0    COPD (chronic obstructive pulmonary disease) with emphysema ([address] J43.9    Hypokalemia E87.6    Transaminitis R74.0    Nodule of kidney N28.89    COPD with acute exacerbation (HCC) J44.1    PTSD (post-traumatic stress disorder) F43.10    Acute respiratory failure ([facility]) J96.00       IMPRESSION:   · S/P Acute hypoxemic respiratory failure, Improved severe bronchospasm  With mucus plugging from refractory LRTI: all resolved  · COPD/Asthma: FEV1 not known. · Severe anxiety disorder   · Chronic pain,   · Sinusitis/ Sinus Mass  · PTSD from past interactions with physicians in past  · Possible left upper pole renal mass, as per CTA chest - stable per patient  · Multiple pulmonary nodules from CTA chest: will need follow up for stability: d/d postinflammatory vs true nodule ( smoker). Bilateral pulmonary nodules as described above with the largest in the left  lower lobe measuring 11 x 11 mm. · Hx HTN  · Elevated troponin with abnormal EKG- cardiology following      RECOMMENDATIONS:   · Continue current bronchodilators- Advair with albuterol for rescue  · Will obtain PFT's for functional assessment  · Discussed need for continued abstinence from smoking: counseled and congratulated on success so far  · will follow up Lung nodule per Fleischner guidelines- short term follow up 3 months CT prior to next visit  · Healthy lifestyle- activity, healthy diet and well being discussed  · Cardiology follow up as scheduled.         Aide Barajas MD

## 2017-04-17 NOTE — PATIENT INSTRUCTIONS
COPD and Asthma: Care Instructions  Your Care Instructions  Some people who have chronic obstructive pulmonary disease (COPD) also have asthma. Both of these problems can damage your lungs. This makes it very important to control them. Asthma causes the airways that lead to the lungs to swell and become narrow. This makes it hard to breathe. You may wheeze or cough. If you have a bad attack, you may need emergency care. There are two parts to treating asthma. · Controlling asthma over the long term. · Treating attacks when they occur. You and your doctor can make an asthma treatment plan that will help. This plan tells you the medicines you take every day to reduce the swelling in your airways and prevent attacks. It also tells you what to do if you have an asthma attack. Follow-up care is a key part of your treatment and safety. Be sure to make and go to all appointments, and call your doctor if you are having problems. It's also a good idea to know your test results and keep a list of the medicines you take. How can you care for yourself at home? To control asthma over the long term  Medicines  Controller medicines reduce swelling in your lungs. They also prevent asthma attacks. Take your controller medicine exactly as prescribed. Talk to your doctor if you have any problems with your medicine. · Inhaled corticosteroid is a common and effective controller medicine. Using it the right way can prevent or reduce most side effects. · Take your controller medicine every day, not just when you have symptoms. This helps prevent problems before they occur. · Always bring your asthma medicine with you when you travel. · Your doctor may prescribe long-acting medicine that combines a corticosteroid with a beta2-agonist. Follow your doctor's instructions exactly about how to take a long-acting medicine. Examples include:  ¨ Fluticasone and salmeterol (Advair). ¨ Budesonide and formoterol (Symbicort).   · Do not depend on your controller medicines to stop an asthma attack that has already started. They do not work fast enough to help. · Your doctor may also prescribe anticholinergic inhalers. These include ipratropium (Atrovent) and tiotropium (Spiriva). Education  · Learn what sets off an asthma attack. Avoid these triggers when you can. Common triggers include smoke, air pollution, pollen, animal dander, colds, stress, and cold air. · Do not smoke. Smoking can make COPD and asthma worse. If you need help quitting, talk to your doctor about stop-smoking programs and medicines. These can increase your chances of quitting for good. · Check yourself for symptoms to know which step to follow in your action plan. Watch for things like being short of breath, having chest tightness, coughing, and wheezing. Also notice if symptoms wake you up at night or if you get tired quickly when you exercise. · You may want to learn how to use a peak flow meter. This measures how open your airways are. It may help you know when you will have an asthma attack. To treat attacks when they occur  Use your asthma action plan when you have an attack. Your quick-relief medicine, such as albuterol, will stop an asthma attack. It relaxes the muscles that get tight around the airways. · Take your quick-relief medicine exactly as prescribed. Talk with your doctor if you have any problems with your medicine. · Keep this medicine with you at all times. · You may need to use this medicine before you exercise. If your doctor prescribed corticosteroid pills to use during an attack, take them as directed. They may take hours to work, but they may shorten the attack and help you breathe better. When should you call for help? Call 911 anytime you think you may need emergency care. For example, call if:  · You have severe trouble breathing.   Call your doctor now or seek immediate medical care if:  · You have new or worse shortness of breath. · You are coughing more deeply or more often, especially if you notice more mucus or a change in the color of your mucus. · You cough up blood. · You have new or increased swelling in your legs or belly. · You have a fever. · You have used your quick-relief medicine but you are still short of breath. Watch closely for changes in your health, and be sure to contact your doctor if you have any problems. Where can you learn more? Go to http://norah-rush.info/. Enter A350 in the search box to learn more about \"COPD and Asthma: Care Instructions. \"  Current as of: May 23, 2016  Content Version: 11.2  © 1513-4995 [employer]. Care instructions adapted under license by [employer] (which disclaims liability or warranty for this information). If you have questions about a medical condition or this instruction, always ask your healthcare professional. [place] [address] any warranty or liability for your use of this information. Learning About COPD, Asthma, and Air Pollution  How does air pollution affect COPD and asthma? When you have COPD or asthma, air pollution may make your symptoms worse. If it does, it means that air pollution is a trigger for you. It is important to know what your triggers are and how to deal with them. If air pollution is a trigger for you, you need to learn about air quality and pay attention to weather forecasts that include how bad the air is expected to be. How can you manage a flare-up caused by air pollution? · Do not panic. Quick treatment at home may help you prevent serious breathing problems. · Take your medicines exactly as your doctor tells you. ¨ Use your quick-relief inhaler as directed by your doctor. If your symptoms do not get better after you use your medicine, have someone take you to the emergency room. Call an ambulance if necessary.   ¨ With inhaled medicines, a spacer or a nebulizer may help you get more medicine to your lungs. Ask your doctor or pharmacist how to use them properly. Practice using the spacer in front of a mirror before you have a flare-up. This may help you get the medicine into your lungs quickly. ¨ If your doctor has given you steroid pills, take them as directed. ¨ Talk to your doctor if you have any problems with your medicine. What can you do to prevent flare-ups? · Try not to be outside when air pollution levels are high. Stay at home with your windows closed. · Do not smoke. This is the most important step you can take to prevent more damage to your lungs and prevent problems. If you already smoke, it is never too late to stop. If you need help quitting, talk to your doctor about stop-smoking programs and medicines. These can increase your chances of quitting for good. · Avoid secondhand smoke; cold, dry air; and high altitudes. · Take your daily medicines as prescribed. · Avoid colds and flu. ¨ Get a pneumococcal vaccine. ¨ Get a flu vaccine each year, as soon as it is available. Ask those you live or work with to do the same, so they will not get the flu and infect you. ¨ Try to stay away from people with colds or the flu. ¨ Wash your hands often. Follow-up care is a key part of your treatment and safety. Be sure to make and go to all appointments, and call your doctor if you are having problems. It's also a good idea to know your test results and keep a list of the medicines you take. Where can you learn more? Go to http://norah-rush.info/. Enter  in the search box to learn more about \"Learning About COPD, Asthma, and Air Pollution. \"  Current as of: May 23, 2016  Content Version: 11.2  © 7737-9218 [employer]. Care instructions adapted under license by [employer] (which disclaims liability or warranty for this information).  If you have questions about a medical condition or this instruction, always ask your healthcare professional. [address] any warranty or liability for your use of this information.

## 2017-04-17 NOTE — MR AVS SNAPSHOT
Visit Information Date & Time Provider Department Dept. Phone Encounter #  
 4/17/2017  9:15 AM Ana Marshall MD [facility] [id number] Follow-up Instructions Return in about 2 months (around 6/17/2017). Upcoming Health Maintenance Date Due Hepatitis C Screening 1955 Pneumococcal 19-64 Medium Risk (1 of 1 - PPSV23) 12/28/1974 DTaP/Tdap/Td series (1 - Tdap) 12/28/1976 PAP AKA CERVICAL CYTOLOGY 12/28/1976 BREAST CANCER SCRN MAMMOGRAM 12/28/2005 FOBT Q 1 YEAR AGE 50-75 12/28/2005 ZOSTER VACCINE AGE 60> 12/28/2015 INFLUENZA AGE 9 TO ADULT 8/1/2016 Allergies as of 4/17/2017  Review Complete On: 4/17/2017 By: Ana Marshall MD  
  
 Severity Noted Reaction Type Reactions Sulfacetamide  05/02/2012   Systemic Anaphylaxis Current Immunizations  Reviewed on 4/17/2017 Name Date Influenza High Dose Vaccine PF 10/1/2016 Pneumococcal Conjugate (PCV-13) 3/18/2016 Pneumococcal Polysaccharide (PPSV-23) 10/1/2016 Reviewed by Rick Prasad LPN on 9/77/7632 at  9:27 AM  
You Were Diagnosed With   
  
 Codes Comments Panlobular emphysema ([facility] [address]    -  Primary ICD-10-CM: J43.1 ICD-9-CM: 492.8 Lung nodules     ICD-10-CM: R91.8 ICD-9-CM: 793.19 Vitals BP Pulse Temp Resp Height(growth percentile) Weight(growth percentile) 92/60 (BP 1 Location: Left arm, BP Patient Position: Sitting) 85 98.4 °F (36.9 °C) (Oral) 18 5' 8\" (1.727 m) 132 lb (59.9 kg) SpO2 BMI OB Status Smoking Status 98% 20.07 kg/m2 Postmenopausal Current Some Day Smoker BMI and BSA Data Body Mass Index Body Surface Area 20.07 kg/m 2 1.7 m 2 Preferred Pharmacy Pharmacy Name Phone Atilio Schaeffer [address] [facility] [phone] Your Updated Medication List  
  
   
This list is accurate as of: 4/17/17 10:49 AM.  Always use your most recent med list.  
  
  
  
  
 ADVAIR DISKUS 100-50 mcg/dose diskus inhaler Generic drug:  fluticasone-salmeterol Take 1 Puff by inhalation every twelve (12) hours. albuterol 90 mcg/actuation inhaler Commonly known as:  PROVENTIL HFA, VENTOLIN HFA, PROAIR HFA Take 2 Puffs by inhalation every four (4) hours as needed for Wheezing. aspirin 81 mg chewable tablet Take 1 Tab by mouth daily. atorvastatin 20 mg tablet Commonly known as:  LIPITOR Take 1 Tab by mouth daily. baclofen 10 mg tablet Commonly known as:  LIORESAL Take 0.5 Tabs by mouth three (3) times daily. CRESTOR 10 mg tablet Generic drug:  rosuvastatin Take 10 mg by mouth nightly. DIOVAN -25 mg per tablet Generic drug:  valsartan-hydroCHLOROthiazide Take 1 Tab by mouth daily. dronabinol 5 mg capsule Commonly known as:  Lindalee Kalani Take 1 Cap by mouth two (2) times a day. Max Daily Amount: 10 mg.  
  
 escitalopram oxalate 20 mg tablet Commonly known as:  Sumit Smiles Take 1 Tab by mouth daily. folic acid 1 mg tablet Commonly known as:  Google Take 1 Tab by mouth daily. gabapentin 300 mg capsule Commonly known as:  NEURONTIN Take 1 Cap by mouth two (2) times a day. HYDROcodone-acetaminophen 5-325 mg per tablet Commonly known as:  Lynnda Levo Take 1 Tab by mouth every eight (8) hours as needed for Pain. Max Daily Amount: 3 Tabs.  
  
 lidocaine 2 % solution Commonly known as:  XYLOCAINE Take 15 mL by mouth as needed for Pain.  
  
 magic mouthwash 585--40 mg/30 mL Mwsh oral suspension Commonly known as:  FIRST-MOUTHWASH BLM Take 5 mL by mouth every four (4) hours as needed for Stomatitis. pantoprazole 40 mg tablet Commonly known as:  PROTONIX Take 1 Tab by mouth Daily (before breakfast). predniSONE 20 mg tablet Commonly known as:  Sherra Jose Take 1 Tab by mouth daily (with breakfast). Thiamine Mononitrate 100 mg tablet Commonly known as:  B-1 Take 1 Tab by mouth daily. Follow-up Instructions Return in about 2 months (around 6/17/2017). To-Do List   
 04/18/2017 Procedures: AMB POC PFT COMPLETE W/BRONCHODILATOR   
  
 04/18/2017 Procedures: AMB POC PFT COMPLETE W/O BRONCHODILATOR   
  
 04/18/2017 Procedures:  DIFFUSING CAPACITY   
  
 04/18/2017 Procedures:  GAS DILUT/WASHOUT LUNG VOL W/WO DISTRIB VENT&VOL   
  
 06/05/2017 Imaging:  CT CHEST WO CONT Patient Instructions COPD and Asthma: Care Instructions Your Care Instructions Some people who have chronic obstructive pulmonary disease (COPD) also have asthma. Both of these problems can damage your lungs. This makes it very important to control them. Asthma causes the airways that lead to the lungs to swell and become narrow. This makes it hard to breathe. You may wheeze or cough. If you have a bad attack, you may need emergency care. There are two parts to treating asthma. · Controlling asthma over the long term. · Treating attacks when they occur. You and your doctor can make an asthma treatment plan that will help. This plan tells you the medicines you take every day to reduce the swelling in your airways and prevent attacks. It also tells you what to do if you have an asthma attack. Follow-up care is a key part of your treatment and safety. Be sure to make and go to all appointments, and call your doctor if you are having problems. It's also a good idea to know your test results and keep a list of the medicines you take. How can you care for yourself at home? To control asthma over the long term Medicines Controller medicines reduce swelling in your lungs. They also prevent asthma attacks. Take your controller medicine exactly as prescribed. Talk to your doctor if you have any problems with your medicine. · Inhaled corticosteroid is a common and effective controller medicine. Using it the right way can prevent or reduce most side effects. · Take your controller medicine every day, not just when you have symptoms. This helps prevent problems before they occur. · Always bring your asthma medicine with you when you travel. · Your doctor may prescribe long-acting medicine that combines a corticosteroid with a beta2-agonist. Follow your doctor's instructions exactly about how to take a long-acting medicine. Examples include: ¨ Fluticasone and salmeterol (Advair). ¨ Budesonide and formoterol (Symbicort). · Do not depend on your controller medicines to stop an asthma attack that has already started. They do not work fast enough to help. · Your doctor may also prescribe anticholinergic inhalers. These include ipratropium (Atrovent) and tiotropium (Spiriva). Education · Learn what sets off an asthma attack. Avoid these triggers when you can. Common triggers include smoke, air pollution, pollen, animal dander, colds, stress, and cold air. · Do not smoke. Smoking can make COPD and asthma worse. If you need help quitting, talk to your doctor about stop-smoking programs and medicines. These can increase your chances of quitting for good. · Check yourself for symptoms to know which step to follow in your action plan. Watch for things like being short of breath, having chest tightness, coughing, and wheezing. Also notice if symptoms wake you up at night or if you get tired quickly when you exercise. · You may want to learn how to use a peak flow meter. This measures how open your airways are. It may help you know when you will have an asthma attack. To treat attacks when they occur Use your asthma action plan when you have an attack. Your quick-relief medicine, such as albuterol, will stop an asthma attack. It relaxes the muscles that get tight around the airways. · Take your quick-relief medicine exactly as prescribed. Talk with your doctor if you have any problems with your medicine. · Keep this medicine with you at all times. · You may need to use this medicine before you exercise. If your doctor prescribed corticosteroid pills to use during an attack, take them as directed. They may take hours to work, but they may shorten the attack and help you breathe better. When should you call for help? Call 911 anytime you think you may need emergency care. For example, call if: 
· You have severe trouble breathing. Call your doctor now or seek immediate medical care if: 
· You have new or worse shortness of breath. · You are coughing more deeply or more often, especially if you notice more mucus or a change in the color of your mucus. · You cough up blood. · You have new or increased swelling in your legs or belly. · You have a fever. · You have used your quick-relief medicine but you are still short of breath. Watch closely for changes in your health, and be sure to contact your doctor if you have any problems. Where can you learn more? Go to http://norah-rush.info/. Enter A350 in the search box to learn more about \"COPD and Asthma: Care Instructions. \" Current as of: May 23, 2016 Content Version: 11.2 © 2341-7411 [employer]. Care instructions adapted under license by [employer] (which disclaims liability or warranty for this information). If you have questions about a medical condition or this instruction, always ask your healthcare professional. [place] [address] any warranty or liability for your use of this information. Learning About COPD, Asthma, and Air Pollution How does air pollution affect COPD and asthma? When you have COPD or asthma, air pollution may make your symptoms worse. If it does, it means that air pollution is a trigger for you. It is important to know what your triggers are and how to deal with them.  If air pollution is a trigger for you, you need to learn about air quality and pay attention to weather forecasts that include how bad the air is expected to be. How can you manage a flare-up caused by air pollution? · Do not panic. Quick treatment at home may help you prevent serious breathing problems. · Take your medicines exactly as your doctor tells you. ¨ Use your quick-relief inhaler as directed by your doctor. If your symptoms do not get better after you use your medicine, have someone take you to the emergency room. Call an ambulance if necessary. ¨ With inhaled medicines, a spacer or a nebulizer may help you get more medicine to your lungs. Ask your doctor or pharmacist how to use them properly. Practice using the spacer in front of a mirror before you have a flare-up. This may help you get the medicine into your lungs quickly. ¨ If your doctor has given you steroid pills, take them as directed. ¨ Talk to your doctor if you have any problems with your medicine. What can you do to prevent flare-ups? · Try not to be outside when air pollution levels are high. Stay at home with your windows closed. · Do not smoke. This is the most important step you can take to prevent more damage to your lungs and prevent problems. If you already smoke, it is never too late to stop. If you need help quitting, talk to your doctor about stop-smoking programs and medicines. These can increase your chances of quitting for good. · Avoid secondhand smoke; cold, dry air; and high altitudes. · Take your daily medicines as prescribed. · Avoid colds and flu. ¨ Get a pneumococcal vaccine. ¨ Get a flu vaccine each year, as soon as it is available. Ask those you live or work with to do the same, so they will not get the flu and infect you. ¨ Try to stay away from people with colds or the flu. ¨ Wash your hands often. Follow-up care is a key part of your treatment and safety.  Be sure to make and go to all appointments, and call your doctor if you are having problems. It's also a good idea to know your test results and keep a list of the medicines you take. Where can you learn more? Go to http://norah-rush.info/. Enter  in the search box to learn more about \"Learning About COPD, Asthma, and Air Pollution. \" Current as of: May 23, 2016 Content Version: 11.2 © 8419-0829 [employer]. Care instructions adapted under license by [employer] (which disclaims liability or warranty for this information). If you have questions about a medical condition or this instruction, always ask your healthcare professional. [place] [address] any warranty or liability for your use of this information. Introducing [facility]! Dear [facility]: 
Thank you for requesting a [employer] account. Our records indicate that you already have an active [employer] account. You can access your account anytime at [email or web address] Did you know that you can access your hospital and ER discharge instructions at any time in [employer]? You can also review all of your test results from your hospital stay or ER visit. Additional Information If you have questions, please visit the Frequently Asked Questions section of the [employer] website at [email or web address] Remember, [employer] is NOT to be used for urgent needs. For medical emergencies, dial 911. Now available from your iPhone and Android! Please provide this summary of care documentation to your next provider. Your primary care clinician is listed as Brad Nuñez. If you have any questions after today's visit, please call [phone].

## 2017-06-05 ENCOUNTER — HOSPITAL ENCOUNTER (OUTPATIENT)
Dept: CT IMAGING | Age: 62
Discharge: HOME | End: 2017-06-05
Attending: INTERNAL MEDICINE
Payer: OTHER GOVERNMENT

## 2017-06-05 DIAGNOSIS — R91.8 LUNG NODULES: ICD-10-CM

## 2017-06-05 PROCEDURE — 71250 CT THORAX DX C-: CPT

## 2017-06-06 ENCOUNTER — DOCUMENTATION ONLY (OUTPATIENT)
Dept: PULMONOLOGY | Age: 62
End: 2017-06-06

## 2017-06-06 NOTE — PROGRESS NOTES
1. Moderately severe centrilobular emphysema predominantly involving the upper  lobes bilaterally. There are several pulmonary nodules which are new in appearance from prior examination. While some of these nodular opacities may pertain to peribronchial inflammation/mucoid impaction, spiculated densities in the right upper lobe, as well as the medial left upper lobe, demonstrate a more concerning imaging appearance. Further correlation with PET CT is recommended. 2. Hemorrhagic/proteinaceous renal cysts. 3. Mild lower lung predominant cylindrical bronchiectasis.     Will discuss at appointment and consider ordering PET/CT

## 2017-06-12 ENCOUNTER — TELEPHONE (OUTPATIENT)
Dept: PULMONOLOGY | Age: 62
End: 2017-06-12

## 2017-06-12 NOTE — TELEPHONE ENCOUNTER
Pt had a ct done @ [facility]  on June 5 and she would like to speak with someone about the results.  Please call [phone]

## 2017-06-13 NOTE — TELEPHONE ENCOUNTER
Pts  called again stating this is an emergency situation and that he needs to speak with Dr. Jeaneth Harrison as soon as possible.  Please call [phone] ONLY

## 2017-06-13 NOTE — TELEPHONE ENCOUNTER
Pts  Caroline Cardona called because pt looked at her ct results on [employer] and is very upset about them. He was wondering if someone could give them a call and let them know something. She does have an appt on 6/27 but they feel that she shouldn't wait that long to hear the results. He said that if they need to come in and discuss the results in person that he would bring her.  Please call [phone] or [phone]

## 2017-06-13 NOTE — TELEPHONE ENCOUNTER
Called and spoke to Patient and her Spouse. Explained Ct findings- most likely mucoid impaction and mucus plug related nodular infiltrates. Compared to CT scan in march findings are new making mucoid impaction more likely. Reassured about concern for malignancy. No indication at present for PET ( false positive with inflammation). Instructed on hydration, use of bronchodilators and airway clearance. Will repeat CT in 3-4 months. Follow up appointment as scheduled on 6/27 and sooner if she still is worried- \" states she will be OK\".   Will follow up

## 2017-06-24 ENCOUNTER — HOSPITAL ENCOUNTER (OUTPATIENT)
Dept: ULTRASOUND IMAGING | Age: 62
Discharge: HOME | End: 2017-06-24
Attending: INTERNAL MEDICINE
Payer: OTHER GOVERNMENT

## 2017-06-24 DIAGNOSIS — N28.1 RENAL CYST, ACQUIRED: ICD-10-CM

## 2017-06-24 PROCEDURE — 76770 US EXAM ABDO BACK WALL COMP: CPT

## 2017-06-27 ENCOUNTER — OFFICE VISIT (OUTPATIENT)
Dept: PULMONOLOGY | Age: 62
End: 2017-06-27

## 2017-06-27 VITALS
WEIGHT: 141 LBS | SYSTOLIC BLOOD PRESSURE: 150 MMHG | OXYGEN SATURATION: 90 % | BODY MASS INDEX: 21.37 KG/M2 | RESPIRATION RATE: 23 BRPM | HEART RATE: 80 BPM | TEMPERATURE: 98.2 F | HEIGHT: 68 IN | DIASTOLIC BLOOD PRESSURE: 88 MMHG

## 2017-06-27 DIAGNOSIS — R91.1 LUNG NODULE: ICD-10-CM

## 2017-06-27 DIAGNOSIS — J43.1 PANLOBULAR EMPHYSEMA (HCC): ICD-10-CM

## 2017-06-27 DIAGNOSIS — J44.9 STAGE 4 VERY SEVERE COPD BY GOLD CLASSIFICATION (HCC): Primary | ICD-10-CM

## 2017-06-27 NOTE — MR AVS SNAPSHOT
Visit Information Date & Time Provider Department Dept. Phone Encounter #  
 6/27/2017  3:00 PM Zandra Mohan MD [facility] [place] [id number] Follow-up Instructions Return in about 3 months (around 9/27/2017). Upcoming Health Maintenance Date Due Hepatitis C Screening 1955 DTaP/Tdap/Td series (1 - Tdap) 12/28/1976 PAP AKA CERVICAL CYTOLOGY 12/28/1976 BREAST CANCER SCRN MAMMOGRAM 12/28/2005 FOBT Q 1 YEAR AGE 50-75 12/28/2005 ZOSTER VACCINE AGE 60> 12/28/2015 INFLUENZA AGE 9 TO ADULT 8/1/2017 Allergies as of 6/27/2017  Review Complete On: 6/27/2017 By: Juan Rodriguez RT Severity Noted Reaction Type Reactions Sulfacetamide  05/02/2012   Systemic Anaphylaxis Current Immunizations  Reviewed on 4/17/2017 Name Date Influenza High Dose Vaccine PF 10/1/2016 Pneumococcal Conjugate (PCV-13) 3/18/2016 Pneumococcal Polysaccharide (PPSV-23) 10/1/2016 Not reviewed this visit You Were Diagnosed With   
  
 Codes Comments Stage 4 very severe COPD by GOLD classification ([facility]    -  Primary ICD-10-CM: J44.9 ICD-9-CM: 651 Panlobular emphysema ([facility]     ICD-10-CM: J43.1 ICD-9-CM: 492.8 Lung nodule     ICD-10-CM: R91.1 ICD-9-CM: 793.11 Vitals BP Pulse Temp Resp Height(growth percentile) Weight(growth percentile) 150/88 (BP 1 Location: Left arm, BP Patient Position: At rest) 80 98.2 °F (36.8 °C) (Oral) 23 5' 8\" (1.727 m) 141 lb (64 kg) SpO2 BMI OB Status Smoking Status 90% 21.44 kg/m2 Postmenopausal Current Some Day Smoker BMI and BSA Data Body Mass Index Body Surface Area  
 21.44 kg/m 2 1.75 m 2 Preferred Pharmacy Pharmacy Name Phone [facility] [phone] Your Updated Medication List  
  
   
This list is accurate as of: 6/27/17  3:30 PM.  Always use your most recent med list.  
  
  
  
  
 ADVAIR DISKUS 100-50 mcg/dose diskus inhaler Generic drug:  fluticasone-salmeterol Take 1 Puff by inhalation every twelve (12) hours. albuterol 90 mcg/actuation inhaler Commonly known as:  PROVENTIL HFA, VENTOLIN HFA, PROAIR HFA Take 2 Puffs by inhalation every four (4) hours as needed for Wheezing. aspirin 81 mg chewable tablet Take 1 Tab by mouth daily. atorvastatin 20 mg tablet Commonly known as:  LIPITOR Take 1 Tab by mouth daily. baclofen 10 mg tablet Commonly known as:  LIORESAL Take 0.5 Tabs by mouth three (3) times daily. CRESTOR 10 mg tablet Generic drug:  rosuvastatin Take 10 mg by mouth nightly. DIOVAN -25 mg per tablet Generic drug:  valsartan-hydroCHLOROthiazide Take 1 Tab by mouth daily. dronabinol 5 mg capsule Commonly known as:  Katiuska Serafin Take 1 Cap by mouth two (2) times a day. Max Daily Amount: 10 mg.  
  
 escitalopram oxalate 20 mg tablet Commonly known as:  Yakov Pino Take 1 Tab by mouth daily. folic acid 1 mg tablet Commonly known as:  Google Take 1 Tab by mouth daily. gabapentin 300 mg capsule Commonly known as:  NEURONTIN Take 1 Cap by mouth two (2) times a day. HYDROcodone-acetaminophen 5-325 mg per tablet Commonly known as:  Feliz Gallon Take 1 Tab by mouth every eight (8) hours as needed for Pain. Max Daily Amount: 3 Tabs.  
  
 lidocaine 2 % solution Commonly known as:  XYLOCAINE Take 15 mL by mouth as needed for Pain.  
  
 magic mouthwash 343--40 mg/30 mL Mwsh oral suspension Commonly known as:  FIRST-MOUTHWASH BLM Take 5 mL by mouth every four (4) hours as needed for Stomatitis. pantoprazole 40 mg tablet Commonly known as:  PROTONIX Take 1 Tab by mouth Daily (before breakfast). predniSONE 20 mg tablet Commonly known as:  Shira Sake Take 1 Tab by mouth daily (with breakfast). Thiamine Mononitrate 100 mg tablet Commonly known as:  B-1 Take 1 Tab by mouth daily. * umeclidinium 62.5 mcg/actuation inhaler Commonly known as:  INCRUSE ELLIPTA Take 1 Puff by inhalation daily. * umeclidinium 62.5 mcg/actuation inhaler Commonly known as:  INCRUSE ELLIPTA Take 1 Puff by inhalation daily. * Notice: This list has 2 medication(s) that are the same as other medications prescribed for you. Read the directions carefully, and ask your doctor or other care provider to review them with you. Prescriptions Sent to Pharmacy Refills  
 umeclidinium (INCRUSE ELLIPTA) 62.5 mcg/actuation inhaler 6 Sig: Take 1 Puff by inhalation daily. Class: Normal  
 [facility]acy: [facility] [address] [place] [address], [address] [place] [address] Ph #: [phone] Route: Inhalation We Performed the Following AMB POC PFT COMPLETE W/BRONCHODILATOR [91285 CPT(R)] DIFFUSING CAPACITY [id number] CPT(R)] GAS DILUT/WASHOUT LUNG VOL W/WO DISTRIB VENT&VOL [92355 CPT(R)] Follow-up Instructions Return in about 3 months (around 9/27/2017). To-Do List   
 09/27/2017 Imaging:  CT CHEST WO CONT Patient Instructions Breathing Techniques for COPD: Care Instructions Your Care Instructions Breathing is hard when you have chronic obstructive pulmonary disease (COPD). You may take quick, short breaths. Breathing this way makes it harder to get air into your lungs. Learning new ways to control your breathing may help. You may feel better and be able to do more. You can try three basic ways to control your breathing. They are pursed-lip breathing, diaphragmatic breathing, and breathing while bending. Use these methods when you are more short of breath than normal. Practice them often so you can do them well. Follow-up care is a key part of your treatment and safety.  Be sure to make and go to all appointments, and call your doctor if you are having problems. It's also a good idea to know your test results and keep a list of the medicines you take. How can you care for yourself at home? · Pursed-lip breathing helps you breathe more air out so that your next breath can be deeper. For this type of breathing, you breathe in through your nose and out through your mouth while almost closing your lips. Breathe in for about 2 seconds, and breathe out for 4 to 6 seconds. Pursed-lip breathing decreases shortness of breath and improves your ability to exercise. · Diaphragmatic breathing helps your lungs expand so that they take in more air. ¨ Lie on your back, or prop yourself up on several pillows. ¨ Put one hand on your belly and the other on your chest. When you breathe in, push your belly out as far as possible. You should feel the hand on your belly move out, while the hand on your chest does not move. ¨ When you breathe out, you should feel the hand on your belly move in. When you can do this type of breathing well while lying down, learn to do it while sitting or standing. Many people with COPD find this breathing method helpful. ¨ Practice diaphragmatic breathing for 20 minutes, 2 or 3 times a day. · Breathing while bending forward at the waist may make breathing easier. It can reduce shortness of breath while you exercise or rest. It helps the diaphragm move more easily. The diaphragm is a large muscle that separates your lungs from your belly. It helps draw air into your lungs as you breathe. · Do not smoke. Smoking makes COPD worse. If you need help quitting, talk to your doctor about stop-smoking programs and medicines. These can increase your chances of quitting for good. When should you call for help? Call your doctor now or seek immediate medical care if: 
· Your breathing methods do not help. · Your shortness of breath gets worse. · You cough up blood. · You have swelling in your belly and legs. · You have severe chest pain. Watch closely for changes in your health, and be sure to contact your doctor if you have any problems. Where can you learn more? Go to http://norah-rush.info/. Enter T985 in the search box to learn more about \"Breathing Techniques for COPD: Care Instructions. \" Current as of: March 25, 2017 Content Version: 11.3 © 8423-7816 plista. Care instructions adapted under license by [employer] (which disclaims liability or warranty for this information). If you have questions about a medical condition or this instruction, always ask your healthcare professional. [place] [address] any warranty or liability for your use of this information. COPD and Asthma: Care Instructions Your Care Instructions Some people who have chronic obstructive pulmonary disease (COPD) also have asthma. Both of these problems can damage your lungs. This makes it very important to control them. Asthma causes the airways that lead to the lungs to swell and become narrow. This makes it hard to breathe. You may wheeze or cough. If you have a bad attack, you may need emergency care. There are two parts to treating asthma. · Controlling asthma over the long term. · Treating attacks when they occur. You and your doctor can make an asthma treatment plan that will help. This plan tells you the medicines you take every day to reduce the swelling in your airways and prevent attacks. It also tells you what to do if you have an asthma attack. Follow-up care is a key part of your treatment and safety. Be sure to make and go to all appointments, and call your doctor if you are having problems. It's also a good idea to know your test results and keep a list of the medicines you take. How can you care for yourself at home? To control asthma over the long term Medicines Controller medicines reduce swelling in your lungs.  They also prevent asthma attacks. Take your controller medicine exactly as prescribed. Talk to your doctor if you have any problems with your medicine. · Inhaled corticosteroid is a common and effective controller medicine. Using it the right way can prevent or reduce most side effects. · Take your controller medicine every day, not just when you have symptoms. This helps prevent problems before they occur. · Always bring your asthma medicine with you when you travel. · Your doctor may prescribe long-acting medicine that combines a corticosteroid with a beta2-agonist. Follow your doctor's instructions exactly about how to take a long-acting medicine. Examples include: ¨ Fluticasone and salmeterol (Advair). ¨ Budesonide and formoterol (Symbicort). · Do not depend on your controller medicines to stop an asthma attack that has already started. They do not work fast enough to help. · Your doctor may also prescribe anticholinergic inhalers. These include ipratropium (Atrovent) and tiotropium (Spiriva). Education · Learn what sets off an asthma attack. Avoid these triggers when you can. Common triggers include smoke, air pollution, pollen, animal dander, colds, stress, and cold air. · Do not smoke. Smoking can make COPD and asthma worse. If you need help quitting, talk to your doctor about stop-smoking programs and medicines. These can increase your chances of quitting for good. · Check yourself for symptoms to know which step to follow in your action plan. Watch for things like being short of breath, having chest tightness, coughing, and wheezing. Also notice if symptoms wake you up at night or if you get tired quickly when you exercise. · You may want to learn how to use a peak flow meter. This measures how open your airways are. It may help you know when you will have an asthma attack. To treat attacks when they occur Use your asthma action plan when you have an attack.  Your quick-relief medicine, such as albuterol, will stop an asthma attack. It relaxes the muscles that get tight around the airways. · Take your quick-relief medicine exactly as prescribed. Talk with your doctor if you have any problems with your medicine. · Keep this medicine with you at all times. · You may need to use this medicine before you exercise. If your doctor prescribed corticosteroid pills to use during an attack, take them as directed. They may take hours to work, but they may shorten the attack and help you breathe better. When should you call for help? Call 911 anytime you think you may need emergency care. For example, call if: 
· You have severe trouble breathing. Call your doctor now or seek immediate medical care if: 
· You have new or worse shortness of breath. · You are coughing more deeply or more often, especially if you notice more mucus or a change in the color of your mucus. · You cough up blood. · You have new or increased swelling in your legs or belly. · You have a fever. · You have used your quick-relief medicine but you are still short of breath. Watch closely for changes in your health, and be sure to contact your doctor if you have any problems. Where can you learn more? Go to http://norah-rush.info/. Enter A350 in the search box to learn more about \"COPD and Asthma: Care Instructions. \" Current as of: March 25, 2017 Content Version: 11.3 © 3120-2189 [employer]. Care instructions adapted under license by [employer] (which disclaims liability or warranty for this information). If you have questions about a medical condition or this instruction, always ask your healthcare professional. [address] any warranty or liability for your use of this information. Introducing [facility]! Dear Miquel Tiwari: 
Thank you for requesting a [employer] account.   Our records indicate that you already have an active [employer] account. You can access your account anytime at [email or web address] Did you know that you can access your hospital and ER discharge instructions at any time in [employer]? You can also review all of your test results from your hospital stay or ER visit. Additional Information If you have questions, please visit the Frequently Asked Questions section of the [employer] website at [email or web address] Remember, [employer] is NOT to be used for urgent needs. For medical emergencies, dial 911. Now available from your iPhone and Android! Please provide this summary of care documentation to your next provider. Your primary care clinician is listed as Brad Nuñez. If you have any questions after today's visit, please call [phone].

## 2017-06-27 NOTE — PROGRESS NOTES
[facility]  Pulmonary, Critical Care, and Sleep Medicine      Pulmonary Office Progress Notes    Name: Timothy Downey     : 1955     Date: 2017        Subjective:     Patient is a 64 y.o. female is here for follow up for: Problems-Hospital follow up: acute respiratory failure- severe COPD/Asthma.      17  Feels better- no cough, wheezing, SOB, chest pain at present. Using Advair daily with Prn albuterol- has not needed. Some nasal congestion. Feels weak due to let leg being weak . Actually fell and hurt right ankle  Has not smoked since admission. Denies fever/chills. Did follow up with cardiology- stress test planned at later date. Completed recommended testing- CT scan and PFT  No other concerns- staying active: gardening    HPI:  She has a history of PTSD, anxiety disorder, and chronic pain. She came into the hospital on the  and, at that point in time, had worsening shortness of breath, which had started about 2 weeks ago, but prompted her to seek emergency care that day. Her shortness of breath was associated with wheezing and cough which was nonproductive. As noted, her history includes chronic neck and back pain, hypertension, chronic sinusitis, COPD, anxiety, and PTSD. She was hypotensive, lethargic, tachypneic. ABG showed hypoxia, but no significant  hypercarbia. She was admitted to the Intensive Care Unit and, shortly thereafter, was intubated , maintained on ventilator support, steroids, sedatives. Eventually liberated from ventilator support. Her testing included a CT angiogram of the chest. There was no evidence for pulmonary emboli. There were bilateral pulmonary nodules, with the largest in the left lower lobe,measuring 11 x 11 mm, and a possible solid exophytic left upper pole renal mass.   .    Past Medical History:   Diagnosis Date    Back pain     Chronic lung disease     COPD (chronic obstructive pulmonary disease) ([address]     Hypertension     PTSD (post-traumatic stress disorder)     Shingles        Allergies   Allergen Reactions    Sulfacetamide Anaphylaxis       Current Outpatient Prescriptions   Medication Sig Dispense Refill    umeclidinium (INCRUSE ELLIPTA) 62.5 mcg/actuation inhaler Take 1 Puff by inhalation daily. 1 Inhaler 0    umeclidinium (INCRUSE ELLIPTA) 62.5 mcg/actuation inhaler Take 1 Puff by inhalation daily. 1 Inhaler 6    rosuvastatin (CRESTOR) 10 mg tablet Take 10 mg by mouth nightly.  HYDROcodone-acetaminophen (NORCO) 5-325 mg per tablet Take 1 Tab by mouth every eight (8) hours as needed for Pain. Max Daily Amount: 3 Tabs. 30 Tab 0    escitalopram oxalate (LEXAPRO) 20 mg tablet Take 1 Tab by mouth daily. 30 Tab 1    baclofen (LIORESAL) 10 mg tablet Take 0.5 Tabs by mouth three (3) times daily. 30 Tab 0    aspirin 81 mg chewable tablet Take 1 Tab by mouth daily. 30 Tab 1    valsartan-hydroCHLOROthiazide (DIOVAN HCT) 160-25 mg per tablet Take 1 Tab by mouth daily.  albuterol (PROVENTIL HFA, VENTOLIN HFA, PROAIR HFA) 90 mcg/actuation inhaler Take 2 Puffs by inhalation every four (4) hours as needed for Wheezing.  fluticasone-salmeterol (ADVAIR DISKUS) 100-50 mcg/dose diskus inhaler Take 1 Puff by inhalation every twelve (12) hours.  Thiamine Mononitrate (B-1) 100 mg tablet Take 1 Tab by mouth daily. 30 Tab 1    predniSONE (DELTASONE) 20 mg tablet Take 1 Tab by mouth daily (with breakfast). 5 Tab 0    magic mouthwash (FIRST-MOUTHWASH BLM) 164--40 mg/30 mL mwsh oral suspension Take 5 mL by mouth every four (4) hours as needed for Stomatitis. 237 mL 0    lidocaine (XYLOCAINE) 2 % solution Take 15 mL by mouth as needed for Pain. 1 Bottle 0    pantoprazole (PROTONIX) 40 mg tablet Take 1 Tab by mouth Daily (before breakfast). 30 Tab 0    dronabinol (MARINOL) 5 mg capsule Take 1 Cap by mouth two (2) times a day.  Max Daily Amount: 10 mg. 60 Cap 1    folic acid (FOLVITE) 1 mg tablet Take 1 Tab by mouth daily. 30 Tab 1    gabapentin (NEURONTIN) 300 mg capsule Take 1 Cap by mouth two (2) times a day. 60 Cap 0    atorvastatin (LIPITOR) 20 mg tablet Take 1 Tab by mouth daily. 30 Tab 1       Review of Systems:  HEENT: No epistaxis, no nasal drainage, no difficulty in swallowing, no redness in eyes  Respiratory: as above  Cardiovascular: no chest pain, no palpitations, no chronic leg edema, no syncope  Gastrointestinal: no abd pain, no vomiting, no diarrhea, no bleeding symptoms  Genitourinary: No urinary symptoms or hematuria  Integument/breast: No ulcers or rashes  Musculoskeletal:Neg  Neurological: No focal weakness, no seizures, no headaches  Behvioral/Psych: No anxiety, no depression  Constitutional: No fever, no chills, no weight loss, no night sweats     Objective:     Visit Vitals    /88 (BP 1 Location: Left arm, BP Patient Position: At rest)    Pulse 80    Temp 98.2 °F (36.8 °C) (Oral)    Resp 23    Ht 5' 8\" (1.727 m)    Wt 64 kg (141 lb)    SpO2 90%  Comment: tan nail polish    BMI 21.44 kg/m2        Physical Exam:   General: comfortable, no acute distress  HEENT: pupils reactive, sclera anicteric, EOM intact  Neck: No adenopathy or thyroid swelling, no JVD, supple  CVS: S1S2 no murmurs  RS: Mod AE bilaterally, no tactile fremitus or egophony, no accessory muscle use  Abd: soft, non tender, no hepatosplenomegaly  Neuro: non focal, awake, alert  Extrm: no leg edema, clubbing or cyanosis  Skin: no rash    Data review:     Admission on 03/04/2017, Discharged on 03/16/2017   No results displayed because visit has over 200 results. Date FVC FEV1 pre/post FEV1/FVC [id number]/post TLC RV VC DLCO   6/27/2017 52 25/33  30% change 48 10/12 100 154 70 35                                        Imaging:  I have personally reviewed the patients radiographs and have reviewed the reports:  Ct scan chest 6/5/2017:    1.  Moderately severe centrilobular emphysema predominantly involving the upper  lobes bilaterally. There are several pulmonary nodules which are new in  appearance from prior examination. While some of these nodular opacities may  pertain to peribronchial inflammation/mucoid impaction, spiculated densities in  the right upper lobe, as well as the medial left upper lobe, demonstrate a more  concerning imaging appearance. Further correlation with PET CT is recommended.     2. Hemorrhagic/proteinaceous renal cysts.     3. Mild lower lung predominant cylindrical bronchiectasis.           Ct scan chest- CTA:  3/4/17:  1. Study is degraded by respiratory motion artifact however there are no  definite pulmonary emboli.     2. Emphysema. 3. Bilateral pulmonary nodules as described above with the largest in the left  lower lobe measuring 11 x 11 mm.  4. Possible solid exophytic left upper pole renal mass.     Recommend renal ultrasound for further evaluation of the kidneys.     Recommend short-term follow-up chest CT in 3-4 months to evaluate possible lung  nodules. The nodule seen on the lower lobes with there is significant  respiratory motion artifact         Patient Active Problem List   Diagnosis Code    Respiratory insufficiency R06.89    Sinusitis J32.9    Mass of sinus R22.0    COPD (chronic obstructive pulmonary disease) with emphysema ([facility]) J43.9    Hypokalemia E87.6    Transaminitis R74.0    Nodule of kidney N28.89    COPD with acute exacerbation ([facility]) J44.1    PTSD (post-traumatic stress disorder) F43.10    Acute respiratory failure ([facility]) J96.00       IMPRESSION:   · S/P Acute hypoxemic respiratory failure, Improved severe bronchospasm  With mucus plugging from refractory LRTI: all resolved  · COPD/Asthma: FEV1 0.73L (25% predicted) with reversibility. Air trapping and reduced DLCO- 35% predicted. C/w very severe COPD/asthma and emphysema. · Abnormal CT scan chest-  Multiple pulmonary nodules from CTA chest and follow up CT.  Will need follow up for stability: d/d postinflammatory vs true nodule ( smoker). Bilateral pulmonary nodules as described above with the largest in the left lower lobe measuring 11 x 11 mm. · Severe anxiety disorder   · Chronic pain,   · Sinusitis/ Sinus Mass  · PTSD from past interactions with physicians in past  · Possible left upper pole renal mass, as per CTA chest - stable per patient  · Hx HTN  · Elevated troponin with abnormal EKG during hospitalization; cardiology following. resolved      RECOMMENDATIONS:   · Continue current bronchodilators- Advair with albuterol for rescue . Will add LAMA- appropriate for stage of disease and would benefit dynamic hyperinflation component- improve exercise tolerance  · Discussed PFT's and CT scan results and care plan  · Discussed need for continued abstinence from smoking: counseled and congratulated on success so far  · will follow up Lung nodule per Fleischner guidelines- short term follow up 3 months CT prior to next visit  · Discussed need for pacing and continued efforts at preserving lung function. · Healthy lifestyle- activity, healthy diet and well being discussed  · Questions/concerns addressed  · Cardiology follow up as scheduled.         Demario Herman MD

## 2017-06-27 NOTE — PATIENT INSTRUCTIONS
Breathing Techniques for COPD: Care Instructions  Your Care Instructions    Breathing is hard when you have chronic obstructive pulmonary disease (COPD). You may take quick, short breaths. Breathing this way makes it harder to get air into your lungs. Learning new ways to control your breathing may help. You may feel better and be able to do more. You can try three basic ways to control your breathing. They are pursed-lip breathing, diaphragmatic breathing, and breathing while bending. Use these methods when you are more short of breath than normal. Practice them often so you can do them well. Follow-up care is a key part of your treatment and safety. Be sure to make and go to all appointments, and call your doctor if you are having problems. It's also a good idea to know your test results and keep a list of the medicines you take. How can you care for yourself at home? · Pursed-lip breathing helps you breathe more air out so that your next breath can be deeper. For this type of breathing, you breathe in through your nose and out through your mouth while almost closing your lips. Breathe in for about 2 seconds, and breathe out for 4 to 6 seconds. Pursed-lip breathing decreases shortness of breath and improves your ability to exercise. · Diaphragmatic breathing helps your lungs expand so that they take in more air. ¨ Lie on your back, or prop yourself up on several pillows. ¨ Put one hand on your belly and the other on your chest. When you breathe in, push your belly out as far as possible. You should feel the hand on your belly move out, while the hand on your chest does not move. ¨ When you breathe out, you should feel the hand on your belly move in. When you can do this type of breathing well while lying down, learn to do it while sitting or standing. Many people with COPD find this breathing method helpful. ¨ Practice diaphragmatic breathing for 20 minutes, 2 or 3 times a day.   · Breathing while bending forward at the waist may make breathing easier. It can reduce shortness of breath while you exercise or rest. It helps the diaphragm move more easily. The diaphragm is a large muscle that separates your lungs from your belly. It helps draw air into your lungs as you breathe. · Do not smoke. Smoking makes COPD worse. If you need help quitting, talk to your doctor about stop-smoking programs and medicines. These can increase your chances of quitting for good. When should you call for help? Call your doctor now or seek immediate medical care if:  · Your breathing methods do not help. · Your shortness of breath gets worse. · You cough up blood. · You have swelling in your belly and legs. · You have severe chest pain. Watch closely for changes in your health, and be sure to contact your doctor if you have any problems. Where can you learn more? Go to http://norahWauwaarush.info/. Enter O038 in the search box to learn more about \"Breathing Techniques for COPD: Care Instructions. \"  Current as of: March 25, 2017  Content Version: 11.3  © 2252-5595 [employer]. Care instructions adapted under license by [email or web address] (which disclaims liability or warranty for this information). If you have questions about a medical condition or this instruction, always ask your healthcare professional. [address] any warranty or liability for your use of this information. COPD and Asthma: Care Instructions  Your Care Instructions  Some people who have chronic obstructive pulmonary disease (COPD) also have asthma. Both of these problems can damage your lungs. This makes it very important to control them. Asthma causes the airways that lead to the lungs to swell and become narrow. This makes it hard to breathe. You may wheeze or cough. If you have a bad attack, you may need emergency care. There are two parts to treating asthma.   · Controlling asthma over the long term. · Treating attacks when they occur. You and your doctor can make an asthma treatment plan that will help. This plan tells you the medicines you take every day to reduce the swelling in your airways and prevent attacks. It also tells you what to do if you have an asthma attack. Follow-up care is a key part of your treatment and safety. Be sure to make and go to all appointments, and call your doctor if you are having problems. It's also a good idea to know your test results and keep a list of the medicines you take. How can you care for yourself at home? To control asthma over the long term  Medicines  Controller medicines reduce swelling in your lungs. They also prevent asthma attacks. Take your controller medicine exactly as prescribed. Talk to your doctor if you have any problems with your medicine. · Inhaled corticosteroid is a common and effective controller medicine. Using it the right way can prevent or reduce most side effects. · Take your controller medicine every day, not just when you have symptoms. This helps prevent problems before they occur. · Always bring your asthma medicine with you when you travel. · Your doctor may prescribe long-acting medicine that combines a corticosteroid with a beta2-agonist. Follow your doctor's instructions exactly about how to take a long-acting medicine. Examples include:  ¨ Fluticasone and salmeterol (Advair). ¨ Budesonide and formoterol (Symbicort). · Do not depend on your controller medicines to stop an asthma attack that has already started. They do not work fast enough to help. · Your doctor may also prescribe anticholinergic inhalers. These include ipratropium (Atrovent) and tiotropium (Spiriva). Education  · Learn what sets off an asthma attack. Avoid these triggers when you can. Common triggers include smoke, air pollution, pollen, animal dander, colds, stress, and cold air. · Do not smoke. Smoking can make COPD and asthma worse.  If you need help quitting, talk to your doctor about stop-smoking programs and medicines. These can increase your chances of quitting for good. · Check yourself for symptoms to know which step to follow in your action plan. Watch for things like being short of breath, having chest tightness, coughing, and wheezing. Also notice if symptoms wake you up at night or if you get tired quickly when you exercise. · You may want to learn how to use a peak flow meter. This measures how open your airways are. It may help you know when you will have an asthma attack. To treat attacks when they occur  Use your asthma action plan when you have an attack. Your quick-relief medicine, such as albuterol, will stop an asthma attack. It relaxes the muscles that get tight around the airways. · Take your quick-relief medicine exactly as prescribed. Talk with your doctor if you have any problems with your medicine. · Keep this medicine with you at all times. · You may need to use this medicine before you exercise. If your doctor prescribed corticosteroid pills to use during an attack, take them as directed. They may take hours to work, but they may shorten the attack and help you breathe better. When should you call for help? Call 911 anytime you think you may need emergency care. For example, call if:  · You have severe trouble breathing. Call your doctor now or seek immediate medical care if:  · You have new or worse shortness of breath. · You are coughing more deeply or more often, especially if you notice more mucus or a change in the color of your mucus. · You cough up blood. · You have new or increased swelling in your legs or belly. · You have a fever. · You have used your quick-relief medicine but you are still short of breath. Watch closely for changes in your health, and be sure to contact your doctor if you have any problems. Where can you learn more? Go to http://norah-rush.info/.   Enter A350 in the search box to learn more about \"COPD and Asthma: Care Instructions. \"  Current as of: March 25, 2017  Content Version: 11.3  © 4369-3585 [employer] [place]. Care instructions adapted under license by [employer] (which disclaims liability or warranty for this information). If you have questions about a medical condition or this instruction, always ask your healthcare professional. [place] [address] any warranty or liability for your use of this information.

## 2018-03-12 NOTE — H&P
History & Physical    Patient: Kai Aquino MRN: [id number]  CSN: [id number]    YOB: 1955  Age: 64 y.o. Sex: female      DOA: 3/4/2017  Primary Care Provider:  Neptali Bakrer MD      Assessment/Plan     1. Acute exacerbation of COPD  2. Bacterial sinusitis w left enthoid soft tissue density- she has been followed by ENT at [facility]. No signs of CNS involvement. ENT had been called by ER and images reviewed with out further inpatient ENT recommendations  3. Elevated troponin with out chest pain, doubt ACS  4. Anxiety  5. Chronic pain  6. transaminitis  7. Intravascular volume depletion  8. Hypokalemia  9. Exophytic kidney nodule    PLAN:  -Admit to medical service with telemetry monitoring  - start IV solumedrol, IV levaquin  - bolus NS now and start maintence fluids  - culture blood, sputum  - start scheduled duoneb q4 hours, albuterol q2   - MRI orbit for better visualization of orbit  - minimize psychotropic agents  - obtain RUQ US, ggt, tylenol level  - replete K  - 2d echo  - trend cardiac enzymes  - obtain outside records  - full code, dvt ppx lovenox    Patient Active Problem List   Diagnosis Code    Respiratory insufficiency R06.89    Sinusitis J32.9    Abnormal EKG R94.31    Mass of sinus R22.0     HPI:   CC:shortness of breath  Kai Aquino is a 64 y.o. female with past medical history significant for COPD, chronic neck/ back pain, HTN, chronic sinusitis under the care of Dr Patric Baumgarten of ENT at [facility] presents to the Er with worsening shortness of breath which had started about 2 weeks ago but had been gradually worsening prompting her to seek emergent care today. She states her dyspnea is associated w wheezing and cough which is nonproductive. She denies chest pain, headache, fever, nausea, vomiting or diarrhea.      On presentation to the ER she was afebrile, had intermittent sinus tachcyardia, had low=- normal blood pressures and tachypnea w oxygen saturations in mid 90s Provider e-prescribed prescription to the pharmacy.    on supplemental oxygen. She was in respiratory distress, anxious, had erythematous tympanic membranes, dry mucus membranes, she had expiratory wheezes bilaterally w poor air movement. No EKG completed. Labs w + Ddimer, elevated troponin 0.37,  CK MB .37, BNP normal, AST 62, ALT 65, CXR w ? Pulmonary edema. CTA chest with out PE, + emphysematous changes noted. CT of head w ethmoid mucosal thickening, mass unable to be excluded, adjacent destruction of medal wall of left orbit w possible soft tissue density encroaching into the left orbit DDX malignancy w bony destruction v sinusitis w subperiosteal abscess involving left medial orbit. Medicine is asked to admit for further management. I recommended consultation with ENT given findings of CT scan and had not accepted at that time. I was called by bedside RN several hours later informing of admission to ICU     I later spoke w Dr Brandon Saavedra who related that  [facility] at [facility] did not believe she required inpatient ENT evaluation. She also had significant anxiety/ pain requiring benzodiazepine and opioid analgesia. On my evaluation she was hypotensive w BP 90/60, lethargic, tachypneic. She was easily arousable and able to provide adequate history. She states she feels \" a little better\" than on admission. ABG w hypoxia but no significant hypercarbia. Past Medical History:   Diagnosis Date    Back pain     COPD (chronic obstructive pulmonary disease) (HCC)     Hypertension     PTSD (post-traumatic stress disorder)     Shingles      Past Surgical History:   Procedure Laterality Date    HX TUBAL LIGATION      HX TYMPANOSTOMY      LAMINECTOMY,LUMBAR        Social History   Substance Use Topics    Smoking status: Current Some Day Smoker     Packs/day: 0.50    Smokeless tobacco: None    Alcohol use None     History reviewed. No pertinent family history. No current facility-administered medications on file prior to encounter.       Current Outpatient Prescriptions on File Prior to Encounter   Medication Sig Dispense Refill    valsartan (DIOVAN) 160 mg tablet Take  by mouth daily.  escitalopram (LEXAPRO) 20 mg tablet Take 40 mg by mouth daily.  clonazePAM (KLONOPIN) 0.5 mg tablet Take 0.5 mg by mouth nightly as needed.  fluticasone-salmeterol (ADVAIR DISKUS) 100-50 mcg/dose diskus inhaler Take 1 Puff by inhalation every twelve (12) hours.  HYDROcodone-acetaminophen (NORCO) 5-325 mg per tablet Take 1 Tab by mouth.  busPIRone (BUSPAR) 15 mg tablet Take 15 mg by mouth three (3) times daily.  atorvastatin (LIPITOR) 20 mg tablet Take 20 mg by mouth daily.  ipratropium-albuterol (COMBIVENT)  mcg/actuation inhaler Take  by inhalation every six (6) hours as needed.  ibuprofen (MOTRIN) 800 mg tablet Take 800 mg by mouth every six (6) hours as needed.  oxyCODONE-acetaminophen (PERCOCET) 5-325 mg per tablet Take 1 Tab by mouth every four (4) hours as needed for Pain.  12 Tab 0      Allergies   Allergen Reactions    Sulfacetamide Anaphylaxis       Review of Systems  Constitutional: No fever, chills, diaphoresis+ malaise, +fatigue - weight gain/loss or falls  Skin: no itching or rashes  HEENT: no neck stiffness, +hearing loss,- tinnitus, - epistaxis or sore throat  EYES: no blurry vision, double vision or photophobia  CARDIOVASCULAR: no, cp, palpitations, orthopnea, pnd or LE edema  PULMONARY: + cough +wheeze, + shortness of breath - sputum production  GI: no nausea, vomiting, diarrhea, abdominal pain, melena, hematemesis or brbpr  : no dysuria, hematuria  MUSCULOSKELETAL: + back pain, - joint pain            - myalgias  ENDOCRINE: no heat/cold intolerance, polyuria or polydipsia  HEME: no easy bruising or bleeding  NEURO: no unilateral weakness, numbness, tingling or seizures      Physical Exam:        Visit Vitals    BP 95/70 (BP 1 Location: Left arm, BP Patient Position: At rest)    Pulse 84    Temp 97.6 °F (36.4 °C)    Resp (!) 31    Ht 5' 8\" (1.727 m)    Wt 62.6 kg (138 lb)    SpO2 96%    BMI 20.98 kg/m2    O2 Flow Rate (L/min): 2 l/min O2 Device: Nasal cannula    Temp (24hrs), Av °F (36.7 °C), Min:97.6 °F (36.4 °C), Max:98.6 °F (37 °C)           Body mass index is 20.98 kg/(m^2). General:  Lethargic, easily arousable, tachypneic   Head:  Normocephalic, without obvious abnormality, atraumatic. Eyes:  Conjunctivae/corneas clear, sclera anicteric, PERRL, EOMs intact. Nose: Nares normal. No drainage or sinus tenderness. Throat: Lips, mucosa, and tongue normal. .   Neck: Supple, symmetrical, trachea midline, no adenopathy. Lungs:   Decreased air movement, scattered expiratory wheezes       Heart:  Regular rate and rhythm, S1, S2 normal, no murmur, click, rub or gallop, cap refill normal      Abdomen: Soft, non-tender. Bowel sounds normal. No masses,  No organomegaly. Extremities: Extremities normal, atraumatic, no cyanosis or edema. Pulses: 2+ and symmetric all extremities. Skin: Skin color pale, texture, turgor normal. No rashes or lesions   Neurologic: CNII-XII intact. No focal motor or sensory deficit.        Laboratory Studies:    CMP:   Lab Results   Component Value Date/Time     2017 07:55 AM    K 2.7 (LL) 2017 07:55 AM     2017 07:55 AM    CO2 34 (H) 2017 07:55 AM    AGAP 8 2017 07:55 AM    GLU 70 (L) 2017 07:55 AM    BUN 16 2017 07:55 AM    CREA 0.85 2017 07:55 AM    GFRAA >60 2017 07:55 AM    GFRNA >60 2017 07:55 AM    CA 9.1 2017 07:55 AM    MG 1.9 2017 07:55 AM    ALB 3.3 (L) 2017 07:55 AM    TP 7.6 2017 07:55 AM    GLOB 4.3 (H) 2017 07:55 AM    AGRAT 0.8 2017 07:55 AM    SGOT 65 (H) 2017 07:55 AM    ALT 62 (H) 2017 07:55 AM     CBC:   Lab Results   Component Value Date/Time    WBC 7.3 2017 07:55 AM    HGB 14.8 2017 07:55 AM    HCT 44.2 03/04/2017 07:55 AM     03/04/2017 07:55 AM       Imaging studies personally reviewed:  EKG: NSR  CXR: increased interstitial markings  CTA chest :\" 1. Study is degraded by respiratory motion artifact however there are no  definite pulmonary emboli.     2. Emphysema. 3. Bilateral pulmonary nodules as described above with the largest in the left  lower lobe measuring 11 x 11 mm.  4. Possible solid exophytic left upper pole renal mass.     Recommend renal ultrasound for further evaluation of the kidneys.     Recommend short-term follow-up chest CT in 3-4 months to evaluate possible lung  nodules. The nodule seen on the lower lobes with there is significant  respiratory motion artifact. \"    CT head:\"      1. No evidence of acute infarct, hemorrhage or mass. Normal noncontrast CT  brain. 2. Focal left ethmoid soft tissue density. This could represent mucosal  thickening however mass cannot be completely excluded. There is adjacent  destruction of the medial wall of the left orbit with possible soft tissue  density encroaching into the left orbit. Differential includes malignancy with  bony destruction versus sinusitis with subperiosteal abscess involving the  medial left orbit. 3. Prior right mastoidectomy. \"           Liane Castillo,   Internal Medicine/Geriatrics

## 2018-07-02 RX ORDER — UMECLIDINIUM 62.5 UG/1
AEROSOL, POWDER ORAL
Qty: 1 INHALER | Refills: 3 | Status: SHIPPED | OUTPATIENT
Start: 2018-07-02

## 2018-11-01 ENCOUNTER — HOSPITAL ENCOUNTER (OUTPATIENT)
Dept: LAB | Age: 63
Discharge: HOME | End: 2018-11-01
Payer: OTHER GOVERNMENT

## 2018-11-01 PROCEDURE — 88305 TISSUE EXAM BY PATHOLOGIST: CPT | Performed by: RADIOLOGY

## 2019-12-01 ENCOUNTER — HOSPITAL ENCOUNTER (EMERGENCY)
Age: 64
Discharge: HOME | End: 2019-12-01
Attending: EMERGENCY MEDICINE
Payer: OTHER GOVERNMENT

## 2019-12-01 ENCOUNTER — APPOINTMENT (OUTPATIENT)
Dept: GENERAL RADIOLOGY | Age: 64
End: 2019-12-01
Attending: PHYSICIAN ASSISTANT
Payer: OTHER GOVERNMENT

## 2019-12-01 VITALS
HEART RATE: 69 BPM | RESPIRATION RATE: 18 BRPM | BODY MASS INDEX: 21.98 KG/M2 | WEIGHT: 145 LBS | TEMPERATURE: 97.8 F | DIASTOLIC BLOOD PRESSURE: 73 MMHG | SYSTOLIC BLOOD PRESSURE: 136 MMHG | OXYGEN SATURATION: 98 % | HEIGHT: 68 IN

## 2019-12-01 DIAGNOSIS — S76.211A GROIN STRAIN, RIGHT, INITIAL ENCOUNTER: Primary | ICD-10-CM

## 2019-12-01 PROCEDURE — 99282 EMERGENCY DEPT VISIT SF MDM: CPT

## 2019-12-01 PROCEDURE — 73502 X-RAY EXAM HIP UNI 2-3 VIEWS: CPT

## 2019-12-01 RX ORDER — IBUPROFEN 800 MG/1
800 TABLET ORAL
Qty: 20 TAB | Refills: 0 | Status: SHIPPED | OUTPATIENT
Start: 2019-12-01 | End: 2019-12-08

## 2019-12-01 RX ORDER — CYCLOBENZAPRINE HCL 5 MG
5 TABLET ORAL
Qty: 15 TAB | Refills: 0 | Status: SHIPPED | OUTPATIENT
Start: 2019-12-01 | End: 2022-01-31

## 2019-12-01 NOTE — ED PROVIDER NOTES
EMERGENCY DEPARTMENT HISTORY AND PHYSICAL EXAM    Date: 12/1/2019  Patient Name: Neo Owens    History of Presenting Illness     Chief Complaint   Patient presents with    Fall       History Provided By: Patient    Chief Complaint: hip pain       Additional History (Context):   2:13 PM  Neo Owens is a 61 y.o. female  presents to the emergency department C/O after a fall from a stepladder. She is ambulatory. No tingling numbness or weakness of the leg. She has full range of motion. PCP: Zoë Pritchett, DO    Current Outpatient Medications   Medication Sig Dispense Refill    ibuprofen (MOTRIN) 800 mg tablet Take 1 Tab by mouth every six (6) hours as needed for Pain for up to 7 days. 20 Tab 0    cyclobenzaprine (FLEXERIL) 5 mg tablet Take 1 Tab by mouth three (3) times daily as needed for Muscle Spasm(s). 15 Tab 0    INCRUSE ELLIPTA 62.5 mcg/actuation inhaler USE 1 INHALATION DAILY 1 Inhaler 3    umeclidinium (INCRUSE ELLIPTA) 62.5 mcg/actuation inhaler Take 1 Puff by inhalation daily. 1 Inhaler 0    rosuvastatin (CRESTOR) 10 mg tablet Take 10 mg by mouth nightly.  Thiamine Mononitrate (B-1) 100 mg tablet Take 1 Tab by mouth daily. 30 Tab 1    predniSONE (DELTASONE) 20 mg tablet Take 1 Tab by mouth daily (with breakfast). 5 Tab 0    magic mouthwash (FIRST-MOUTHWASH BLM) 124--40 mg/30 mL mwsh oral suspension Take 5 mL by mouth every four (4) hours as needed for Stomatitis. 237 mL 0    lidocaine (XYLOCAINE) 2 % solution Take 15 mL by mouth as needed for Pain. 1 Bottle 0    pantoprazole (PROTONIX) 40 mg tablet Take 1 Tab by mouth Daily (before breakfast). 30 Tab 0    HYDROcodone-acetaminophen (NORCO) 5-325 mg per tablet Take 1 Tab by mouth every eight (8) hours as needed for Pain. Max Daily Amount: 3 Tabs. 30 Tab 0    dronabinol (MARINOL) 5 mg capsule Take 1 Cap by mouth two (2) times a day.  Max Daily Amount: 10 mg. 60 Cap 1    escitalopram oxalate (LEXAPRO) 20 mg tablet Take 1 Tab by mouth daily. 30 Tab 1    folic acid (FOLVITE) 1 mg tablet Take 1 Tab by mouth daily. 30 Tab 1    gabapentin (NEURONTIN) 300 mg capsule Take 1 Cap by mouth two (2) times a day. 60 Cap 0    baclofen (LIORESAL) 10 mg tablet Take 0.5 Tabs by mouth three (3) times daily. 30 Tab 0    aspirin 81 mg chewable tablet Take 1 Tab by mouth daily. 30 Tab 1    atorvastatin (LIPITOR) 20 mg tablet Take 1 Tab by mouth daily. 30 Tab 1    valsartan-hydroCHLOROthiazide (DIOVAN HCT) 160-25 mg per tablet Take 1 Tab by mouth daily.  albuterol (PROVENTIL HFA, VENTOLIN HFA, PROAIR HFA) 90 mcg/actuation inhaler Take 2 Puffs by inhalation every four (4) hours as needed for Wheezing.  fluticasone-salmeterol (ADVAIR DISKUS) 100-50 mcg/dose diskus inhaler Take 1 Puff by inhalation every twelve (12) hours. Past History     Past Medical History:  Past Medical History:   Diagnosis Date    Back pain     Chronic lung disease     COPD (chronic obstructive pulmonary disease) (HCC)     Hypertension     PTSD (post-traumatic stress disorder)     Shingles        Past Surgical History:  Past Surgical History:   Procedure Laterality Date    HX TUBAL LIGATION      HX TYMPANOSTOMY      LAMINECTOMY,LUMBAR         Family History:  History reviewed. No pertinent family history. Social History:  Social History     Tobacco Use    Smoking status: Current Some Day Smoker     Packs/day: 0.50     Years: 45.00     Pack years: 22.50     Types: Cigarettes    Smokeless tobacco: Never Used   Substance Use Topics    Alcohol use: Not on file    Drug use: Not on file       Allergies: Allergies   Allergen Reactions    Sulfacetamide Anaphylaxis       Review of Systems   Review of Systems   Constitutional: Negative for chills and fever. Musculoskeletal: Positive for arthralgias (right ). Neurological: Negative for weakness and numbness. All other systems reviewed and are negative.       Physical Exam     Vitals:    12/01/19 1409   BP: 136/73   Pulse: 69   Resp: 18   Temp: 97.8 °F (36.6 °C)   SpO2: 98%   Weight: 65.8 kg (145 lb)   Height: 5' 8\" (1.727 m)     Physical Exam  Vitals signs and nursing note reviewed. Constitutional:       Appearance: She is well-developed. HENT:      Head: Normocephalic and atraumatic. Neck:      Musculoskeletal: Normal range of motion and neck supple. Cardiovascular:      Rate and Rhythm: Normal rate and regular rhythm. Heart sounds: Normal heart sounds. No murmur. Pulmonary:      Effort: Pulmonary effort is normal. No respiratory distress. Breath sounds: Normal breath sounds. No wheezing or rales. Abdominal:      General: Bowel sounds are normal.      Palpations: Abdomen is soft. Tenderness: There is no tenderness. Musculoskeletal:        Legs:       Comments: Full range of motion of the hip, ambulatory, pulses 2+ for DP and PT   Neurological:      Mental Status: She is alert and oriented to person, place, and time. Psychiatric:         Judgment: Judgment normal.         Diagnostic Study Results     Labs:   No results found for this or any previous visit (from the past 12 hour(s)). Radiologic Studies:   XR HIP RT W OR WO PELV 2-3 VWS   Final Result   IMPRESSION:      1. No acute osseous abnormality. CT Results  (Last 48 hours)    None        CXR Results  (Last 48 hours)    None          Medical Decision Making   I am the first provider for this patient. I reviewed the vital signs, available nursing notes, past medical history, past surgical history, family history and social history. Vital Signs: Reviewed the patient's vital signs. Pulse Oximetry Analysis: 98% on RA     Records Reviewed: Nursing Notes and Old Medical Records    Procedures:  Procedures    ED Course:   2:13 PM Initial assessment performed. The patients presenting problems have been discussed, and they are in agreement with the care plan formulated and outlined with them.   I have encouraged them to ask questions as they arise throughout their visit. Discussion:  Pt presents with right hip pain after a fall from a stepladder. X-ray shows no acute process. She is ambulatory and neurovascularly intact. Suspect groin strain will have patient follow-up with her orthopedist if not better in a week and treat with NSAIDs and muscle relaxer. Strict return precautions given, pt offering no questions or complaints. Diagnosis and Disposition     DISCHARGE NOTE:  Delaney Cid's  results have been reviewed with her. She has been counseled regarding her diagnosis, treatment, and plan. She verbally conveys understanding and agreement of the signs, symptoms, diagnosis, treatment and prognosis and additionally agrees to follow up as discussed. She also agrees with the care-plan and conveys that all of her questions have been answered. I have also provided discharge instructions for her that include: educational information regarding their diagnosis and treatment, and list of reasons why they would want to return to the ED prior to their follow-up appointment, should her condition change. She has been provided with education for proper emergency department utilization. CLINICAL IMPRESSION:    1. Groin strain, right, initial encounter        PLAN:  1. D/C Home  2. Discharge Medication List as of 12/1/2019  3:16 PM      START taking these medications    Details   ibuprofen (MOTRIN) 800 mg tablet Take 1 Tab by mouth every six (6) hours as needed for Pain for up to 7 days. , Print, Disp-20 Tab, R-0      cyclobenzaprine (FLEXERIL) 5 mg tablet Take 1 Tab by mouth three (3) times daily as needed for Muscle Spasm(s). , Print, Disp-15 Tab, R-0         CONTINUE these medications which have NOT CHANGED    Details   !! INCRUSE ELLIPTA 62.5 mcg/actuation inhaler USE 1 INHALATION DAILY, Normal, Disp-1 Inhaler, R-3      !! umeclidinium (INCRUSE ELLIPTA) 62.5 mcg/actuation inhaler Take 1 Puff by inhalation daily. , Sample, Disp-1 Inhaler, R-0      rosuvastatin (CRESTOR) 10 mg tablet Take 10 mg by mouth nightly., Historical Med      Thiamine Mononitrate (B-1) 100 mg tablet Take 1 Tab by mouth daily. , Print, Disp-30 Tab, R-1      predniSONE (DELTASONE) 20 mg tablet Take 1 Tab by mouth daily (with breakfast). , Print, Disp-5 Tab, R-0      magic mouthwash (FIRST-MOUTHWASH BLM) 514--40 mg/30 mL mwsh oral suspension Take 5 mL by mouth every four (4) hours as needed for Stomatitis. , Print, Disp-237 mL, R-0      lidocaine (XYLOCAINE) 2 % solution Take 15 mL by mouth as needed for Pain., Print, Disp-1 Bottle, R-0      pantoprazole (PROTONIX) 40 mg tablet Take 1 Tab by mouth Daily (before breakfast). , Print, Disp-30 Tab, R-0      HYDROcodone-acetaminophen (NORCO) 5-325 mg per tablet Take 1 Tab by mouth every eight (8) hours as needed for Pain. Max Daily Amount: 3 Tabs., Print, Disp-30 Tab, R-0      dronabinol (MARINOL) 5 mg capsule Take 1 Cap by mouth two (2) times a day. Max Daily Amount: 10 mg., Print, Disp-60 Cap, R-1      escitalopram oxalate (LEXAPRO) 20 mg tablet Take 1 Tab by mouth daily. , Print, Disp-30 Tab, R-1      folic acid (FOLVITE) 1 mg tablet Take 1 Tab by mouth daily. , Print, Disp-30 Tab, R-1      gabapentin (NEURONTIN) 300 mg capsule Take 1 Cap by mouth two (2) times a day., Print, Disp-60 Cap, R-0      baclofen (LIORESAL) 10 mg tablet Take 0.5 Tabs by mouth three (3) times daily. , Print, Disp-30 Tab, R-0      aspirin 81 mg chewable tablet Take 1 Tab by mouth daily. , Print, Disp-30 Tab, R-1      atorvastatin (LIPITOR) 20 mg tablet Take 1 Tab by mouth daily. , Print, Disp-30 Tab, R-1      valsartan-hydroCHLOROthiazide (DIOVAN HCT) 160-25 mg per tablet Take 1 Tab by mouth daily. , Historical Med      albuterol (PROVENTIL HFA, VENTOLIN HFA, PROAIR HFA) 90 mcg/actuation inhaler Take 2 Puffs by inhalation every four (4) hours as needed for Wheezing., Historical Med      fluticasone-salmeterol (ADVAIR DISKUS) 100-50 mcg/dose diskus inhaler Not for PRN use. Not for use as a rescue inhaler. Take 1 Puff by inhalation every twelve (12) hours. Historical Med, 1 Puff       !! - Potential duplicate medications found. Please discuss with provider. 3.   Follow-up Information     Follow up With Specialties Details Why Contact Maral Ugalde DO Internal Medicine  call for follow up and recheck  [address]      [facility] EMERGENCY DEPT Emergency Medicine  If symptoms worsen [address] [place] [id number] [phone]    Ryan Cruz MD Orthopedic Surgery  call for follow up  [address]  [phone]                   Please note that this dictation was completed with [employer], the computer voice recognition software. Quite often unanticipated grammatical, syntax, homophones, and other interpretive errors are inadvertently transcribed by the computer software. Please disregard these errors. Please excuse any errors that have escaped final proofreading.

## 2019-12-01 NOTE — ED TRIAGE NOTES
Pt states \" I was decorating and I fell off the stool and fell on my right thigh  I felt instant pain in my groin. \"

## 2019-12-01 NOTE — DISCHARGE INSTRUCTIONS
Groin Strain: Care Instructions  Your Care Instructions  A groin strain is an injury that happens when you tear or overstretch (pull) a groin muscle. The groin muscles are in the area on either side of the body in the folds where the belly joins the legs. You can strain a groin muscle during exercise, such as running, skating, kicking in soccer, or playing basketball. It can happen when you lift, push, or pull heavy objects. You might pull a groin muscle when you fall. The injury can range from a minor pull to a more serious tear of the muscle. You may feel pain and tenderness that's worse when you squeeze your legs together. You may also have pain when you raise the knee of the injured side. There may be swelling or bruising in the groin area or inner thigh. If you have a bad strain, you may walk with a limp while it heals. Rest and other home care can help the muscle heal. Healing can take up to 3 weeks or more. Your doctor may want to see you again in 2 to 3 weeks. Follow-up care is a key part of your treatment and safety. Be sure to make and go to all appointments, and call your doctor if you are having problems. It's also a good idea to know your test results and keep a list of the medicines you take. How can you care for yourself at home? · Be safe with medicines. Read and follow all instructions on the label. ? If the doctor gave you a prescription medicine for pain, take it as prescribed. ? If you are not taking a prescription pain medicine, ask your doctor if you can take an over-the-counter medicine. · Rest and protect your injured or sore groin area for 1 to 2 weeks. Stop, change, or take a break from any activity that may be causing your pain or soreness. Do not do intense activities while you still have pain. · Put ice or a cold pack on your groin area for 10 to 20 minutes at a time. Try to do this every 1 to 2 hours for the next 3 days (when you are awake) or until the swelling goes down. Put a thin cloth between the ice and your skin. · After 2 or 3 days, if your swelling is gone, apply heat. Put a warm water bottle, a heating pad set on low, or a warm cloth on your groin area. Do not go to sleep with a heating pad on your skin. · If your doctor gave you crutches, make sure you use them as directed. · Wear snug shorts or underwear that support the injured area. When should you call for help? Call your doctor now or seek immediate medical care if:    · You have new or severe pain or swelling in the groin area.     · Your groin or upper thigh is cool or pale or changes color.     · You have tingling, weakness, or numbness in your groin or leg.     · You cannot move your leg.     · You cannot put weight on your leg.    Watch closely for changes in your health, and be sure to contact your doctor if:    · You do not get better as expected. Where can you learn more? Go to http://norah-rush.info/. Enter E355 in the search box to learn more about \"Groin Strain: Care Instructions. \"  Current as of: June 26, 2019  Content Version: 12.2  © 5159-4187 [employer]. Care instructions adapted under license by "[employer] (which disclaims liability or warranty for this information). If you have questions about a medical condition or this instruction, always ask your healthcare professional. [place] [address] any warranty or liability for your use of this information.

## 2020-07-22 NOTE — DIABETES MGMT
GLYCEMIC CONTROL & NUTRITION:    - noted A1C meets criteria for pre-DM, no known documentation of previous dx  - steroids on board (IV Solu-medrol 60 mg q 6 hours)  - POCt + Humalog orders in place, recommend continue  - recommend follow-up with OP PCM re: BG monitoring and discuss treatment options  - encourage OP DM Self Management Class (schedule given)      Recent Glucose Results:   Lab Results   Component Value Date/Time     (H) 03/06/2017 05:00 AM    GLUCPOC 183 (H) 03/06/2017 12:30 PM    GLUCPOC 150 (H) 03/06/2017 05:59 AM    GLUCPOC 174 (H) 03/05/2017 10:48 PM       Lab Results   Component Value Date/Time    Hemoglobin A1c 6.2 03/04/2017 04:25 PM               Tammy Mohan, MPH, RD yes

## 2021-01-04 ENCOUNTER — HOSPITAL ENCOUNTER (OUTPATIENT)
Dept: NON INVASIVE DIAGNOSTICS | Age: 66
Discharge: HOME | End: 2021-01-04
Payer: MEDICARE

## 2021-01-04 ENCOUNTER — TRANSCRIBE ORDER (OUTPATIENT)
Dept: REGISTRATION | Age: 66
End: 2021-01-04

## 2021-01-04 ENCOUNTER — HOSPITAL ENCOUNTER (OUTPATIENT)
Dept: LAB | Age: 66
Discharge: HOME | End: 2021-01-04
Payer: MEDICARE

## 2021-01-04 DIAGNOSIS — Z01.818 OTHER SPECIFIED PRE-OPERATIVE EXAMINATION: ICD-10-CM

## 2021-01-04 DIAGNOSIS — Z01.818 OTHER SPECIFIED PRE-OPERATIVE EXAMINATION: Primary | ICD-10-CM

## 2021-01-04 LAB
HCT VFR BLD AUTO: 47.7 % (ref 35–45)
HGB BLD-MCNC: 16.1 G/DL (ref 12–16)
POTASSIUM SERPL-SCNC: 4.1 MMOL/L (ref 3.5–5.5)

## 2021-01-04 PROCEDURE — 36415 COLL VENOUS BLD VENIPUNCTURE: CPT

## 2021-01-04 PROCEDURE — 84132 ASSAY OF SERUM POTASSIUM: CPT

## 2021-01-04 PROCEDURE — 85018 HEMOGLOBIN: CPT

## 2021-01-04 PROCEDURE — 93005 ELECTROCARDIOGRAM TRACING: CPT

## 2021-01-05 LAB
ATRIAL RATE: 66 BPM
CALCULATED P AXIS, ECG09: 74 DEGREES
CALCULATED R AXIS, ECG10: 87 DEGREES
CALCULATED T AXIS, ECG11: 80 DEGREES
DIAGNOSIS, 93000: NORMAL
P-R INTERVAL, ECG05: 168 MS
Q-T INTERVAL, ECG07: 416 MS
QRS DURATION, ECG06: 88 MS
QTC CALCULATION (BEZET), ECG08: 436 MS
VENTRICULAR RATE, ECG03: 66 BPM

## 2021-04-13 ENCOUNTER — HOSPITAL ENCOUNTER (OUTPATIENT)
Dept: LAB | Age: 66
Discharge: HOME | End: 2021-04-13
Payer: MEDICARE

## 2021-04-13 ENCOUNTER — TRANSCRIBE ORDER (OUTPATIENT)
Dept: REGISTRATION | Age: 66
End: 2021-04-13

## 2021-04-13 DIAGNOSIS — M18.11 PRIMARY OSTEOARTHRITIS OF FIRST CARPOMETACARPAL JOINT OF RIGHT HAND: Primary | ICD-10-CM

## 2021-04-13 LAB
HCT VFR BLD AUTO: 47 % (ref 35–45)
HGB BLD-MCNC: 15.3 G/DL (ref 12–16)
POTASSIUM SERPL-SCNC: 4.2 MMOL/L (ref 3.5–5.5)

## 2021-04-13 PROCEDURE — 36415 COLL VENOUS BLD VENIPUNCTURE: CPT

## 2021-04-13 PROCEDURE — 84132 ASSAY OF SERUM POTASSIUM: CPT

## 2021-04-13 PROCEDURE — 85018 HEMOGLOBIN: CPT

## 2021-12-16 ENCOUNTER — TRANSCRIBE ORDER (OUTPATIENT)
Dept: SCHEDULING | Age: 66
End: 2021-12-16

## 2021-12-16 DIAGNOSIS — M25.511 RIGHT SHOULDER PAIN: Primary | ICD-10-CM

## 2021-12-27 ENCOUNTER — HOSPITAL ENCOUNTER (OUTPATIENT)
Dept: CT IMAGING | Age: 66
Discharge: HOME | End: 2021-12-27
Attending: ORTHOPAEDIC SURGERY
Payer: MEDICARE

## 2021-12-27 DIAGNOSIS — M25.511 RIGHT SHOULDER PAIN: ICD-10-CM

## 2021-12-27 PROCEDURE — 73200 CT UPPER EXTREMITY W/O DYE: CPT

## 2022-01-27 ENCOUNTER — HOSPITAL ENCOUNTER (OUTPATIENT)
Dept: PREADMISSION TESTING | Age: 67
Discharge: HOME | End: 2022-01-27
Payer: MEDICARE

## 2022-01-27 ENCOUNTER — TRANSCRIBE ORDER (OUTPATIENT)
Dept: REGISTRATION | Age: 67
End: 2022-01-27

## 2022-01-27 DIAGNOSIS — M19.011 ARTHRITIS OF RIGHT SHOULDER REGION: Primary | ICD-10-CM

## 2022-01-27 DIAGNOSIS — M19.011 ARTHRITIS OF RIGHT SHOULDER REGION: ICD-10-CM

## 2022-01-27 LAB
ALBUMIN SERPL-MCNC: 3.6 G/DL (ref 3.4–5)
ALBUMIN/GLOB SERPL: 1.2 {RATIO} (ref 0.8–1.7)
ALP SERPL-CCNC: 80 U/L (ref 45–117)
ALT SERPL-CCNC: 25 U/L (ref 13–56)
ANION GAP SERPL CALC-SCNC: 2 MMOL/L (ref 3–18)
APPEARANCE UR: CLEAR
APTT PPP: 26.5 SEC (ref 23–36.4)
AST SERPL-CCNC: 22 U/L (ref 10–38)
ATRIAL RATE: 83 BPM
BACTERIA URNS QL MICRO: ABNORMAL /HPF
BASOPHILS # BLD: 0.1 K/UL (ref 0–0.1)
BASOPHILS NFR BLD: 1 % (ref 0–2)
BILIRUB SERPL-MCNC: 0.2 MG/DL (ref 0.2–1)
BILIRUB UR QL: NEGATIVE
BUN SERPL-MCNC: 22 MG/DL (ref 7–18)
BUN/CREAT SERPL: 19 (ref 12–20)
CALCIUM SERPL-MCNC: 10.2 MG/DL (ref 8.5–10.1)
CALCULATED P AXIS, ECG09: 79 DEGREES
CALCULATED R AXIS, ECG10: 81 DEGREES
CALCULATED T AXIS, ECG11: 77 DEGREES
CHLORIDE SERPL-SCNC: 103 MMOL/L (ref 100–111)
CO2 SERPL-SCNC: 33 MMOL/L (ref 21–32)
COLOR UR: YELLOW
CREAT SERPL-MCNC: 1.17 MG/DL (ref 0.6–1.3)
DIAGNOSIS, 93000: NORMAL
DIFFERENTIAL METHOD BLD: NORMAL
EOSINOPHIL # BLD: 0.1 K/UL (ref 0–0.4)
EOSINOPHIL NFR BLD: 2 % (ref 0–5)
EPITH CASTS URNS QL MICRO: ABNORMAL /LPF (ref 0–5)
ERYTHROCYTE [DISTWIDTH] IN BLOOD BY AUTOMATED COUNT: 14.2 % (ref 11.6–14.5)
GLOBULIN SER CALC-MCNC: 3 G/DL (ref 2–4)
GLUCOSE SERPL-MCNC: 121 MG/DL (ref 74–99)
GLUCOSE UR STRIP.AUTO-MCNC: NEGATIVE MG/DL
HBA1C MFR BLD: 5.9 % (ref 4.2–5.6)
HCT VFR BLD AUTO: 42.3 % (ref 35–45)
HGB BLD-MCNC: 13.2 G/DL (ref 12–16)
HGB UR QL STRIP: NEGATIVE
HYALINE CASTS URNS QL MICRO: ABNORMAL /LPF (ref 0–2)
IMM GRANULOCYTES # BLD AUTO: 0 K/UL (ref 0–0.04)
IMM GRANULOCYTES NFR BLD AUTO: 0 % (ref 0–0.5)
INR PPP: 1 (ref 0.8–1.2)
KETONES UR QL STRIP.AUTO: NEGATIVE MG/DL
LEUKOCYTE ESTERASE UR QL STRIP.AUTO: ABNORMAL
LYMPHOCYTES # BLD: 2.2 K/UL (ref 0.9–3.6)
LYMPHOCYTES NFR BLD: 31 % (ref 21–52)
MCH RBC QN AUTO: 29.8 PG (ref 24–34)
MCHC RBC AUTO-ENTMCNC: 31.2 G/DL (ref 31–37)
MCV RBC AUTO: 95.5 FL (ref 78–100)
MONOCYTES # BLD: 0.6 K/UL (ref 0.05–1.2)
MONOCYTES NFR BLD: 8 % (ref 3–10)
NEUTS SEG # BLD: 4.1 K/UL (ref 1.8–8)
NEUTS SEG NFR BLD: 58 % (ref 40–73)
NITRITE UR QL STRIP.AUTO: NEGATIVE
NRBC # BLD: 0 K/UL (ref 0–0.01)
NRBC BLD-RTO: 0 PER 100 WBC
P-R INTERVAL, ECG05: 188 MS
PH UR STRIP: 5 [PH] (ref 5–8)
PLATELET # BLD AUTO: 229 K/UL (ref 135–420)
PMV BLD AUTO: 11.2 FL (ref 9.2–11.8)
POTASSIUM SERPL-SCNC: 3.8 MMOL/L (ref 3.5–5.5)
PROT SERPL-MCNC: 6.6 G/DL (ref 6.4–8.2)
PROT UR STRIP-MCNC: NEGATIVE MG/DL
PROTHROMBIN TIME: 12.7 SEC (ref 11.5–15.2)
Q-T INTERVAL, ECG07: 364 MS
QRS DURATION, ECG06: 90 MS
QTC CALCULATION (BEZET), ECG08: 427 MS
RBC # BLD AUTO: 4.43 M/UL (ref 4.2–5.3)
RBC #/AREA URNS HPF: ABNORMAL /HPF (ref 0–5)
SODIUM SERPL-SCNC: 138 MMOL/L (ref 136–145)
SP GR UR REFRACTOMETRY: 1.02 (ref 1–1.03)
UROBILINOGEN UR QL STRIP.AUTO: 0.2 EU/DL (ref 0.2–1)
VENTRICULAR RATE, ECG03: 83 BPM
WBC # BLD AUTO: 7.1 K/UL (ref 4.6–13.2)
WBC URNS QL MICRO: ABNORMAL /HPF (ref 0–5)

## 2022-01-27 PROCEDURE — 85025 COMPLETE CBC W/AUTO DIFF WBC: CPT

## 2022-01-27 PROCEDURE — 80053 COMPREHEN METABOLIC PANEL: CPT

## 2022-01-27 PROCEDURE — 83036 HEMOGLOBIN GLYCOSYLATED A1C: CPT

## 2022-01-27 PROCEDURE — 87186 SC STD MICRODIL/AGAR DIL: CPT

## 2022-01-27 PROCEDURE — 85610 PROTHROMBIN TIME: CPT

## 2022-01-27 PROCEDURE — 36415 COLL VENOUS BLD VENIPUNCTURE: CPT

## 2022-01-27 PROCEDURE — 85730 THROMBOPLASTIN TIME PARTIAL: CPT

## 2022-01-27 PROCEDURE — 93005 ELECTROCARDIOGRAM TRACING: CPT

## 2022-01-27 PROCEDURE — 87077 CULTURE AEROBIC IDENTIFY: CPT

## 2022-01-27 PROCEDURE — 87086 URINE CULTURE/COLONY COUNT: CPT

## 2022-01-27 PROCEDURE — 81001 URINALYSIS AUTO W/SCOPE: CPT

## 2022-01-28 LAB
BACTERIA SPEC CULT: NORMAL
BACTERIA SPEC CULT: NORMAL
SERVICE CMNT-IMP: NORMAL

## 2022-01-30 LAB
BACTERIA SPEC CULT: ABNORMAL
CC UR VC: ABNORMAL
SERVICE CMNT-IMP: ABNORMAL

## 2022-01-31 ENCOUNTER — HOSPITAL ENCOUNTER (OUTPATIENT)
Dept: PREADMISSION TESTING | Age: 67
Discharge: HOME | End: 2022-01-31

## 2022-01-31 ENCOUNTER — HOSPITAL ENCOUNTER (OUTPATIENT)
Dept: PREADMISSION TESTING | Age: 67
Discharge: HOME | End: 2022-01-31
Payer: MEDICARE

## 2022-01-31 VITALS — BODY MASS INDEX: 22.73 KG/M2 | HEIGHT: 68 IN | WEIGHT: 150 LBS

## 2022-01-31 PROCEDURE — U0003 INFECTIOUS AGENT DETECTION BY NUCLEIC ACID (DNA OR RNA); SEVERE ACUTE RESPIRATORY SYNDROME CORONAVIRUS 2 (SARS-COV-2) (CORONAVIRUS DISEASE [COVID-19]), AMPLIFIED PROBE TECHNIQUE, MAKING USE OF HIGH THROUGHPUT TECHNOLOGIES AS DESCRIBED BY CMS-2020-01-R: HCPCS

## 2022-01-31 RX ORDER — IMIPRAMINE HYDROCHLORIDE 50 MG/1
50 TABLET ORAL
COMMUNITY

## 2022-01-31 RX ORDER — CEFAZOLIN SODIUM/WATER 2 G/20 ML
2 SYRINGE (ML) INTRAVENOUS ONCE
Status: CANCELLED | OUTPATIENT
Start: 2022-01-31 | End: 2022-01-31

## 2022-01-31 RX ORDER — BUPROPION HYDROCHLORIDE 150 MG/1
150 TABLET ORAL 2 TIMES DAILY
COMMUNITY

## 2022-01-31 RX ORDER — SODIUM CHLORIDE, SODIUM LACTATE, POTASSIUM CHLORIDE, CALCIUM CHLORIDE 600; 310; 30; 20 MG/100ML; MG/100ML; MG/100ML; MG/100ML
125 INJECTION, SOLUTION INTRAVENOUS CONTINUOUS
Status: CANCELLED | OUTPATIENT
Start: 2022-01-31

## 2022-01-31 RX ORDER — OMEPRAZOLE 40 MG/1
40 CAPSULE, DELAYED RELEASE ORAL DAILY
COMMUNITY

## 2022-01-31 RX ORDER — CELECOXIB 100 MG/1
100 CAPSULE ORAL 2 TIMES DAILY
COMMUNITY
End: 2022-02-07

## 2022-01-31 RX ORDER — CLONAZEPAM 1 MG/1
1 TABLET ORAL 2 TIMES DAILY
COMMUNITY

## 2022-01-31 NOTE — PERIOP NOTES
Attempted to call patient for PAT phone appointment. Patient had \"an unexpected issue come up with my grandson and I am on my way to another appointment\". Patient desires to reschedule PAT phone appointment.

## 2022-01-31 NOTE — PERIOP NOTES
PAT - SURGICAL PRE-ADMISSION INSTRUCTIONS    NAME:  Sandoval Beltrán                                                          TODAY'S DATE:  1/31/2022    SURGERY DATE:  2/7/2022                                  SURGERY ARRIVAL TIME:   tba    1. Do NOT eat or drink anything, including candy or gum, after MIDNIGHT on 2-7 , unless you have specific instructions from your Surgeon or Anesthesia Provider to do so. 2. No smoking 24 hours before surgery. 3. No alcohol 24 hours prior to the day of surgery. 4. No recreational drugs for one week prior to the day of surgery. 5. Leave all valuables, including money/purse, at home. 6. Remove all jewelry, nail polish, makeup (including mascara); no lotions, powders, deodorant, or perfume/cologne/after shave. 7. Glasses/Contact lenses and Dentures may be worn to the hospital.  They will be removed prior to surgery. 8. Call your doctor if symptoms of a cold or illness develop within 24 ours prior to surgery. 9. AN ADULT MUST DRIVE YOU HOME AFTER OUTPATIENT SURGERY. 10. If you are having an OUTPATIENT procedure, please make arrangements for a responsible adult to be with you for 24 hours after your surgery. 11. If you are admitted to the hospital, you will be assigned to a bed after surgery is complete. Normally a family member will not be able to see you until you are in your assigned bed. 12. Visitation Restrictions Explained. Special Instructions:  Covid Test today, Quarantine requirements discussed  Bring inhaler. Pt states a few years ago she was hospitalized with pneumonia and treated with solumedrol which caused an elevated cardiac enzyme. Saw cardiologist and had normal report.    Patient Prep:    use CHG solution     LM with HRSOM re: urine culture     These surgical instructions were reviewed with pt during the PAT phone call

## 2022-02-01 LAB — SARS-COV-2, NAA: NOT DETECTED

## 2022-02-03 NOTE — NURSE NAVIGATOR
Janis Vail watched the joint seminar online and received a preoperative education booklet in anticipation of joint replacement surgery.      Orthopaedic Nurse Navigator

## 2022-02-07 ENCOUNTER — HOSPITAL ENCOUNTER (OUTPATIENT)
Age: 67
Setting detail: OUTPATIENT SURGERY
Discharge: HOME | End: 2022-02-07
Attending: ORTHOPAEDIC SURGERY | Admitting: ORTHOPAEDIC SURGERY
Payer: MEDICARE

## 2022-02-07 ENCOUNTER — ANESTHESIA EVENT (OUTPATIENT)
Dept: SURGERY | Age: 67
End: 2022-02-07
Payer: MEDICARE

## 2022-02-07 ENCOUNTER — APPOINTMENT (OUTPATIENT)
Dept: GENERAL RADIOLOGY | Age: 67
End: 2022-02-07
Attending: PHYSICIAN ASSISTANT
Payer: MEDICARE

## 2022-02-07 ENCOUNTER — ANESTHESIA (OUTPATIENT)
Dept: SURGERY | Age: 67
End: 2022-02-07
Payer: MEDICARE

## 2022-02-07 VITALS
OXYGEN SATURATION: 94 % | HEIGHT: 68 IN | TEMPERATURE: 97.6 F | WEIGHT: 156.7 LBS | SYSTOLIC BLOOD PRESSURE: 107 MMHG | HEART RATE: 88 BPM | RESPIRATION RATE: 25 BRPM | DIASTOLIC BLOOD PRESSURE: 77 MMHG | BODY MASS INDEX: 23.75 KG/M2

## 2022-02-07 DIAGNOSIS — Z96.611 S/P SHOULDER REPLACEMENT, RIGHT: Primary | ICD-10-CM

## 2022-02-07 LAB
ABO + RH BLD: NORMAL
BLOOD GROUP ANTIBODIES SERPL: NORMAL
GLUCOSE BLD STRIP.AUTO-MCNC: 121 MG/DL (ref 70–110)
SPECIMEN EXP DATE BLD: NORMAL

## 2022-02-07 PROCEDURE — 74011250636 HC RX REV CODE- 250/636: Performed by: ORTHOPAEDIC SURGERY

## 2022-02-07 PROCEDURE — 77030035643 HC BLD SAW OSC PRECIS STRY -C: Performed by: ORTHOPAEDIC SURGERY

## 2022-02-07 PROCEDURE — 77030041075 HC DRSG AG OPTIFRM MDII -B: Performed by: ORTHOPAEDIC SURGERY

## 2022-02-07 PROCEDURE — 77030027138 HC INCENT SPIROMETER -A: Performed by: ORTHOPAEDIC SURGERY

## 2022-02-07 PROCEDURE — 77030031139 HC SUT VCRL2 J&J -A: Performed by: ORTHOPAEDIC SURGERY

## 2022-02-07 PROCEDURE — 74011250636 HC RX REV CODE- 250/636: Performed by: ANESTHESIOLOGY

## 2022-02-07 PROCEDURE — 73020 X-RAY EXAM OF SHOULDER: CPT

## 2022-02-07 PROCEDURE — 77030002922 HC SUT FBRWRE ARTH -B: Performed by: ORTHOPAEDIC SURGERY

## 2022-02-07 PROCEDURE — 77030040361 HC SLV COMPR DVT MDII -B: Performed by: ORTHOPAEDIC SURGERY

## 2022-02-07 PROCEDURE — 74011000250 HC RX REV CODE- 250: Performed by: REGISTERED NURSE

## 2022-02-07 PROCEDURE — 76210000026 HC REC RM PH II 1 TO 1.5 HR: Performed by: ORTHOPAEDIC SURGERY

## 2022-02-07 PROCEDURE — 76210000006 HC OR PH I REC 0.5 TO 1 HR: Performed by: ORTHOPAEDIC SURGERY

## 2022-02-07 PROCEDURE — 74011000250 HC RX REV CODE- 250: Performed by: NURSE ANESTHETIST, CERTIFIED REGISTERED

## 2022-02-07 PROCEDURE — 82962 GLUCOSE BLOOD TEST: CPT

## 2022-02-07 PROCEDURE — 86900 BLOOD TYPING SEROLOGIC ABO: CPT

## 2022-02-07 PROCEDURE — 64415 NJX AA&/STRD BRCH PLXS IMG: CPT | Performed by: ANESTHESIOLOGY

## 2022-02-07 PROCEDURE — C1776 JOINT DEVICE (IMPLANTABLE): HCPCS | Performed by: ORTHOPAEDIC SURGERY

## 2022-02-07 PROCEDURE — 74011250636 HC RX REV CODE- 250/636: Performed by: REGISTERED NURSE

## 2022-02-07 PROCEDURE — 74011000250 HC RX REV CODE- 250: Performed by: PHYSICIAN ASSISTANT

## 2022-02-07 PROCEDURE — 76060000036 HC ANESTHESIA 2.5 TO 3 HR: Performed by: ORTHOPAEDIC SURGERY

## 2022-02-07 PROCEDURE — 77030006643: Performed by: ANESTHESIOLOGY

## 2022-02-07 PROCEDURE — 77030008477 HC STYL SATN SLP COVD -A: Performed by: ANESTHESIOLOGY

## 2022-02-07 PROCEDURE — 2709999900 HC NON-CHARGEABLE SUPPLY: Performed by: ORTHOPAEDIC SURGERY

## 2022-02-07 PROCEDURE — 77030013708 HC HNDPC SUC IRR PULS STRY –B: Performed by: ORTHOPAEDIC SURGERY

## 2022-02-07 PROCEDURE — 77030006835 HC BLD SAW SAG STRY -B: Performed by: ORTHOPAEDIC SURGERY

## 2022-02-07 PROCEDURE — 77030011264 HC ELECTRD BLD EXT COVD -A: Performed by: ORTHOPAEDIC SURGERY

## 2022-02-07 PROCEDURE — 77030020407 HC IV BLD WRMR ST 3M -A: Performed by: ANESTHESIOLOGY

## 2022-02-07 PROCEDURE — 76010000132 HC OR TIME 2.5 TO 3 HR: Performed by: ORTHOPAEDIC SURGERY

## 2022-02-07 PROCEDURE — 74011000258 HC RX REV CODE- 258: Performed by: NURSE ANESTHETIST, CERTIFIED REGISTERED

## 2022-02-07 PROCEDURE — 77030008683 HC TU ET CUF COVD -A: Performed by: ANESTHESIOLOGY

## 2022-02-07 PROCEDURE — 77030020782 HC GWN BAIR PAWS FLX 3M -B: Performed by: ORTHOPAEDIC SURGERY

## 2022-02-07 PROCEDURE — 77030013079 HC BLNKT BAIR HGGR 3M -A: Performed by: ANESTHESIOLOGY

## 2022-02-07 PROCEDURE — 77030002933 HC SUT MCRYL J&J -A: Performed by: ORTHOPAEDIC SURGERY

## 2022-02-07 PROCEDURE — L3660 SO 8 AB RSTR CAN/WEB PRE OTS: HCPCS | Performed by: ORTHOPAEDIC SURGERY

## 2022-02-07 PROCEDURE — 36415 COLL VENOUS BLD VENIPUNCTURE: CPT

## 2022-02-07 PROCEDURE — 76942 ECHO GUIDE FOR BIOPSY: CPT | Performed by: ORTHOPAEDIC SURGERY

## 2022-02-07 PROCEDURE — 77030034479 HC ADH SKN CLSR PRINEO J&J -B: Performed by: ORTHOPAEDIC SURGERY

## 2022-02-07 PROCEDURE — 74011000250 HC RX REV CODE- 250: Performed by: ANESTHESIOLOGY

## 2022-02-07 DEVICE — IMPLANTABLE DEVICE
Type: IMPLANTABLE DEVICE | Site: SHOULDER | Status: FUNCTIONAL
Brand: EQUINOXE

## 2022-02-07 DEVICE — SHOULDER S3 TOT ADV REVRS -- IMPL CAPPED S3: Type: IMPLANTABLE DEVICE | Site: SHOULDER | Status: FUNCTIONAL

## 2022-02-07 RX ORDER — FENTANYL CITRATE 50 UG/ML
25 INJECTION, SOLUTION INTRAMUSCULAR; INTRAVENOUS AS NEEDED
Status: DISCONTINUED | OUTPATIENT
Start: 2022-02-07 | End: 2022-02-08 | Stop reason: HOSPADM

## 2022-02-07 RX ORDER — LIDOCAINE HYDROCHLORIDE 20 MG/ML
INJECTION, SOLUTION EPIDURAL; INFILTRATION; INTRACAUDAL; PERINEURAL AS NEEDED
Status: DISCONTINUED | OUTPATIENT
Start: 2022-02-07 | End: 2022-02-07 | Stop reason: HOSPADM

## 2022-02-07 RX ORDER — ALBUTEROL SULFATE 0.83 MG/ML
2.5 SOLUTION RESPIRATORY (INHALATION)
Status: DISCONTINUED | OUTPATIENT
Start: 2022-02-07 | End: 2022-02-08 | Stop reason: HOSPADM

## 2022-02-07 RX ORDER — NALOXONE HYDROCHLORIDE 0.4 MG/ML
0.1 INJECTION, SOLUTION INTRAMUSCULAR; INTRAVENOUS; SUBCUTANEOUS AS NEEDED
Status: DISCONTINUED | OUTPATIENT
Start: 2022-02-07 | End: 2022-02-08 | Stop reason: HOSPADM

## 2022-02-07 RX ORDER — PROPOFOL 10 MG/ML
INJECTION, EMULSION INTRAVENOUS AS NEEDED
Status: DISCONTINUED | OUTPATIENT
Start: 2022-02-07 | End: 2022-02-07 | Stop reason: HOSPADM

## 2022-02-07 RX ORDER — ROPIVACAINE HYDROCHLORIDE 5 MG/ML
INJECTION, SOLUTION EPIDURAL; INFILTRATION; PERINEURAL
Status: COMPLETED | OUTPATIENT
Start: 2022-02-07 | End: 2022-02-07

## 2022-02-07 RX ORDER — DIPHENHYDRAMINE HYDROCHLORIDE 50 MG/ML
12.5 INJECTION, SOLUTION INTRAMUSCULAR; INTRAVENOUS
Status: DISCONTINUED | OUTPATIENT
Start: 2022-02-07 | End: 2022-02-08 | Stop reason: HOSPADM

## 2022-02-07 RX ORDER — DEXMEDETOMIDINE HYDROCHLORIDE 100 UG/ML
INJECTION, SOLUTION INTRAVENOUS
Status: COMPLETED | OUTPATIENT
Start: 2022-02-07 | End: 2022-02-07

## 2022-02-07 RX ORDER — NALOXONE HYDROCHLORIDE 4 MG/.1ML
SPRAY NASAL
Qty: 1 EACH | Refills: 0 | Status: SHIPPED | OUTPATIENT
Start: 2022-02-07

## 2022-02-07 RX ORDER — DEXAMETHASONE SODIUM PHOSPHATE 4 MG/ML
INJECTION, SOLUTION INTRA-ARTICULAR; INTRALESIONAL; INTRAMUSCULAR; INTRAVENOUS; SOFT TISSUE AS NEEDED
Status: DISCONTINUED | OUTPATIENT
Start: 2022-02-07 | End: 2022-02-07 | Stop reason: HOSPADM

## 2022-02-07 RX ORDER — TRANEXAMIC ACID 10 MG/ML
1 INJECTION, SOLUTION INTRAVENOUS ONCE
Status: COMPLETED | OUTPATIENT
Start: 2022-02-07 | End: 2022-02-07

## 2022-02-07 RX ORDER — GLYCOPYRROLATE 0.2 MG/ML
INJECTION INTRAMUSCULAR; INTRAVENOUS AS NEEDED
Status: DISCONTINUED | OUTPATIENT
Start: 2022-02-07 | End: 2022-02-07 | Stop reason: HOSPADM

## 2022-02-07 RX ORDER — SODIUM CHLORIDE 0.9 % (FLUSH) 0.9 %
5-40 SYRINGE (ML) INJECTION AS NEEDED
Status: DISCONTINUED | OUTPATIENT
Start: 2022-02-07 | End: 2022-02-08 | Stop reason: HOSPADM

## 2022-02-07 RX ORDER — HYDROMORPHONE HYDROCHLORIDE 1 MG/ML
0.5 INJECTION, SOLUTION INTRAMUSCULAR; INTRAVENOUS; SUBCUTANEOUS
Status: DISCONTINUED | OUTPATIENT
Start: 2022-02-07 | End: 2022-02-08 | Stop reason: HOSPADM

## 2022-02-07 RX ORDER — ASPIRIN 81 MG/1
81 TABLET ORAL 2 TIMES DAILY
Qty: 60 TABLET | Refills: 0 | Status: SHIPPED | OUTPATIENT
Start: 2022-02-07

## 2022-02-07 RX ORDER — ONDANSETRON 2 MG/ML
INJECTION INTRAMUSCULAR; INTRAVENOUS AS NEEDED
Status: DISCONTINUED | OUTPATIENT
Start: 2022-02-07 | End: 2022-02-07 | Stop reason: HOSPADM

## 2022-02-07 RX ORDER — OXYCODONE AND ACETAMINOPHEN 5; 325 MG/1; MG/1
1-2 TABLET ORAL
Qty: 40 TABLET | Refills: 0 | Status: SHIPPED | OUTPATIENT
Start: 2022-02-07 | End: 2022-02-10

## 2022-02-07 RX ORDER — PHENYLEPHRINE HCL IN 0.9% NACL 1 MG/10 ML
SYRINGE (ML) INTRAVENOUS AS NEEDED
Status: DISCONTINUED | OUTPATIENT
Start: 2022-02-07 | End: 2022-02-07 | Stop reason: HOSPADM

## 2022-02-07 RX ORDER — CEPHALEXIN 500 MG/1
500 CAPSULE ORAL 4 TIMES DAILY
Qty: 8 CAPSULE | Refills: 0 | Status: SHIPPED | OUTPATIENT
Start: 2022-02-07 | End: 2022-02-09

## 2022-02-07 RX ORDER — FENTANYL CITRATE 50 UG/ML
INJECTION, SOLUTION INTRAMUSCULAR; INTRAVENOUS AS NEEDED
Status: DISCONTINUED | OUTPATIENT
Start: 2022-02-07 | End: 2022-02-07 | Stop reason: HOSPADM

## 2022-02-07 RX ORDER — KETAMINE HCL IN 0.9 % NACL 50 MG/5 ML
SYRINGE (ML) INTRAVENOUS AS NEEDED
Status: DISCONTINUED | OUTPATIENT
Start: 2022-02-07 | End: 2022-02-07 | Stop reason: HOSPADM

## 2022-02-07 RX ORDER — HYDROCODONE BITARTRATE AND ACETAMINOPHEN 7.5; 325 MG/1; MG/1
1 TABLET ORAL EVERY 6 HOURS
COMMUNITY
End: 2022-02-07

## 2022-02-07 RX ORDER — SODIUM CHLORIDE 0.9 % (FLUSH) 0.9 %
5-40 SYRINGE (ML) INJECTION EVERY 8 HOURS
Status: DISCONTINUED | OUTPATIENT
Start: 2022-02-07 | End: 2022-02-08 | Stop reason: HOSPADM

## 2022-02-07 RX ORDER — CEFAZOLIN SODIUM/WATER 2 G/20 ML
2 SYRINGE (ML) INTRAVENOUS ONCE
Status: COMPLETED | OUTPATIENT
Start: 2022-02-07 | End: 2022-02-07

## 2022-02-07 RX ORDER — SODIUM CHLORIDE, SODIUM LACTATE, POTASSIUM CHLORIDE, CALCIUM CHLORIDE 600; 310; 30; 20 MG/100ML; MG/100ML; MG/100ML; MG/100ML
125 INJECTION, SOLUTION INTRAVENOUS CONTINUOUS
Status: DISCONTINUED | OUTPATIENT
Start: 2022-02-07 | End: 2022-02-08 | Stop reason: HOSPADM

## 2022-02-07 RX ORDER — ROCURONIUM BROMIDE 10 MG/ML
INJECTION, SOLUTION INTRAVENOUS AS NEEDED
Status: DISCONTINUED | OUTPATIENT
Start: 2022-02-07 | End: 2022-02-07 | Stop reason: HOSPADM

## 2022-02-07 RX ORDER — DEXAMETHASONE SODIUM PHOSPHATE 10 MG/ML
INJECTION INTRAMUSCULAR; INTRAVENOUS
Status: COMPLETED | OUTPATIENT
Start: 2022-02-07 | End: 2022-02-07

## 2022-02-07 RX ORDER — MIDAZOLAM HYDROCHLORIDE 1 MG/ML
INJECTION, SOLUTION INTRAMUSCULAR; INTRAVENOUS AS NEEDED
Status: DISCONTINUED | OUTPATIENT
Start: 2022-02-07 | End: 2022-02-07 | Stop reason: HOSPADM

## 2022-02-07 RX ORDER — SODIUM CHLORIDE, SODIUM LACTATE, POTASSIUM CHLORIDE, CALCIUM CHLORIDE 600; 310; 30; 20 MG/100ML; MG/100ML; MG/100ML; MG/100ML
100 INJECTION, SOLUTION INTRAVENOUS CONTINUOUS
Status: DISCONTINUED | OUTPATIENT
Start: 2022-02-07 | End: 2022-02-08 | Stop reason: HOSPADM

## 2022-02-07 RX ADMIN — Medication 50 MG: at 16:25

## 2022-02-07 RX ADMIN — FENTANYL CITRATE 50 MCG: 50 INJECTION, SOLUTION INTRAMUSCULAR; INTRAVENOUS at 18:58

## 2022-02-07 RX ADMIN — Medication 100 MCG: at 16:42

## 2022-02-07 RX ADMIN — Medication 100 MCG: at 18:08

## 2022-02-07 RX ADMIN — ROCURONIUM BROMIDE 50 MG: 10 INJECTION, SOLUTION INTRAVENOUS at 16:27

## 2022-02-07 RX ADMIN — SODIUM CHLORIDE, SODIUM LACTATE, POTASSIUM CHLORIDE, AND CALCIUM CHLORIDE 125 ML/HR: 600; 310; 30; 20 INJECTION, SOLUTION INTRAVENOUS at 13:45

## 2022-02-07 RX ADMIN — FENTANYL CITRATE 25 MCG: 50 INJECTION, SOLUTION INTRAMUSCULAR; INTRAVENOUS at 18:29

## 2022-02-07 RX ADMIN — DEXMEDETOMIDINE HYDROCHLORIDE 12 MCG: 100 INJECTION, SOLUTION INTRAVENOUS at 15:42

## 2022-02-07 RX ADMIN — MIDAZOLAM 2.5 MG: 1 INJECTION INTRAMUSCULAR; INTRAVENOUS at 15:31

## 2022-02-07 RX ADMIN — Medication 2 G: at 16:36

## 2022-02-07 RX ADMIN — LIDOCAINE HYDROCHLORIDE 100 MG: 20 INJECTION, SOLUTION INTRAVENOUS at 16:25

## 2022-02-07 RX ADMIN — MIDAZOLAM 2 MG: 1 INJECTION INTRAMUSCULAR; INTRAVENOUS at 15:26

## 2022-02-07 RX ADMIN — DEXAMETHASONE SODIUM PHOSPHATE 4 MG: 10 INJECTION, SOLUTION INTRAMUSCULAR; INTRAVENOUS at 15:42

## 2022-02-07 RX ADMIN — FENTANYL CITRATE 100 MCG: 50 INJECTION, SOLUTION INTRAMUSCULAR; INTRAVENOUS at 15:26

## 2022-02-07 RX ADMIN — DEXAMETHASONE SODIUM PHOSPHATE 4 MG: 4 INJECTION, SOLUTION INTRAMUSCULAR; INTRAVENOUS at 16:40

## 2022-02-07 RX ADMIN — PROPOFOL 200 MG: 10 INJECTION, EMULSION INTRAVENOUS at 16:26

## 2022-02-07 RX ADMIN — Medication 100 MCG: at 16:33

## 2022-02-07 RX ADMIN — Medication 100 MCG: at 17:42

## 2022-02-07 RX ADMIN — SODIUM CHLORIDE, SODIUM LACTATE, POTASSIUM CHLORIDE, AND CALCIUM CHLORIDE: 600; 310; 30; 20 INJECTION, SOLUTION INTRAVENOUS at 16:32

## 2022-02-07 RX ADMIN — FENTANYL CITRATE 25 MCG: 50 INJECTION, SOLUTION INTRAMUSCULAR; INTRAVENOUS at 18:50

## 2022-02-07 RX ADMIN — MIDAZOLAM 4 MG: 1 INJECTION INTRAMUSCULAR; INTRAVENOUS at 16:20

## 2022-02-07 RX ADMIN — MIDAZOLAM 2.5 MG: 1 INJECTION INTRAMUSCULAR; INTRAVENOUS at 15:33

## 2022-02-07 RX ADMIN — TRANEXAMIC ACID 1 G: 10 INJECTION, SOLUTION INTRAVENOUS at 16:46

## 2022-02-07 RX ADMIN — ROPIVACAINE HYDROCHLORIDE 20 ML: 5 INJECTION, SOLUTION EPIDURAL; INFILTRATION; PERINEURAL at 15:42

## 2022-02-07 RX ADMIN — Medication 100 MCG: at 17:56

## 2022-02-07 RX ADMIN — FENTANYL CITRATE 25 MCG: 0.05 INJECTION, SOLUTION INTRAMUSCULAR; INTRAVENOUS at 19:25

## 2022-02-07 RX ADMIN — GLYCOPYRROLATE 0.2 MG: 0.2 INJECTION INTRAMUSCULAR; INTRAVENOUS at 16:40

## 2022-02-07 RX ADMIN — SODIUM CHLORIDE, SODIUM LACTATE, POTASSIUM CHLORIDE, AND CALCIUM CHLORIDE: 600; 310; 30; 20 INJECTION, SOLUTION INTRAVENOUS at 18:37

## 2022-02-07 RX ADMIN — Medication 100 MCG: at 17:07

## 2022-02-07 RX ADMIN — TRANEXAMIC ACID 1 G: 100 INJECTION, SOLUTION INTRAVENOUS at 18:40

## 2022-02-07 RX ADMIN — ONDANSETRON HYDROCHLORIDE 6 MG: 2 INJECTION INTRAMUSCULAR; INTRAVENOUS at 16:39

## 2022-02-07 NOTE — ANESTHESIA PREPROCEDURE EVALUATION
Relevant Problems   RESPIRATORY SYSTEM   (+) COPD (chronic obstructive pulmonary disease) with emphysema (HCC)   (+) COPD with acute exacerbation (HCC)      NEUROLOGY   (+) PTSD (post-traumatic stress disorder)       Anesthetic History   No history of anesthetic complications            Review of Systems / Medical History  Patient summary reviewed, nursing notes reviewed and pertinent labs reviewed    Pulmonary    COPD: moderate        Asthma : well controlled  Pertinent negatives: No sleep apnea and smoker     Neuro/Psych   Within defined limits           Cardiovascular    Hypertension: well controlled              Exercise tolerance: >4 METS     GI/Hepatic/Renal             Pertinent negatives: No hiatal hernia and GERD   Endo/Other        Arthritis     Other Findings              Physical Exam    Airway  Mallampati: II  TM Distance: > 6 cm  Neck ROM: normal range of motion   Mouth opening: Normal     Cardiovascular    Rhythm: regular  Rate: normal         Dental  No notable dental hx       Pulmonary  Breath sounds clear to auscultation               Abdominal  GI exam deferred       Other Findings            Anesthetic Plan    ASA: 2  Anesthesia type: general      Post-op pain plan if not by surgeon: peripheral nerve block single    Induction: Intravenous  Anesthetic plan and risks discussed with: Patient

## 2022-02-07 NOTE — ANESTHESIA PROCEDURE NOTES
R Interscalene Peripheral Block    Start time: 2/7/2022 3:27 PM  End time: 2/7/2022 3:42 PM  Performed by: Rebeca Hurley MD  Authorized by: Rebeca Hurley MD       Pre-procedure: Indications: at surgeon's request and post-op pain management    Preanesthetic Checklist: patient identified, risks and benefits discussed, site marked, timeout performed, anesthesia consent given and patient being monitored    Timeout Time: 15:26 EST          Block Type:   Block Type: Interscalene  Laterality:  Right  Monitoring:  Standard ASA monitoring, responsive to questions, continuous pulse ox, oxygen, frequent vital sign checks and heart rate  Injection Technique:  Single shot  Procedures: ultrasound guided and nerve stimulator    Patient position: semifowlers.   Prep: DuraPrep    Location:  Interscalene  Needle Type:  Stimuplex  Needle Gauge:  20 G  Needle Localization:  Nerve stimulator and ultrasound guidance  Medication Injected:  Ropivacaine (PF) (NAROPIN) 5 mg/mL (0.5 %) injection, 20 mL  dexmedeTOMidine (PRECEDEX) 100 mcg/mL iv solution, 12 mcg  dexamethasone (PF) (DECADRON) 10 mg/mL injection, 4 mg  Med Admin Time: 2/7/2022 3:42 PM    Assessment:  Number of attempts:  1  Injection Assessment:  Incremental injection every 5 mL, local visualized surrounding nerve on ultrasound, negative aspiration for blood, negative aspiration for CSF, no paresthesia, no intravascular symptoms and ultrasound image on chart  Patient tolerance:  Patient tolerated the procedure well with no immediate complications

## 2022-02-07 NOTE — PERIOP NOTES
Reviewed PTA medication list with patient/caregiver and patient/caregiver denies any additional medications. Patient admits to having a responsible adult care for them at home for at least 24 hours after surgery. Patient encouraged to use gown warming system and informed that using said warming gown to regulate body temperature prior to a procedure has been shown to help reduce the risks of blood clots and infection. Patient's pharmacy of choice verified and documented in PTA medication section. Dual skin assessment & fall risk band verification completed with Brennon MCDUFFIE.

## 2022-02-07 NOTE — PERIOP NOTES
Checked on pt. Sitting crossed leg in stretcher. Call bell w/in reach, doesn't need to use restroom. Informed that I will give her a last call reminder about the bathroom and that anesthesia and surgeon will be in to talk to her shortly.

## 2022-02-07 NOTE — H&P
[address] [facility] [address]  History and Physical Exam    Patient: Zaina Crockett MRN: [id number]  SSN: [id number]    YOB: 1955  Age: 77 y.o. Sex: female      Subjective:      Chief Complaint: right shoulder pain    History of Present Illness:  Patient complains of right shoulder pain and painful limited ROM. Past Medical History:   Diagnosis Date    Arthritis     Asthma     Back pain     Chronic lung disease     COPD (chronic obstructive pulmonary disease) (HCC)     GERD (gastroesophageal reflux disease)     Hypertension     PTSD (post-traumatic stress disorder)     Scarlet fever     age 24   24 [facility]      Past Surgical History:   Procedure Laterality Date    HX HYSTERECTOMY      HX ORTHOPAEDIC Right     thumb re-attachment    HX TUBAL LIGATION      HX TYMPANOSTOMY      VA LAMINECTOMY,LUMBAR       Social History     Occupational History    Not on file   Tobacco Use    Smoking status: Former Smoker     Packs/day: 0.50     Years: 45.00     Pack years: 22.50     Types: Cigarettes     Quit date: 2020     Years since quittin.6    Smokeless tobacco: Never Used   Vaping Use    Vaping Use: Never used   Substance and Sexual Activity    Alcohol use: Not Currently    Drug use: Not Currently    Sexual activity: Not on file     Prior to Admission medications    Medication Sig Start Date End Date Taking? Authorizing Provider   buPROPion XL (Wellbutrin XL) 150 mg tablet Take 150 mg by mouth daily. Provider, Historical   celecoxib (CeleBREX) 100 mg capsule Take 100 mg by mouth two (2) times a day. Provider, Historical   omeprazole (PRILOSEC) 40 mg capsule Take 40 mg by mouth daily. Provider, Historical   imipramine (TOFRANIL) 50 mg tablet Take 50 mg by mouth nightly. Indications: anxious, panic disorder    Provider, Historical   clonazePAM (KlonoPIN) 1 mg tablet Take 1 mg by mouth two (2) times a day.     Provider, Historical   INCRUSE ELLIPTA 62.5 mcg/actuation inhaler USE 1 INHALATION DAILY 7/2/18   Chano ARRINGTON MD   rosuvastatin (CRESTOR) 10 mg tablet Take 10 mg by mouth nightly. Provider, Historical   Thiamine Mononitrate (B-1) 100 mg tablet Take 1 Tab by mouth daily. 3/16/17   Viviane Carrera MD   predniSONE (DELTASONE) 20 mg tablet Take 1 Tab by mouth daily (with breakfast). 3/16/17   Viviane Carrera MD   magic mouthwash (FIRST-MOUTHWASH BLM) 174--40 mg/30 mL mwsh oral suspension Take 5 mL by mouth every four (4) hours as needed for Stomatitis. 3/16/17   Viviane Carrera MD   lidocaine (XYLOCAINE) 2 % solution Take 15 mL by mouth as needed for Pain. 3/16/17   Viviane Carrera MD   HYDROcodone-acetaminophen [facility]) 5-325 mg per tablet Take 1 Tab by mouth every eight (8) hours as needed for Pain. Max Daily Amount: 3 Tabs. 3/16/17   Viviane Carrera MD   valsartan-hydroCHLOROthiazide (DIOVAN HCT) 160-25 mg per tablet Take 1 Tab by mouth daily. Provider, Historical   albuterol (PROVENTIL HFA, VENTOLIN HFA, PROAIR HFA) 90 mcg/actuation inhaler Take 2 Puffs by inhalation every four (4) hours as needed for Wheezing. Marissa Son MD   fluticasone-salmeterol (ADVAIR DISKUS) 100-50 mcg/dose diskus inhaler Take 1 Puff by inhalation every twelve (12) hours. Other, MD Marissa       Allergies: Allergies   Allergen Reactions    Sulfacetamide Anaphylaxis      No pertinent family history. Review of Systems:  A comprehensive review of systems was negative except for that written in the History of Present Illness. Objective:       Physical Exam:  HEENT: Normocephalic, atraumatic  Lungs:  Clear to auscultation  Heart:   Regular rate and rhythm  Abdomen: Soft  Extremities:  Pain with range of motion of the right shoulder  Neurological: Grossly neurovascularly intact    Assessment:      Arthritis of the right shoulder.     Plan:       The patient has failed previous efforts of conservative management to include non-steroidal anti-inflammatory medications. Due to the fact that conservative efforts failed, the patient became a candidate for surgical intervention. Proceed with scheduled right total shoulder replacement, standard vs. reverse. The various methods of treatment have been discussed with the patient and family. After consideration of risks, benefits, and other options for treatment, the patient has consented to surgical interventions. Questions were answered and preoperative teaching was done by Dr. Sheldon Dnet.      Signed By: Arnoldo Valenzuela PA-C     February 7, 2022

## 2022-02-07 NOTE — PERIOP NOTES
Report given to KUSUM Mcdowell RN regarding pt being anxious and being concerned about post op pain Rx. Aware that family is in the lobby if Anes or MD have any issues.

## 2022-02-07 NOTE — PERIOP NOTES
Pt. Used call bell to inquire when surgery will be. Informed is scheduled for 3pm but that surgeon is running behind. Call bell within reach.

## 2022-02-07 NOTE — INTERVAL H&P NOTE
Update History & Physical    The Patient's History and Physical of today was reviewed with the patient and I examined the patient. There was no change. The surgical site was confirmed by the patient and me. Plan:  The risk, benefits, expected outcome, and alternative to the recommended procedure have been discussed with the patient. Patient understands and wants to proceed with the procedure.     Electronically signed by Gordon Chavez MD on 2/7/2022 at 3:22 PM

## 2022-02-08 NOTE — DISCHARGE SUMMARY
Patient: Julia Rogers               Sex: female         MRN: [id number]       YOB: 1955      Age:  77 y.o.        LOS:  LOS: 0 days                DOA: 2/7/2022    Discharge Date: 2/7/2022    Admission Diagnoses: RIGHT SHOULDER OSTEOARTHRITIS    Discharge Diagnoses:    Problem List as of 2/7/2022 Date Reviewed: 6/27/2017          Codes Class Noted - Resolved    Acute respiratory failure ([facility] ICD-10-CM: J96.00  ICD-9-CM: 518.81  3/11/2017 - Present        PTSD (post-traumatic stress disorder) ICD-10-CM: F43.10  ICD-9-CM: 309.81  3/9/2017 - Present        COPD with acute exacerbation ([facility] ICD-10-CM: J44.1  ICD-9-CM: 491.21  3/6/2017 - Present        Respiratory insufficiency ICD-10-CM: R06.89  ICD-9-CM: 786.09  3/4/2017 - Present        Sinusitis ICD-10-CM: J32.9  ICD-9-CM: 473.9  3/4/2017 - Present        Mass of sinus ICD-10-CM: J34.89  ICD-9-CM: 478.19  3/4/2017 - Present        COPD (chronic obstructive pulmonary disease) with emphysema ([facility] ICD-10-CM: J43.9  ICD-9-CM: 492.8  3/4/2017 - Present        Hypokalemia ICD-10-CM: E87.6  ICD-9-CM: 276.8  3/4/2017 - Present        Transaminitis ICD-10-CM: R74.01  ICD-9-CM: 790.4  3/4/2017 - Present        Nodule of kidney ICD-10-CM: N28.89  ICD-9-CM: 593.9  3/4/2017 - Present        RESOLVED: Abnormal EKG ICD-10-CM: R94.31  ICD-9-CM: 794.31  3/4/2017 - 3/4/2017              This is a 77 y.o. female with a  history of ongoing shoulder pain secondary to degenerative joint disease. The patient has failed to respond to conservative care. The option of a total shoulder arthroplasty was discussed with the patient. Risks and benefits of the procedure were explained to the patient as well as other treatment options and possible surgical outcomes. The patient acknowledged and consent was obtained. The patient was therefore scheduled to undergo a right total shoulder arthroplasty with Dr. Cornelia Fu.  The patient was taken to the operating room for the above-stated procedure. IV antibiotics were given prior to the incision. SCDs were used for DVT prophylaxis. The patient had an estimated intraoperative blood loss of 150 mL. The patient tolerated the procedure well without any complications, and was taken to the recovery room in stable condition. The patient was then transferred to the postoperative orthopedic floor for convalescence, PT, pain management, as well as discharge planning. Physical therapy and occupational therapy initiated their evaluation and treatment and continued to follow the patient until the patient was discharged. The patient then was transitioned over to oral narcotics in which was well tolerated. DVT prophylaxis was initiated on the day of surgery including; aspirin, compression stockings and bilateral foot pumps. At the time of discharge, the patient was able to ambulate safely and had an understanding of the explicit discharge precautions and instructions following surgery. Home Health will come out to assist the patient with this. The patient was discharged to follow up with Dr. Claudia Carlisle in approximately 10 to 14 days. Discharge Condition: Good  DISPOSITION: To home. On the day of discharge the patient was afebrile. Vital signs were stable. The patient was in no acute distress. her Right shoulder incision was clean, dry, and intact. Extremity was warm and well-perfused, distally neurovascularly intact. DISCHARGE INSTRUCTIONS:    The patient will be discharged home on aspirin 81mg BID x 1 month for DVT prophylaxis. Continue physical therapy for pendulum exercises only. Continue therapeutic exercises. Follow up in 10 to 14 days with Dr. Claudia Carlisle. DISCHARGE MEDICATIONS:   Current Discharge Medication List      START taking these medications    Details   aspirin delayed-release 81 mg tablet Take 1 Tablet by mouth two (2) times a day.   Qty: 60 Tablet, Refills: 0      cephALEXin (KEFLEX) 500 mg capsule Take 1 Capsule by mouth four (4) times daily for 2 days. Qty: 8 Capsule, Refills: 0      naloxone (NARCAN) 4 mg/actuation nasal spray Use 1 spray intranasally, then discard. Repeat with new spray every 2 min as needed for opioid overdose symptoms, alternating nostrils. Qty: 1 Each, Refills: 0      oxyCODONE-acetaminophen (PERCOCET) 5-325 mg per tablet Take 1-2 Tablets by mouth every four (4) hours as needed for Pain for up to 3 days. Max Daily Amount: 12 Tablets. Qty: 40 Tablet, Refills: 0    Associated Diagnoses: S/P shoulder replacement, right         CONTINUE these medications which have NOT CHANGED    Details   buPROPion XL (Wellbutrin XL) 150 mg tablet Take 150 mg by mouth two (2) times a day. omeprazole (PRILOSEC) 40 mg capsule Take 40 mg by mouth daily. imipramine (TOFRANIL) 50 mg tablet Take 50 mg by mouth nightly. Indications: anxious, panic disorder      clonazePAM (KlonoPIN) 1 mg tablet Take 1 mg by mouth two (2) times a day. INCRUSE ELLIPTA 62.5 mcg/actuation inhaler USE 1 INHALATION DAILY  Qty: 1 Inhaler, Refills: 3      rosuvastatin (CRESTOR) 10 mg tablet Take 10 mg by mouth nightly. valsartan-hydroCHLOROthiazide (DIOVAN HCT) 160-25 mg per tablet Take 1 Tab by mouth daily. fluticasone-salmeterol (ADVAIR DISKUS) 100-50 mcg/dose diskus inhaler Take 1 Puff by inhalation every twelve (12) hours. albuterol (PROVENTIL HFA, VENTOLIN HFA, PROAIR HFA) 90 mcg/actuation inhaler Take 2 Puffs by inhalation every four (4) hours as needed for Wheezing.          STOP taking these medications       HYDROcodone-acetaminophen (Norco) 7.5-325 mg per tablet Comments:   Reason for Stopping:         celecoxib (CeleBREX) 100 mg capsule Comments:   Reason for Stopping:

## 2022-02-08 NOTE — OP NOTES
[address] [facility] [address]  Reverse Total Shoulder Arthroplasty    Patient: Iliana Nguyen MRN: [id number]  SSN: [id number]    YOB: 1955  Age: 77 y.o. Sex: female      Date of Surgery: 2/7/2022   Preoperative Diagnosis: RIGHT SHOULDER OSTEOARTHRITIS   Postoperative Diagnosis: RIGHT SHOULDER OSTEOARTHRITIS   Location: [facility]  Surgeon: Cas Almendarez MD  Assistant:  Sanna Mena PA-C  Circ-1: Kristina Salas RN  Physician Assistant: Sherwin Post PA-C  Scrub Tech-1: Hurshel Hodgkins A  Surg Asst-1: Bernarda Mainpato    Anesthesia: General and Regional Nerve Block    Procedure: Reverse Total Shoulder Arthroplasty    Findings: Shoulder Arthritis with rotator cuff tear    Estimated Blood Loss: 250    Specimens: None    Complications: None    Implants:   Implant Name Type Inv.  Item Serial No.  Lot No. LRB No. Used Action   PLATE GIOVANNI STD SHLDR MAGDALENA REV [employer] - [id number]  PLATE GIOVANNI STD SHLDR MAGDALENA REV [employer] [id number] [employer]  Right 1 Implanted   SCR TORQUE DEFINING KIT -- [employer] - [id number]  SCR TORQUE DEFINING KIT -- Tracee Sires [id number] [employer]  Right 1 Implanted   SCR BNE LCK GLENOSPHERE -- EQUINOXE - [id number]  SCR BNE LCK GLENOSPHERE -- EQUINOXE [id number] [employer]  Right 1 Implanted   KIT BNE SCR L26MM DIA4.5MM GIOVANNI SHLDR ORNG COMPR JESSE CAP - [id number]  KIT BNE SCR L26MM DIA4.5MM GIOVANNI SHLDR ORNG COMPR JESSE CAP [id number] [employer]WD  Right 1 Implanted   SCR LCK CAP KIT 4.5X30MM CHAPIS -- EQUINOXE - [id number]  SCR LCK CAP KIT 4.5X30MM CHAPIS -- EQUINOXE [id number] [employer]  Right 1 Implanted   REVERSE SHOULDER GLENOSHERE   [id number] [employer]  Right 1 Implanted   SCR LCK CAP KIT 4.5X22MM BLK -- EQUINOXE - [id number]  SCR LCK CAP KIT 4.5X22MM BLK -- EQUINOXE [id number] [employer]  Right 1 Implanted   KIT BONE SCR L18MM DIA4.5MM GIOVANNI SHLDR WHT COMPR LCK CAP RVS - [id number]  KIT BONE SCR L18MM DIA4.5MM GIOVANNI CHIUT COMPR LCK CAP RVS [id number] [employer]_  Right 1 Implanted   EQUINOX HUMERAL STEM   [id number] [facility]  Right 1 Implanted   LINER HUM [id number] +0MM OFFSET STD POLY FOR REV SHLDR SYS - [id number]  LINER HUM [id number] +0MM OFFSET STD POLY FOR REV SHLDR SYS [id number] [employer]WD  Right 1 Implanted   TRAY HUM ADPT +5MM OFFSET STD POLY FOR REV SHLDR SYS - [id number]  TRAY HUM ADPT +5MM OFFSET STD POLY FOR REV SHLDR SYS [id number] [employer]WD  Right 1 Implanted          DESCRIPTION OF PROCEDURE : The patient was taken to the operating room and placed in a supine position on the operating table. After satisfactory general anesthesia was established, the patient was moved into the beach chair position. Shoulder was positioned off the edge of the table. Barrier drape was used. Shoulder was prepped with ChloraPrep and draped in a sterile fashion. Outline of the acromion, clavicle, AC joint, coracoid process as well as the line of the intended incision had been marked in the preoperative hold area, was still visible, was remarked at this time. Deltopectoral incision was made a length of approximately 4 inches. Incision was made through the skin and subcutaneous tissue. Bleeders were controlled as they were encountered using the Bovie electrocautery. The deltopectoral interval and cephalic vein were identified. Dissection was continued on the medial border of the cephalic vein. Dissection was continued down to the conjoined tendon and the anterior aspect of the shoulder. The subscapularis tendon was markedly weakened and a rotator cuff tear was evident as expected. The subscapularis tendon was divided. The capsule was also divided and the humeral head exposed. The [employer] guide was placed next to the humeral shaft and the humeral head was cut in approximately 10 to 20 degrees of retroversion. Attention was directed to the glenoid. The Fukuda retractor was placed. The labrum was removed. The glenoid was exposed.  A center k-wire was placed using the guide at the inferior aspect of the glenoid. Reaming commenced up to the 38 reamer. This produced a smooth glenoid. The guide was again used and the central peg hole was made at this time. The glenoid plate was placed, impacted into position after bone was placed through the central canal. 4 screws holes were all drilled, measured and appropriate-sized screws were placed. Excellent fixation of the glenoid plate was obtained. The glenosphere was then placed, and after orienting correctly, was clicked in appropriate position. The holding screw was placed, had excellent excursion with excellent squeak when it was fully tightened. Attention was re-directed to the humerus. Reaming progressed up to the size 10. This produced a good bite cut, distally. Broaching then progressed up to the size 10 with excellent stability being obtained. The size 10  stem was placed. The trialing was done with the  5 baseplate and the 0 liner. This produced satisfactory stability. The 5 base plate was placed and the 0 liner were then impacted. This was able to be reduced with appropriate difficulty and good stability was obtained. The arm was placed in flexion, extension, internal and external rotation. Good mobility was obtained with excellent stability also being obtained. The wound was irrigated multiple times throughout the procedure with antibiotic irrigation and, again at this time. The subscapularis tendon which had been tagged when it was sectioned was repaired using a #2 fiberwire rene-jeromy and figure of 8 sutures. The deltopectoral interval was closed with 0 Vicryl. Subcutaneous tissue was closed using 2-0 Monocryl and the skin was closed with subcuticular 3-0 Monocryl followed by the Prineo system.  The patient tolerated the procedure well, transferred onto the recovery room bed and taken to the recovery room in stable condition

## 2022-02-08 NOTE — DISCHARGE INSTRUCTIONS
Total Shoulder Arthroplasty Discharge Instructions           Dr. Miriam Rocha    Please take the time to review the following instructions before you leave the hospital and use them as guidelines during your recovery from surgery. If you have any questions you may contact my office at [phone]. Wound Care/Dressing Changes:    Two days after your surgery date you should remove your dressing. A big, bulky dressing isn't necessary as long as there isn't any drainage from the incisions. If there is drainage you can put a band-aid, Primapore, or Mepilex dressing over the incision and change it daily until drainage stops. Wear ASHLEY hose as needed for comfort and swelling. No dressing is necessary if there is no drainage. It isn't necessary to apply antibiotic ointment to your incisions. Prineo tape will peel off in approximately 2-6 weeks. It does not need to be removed prior to that. When it begins to peel off you can cut the edges away with scissors. Showering/Bathing:    [x]   You may shower 2 days after surgery. Your dressing may be removed for showering. You may get your incisions wet in the shower. Don't vigorously scrub the area where your incisions are. Apply a clean, dry dressing after drying off the area of your incisions. Don't take a tub bath, get in a swimming pool or Jacuzzi until the incisions are completely healed, which is about 14 days. Do not soak your incisions under water. Weight Bearing Status/Activity:          You may walk as tolerated and perform your normal daily activities. Ice/Elevation:    Continue ice and elevation as needed for pain and swelling. Diet:    Resume your prehospital diet. If you have excessive nausea or vomitting call your doctor's office. Medications:        1.  You will be given a prescription for pain medications when you are discharged from the hospital.  Take the medication as needed according to the directions on the prescription bottle. Possible side effects of the medication include dizziness, headache, nausea, vomiting, constipation and urinary retention. If you experience any of these side effects call the office so that we can assist you in relieving them. Discontinue the use of the pain medication if you develop itching, rash, shortness of breath or difficulties swallowing. If these symptoms become severe or aren't relieved by discontinuing the medication you should seek immediate medical attention. Refills of pain medication are authorized during office hours only. (8AM - 5PM Mon thru Fri)  2. If you were prescribed Percocet/oxycodone or Dilaudid/hydromorphone you must have a written prescription. These medications legally CANNOT be called in to a pharmacy. 3. Do not take Tylenol in addition to your pain medication as most of the pain medication already contains Tylenol. Do not exceed 4000 mg of Tylenol per day. Ex:  (hydrocodon 5/500mg = 500 mg of Tylenol)  4. You may resume the medication you were taking prior to your surgery. Pain medication may change the effects of any antidepressant medication. If you have any questions about possible interactions between your regular medications and the pain medication you should consult the physician who prescribes your regular medications. Stool Softeners:    Pain medications can cause constipation. Stool softeners, warm prune juice and increasing your water and fiber intake can help prevent constipation. Do not take laxatives. Blood Thinner: You will be prescribed aspirin 81mg to take twice daily for one month following surgery to prevent blood clots. Home Health:  Begin In-Home Physical Therapy; 3 times a week to work on pendulum exercises of the shoulder only until further notified by surgeon. Home health has been arranged for skilled nursing visits and physical therapy.   If no one from the agency calls you on the day after you arrive home, please contact them at the number provided at discharge. .     Follow Up Appointment:     Please call [phone] for a follow appointment with Dr. Mariya Reno in 10-14 days from the time of your surgery. Please let our office know you are scheduling a post-op appointment. Signs and Symptoms to be Aware of: If any of the following signs and symptoms occur, you should contact Dr. Acosta office. Please be advised if a problem arises which you feel requires immediate medical attention or you are unable to contact Dr. Acosta office you should seek immediate medical attention at the emergency department or other health care facility you have access to. Signs and symptoms to watch for include:     1. A sudden increase in swelling and l or redness or warmth at the area your surgery was performed which isn't relieved by rest, ice and elevation. 2 Oral temperature greater than 101.5 degrees for 12 hours or more which isn't relieved by an increase in fluid intake and taking two Tylenol every 4-6 hours. 3 Excessive drainage from your incisions, or drainage which hasn't stopped by 72 hours after your surgery despite applying a compressive dressing, ice and elevation. 4 Calfpain, tenderness, redness or swelling which isn't relieved with rest and elevation. 5 Fever, chills, shortness ofbreath, chest pain, nausea, vomiting or other signs and symptoms which are of concern to you. Other Instructions:    Patient armband removed and shredded    DISCHARGE SUMMARY from Nurse    PATIENT INSTRUCTIONS:    After general anesthesia or intravenous sedation, for 24 hours or while taking prescription Narcotics:  · Limit your activities  · Do not drive and operate hazardous machinery  · Do not make important personal or business decisions  · Do  not drink alcoholic beverages  · If you have not urinated within 8 hours after discharge, please contact your surgeon on call.     Report the following to your surgeon:  · Excessive pain, swelling, redness or odor of or around the surgical area  · Temperature over 100.5  · Nausea and vomiting lasting longer than 4 hours or if unable to take medications  · Any signs of decreased circulation or nerve impairment to extremity: change in color, persistent  numbness, tingling, coldness or increase pain  · Any questions    What to do at Home:  Recommended activity: Activity as tolerated and no driving for today, No driving while on analgesics and See surgical instructions    If you experience any of the following symptoms Fever, chills, pain uncontrolled by prescribed pain meds, odorous drainage, redness and or swelling at incision site  , please follow up with Dr. Guillermo Pinedo. *  Please give a list of your current medications to your Primary Care Provider. *  Please update this list whenever your medications are discontinued, doses are      changed, or new medications (including over-the-counter products) are added. *  Please carry medication information at all times in case of emergency situations. These are general instructions for a healthy lifestyle:    No smoking/ No tobacco products/ Avoid exposure to second hand smoke  Surgeon General's Warning:  Quitting smoking now greatly reduces serious risk to your health. Obesity, smoking, and sedentary lifestyle greatly increases your risk for illness    A healthy diet, regular physical exercise & weight monitoring are important for maintaining a healthy lifestyle    You may be retaining fluid if you have a history of heart failure or if you experience any of the following symptoms:  Weight gain of 3 pounds or more overnight or 5 pounds in a week, increased swelling in our hands or feet or shortness of breath while lying flat in bed. Please call your doctor as soon as you notice any of these symptoms; do not wait until your next office visit. The discharge information has been reviewed with the patient and spouse.   The patient and spouse verbalized understanding. Discharge medications reviewed with the patient and spouse and appropriate educational materials and side effects teaching were provided. ___________________________________________________________________________________________________________________________________         9 Things To Do If You've Been Exposed to COVID-19  If you are fully vaccinated or have recently been sick with COVID-19, you may not need to quarantine. Stay home. If you've been exposed to the virus but don't have symptoms, you may need to stay in quarantine for up to 14 days. In some cases it may be shorter. Ask your doctor when it's safe to end your quarantine. Be sure to follow all instructions from your local health authorities. Don't go to school, work, or public areas. And don't use public transportation, ride-shares, or taxis unless you have no choice. Leave your home only if you need to get medical care. But call the doctor's office first so they know you're coming. Call your doctor. Call your doctor or other health professional to let them know that you've been exposed. They might want you to be tested, or they may have other instructions for you. Wear a mask when you are around other people. It can help stop the spread of the virus. Limit contact with people in your home. If possible, stay in a separate bedroom and use a separate bathroom. Avoid contact with pets and other animals. Cover your mouth and nose with a tissue when you cough or sneeze. Then throw it in the trash right away. Wash your hands often, especially after you cough or sneeze. Use soap and water, and scrub for at least 20 seconds. If soap and water aren't available, use an alcohol-based hand . Don't share personal household items. These include bedding, towels, cups and glasses, and eating utensils. Clean and disinfect your home every day.   Use household  or disinfectant wipes or sprays. Current as of: March 26, 2021               Content Version: 13.0  © 5940-9453 [employer], [employer]. Care instructions adapted under license by [employer] (which disclaims liability or warranty for this information). If you have questions about a medical condition or this instruction, always ask your healthcare professional. [place] [address] any warranty or liability for your use of this information.

## 2022-02-08 NOTE — PERIOP NOTES
Dr. Guillermo Pinedo made aware  There is no PT/OT after 7pm. Per Dr. Guillermo Pinedo ambulate pt on unit, if they can ambulate safely the pt can be discharged

## 2022-02-08 NOTE — ANESTHESIA POSTPROCEDURE EVALUATION
Post-Anesthesia Evaluation and Assessment    Cardiovascular Function/Vital Signs  Visit Vitals  BP (!) 108/58   Pulse 92   Temp 36.6 °C (97.9 °F)   Resp 28   Ht 5' 8\" (1.727 m)   Wt 71.1 kg (156 lb 11.2 oz)   SpO2 98%   BMI 23.83 kg/m²       Patient is status post Procedure(s):  RIGHT REVERSE TOTAL SHOULDER REPLACEMENT. Nausea/Vomiting: Controlled. Postoperative hydration reviewed and adequate. Pain:  Pain Scale 1: Numeric (0 - 10) (02/07/22 1925)  Pain Intensity 1: 4 (02/07/22 1925)   Managed. Neurological Status:   Neuro (WDL): Exceptions to WDL (02/07/22 1941)   At baseline. Mental Status and Level of Consciousness: Arousable. Pulmonary Status:   O2 Device: Nasal cannula (02/07/22 1941)   Adequate oxygenation and airway patent. Complications related to anesthesia: None    Post-anesthesia assessment completed. No concerns. Patient has met all discharge requirements.     Signed By: Jo-Ann Ramirez CRNA    February 7, 2022

## 2022-02-08 NOTE — BRIEF OP NOTE
Brief Postoperative Note    Patient: Shaun Mauro  YOB: 1955  MRN: [id number]    Date of Procedure: 2/7/2022     Pre-Op Diagnosis: RIGHT SHOULDER OSTEOARTHRITIS    Post-Op Diagnosis: Same as preoperative diagnosis. Procedure(s):  RIGHT REVERSE TOTAL SHOULDER REPLACEMENT    Surgeon(s):  Tiffany Ram MD    Surgical Assistant: Physician Assistant: Luh Stephens PA-C  Surg Asst-1: Verónica Fernández    Anesthesia: General     Estimated Blood Loss (mL): 010     Complications: None    Specimens: * No specimens in log *     Implants:   Implant Name Type Inv.  Item Serial No.  Lot No. LRB No. Used Action   PLATE GIOVANNI STD SHLDR MAGDALENA REV [employer] - [id number]  PLATE GIOVANNI STD SHLDR MAGDALENA REV [employer] [id number] INC_  Right 1 Implanted   SCR TORQUE DEFINING KIT -- [employer] - [id number]  SCR TORQUE DEFINING KIT -- [employer] [id number] [employer]  Right 1 Implanted   SCR BNE LCK GLENOSPHERE -- [employer] - [id number]  SCR BNE LCK GLENOSPHERE -- EQUINOXE [id number] [employer]  Right 1 Implanted   KIT BNE SCR L26MM DIA4.5MM GIOVANNI SHLDR ORNG COMPR JESSE CAP - [id number]  KIT BNE SCR L26MM DIA4.5MM GIOVANNI SHLDR ORNG COMPR JESSE CAP [id number] [employer]_  Right 1 Implanted   SCR LCK CAP KIT 4.5X30MM CHAPIS -- EQUINOXE - [id number]  SCR LCK CAP KIT 4.5X30MM CHAPIS -- EQUINOXE [id number] [employer]  Right 1 Implanted   REVERSE SHOULDER GLENOSHERE   [id number] [employer]  Right 1 Implanted   SCR LCK CAP KIT 4.5X22MM BLK -- EQUINOXE - [id number]  SCR LCK CAP KIT 4.5X22MM BLK -- EQUINOXE [id number] [employer]  Right 1 Implanted   KIT BONE SCR L18MM DIA4.5MM GIOVANNI SHLDR WHT COMPR LCK CAP RVS - [id number]  KIT BONE SCR L18MM DIA4.5MM GIOVANNI SHLDR WHT COMPR LCK CAP RVS [id number] [employer]_  Right 1 Implanted   EQUINOX HUMERAL STEM   [id number] [employer]  Right 1 Implanted   LINER HUM [id number] +0MM OFFSET STD POLY FOR REV SHLDR SYS - [id number]  LINER HUM [id number] +0MM OFFSET STD POLY FOR REV SHLDR SYS [id number] [employer]WD  Right 1 Implanted   TRAY HUM ADPT +5MM OFFSET STD POLY FOR REV SHLDR SYS - [id number]  TRAY HUM ADPT +5MM OFFSET STD POLY FOR REV SHLDR SYS [id number] [employer]WD  Right 1 Implanted       Drains: * No LDAs found *    Findings: Severe DJD    Electronically Signed by Nelma Lundborg, PA-C on 2/7/2022 at 7:02 PM

## 2022-02-08 NOTE — PERIOP NOTES
Pt was able to ambulate in PACU safely. No c/o nausea or dizziness    2050: I have reviewed discharge instructions with the patient and spouse. The patient and spouse verbalized understanding.

## 2022-03-18 PROBLEM — J34.89 MASS OF SINUS: Status: ACTIVE | Noted: 2017-03-04

## 2022-03-18 PROBLEM — J44.1 COPD WITH ACUTE EXACERBATION (HCC): Status: ACTIVE | Noted: 2017-03-06

## 2022-03-18 PROBLEM — R06.89 RESPIRATORY INSUFFICIENCY: Status: ACTIVE | Noted: 2017-03-04

## 2022-03-18 PROBLEM — J43.9 COPD (CHRONIC OBSTRUCTIVE PULMONARY DISEASE) WITH EMPHYSEMA (HCC): Status: ACTIVE | Noted: 2017-03-04

## 2022-03-19 PROBLEM — E87.6 HYPOKALEMIA: Status: ACTIVE | Noted: 2017-03-04

## 2022-03-19 PROBLEM — F43.10 PTSD (POST-TRAUMATIC STRESS DISORDER): Status: ACTIVE | Noted: 2017-03-09

## 2022-03-19 PROBLEM — N28.89 NODULE OF KIDNEY: Status: ACTIVE | Noted: 2017-03-04

## 2022-03-19 PROBLEM — J96.00 ACUTE RESPIRATORY FAILURE (HCC): Status: ACTIVE | Noted: 2017-03-11

## 2022-03-19 PROBLEM — J32.9 SINUSITIS: Status: ACTIVE | Noted: 2017-03-04

## 2022-03-19 PROBLEM — R74.01 TRANSAMINITIS: Status: ACTIVE | Noted: 2017-03-04

## 2022-07-15 ENCOUNTER — TRANSCRIBE ORDER (OUTPATIENT)
Dept: SCHEDULING | Age: 67
End: 2022-07-15

## 2022-07-15 DIAGNOSIS — I20.9 ANGINA PECTORIS, UNSPECIFIED (HCC): Primary | ICD-10-CM

## 2022-07-15 DIAGNOSIS — R06.09 DYSPNEA ON EXERTION: ICD-10-CM

## 2022-08-09 ENCOUNTER — HOSPITAL ENCOUNTER (OUTPATIENT)
Dept: NON INVASIVE DIAGNOSTICS | Age: 67
Discharge: HOME | End: 2022-08-09
Attending: INTERNAL MEDICINE
Payer: MEDICARE

## 2022-08-09 ENCOUNTER — HOSPITAL ENCOUNTER (OUTPATIENT)
Dept: NUCLEAR MEDICINE | Age: 67
Discharge: HOME | End: 2022-08-09
Attending: INTERNAL MEDICINE
Payer: MEDICARE

## 2022-08-09 DIAGNOSIS — I20.9 ANGINA PECTORIS, UNSPECIFIED (HCC): ICD-10-CM

## 2022-08-09 DIAGNOSIS — R06.09 DYSPNEA ON EXERTION: ICD-10-CM

## 2022-08-09 LAB
NUC STRESS EJECTION FRACTION: 77 %
STRESS BASELINE HR: 75 BPM
STRESS BASELINE ST DEPRESSION: 0 MM
STRESS ESTIMATED WORKLOAD: 1 METS
STRESS EXERCISE DUR MIN: 0 MIN
STRESS EXERCISE DUR SEC: 57 SEC
STRESS PEAK DIAS BP: 86 MMHG
STRESS PEAK SYS BP: 136 MMHG
STRESS PERCENT HR ACHIEVED: 52 %
STRESS POST PEAK HR: 80 BPM
STRESS RATE PRESSURE PRODUCT: NORMAL BPM*MMHG
STRESS TARGET HR: 154 BPM
TID: 1.23

## 2022-08-09 PROCEDURE — 93017 CV STRESS TEST TRACING ONLY: CPT

## 2022-08-09 PROCEDURE — A9500 TC99M SESTAMIBI: HCPCS

## 2022-08-09 PROCEDURE — 74011250636 HC RX REV CODE- 250/636: Performed by: INTERNAL MEDICINE

## 2022-08-09 RX ORDER — TETRAKIS(2-METHOXYISOBUTYLISOCYANIDE)COPPER(I) TETRAFLUOROBORATE 1 MG/ML
35.4 INJECTION, POWDER, LYOPHILIZED, FOR SOLUTION INTRAVENOUS
Status: COMPLETED | OUTPATIENT
Start: 2022-08-09 | End: 2022-08-09

## 2022-08-09 RX ORDER — TETRAKIS(2-METHOXYISOBUTYLISOCYANIDE)COPPER(I) TETRAFLUOROBORATE 1 MG/ML
11.1 INJECTION, POWDER, LYOPHILIZED, FOR SOLUTION INTRAVENOUS
Status: COMPLETED | OUTPATIENT
Start: 2022-08-09 | End: 2022-08-09

## 2022-08-09 RX ADMIN — TETRAKIS(2-METHOXYISOBUTYLISOCYANIDE)COPPER(I) TETRAFLUOROBORATE 35.4 MILLICURIE: 1 INJECTION, POWDER, LYOPHILIZED, FOR SOLUTION INTRAVENOUS at 12:00

## 2022-08-09 RX ADMIN — REGADENOSON 0.4 MG: 0.08 INJECTION, SOLUTION INTRAVENOUS at 12:10

## 2022-08-09 RX ADMIN — TETRAKIS(2-METHOXYISOBUTYLISOCYANIDE)COPPER(I) TETRAFLUOROBORATE 11.1 MILLICURIE: 1 INJECTION, POWDER, LYOPHILIZED, FOR SOLUTION INTRAVENOUS at 10:00

## 2022-09-06 ENCOUNTER — TRANSCRIBE ORDER (OUTPATIENT)
Dept: SCHEDULING | Age: 67
End: 2022-09-06

## 2022-09-06 DIAGNOSIS — R91.1 LESION OF LUNG: Primary | ICD-10-CM

## 2022-09-08 ENCOUNTER — HOSPITAL ENCOUNTER (OUTPATIENT)
Dept: PET IMAGING | Age: 67
Discharge: HOME | End: 2022-09-08
Attending: INTERNAL MEDICINE
Payer: MEDICARE

## 2022-09-08 DIAGNOSIS — R91.1 LESION OF LUNG: ICD-10-CM

## 2022-09-08 PROCEDURE — A9552 F18 FDG: HCPCS

## 2022-09-08 PROCEDURE — 74011000250 HC RX REV CODE- 250: Performed by: INTERNAL MEDICINE

## 2022-09-08 RX ORDER — FLUDEOXYGLUCOSE F-18 200 MCI/ML
5-10 INJECTION INTRAVENOUS ONCE
Status: COMPLETED | OUTPATIENT
Start: 2022-09-08 | End: 2022-09-08

## 2022-09-08 RX ORDER — BARIUM SULFATE 20 MG/ML
675 SUSPENSION ORAL
Status: COMPLETED | OUTPATIENT
Start: 2022-09-08 | End: 2022-09-08

## 2022-09-08 RX ADMIN — BARIUM SULFATE 675 ML: 20 SUSPENSION ORAL at 12:13

## 2022-09-08 RX ADMIN — FLUDEOXYGLUCOSE F-18 10.23 MILLICURIE: 200 INJECTION INTRAVENOUS at 11:20

## 2024-01-04 ENCOUNTER — APPOINTMENT (OUTPATIENT)
Facility: HOSPITAL | Age: 69
DRG: 190 | End: 2024-01-04
Payer: MEDICARE

## 2024-01-04 ENCOUNTER — HOSPITAL ENCOUNTER (INPATIENT)
Facility: HOSPITAL | Age: 69
LOS: 4 days | Discharge: HOME HEALTH | DRG: 190 | End: 2024-01-08
Attending: EMERGENCY MEDICINE | Admitting: FAMILY MEDICINE
Payer: MEDICARE

## 2024-01-04 DIAGNOSIS — J44.1 COPD WITH ACUTE EXACERBATION (HCC): ICD-10-CM

## 2024-01-04 DIAGNOSIS — J96.01 ACUTE RESPIRATORY FAILURE WITH HYPOXIA (HCC): ICD-10-CM

## 2024-01-04 DIAGNOSIS — R94.31 ABNORMAL EKG: ICD-10-CM

## 2024-01-04 DIAGNOSIS — R79.89 ELEVATED TROPONIN: Primary | ICD-10-CM

## 2024-01-04 LAB
ALBUMIN SERPL-MCNC: 3.6 G/DL (ref 3.4–5)
ALBUMIN/GLOB SERPL: 1.1 (ref 0.8–1.7)
ALP SERPL-CCNC: 84 U/L (ref 45–117)
ALT SERPL-CCNC: 35 U/L (ref 13–56)
ANION GAP BLD CALC-SCNC: ABNORMAL MMOL/L (ref 10–20)
ANION GAP SERPL CALC-SCNC: 6 MMOL/L (ref 3–18)
ARTERIAL PATENCY WRIST A: POSITIVE
AST SERPL-CCNC: 38 U/L (ref 10–38)
BASE EXCESS BLD CALC-SCNC: 2.9 MMOL/L
BASE EXCESS BLD CALC-SCNC: 5 MMOL/L
BASOPHILS # BLD: 0 K/UL (ref 0–0.1)
BASOPHILS NFR BLD: 0 % (ref 0–2)
BDY SITE: ABNORMAL
BILIRUB SERPL-MCNC: 0.5 MG/DL (ref 0.2–1)
BUN SERPL-MCNC: 15 MG/DL (ref 7–18)
BUN/CREAT SERPL: 18 (ref 12–20)
CA-I BLD-MCNC: 1.19 MMOL/L (ref 1.12–1.32)
CALCIUM SERPL-MCNC: 9.4 MG/DL (ref 8.5–10.1)
CHLORIDE BLD-SCNC: 93 MMOL/L (ref 98–107)
CHLORIDE SERPL-SCNC: 89 MMOL/L (ref 100–111)
CO2 BLD-SCNC: 30 MMOL/L (ref 19–24)
CO2 SERPL-SCNC: 35 MMOL/L (ref 21–32)
CREAT BLD-MCNC: 0.49 MG/DL (ref 0.6–1.3)
CREAT SERPL-MCNC: 0.85 MG/DL (ref 0.6–1.3)
DIFFERENTIAL METHOD BLD: ABNORMAL
EOSINOPHIL # BLD: 0 K/UL (ref 0–0.4)
EOSINOPHIL NFR BLD: 0 % (ref 0–5)
ERYTHROCYTE [DISTWIDTH] IN BLOOD BY AUTOMATED COUNT: 12.4 % (ref 11.6–14.5)
FIO2 ON VENT: 4 %
FLUAV RNA SPEC QL NAA+PROBE: DETECTED
FLUBV RNA SPEC QL NAA+PROBE: NOT DETECTED
GAS FLOW.O2 O2 DELIVERY SYS: ABNORMAL
GLOBULIN SER CALC-MCNC: 3.3 G/DL (ref 2–4)
GLUCOSE BLD-MCNC: 129 MG/DL (ref 65–100)
GLUCOSE SERPL-MCNC: 156 MG/DL (ref 74–99)
HCO3 BLD-SCNC: 29.2 MMOL/L (ref 22–26)
HCO3 BLD-SCNC: 30.7 MMOL/L (ref 22–26)
HCT VFR BLD AUTO: 42.5 % (ref 35–45)
HGB BLD-MCNC: 14.4 G/DL (ref 12–16)
IMM GRANULOCYTES # BLD AUTO: 0 K/UL (ref 0–0.04)
IMM GRANULOCYTES NFR BLD AUTO: 0 % (ref 0–0.5)
LACTATE BLD-SCNC: 0.52 MMOL/L (ref 0.4–2)
LACTATE BLD-SCNC: 0.72 MMOL/L (ref 0.4–2)
LACTATE BLD-SCNC: 0.74 MMOL/L (ref 0.4–2)
LYMPHOCYTES # BLD: 0.6 K/UL (ref 0.9–3.6)
LYMPHOCYTES NFR BLD: 7 % (ref 21–52)
MCH RBC QN AUTO: 31.8 PG (ref 24–34)
MCHC RBC AUTO-ENTMCNC: 33.9 G/DL (ref 31–37)
MCV RBC AUTO: 93.8 FL (ref 78–100)
MONOCYTES # BLD: 0.8 K/UL (ref 0.05–1.2)
MONOCYTES NFR BLD: 9 % (ref 3–10)
NEUTS SEG # BLD: 7.7 K/UL (ref 1.8–8)
NEUTS SEG NFR BLD: 84 % (ref 40–73)
NRBC # BLD: 0 K/UL (ref 0–0.01)
NRBC BLD-RTO: 0 PER 100 WBC
PCO2 BLD: 48.2 MMHG (ref 35–45)
PCO2 BLD: 50.5 MMHG (ref 35–45)
PH BLD: 7.37 (ref 7.35–7.45)
PH BLD: 7.41 (ref 7.35–7.45)
PLATELET # BLD AUTO: 188 K/UL (ref 135–420)
PMV BLD AUTO: 11.3 FL (ref 9.2–11.8)
PO2 BLD: 31 MMHG (ref 80–100)
PO2 BLD: 72 MMHG (ref 80–100)
POTASSIUM BLD-SCNC: 3.5 MMOL/L (ref 3.5–5.1)
POTASSIUM SERPL-SCNC: 3.6 MMOL/L (ref 3.5–5.5)
PROCALCITONIN SERPL-MCNC: <0.05 NG/ML
PROT SERPL-MCNC: 6.9 G/DL (ref 6.4–8.2)
RBC # BLD AUTO: 4.53 M/UL (ref 4.2–5.3)
SAO2 % BLD: 57.2 % (ref 92–97)
SAO2 % BLD: 94 %
SARS-COV-2 RNA RESP QL NAA+PROBE: NOT DETECTED
SERVICE CMNT-IMP: ABNORMAL
SERVICE CMNT-IMP: ABNORMAL
SODIUM BLD-SCNC: 132 MMOL/L (ref 136–145)
SODIUM SERPL-SCNC: 130 MMOL/L (ref 136–145)
SPECIMEN SITE: ABNORMAL
SPECIMEN TYPE: ABNORMAL
TROPONIN I SERPL HS-MCNC: 407 NG/L (ref 0–54)
TROPONIN I SERPL HS-MCNC: 517 NG/L (ref 0–54)
WBC # BLD AUTO: 9.2 K/UL (ref 4.6–13.2)

## 2024-01-04 PROCEDURE — 84145 PROCALCITONIN (PCT): CPT

## 2024-01-04 PROCEDURE — 82947 ASSAY GLUCOSE BLOOD QUANT: CPT

## 2024-01-04 PROCEDURE — 2580000003 HC RX 258: Performed by: EMERGENCY MEDICINE

## 2024-01-04 PROCEDURE — 84132 ASSAY OF SERUM POTASSIUM: CPT

## 2024-01-04 PROCEDURE — 2580000003 HC RX 258: Performed by: FAMILY MEDICINE

## 2024-01-04 PROCEDURE — 87636 SARSCOV2 & INF A&B AMP PRB: CPT

## 2024-01-04 PROCEDURE — 6360000002 HC RX W HCPCS: Performed by: FAMILY MEDICINE

## 2024-01-04 PROCEDURE — 94762 N-INVAS EAR/PLS OXIMTRY CONT: CPT

## 2024-01-04 PROCEDURE — 96365 THER/PROPH/DIAG IV INF INIT: CPT

## 2024-01-04 PROCEDURE — 85025 COMPLETE CBC W/AUTO DIFF WBC: CPT

## 2024-01-04 PROCEDURE — 87040 BLOOD CULTURE FOR BACTERIA: CPT

## 2024-01-04 PROCEDURE — 83605 ASSAY OF LACTIC ACID: CPT

## 2024-01-04 PROCEDURE — 6370000000 HC RX 637 (ALT 250 FOR IP): Performed by: EMERGENCY MEDICINE

## 2024-01-04 PROCEDURE — 1100000003 HC PRIVATE W/ TELEMETRY

## 2024-01-04 PROCEDURE — 85014 HEMATOCRIT: CPT

## 2024-01-04 PROCEDURE — 82803 BLOOD GASES ANY COMBINATION: CPT

## 2024-01-04 PROCEDURE — 6360000002 HC RX W HCPCS: Performed by: EMERGENCY MEDICINE

## 2024-01-04 PROCEDURE — 80053 COMPREHEN METABOLIC PANEL: CPT

## 2024-01-04 PROCEDURE — 71045 X-RAY EXAM CHEST 1 VIEW: CPT

## 2024-01-04 PROCEDURE — 84484 ASSAY OF TROPONIN QUANT: CPT

## 2024-01-04 PROCEDURE — 82330 ASSAY OF CALCIUM: CPT

## 2024-01-04 PROCEDURE — 84295 ASSAY OF SERUM SODIUM: CPT

## 2024-01-04 PROCEDURE — 99285 EMERGENCY DEPT VISIT HI MDM: CPT

## 2024-01-04 PROCEDURE — 96375 TX/PRO/DX INJ NEW DRUG ADDON: CPT

## 2024-01-04 RX ORDER — BUDESONIDE 0.5 MG/2ML
0.5 INHALANT ORAL
Status: DISCONTINUED | OUTPATIENT
Start: 2024-01-04 | End: 2024-01-08 | Stop reason: HOSPADM

## 2024-01-04 RX ORDER — ONDANSETRON 4 MG/1
4 TABLET, ORALLY DISINTEGRATING ORAL EVERY 8 HOURS PRN
Status: DISCONTINUED | OUTPATIENT
Start: 2024-01-04 | End: 2024-01-08 | Stop reason: HOSPADM

## 2024-01-04 RX ORDER — 0.9 % SODIUM CHLORIDE 0.9 %
30 INTRAVENOUS SOLUTION INTRAVENOUS ONCE
Status: COMPLETED | OUTPATIENT
Start: 2024-01-04 | End: 2024-01-04

## 2024-01-04 RX ORDER — GUAIFENESIN/DEXTROMETHORPHAN 100-10MG/5
5 SYRUP ORAL EVERY 4 HOURS PRN
Status: DISCONTINUED | OUTPATIENT
Start: 2024-01-04 | End: 2024-01-08

## 2024-01-04 RX ORDER — ARFORMOTEROL TARTRATE 15 UG/2ML
15 SOLUTION RESPIRATORY (INHALATION)
Status: DISCONTINUED | OUTPATIENT
Start: 2024-01-04 | End: 2024-01-08 | Stop reason: HOSPADM

## 2024-01-04 RX ORDER — IPRATROPIUM BROMIDE AND ALBUTEROL SULFATE 2.5; .5 MG/3ML; MG/3ML
1 SOLUTION RESPIRATORY (INHALATION)
Status: COMPLETED | OUTPATIENT
Start: 2024-01-04 | End: 2024-01-04

## 2024-01-04 RX ORDER — ACETAMINOPHEN 650 MG/1
650 SUPPOSITORY RECTAL EVERY 6 HOURS PRN
Status: DISCONTINUED | OUTPATIENT
Start: 2024-01-04 | End: 2024-01-08 | Stop reason: HOSPADM

## 2024-01-04 RX ORDER — SODIUM CHLORIDE 0.9 % (FLUSH) 0.9 %
5-40 SYRINGE (ML) INJECTION EVERY 12 HOURS SCHEDULED
Status: DISCONTINUED | OUTPATIENT
Start: 2024-01-04 | End: 2024-01-08 | Stop reason: HOSPADM

## 2024-01-04 RX ORDER — LORAZEPAM 2 MG/ML
1 INJECTION INTRAMUSCULAR ONCE
Status: COMPLETED | OUTPATIENT
Start: 2024-01-04 | End: 2024-01-04

## 2024-01-04 RX ORDER — BENZONATATE 100 MG/1
100 CAPSULE ORAL 3 TIMES DAILY PRN
Status: DISCONTINUED | OUTPATIENT
Start: 2024-01-04 | End: 2024-01-08 | Stop reason: HOSPADM

## 2024-01-04 RX ORDER — ENOXAPARIN SODIUM 100 MG/ML
40 INJECTION SUBCUTANEOUS DAILY
Status: DISCONTINUED | OUTPATIENT
Start: 2024-01-04 | End: 2024-01-08 | Stop reason: HOSPADM

## 2024-01-04 RX ORDER — ACETAMINOPHEN 325 MG/1
650 TABLET ORAL EVERY 6 HOURS PRN
Status: DISCONTINUED | OUTPATIENT
Start: 2024-01-04 | End: 2024-01-08 | Stop reason: HOSPADM

## 2024-01-04 RX ORDER — ALBUTEROL SULFATE 2.5 MG/3ML
2.5 SOLUTION RESPIRATORY (INHALATION)
Status: COMPLETED | OUTPATIENT
Start: 2024-01-04 | End: 2024-01-04

## 2024-01-04 RX ORDER — ONDANSETRON 2 MG/ML
4 INJECTION INTRAMUSCULAR; INTRAVENOUS EVERY 6 HOURS PRN
Status: DISCONTINUED | OUTPATIENT
Start: 2024-01-04 | End: 2024-01-08 | Stop reason: HOSPADM

## 2024-01-04 RX ORDER — SODIUM CHLORIDE 9 MG/ML
INJECTION, SOLUTION INTRAVENOUS PRN
Status: DISCONTINUED | OUTPATIENT
Start: 2024-01-04 | End: 2024-01-08 | Stop reason: HOSPADM

## 2024-01-04 RX ORDER — MAGNESIUM SULFATE IN WATER 40 MG/ML
2000 INJECTION, SOLUTION INTRAVENOUS ONCE
Status: COMPLETED | OUTPATIENT
Start: 2024-01-04 | End: 2024-01-04

## 2024-01-04 RX ORDER — POLYETHYLENE GLYCOL 3350 17 G/17G
17 POWDER, FOR SOLUTION ORAL DAILY PRN
Status: DISCONTINUED | OUTPATIENT
Start: 2024-01-04 | End: 2024-01-08 | Stop reason: HOSPADM

## 2024-01-04 RX ORDER — IPRATROPIUM BROMIDE AND ALBUTEROL SULFATE 2.5; .5 MG/3ML; MG/3ML
1 SOLUTION RESPIRATORY (INHALATION) EVERY 4 HOURS PRN
Status: DISCONTINUED | OUTPATIENT
Start: 2024-01-04 | End: 2024-01-08 | Stop reason: HOSPADM

## 2024-01-04 RX ORDER — ALBUTEROL SULFATE 2.5 MG/3ML
15 SOLUTION RESPIRATORY (INHALATION)
Status: DISCONTINUED | OUTPATIENT
Start: 2024-01-04 | End: 2024-01-04

## 2024-01-04 RX ORDER — LORAZEPAM 2 MG/ML
1 INJECTION INTRAMUSCULAR EVERY 6 HOURS PRN
Status: DISCONTINUED | OUTPATIENT
Start: 2024-01-04 | End: 2024-01-05

## 2024-01-04 RX ORDER — SODIUM CHLORIDE 0.9 % (FLUSH) 0.9 %
5-40 SYRINGE (ML) INJECTION PRN
Status: DISCONTINUED | OUTPATIENT
Start: 2024-01-04 | End: 2024-01-08 | Stop reason: HOSPADM

## 2024-01-04 RX ADMIN — WATER 125 MG: 1 INJECTION INTRAMUSCULAR; INTRAVENOUS; SUBCUTANEOUS at 15:54

## 2024-01-04 RX ADMIN — IPRATROPIUM BROMIDE AND ALBUTEROL SULFATE 1 DOSE: 2.5; .5 SOLUTION RESPIRATORY (INHALATION) at 15:39

## 2024-01-04 RX ADMIN — SODIUM CHLORIDE 2244 ML: 9 INJECTION, SOLUTION INTRAVENOUS at 15:05

## 2024-01-04 RX ADMIN — MAGNESIUM SULFATE HEPTAHYDRATE 2000 MG: 40 INJECTION, SOLUTION INTRAVENOUS at 15:02

## 2024-01-04 RX ADMIN — LORAZEPAM 1 MG: 2 INJECTION, SOLUTION INTRAMUSCULAR; INTRAVENOUS at 19:46

## 2024-01-04 RX ADMIN — IPRATROPIUM BROMIDE AND ALBUTEROL SULFATE 1 DOSE: 2.5; .5 SOLUTION RESPIRATORY (INHALATION) at 15:40

## 2024-01-04 RX ADMIN — LORAZEPAM 1 MG: 2 INJECTION, SOLUTION INTRAMUSCULAR; INTRAVENOUS at 19:47

## 2024-01-04 RX ADMIN — LORAZEPAM 1 MG: 2 INJECTION, SOLUTION INTRAMUSCULAR; INTRAVENOUS at 21:31

## 2024-01-04 RX ADMIN — ENOXAPARIN SODIUM 40 MG: 100 INJECTION SUBCUTANEOUS at 19:50

## 2024-01-04 RX ADMIN — IPRATROPIUM BROMIDE AND ALBUTEROL SULFATE 1 DOSE: 2.5; .5 SOLUTION RESPIRATORY (INHALATION) at 15:38

## 2024-01-04 RX ADMIN — WATER 60 MG: 1 INJECTION INTRAMUSCULAR; INTRAVENOUS; SUBCUTANEOUS at 21:31

## 2024-01-04 RX ADMIN — LORAZEPAM 1 MG: 2 INJECTION, SOLUTION INTRAMUSCULAR; INTRAVENOUS at 16:33

## 2024-01-04 NOTE — ED TRIAGE NOTES
Wears 2-4L O2 at all times for COPD, went to PCP office today for increased SOB/fever/congestion, flu being passed throughout family. Anxiety noted in office, +hx of anxiety

## 2024-01-04 NOTE — ED PROVIDER NOTES
[facility] EMERGENCY DEPT  EMERGENCY DEPARTMENT ENCOUNTER       Pt Name: Eryn Howell  MRN: [id number]  Birthdate 1955  Date of evaluation: 1/4/2024  Provider: Nabil Baptiste MD   PCP: Nish Sims DO  Note Started: 6:02 PM EST 1/4/24     CHIEF COMPLAINT       Chief Complaint   Patient presents with    Shortness of Breath    Anxiety        HISTORY OF PRESENT ILLNESS: 1 or more elements      History From: Patient and Patient's   HPI Limitations: None     Eryn Howell is a 68 y.o. female who presents with complaints of severe shortness of breath, anxiety, cough, chest pain over the last 3 days.  Patient has severe COPD, is oxygen dependent, and has been feeling much worse over several days.  She went to see her primary care doctor today who sent her to our ER by ambulance given her heart rate being 180 bpm.  On arrival she is severely short of breath, anxious, tachypneic.     Nursing Notes were all reviewed and agreed with or any disagreements were addressed in the HPI.     REVIEW OF SYSTEMS      Review of Systems     Positives and Pertinent negatives as per HPI.    PAST HISTORY     Past Medical History:  Past Medical History:   Diagnosis Date    Arthritis     Asthma     Back pain     Chronic lung disease     COPD (chronic obstructive pulmonary disease) ([facility])     GERD (gastroesophageal reflux disease)     Hypertension     PTSD (post-traumatic stress disorder)     Scarlet fever 1976    age 21    Shingles          Past Surgical History:  Past Surgical History:   Procedure Laterality Date    HYSTERECTOMY (CERVIX STATUS UNKNOWN)      LAMINECTOMY,LUMBAR      ORTHOPEDIC SURGERY Right 2021    thumb re-attachment    TUBAL LIGATION      TYMPANOSTOMY TUBE PLACEMENT         Family History:  No family history on file.    Social History:  Social History     Tobacco Use    Smoking status: Former     Current packs/day: 0.00     Types: Cigarettes     Quit date: 5/29/2020     Years since quitting: 3.6    Smokeless  MEDICATIONS:  Current Discharge Medication List          I am the Primary Clinician of Record.   Nabil Baptiste MD (electronically signed)      (Please note that parts of this dictation were completed with voice recognition software. Quite often unanticipated grammatical, syntax, homophones, and other interpretive errors are inadvertently transcribed by the computer software. Please disregards these errors. Please excuse any errors that have escaped final proofreading.)         Nabil Baptiste MD  01/04/24 4506

## 2024-01-04 NOTE — H&P
History & Physical    Patient: Eryn Howell MRN: [id number]  CSN: [id number]    YOB: 1955  Age: 68 y.o.  Sex: female      DOA: 1/4/2024  Primary Care Provider:  Nish Sims DO      Assessment/Plan   68 y.o. female with a history of COPD, PTSD, NSTEMI due to respiratory failure, chronic oxygen requirement and insomnia who presents to the emergency department for symptoms of malaise and worsening respiratory distress.  Admitted for acute respiratory failure with hypoxia due to COPD exacerbation.     Acute respiratory failure with hypoxia -  Continuous pulse oximetry  Oxygen supplementation to keep O2 sats between 91% and 92%    COPD exacerbation-  Oxygen supplementation  Received loading dose of Solu-Medrol in the emergency room  Will continue Solu-Medrol 60 mg IV every 6 hours  Brovana nebs every 12 hours  Pulmicort nebs every 12 hours  DuoNebs every 4 hours as needed wheezing respiratory distress  Repeat ABG in a.m.  Will consult pulmonology tomorrow    Acute influenza-  Influenza A positive  Ordered Tamiflu  Supportive care    Elevated troponin-  Cardiology consulted  Trend cardiac enzymes every 6 hours  Order echocardiogram  Start heparin in ED if second troponin elevated    History of PTSD -supportive care, ordered Ativan 1 mg IV every 6 hours as needed agitation    History of insomnia -supportive care, resume home medications when appropriate    History of NSTEMI -telemetry monitoring    DVT and GI prophylaxis ordered    Patient's  and daughter at bedside        Active Hospital Problems    Diagnosis Date Noted    On home oxygen therapy [Z99.81] 01/05/2024    Influenza A [J10.1] 01/05/2024    Elevated troponin [R79.89] 01/05/2024    Acute respiratory failure (HCC) [J96.00] 03/11/2017    PTSD (post-traumatic stress disorder) [F43.10] 03/09/2017    COPD with acute exacerbation (HCC) [J44.1] 03/06/2017       Estimated length of stay : 3-4 days    CC: respiratory distress       HPI:  11.6 - 14.5 %    Platelets 188 135 - 420 K/uL    MPV 11.3 9.2 - 11.8 FL    Nucleated RBCs 0.0 0  WBC    nRBC 0.00 0.00 - 0.01 K/uL    Neutrophils % 84 (H) 40 - 73 %    Lymphocytes % 7 (L) 21 - 52 %    Monocytes % 9 3 - 10 %    Eosinophils % 0 0 - 5 %    Basophils % 0 0 - 2 %    Immature Granulocytes 0 0.0 - 0.5 %    Neutrophils Absolute 7.7 1.8 - 8.0 K/UL    Lymphocytes Absolute 0.6 (L) 0.9 - 3.6 K/UL    Monocytes Absolute 0.8 0.05 - 1.2 K/UL    Eosinophils Absolute 0.0 0.0 - 0.4 K/UL    Basophils Absolute 0.0 0.0 - 0.1 K/UL    Absolute Immature Granulocyte 0.0 0.00 - 0.04 K/UL    Differential Type AUTOMATED     Comprehensive Metabolic Panel    Collection Time: 01/04/24  1:47 PM   Result Value Ref Range    Sodium 130 (L) 136 - 145 mmol/L    Potassium 3.6 3.5 - 5.5 mmol/L    Chloride 89 (L) 100 - 111 mmol/L    CO2 35 (H) 21 - 32 mmol/L    Anion Gap 6 3.0 - 18 mmol/L    Glucose 156 (H) 74 - 99 mg/dL    BUN 15 7.0 - 18 MG/DL    Creatinine 0.85 0.6 - 1.3 MG/DL    Bun/Cre Ratio 18 12 - 20      Est, Glom Filt Rate >60 >60 ml/min/1.73m2    Calcium 9.4 8.5 - 10.1 MG/DL    Total Bilirubin 0.5 0.2 - 1.0 MG/DL    ALT 35 13 - 56 U/L    AST 38 10 - 38 U/L    Alk Phosphatase 84 45 - 117 U/L    Total Protein 6.9 6.4 - 8.2 g/dL    Albumin 3.6 3.4 - 5.0 g/dL    Globulin 3.3 2.0 - 4.0 g/dL    Albumin/Globulin Ratio 1.1 0.8 - 1.7     Procalcitonin    Collection Time: 01/04/24  1:47 PM   Result Value Ref Range    Procalcitonin <0.05 ng/mL   Troponin    Collection Time: 01/04/24  1:47 PM   Result Value Ref Range    Troponin, High Sensitivity 517 (HH) 0 - 54 ng/L   COVID-19 & Influenza Combo    Collection Time: 01/04/24  1:47 PM    Specimen: Nasopharyngeal   Result Value Ref Range    SARS-CoV-2, PCR Not detected NOTD      Rapid Influenza A By PCR Detected (A) NOTD      Rapid Influenza B By PCR Not detected NOTD     EKG 12 Lead    Collection Time: 01/04/24  3:56 PM   Result Value Ref Range    Ventricular Rate 88 BPM    Atrial Rate  88 BPM    P-R Interval 172 ms    QRS Duration 86 ms    Q-T Interval 402 ms    QTc Calculation (Bazett) 486 ms    P Axis 92 degrees    R Axis 103 degrees    T Axis 98 degrees    Diagnosis       Suspect arm lead reversal, interpretation assumes no reversal  Normal sinus rhythm  Right atrial enlargement  Rightward axis  Pulmonary disease pattern  Nonspecific ST abnormality  Abnormal ECG  When compared with ECG of 27-JAN-2022 12:14,  QT has lengthened     POCT lactic acid (lactate)    Collection Time: 01/04/24  4:14 PM   Result Value Ref Range    POC Lactic Acid 0.74 0.40 - 2.00 mmol/L   Troponin    Collection Time: 01/04/24  4:37 PM   Result Value Ref Range    Troponin, High Sensitivity 407 (HH) 0 - 54 ng/L   POC CG4:BLOOD GAS,LACTATE    Collection Time: 01/04/24  4:40 PM   Result Value Ref Range    POC pH 7.37 7.35 - 7.45      POC pCO2 50.5 (H) 35.0 - 45.0 MMHG    POC PO2 31 (LL) 80 - 100 MMHG    POC HCO3 29.2 (H) 22 - 26 MMOL/L    POC O2 SAT 57.2 (L) 92 - 97 %    Base Excess 2.9 mmol/L    Specimen type: VENOUS BLOOD      Performed by: Hugh Mario     POC Lactic Acid 0.72 0.40 - 2.00 mmol/L   POCT Blood Gas & Electrolytes    Collection Time: 01/04/24  6:12 PM   Result Value Ref Range    POC pH 7.41 7.35 - 7.45      POC pCO2 48.2 (H) 35.0 - 45.0 MMHG    POC PO2 72 (L) 80 - 100 MMHG    POC Ionized Calcium 1.19 1.12 - 1.32 mmol/L    Base Excess 5.0 mmol/L    POC HCO3 30.7 (H) 22 - 26 MMOL/L    POC TCO2 30 (H) 19 - 24 MMOL/L    POC O2 SAT 94 %    Source ARTERIAL      Site RIGHT RADIAL      Pasquale Test Positive      DEVICE NASAL CANNULA      FIO2 Arterial 4 %    Performed by: CASSIE RIOS Resp     POC Sodium 132 (L) 136 - 145 mmol/L    POC Potassium 3.5 3.5 - 5.1 mmol/L    POC Glucose 129 (H) 65 - 100 mg/dL    POC Creatinine 0.49 (L) 0.6 - 1.3 mg/dL    POC Lactic Acid 0.52 0.40 - 2.00 mmol/L    POC Chloride 93 (L) 98 - 107 mmol/L    Anion Gap, POC Cannot be calculated  Cannot be calculated   10 - 20 mmol/L    eGFR, POC

## 2024-01-05 ENCOUNTER — APPOINTMENT (OUTPATIENT)
Facility: HOSPITAL | Age: 69
DRG: 190 | End: 2024-01-05
Attending: FAMILY MEDICINE
Payer: MEDICARE

## 2024-01-05 PROBLEM — J96.00 ACUTE RESPIRATORY FAILURE (HCC): Status: ACTIVE | Noted: 2017-03-11

## 2024-01-05 PROBLEM — E87.6 HYPOKALEMIA: Status: RESOLVED | Noted: 2017-03-04 | Resolved: 2024-01-05

## 2024-01-05 PROBLEM — R74.01 TRANSAMINITIS: Status: RESOLVED | Noted: 2017-03-04 | Resolved: 2024-01-05

## 2024-01-05 PROBLEM — Z99.81 ON HOME OXYGEN THERAPY: Status: ACTIVE | Noted: 2024-01-05

## 2024-01-05 PROBLEM — Z99.81 ON HOME OXYGEN THERAPY: Status: RESOLVED | Noted: 2024-01-05 | Resolved: 2024-01-05

## 2024-01-05 PROBLEM — R79.89 ELEVATED TROPONIN: Status: ACTIVE | Noted: 2024-01-05

## 2024-01-05 PROBLEM — N28.89 NODULE OF KIDNEY: Status: RESOLVED | Noted: 2017-03-04 | Resolved: 2024-01-05

## 2024-01-05 PROBLEM — R06.89 RESPIRATORY INSUFFICIENCY: Status: RESOLVED | Noted: 2017-03-04 | Resolved: 2024-01-05

## 2024-01-05 PROBLEM — J34.89 MASS OF SINUS: Status: RESOLVED | Noted: 2017-03-04 | Resolved: 2024-01-05

## 2024-01-05 PROBLEM — J10.1 INFLUENZA A: Status: ACTIVE | Noted: 2024-01-05

## 2024-01-05 PROBLEM — F43.10 PTSD (POST-TRAUMATIC STRESS DISORDER): Status: ACTIVE | Noted: 2017-03-09

## 2024-01-05 PROBLEM — J96.01 ACUTE RESPIRATORY FAILURE WITH HYPOXEMIA (HCC): Status: ACTIVE | Noted: 2017-03-11

## 2024-01-05 PROBLEM — J32.9 SINUSITIS: Status: RESOLVED | Noted: 2017-03-04 | Resolved: 2024-01-05

## 2024-01-05 LAB
ECHO AO ROOT DIAM: 2.8 CM
ECHO AO ROOT INDEX: 1.49 CM/M2
ECHO AV AREA PEAK VELOCITY: 2.5 CM2
ECHO AV AREA VTI: 2.6 CM2
ECHO AV AREA/BSA PEAK VELOCITY: 1.3 CM2/M2
ECHO AV AREA/BSA VTI: 1.4 CM2/M2
ECHO AV MEAN GRADIENT: 3 MMHG
ECHO AV MEAN VELOCITY: 0.8 M/S
ECHO AV PEAK GRADIENT: 7 MMHG
ECHO AV PEAK VELOCITY: 1.3 M/S
ECHO AV VELOCITY RATIO: 0.92
ECHO AV VTI: 22.3 CM
ECHO BSA: 1.89 M2
ECHO EST RA PRESSURE: 3 MMHG
ECHO LV E' LATERAL VELOCITY: 11 CM/S
ECHO LV E' SEPTAL VELOCITY: 12 CM/S
ECHO LV FRACTIONAL SHORTENING: 33 % (ref 28–44)
ECHO LV INTERNAL DIMENSION DIASTOLE INDEX: 2.39 CM/M2
ECHO LV INTERNAL DIMENSION DIASTOLIC: 4.5 CM (ref 3.9–5.3)
ECHO LV INTERNAL DIMENSION SYSTOLIC INDEX: 1.6 CM/M2
ECHO LV INTERNAL DIMENSION SYSTOLIC: 3 CM
ECHO LV IVSD: 0.7 CM (ref 0.6–0.9)
ECHO LV MASS 2D: 78.9 G (ref 67–162)
ECHO LV MASS INDEX 2D: 41.9 G/M2 (ref 43–95)
ECHO LV POSTERIOR WALL DIASTOLIC: 0.5 CM (ref 0.6–0.9)
ECHO LV RELATIVE WALL THICKNESS RATIO: 0.22
ECHO LVOT AREA: 2.8 CM2
ECHO LVOT AV VTI INDEX: 0.91
ECHO LVOT DIAM: 1.9 CM
ECHO LVOT MEAN GRADIENT: 3 MMHG
ECHO LVOT PEAK GRADIENT: 6 MMHG
ECHO LVOT PEAK VELOCITY: 1.2 M/S
ECHO LVOT STROKE VOLUME INDEX: 30.4 ML/M2
ECHO LVOT SV: 57.2 ML
ECHO LVOT VTI: 20.2 CM
ECHO MV A VELOCITY: 0.99 M/S
ECHO MV E DECELERATION TIME (DT): 98.1 MS
ECHO MV E VELOCITY: 0.92 M/S
ECHO MV E/A RATIO: 0.93
ECHO MV E/E' LATERAL: 8.36
ECHO MV E/E' RATIO (AVERAGED): 8.02
ECHO RA END SYSTOLIC VOLUME APICAL 4 CHAMBER INDEX BSA: 14 ML/M2
ECHO RA VOLUME: 26 ML
ECHO RIGHT VENTRICULAR SYSTOLIC PRESSURE (RVSP): 29 MMHG
ECHO RV FREE WALL PEAK S': 14 CM/S
ECHO RV INTERNAL DIMENSION: 2.7 CM
ECHO RV TAPSE: 2.1 CM (ref 1.7–?)
ECHO TV REGURGITANT MAX VELOCITY: 2.57 M/S
ECHO TV REGURGITANT PEAK GRADIENT: 26 MMHG
GLUCOSE BLD STRIP.AUTO-MCNC: 247 MG/DL (ref 70–110)
GLUCOSE BLD STRIP.AUTO-MCNC: 273 MG/DL (ref 70–110)
TROPONIN I SERPL HS-MCNC: 160 NG/L (ref 0–54)
TROPONIN I SERPL HS-MCNC: 204 NG/L (ref 0–54)

## 2024-01-05 PROCEDURE — 93306 TTE W/DOPPLER COMPLETE: CPT

## 2024-01-05 PROCEDURE — 2700000000 HC OXYGEN THERAPY PER DAY

## 2024-01-05 PROCEDURE — 84484 ASSAY OF TROPONIN QUANT: CPT

## 2024-01-05 PROCEDURE — 6370000000 HC RX 637 (ALT 250 FOR IP): Performed by: FAMILY MEDICINE

## 2024-01-05 PROCEDURE — 1100000003 HC PRIVATE W/ TELEMETRY

## 2024-01-05 PROCEDURE — 36415 COLL VENOUS BLD VENIPUNCTURE: CPT

## 2024-01-05 PROCEDURE — 2580000003 HC RX 258: Performed by: FAMILY MEDICINE

## 2024-01-05 PROCEDURE — 82962 GLUCOSE BLOOD TEST: CPT

## 2024-01-05 PROCEDURE — 6360000002 HC RX W HCPCS: Performed by: FAMILY MEDICINE

## 2024-01-05 PROCEDURE — 2500000003 HC RX 250 WO HCPCS: Performed by: INTERNAL MEDICINE

## 2024-01-05 PROCEDURE — 94640 AIRWAY INHALATION TREATMENT: CPT

## 2024-01-05 PROCEDURE — 97165 OT EVAL LOW COMPLEX 30 MIN: CPT

## 2024-01-05 PROCEDURE — 6370000000 HC RX 637 (ALT 250 FOR IP): Performed by: INTERNAL MEDICINE

## 2024-01-05 RX ORDER — LORAZEPAM 1 MG/1
2 TABLET ORAL 3 TIMES DAILY
Status: DISCONTINUED | OUTPATIENT
Start: 2024-01-05 | End: 2024-01-08 | Stop reason: HOSPADM

## 2024-01-05 RX ORDER — OSELTAMIVIR PHOSPHATE 75 MG/1
75 CAPSULE ORAL 2 TIMES DAILY
Status: DISCONTINUED | OUTPATIENT
Start: 2024-01-05 | End: 2024-01-08 | Stop reason: HOSPADM

## 2024-01-05 RX ORDER — IPRATROPIUM BROMIDE AND ALBUTEROL SULFATE 2.5; .5 MG/3ML; MG/3ML
1 SOLUTION RESPIRATORY (INHALATION)
Status: DISCONTINUED | OUTPATIENT
Start: 2024-01-05 | End: 2024-01-08 | Stop reason: HOSPADM

## 2024-01-05 RX ORDER — FLUCONAZOLE 100 MG/1
150 TABLET ORAL ONCE
Status: COMPLETED | OUTPATIENT
Start: 2024-01-05 | End: 2024-01-05

## 2024-01-05 RX ORDER — GUAIFENESIN 200 MG/10ML
200 LIQUID ORAL EVERY 6 HOURS
Status: DISCONTINUED | OUTPATIENT
Start: 2024-01-05 | End: 2024-01-08

## 2024-01-05 RX ADMIN — OSELTAMIVIR PHOSPHATE 75 MG: 75 CAPSULE ORAL at 20:32

## 2024-01-05 RX ADMIN — LORAZEPAM 2 MG: 1 TABLET ORAL at 14:41

## 2024-01-05 RX ADMIN — GUAIFENESIN 200 MG: 200 SOLUTION ORAL at 20:25

## 2024-01-05 RX ADMIN — LORAZEPAM 2 MG: 1 TABLET ORAL at 20:25

## 2024-01-05 RX ADMIN — IPRATROPIUM BROMIDE AND ALBUTEROL SULFATE 1 DOSE: 2.5; .5 SOLUTION RESPIRATORY (INHALATION) at 15:51

## 2024-01-05 RX ADMIN — IPRATROPIUM BROMIDE AND ALBUTEROL SULFATE 1 DOSE: .5; 3 SOLUTION RESPIRATORY (INHALATION) at 08:58

## 2024-01-05 RX ADMIN — ARFORMOTEROL TARTRATE 15 MCG: 15 SOLUTION RESPIRATORY (INHALATION) at 19:23

## 2024-01-05 RX ADMIN — LORAZEPAM 1 MG: 2 INJECTION, SOLUTION INTRAMUSCULAR; INTRAVENOUS at 04:12

## 2024-01-05 RX ADMIN — GUAIFENESIN 200 MG: 200 SOLUTION ORAL at 14:41

## 2024-01-05 RX ADMIN — AZITHROMYCIN MONOHYDRATE 500 MG: 500 INJECTION, POWDER, LYOPHILIZED, FOR SOLUTION INTRAVENOUS at 11:12

## 2024-01-05 RX ADMIN — LORAZEPAM 1 MG: 2 INJECTION, SOLUTION INTRAMUSCULAR; INTRAVENOUS at 09:44

## 2024-01-05 RX ADMIN — OSELTAMIVIR PHOSPHATE 75 MG: 75 CAPSULE ORAL at 08:27

## 2024-01-05 RX ADMIN — FLUCONAZOLE 150 MG: 100 TABLET ORAL at 11:13

## 2024-01-05 RX ADMIN — ARFORMOTEROL TARTRATE 15 MCG: 15 SOLUTION RESPIRATORY (INHALATION) at 08:56

## 2024-01-05 RX ADMIN — NYSTATIN 500000 UNITS: 100000 SUSPENSION ORAL at 20:25

## 2024-01-05 RX ADMIN — BUDESONIDE 500 MCG: 0.5 SUSPENSION RESPIRATORY (INHALATION) at 08:56

## 2024-01-05 RX ADMIN — IPRATROPIUM BROMIDE AND ALBUTEROL SULFATE 1 DOSE: 2.5; .5 SOLUTION RESPIRATORY (INHALATION) at 19:23

## 2024-01-05 RX ADMIN — WATER 60 MG: 1 INJECTION INTRAMUSCULAR; INTRAVENOUS; SUBCUTANEOUS at 20:26

## 2024-01-05 RX ADMIN — NYSTATIN 500000 UNITS: 100000 SUSPENSION ORAL at 14:41

## 2024-01-05 RX ADMIN — CEFTRIAXONE 1000 MG: 1 INJECTION, POWDER, FOR SOLUTION INTRAMUSCULAR; INTRAVENOUS at 11:12

## 2024-01-05 RX ADMIN — IPRATROPIUM BROMIDE AND ALBUTEROL SULFATE 1 DOSE: 2.5; .5 SOLUTION RESPIRATORY (INHALATION) at 14:18

## 2024-01-05 RX ADMIN — SODIUM CHLORIDE, PRESERVATIVE FREE 10 ML: 5 INJECTION INTRAVENOUS at 20:26

## 2024-01-05 RX ADMIN — ENOXAPARIN SODIUM 40 MG: 100 INJECTION SUBCUTANEOUS at 20:25

## 2024-01-05 RX ADMIN — BUDESONIDE 500 MCG: 0.5 SUSPENSION RESPIRATORY (INHALATION) at 19:23

## 2024-01-05 RX ADMIN — WATER 60 MG: 1 INJECTION INTRAMUSCULAR; INTRAVENOUS; SUBCUTANEOUS at 04:11

## 2024-01-05 RX ADMIN — WATER 60 MG: 1 INJECTION INTRAMUSCULAR; INTRAVENOUS; SUBCUTANEOUS at 16:06

## 2024-01-05 RX ADMIN — WATER 60 MG: 1 INJECTION INTRAMUSCULAR; INTRAVENOUS; SUBCUTANEOUS at 08:27

## 2024-01-05 RX ADMIN — SODIUM CHLORIDE, PRESERVATIVE FREE 10 ML: 5 INJECTION INTRAVENOUS at 08:27

## 2024-01-05 NOTE — CONSULTS
[facility]  Pulmonary, Critical Care, and Sleep Medicine    Name: Eryn Howell MRN: [id number]   : 1955 Hospital: [facility]    Date: 2024  Room: [address] [place] [address] Note                                                                             Consult requesting physician: Jimmy Alaniz MD   Reason for Consult: COPD exacerbation    IMPRESSION:     Acute respiratory failure with hypoxemia  COPD exacerbation  Influenza A viral infection      Patient Active Problem List   Diagnosis Code    COPD with acute exacerbation ([facility]) J44.1    COPD (chronic obstructive pulmonary disease) with emphysema ([facility]) J43.9    PTSD (post-traumatic stress disorder) F43.10    Acute respiratory failure with hypoxemia ([facility]) J96.01    Influenza A J10.1    Elevated troponin R79.89       Code status: Full Code       RECOMMENDATIONS:     Patient worsening with COPD exacerbation with influenza viral infection    Stable respirations; on nasal cannula oxygen-currently 4 L; O2 sats being monitored remotely  Chest x-ray images and report reviewed-cardiac size normal, bilateral upper lobe emphysema findings, no focal infiltrates or consolidations, no pleural effusions  Prior PET scan findings of calcified granulomas, bilateral upper lobe centrilobular emphysema changes  Prior FEV1-not known  ABG on admission-pH normal, mild hypercapnia, hypoxemia-PaO2 72 on 4 L oxygen  Bronchodilator therapy-Brovana and budesonide nebs twice daily; ipratropium/albuterol nebs every 4 hours while awake  IV steroids-Solu-Medrol 6 mg every 6 hours  Cough medication-Robitussin 200 mg every 6 hours oral  Keep SPO2 >=92%. HOB 30 degree elevation all the time. Aspiration precautions. Incentive spirometry as tolerated.  Empiric antibiotic therapy-ceftriaxone and azithromycin  Prn Ativan for anxiety  Tamiflu twice daily for influenza A viral infection; patient on droplet isolation  DVT prophylaxis-enoxaparin  Oral  Reconciliation, Ar   rosuvastatin (CRESTOR) 10 MG tablet Take 10 mg by mouth    Automatic Reconciliation, Ar   umeclidinium bromide (INCRUSE ELLIPTA) 62.5 MCG/ACT inhaler USE 1 INHALATION DAILY 7/2/18   Automatic Reconciliation, Ar   valsartan-hydroCHLOROthiazide (DIOVAN-HCT) 160-25 MG per tablet Take 1 tablet by mouth daily    Automatic Reconciliation, Ar     Current Facility-Administered Medications   Medication Dose Route Frequency Provider Last Rate Last Admin    oseltamivir (TAMIFLU) capsule 75 mg  75 mg Oral BID Jimmy Alaniz MD   75 mg at 01/05/24 0827    LORazepam (ATIVAN) tablet 2 mg  2 mg Oral TID Jimmy Alaniz MD        cefTRIAXone (ROCEPHIN) 1,000 mg in sodium chloride 0.9 % 50 mL IVPB (mini-bag)  1,000 mg IntraVENous Q24H Jimmy Alaniz MD   Stopped at 01/05/24 1142    azithromycin (ZITHROMAX) 500 mg in sodium chloride 0.9% 250 mL (vial-mate/adapter) IVPB  500 mg IntraVENous Q24H Jimmy Alaniz MD   500 mg at 01/05/24 1112    sodium chloride flush 0.9 % injection 5-40 mL  5-40 mL IntraVENous 2 times per day Jimmy Alaniz MD   10 mL at 01/05/24 0827    sodium chloride flush 0.9 % injection 5-40 mL  5-40 mL IntraVENous PRN Jimmy Alaniz MD        0.9 % sodium chloride infusion   IntraVENous PRN Jimmy Alaniz MD        ondansetron (ZOFRAN-ODT) disintegrating tablet 4 mg  4 mg Oral Q8H PRN Jimmy Alaniz MD        Or    ondansetron (ZOFRAN) injection 4 mg  4 mg IntraVENous Q6H PRN Jimmy Alaniz MD        polyethylene glycol (GLYCOLAX) packet 17 g  17 g Oral Daily PRN Jimmy Alaniz MD        enoxaparin (LOVENOX) injection 40 mg  40 mg SubCUTAneous Daily Jimmy Alaniz MD   40 mg at 01/04/24 1950    acetaminophen (TYLENOL) tablet 650 mg  650 mg Oral Q6H PRN Jimmy Alaniz MD        Or    acetaminophen (TYLENOL) suppository 650 mg  650 mg Rectal Q6H PRN Jimmy Alaniz MD        ipratropium 0.5 mg-albuterol 2.5 mg  Influenza A, and Influenza B in nasopharyngeal for use under the FDA's Emergency Use Authorization(EAU) only.     Fact sheet for Patients: https://www.fda.gov/media/027596/download  Fact sheet for Healthcare Providers: https://www.fda.fov/media/775412/download                  PET SCAN 9/8/2022  INDICATION: Lesion of lung. Lung nodule in right upper lobe.    PRIOR PET: None   REFERENCES: CT neck 8/30/2022, CT chest 6/5/2017, head CT 3/4/2017.  IMPRESSION:   1. No hypermetabolic lung nodules. There are multiple granulomatous nodular lung opacities that have improved since 2017.  -New 3 mm right upper lobe nodule since 2017 can be followed for stability.   2. Indeterminate site of moderate activity along either the left anterior fifth rib or adjacent pleura. No clear CT explanation for the finding. Correlate with any recent trauma to the region. A 3 month CT chest follow-up study is recommended to reassess for occult lesion.   3. Other chronic CT findings as above. Abnormality of the anterior skull base/ethmoid sinus is stable since 2017 without abnormal activity. This can be assessed with MRI of the brain, if not already performed elsewhere, for polyp or encephalocele.    XR CHEST PORTABLE  Result Date: 1/4/2024  INDICATION: Sepsis. Portable AP view of the chest. Direct comparison made to prior CT dated 6/2017. Cardiomediastinal silhouette is stable. There are biapical emphysematous changes. There is no focal air space process. There is no pleural fluid. There is no pneumothorax.   Biapical emphysematous changes. No focal air space process.            Please note: Voice-recognition software may have been used to generate this report, which may have resulted in some phonetic-based errors in grammar and contents. Even though attempts were made to correct all the mistakes, some may have been missed, and remained in the body of the document.    Teddy Pagan MD  1/5/2024

## 2024-01-05 NOTE — PROGRESS NOTES
Hospitalist Progress Note    Patient: Eryn Howell MRN: [id number]  CSN: [id number]    YOB: 1955  Age: 68 y.o.  Sex: female    DOA: 1/4/2024 LOS:  LOS: 1 day          Chief Complaint:    COPD exacerbation.    Assessment/Plan   68 y.o. female with a history of COPD, PTSD, NSTEMI due to respiratory failure, chronic oxygen requirement and insomnia who presents to the emergency department for symptoms of malaise and worsening respiratory distress.  Admitted for acute respiratory failure with hypoxia due to COPD exacerbation.      Acute respiratory failure with hypoxia -  Continuous pulse oximetry  Oxygen supplementation to keep O2 sats between 91% and 92%     COPD exacerbation-  Oxygen supplementation  continue Solu-Medrol 60 mg IV every 6 hours  Brovana nebs every 12 hours  Pulmicort nebs every 12 hours  DuoNebs every 4 hours as needed wheezing respiratory distress  ABG pending   Consulted pulm today, d/w Dr. Pagan   Started 5 day course of IV rocephin and azithromycin  Added diflucan 150mg po one dose per pt request for thrush     CBC and BMP stat ordered for today      Acute influenza-  Influenza A positive  Cont Tamiflu  Supportive care     Elevated troponin-  Cardiology consulted  Trend cardiac enzymes every 6 hours >trending down   Echo pending      History of PTSD -supportive care, restarted home dose of ativan 2mg po tid      History of insomnia -supportive care, resume home medications when appropriate     History of NSTEMI -telemetry monitoring     DVT and GI prophylaxis ordered     Patient's daughter at bedside, spent the night     Will order PT/OT     Active Hospital Problems    Diagnosis Date Noted    On home oxygen therapy [Z99.81] 01/05/2024    Influenza A [J10.1] 01/05/2024    Elevated troponin [R79.89] 01/05/2024    Acute respiratory failure (HCC) [J96.00] 03/11/2017    PTSD (post-traumatic stress disorder) [F43.10] 03/09/2017    COPD with acute exacerbation (HCC) [J44.1]  03/06/2017       Disposition: TBD      Subjective:    Pt less agitated, pressured speech  Feeling a little better  Very tired  Sleep a little overnight   Daughter at bedside    Review of systems:    Constitutional: denies fevers, chills, myalgias  Respiratory: denies SOB, cough  Cardiovascular: denies chest pain, palpitations  Gastrointestinal: denies nausea, vomiting, diarrhea      Vital signs/Intake and Output:  Visit Vitals  BP 97/77   Pulse 82   Temp 97.3 °F (36.3 °C) (Oral)   Resp 18   Ht 1.727 m (5' 8\")   Wt 74.8 kg (165 lb)   SpO2 97%   BMI 25.09 kg/m²     Current Shift:  No intake/output data recorded.  Last three shifts:  No intake/output data recorded.    Exam:    General: Well developed, alert, NAD, OX3  Head/Neck: NCAT, supple, No masses, No lymphadenopathy  CVS:Regular rate and rhythm, no M/R/G, S1/S2 heard, no thrill  Lungs:Clear to auscultation bilaterally, no wheezes, rhonchi, or rales  Abdomen: Soft, Nontender, No distention, Normal Bowel sounds, No hepatomegaly  Extremities: No C/C/E, pulses palpable 2+  Skin:normal texture and turgor, no rashes, no lesions  Neuro:grossly normal , follows commands  Psych:appropriate    Labs: Results:       Chemistry Recent Labs     01/04/24  1347 01/04/24  1812   GLUCOSE 156*  --    *  --    K 3.6  --    CL 89*  --    CO2 35*  --    BUN 15  --    CREATININE 0.85 0.49*   CALCIUM 9.4  --    BILITOT 0.5  --    AST 38  --    ALT 35  --    ALKPHOS 84  --    PROT 6.9  --    GLOB 3.3  --    LABGLOM >60  --       CBC w/Diff Recent Labs     01/04/24  1347   WBC 9.2   RBC 4.53   HGB 14.4   HCT 42.5      LYMPHOPCT 7*      Cardiac Enzymes No results for input(s): \"CKTOTAL\", \"CKMB\", \"CKMBINDEX\", \"TROPONINI\", \"TENISHA\" in the last 72 hours.   Coagulation No results for input(s): \"PROTIME\", \"INR\", \"APTT\" in the last 72 hours.    Lipid Panel No results found for: \"CHOL\", \"TRIG\", \"HDL\", \"LDLCHOLESTEROL\", \"LDLCALC\", \"LABVLDL\", \"VLDL\", \"CHOLHDLRATIO\"   BNP No results for

## 2024-01-05 NOTE — PROGRESS NOTES
RESPIRATORY CARE    New admission from ER. Brovana and Pulmicort not given. Family at bedside, refused as patient asleep after 2 days awake.  Requested to hold.

## 2024-01-05 NOTE — PROGRESS NOTES
Occupational Therapy Goals:  Initiated 1/5/2024 to be met within 7-10 days.  Short Term Goals  Time Frame for Short Term Goals: 7 days  Short Term Goal 1: Patient will complete toileting with supervision  Short Term Goal 2: Patient will complete toilet transfer with supervision  Short Term Goal 3: Patient will complete grooming mod I/I  Short Term Goal 4: Patient will complete bathing mod I/I    OCCUPATIONAL THERAPY EVALUATION    Patient: Eryn Howell (68 y.o. female)  Date: 1/5/2024  Primary Diagnosis: COPD with acute exacerbation ([facility]) [J44.1]  Acute respiratory failure with hypoxia ([facility]) [J96.01]       Precautions:  ,  ,  ,  ,  ,  ,  ,    PLOF: Patient was independent with ADLs, IADLs, uses cane/RW/rollator for ambulation    ASSESSMENT :  Patient was supine at start of session, family present spoon feeding. Patient and family educated to elevate HOB or sit up when eating or drinking to prevent choking, verbalized understanding. Patient supine to sit with Hob slightly elevated with supervision. Patient NC O2 donned. Patient sitting EOB. Patient crossed up legs for sock adjustments with SBA. Patient feeding self independently sitting EOB. Patient stating \"Don't make me walk on my first day\". Family reports seh has walked to the bathroom several times already.  Patient scooted at EOB with SBA towards HOB. Patient BUE strength and ROM is WFL (note prior R shoulder replacement). Patient left in bed with call light in reach, needs met. Patient has decreased endurance. Unable to get accurate spO2 via finger d/t dark nail polish. Patient rated exertion as 3/20, then stated \"I'm just exhausted\".  ,      DEFICITS/IMPAIRMENTS:  Performance deficits / Impairments: Decreased endurance    Patient will benefit from skilled intervention to address the above impairments.  Patient's rehabilitation potential is considered to be Prognosis: Fair.  Factors which may influence rehabilitation potential include:   []             None  Transfers for ADLs:  Bed Mobility:     Bed Mobility Training  Bed Mobility Training: Yes  Supine to Sit: Supervision  Scooting: Stand-by assistance  Transfers:                 Transfer Training  Transfer Training: No (patient declined)    ADL Assessment:      Feeding: Independent  Grooming: Setup (in sitting)  UE Bathing: Minimal assistance  LE Bathing: Minimal assistance  UE Dressing: Stand by assistance  LE Dressing: Minimal assistance  Toileting: Minimal assistance      Pain:  Pain level pre-treatment: 0/10   Pain level post-treatment: 0/10   Pain Intervention(s): Rest, Ice, Repositioning   Response to intervention: Nurse notified    Activity Tolerance:   Activity Tolerance: Patient Tolerated treatment well  Please refer to the flowsheet for vital signs taken during this treatment.    After treatment:   [] Patient left in no apparent distress sitting up in chair  [x] Patient left in no apparent distress in bed  [x] Call bell left within reach  [] Nursing notified  [] Caregiver present  [] Bed alarm activated    COMMUNICATION/EDUCATION:   Patient Education  Education Given To: Patient  Education Provided: Role of Therapy;Transfer Training;Plan of Care;ADL Adaptive Strategies  Education Method: Verbal  Barriers to Learning: None  Education Outcome: Verbalized understanding;Demonstrated understanding    Thank you for this referral.  TANIA Corrales, OTR/L  Minutes: 11    Eval Complexity: Decision Making: Low Complexity

## 2024-01-05 NOTE — CONSULTS
[facility] Consult Note      Patient: Eryn Howell MRN: [id number]  SSN: [id number]    YOB: 1955  Age: 68 y.o.  Sex: female    Date of Consultation: 01/04/2024  Referring Physician:   Reason for Consultation: Abnormal troponin     Chief complain: Shortness of breath    HPI:  68-year-old female came to emergency with complaining of worsening of shortness of breath.  She is complaining of wheezing.  Complaining of cough and congestion.  Not sure about fever.  Denies any chest pain.  Denies any dizziness, palpitation, presyncope or syncope.  Patient went to see primary care provider and was sent to emergency room.  Denies any current smoking or alcohol abuse.  She is ex-smoker quit smoking 3 years ago. Cardiology consult called for evaluation of abnormal troponin    Past Medical History:   Diagnosis Date    Arthritis     Asthma     Back pain     Chronic lung disease     COPD (chronic obstructive pulmonary disease) (HCC)     GERD (gastroesophageal reflux disease)     Hypertension     PTSD (post-traumatic stress disorder)     Scarlet fever 1976    age 21    Shingles      Past Surgical History:   Procedure Laterality Date    HYSTERECTOMY (CERVIX STATUS UNKNOWN)      LAMINECTOMY,LUMBAR      ORTHOPEDIC SURGERY Right 2021    thumb re-attachment    TUBAL LIGATION      TYMPANOSTOMY TUBE PLACEMENT       Current Facility-Administered Medications   Medication Dose Route Frequency    oseltamivir (TAMIFLU) capsule 75 mg  75 mg Oral BID    LORazepam (ATIVAN) tablet 2 mg  2 mg Oral TID    cefTRIAXone (ROCEPHIN) 1,000 mg in sodium chloride 0.9 % 50 mL IVPB (mini-bag)  1,000 mg IntraVENous Q24H    azithromycin (ZITHROMAX) 500 mg in sodium chloride 0.9% 250 mL (vial-mate/adapter) IVPB  500 mg IntraVENous Q24H    ipratropium 0.5 mg-albuterol 2.5 mg (DUONEB) nebulizer solution 1 Dose  1 Dose Inhalation Q4H WA RT    nystatin (MYCOSTATIN) 237009 UNIT/ML suspension 500,000 Units  5 mL Oral 4x Daily    guaiFENesin  (ROBITUSSIN) 100 MG/5ML liquid 200 mg  200 mg Oral Q6H    sodium chloride flush 0.9 % injection 5-40 mL  5-40 mL IntraVENous 2 times per day    sodium chloride flush 0.9 % injection 5-40 mL  5-40 mL IntraVENous PRN    0.9 % sodium chloride infusion   IntraVENous PRN    ondansetron (ZOFRAN-ODT) disintegrating tablet 4 mg  4 mg Oral Q8H PRN    Or    ondansetron (ZOFRAN) injection 4 mg  4 mg IntraVENous Q6H PRN    polyethylene glycol (GLYCOLAX) packet 17 g  17 g Oral Daily PRN    enoxaparin (LOVENOX) injection 40 mg  40 mg SubCUTAneous Daily    acetaminophen (TYLENOL) tablet 650 mg  650 mg Oral Q6H PRN    Or    acetaminophen (TYLENOL) suppository 650 mg  650 mg Rectal Q6H PRN    ipratropium 0.5 mg-albuterol 2.5 mg (DUONEB) nebulizer solution 1 Dose  1 Dose Inhalation Q4H PRN    guaiFENesin-dextromethorphan (ROBITUSSIN DM) 100-10 MG/5ML syrup 5 mL  5 mL Oral Q4H PRN    benzonatate (TESSALON) capsule 100 mg  100 mg Oral TID PRN    methylPREDNISolone sodium succ (SOLU-MEDROL) 60 mg in sterile water 0.96 mL injection  60 mg IntraVENous Q6H    budesonide (PULMICORT) nebulizer suspension 500 mcg  0.5 mg Nebulization BID RT    arformoterol tartrate (BROVANA) nebulizer solution 15 mcg  15 mcg Nebulization BID RT       Allergies and Intolerances:   Allergies   Allergen Reactions    Sulfacetamide Anaphylaxis    Sulfamethoxazole-Trimethoprim Anaphylaxis       Family History:   No family history on file.    Social History:   She  reports that she quit smoking about 3 years ago. Her smoking use included cigarettes. She has never used smokeless tobacco.  She  reports that she does not currently use alcohol.      Review of Systems:     Gen: No fever, chills, malaise, weight loss/gain.   Heent: No headache, rhinorrhea, epistaxis, ear pain, hearing loss, sinus pain, neck pain/stiffness, sore throat.   Heart:  positive shortness of breath, No chest pain, palpitations, pnd, or orthopnea.   Resp:   Positive cough, dyspnea,  wheezing, no

## 2024-01-05 NOTE — PROGRESS NOTES
Case Management Assessment  Initial Evaluation    Date/Time of Evaluation: 1/5/2024 10:41 AM  Assessment Completed by: Lucrecia Fagan    If patient is discharged prior to next notation, then this note serves as note for discharge by case management.    Patient Name: Eryn Howell                   YOB: 1955  Diagnosis: COPD with acute exacerbation (HCC) [J44.1]  Acute respiratory failure with hypoxia (HCC) [J96.01]                   Date / Time: 1/4/2024  1:38 PM    Patient Admission Status: Inpatient   Readmission Risk (Low < 19, Mod (19-27), High > 27): Readmission Risk Score: 10.3    Current PCP: Nish Sims, DO  PCP verified by CM? Yes    Chart Reviewed: Yes      History Provided by:  patient   Patient Orientation: Alert and Oriented    Patient Cognition: Alert    Hospitalization in the last 30 days (Readmission):  Yes    If yes, Readmission Assessment in CM Navigator will be completed.    Advance Directives:      Code Status: Full Code   Patient's Primary Decision Maker is:        Discharge Planning:    Patient lives with: Spouse/Significant Other Type of Home: House  Primary Care Giver:    Patient Support Systems include:     Current Financial resources:    Current community resources:    Current services prior to admission: Oxygen Therapy            Current DME:              Type of Home Care services:       ADLS  Prior functional level:    Current functional level:      PT AM-PAC:   /24  OT AM-PAC:   /24    Family can provide assistance at DC:    Would you like Case Management to discuss the discharge plan with any other family members/significant others, and if so, who?    Plans to Return to Present Housing:    Other Identified Issues/Barriers to RETURNING to current housing: n/a  Potential Assistance needed at discharge: Home Care            Potential DME:    Patient expects to discharge to: House  Plan for transportation at discharge: Family    Financial    Payor: [employer] MEDICARE / Plan:  [employer] CHOICE-PPO MEDICARE / Product Type: *No Product type* /     Does insurance require precert for SNF: No    Potential assistance Purchasing Medications: No  Meds-to-Beds request: Yes    No Pharmacies Listed    Notes:    Factors facilitating achievement of predicted outcomes: Family support    Barriers to discharge: n/a    Additional Case Management Notes: Patient admitted form home. Patient lives with spouse and son. Patient on home 02 prior to admission. Patient has a rollator at home that she uses frequently. Patient no longer drives and has family transport her to appointments. Patient established with PCP and is able to obtain meds as needed. Patient has family support and does not have any barriers upon dc. PT/OT to see patient in acute setting to determine possible recommendations. Patient open to hhc if needed. Daughter at bedside and agreeable with conversation. Patient likely to remain in acute setting throughout weekend. SW to follow to assist with DC planning needs.     The Plan for Transition of Care is related to the following treatment goals of COPD with acute exacerbation (HCC) [J44.1]  Acute respiratory failure with hypoxia (HCC) [J96.01]    IF APPLICABLE: The Patient and/or patient representative Eryn and her family were provided with a choice of provider and agrees with the discharge plan. Freedom of choice list with basic dialogue that supports the patient's individualized plan of care/goals and shares the quality data associated with the providers was provided to: Patient   Patient Representative Name:       The Patient and/or Patient Representative Agree with the Discharge Plan? Yes    Lucrecia Fagan  Case Management Department  Ph: [phone] Fax: n/a

## 2024-01-05 NOTE — PROGRESS NOTES
Primary RN unavailable and assisted patients request for her PRN Ativan. Administered ordered dose per MAR. Unable to waste reminder of ativan in [employer]. Ativan .5 ml wasted with Primary RN Sarah Bell as witness in Medication Room.

## 2024-01-06 ENCOUNTER — APPOINTMENT (OUTPATIENT)
Facility: HOSPITAL | Age: 69
DRG: 190 | End: 2024-01-06
Payer: MEDICARE

## 2024-01-06 LAB
ANION GAP SERPL CALC-SCNC: 8 MMOL/L (ref 3–18)
ARTERIAL PATENCY WRIST A: POSITIVE
BASE EXCESS BLD CALC-SCNC: 7.5 MMOL/L
BASOPHILS # BLD: 0 K/UL (ref 0–0.1)
BASOPHILS NFR BLD: 0 % (ref 0–2)
BDY SITE: ABNORMAL
BUN SERPL-MCNC: 18 MG/DL (ref 7–18)
BUN/CREAT SERPL: 29 (ref 12–20)
CALCIUM SERPL-MCNC: 9 MG/DL (ref 8.5–10.1)
CHLORIDE SERPL-SCNC: 99 MMOL/L (ref 100–111)
CO2 SERPL-SCNC: 30 MMOL/L (ref 21–32)
CREAT SERPL-MCNC: 0.63 MG/DL (ref 0.6–1.3)
DIFFERENTIAL METHOD BLD: ABNORMAL
EOSINOPHIL # BLD: 0 K/UL (ref 0–0.4)
EOSINOPHIL NFR BLD: 0 % (ref 0–5)
ERYTHROCYTE [DISTWIDTH] IN BLOOD BY AUTOMATED COUNT: 12.6 % (ref 11.6–14.5)
GAS FLOW.O2 O2 DELIVERY SYS: ABNORMAL
GLUCOSE BLD STRIP.AUTO-MCNC: 275 MG/DL (ref 70–110)
GLUCOSE BLD STRIP.AUTO-MCNC: 328 MG/DL (ref 70–110)
GLUCOSE BLD STRIP.AUTO-MCNC: 339 MG/DL (ref 70–110)
GLUCOSE SERPL-MCNC: 239 MG/DL (ref 74–99)
HCO3 BLD-SCNC: 32.9 MMOL/L (ref 22–26)
HCT VFR BLD AUTO: 36.6 % (ref 35–45)
HGB BLD-MCNC: 12.3 G/DL (ref 12–16)
IMM GRANULOCYTES # BLD AUTO: 0 K/UL (ref 0–0.04)
IMM GRANULOCYTES NFR BLD AUTO: 0 % (ref 0–0.5)
LYMPHOCYTES # BLD: 0.5 K/UL (ref 0.9–3.6)
LYMPHOCYTES NFR BLD: 5 % (ref 21–52)
MCH RBC QN AUTO: 31.6 PG (ref 24–34)
MCHC RBC AUTO-ENTMCNC: 33.6 G/DL (ref 31–37)
MCV RBC AUTO: 94.1 FL (ref 78–100)
MONOCYTES # BLD: 0.4 K/UL (ref 0.05–1.2)
MONOCYTES NFR BLD: 4 % (ref 3–10)
NEUTS SEG # BLD: 9.1 K/UL (ref 1.8–8)
NEUTS SEG NFR BLD: 90 % (ref 40–73)
NRBC # BLD: 0 K/UL (ref 0–0.01)
NRBC BLD-RTO: 0 PER 100 WBC
O2/TOTAL GAS SETTING VFR VENT: 4 %
PCO2 BLD: 48.1 MMHG (ref 35–45)
PH BLD: 7.44 (ref 7.35–7.45)
PLATELET # BLD AUTO: 165 K/UL (ref 135–420)
PMV BLD AUTO: 10.6 FL (ref 9.2–11.8)
PO2 BLD: 66 MMHG (ref 80–100)
POTASSIUM SERPL-SCNC: 3.2 MMOL/L (ref 3.5–5.5)
RBC # BLD AUTO: 3.89 M/UL (ref 4.2–5.3)
SAO2 % BLD: 93.2 % (ref 92–97)
SERVICE CMNT-IMP: ABNORMAL
SODIUM SERPL-SCNC: 137 MMOL/L (ref 136–145)
SPECIMEN TYPE: ABNORMAL
WBC # BLD AUTO: 10.1 K/UL (ref 4.6–13.2)

## 2024-01-06 PROCEDURE — 6370000000 HC RX 637 (ALT 250 FOR IP): Performed by: INTERNAL MEDICINE

## 2024-01-06 PROCEDURE — 6360000002 HC RX W HCPCS: Performed by: INTERNAL MEDICINE

## 2024-01-06 PROCEDURE — 94640 AIRWAY INHALATION TREATMENT: CPT

## 2024-01-06 PROCEDURE — 82962 GLUCOSE BLOOD TEST: CPT

## 2024-01-06 PROCEDURE — 1100000003 HC PRIVATE W/ TELEMETRY

## 2024-01-06 PROCEDURE — 2580000003 HC RX 258: Performed by: FAMILY MEDICINE

## 2024-01-06 PROCEDURE — 6370000000 HC RX 637 (ALT 250 FOR IP): Performed by: FAMILY MEDICINE

## 2024-01-06 PROCEDURE — 6360000002 HC RX W HCPCS: Performed by: FAMILY MEDICINE

## 2024-01-06 PROCEDURE — 85025 COMPLETE CBC W/AUTO DIFF WBC: CPT

## 2024-01-06 PROCEDURE — 36600 WITHDRAWAL OF ARTERIAL BLOOD: CPT

## 2024-01-06 PROCEDURE — 36415 COLL VENOUS BLD VENIPUNCTURE: CPT

## 2024-01-06 PROCEDURE — 2500000003 HC RX 250 WO HCPCS: Performed by: INTERNAL MEDICINE

## 2024-01-06 PROCEDURE — 2700000000 HC OXYGEN THERAPY PER DAY

## 2024-01-06 PROCEDURE — 80048 BASIC METABOLIC PNL TOTAL CA: CPT

## 2024-01-06 PROCEDURE — 82803 BLOOD GASES ANY COMBINATION: CPT

## 2024-01-06 PROCEDURE — 71045 X-RAY EXAM CHEST 1 VIEW: CPT

## 2024-01-06 PROCEDURE — 2580000003 HC RX 258: Performed by: INTERNAL MEDICINE

## 2024-01-06 RX ORDER — IMIPRAMINE HCL 25 MG
50 TABLET ORAL NIGHTLY
Status: DISCONTINUED | OUTPATIENT
Start: 2024-01-06 | End: 2024-01-07

## 2024-01-06 RX ORDER — BUPROPION HYDROCHLORIDE 150 MG/1
150 TABLET ORAL EVERY MORNING
Status: DISCONTINUED | OUTPATIENT
Start: 2024-01-07 | End: 2024-01-08 | Stop reason: HOSPADM

## 2024-01-06 RX ORDER — POTASSIUM CHLORIDE 20 MEQ/1
40 TABLET, EXTENDED RELEASE ORAL ONCE
Status: COMPLETED | OUTPATIENT
Start: 2024-01-06 | End: 2024-01-06

## 2024-01-06 RX ORDER — DEXTROSE MONOHYDRATE 100 MG/ML
INJECTION, SOLUTION INTRAVENOUS CONTINUOUS PRN
Status: DISCONTINUED | OUTPATIENT
Start: 2024-01-06 | End: 2024-01-08 | Stop reason: HOSPADM

## 2024-01-06 RX ORDER — INSULIN LISPRO 100 [IU]/ML
0-4 INJECTION, SOLUTION INTRAVENOUS; SUBCUTANEOUS NIGHTLY
Status: DISCONTINUED | OUTPATIENT
Start: 2024-01-06 | End: 2024-01-08 | Stop reason: HOSPADM

## 2024-01-06 RX ORDER — INSULIN LISPRO 100 [IU]/ML
0-4 INJECTION, SOLUTION INTRAVENOUS; SUBCUTANEOUS
Status: DISCONTINUED | OUTPATIENT
Start: 2024-01-06 | End: 2024-01-08 | Stop reason: HOSPADM

## 2024-01-06 RX ORDER — ASPIRIN 81 MG/1
81 TABLET ORAL DAILY
Status: DISCONTINUED | OUTPATIENT
Start: 2024-01-06 | End: 2024-01-08 | Stop reason: HOSPADM

## 2024-01-06 RX ADMIN — IPRATROPIUM BROMIDE AND ALBUTEROL SULFATE 1 DOSE: 2.5; .5 SOLUTION RESPIRATORY (INHALATION) at 16:38

## 2024-01-06 RX ADMIN — NYSTATIN 500000 UNITS: 100000 SUSPENSION ORAL at 16:54

## 2024-01-06 RX ADMIN — SODIUM CHLORIDE, PRESERVATIVE FREE 10 ML: 5 INJECTION INTRAVENOUS at 09:29

## 2024-01-06 RX ADMIN — WATER 60 MG: 1 INJECTION INTRAMUSCULAR; INTRAVENOUS; SUBCUTANEOUS at 05:28

## 2024-01-06 RX ADMIN — IPRATROPIUM BROMIDE AND ALBUTEROL SULFATE 1 DOSE: 2.5; .5 SOLUTION RESPIRATORY (INHALATION) at 13:05

## 2024-01-06 RX ADMIN — ASPIRIN 81 MG: 81 TABLET ORAL at 09:46

## 2024-01-06 RX ADMIN — ACETAMINOPHEN 650 MG: 325 TABLET ORAL at 02:31

## 2024-01-06 RX ADMIN — POTASSIUM CHLORIDE 40 MEQ: 1500 TABLET, EXTENDED RELEASE ORAL at 16:54

## 2024-01-06 RX ADMIN — IPRATROPIUM BROMIDE AND ALBUTEROL SULFATE 1 DOSE: 2.5; .5 SOLUTION RESPIRATORY (INHALATION) at 08:20

## 2024-01-06 RX ADMIN — METHYLPREDNISOLONE SODIUM SUCCINATE 40 MG: 40 INJECTION INTRAMUSCULAR; INTRAVENOUS at 16:56

## 2024-01-06 RX ADMIN — AZITHROMYCIN MONOHYDRATE 500 MG: 500 INJECTION, POWDER, LYOPHILIZED, FOR SOLUTION INTRAVENOUS at 09:47

## 2024-01-06 RX ADMIN — IPRATROPIUM BROMIDE AND ALBUTEROL SULFATE 1 DOSE: 2.5; .5 SOLUTION RESPIRATORY (INHALATION) at 18:24

## 2024-01-06 RX ADMIN — LORAZEPAM 2 MG: 1 TABLET ORAL at 09:28

## 2024-01-06 RX ADMIN — OSELTAMIVIR PHOSPHATE 75 MG: 75 CAPSULE ORAL at 20:14

## 2024-01-06 RX ADMIN — SODIUM CHLORIDE, PRESERVATIVE FREE 10 ML: 5 INJECTION INTRAVENOUS at 20:21

## 2024-01-06 RX ADMIN — IMIPRAMINE HYDROCHLORIDE 50 MG: 25 TABLET ORAL at 20:26

## 2024-01-06 RX ADMIN — GUAIFENESIN 200 MG: 200 SOLUTION ORAL at 01:41

## 2024-01-06 RX ADMIN — ARFORMOTEROL TARTRATE 15 MCG: 15 SOLUTION RESPIRATORY (INHALATION) at 08:21

## 2024-01-06 RX ADMIN — NYSTATIN 500000 UNITS: 100000 SUSPENSION ORAL at 20:14

## 2024-01-06 RX ADMIN — INSULIN LISPRO 4 UNITS: 100 INJECTION, SOLUTION INTRAVENOUS; SUBCUTANEOUS at 20:45

## 2024-01-06 RX ADMIN — LORAZEPAM 2 MG: 1 TABLET ORAL at 20:14

## 2024-01-06 RX ADMIN — BENZONATATE 100 MG: 100 CAPSULE ORAL at 01:43

## 2024-01-06 RX ADMIN — NYSTATIN 500000 UNITS: 100000 SUSPENSION ORAL at 14:00

## 2024-01-06 RX ADMIN — WATER 60 MG: 1 INJECTION INTRAMUSCULAR; INTRAVENOUS; SUBCUTANEOUS at 09:28

## 2024-01-06 RX ADMIN — BUDESONIDE 500 MCG: 0.5 SUSPENSION RESPIRATORY (INHALATION) at 08:21

## 2024-01-06 RX ADMIN — INSULIN LISPRO 3 UNITS: 100 INJECTION, SOLUTION INTRAVENOUS; SUBCUTANEOUS at 16:55

## 2024-01-06 RX ADMIN — BENZONATATE 100 MG: 100 CAPSULE ORAL at 09:46

## 2024-01-06 RX ADMIN — ENOXAPARIN SODIUM 40 MG: 100 INJECTION SUBCUTANEOUS at 20:15

## 2024-01-06 RX ADMIN — LORAZEPAM 2 MG: 1 TABLET ORAL at 13:59

## 2024-01-06 RX ADMIN — BENZONATATE 100 MG: 100 CAPSULE ORAL at 20:14

## 2024-01-06 RX ADMIN — OSELTAMIVIR PHOSPHATE 75 MG: 75 CAPSULE ORAL at 09:28

## 2024-01-06 RX ADMIN — INSULIN LISPRO 2 UNITS: 100 INJECTION, SOLUTION INTRAVENOUS; SUBCUTANEOUS at 13:59

## 2024-01-06 RX ADMIN — ARFORMOTEROL TARTRATE 15 MCG: 15 SOLUTION RESPIRATORY (INHALATION) at 18:24

## 2024-01-06 RX ADMIN — CEFTRIAXONE 1000 MG: 1 INJECTION, POWDER, FOR SOLUTION INTRAMUSCULAR; INTRAVENOUS at 09:47

## 2024-01-06 RX ADMIN — NYSTATIN 500000 UNITS: 100000 SUSPENSION ORAL at 09:27

## 2024-01-06 ASSESSMENT — PAIN SCALES - GENERAL
PAINLEVEL_OUTOF10: 6
PAINLEVEL_OUTOF10: 0
PAINLEVEL_OUTOF10: 0

## 2024-01-06 ASSESSMENT — PAIN DESCRIPTION - LOCATION: LOCATION: HEAD

## 2024-01-06 ASSESSMENT — PAIN DESCRIPTION - DESCRIPTORS: DESCRIPTORS: ACHING

## 2024-01-06 NOTE — PROGRESS NOTES
Pulmonary Specialists  Pulmonary, Critical Care, and Sleep Medicine    Name: Eryn Howell MRN: [id number]   : 1955 Hospital: [facility]    Date: 2024  Room: [address] [place] [address] Note                                                                             Consult requesting physician: Jimmy Alaniz MD   Reason for Consult: COPD exacerbation    IMPRESSION:     Acute respiratory failure with hypoxemia  COPD exacerbation  Influenza A viral infection      Patient Active Problem List   Diagnosis Code    COPD with acute exacerbation ([facility]) J44.1    COPD (chronic obstructive pulmonary disease) with emphysema ([facility]) J43.9    PTSD (post-traumatic stress disorder) F43.10    Acute respiratory failure with hypoxemia ([facility]) J96.01    Influenza A J10.1    Elevated troponin R79.89       Code status: Full Code       RECOMMENDATIONS:     Patient with COPD exacerbation with influenza viral infection    Overall improved/stable respirations; nasal cannula oxygen-currently 4 L; stable oxygen saturations  Chest x-ray -cardiac size normal, bilateral upper lobe emphysema findings, no focal infiltrates or consolidations, no pleural effusions; follow-up chest x-ray today pending  Prior PET scan findings of calcified granulomas, bilateral upper lobe centrilobular emphysema changes  Prior FEV1-not known  ABG on admission-pH normal, mild hypercapnia, hypoxemia-PaO2 72 on 4 L oxygen  Bronchodilator-Brovana and budesonide nebs twice a day  Ipratropium/albuterol nebs every 4 hours while awake  IV steroids-daughter requesting to decrease Solu-Medrol due to anxiety episodes; avoid steroid use before bedtime; Solu-Medrol adjusted to 40 mg IV once daily in the morning, and 40 mg once daily at 4 PM.  Wean steroids as patient improves further.  Cough medication-Robitussin 200 mg every 6 hours oral  Keep SPO2 >=92%. HOB 30 degree elevation all the time. Aspiration precautions. Incentive spirometry as  tolerated.  Empiric antibiotic therapy-ceftriaxone and azithromycin; complete 5 to 7 days antibiotic treatment; blood cultures 1/4-negative so far  Oral Ativan for anxiety  Tamiflu twice daily for influenza A viral infection x 5 days; patient on droplet isolation  DVT prophylaxis-enoxaparin  Oral thrush reported; nystatin swish and swallow for 5 days  Echo results reviewed-EF 50-55%; grade 1 diastolic dysfunction; RVSP 29 mmHg-no pulm hypertension  Patient seen by cardiology; on low-dose aspirin    Labs reviewed-WBC 10.1-no leukocytosis; hemoglobin 12.3, platelets 165-normal  BUN 18, creatinine 0.63; K3.2-oral potassium ordered  [id number]-lamwyiz scale insulin ordered while on steroid therapy    Discussed with patient daughter at bedside  High complexity decision making was performed during the evaluation of this patient at high risk for decompensation with multiple organ involvement.      Subjective/History of Present Illness:     Patient is a 68 y.o. female with PMHx significant for COPD, former smoker, history of PTSD and anxiety.  The patient came to ER yesterday from PCP office with worsening shortness of breath symptoms.  Patient was subsequently admitted to telemetry unit with COPD treatments.  Patient also influenza A positive.      1/6/2024 :   Patient seen in telemetry unit; daughter at bedside  Patient sleeping, and daughter requesting patient not to be disturbed since she has baseline anxiety  Overall stable/improved breathing  Productive cough  No chest pain or hemoptysis reported  No vomiting or diarrhea reported  History of anxiety on Ativan as needed  Afebrile; blood pressure-stable  Stable oxygen saturations on nasal cannula oxygen      Social history-former smoker, stopped smoking about 5 years ago; used to smoke about half a pack a day  No alcohol use reported  Status post flu and RSV vaccinations      Review of Systems:   HEENT: No epistaxis, no baseline difficulty in swallowing  Respiratory: as  adjacent pleura. No clear CT explanation for the finding. Correlate with any recent trauma to the region. A 3 month CT chest follow-up study is recommended to reassess for occult lesion.   3. Other chronic CT findings as above. Abnormality of the anterior skull base/ethmoid sinus is stable since 2017 without abnormal activity. This can be assessed with MRI of the brain, if not already performed elsewhere, for polyp or encephalocele.           Please note: Voice-recognition software may have been used to generate this report, which may have resulted in some phonetic-based errors in grammar and contents. Even though attempts were made to correct all the mistakes, some may have been missed, and remained in the body of the document.    Teddy Pagan MD  1/6/2024

## 2024-01-06 NOTE — PROGRESS NOTES
Attempted to take the vital signs. But, patient's daughter stated that patient needs to sleep now. Did not take the vitals.

## 2024-01-06 NOTE — PLAN OF CARE
Problem: Discharge Planning  Goal: Discharge to home or other facility with appropriate resources  Outcome: Progressing  Flowsheets (Taken 1/6/2024 0900)  Discharge to home or other facility with appropriate resources:   Identify barriers to discharge with patient and caregiver   Arrange for needed discharge resources and transportation as appropriate   Identify discharge learning needs (meds, wound care, etc)     Problem: Skin/Tissue Integrity  Goal: Absence of new skin breakdown  Description: 1.  Monitor for areas of redness and/or skin breakdown  2.  Assess vascular access sites hourly  3.  Every 4-6 hours minimum:  Change oxygen saturation probe site  4.  Every 4-6 hours:  If on nasal continuous positive airway pressure, respiratory therapy assess nares and determine need for appliance change or resting period.  Outcome: Progressing     Problem: Safety - Adult  Goal: Free from fall injury  Outcome: Progressing     Problem: Pain  Goal: Verbalizes/displays adequate comfort level or baseline comfort level  Outcome: Progressing  Flowsheets (Taken 1/6/2024 0330 by Paula Redding, RN)  Verbalizes/displays adequate comfort level or baseline comfort level:   Encourage patient to monitor pain and request assistance   Assess pain using appropriate pain scale   Administer analgesics based on type and severity of pain and evaluate response   Implement non-pharmacological measures as appropriate and evaluate response

## 2024-01-06 NOTE — PLAN OF CARE
Problem: Discharge Planning  Goal: Discharge to home or other facility with appropriate resources  1/5/2024 1933 by Shelby Andrews, RN  Outcome: Progressing  1/5/2024 1355 by Sarah Bell RN  Outcome: Progressing     Problem: Skin/Tissue Integrity  Goal: Absence of new skin breakdown  Description: 1.  Monitor for areas of redness and/or skin breakdown  2.  Assess vascular access sites hourly  3.  Every 4-6 hours minimum:  Change oxygen saturation probe site  4.  Every 4-6 hours:  If on nasal continuous positive airway pressure, respiratory therapy assess nares and determine need for appliance change or resting period.  1/5/2024 1933 by Shelby Andrews, RN  Outcome: Progressing  1/5/2024 1355 by Sarah Bell, RN  Outcome: Progressing     Problem: Safety - Adult  Goal: Free from fall injury  1/5/2024 1933 by Shelby Andrews, RN  Outcome: Progressing  1/5/2024 1355 by Sarah Bell, RN  Outcome: Progressing

## 2024-01-07 LAB
ANION GAP SERPL CALC-SCNC: 5 MMOL/L (ref 3–18)
BASOPHILS # BLD: 0 K/UL (ref 0–0.1)
BASOPHILS NFR BLD: 0 % (ref 0–2)
BUN SERPL-MCNC: 22 MG/DL (ref 7–18)
BUN/CREAT SERPL: 27 (ref 12–20)
CALCIUM SERPL-MCNC: 9 MG/DL (ref 8.5–10.1)
CHLORIDE SERPL-SCNC: 102 MMOL/L (ref 100–111)
CO2 SERPL-SCNC: 31 MMOL/L (ref 21–32)
CREAT SERPL-MCNC: 0.81 MG/DL (ref 0.6–1.3)
DIFFERENTIAL METHOD BLD: ABNORMAL
EOSINOPHIL # BLD: 0 K/UL (ref 0–0.4)
EOSINOPHIL NFR BLD: 0 % (ref 0–5)
ERYTHROCYTE [DISTWIDTH] IN BLOOD BY AUTOMATED COUNT: 12.4 % (ref 11.6–14.5)
GLUCOSE BLD STRIP.AUTO-MCNC: 162 MG/DL (ref 70–110)
GLUCOSE BLD STRIP.AUTO-MCNC: 250 MG/DL (ref 70–110)
GLUCOSE BLD STRIP.AUTO-MCNC: 268 MG/DL (ref 70–110)
GLUCOSE BLD STRIP.AUTO-MCNC: 315 MG/DL (ref 70–110)
GLUCOSE SERPL-MCNC: 230 MG/DL (ref 74–99)
HCT VFR BLD AUTO: 37.9 % (ref 35–45)
HGB BLD-MCNC: 12.6 G/DL (ref 12–16)
IMM GRANULOCYTES # BLD AUTO: 0.1 K/UL (ref 0–0.04)
IMM GRANULOCYTES NFR BLD AUTO: 1 % (ref 0–0.5)
LYMPHOCYTES # BLD: 0.7 K/UL (ref 0.9–3.6)
LYMPHOCYTES NFR BLD: 7 % (ref 21–52)
MCH RBC QN AUTO: 31.5 PG (ref 24–34)
MCHC RBC AUTO-ENTMCNC: 33.2 G/DL (ref 31–37)
MCV RBC AUTO: 94.8 FL (ref 78–100)
MONOCYTES # BLD: 0.5 K/UL (ref 0.05–1.2)
MONOCYTES NFR BLD: 5 % (ref 3–10)
NEUTS SEG # BLD: 8.5 K/UL (ref 1.8–8)
NEUTS SEG NFR BLD: 88 % (ref 40–73)
NRBC # BLD: 0 K/UL (ref 0–0.01)
NRBC BLD-RTO: 0 PER 100 WBC
PLATELET # BLD AUTO: 177 K/UL (ref 135–420)
PMV BLD AUTO: 10.8 FL (ref 9.2–11.8)
POTASSIUM SERPL-SCNC: 3.4 MMOL/L (ref 3.5–5.5)
RBC # BLD AUTO: 4 M/UL (ref 4.2–5.3)
SODIUM SERPL-SCNC: 138 MMOL/L (ref 136–145)
WBC # BLD AUTO: 9.6 K/UL (ref 4.6–13.2)

## 2024-01-07 PROCEDURE — 6370000000 HC RX 637 (ALT 250 FOR IP): Performed by: FAMILY MEDICINE

## 2024-01-07 PROCEDURE — 2580000003 HC RX 258: Performed by: INTERNAL MEDICINE

## 2024-01-07 PROCEDURE — 6360000002 HC RX W HCPCS: Performed by: INTERNAL MEDICINE

## 2024-01-07 PROCEDURE — 6370000000 HC RX 637 (ALT 250 FOR IP): Performed by: INTERNAL MEDICINE

## 2024-01-07 PROCEDURE — 82962 GLUCOSE BLOOD TEST: CPT

## 2024-01-07 PROCEDURE — 85025 COMPLETE CBC W/AUTO DIFF WBC: CPT

## 2024-01-07 PROCEDURE — 2580000003 HC RX 258: Performed by: FAMILY MEDICINE

## 2024-01-07 PROCEDURE — 94640 AIRWAY INHALATION TREATMENT: CPT

## 2024-01-07 PROCEDURE — 6360000002 HC RX W HCPCS: Performed by: FAMILY MEDICINE

## 2024-01-07 PROCEDURE — 36415 COLL VENOUS BLD VENIPUNCTURE: CPT

## 2024-01-07 PROCEDURE — 80048 BASIC METABOLIC PNL TOTAL CA: CPT

## 2024-01-07 PROCEDURE — 1100000003 HC PRIVATE W/ TELEMETRY

## 2024-01-07 PROCEDURE — 2700000000 HC OXYGEN THERAPY PER DAY

## 2024-01-07 RX ORDER — POTASSIUM CHLORIDE 20 MEQ/1
40 TABLET, EXTENDED RELEASE ORAL ONCE
Status: COMPLETED | OUTPATIENT
Start: 2024-01-07 | End: 2024-01-07

## 2024-01-07 RX ORDER — MECOBALAMIN 5000 MCG
10 TABLET,DISINTEGRATING ORAL NIGHTLY
Status: DISCONTINUED | OUTPATIENT
Start: 2024-01-07 | End: 2024-01-08 | Stop reason: HOSPADM

## 2024-01-07 RX ORDER — PREDNISONE 20 MG/1
40 TABLET ORAL DAILY
Status: DISCONTINUED | OUTPATIENT
Start: 2024-01-08 | End: 2024-01-08 | Stop reason: HOSPADM

## 2024-01-07 RX ORDER — IMIPRAMINE HCL 25 MG
75 TABLET ORAL NIGHTLY
Status: DISCONTINUED | OUTPATIENT
Start: 2024-01-07 | End: 2024-01-08 | Stop reason: HOSPADM

## 2024-01-07 RX ORDER — MECOBALAMIN 5000 MCG
5 TABLET,DISINTEGRATING ORAL NIGHTLY
Status: DISCONTINUED | OUTPATIENT
Start: 2024-01-07 | End: 2024-01-07

## 2024-01-07 RX ADMIN — LORAZEPAM 2 MG: 1 TABLET ORAL at 19:56

## 2024-01-07 RX ADMIN — IPRATROPIUM BROMIDE AND ALBUTEROL SULFATE 1 DOSE: 2.5; .5 SOLUTION RESPIRATORY (INHALATION) at 12:37

## 2024-01-07 RX ADMIN — METHYLPREDNISOLONE SODIUM SUCCINATE 20 MG: 40 INJECTION INTRAMUSCULAR; INTRAVENOUS at 16:14

## 2024-01-07 RX ADMIN — Medication 10 MG: at 20:26

## 2024-01-07 RX ADMIN — POTASSIUM CHLORIDE 40 MEQ: 1500 TABLET, EXTENDED RELEASE ORAL at 16:13

## 2024-01-07 RX ADMIN — LORAZEPAM 2 MG: 1 TABLET ORAL at 08:22

## 2024-01-07 RX ADMIN — NYSTATIN 500000 UNITS: 100000 SUSPENSION ORAL at 16:13

## 2024-01-07 RX ADMIN — IMIPRAMINE HYDROCHLORIDE 75 MG: 25 TABLET ORAL at 20:26

## 2024-01-07 RX ADMIN — OSELTAMIVIR PHOSPHATE 75 MG: 75 CAPSULE ORAL at 20:26

## 2024-01-07 RX ADMIN — LORAZEPAM 2 MG: 1 TABLET ORAL at 13:05

## 2024-01-07 RX ADMIN — IPRATROPIUM BROMIDE AND ALBUTEROL SULFATE 1 DOSE: 2.5; .5 SOLUTION RESPIRATORY (INHALATION) at 08:12

## 2024-01-07 RX ADMIN — BENZONATATE 100 MG: 100 CAPSULE ORAL at 08:22

## 2024-01-07 RX ADMIN — INSULIN LISPRO 2 UNITS: 100 INJECTION, SOLUTION INTRAVENOUS; SUBCUTANEOUS at 16:13

## 2024-01-07 RX ADMIN — INSULIN LISPRO 2 UNITS: 100 INJECTION, SOLUTION INTRAVENOUS; SUBCUTANEOUS at 08:22

## 2024-01-07 RX ADMIN — BENZONATATE 100 MG: 100 CAPSULE ORAL at 16:14

## 2024-01-07 RX ADMIN — INSULIN LISPRO 4 UNITS: 100 INJECTION, SOLUTION INTRAVENOUS; SUBCUTANEOUS at 20:26

## 2024-01-07 RX ADMIN — BENZONATATE 100 MG: 100 CAPSULE ORAL at 19:56

## 2024-01-07 RX ADMIN — NYSTATIN 500000 UNITS: 100000 SUSPENSION ORAL at 08:22

## 2024-01-07 RX ADMIN — ARFORMOTEROL TARTRATE 15 MCG: 15 SOLUTION RESPIRATORY (INHALATION) at 08:12

## 2024-01-07 RX ADMIN — IPRATROPIUM BROMIDE AND ALBUTEROL SULFATE 1 DOSE: 2.5; .5 SOLUTION RESPIRATORY (INHALATION) at 16:54

## 2024-01-07 RX ADMIN — BUPROPION HYDROCHLORIDE 150 MG: 150 TABLET, EXTENDED RELEASE ORAL at 08:22

## 2024-01-07 RX ADMIN — ENOXAPARIN SODIUM 40 MG: 100 INJECTION SUBCUTANEOUS at 20:00

## 2024-01-07 RX ADMIN — METHYLPREDNISOLONE SODIUM SUCCINATE 40 MG: 40 INJECTION INTRAMUSCULAR; INTRAVENOUS at 08:22

## 2024-01-07 RX ADMIN — ASPIRIN 81 MG: 81 TABLET ORAL at 08:22

## 2024-01-07 RX ADMIN — BUDESONIDE 500 MCG: 0.5 SUSPENSION RESPIRATORY (INHALATION) at 08:13

## 2024-01-07 RX ADMIN — AZITHROMYCIN MONOHYDRATE 500 MG: 500 INJECTION, POWDER, LYOPHILIZED, FOR SOLUTION INTRAVENOUS at 11:23

## 2024-01-07 RX ADMIN — CEFTRIAXONE 1000 MG: 1 INJECTION, POWDER, FOR SOLUTION INTRAMUSCULAR; INTRAVENOUS at 11:22

## 2024-01-07 RX ADMIN — SODIUM CHLORIDE, PRESERVATIVE FREE 10 ML: 5 INJECTION INTRAVENOUS at 08:23

## 2024-01-07 RX ADMIN — NYSTATIN 500000 UNITS: 100000 SUSPENSION ORAL at 11:23

## 2024-01-07 RX ADMIN — OSELTAMIVIR PHOSPHATE 75 MG: 75 CAPSULE ORAL at 08:34

## 2024-01-07 ASSESSMENT — PAIN SCALES - GENERAL: PAINLEVEL_OUTOF10: 0

## 2024-01-07 NOTE — PROGRESS NOTES
Cardiology Progress Note        Patient: Eryn Howell        Sex: female          DOA: 1/4/2024  YOB: 1955      Age:  68 y.o.        LOS:  LOS: 2 days       Patient seen and examined, chart reviewed    Assessment/Plan     Patient Active Problem List   Diagnosis    COPD with acute exacerbation (HCC)    COPD (chronic obstructive pulmonary disease) with emphysema (HCC)    PTSD (post-traumatic stress disorder)    Acute respiratory failure with hypoxemia (HCC)    Influenza A    Elevated troponin      Abnormal troponin no clear evidence of acute coronary syndrome could be demand ischemia     Echocardiogram revealed       Image quality is technically difficult.  Technically difficult study due to patient's body habitus and  low parasternal window.    Left Ventricle: Low normal left ventricular systolic function with a visually estimated EF of 50 - 55%. Left ventricle size is normal. Normal wall thickness. Normal wall motion. Grade I diastolic dysfunction present with normal LV EF.    Tricuspid Valve:  Mild regurgitation. The estimated RVSP is 29 mmHg.    Pericardium: Trivial localized pericardial effusion present around the right ventricle and right atrium. No indication of cardiac tamponade.        Plan:      Continue aspirin 81 mg by mouth once a day.    Continue current management for COPD exacerbation and influenza   Plan discussed with family member at bedside            Subjective:    cc:   Denies any chest pain      REVIEW OF SYSTEMS:     General: No fevers or chills.  Cardiovascular: No chest pain,No palpitations, No orthopnea, No PND, No leg swelling, No claudication  Pulmonary: No  dyspnea.   Gastrointestinal: No nausea, vomiting, bleeding  Neurology: No Dizziness    Objective:      Visit Vitals  /73   Pulse (!) 104   Temp 97.3 °F (36.3 °C) (Oral)   Resp 18   Ht 1.727 m (5' 8\")   Wt 74.8 kg (165 lb)   SpO2 96%   BMI 25.09 kg/m²     Body mass index is 25.09  kg/m².    Physical Exam:  General Appearance: Comfortable, not using accessory muscles of respiration.  HEENT: AGGIE.   HEAD: Atraumatic  NECK: No JVD, no thyroidomeglay. CAROTIDS: no bruit  LUNGS:  bilateral air entry equal but reduced   HEART: S1+S2 audible, no murmur, no pericardial rub.     ABD: Non-tender, BS Audible    EXT: No edema, and no cyanosis.  VASCULAR EXAM: Pulses are intact.    PSYCHIATRIC EXAM: Mood is appropriate.  MUSCULOSKELETAL: Grossly no joint deformity.  NEUROLOGICAL: AAO times 3, No motor and sensory deficit    Medication:  Current Facility-Administered Medications   Medication Dose Route Frequency    aspirin EC tablet 81 mg  81 mg Oral Daily    methylPREDNISolone sodium succ (SOLU-MEDROL) 40 mg in sterile water 1 mL injection  40 mg IntraVENous Q24H    [START ON 1/7/2024] methylPREDNISolone sodium succ (SOLU-MEDROL) 40 mg in sterile water 1 mL injection  40 mg IntraVENous Daily    insulin lispro (HUMALOG) injection vial 0-4 Units  0-4 Units SubCUTAneous TID WC    insulin lispro (HUMALOG) injection vial 0-4 Units  0-4 Units SubCUTAneous Nightly    glucose chewable tablet 16 g  4 tablet Oral PRN    dextrose bolus 10% 125 mL  125 mL IntraVENous PRN    Or    dextrose bolus 10% 250 mL  250 mL IntraVENous PRN    glucagon (rDNA) injection 1 mg  1 mg SubCUTAneous PRN    dextrose 10 % infusion   IntraVENous Continuous PRN    imipramine (TOFRANIL) tablet 50 mg  50 mg Oral Nightly    [START ON 1/7/2024] buPROPion (WELLBUTRIN XL) extended release tablet 150 mg  150 mg Oral QAM    oseltamivir (TAMIFLU) capsule 75 mg  75 mg Oral BID    LORazepam (ATIVAN) tablet 2 mg  2 mg Oral TID    cefTRIAXone (ROCEPHIN) 1,000 mg in sodium chloride 0.9 % 50 mL IVPB (mini-bag)  1,000 mg IntraVENous Q24H    azithromycin (ZITHROMAX) 500 mg in sodium chloride 0.9% 250 mL (vial-mate/adapter) IVPB  500 mg IntraVENous Q24H    ipratropium 0.5 mg-albuterol 2.5 mg (DUONEB) nebulizer solution 1 Dose  1 Dose Inhalation Q4H WA RT

## 2024-01-07 NOTE — PLAN OF CARE
Problem: Discharge Planning  Goal: Discharge to home or other facility with appropriate resources  Outcome: Progressing  Flowsheets (Taken 1/7/2024 8956)  Discharge to home or other facility with appropriate resources:   Identify barriers to discharge with patient and caregiver   Arrange for needed discharge resources and transportation as appropriate     Problem: Skin/Tissue Integrity  Goal: Absence of new skin breakdown  Description: 1.  Monitor for areas of redness and/or skin breakdown  2.  Assess vascular access sites hourly  3.  Every 4-6 hours minimum:  Change oxygen saturation probe site  4.  Every 4-6 hours:  If on nasal continuous positive airway pressure, respiratory therapy assess nares and determine need for appliance change or resting period.  Outcome: Progressing     Problem: Safety - Adult  Goal: Free from fall injury  Outcome: Progressing     Problem: Pain  Goal: Verbalizes/displays adequate comfort level or baseline comfort level  Outcome: Progressing

## 2024-01-07 NOTE — PROGRESS NOTES
Hospitalist Progress Note    Patient: Eryn Howell MRN: [id number]  [facility]: [id number]    YOB: 1955  Age: 68 y.o.  Sex: female    DOA: 1/4/2024 LOS:  LOS: 3 days          Chief Complaint:    COPD exacerbation.    Assessment/Plan   68 y.o. female with a history of COPD, PTSD, NSTEMI due to respiratory failure, chronic oxygen requirement and insomnia who presents to the emergency department for symptoms of malaise and worsening respiratory distress.  Admitted for acute respiratory failure with hypoxia due to COPD exacerbation.      Acute respiratory failure with hypoxia -  Continuous pulse oximetry  Oxygen supplementation to keep O2 sats between 91% and 92%     COPD exacerbation-  Appreciate Pulmonology expertise   Oxygen supplementation  Weaning Solu-Medrol IV down to 20mg q24hrs with switch to po prednisone tomorrow   Brovana nebs every 12 hours  Pulmicort nebs every 12 hours  DuoNebs every 4 hours while awake   Complete 5 day course of IV rocephin and azithromycin    Reviewed Pulm DC plan with pt and :   home O2, home nebulizer machine -pt has both at home already per    possible discharge planning -CM to do tomorrow   Recommend Trelegy inhaler 200 mcg once daily on discharge, prn albuterol inhaler, prn albuterol nebulizers as needed -will need on DC   Follow-up with me in 2 to 3 weeks in the office subsequently     SSI while on steroids for hyperglycemia      Acute influenza-  Influenza A positive  Complete tamiflu course   Supportive care     Elevated troponin-  Cardiology consulted  Echo Low normal left ventricular systolic function with a visually estimated EF of 50 - 55%. Left ventricle size is normal. Normal wall thickness. Normal wall motion.      History of PTSD -supportive care, restarted home dose of ativan 2mg po tid      History of insomnia -supportive care, adjust home medications,  brought med bottles      History of NSTEMI -telemetry monitoring     DVT and GI

## 2024-01-07 NOTE — PROGRESS NOTES
Cardiology Progress Note        Patient: Eryn Howell        Sex: female          DOA: 1/4/2024  YOB: 1955      Age:  68 y.o.        LOS:  LOS: 3 days       Patient seen and examined, chart reviewed    Assessment/Plan     Patient Active Problem List   Diagnosis    COPD with acute exacerbation (HCC)    COPD (chronic obstructive pulmonary disease) with emphysema (HCC)    PTSD (post-traumatic stress disorder)    Acute respiratory failure with hypoxemia (HCC)    Influenza A    Elevated troponin      Abnormal troponin no clear evidence of acute coronary syndrome could be demand ischemia     Echocardiogram revealed       Image quality is technically difficult.  Technically difficult study due to patient's body habitus and  low parasternal window.    Left Ventricle: Low normal left ventricular systolic function with a visually estimated EF of 50 - 55%. Left ventricle size is normal. Normal wall thickness. Normal wall motion. Grade I diastolic dysfunction present with normal LV EF.    Tricuspid Valve:  Mild regurgitation. The estimated RVSP is 29 mmHg.    Pericardium: Trivial localized pericardial effusion present around the right ventricle and right atrium. No indication of cardiac tamponade.        Plan:      Continue aspirin 81 mg by mouth once a day.    Continue current management for COPD exacerbation and influenza   Plan discussed with family member at bedside       Cardiology sign off   Follow-up in cardiology clinic in 4-6 work after discharge         Subjective:    cc:   Denies any chest pain      REVIEW OF SYSTEMS:     General: No fevers or chills.  Cardiovascular: No chest pain,No palpitations, No orthopnea, No PND, No leg swelling, No claudication  Pulmonary: No  dyspnea.   Gastrointestinal: No nausea, vomiting, bleeding  Neurology: No Dizziness    Objective:      Visit Vitals  BP (!) 130/90   Pulse 96   Temp 97.8 °F (36.6 °C) (Oral)   Resp 18   Ht 1.727 m (5' 8\")    Wt 74.8 kg (165 lb)   SpO2 92%   BMI 25.09 kg/m²     Body mass index is 25.09 kg/m².    Physical Exam:  General Appearance: Comfortable, not using accessory muscles of respiration.  HEENT: AGGIE.   HEAD: Atraumatic  NECK: No JVD, no thyroidomeglay. CAROTIDS: no bruit  LUNGS:  bilateral air entry equal but reduced   HEART: S1+S2 audible, no murmur, no pericardial rub.     ABD: Non-tender, BS Audible    EXT: No edema, and no cyanosis.  VASCULAR EXAM: Pulses are intact.    PSYCHIATRIC EXAM: Mood is appropriate.  MUSCULOSKELETAL: Grossly no joint deformity.  NEUROLOGICAL: AAO times 3, No motor and sensory deficit    Medication:  Current Facility-Administered Medications   Medication Dose Route Frequency    methylPREDNISolone sodium succ (SOLU-MEDROL) 20 mg in sterile water 0.5 mL injection  20 mg IntraVENous Q24H    [START ON 1/8/2024] predniSONE (DELTASONE) tablet 40 mg  40 mg Oral Daily    potassium chloride (KLOR-CON M) extended release tablet 40 mEq  40 mEq Oral Once    aspirin EC tablet 81 mg  81 mg Oral Daily    insulin lispro (HUMALOG) injection vial 0-4 Units  0-4 Units SubCUTAneous TID WC    insulin lispro (HUMALOG) injection vial 0-4 Units  0-4 Units SubCUTAneous Nightly    glucose chewable tablet 16 g  4 tablet Oral PRN    dextrose bolus 10% 125 mL  125 mL IntraVENous PRN    Or    dextrose bolus 10% 250 mL  250 mL IntraVENous PRN    glucagon (rDNA) injection 1 mg  1 mg SubCUTAneous PRN    dextrose 10 % infusion   IntraVENous Continuous PRN    imipramine (TOFRANIL) tablet 50 mg  50 mg Oral Nightly    buPROPion (WELLBUTRIN XL) extended release tablet 150 mg  150 mg Oral QAM    oseltamivir (TAMIFLU) capsule 75 mg  75 mg Oral BID    LORazepam (ATIVAN) tablet 2 mg  2 mg Oral TID    cefTRIAXone (ROCEPHIN) 1,000 mg in sodium chloride 0.9 % 50 mL IVPB (mini-bag)  1,000 mg IntraVENous Q24H    azithromycin (ZITHROMAX) 500 mg in sodium chloride 0.9% 250 mL (vial-mate/adapter) IVPB  500 mg IntraVENous Q24H

## 2024-01-07 NOTE — PROGRESS NOTES
Pulmonary Specialists  Pulmonary, Critical Care, and Sleep Medicine    Name: Eryn Howell MRN: [id number]   : 1955 Hospital: [facility]    Date: 2024  Room: [address] [place] [address] Note                                                                             Consult requesting physician: Jimmy Alaniz MD   Reason for Consult: COPD exacerbation    IMPRESSION:     Acute respiratory failure with hypoxemia  COPD exacerbation  Influenza A viral infection      Patient Active Problem List   Diagnosis Code    COPD with acute exacerbation ([facility]) J44.1    COPD (chronic obstructive pulmonary disease) with emphysema ([facility]) J43.9    PTSD (post-traumatic stress disorder) F43.10    Acute respiratory failure with hypoxemia ([facility]) J96.01    Influenza A J10.1    Elevated troponin R79.89       Code status: Full Code       RECOMMENDATIONS:     Patient with COPD exacerbation with influenza viral infection    Overall stable respirations  Nasal cannula oxygen-4 L; wean as tolerated; recommend incentive spirometry  ABG done yesterday-hypoxemia, mild hypercapnia, normal pH-FiO2 4 L  Chest x-ray -reviewed images and report and compared to prior-no focal infiltrates or consolidation; no pleural effusion; bilateral upper lobe emphysema changes; cardiac size normal size; stable chest x-ray findings with no pneumonia  Prior PET scan findings of calcified granulomas, bilateral upper lobe centrilobular emphysema changes  Prior FEV1-not known    Nebulizer treatments-Brovana and budesonide nebulizer twice daily; ipratropium/albuterol nebs every 4 hours while awake  Steroids-wean IV Solu-Medrol; switch to oral prednisone 40 mg daily from tomorrow  Anxiety episodes-patient on lorazepam 2 mg oral 3 times daily  Cough medication-Robitussin 200 mg every 6 hours oral  Keep SPO2 >=92%. HOB 30 degree elevation all the time.   Empiric antibiotic therapy-ceftriaxone and azithromycin; complete 5 to 7 days

## 2024-01-07 NOTE — PROGRESS NOTES
Physical Therapy Evalution Attempt    Chart reviewed.  Pt deferred to participate in physical therapy session due to:    []  Eating meal  []  Nausea  []  Dizziness  []  Lethargy  []  Lab Results  []  Blood Transfusion  []  Dialysis  []  Pain  [x]  Other:  PPE donned for pt evaluation attempt.  Pt presented w/ increased anxiety and reports she is waiting on her \"needed medication at 1300.\"  Home environment and PLOF were discussed for temporary distraction strategy.  Nurse Emily aware of pt need for medication.  Per pt report and spouse present pt had just ambulated to/from bathroom.  Pt not interested at this time to perform mobility.  Pt has rollator and SPC at home and uses intermittently as needed.  Pt on O2 support at home and does negotiate full flight of stairs every other day at baseline.    Will attempt tomorrow as pt schedule allows.    Isabela Elizabeth PT

## 2024-01-08 VITALS
TEMPERATURE: 98.6 F | HEIGHT: 68 IN | SYSTOLIC BLOOD PRESSURE: 111 MMHG | RESPIRATION RATE: 20 BRPM | WEIGHT: 165 LBS | DIASTOLIC BLOOD PRESSURE: 79 MMHG | OXYGEN SATURATION: 98 % | BODY MASS INDEX: 25.01 KG/M2 | HEART RATE: 95 BPM

## 2024-01-08 LAB
ANION GAP SERPL CALC-SCNC: 4 MMOL/L (ref 3–18)
BASOPHILS # BLD: 0 K/UL (ref 0–0.1)
BASOPHILS NFR BLD: 0 % (ref 0–2)
BUN SERPL-MCNC: 24 MG/DL (ref 7–18)
BUN/CREAT SERPL: 34 (ref 12–20)
CALCIUM SERPL-MCNC: 8.9 MG/DL (ref 8.5–10.1)
CHLORIDE SERPL-SCNC: 101 MMOL/L (ref 100–111)
CO2 SERPL-SCNC: 34 MMOL/L (ref 21–32)
CREAT SERPL-MCNC: 0.71 MG/DL (ref 0.6–1.3)
DIFFERENTIAL METHOD BLD: ABNORMAL
EOSINOPHIL # BLD: 0 K/UL (ref 0–0.4)
EOSINOPHIL NFR BLD: 0 % (ref 0–5)
ERYTHROCYTE [DISTWIDTH] IN BLOOD BY AUTOMATED COUNT: 12.6 % (ref 11.6–14.5)
GLUCOSE BLD STRIP.AUTO-MCNC: 225 MG/DL (ref 70–110)
GLUCOSE SERPL-MCNC: 193 MG/DL (ref 74–99)
HCT VFR BLD AUTO: 41.4 % (ref 35–45)
HGB BLD-MCNC: 13.2 G/DL (ref 12–16)
IMM GRANULOCYTES # BLD AUTO: 0.1 K/UL (ref 0–0.04)
IMM GRANULOCYTES NFR BLD AUTO: 1 % (ref 0–0.5)
LYMPHOCYTES # BLD: 1 K/UL (ref 0.9–3.6)
LYMPHOCYTES NFR BLD: 13 % (ref 21–52)
MCH RBC QN AUTO: 31 PG (ref 24–34)
MCHC RBC AUTO-ENTMCNC: 31.9 G/DL (ref 31–37)
MCV RBC AUTO: 97.2 FL (ref 78–100)
MONOCYTES # BLD: 0.6 K/UL (ref 0.05–1.2)
MONOCYTES NFR BLD: 7 % (ref 3–10)
NEUTS SEG # BLD: 6.4 K/UL (ref 1.8–8)
NEUTS SEG NFR BLD: 79 % (ref 40–73)
NRBC # BLD: 0 K/UL (ref 0–0.01)
NRBC BLD-RTO: 0 PER 100 WBC
PLATELET # BLD AUTO: 170 K/UL (ref 135–420)
PMV BLD AUTO: 10.7 FL (ref 9.2–11.8)
POTASSIUM SERPL-SCNC: 4 MMOL/L (ref 3.5–5.5)
RBC # BLD AUTO: 4.26 M/UL (ref 4.2–5.3)
SODIUM SERPL-SCNC: 139 MMOL/L (ref 136–145)
WBC # BLD AUTO: 8.1 K/UL (ref 4.6–13.2)

## 2024-01-08 PROCEDURE — 6370000000 HC RX 637 (ALT 250 FOR IP): Performed by: INTERNAL MEDICINE

## 2024-01-08 PROCEDURE — 97162 PT EVAL MOD COMPLEX 30 MIN: CPT

## 2024-01-08 PROCEDURE — 6370000000 HC RX 637 (ALT 250 FOR IP): Performed by: FAMILY MEDICINE

## 2024-01-08 PROCEDURE — 36415 COLL VENOUS BLD VENIPUNCTURE: CPT

## 2024-01-08 PROCEDURE — 2580000003 HC RX 258: Performed by: FAMILY MEDICINE

## 2024-01-08 PROCEDURE — 2500000003 HC RX 250 WO HCPCS: Performed by: INTERNAL MEDICINE

## 2024-01-08 PROCEDURE — 80048 BASIC METABOLIC PNL TOTAL CA: CPT

## 2024-01-08 PROCEDURE — 85025 COMPLETE CBC W/AUTO DIFF WBC: CPT

## 2024-01-08 PROCEDURE — 82962 GLUCOSE BLOOD TEST: CPT

## 2024-01-08 PROCEDURE — 6360000002 HC RX W HCPCS: Performed by: FAMILY MEDICINE

## 2024-01-08 PROCEDURE — 2700000000 HC OXYGEN THERAPY PER DAY

## 2024-01-08 PROCEDURE — 6370000000 HC RX 637 (ALT 250 FOR IP): Performed by: HOSPITALIST

## 2024-01-08 PROCEDURE — 94640 AIRWAY INHALATION TREATMENT: CPT

## 2024-01-08 RX ORDER — GUAIFENESIN 600 MG/1
600 TABLET, EXTENDED RELEASE ORAL 2 TIMES DAILY
Status: DISCONTINUED | OUTPATIENT
Start: 2024-01-08 | End: 2024-01-08 | Stop reason: HOSPADM

## 2024-01-08 RX ORDER — PREDNISONE 5 MG/1
TABLET ORAL
Qty: 1 EACH | Refills: 0 | Status: SHIPPED | OUTPATIENT
Start: 2024-01-08

## 2024-01-08 RX ORDER — IPRATROPIUM BROMIDE AND ALBUTEROL SULFATE 2.5; .5 MG/3ML; MG/3ML
3 SOLUTION RESPIRATORY (INHALATION) EVERY 4 HOURS PRN
Qty: 360 ML | Refills: 0 | Status: SHIPPED | OUTPATIENT
Start: 2024-01-08

## 2024-01-08 RX ORDER — GUAIFENESIN 600 MG/1
600 TABLET, EXTENDED RELEASE ORAL 2 TIMES DAILY
Qty: 10 TABLET | Refills: 0 | Status: SHIPPED | OUTPATIENT
Start: 2024-01-08

## 2024-01-08 RX ORDER — DOXYCYCLINE HYCLATE 100 MG
100 TABLET ORAL 2 TIMES DAILY
Qty: 10 TABLET | Refills: 0 | Status: SHIPPED | OUTPATIENT
Start: 2024-01-08 | End: 2024-01-13

## 2024-01-08 RX ORDER — OSELTAMIVIR PHOSPHATE 75 MG/1
75 CAPSULE ORAL 2 TIMES DAILY
Qty: 3 CAPSULE | Refills: 0 | Status: SHIPPED | OUTPATIENT
Start: 2024-01-08 | End: 2024-01-10

## 2024-01-08 RX ORDER — BENZONATATE 100 MG/1
100 CAPSULE ORAL 3 TIMES DAILY PRN
Qty: 21 CAPSULE | Refills: 0 | Status: SHIPPED | OUTPATIENT
Start: 2024-01-08 | End: 2024-01-15

## 2024-01-08 RX ORDER — FLUTICASONE FUROATE, UMECLIDINIUM BROMIDE AND VILANTEROL TRIFENATATE 200; 62.5; 25 UG/1; UG/1; UG/1
1 POWDER RESPIRATORY (INHALATION) DAILY
Qty: 1 EACH | Refills: 0 | Status: SHIPPED | OUTPATIENT
Start: 2024-01-08

## 2024-01-08 RX ADMIN — AZITHROMYCIN MONOHYDRATE 500 MG: 500 INJECTION, POWDER, LYOPHILIZED, FOR SOLUTION INTRAVENOUS at 11:14

## 2024-01-08 RX ADMIN — ARFORMOTEROL TARTRATE 15 MCG: 15 SOLUTION RESPIRATORY (INHALATION) at 09:41

## 2024-01-08 RX ADMIN — OSELTAMIVIR PHOSPHATE 75 MG: 75 CAPSULE ORAL at 07:55

## 2024-01-08 RX ADMIN — GUAIFENESIN 600 MG: 600 TABLET, EXTENDED RELEASE ORAL at 10:35

## 2024-01-08 RX ADMIN — IPRATROPIUM BROMIDE AND ALBUTEROL SULFATE 1 DOSE: 2.5; .5 SOLUTION RESPIRATORY (INHALATION) at 09:41

## 2024-01-08 RX ADMIN — ASPIRIN 81 MG: 81 TABLET ORAL at 07:52

## 2024-01-08 RX ADMIN — NYSTATIN 500000 UNITS: 100000 SUSPENSION ORAL at 14:21

## 2024-01-08 RX ADMIN — LORAZEPAM 2 MG: 1 TABLET ORAL at 14:20

## 2024-01-08 RX ADMIN — LORAZEPAM 2 MG: 1 TABLET ORAL at 07:52

## 2024-01-08 RX ADMIN — CEFTRIAXONE 1000 MG: 1 INJECTION, POWDER, FOR SOLUTION INTRAMUSCULAR; INTRAVENOUS at 10:35

## 2024-01-08 RX ADMIN — BUPROPION HYDROCHLORIDE 150 MG: 150 TABLET, EXTENDED RELEASE ORAL at 07:52

## 2024-01-08 RX ADMIN — INSULIN LISPRO 1 UNITS: 100 INJECTION, SOLUTION INTRAVENOUS; SUBCUTANEOUS at 12:20

## 2024-01-08 RX ADMIN — BUDESONIDE 500 MCG: 0.5 SUSPENSION RESPIRATORY (INHALATION) at 09:41

## 2024-01-08 RX ADMIN — SODIUM CHLORIDE, PRESERVATIVE FREE 10 ML: 5 INJECTION INTRAVENOUS at 07:54

## 2024-01-08 RX ADMIN — NYSTATIN 500000 UNITS: 100000 SUSPENSION ORAL at 07:53

## 2024-01-08 NOTE — PROGRESS NOTES
Physical Therapy Goals:  Initiated 1/8/2024 to be met within 7-10 days.  Short Term Goals  Short Term Goal 1: Patient will perform supine to/from sit with SBA  Short Term Goal 2: Patient will perform sit to/from stand with SBA  Short Term Goal 3: Patient will ambulate 50 ft with RW and SBA to simulate household negotiation  Short Term Goal 4: Patient will negotiate 3 stairs with handrails and CGA to simulate home entry  PHYSICAL THERAPY EVALUATION    Patient: Eryn Howell (68 y.o. female)  Date: 1/8/2024  Diagnosis: COPD with acute exacerbation (HCC) [J44.1]  Acute respiratory failure with hypoxia (HCC) [J96.01] COPD with acute exacerbation (HCC)  Precautions: Fall Risk (droplet)  PLOF: Independent ambulation without [id number]    ASSESSMENT :  Patient presents with decreased lower extremity strength, impaired balance, decreased gait quality and endurance and decreased overall functional mobility. Pt has all necessary equipment at home, good family support and home O2. Patient performed stand transfers with CGA. Patient ambulated 3 ft with RW and CGA; no overt loss of balance noted. SPO2 following ambulation 87% with sitting recovery for ~1 minute SPO2 increased to 91%. Patient would benefit from continued skilled PT services to progress functional mobility and endurance. Recommend home health PT and family support at d/c. RN at bedside upon exiting.     DEFICITS/IMPAIRMENTS:   Body Structures, Functions, Activity Limitations Requiring Skilled Therapeutic Intervention: Decreased functional mobility ;Decreased strength;Decreased endurance;Decreased balance    Patient will benefit from skilled intervention to address the above impairments.  Patient's rehabilitation potential/Therapy Prognosis: Good.  Factors which may influence rehabilitation potential include:   []         None noted  []         Mental ability/status  [x]         Medical condition  []         Home/family situation and support systems  []

## 2024-01-08 NOTE — DISCHARGE SUMMARY
Discharge Summary    Patient: Eryn Howell MRN: [id number]  CSN: [id number]    YOB: 1955  Age: 68 y.o.  Sex: female    DOA: 1/4/2024 LOS:  LOS: 4 days   Discharge Date:      Primary Care Provider:  Nish Sims DO    Admission Diagnoses: COPD with acute exacerbation (HCC) [J44.1]  Acute respiratory failure with hypoxia (HCC) [J96.01]    Discharge Diagnoses:    Active Hospital Problems    Diagnosis Date Noted    Influenza A [J10.1] 01/05/2024    Elevated troponin [R79.89] 01/05/2024    Acute respiratory failure with hypoxemia (HCC) [J96.01] 03/11/2017    PTSD (post-traumatic stress disorder) [F43.10] 03/09/2017    COPD with acute exacerbation (HCC) [J44.1] 03/06/2017       Discharge Condition: stable     Discharge Medications:        Medication List        START taking these medications      benzonatate 100 MG capsule  Commonly known as: TESSALON  Take 1 capsule by mouth 3 times daily as needed for Cough     doxycycline hyclate 100 MG tablet  Commonly known as: VIBRA-TABS  Take 1 tablet by mouth 2 times daily for 5 days     guaiFENesin 600 MG extended release tablet  Commonly known as: MUCINEX  Take 1 tablet by mouth 2 times daily     ipratropium 0.5 mg-albuterol 2.5 mg 0.5-2.5 (3) MG/3ML Soln nebulizer solution  Commonly known as: DUONEB  Inhale 3 mLs into the lungs every 4 hours as needed for Shortness of Breath     nystatin 911324 UNIT/ML suspension  Commonly known as: MYCOSTATIN  Take 5 mLs by mouth 4 times daily for 9 doses     oseltamivir 75 MG capsule  Commonly known as: TAMIFLU  Take 1 capsule by mouth 2 times daily for 3 doses     predniSONE 5 MG (21) Tbpk  As instructed     Trelegy Ellipta 200-62.5-25 MCG/ACT Aepb inhaler  Generic drug: fluticasone-umeclidin-vilant  Inhale 1 puff into the lungs daily            CONTINUE taking these medications      albuterol sulfate  (90 Base) MCG/ACT inhaler  Commonly known as: PROVENTIL;VENTOLIN;PROAIR     aspirin 81 MG EC tablet    requirement and insomnia who presents to the emergency department for symptoms of malaise and worsening respiratory distress.  Was seen in her PCPs office earlier today and sent via [facility] to [facility] ED for heart rate in the 180s.  When she arrived to the emergency room she was found to be in acute respiratory distress with wheezing and difficulty breathing.  She was given a loading dose of steroids, magnesium IV and multiple serial nebulizer treatments.  Initial blood gas was venous blood gases showed elevated CO2.  I requested an ABG be ordered and pCO2 was 48 and pO2 was 72.  Emergency room patient was also found to have an elevated troponin and Dr. Santamaria was consulted.  He advised trending cardiac enzymes and a second troponin was trending up start heparin in emergency room.  Should also be noted the patient was found to be influenza A positive    Hospital Course :   68 y.o. female with a history of COPD, PTSD, NSTEMI due to respiratory failure, chronic oxygen requirement and insomnia who presents to the emergency department for symptoms of malaise and worsening respiratory distress.  Admitted for acute respiratory failure with hypoxia due to COPD exacerbation. Since pt  was admitted, iv steroid/breathing treatment were administrated with empiric iv abx. Glucose was controlled per SSI. With the treatment, sob resolved and nc O2 remained home O2 level. She is cleared to be dc per pulmonologist. She also received tamiflu for flu A treatment.recommend to continue tamiflu. Cardiologist consulted for elevated trop, trop trending done, no acs. Continue aspirin and see cardiologist as out-pt for stress test as indicated. Echo done with normal EF.       Discharge planning discussed with patient and her daughter, daughter stated her mother's breathing was back to her baseline agrees  with the plan and no questions and concerns at this point.       Activity: activity as tolerated    Diet: cardiac  is 29 mmHg.   Pericardium: Trivial localized pericardial effusion present around the right ventricle and right atrium. No indication of cardiac tamponade.     XR CHEST PORTABLE    Result Date: 1/4/2024  INDICATION: Sepsis. Portable AP view of the chest. Direct comparison made to prior CT dated 6/2017. Cardiomediastinal silhouette is stable. There are biapical emphysematous changes. There is no focal air space process. There is no pleural fluid. There is no pneumothorax.     Biapical emphysematous changes. No focal air space process.             ALIZE MARRERO,Internal Medicine     CC: Nish Sims DO

## 2024-01-08 NOTE — FLOWSHEET NOTE
Patient discharged to home with written and verbal instructions given. Patient and belongings transported to awaiting vehicle via wheelchair.

## 2024-01-08 NOTE — PROGRESS NOTES
[facility]  Pulmonary, Critical Care, and Sleep Medicine    Name: Eryn Howell MRN: [id number]   : 1955 Hospital: [facility]    Date: 2024  Room: [address] [place] [address] Note                                                                             Consult requesting physician: Jimmy Alaniz MD   Reason for Consult: COPD exacerbation    IMPRESSION:     Acute respiratory failure with hypoxemia  COPD exacerbation  Influenza A viral infection      Patient Active Problem List   Diagnosis Code    COPD with acute exacerbation ([facility]) J44.1    COPD (chronic obstructive pulmonary disease) with emphysema ([facility]) J43.9    PTSD (post-traumatic stress disorder) F43.10    Acute respiratory failure with hypoxemia ([facility]) J96.01    Influenza A J10.1    Elevated troponin R79.89       Code status: Full Code       RECOMMENDATIONS:     Patient with COPD exacerbation with influenza viral infection    CXR 2024: No acute infiltrate.  ABG 2024: pH 7.44, pCO2 48.1, pO2 66, SpO2 93.2% on 4 LPM O2.    Prior PET scan findings of calcified granulomas, bilateral upper lobe centrilobular emphysema changes  Prior FEV1-not known    Overall patient's symptoms have significantly improved.  Oxygen therapy: Continue O2 4 LPM O2; wean as tolerated; SpO2 goal > 91%.  Patient has oxygen at home and using as needed; advised to use regularly for now.    Bronchodilator therapy: Currently on Brovana nebs twice daily, Pulmicort nebs twice daily, DuoNeb 4 times daily.  Change bronchodilator therapy to Trelegy Ellipta 200 mcg 1 puff daily, albuterol HFA and nebulized every 4 hours as needed for shortness of breath.  Patient has nebulizer machine at home.    Steroids: Currently on Solu-Medrol 20 mg IV daily.  Change steroids to prednisone 20 mg daily and taper over the next 5 to 7 days.    Anxiety treatment: On lorazepam 2 mg p.o. 3 times daily.    Continue Robitussin.    Antibiotics: On Rocephin  difficult.  Technically difficult study due to patient's body habitus and  low parasternal window.   Left Ventricle: Low normal left ventricular systolic function with a visually estimated EF of 50 - 55%. Left ventricle size is normal. Normal wall thickness. Normal wall motion. Grade I diastolic dysfunction present with normal LV EF.   Tricuspid Valve:  Mild regurgitation. The estimated RVSP is 29 mmHg.   Pericardium: Trivial localized pericardial effusion present around the right ventricle and right atrium. No indication of cardiac tamponade.     XR CHEST PORTABLE  Result Date: 1/4/2024  INDICATION: Sepsis. Portable AP view of the chest. Direct comparison made to prior CT dated 6/2017. Cardiomediastinal silhouette is stable. There are biapical emphysematous changes. There is no focal air space process. There is no pleural fluid. There is no pneumothorax.   Biapical emphysematous changes. No focal air space process.      PET SCAN 9/8/2022  INDICATION: Lesion of lung. Lung nodule in right upper lobe.    PRIOR PET: None   REFERENCES: CT neck 8/30/2022, CT chest 6/5/2017, head CT 3/4/2017.  IMPRESSION:   1. No hypermetabolic lung nodules. There are multiple granulomatous nodular lung opacities that have improved since 2017.  -New 3 mm right upper lobe nodule since 2017 can be followed for stability.   2. Indeterminate site of moderate activity along either the left anterior fifth rib or adjacent pleura. No clear CT explanation for the finding. Correlate with any recent trauma to the region. A 3 month CT chest follow-up study is recommended to reassess for occult lesion.   3. Other chronic CT findings as above. Abnormality of the anterior skull base/ethmoid sinus is stable since 2017 without abnormal activity. This can be assessed with MRI of the brain, if not already performed elsewhere, for polyp or encephalocele.           Please note: Voice-recognition software may have been used to generate this report, which may

## 2024-01-08 NOTE — PROGRESS NOTES
PT order received and chart reviewed. Pt asleep upon entering and family at bedside requesting not to wake patient. Family member reports patient has been getting up and moving around fine and does not need physical therapy. Will return later and initiate PT evaluation if patient agreeable.  Marcie Rosenberg, PT, DPT

## 2024-01-10 LAB
BACTERIA SPEC CULT: NORMAL
BACTERIA SPEC CULT: NORMAL
SERVICE CMNT-IMP: NORMAL
SERVICE CMNT-IMP: NORMAL

## 2024-01-12 LAB
EKG ATRIAL RATE: 88 BPM
EKG DIAGNOSIS: NORMAL
EKG P AXIS: 92 DEGREES
EKG P-R INTERVAL: 172 MS
EKG Q-T INTERVAL: 402 MS
EKG QRS DURATION: 86 MS
EKG QTC CALCULATION (BAZETT): 486 MS
EKG R AXIS: 103 DEGREES
EKG T AXIS: 98 DEGREES
EKG VENTRICULAR RATE: 88 BPM

## 2024-02-04 PROBLEM — R79.89 ELEVATED TROPONIN: Status: RESOLVED | Noted: 2024-01-05 | Resolved: 2024-02-04

## 2024-02-04 PROBLEM — J10.1 INFLUENZA A: Status: RESOLVED | Noted: 2024-01-05 | Resolved: 2024-02-04

## 2024-03-12 ENCOUNTER — HOSPITAL ENCOUNTER (OUTPATIENT)
Facility: HOSPITAL | Age: 69
Setting detail: RECURRING SERIES
Discharge: HOME | End: 2024-03-15
Payer: MEDICARE

## 2024-03-12 PROCEDURE — 97162 PT EVAL MOD COMPLEX 30 MIN: CPT

## 2024-03-12 NOTE — PROGRESS NOTES
PT DAILY TREATMENT NOTE/NEURO EVAL 10-18      Patient Name: Eryn Howell    Date: 3/12/2024    : 1955  Insurance: Payor: HU[employer]NA MEDICARE / Plan: HU[employer]NA CHOICE-PPO MEDICARE / Product Type: *No Product type* /      Patient  verified yes     Visit #   Current / Total 1 24   Time   In / Out 115 215   Pain   In / Out 5 5     TREATMENT AREA =  Other abnormalities of gait and mobility [R26.89]      SUBJECTIVE  Any medication changes, allergies to medications, adverse drug reactions, diagnosis change, or new procedure performed?: [x] No    [] Yes (see summary sheet for update)  Subjective functional status/changes:     PLOF: Pt has an active lifestyle likes to garden   Limitations to PLOF: difficulty walking, difficulty performing stairs, difficulty with standing, fear of falling   Mechanism of Injury: Pt reports that she is having pain in her lower sacrum and she fell 2 years ago off a ladder. She reported that she had a pelvic fracture and then left leg has gotten weaker since then.  Reports that she is falling a lot starting November.  She was hospitalized in January for the flu.  Pt reported 2 falls in the past month.    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 
been reviewed with PTA    Certification Period: 3/12/24-6/10/24  Karrieemerald De La Cruz, PT 3/12/2024 1:15 PM  ____________________________________________________________________  I certify that the above Therapy Services are being furnished while the patient is under my care. I agree with the treatment plan and certify that this therapy is necessary.    Physician's Signature:____________________________Date:_________TIME:________                                      Nish Sims DO  Insurance: Payor: HUMANA MEDICARE / Plan: HUMANA CHOICE-PPO MEDICARE / Product Type: *No Product type* /      ** Signature, Date and Time must be completed for valid certification **    [facility]  [address] [place] [address]  Ph [phone]  Fx [phone]

## 2024-03-15 ENCOUNTER — HOSPITAL ENCOUNTER (OUTPATIENT)
Facility: HOSPITAL | Age: 69
Setting detail: RECURRING SERIES
Discharge: HOME | End: 2024-03-18
Payer: MEDICARE

## 2024-03-15 PROCEDURE — 97530 THERAPEUTIC ACTIVITIES: CPT

## 2024-03-15 PROCEDURE — 97110 THERAPEUTIC EXERCISES: CPT

## 2024-03-15 NOTE — PROGRESS NOTES
order to maximize function and promote patient satisfaction with overall outcome.  Status at IE:43  FOTO score = an established functional score where 100 = no disability  Current:     Patient will improve 30 second STS test by 9 repititions to display MCID and progression to decreased falls risk.  Status at IE: 6  Current:    PLAN  Yes  Continue plan of care  []  Upgrade activities as tolerated  []  Discharge due to :  []  Other:    Surendra Rodriguez, MARY GRACE    3/15/2024    7:49 AM    Future Appointments   Date Time Provider Department Center   3/15/2024  8:30 AM Surendra Rodriguez, PTA [facility]   3/19/2024  2:30 PM MichaelSurendra pollard, PTA [facility]   3/21/2024  3:10 PM MichaelSurendra pollard, PTA [facility]   3/25/2024  9:10 AM Slack, Karrie, PT [facility]   3/27/2024  8:30 AM Slack, Karrie, PT [facility]   4/2/2024  2:30 PM Surendra Rodriguez, PTA [facility]   4/4/2024  2:30 PM MichaelSurendra pollard, PTA [facility]   4/9/2024  3:50 PM Slack, Karrie, PT [facility]   4/11/2024  2:30 PM MichaelSurendra pollard H, PTA [facility]   4/16/2024  3:10 PM Slack, Karrie, PT [facility]   4/18/2024  2:30 PM MichaelSurendra pollard H, PTA [facility]   4/23/2024  2:30 PM MichaelSurendra pollard H, PTA [facility]   4/25/2024  3:10 PM Slack, Karrie, PT [facility]   4/30/2024  2:30 PM MichaelSurendra pollard H, PTA [facility]   5/2/2024  1:50 PM Slack, Karrie, PT [facility]   5/7/2024  2:30 PM MichaelSurendra pollard H, PTA [facility]   5/9/2024  3:10 PM Slack, Karrie, PT [facility]   5/14/2024  2:30 PM Michael, Surendra H, PTA [facility]   5/16/2024  2:30 PM Michael, Surendra H, PTA [facility]   5/21/2024  2:30 PM Karrie De La Cruz, PT [facility]   5/23/2024  2:30 PM Michael, Surendra H, PTA [facility]   5/28/2024  2:30 PM Karrie De La Cruz, PT [facility]   5/30/2024  2:30 PM Michael, Surendra H, PTA [facility]   6/4/2024  2:30 PM Karrie De La Cruz, PT [facility]   6/6/2024  2:30 PM Michael, Surendra H, PTA [facility]

## 2024-03-19 ENCOUNTER — HOSPITAL ENCOUNTER (OUTPATIENT)
Facility: HOSPITAL | Age: 69
Setting detail: RECURRING SERIES
Discharge: HOME | End: 2024-03-22
Payer: MEDICARE

## 2024-03-19 PROCEDURE — 97530 THERAPEUTIC ACTIVITIES: CPT

## 2024-03-19 PROCEDURE — 97112 NEUROMUSCULAR REEDUCATION: CPT

## 2024-03-19 PROCEDURE — 97110 THERAPEUTIC EXERCISES: CPT

## 2024-03-19 NOTE — PROGRESS NOTES
PHYSICAL / OCCUPATIONAL THERAPY - DAILY TREATMENT NOTE     Patient Name: Eryn Howell    Date: 3/19/2024    : 1955  Insurance: Payor: HUMANA MEDICARE / Plan: HUMANA CHOICE-PPO MEDICARE / Product Type: *No Product type* /      Patient  verified Yes     Visit #   Current / Total 3 24   Time   In / Out 233 326   Pain   In / Out 6 5   Subjective Functional Status/Changes: Pt reported that she like to exercise better in a back room    Changes to:  Allergies, Med Hx, Sx Hx?   no       TREATMENT AREA =  Other abnormalities of gait and mobility [R26.89]    OBJECTIVE    Therapeutic Procedures:  Tx Min Billable or 1:1 Min (if diff from Tx Min) Procedure, Rationale, Specifics   30 30 87804 Therapeutic Exercise (timed):  increase ROM, strength, coordination, balance, and proprioception to improve patient's ability to progress to PLOF and address remaining functional goals. (see flow sheet as applicable)    Details if applicable:       15 15 72088 Neuromuscular Re-Education (timed):  improve balance, coordination, kinesthetic sense, posture, core stability and proprioception to improve patient's ability to develop conscious control of individual muscles and awareness of position of extremities in order to progress to PLOF and address remaining functional goals. (see flow sheet as applicable)    Details if applicable:     8 8 35822 Therapeutic Activity (timed):  use of dynamic activities replicating functional movements to increase ROM, strength, coordination, balance, and proprioception in order to improve patient's ability to progress to PLOF and address remaining functional goals.  (see flow sheet as applicable)     Details if applicable:     53 53 [facility] Totals Reminder: bill using total billable min of TIMED therapeutic procedures (example: do not include dry needle or estim unattended, both untimed codes, in totals to left)  8-22 min = 1 unit; 23-37 min = 2 units; 38-52 min = 3 units; 53-67 min = 4 units;

## 2024-03-19 NOTE — PROGRESS NOTES
PHYSICAL / OCCUPATIONAL THERAPY - DAILY TREATMENT NOTE     Patient Name: Eryn Howell    Date: 3/19/2024    : 1955  Insurance: Payor: HUMANA MEDICARE / Plan: HUMANA CHOICE-O MEDICARE / Product Type: *No Product type* /      Patient  verified {YES/NO:69384}     Visit #   Current / Total *** ***   Time   In / Out *** ***   Pain   In / Out *** ***   Subjective Functional Status/Changes: ***   Changes to:  Allergies, Med Hx, Sx Hx?   {YES/NO DIET:410140662}       TREATMENT AREA =  Other abnormalities of gait and mobility [R26.89]    OBJECTIVE    Modalities Rationale:     {[facility] Modality Rationale:05130} to improve patient's ability to progress to PLOF and address remaining functional goals.     min [] Estim Unattended, type/location:                                      []  w/ice    []  w/heat    min [] Estim Attended, type/location:                                     []  w/US     []  w/ice    []  w/heat    []  TENS insruct      min []  Mechanical Traction: type/lbs                   []  pro   []  sup   []  int   []  cont    []  before manual    []  after manual    min []  Ultrasound, settings/location:      min []  Iontophoresis w/ dexamethasone, location:                                               []  take home patch       []  in clinic        min  unbilled []  Ice     []  Heat    location/position:     min []  Paraffin,  details:     min []  Vasopneumatic Device, press/temp:     min []  Whirlpool / Fluido:    If using vaso (only need to measure limb vaso being performed on)      pre-treatment girth :       post-treatment girth :       measured at (landmark location) :      min []  Other:    Skin assessment post-treatment (if applicable):    []  intact    []  redness- no adverse reaction                 []redness - adverse reaction:         Therapeutic Procedures:  Tx Min Billable or 1:1 Min (if diff from Tx Min) Procedure, Rationale, Specifics   ***  {[facility] Ther Procedures

## 2024-03-21 ENCOUNTER — HOSPITAL ENCOUNTER (OUTPATIENT)
Facility: HOSPITAL | Age: 69
Setting detail: RECURRING SERIES
Discharge: HOME | End: 2024-03-24
Payer: MEDICARE

## 2024-03-21 PROCEDURE — 97110 THERAPEUTIC EXERCISES: CPT

## 2024-03-21 PROCEDURE — 97530 THERAPEUTIC ACTIVITIES: CPT

## 2024-03-21 PROCEDURE — 97112 NEUROMUSCULAR REEDUCATION: CPT

## 2024-03-25 ENCOUNTER — HOSPITAL ENCOUNTER (OUTPATIENT)
Facility: HOSPITAL | Age: 69
Setting detail: RECURRING SERIES
Discharge: HOME | End: 2024-03-28
Payer: MEDICARE

## 2024-03-25 PROCEDURE — 97530 THERAPEUTIC ACTIVITIES: CPT

## 2024-03-25 PROCEDURE — 97112 NEUROMUSCULAR REEDUCATION: CPT

## 2024-03-25 PROCEDURE — 97110 THERAPEUTIC EXERCISES: CPT

## 2024-03-25 NOTE — PROGRESS NOTES
PHYSICAL / OCCUPATIONAL THERAPY - DAILY TREATMENT NOTE     Patient Name: Eryn Howell    Date: 3/25/2024    : 1955  Insurance: Payor: HUMANA MEDICARE / Plan: HUMANA CHOICE-PPO MEDICARE / Product Type: *No Product type* /      Patient  verified Yes     Visit #   Current / Total 5 24   Time   In / Out 908 950   Pain   In / Out 6/10 6/10   Subjective Functional Status/Changes: Pt reported that her shoulder was hurting today    Changes to:  Allergies, Med Hx, Sx Hx?   no       TREATMENT AREA =  Other abnormalities of gait and mobility [R26.89]    OBJECTIVE    Therapeutic Procedures:  Tx Min Billable or 1:1 Min (if diff from Tx Min) Procedure, Rationale, Specifics   15 15 98156 Therapeutic Exercise (timed):  increase ROM, strength, coordination, balance, and proprioception to improve patient's ability to progress to PLOF and address remaining functional goals. (see flow sheet as applicable)    Details if applicable:       15 15 85447 Neuromuscular Re-Education (timed):  improve balance, coordination, kinesthetic sense, posture, core stability and proprioception to improve patient's ability to develop conscious control of individual muscles and awareness of position of extremities in order to progress to PLOF and address remaining functional goals. (see flow sheet as applicable)    Details if applicable:      82821 Therapeutic Activity (timed):  use of dynamic activities replicating functional movements to increase ROM, strength, coordination, balance, and proprioception in order to improve patient's ability to progress to PLOF and address remaining functional goals.  (see flow sheet as applicable)     Details if applicable:     42 42 [facility] Totals Reminder: bill using total billable min of TIMED therapeutic procedures (example: do not include dry needle or estim unattended, both untimed codes, in totals to left)  8-22 min = 1 unit; 23-37 min = 2 units; 38-52 min = 3 units; 53-67 min = 4 units; 68-82

## 2024-03-27 ENCOUNTER — APPOINTMENT (OUTPATIENT)
Facility: HOSPITAL | Age: 69
End: 2024-03-27
Payer: MEDICARE

## 2024-03-28 ENCOUNTER — HOSPITAL ENCOUNTER (OUTPATIENT)
Facility: HOSPITAL | Age: 69
Setting detail: RECURRING SERIES
Discharge: HOME | End: 2024-03-31
Payer: MEDICARE

## 2024-03-28 PROCEDURE — 97530 THERAPEUTIC ACTIVITIES: CPT

## 2024-03-28 PROCEDURE — 97110 THERAPEUTIC EXERCISES: CPT

## 2024-03-28 PROCEDURE — 97112 NEUROMUSCULAR REEDUCATION: CPT

## 2024-03-28 NOTE — PROGRESS NOTES
PHYSICAL / OCCUPATIONAL THERAPY - DAILY TREATMENT NOTE     Patient Name: Eryn Howell    Date: 3/28/2024    : 1955  Insurance: Payor: HUMANA MEDICARE / Plan: HUMANA CHOICE-PPO MEDICARE / Product Type: *No Product type* /      Patient  verified Yes     Visit #   Current / Total 6 224   Time   In / Out 110 207   Pain   In / Out 4 4   Subjective Functional Status/Changes: Pt reported that her left leg was very sore after last visit    Changes to:  Allergies, Med Hx, Sx Hx?   no       TREATMENT AREA =  Other abnormalities of gait and mobility [R26.89]    OBJECTIVE  Therapeutic Procedures:  Tx Min Billable or 1:1 Min (if diff from Tx Min) Procedure, Rationale, Specifics   17 17 17911 Therapeutic Exercise (timed):  increase ROM, strength, coordination, balance, and proprioception to improve patient's ability to progress to PLOF and address remaining functional goals. (see flow sheet as applicable)    Details if applicable:        40900 Neuromuscular Re-Education (timed):  improve balance, coordination, kinesthetic sense, posture, core stability and proprioception to improve patient's ability to develop conscious control of individual muscles and awareness of position of extremities in order to progress to PLOF and address remaining functional goals. (see flow sheet as applicable)    Details if applicable:     15 15 01471 Therapeutic Activity (timed):  use of dynamic activities replicating functional movements to increase ROM, strength, coordination, balance, and proprioception in order to improve patient's ability to progress to PLOF and address remaining functional goals.  (see flow sheet as applicable)     Details if applicable:     57 57 [facility] Totals Reminder: bill using total billable min of TIMED therapeutic procedures (example: do not include dry needle or estim unattended, both untimed codes, in totals to left)  8-22 min = 1 unit; 23-37 min = 2 units; 38-52 min = 3 units; 53-67 min = 4 units;

## 2024-04-01 ENCOUNTER — HOSPITAL ENCOUNTER (EMERGENCY)
Facility: HOSPITAL | Age: 69
Discharge: HOME | End: 2024-04-01
Attending: STUDENT IN AN ORGANIZED HEALTH CARE EDUCATION/TRAINING PROGRAM
Payer: MEDICARE

## 2024-04-01 ENCOUNTER — APPOINTMENT (OUTPATIENT)
Facility: HOSPITAL | Age: 69
End: 2024-04-01
Payer: MEDICARE

## 2024-04-01 VITALS
HEIGHT: 68 IN | SYSTOLIC BLOOD PRESSURE: 133 MMHG | HEART RATE: 81 BPM | OXYGEN SATURATION: 91 % | WEIGHT: 160 LBS | TEMPERATURE: 97.7 F | DIASTOLIC BLOOD PRESSURE: 109 MMHG | RESPIRATION RATE: 20 BRPM | BODY MASS INDEX: 24.25 KG/M2

## 2024-04-01 DIAGNOSIS — S40.011A CONTUSION OF MULTIPLE SITES OF RIGHT SHOULDER AND UPPER ARM, INITIAL ENCOUNTER: Primary | ICD-10-CM

## 2024-04-01 DIAGNOSIS — S40.021A CONTUSION OF MULTIPLE SITES OF RIGHT SHOULDER AND UPPER ARM, INITIAL ENCOUNTER: Primary | ICD-10-CM

## 2024-04-01 PROCEDURE — 6370000000 HC RX 637 (ALT 250 FOR IP): Performed by: STUDENT IN AN ORGANIZED HEALTH CARE EDUCATION/TRAINING PROGRAM

## 2024-04-01 PROCEDURE — 6360000002 HC RX W HCPCS: Performed by: STUDENT IN AN ORGANIZED HEALTH CARE EDUCATION/TRAINING PROGRAM

## 2024-04-01 PROCEDURE — 71046 X-RAY EXAM CHEST 2 VIEWS: CPT

## 2024-04-01 PROCEDURE — 99284 EMERGENCY DEPT VISIT MOD MDM: CPT

## 2024-04-01 PROCEDURE — 73020 X-RAY EXAM OF SHOULDER: CPT

## 2024-04-01 PROCEDURE — 73030 X-RAY EXAM OF SHOULDER: CPT

## 2024-04-01 PROCEDURE — 96372 THER/PROPH/DIAG INJ SC/IM: CPT

## 2024-04-01 RX ORDER — IBUPROFEN 600 MG/1
600 TABLET ORAL
Status: COMPLETED | OUTPATIENT
Start: 2024-04-01 | End: 2024-04-01

## 2024-04-01 RX ORDER — OXYCODONE HYDROCHLORIDE AND ACETAMINOPHEN 5; 325 MG/1; MG/1
1 TABLET ORAL EVERY 6 HOURS PRN
Qty: 12 TABLET | Refills: 0 | Status: SHIPPED | OUTPATIENT
Start: 2024-04-01 | End: 2024-04-10

## 2024-04-01 RX ORDER — OXYCODONE HYDROCHLORIDE AND ACETAMINOPHEN 5; 325 MG/1; MG/1
1 TABLET ORAL EVERY 6 HOURS PRN
Status: DISCONTINUED | OUTPATIENT
Start: 2024-04-01 | End: 2024-04-01

## 2024-04-01 RX ORDER — ACETAMINOPHEN 500 MG
1000 TABLET ORAL
Status: COMPLETED | OUTPATIENT
Start: 2024-04-01 | End: 2024-04-01

## 2024-04-01 RX ORDER — ONDANSETRON 4 MG/1
4 TABLET, ORALLY DISINTEGRATING ORAL
Status: COMPLETED | OUTPATIENT
Start: 2024-04-01 | End: 2024-04-01

## 2024-04-01 RX ORDER — MORPHINE SULFATE 10 MG/ML
6 INJECTION, SOLUTION INTRAMUSCULAR; INTRAVENOUS
Status: COMPLETED | OUTPATIENT
Start: 2024-04-01 | End: 2024-04-01

## 2024-04-01 RX ORDER — MORPHINE SULFATE 10 MG/ML
6 INJECTION, SOLUTION INTRAMUSCULAR; INTRAVENOUS
Status: DISCONTINUED | OUTPATIENT
Start: 2024-04-01 | End: 2024-04-01

## 2024-04-01 RX ADMIN — ACETAMINOPHEN 1000 MG: 500 TABLET ORAL at 20:03

## 2024-04-01 RX ADMIN — IBUPROFEN 600 MG: 600 TABLET, FILM COATED ORAL at 20:04

## 2024-04-01 RX ADMIN — MORPHINE SULFATE 6 MG: 10 INJECTION INTRAVENOUS at 22:35

## 2024-04-01 RX ADMIN — ONDANSETRON 4 MG: 4 TABLET, ORALLY DISINTEGRATING ORAL at 22:36

## 2024-04-01 NOTE — ED TRIAGE NOTES
Pt c/o R shoulder pain with possible deformity s/p fall today while getting into the bathtub. Pt denies head injury. Pt has limited ROM to R shoulder and has a hx of R Rotator Cuff Prosthesis. Sensation and distal pulses intact.

## 2024-04-02 ENCOUNTER — HOSPITAL ENCOUNTER (OUTPATIENT)
Facility: HOSPITAL | Age: 69
Setting detail: RECURRING SERIES
End: 2024-04-02
Payer: MEDICARE

## 2024-04-02 ENCOUNTER — TELEPHONE (OUTPATIENT)
Facility: HOSPITAL | Age: 69
End: 2024-04-02

## 2024-04-02 NOTE — DISCHARGE INSTRUCTIONS
You were seen today for your R shoulder pain. You should continue to take tylenol and ibuprofen. You can take up to 800mg ibuprofen every 4-6 hours and tylenol 1000mg every 8 hours. We have prescribed you percocet for break through pain. Please call your orthopaedist to follow up as soon as possible. Utilize the shoulder sling for the next 1-2 days and then start gentle range of motion exercises.

## 2024-04-03 NOTE — ED PROVIDER NOTES
agreement of the signs, symptoms, diagnosis, treatment and prognosis and additionally agrees to follow up as discussed.  She also agrees with the care-plan and conveys that all of her questions have been answered.  I have also provided discharge instructions for her that include: educational information regarding their diagnosis and treatment, and list of reasons why they would want to return to the ED prior to their follow-up appointment, should her condition change. She has been provided with education for proper emergency department utilization.     CLINICAL IMPRESSION:  1. Contusion of multiple sites of right shoulder and upper arm, initial encounter        PLAN:  D/C Home    DISCHARGE MEDICATIONS:  Discharge Medication List as of 4/1/2024 10:32 PM             Details   oxyCODONE-acetaminophen (PERCOCET) 5-325 MG per tablet Take 1 tablet by mouth every 6 hours as needed for Pain for up to 15 doses. Intended supply: 3 days. Take lowest dose possible to manage pain Max Daily Amount: 4 tablets, Disp-12 tablet, R-0Normal                Details   ipratropium 0.5 mg-albuterol 2.5 mg (DUONEB) 0.5-2.5 (3) MG/3ML SOLN nebulizer solution Inhale 3 mLs into the lungs every 4 hours as needed for Shortness of Breath, Disp-360 mL, R-0Normal      predniSONE 5 MG (21) TBPK As instructed, Disp-1 each, R-0Normal      guaiFENesin (MUCINEX) 600 MG extended release tablet Take 1 tablet by mouth 2 times daily, Disp-10 tablet, R-0Normal      fluticasone-umeclidin-vilant (TRELEGY ELLIPTA) 200-62.5-25 MCG/ACT AEPB inhaler Inhale 1 puff into the lungs daily, Disp-1 each, R-0Normal      albuterol sulfate HFA (PROVENTIL;VENTOLIN;PROAIR) 108 (90 Base) MCG/ACT inhaler Inhale 2 puffs into the lungs every 4 hours as neededHistorical Med      aspirin 81 MG EC tablet Take 1 tablet by mouth dailyHistorical Med      buPROPion (WELLBUTRIN XL) 150 MG extended release tablet Take 1 tablet by mouth every morningHistorical Med      imipramine (TOFRANIL)

## 2024-04-04 ENCOUNTER — APPOINTMENT (OUTPATIENT)
Facility: HOSPITAL | Age: 69
End: 2024-04-04
Payer: MEDICARE

## 2024-04-05 ENCOUNTER — HOSPITAL ENCOUNTER (OUTPATIENT)
Facility: HOSPITAL | Age: 69
Setting detail: RECURRING SERIES
Discharge: HOME | End: 2024-04-08
Payer: MEDICARE

## 2024-04-05 PROCEDURE — 97110 THERAPEUTIC EXERCISES: CPT

## 2024-04-05 PROCEDURE — 97112 NEUROMUSCULAR REEDUCATION: CPT

## 2024-04-05 PROCEDURE — 97530 THERAPEUTIC ACTIVITIES: CPT

## 2024-04-05 NOTE — PROGRESS NOTES
PHYSICAL / OCCUPATIONAL THERAPY - DAILY TREATMENT NOTE     Patient Name: Eryn Howell    Date: 2024    : 1955  Insurance: Payor: [employer] MEDICARE / Plan: HU[employer] CHOICE-PPO MEDICARE / Product Type: *No Product type* /      Patient  verified Yes     Visit #   Current / Total 7    Time   In / Out 1:11 1:52   Pain   In / Out 7/10 6/10   Subjective Functional Status/Changes: \"I fell on Monday (24) in the bathroom and I went to the ER. They said everything was ok but, I have some bruising on my Right Hip.\"   Changes to:  Allergies, Med Hx, Sx Hx?   no       TREATMENT AREA =  Other abnormalities of gait and mobility [R26.89]    OBJECTIVE    Therapeutic Procedures:  Tx Min Billable or 1:1 Min (if diff from Tx Min) Procedure, Rationale, Specifics   11  97471 Therapeutic Exercise (timed):  increase ROM, strength, coordination, balance, and proprioception to improve patient's ability to progress to PLOF and address remaining functional goals. (see flow sheet as applicable)    Details if applicable:       8  43094 Neuromuscular Re-Education (timed):  improve balance, coordination, kinesthetic sense, posture, core stability and proprioception to improve patient's ability to develop conscious control of individual muscles and awareness of position of extremities in order to progress to PLOF and address remaining functional goals. (see flow sheet as applicable)    Details if applicable:     12  44256 Therapeutic Activity (timed):  use of dynamic activities replicating functional movements to increase ROM, strength, coordination, balance, and proprioception in order to improve patient's ability to progress to PLOF and address remaining functional goals.  (see flow sheet as applicable)     Details if applicable:       73994 Self Care/Home Management (timed):  improve patient knowledge and understanding of pain reducing techniques, positioning, and posture/ergonomics  to improve patient's ability to progress

## 2024-04-09 ENCOUNTER — TELEPHONE (OUTPATIENT)
Facility: HOSPITAL | Age: 69
End: 2024-04-09

## 2024-04-09 ENCOUNTER — HOSPITAL ENCOUNTER (OUTPATIENT)
Facility: HOSPITAL | Age: 69
Setting detail: RECURRING SERIES
Discharge: HOME | End: 2024-04-12
Payer: MEDICARE

## 2024-04-09 NOTE — TELEPHONE ENCOUNTER
pt's spouse informed me 10min after arrival that they needed to leave/cxl the therapy session today (a change in meds causing hallucinations)

## 2024-04-09 NOTE — PROGRESS NOTES
[facility]  [address] [place] [address]  Ph [phone]  Fx [phone]    Physical Therapy Progress Note  Patient name: Eryn Howell Start of Care: 3/12/2024   Referral source: Nish Sims DO : 1955               Medical Diagnosis: Other abnormalities of gait and mobility [R26.89]    Onset Date:2023               Treatment Diagnosis: R26.89  Abnormalities of gait and mobility     Prior Hospitalization: see medical history Provider#: [id number]   Medications: Verified on Patient summary List   Comorbidities:  hyper parathyroid removal, Shoulder replacement right, OA, lumbar radiculopathy, 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     Thank you for this referral.   Karrie De La Cruz, PT 2024 11:19 AM    
  Current:     Patient will improve MiniBESTest score to 25/28 to demonstrate decreased risk for falls.  Status at IE: 10/28  Current:     Patient will ambulate 1000ft on level surface with no AD and normalized gait of step over step gait with heel toe pattern.  Status at IE: narrow DAMARI, decreased step length, decreased heel strike  Current:     Patient will improve FOTO by 7 points overall in order to maximize function and promote patient satisfaction with overall outcome.  Status at IE: 43  FOTO score = an established functional score where 100 = no disability  Current:     Patient will improve 30 second STS test by 9 repititions to display MCID and progression to decreased falls risk.  Status at IE: 6  Current: Pt demonstrates good sit to stand performance without UE assistance 4/5/24    PLAN  Yes  Continue plan of care  []  Upgrade activities as tolerated  []  Discharge due to :  []  Other:    Karrie De La Cruz PT    4/9/2024    11:17 AM    Future Appointments   Date Time Provider Department Center   4/9/2024  3:50 PM Karrie De La Cruz, PT [facility]   4/11/2024  2:30 PM Michael, Surendra H, PTA [facility]   4/16/2024  3:10 PM Karrie De La Cruz, PT [facility]   4/18/2024  2:30 PM Michael, Surendra H, PTA [facility]   4/23/2024  2:30 PM Michael Surendra H, PTA [facility]   4/25/2024  3:10 PM Isaac Johnson, PT [facility]   4/30/2024  2:30 PM Michael Surendra H, PTA [facility]   5/2/2024  1:50 PM Karrie De La Cruz, PT [facility]   5/7/2024  2:30 PM Michael, Surendra H, PTA [facility]   5/9/2024  3:10 PM Karrie De La Cruz, PT [facility]   5/14/2024  2:30 PM Michael, Surendra H, PTA [facility]   5/16/2024  2:30 PM Michael, Surendra H, PTA [facility]   5/21/2024  2:30 PM Karrie De La Cruz, PT [facility]   5/23/2024  2:30 PM [facility]   5/28/2024  2:30 PM [facility] La [facility]   5/30/2024  2:30 PM [facility] Rodriguez[facility]   6/4/2024  2:30 PM [facility] La [facility]   6/6/2024  2:30 PM Surendra Rodriguez, PTA

## 2024-04-12 ENCOUNTER — HOSPITAL ENCOUNTER (OUTPATIENT)
Facility: HOSPITAL | Age: 69
Setting detail: RECURRING SERIES
Discharge: HOME | End: 2024-04-15
Payer: MEDICARE

## 2024-04-12 PROCEDURE — 97110 THERAPEUTIC EXERCISES: CPT

## 2024-04-12 PROCEDURE — 97535 SELF CARE MNGMENT TRAINING: CPT

## 2024-04-12 PROCEDURE — 97530 THERAPEUTIC ACTIVITIES: CPT

## 2024-04-12 PROCEDURE — 97112 NEUROMUSCULAR REEDUCATION: CPT

## 2024-04-12 NOTE — PROGRESS NOTES
PHYSICAL / OCCUPATIONAL THERAPY - DAILY TREATMENT NOTE     Patient Name: Eryn Howell    Date: 2024    : 1955  Insurance: Payor: [employer] MEDICARE / Plan: [employer] CHOICE-O MEDICARE / Product Type: *No Product type* /      Patient  verified Yes     Visit #   Current / Total 8 24   Time   In / Out 9:12 10:05   Pain   In / Out 4/10 4/10   Subjective Functional Status/Changes: \"I have some pain in my back.\"   Changes to:  Allergies, Med Hx, Sx Hx?   no       TREATMENT AREA =  Other abnormalities of gait and mobility [R26.89]    OBJECTIVE    Therapeutic Procedures:  Tx Min Billable or 1:1 Min (if diff from Tx Min) Procedure, Rationale, Specifics   15  53545 Therapeutic Exercise (timed):  increase ROM, strength, coordination, balance, and proprioception to improve patient's ability to progress to PLOF and address remaining functional goals. (see flow sheet as applicable)    Details if applicable:       20  16507 Neuromuscular Re-Education (timed):  improve balance, coordination, kinesthetic sense, posture, core stability and proprioception to improve patient's ability to develop conscious control of individual muscles and awareness of position of extremities in order to progress to PLOF and address remaining functional goals. (see flow sheet as applicable)    Details if applicable:     13  14044 Therapeutic Activity (timed):  use of dynamic activities replicating functional movements to increase ROM, strength, coordination, balance, and proprioception in order to improve patient's ability to progress to PLOF and address remaining functional goals.  (see flow sheet as applicable)     Details if applicable:     8  66041 Self Care/Home Management (timed):  improve patient knowledge and understanding of pain reducing techniques, positioning, and posture/ergonomics  to improve patient's ability to progress to PLOF and address remaining functional goals.  (see flow sheet as applicable)    Details if

## 2024-04-12 NOTE — PROGRESS NOTES
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  Status at IE: gave STS    24 PN: Pt reports mostly daily compliance with HEP of 1x/day with occasional 2x/day     Patient will decrease TUG by 6 seconds to display MCID and progression to decreased falls risk.  Status at IE: 16 sec   Current: 10 sec 24 MET     Patient will improve 30 second STS test by 5 repititions to display MCID and progression to decreased falls risk.  Status at IE: 6              24 PN: 9 reps with UE usage Progressing     Patient will be able to raise on to toes for 3 sec with good balance through full range in order to improve fall risk.  Status at IE: 1-2 sec at a time partial range   24 PN: 1-2 sec with partial range No Change     Long Term Goals: To be accomplished in 24 treatments:  Patient will increase strength to 4/5 throughout Volodymyr LEs to aid in return recreational activities and ADLs.  Status at IE:   Hip Left Right   Flexion 3 3+   Extension 3+ 3   Abduction 3+ 3+   Knee Left Right   Extension 4+

## 2024-04-16 ENCOUNTER — HOSPITAL ENCOUNTER (OUTPATIENT)
Facility: HOSPITAL | Age: 69
Setting detail: RECURRING SERIES
Discharge: HOME | End: 2024-04-19
Payer: MEDICARE

## 2024-04-16 PROCEDURE — 97530 THERAPEUTIC ACTIVITIES: CPT

## 2024-04-16 PROCEDURE — 97112 NEUROMUSCULAR REEDUCATION: CPT

## 2024-04-16 PROCEDURE — 97110 THERAPEUTIC EXERCISES: CPT

## 2024-04-16 NOTE — PROGRESS NOTES
PHYSICAL / OCCUPATIONAL THERAPY - DAILY TREATMENT NOTE     Patient Name: Eryn Howell    Date: 2024    : 1955  Insurance: Payor: [employer] MEDICARE / Plan: HU[employer] CHOICE-PPO MEDICARE / Product Type: *No Product type* /      Patient  verified Yes     Visit #   Current / Total 9 24   Time   In / Out 305 401   Pain   In / Out 5/10 5/10   Subjective Functional Status/Changes: Pt reported that she was so sore after last session.    Changes to:  Allergies, Med Hx, Sx Hx?   no       TREATMENT AREA =  Other abnormalities of gait and mobility [R26.89]    OBJECTIVE    Therapeutic Procedures:  Tx Min Billable or 1:1 Min (if diff from Tx Min) Procedure, Rationale, Specifics   30 30 [id number] Therapeutic Exercise (timed):  increase ROM, strength, coordination, balance, and proprioception to improve patient's ability to progress to PLOF and address remaining functional goals. (see flow sheet as applicable)    Details if applicable:       15 15 75337 Neuromuscular Re-Education (timed):  improve balance, coordination, kinesthetic sense, posture, core stability and proprioception to improve patient's ability to develop conscious control of individual muscles and awareness of position of extremities in order to progress to PLOF and address remaining functional goals. (see flow sheet as applicable)    Details if applicable:      26784 Therapeutic Activity (timed):  use of dynamic activities replicating functional movements to increase ROM, strength, coordination, balance, and proprioception in order to improve patient's ability to progress to PLOF and address remaining functional goals.  (see flow sheet as applicable)     Details if applicable:     56 56 [facility] Totals Reminder: bill using total billable min of TIMED therapeutic procedures (example: do not include dry needle or estim unattended, both untimed codes, in totals to left)  8-22 min = 1 unit; 23-37 min = 2 units; 38-52 min = 3 units; 53-67 min = 4 units;

## 2024-04-18 ENCOUNTER — HOSPITAL ENCOUNTER (OUTPATIENT)
Facility: HOSPITAL | Age: 69
Setting detail: RECURRING SERIES
Discharge: HOME | End: 2024-04-21
Payer: MEDICARE

## 2024-04-18 PROCEDURE — 97530 THERAPEUTIC ACTIVITIES: CPT

## 2024-04-18 PROCEDURE — 97110 THERAPEUTIC EXERCISES: CPT

## 2024-04-18 PROCEDURE — 97112 NEUROMUSCULAR REEDUCATION: CPT

## 2024-04-18 NOTE — PROGRESS NOTES
53-67 min = 4 units; 68-82 min = 5 units   Total Total     TOTAL TREATMENT TIME:        48     [x]  Patient Education billed concurrently with other procedures   [x] Review HEP    [] Progressed/Changed HEP, detail:    [] Other detail:       Objective Information/Functional Measures/Assessment    Patient tolerated treatment session well today. Patient had no complaints with addition of piriformis stretch to exercise program to accomplish improved pain in left hip. Pt had increased SOB today and difficulty with managing her breathing but was able to accomplish increased STSs today.  Patient continues to make steady progress toward goals and would benefit from continued skilled PT intervention to address remaining deficits outlined in goals below.    Patient will continue to benefit from skilled PT / OT services to modify and progress therapeutic interventions, analyze and address functional mobility deficits, analyze and address ROM deficits, analyze and address strength deficits, analyze and address soft tissue restrictions, analyze and cue for proper movement patterns, analyze and modify for postural abnormalities, analyze and address imbalance/dizziness, and instruct in home and community integration to address functional deficits and attain remaining goals.    Progress toward goals / Updated goals:  []  See Progress Note/Recertification    Short Term Goals: To be accomplished in 12 treatments:  Patient will report compliance with HEP at least 1x/day to aid in rehabilitation program.  Status at IE: gave STS    4/12/24 PN: Pt reports mostly daily compliance with HEP of 1x/day with occasional 2x/day     Patient will decrease TUG by 6 seconds to display MCID and progression to decreased falls risk.  Status at IE: 16 sec   Current: 10 sec 4/12/24 MET     Patient will improve 30 second STS test by 5 repititions to display MCID and progression to decreased falls risk.  Status at IE: 6              4/12/24 PN: 9 reps with

## 2024-04-23 ENCOUNTER — HOSPITAL ENCOUNTER (OUTPATIENT)
Facility: HOSPITAL | Age: 69
Setting detail: RECURRING SERIES
Discharge: HOME | End: 2024-04-26
Payer: MEDICARE

## 2024-04-23 PROCEDURE — 97112 NEUROMUSCULAR REEDUCATION: CPT

## 2024-04-23 PROCEDURE — 97530 THERAPEUTIC ACTIVITIES: CPT

## 2024-04-23 PROCEDURE — 97110 THERAPEUTIC EXERCISES: CPT

## 2024-04-23 NOTE — PROGRESS NOTES
PHYSICAL / OCCUPATIONAL THERAPY - DAILY TREATMENT NOTE     Patient Name: Eryn Howell    Date: 2024    : 1955  Insurance: Payor: [employer] MEDICARE / Plan: HU[employer]NA CHOICE-PPO MEDICARE / Product Type: *No Product type* /      Patient  verified Yes     Visit #   Current / Total 11 24   Time   In / Out 230 310   Pain   In / Out Shoulders 7/10 Shoulders 7/10   Subjective Functional Status/Changes: Pt reported that she hasn't had to wear a diaper in the past week.  She reported that she had great things to say to her PCP about therapy.  Pt reported that she was upset that she was noted to have 1 NCNS.     Changes to:  Allergies, Med Hx, Sx Hx?   no       TREATMENT AREA =  Other abnormalities of gait and mobility [R26.89]    OBJECTIVE    Therapeutic Procedures:  Tx Min Billable or 1:1 Min (if diff from Tx Min) Procedure, Rationale, Specifics   15 15 69763 Self Care/Home Management (timed):  improve patient knowledge and understanding of physical therapy expectations, procedures and progression  to improve patient's ability to progress to PLOF and address remaining functional goals.  (see flow sheet as applicable)    Details if applicable:       15 15 50650 Therapeutic Exercise (timed):  increase ROM, strength, coordination, balance, and proprioception to improve patient's ability to progress to PLOF and address remaining functional goals. (see flow sheet as applicable)    Details if applicable:     10 10 62969 Neuromuscular Re-Education (timed):  improve balance, coordination, kinesthetic sense, posture, core stability and proprioception to improve patient's ability to develop conscious control of individual muscles and awareness of position of extremities in order to progress to PLOF and address remaining functional goals. (see flow sheet as applicable)     Details if applicable:     40 40 [facility] Totals Reminder: bill using total billable min of TIMED therapeutic procedures (example: do not include dry

## 2024-04-25 ENCOUNTER — TELEPHONE (OUTPATIENT)
Facility: HOSPITAL | Age: 69
End: 2024-04-25

## 2024-04-25 ENCOUNTER — HOSPITAL ENCOUNTER (OUTPATIENT)
Facility: HOSPITAL | Age: 69
Setting detail: RECURRING SERIES
End: 2024-04-25
Payer: MEDICARE

## 2024-04-30 ENCOUNTER — HOSPITAL ENCOUNTER (OUTPATIENT)
Facility: HOSPITAL | Age: 69
Setting detail: RECURRING SERIES
Discharge: HOME | End: 2024-05-03
Payer: MEDICARE

## 2024-04-30 PROCEDURE — 97112 NEUROMUSCULAR REEDUCATION: CPT

## 2024-04-30 PROCEDURE — 97530 THERAPEUTIC ACTIVITIES: CPT

## 2024-04-30 PROCEDURE — 97110 THERAPEUTIC EXERCISES: CPT

## 2024-04-30 NOTE — PROGRESS NOTES
an established functional score where 100 = no disability        Patient will improve 30 second STS test by 9 repititions to display MCID and progression to decreased falls risk.  Status at IE: 6  4/12/24 PN: 9 reps with UE usage Progressing    PLAN  Yes  Continue plan of care  []  Upgrade activities as tolerated  []  Discharge due to :  []  Other:    Surendra MOORE MARY GRACE Rodriguez    4/30/2024    8:00 AM    Future Appointments   Date Time Provider Department Center   4/30/2024  2:30 PM Michael, Surendra H, PTA [facility]   5/2/2024  1:50 PM Karrie De La Cruz, PT [facility]   5/7/2024  2:30 PM Michael, Surendra H, PTA [facility]   5/9/2024  3:10 PM Karrie De La Cruz, PT [facility]   5/14/2024  2:30 PM Surendra Rodriguez H, PTA [facility]   5/16/2024  2:30 PM Michael, Surendra H, PTA [facility]   5/21/2024  2:30 PM Karrie De La Cruz, PT [facility]   5/23/2024  2:30 PM Michael, Surendra H, PTA [facility]   5/28/2024  2:30 PM Karrie De La Cruz, PT [facility]   5/30/2024  2:30 PM Michael, Surendra H, PTA [facility]   6/4/2024  2:30 PM Karrie De La Cruz, PT [facility]   6/6/2024  2:30 PM Michael Surendra H, PTA [facility]

## 2024-05-02 ENCOUNTER — HOSPITAL ENCOUNTER (OUTPATIENT)
Facility: HOSPITAL | Age: 69
Setting detail: RECURRING SERIES
Discharge: HOME | End: 2024-05-05
Payer: MEDICARE

## 2024-05-02 PROCEDURE — 97110 THERAPEUTIC EXERCISES: CPT

## 2024-05-02 PROCEDURE — 97530 THERAPEUTIC ACTIVITIES: CPT

## 2024-05-02 PROCEDURE — 97112 NEUROMUSCULAR REEDUCATION: CPT

## 2024-05-02 NOTE — PROGRESS NOTES
STS test by 5 repititions to display MCID and progression to decreased falls risk.  Status at IE: 6              4/12/24 PN: 9 reps with UE usage Progressing              4/18/24: pt performed 2 sets of 9 reps today      Patient will be able to raise on to toes for 3 sec with good balance through full range in order to improve fall risk.  Status at IE: 1-2 sec at a time partial range   4/12/24 PN: 1-2 sec with partial range No Change  5/2/24: full range 2 finger support 3 sec hold.  PROGRESSING      Long Term Goals: To be accomplished in 24 treatments:  Patient will increase strength to 4/5 throughout Simon LEs to aid in return recreational activities and ADLs.  Status at IE:   Hip Left Right Left 4/16/24 Right 4/16/24   Flexion 3 3+ 5 4   Extension 3+ 3 NT NT   Abduction 3+ 3+ 3+ 3+   Knee Left Right       Extension 4+ 5 5 5   Flexion 3+ 3+ 5 4   Ankle           Plantar Flexion 2 2 NT NT   Dorsiflexion 4- 4 4- 4   4/12/24 PN: unable to test due to time constraints   4/16/24: see above Progressing      Patient will improve MiniBESTest score to 25/28 to demonstrate decreased risk for falls.  Status at IE: 10/28  4/12/24 PN: 17/28 Progressing               Current:pt demonstrates continued progression of SLS and general static balance exercises 4/30/24     Patient will ambulate 1000ft on level surface with no AD and normalized gait of step over step gait with heel toe pattern.  Status at IE: narrow DAMARI, decreased step length, decreased heel strike  4/12/24 PN: pt able to amb without AD with good step through gait mechanics with mild lateral deviations Progressing     Patient will improve FOTO by 7 points overall in order to maximize function and promote patient satisfaction with overall outcome.  Status at IE: 43  4/12/24 PN: pt deferred  4/16/24: 48 Progressing   FOTO score = an established functional score where 100 = no disability        Patient will improve 30 second STS test by 9 repititions to display MCID and

## 2024-05-07 ENCOUNTER — HOSPITAL ENCOUNTER (OUTPATIENT)
Facility: HOSPITAL | Age: 69
Setting detail: RECURRING SERIES
Discharge: HOME | End: 2024-05-10
Payer: MEDICARE

## 2024-05-07 PROCEDURE — 97112 NEUROMUSCULAR REEDUCATION: CPT

## 2024-05-07 PROCEDURE — 97530 THERAPEUTIC ACTIVITIES: CPT

## 2024-05-07 PROCEDURE — 97110 THERAPEUTIC EXERCISES: CPT

## 2024-05-07 PROCEDURE — 97535 SELF CARE MNGMENT TRAINING: CPT

## 2024-05-07 NOTE — PROGRESS NOTES
Is This A New Presentation, Or A Follow-Up?: Discoloration
PHYSICAL / OCCUPATIONAL THERAPY - DAILY TREATMENT NOTE     Patient Name: Eryn Howell    Date: 2024    : 1955  Insurance: Payor: [employer] MEDICARE / Plan: [employer] CHOICE-PPO MEDICARE / Product Type: *No Product type* /      Patient  verified Yes     Visit #   Current / Total 14 24   Time   In / Out 2:27 3:28   Pain   In / Out 6/10 4/10   Subjective Functional Status/Changes: \"I have pain in my Left Hip and down my leg.\"   Changes to:  Allergies, Med Hx, Sx Hx?   no       TREATMENT AREA =  Other abnormalities of gait and mobility [R26.89]    OBJECTIVE    Therapeutic Procedures:  Tx Min Billable or 1:1 Min (if diff from Tx Min) Procedure, Rationale, Specifics   31  98117 Therapeutic Exercise (timed):  increase ROM, strength, coordination, balance, and proprioception to improve patient's ability to progress to PLOF and address remaining functional goals. (see flow sheet as applicable)    Details if applicable:       10  59570 Neuromuscular Re-Education (timed):  improve balance, coordination, kinesthetic sense, posture, core stability and proprioception to improve patient's ability to develop conscious control of individual muscles and awareness of position of extremities in order to progress to PLOF and address remaining functional goals. (see flow sheet as applicable)    Details if applicable:     12  46798 Therapeutic Activity (timed):  use of dynamic activities replicating functional movements to increase ROM, strength, coordination, balance, and proprioception in order to improve patient's ability to progress to PLOF and address remaining functional goals.  (see flow sheet as applicable)     Details if applicable:     8  27670 Self Care/Home Management (timed):  improve patient knowledge and understanding of pain reducing techniques, positioning, and posture/ergonomics  to improve patient's ability to progress to PLOF and address remaining functional goals.  (see flow sheet as 
How Severe Is It?: mild

## 2024-05-09 ENCOUNTER — HOSPITAL ENCOUNTER (OUTPATIENT)
Facility: HOSPITAL | Age: 69
Setting detail: RECURRING SERIES
Discharge: HOME | End: 2024-05-12
Payer: MEDICARE

## 2024-05-09 PROCEDURE — 97112 NEUROMUSCULAR REEDUCATION: CPT

## 2024-05-09 PROCEDURE — 97530 THERAPEUTIC ACTIVITIES: CPT

## 2024-05-09 PROCEDURE — 97110 THERAPEUTIC EXERCISES: CPT

## 2024-05-09 NOTE — PROGRESS NOTES
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  Status at IE: gave STS    24 PN: Pt reports mostly daily compliance with HEP of 1x/day with occasional 2x/day  24 PN: Pt completes daily GOAL MET      Patient will decrease TUG by 6 seconds to display MCID and progression to decreased falls risk.  Status at IE: 16 sec   Current: 10 sec 24 MET     Patient will improve 30 second STS test by 5 repititions to display MCID and progression to decreased falls risk.  Status at IE: 6              24 PN: 9 reps with UE usage Progressing              24 PN: 10 using Volodymyr UE PROGRESSING      Patient will be able to raise on to toes for 3 sec with good balance through full range in order to improve fall risk.  Status at IE: 1-2 sec at a time partial range   24 PN: 1-2 sec with partial range No Change  24 PN: 3 partial range PROGRESSING      Long Term Goals: To be accomplished in 24 treatments:  Patient will increase strength to 4/5 throughout Volodymyr

## 2024-05-09 NOTE — PROGRESS NOTES
PHYSICAL / OCCUPATIONAL THERAPY - DAILY TREATMENT NOTE     Patient Name: Eryn Howell    Date: 2024    : 1955  Insurance: Payor: [employer] MEDICARE / Plan: [employer] CHOICE-PPO MEDICARE / Product Type: *No Product type* /      Patient  verified Yes     Visit #   Current / Total 15 24   Time   In / Out 315 355   Pain   In / Out 5 5   Subjective Functional Status/Changes: PT reported that after last session with PT she had increased left leg pain    Changes to:  Allergies, Med Hx, Sx Hx?   no       TREATMENT AREA =  Other abnormalities of gait and mobility [R26.89]    OBJECTIVE    Therapeutic Procedures:  Tx Min Billable or 1:1 Min (if diff from Tx Min) Procedure, Rationale, Specifics   15 15 15873 Therapeutic Exercise (timed):  increase ROM, strength, coordination, balance, and proprioception to improve patient's ability to progress to PLOF and address remaining functional goals. (see flow sheet as applicable)    Details if applicable:       15 15 14118 Neuromuscular Re-Education (timed):  improve balance, coordination, kinesthetic sense, posture, core stability and proprioception to improve patient's ability to develop conscious control of individual muscles and awareness of position of extremities in order to progress to PLOF and address remaining functional goals. (see flow sheet as applicable)    Details if applicable:     10 10 15904 Therapeutic Activity (timed):  use of dynamic activities replicating functional movements to increase ROM, strength, coordination, balance, and proprioception in order to improve patient's ability to progress to PLOF and address remaining functional goals.  (see flow sheet as applicable)     Details if applicable:     40 40 [facility] BC Totals Reminder: bill using total billable min of TIMED therapeutic procedures (example: do not include dry needle or estim unattended, both untimed codes, in totals to left)  8-22 min = 1 unit; 23-37 min = 2 units; 38-52 min = 3 units; 53-67

## 2024-05-14 ENCOUNTER — HOSPITAL ENCOUNTER (OUTPATIENT)
Facility: HOSPITAL | Age: 69
Setting detail: RECURRING SERIES
Discharge: HOME | End: 2024-05-17
Payer: MEDICARE

## 2024-05-14 PROCEDURE — 97530 THERAPEUTIC ACTIVITIES: CPT

## 2024-05-14 PROCEDURE — 97110 THERAPEUTIC EXERCISES: CPT

## 2024-05-14 PROCEDURE — 97112 NEUROMUSCULAR REEDUCATION: CPT

## 2024-05-14 NOTE — PROGRESS NOTES
PN: 10 using Simon UE PROGRESSING      Patient will be able to raise on to toes for 3 sec with good balance through full range in order to improve fall risk.  Status at IE: 1-2 sec at a time partial range   4/12/24 PN: 1-2 sec with partial range No Change  5/9/24 PN: 3 partial range PROGRESSING   Current:      Long Term Goals: To be accomplished in 24 treatments:  Patient will increase strength to 4/5 throughout Simon LEs to aid in return recreational activities and ADLs.  Status at IE:   Hip Left Right Left 4/16/24 Right 4/16/24 Left   5/9/24 Right 5/9/24   Flexion 3 3+ 5 4 5 4   Extension 3+ 3 NT NT 4 4   Abduction 3+ 3+ 3+ 3+ 4 4   Knee Left Right           Extension 4+ 5 5 5 5 5   Flexion 3+ 3+ 5 4 5 4   Ankle               Plantar Flexion 2 2 NT NT 2+ 2+   Dorsiflexion 4- 4 4- 4 5 4+   4/12/24 PN: unable to test due to time constraints   5/9/24 PN: see above      Patient will improve MiniBESTest score to 25/28 to demonstrate decreased risk for falls.  Status at IE: 10/28  4/12/24 PN: 17/28 Progressing              5/9/24 PN: 19/28     Patient will ambulate 1000ft on level surface with no AD and normalized gait of step over step gait with heel toe pattern.  Status at IE: narrow DAMARI, decreased step length, decreased heel strike  4/12/24 PN: pt able to amb without AD with good step through gait mechanics with mild lateral deviations Progressing  5/9/24 PN: 150 feet with good step length and no lateral deviations      Patient will improve FOTO by 7 points overall in order to maximize function and promote patient satisfaction with overall outcome.  Status at IE: 43  4/12/24 PN: pt deferred  4/16/24: 48 Progressing   FOTO score = an established functional score where 100 = no disability        Patient will improve 30 second STS test by 9 repititions to display MCID and progression to decreased falls risk.  Status at IE: 6  4/12/24 PN: 9 reps with UE usage Progressing                        5/9/24 PN: 10 reps    Current:

## 2024-05-16 ENCOUNTER — HOSPITAL ENCOUNTER (OUTPATIENT)
Facility: HOSPITAL | Age: 69
Setting detail: RECURRING SERIES
Discharge: HOME | End: 2024-05-19
Payer: MEDICARE

## 2024-05-16 PROCEDURE — 97110 THERAPEUTIC EXERCISES: CPT

## 2024-05-16 PROCEDURE — 97535 SELF CARE MNGMENT TRAINING: CPT

## 2024-05-16 PROCEDURE — 97112 NEUROMUSCULAR REEDUCATION: CPT

## 2024-05-16 PROCEDURE — 97530 THERAPEUTIC ACTIVITIES: CPT

## 2024-05-16 NOTE — PROGRESS NOTES
PHYSICAL / OCCUPATIONAL THERAPY - DAILY TREATMENT NOTE     Patient Name: Eryn Howell    Date: 2024    : 1955  Insurance: Payor: [employer] MEDICARE / Plan: [employer] CHOICE-O MEDICARE / Product Type: *No Product type* /      Patient  verified Yes     Visit #   Current / Total 17 24   Time   In / Out 2:28 3:28   Pain   In / Out 5/10 4/10   Subjective Functional Status/Changes: \"I have pain in my Left Hip.\"   Changes to:  Allergies, Med Hx, Sx Hx?   no       TREATMENT AREA =  Other abnormalities of gait and mobility [R26.89]    OBJECTIVE    Therapeutic Procedures:  Tx Min Billable or 1:1 Min (if diff from Tx Min) Procedure, Rationale, Specifics   20  28595 Therapeutic Exercise (timed):  increase ROM, strength, coordination, balance, and proprioception to improve patient's ability to progress to PLOF and address remaining functional goals. (see flow sheet as applicable)    Details if applicable:       18  74196 Neuromuscular Re-Education (timed):  improve balance, coordination, kinesthetic sense, posture, core stability and proprioception to improve patient's ability to develop conscious control of individual muscles and awareness of position of extremities in order to progress to PLOF and address remaining functional goals. (see flow sheet as applicable)    Details if applicable:     12  22949 Therapeutic Activity (timed):  use of dynamic activities replicating functional movements to increase ROM, strength, coordination, balance, and proprioception in order to improve patient's ability to progress to PLOF and address remaining functional goals.  (see flow sheet as applicable)     Details if applicable:     10  86483 Self Care/Home Management (timed):  improve patient knowledge and understanding of pain reducing techniques, positioning, and posture/ergonomics  to improve patient's ability to progress to PLOF and address remaining functional goals.  (see flow sheet as applicable)    Details if

## 2024-05-21 ENCOUNTER — HOSPITAL ENCOUNTER (OUTPATIENT)
Facility: HOSPITAL | Age: 69
Setting detail: RECURRING SERIES
Discharge: HOME | End: 2024-05-24
Payer: MEDICARE

## 2024-05-21 PROCEDURE — 97530 THERAPEUTIC ACTIVITIES: CPT

## 2024-05-21 PROCEDURE — 97110 THERAPEUTIC EXERCISES: CPT

## 2024-05-21 PROCEDURE — 97112 NEUROMUSCULAR REEDUCATION: CPT

## 2024-05-21 NOTE — PROGRESS NOTES
PHYSICAL / OCCUPATIONAL THERAPY - DAILY TREATMENT NOTE     Patient Name: Eryn Howell    Date: 2024    : 1955  Insurance: Payor: [employer] MEDICARE / Plan: [employer] CHOICE-PPO MEDICARE / Product Type: *No Product type* /      Patient  verified Yes     Visit #   Current / Total 18 24   Time   In / Out *** ***   Pain   In / Out *** ***   Subjective Functional Status/Changes: ***   Changes to:  Allergies, Med Hx, Sx Hx?   no       TREATMENT AREA =  Other abnormalities of gait and mobility [R26.89]    OBJECTIVE    Modalities Rationale:     decrease edema, decrease inflammation, and decrease pain to improve patient's ability to progress to PLOF and address remaining functional goals.     min [] Estim Unattended, type/location:                                      []  w/ice    []  w/heat    min [] Estim Attended, type/location:                                     []  w/US     []  w/ice    []  w/heat    []  TENS insruct      min []  Mechanical Traction: type/lbs                   []  pro   []  sup   []  int   []  cont    []  before manual    []  after manual    min []  Ultrasound, settings/location:      min []  Iontophoresis w/ dexamethasone, location:                                               []  take home patch       []  in clinic        min  unbilled []  Ice     []  Heat    location/position:     min []  Paraffin,  details:     min []  Vasopneumatic Device, press/temp:     min []  Whirlpool / Fluido:    If using vaso (only need to measure limb vaso being performed on)      pre-treatment girth :       post-treatment girth :       measured at (landmark location) :      min []  Other:    Skin assessment post-treatment (if applicable):    [x]  intact    []  redness- no adverse reaction                 []redness - adverse reaction:         Therapeutic Procedures:  Tx Min Billable or 1:1 Min (if diff from Tx Min) Procedure, Rationale, Specifics   ***  80157 Therapeutic Exercise (timed):  increase ROM, 
seconds to display MCID and progression to decreased falls risk.  Status at IE: 16 sec   Current: 10 sec 4/12/24 MET     Patient will improve 30 second STS test by 5 repititions to display MCID and progression to decreased falls risk.  Status at IE: 6              4/12/24 PN: 9 reps with UE usage Progressing              5/9/24 PN: 10 using Simon UE PROGRESSING    5/21/24: Patient was able to complete 10 continuous un-timed STS from chair with airex pad without UE assist.     Patient will be able to raise on to toes for 3 sec with good balance through full range in order to improve fall risk.  Status at IE: 1-2 sec at a time partial range   4/12/24 PN: 1-2 sec with partial range No Change  5/9/24 PN: 3 partial range PROGRESSING   Current:  3 sec with partial range 5/16/24     Long Term Goals: To be accomplished in 24 treatments:  Patient will increase strength to 4/5 throughout Simon LEs to aid in return recreational activities and ADLs.  Status at IE:   Hip Left Right Left 4/16/24 Right 4/16/24 Left   5/9/24 Right 5/9/24   Flexion 3 3+ 5 4 5 4   Extension 3+ 3 NT NT 4 4   Abduction 3+ 3+ 3+ 3+ 4 4   Knee Left Right           Extension 4+ 5 5 5 5 5   Flexion 3+ 3+ 5 4 5 4   Ankle               Plantar Flexion 2 2 NT NT 2+ 2+   Dorsiflexion 4- 4 4- 4 5 4+   4/12/24 PN: unable to test due to time constraints   5/9/24 PN: see above      Patient will improve MiniBESTest score to 25/28 to demonstrate decreased risk for falls.  Status at IE: 10/28  4/12/24 PN: 17/28 Progressing              5/9/24 PN: 19/28     Patient will ambulate 1000ft on level surface with no AD and normalized gait of step over step gait with heel toe pattern.  Status at IE: narrow DAMARI, decreased step length, decreased heel strike  4/12/24 PN: pt able to amb without AD with good step through gait mechanics with mild lateral deviations Progressing  5/9/24 PN: 150 feet with good step length and no lateral deviations      Patient will improve FOTO by 7

## 2024-05-23 ENCOUNTER — HOSPITAL ENCOUNTER (OUTPATIENT)
Facility: HOSPITAL | Age: 69
Setting detail: RECURRING SERIES
Discharge: HOME | End: 2024-05-26
Payer: MEDICARE

## 2024-05-23 PROCEDURE — 97530 THERAPEUTIC ACTIVITIES: CPT

## 2024-05-23 PROCEDURE — 97110 THERAPEUTIC EXERCISES: CPT

## 2024-05-23 NOTE — PROGRESS NOTES
PHYSICAL / OCCUPATIONAL THERAPY - DAILY TREATMENT NOTE     Patient Name: Eryn Howell    Date: 2024    : 1955  Insurance: Payor: [employer] MEDICARE / Plan: [employer] CHOICE-PPO MEDICARE / Product Type: *No Product type* /      Patient  verified Yes     Visit #   Current / Total 19 24   Time   In / Out 2:30 3:10   Pain   In / Out 6/10 5/10   Subjective Functional Status/Changes: \"I have some pain in my Left hip.\"   Changes to:  Allergies, Med Hx, Sx Hx?   no       TREATMENT AREA =  Other abnormalities of gait and mobility [R26.89]    OBJECTIVE    Therapeutic Procedures:  Tx Min Billable or 1:1 Min (if diff from Tx Min) Procedure, Rationale, Specifics   28  30955 Therapeutic Exercise (timed):  increase ROM, strength, coordination, balance, and proprioception to improve patient's ability to progress to PLOF and address remaining functional goals. (see flow sheet as applicable)    Details if applicable:         64984 Neuromuscular Re-Education (timed):  improve balance, coordination, kinesthetic sense, posture, core stability and proprioception to improve patient's ability to develop conscious control of individual muscles and awareness of position of extremities in order to progress to PLOF and address remaining functional goals. (see flow sheet as applicable)    Details if applicable:     12  65816 Therapeutic Activity (timed):  use of dynamic activities replicating functional movements to increase ROM, strength, coordination, balance, and proprioception in order to improve patient's ability to progress to PLOF and address remaining functional goals.  (see flow sheet as applicable)     Details if applicable:       28169 Self Care/Home Management (timed):  improve patient knowledge and understanding of pain reducing techniques, positioning, and posture/ergonomics  to improve patient's ability to progress to PLOF and address remaining functional goals.  (see flow sheet as applicable)    Details if

## 2024-05-28 ENCOUNTER — HOSPITAL ENCOUNTER (OUTPATIENT)
Facility: HOSPITAL | Age: 69
Setting detail: RECURRING SERIES
Discharge: HOME | End: 2024-05-31
Payer: MEDICARE

## 2024-05-28 PROCEDURE — 97112 NEUROMUSCULAR REEDUCATION: CPT

## 2024-05-28 PROCEDURE — 97530 THERAPEUTIC ACTIVITIES: CPT

## 2024-05-28 PROCEDURE — 97110 THERAPEUTIC EXERCISES: CPT

## 2024-05-28 PROCEDURE — 97535 SELF CARE MNGMENT TRAINING: CPT

## 2024-05-28 NOTE — PROGRESS NOTES
PHYSICAL / OCCUPATIONAL THERAPY - DAILY TREATMENT NOTE     Patient Name: Eryn Howell    Date: 2024    : 1955  Insurance: Payor: [employer] MEDICARE / Plan: [employer] CHOICE-O MEDICARE / Product Type: *No Product type* /      Patient  verified Yes     Visit #   Current / Total 20 24   Time   In / Out 2:30 3:23   Pain   In / Out 6/10 6/10   Subjective Functional Status/Changes: \"I have pain in the left hip.\"   Changes to:  Allergies, Med Hx, Sx Hx?   no       TREATMENT AREA =  Other abnormalities of gait and mobility [R26.89]    OBJECTIVE    Therapeutic Procedures:  Tx Min Billable or 1:1 Min (if diff from Tx Min) Procedure, Rationale, Specifics   21  36915 Therapeutic Exercise (timed):  increase ROM, strength, coordination, balance, and proprioception to improve patient's ability to progress to PLOF and address remaining functional goals. (see flow sheet as applicable)    Details if applicable:       10  18414 Neuromuscular Re-Education (timed):  improve balance, coordination, kinesthetic sense, posture, core stability and proprioception to improve patient's ability to develop conscious control of individual muscles and awareness of position of extremities in order to progress to PLOF and address remaining functional goals. (see flow sheet as applicable)    Details if applicable:     12  69741 Therapeutic Activity (timed):  use of dynamic activities replicating functional movements to increase ROM, strength, coordination, balance, and proprioception in order to improve patient's ability to progress to PLOF and address remaining functional goals.  (see flow sheet as applicable)     Details if applicable:     10  84579 Self Care/Home Management (timed):  improve patient knowledge and understanding of pain reducing techniques, positioning, and posture/ergonomics  to improve patient's ability to progress to PLOF and address remaining functional goals.  (see flow sheet as applicable)    Details if

## 2024-05-30 ENCOUNTER — HOSPITAL ENCOUNTER (OUTPATIENT)
Facility: HOSPITAL | Age: 69
Setting detail: RECURRING SERIES
End: 2024-05-30
Payer: MEDICARE

## 2024-05-30 PROCEDURE — 97112 NEUROMUSCULAR REEDUCATION: CPT

## 2024-05-30 PROCEDURE — 97110 THERAPEUTIC EXERCISES: CPT

## 2024-05-30 PROCEDURE — 97530 THERAPEUTIC ACTIVITIES: CPT

## 2024-05-30 NOTE — PROGRESS NOTES
1x/day with occasional 2x/day  5/9/24 PN: Pt completes daily GOAL MET      Patient will decrease TUG by 6 seconds to display MCID and progression to decreased falls risk.  Status at IE: 16 sec   Current: 10 sec 4/12/24 MET     Patient will improve 30 second STS test by 5 repititions to display MCID and progression to decreased falls risk.  Status at IE: 6              4/12/24 PN: 9 reps with UE usage Progressing              5/9/24 PN: 10 using Simon UE PROGRESSING               5/21/24: Patient was able to complete 10 continuous un-timed STS from chair with airex pad without UE assist.     Patient will be able to raise on to toes for 3 sec with good balance through full range in order to improve fall risk.  Status at IE: 1-2 sec at a time partial range   4/12/24 PN: 1-2 sec with partial range No Change  5/9/24 PN: 3 partial range PROGRESSING   Current:  3 sec with partial range 5/16/24     Long Term Goals: To be accomplished in 24 treatments:  Patient will increase strength to 4/5 throughout Simon LEs to aid in return recreational activities and ADLs.  Status at IE:   Hip Left Right Left 4/16/24 Right 4/16/24 Left   5/9/24 Right 5/9/24   Flexion 3 3+ 5 4 5 4   Extension 3+ 3 NT NT 4 4   Abduction 3+ 3+ 3+ 3+ 4 4   Knee Left Right           Extension 4+ 5 5 5 5 5   Flexion 3+ 3+ 5 4 5 4   Ankle               Plantar Flexion 2 2 NT NT 2+ 2+   Dorsiflexion 4- 4 4- 4 5 4+   4/12/24 PN: unable to test due to time constraints   5/9/24 PN: see above     Current: Pt continues to progress with resistance levels for PRE's with moderate difficulty but, no increased pain 5/30/24    Patient will improve MiniBESTest score to 25/28 to demonstrate decreased risk for falls.  Status at IE: 10/28  4/12/24 PN: 17/28 Progressing              5/9/24 PN: 19/28     Patient will ambulate 1000ft on level surface with no AD and normalized gait of step over step gait with heel toe pattern.  Status at IE: narrow DAMARI, decreased step length,

## 2024-06-04 ENCOUNTER — APPOINTMENT (OUTPATIENT)
Facility: HOSPITAL | Age: 69
End: 2024-06-04
Payer: MEDICARE

## 2024-06-04 ENCOUNTER — TELEPHONE (OUTPATIENT)
Facility: HOSPITAL | Age: 69
End: 2024-06-04

## 2024-06-06 ENCOUNTER — HOSPITAL ENCOUNTER (OUTPATIENT)
Facility: HOSPITAL | Age: 69
Setting detail: RECURRING SERIES
Discharge: HOME | End: 2024-06-09
Payer: MEDICARE

## 2024-06-06 PROCEDURE — 97530 THERAPEUTIC ACTIVITIES: CPT

## 2024-06-06 PROCEDURE — 97112 NEUROMUSCULAR REEDUCATION: CPT

## 2024-06-06 NOTE — PROGRESS NOTES
PHYSICAL / OCCUPATIONAL THERAPY - DAILY TREATMENT NOTE     Patient Name: Eryn Howell    Date: 2024    : 1955  Insurance: Payor: [employer] MEDICARE / Plan: [employer] CHOICE-PPO MEDICARE / Product Type: *No Product type* /      Patient  verified Yes     Visit #   Current / Total 22 24   Time   In / Out 233 310   Pain   In / Out 6 6   Subjective Functional Status/Changes: Pt reported that she had a panic attack before coming today.  Pt also has a script for shoulder pain that she will bring next visit.    Changes to:  Allergies, Med Hx, Sx Hx?   no       TREATMENT AREA =  Other abnormalities of gait and mobility [R26.89]    OBJECTIVE    Therapeutic Procedures:  Tx Min Billable or 1:1 Min (if diff from Tx Min) Procedure, Rationale, Specifics   15 15 49858 Therapeutic Exercise (timed):  increase ROM, strength, coordination, balance, and proprioception to improve patient's ability to progress to PLOF and address remaining functional goals. (see flow sheet as applicable)    Details if applicable:       15 5 41244 Neuromuscular Re-Education (timed):  improve balance, coordination, kinesthetic sense, posture, core stability and proprioception to improve patient's ability to develop conscious control of individual muscles and awareness of position of extremities in order to progress to PLOF and address remaining functional goals. (see flow sheet as applicable)    Details if applicable:     7 7 70611 Therapeutic Activity (timed):  use of dynamic activities replicating functional movements to increase ROM, strength, coordination, balance, and proprioception in order to improve patient's ability to progress to PLOF and address remaining functional goals.  (see flow sheet as applicable)     Details if applicable:     37 37 [facility] Totals Reminder: bill using total billable min of TIMED therapeutic procedures (example: do not include dry needle or estim unattended, both untimed codes, in totals to left)  8-22 min = 1

## 2024-06-14 ENCOUNTER — HOSPITAL ENCOUNTER (OUTPATIENT)
Facility: HOSPITAL | Age: 69
Setting detail: RECURRING SERIES
Discharge: HOME | End: 2024-06-17
Payer: MEDICARE

## 2024-06-14 PROCEDURE — 97110 THERAPEUTIC EXERCISES: CPT

## 2024-06-14 PROCEDURE — 97112 NEUROMUSCULAR REEDUCATION: CPT

## 2024-06-14 PROCEDURE — 97530 THERAPEUTIC ACTIVITIES: CPT

## 2024-06-14 NOTE — PROGRESS NOTES
1x/day with occasional 2x/day  5/9/24 PN: Pt completes daily GOAL MET      Patient will decrease TUG by 6 seconds to display MCID and progression to decreased falls risk.  Status at IE: 16 sec   Current: 10 sec 4/12/24 MET     Patient will improve 30 second STS test by 5 repititions to display MCID and progression to decreased falls risk.  Status at IE: 6              4/12/24 PN: 9 reps with UE usage Progressing              5/9/24 PN: 10 using Simon UE PROGRESSING               6/14/24 RECERT: 10 reps using SIMON UE SAME      Patient will be able to raise on to toes for 3 sec with good balance through full range in order to improve fall risk.  Status at IE: 1-2 sec at a time partial range   4/12/24 PN: 1-2 sec with partial range No Change  5/9/24 PN: 3 partial range PROGRESSING   6/6/24: 3 sec through full range GOAL MET      Long Term Goals: To be accomplished in 24 treatments:  Patient will increase strength to 4/5 throughout Simon LEs to aid in return recreational activities and ADLs.  Status at IE:   Hip Left Right Left 4/16/24 Right 4/16/24 Left   5/9/24 Right 5/9/24 Left 6/6/24 Right 6/6/24   Flexion 3 3+ 5 4 5 4 5 4   Extension 3+ 3 NT NT 4 4 4 4   Abduction 3+ 3+ 3+ 3+ 4 4 4 4   Knee Left Right               Extension 4+ 5 5 5 5 5 5 5   Flexion 3+ 3+ 5 4 5 4 5 4   Ankle                   Plantar Flexion 2 2 NT NT 2+ 2+ 2+ 2+   Dorsiflexion 4- 4 4- 4 5 4+ 5 5   4/12/24 PN: unable to test due to time constraints   5/9/24 PN: see above              6/6/24 RECERT: see above      Patient will improve MiniBESTest score to 25/28 to demonstrate decreased risk for falls.  Status at IE: 10/28  4/12/24 PN: 17/28 Progressing              5/9/24 PN: 19/28 6/14/24 RECERT: 21/28     Patient will ambulate 1000ft on level surface with no AD and normalized gait of step over step gait with heel toe pattern.  Status at IE: narrow DAMARI, decreased step length, decreased heel strike  4/12/24 PN: pt able to amb without AD with good step

## 2024-06-14 NOTE — PROGRESS NOTES
deviations Progressing  5/9/24 PN: 150 feet with good step length and no lateral deviations   6/14/24 RECERT:  150 feet with good step length and no lateral deviations      Patient will improve FOTO by 7 points overall in order to maximize function and promote patient satisfaction with overall outcome.  Status at IE: 43  4/12/24 PN: pt deferred  4/16/24: 48 Progressing   6/14/24 RECERT: 49 Progressing   FOTO score = an established functional score where 100 = no disability     Patient will improve 30 second STS test by 9 repititions to display MCID and progression to decreased falls risk.  Status at IE: 6  4/12/24 PN: 9 reps with UE usage Progressing              5/9/24 PN: 10 reps               6/14/24 RECERT: 10 reps SAME     Frequency / Duration: Patient to be seen 2 times per week for 24 treatments:    Assessment / Recommendations:Patient has been treated for 23 treatments since initial POC. They have made good progress toward goals, meeting 3/4 STG and progressing toward all LTGs. Pt has been limited in progressing toward LTG by pts severe anxiety and PTSD.  Pt suffers with panic attacks and they limit her participation some days.  PT has adjusted treatment to treating her in back room and away from other clinicians and patients.  Patient tolerated treatment session fairly today and had high anxiety of her testing.   Due to the above issues Pt would benefit from an extension of services to continue to address balance deficits and strength.  Patient continues to make steady progress toward goals and would benefit from continued skilled PT intervention to address remaining deficits outlined in goals below.     Patient will continue to benefit from skilled PT / OT services to modify and progress therapeutic interventions, analyze and address functional mobility deficits, analyze and address ROM deficits, analyze and address strength deficits, analyze and address soft tissue restrictions, analyze and cue for proper

## 2024-06-21 ENCOUNTER — HOSPITAL ENCOUNTER (OUTPATIENT)
Facility: HOSPITAL | Age: 69
Setting detail: RECURRING SERIES
Discharge: HOME | End: 2024-06-24
Payer: MEDICARE

## 2024-06-21 PROCEDURE — 97535 SELF CARE MNGMENT TRAINING: CPT

## 2024-06-21 PROCEDURE — 97530 THERAPEUTIC ACTIVITIES: CPT

## 2024-06-21 PROCEDURE — 97110 THERAPEUTIC EXERCISES: CPT

## 2024-06-21 NOTE — PROGRESS NOTES
decrease TUG by 6 seconds to display MCID and progression to decreased falls risk.  Status at IE: 16 sec   Current: 10 sec 4/12/24 MET     Patient will improve 30 second STS test by 5 repititions to display MCID and progression to decreased falls risk.  Status at IE: 6              4/12/24 PN: 9 reps with UE usage Progressing              5/9/24 PN: 10 using Simon UE PROGRESSING               6/14/24 RECERT: 10 reps using SIMON UE SAME      Patient will be able to raise on to toes for 3 sec with good balance through full range in order to improve fall risk.  Status at IE: 1-2 sec at a time partial range   4/12/24 PN: 1-2 sec with partial range No Change  5/9/24 PN: 3 partial range PROGRESSING   6/6/24: 3 sec through full range GOAL MET      Long Term Goals: To be accomplished in 24 treatments:  Patient will increase strength to 4/5 throughout Simon LEs to aid in return recreational activities and ADLs.  Status at IE:   Hip Left Right Left 4/16/24 Right 4/16/24 Left   5/9/24 Right 5/9/24 Left 6/6/24 Right 6/6/24   Flexion 3 3+ 5 4 5 4 5 4   Extension 3+ 3 NT NT 4 4 4 4   Abduction 3+ 3+ 3+ 3+ 4 4 4 4   Knee Left Right               Extension 4+ 5 5 5 5 5 5 5   Flexion 3+ 3+ 5 4 5 4 5 4   Ankle                   Plantar Flexion 2 2 NT NT 2+ 2+ 2+ 2+   Dorsiflexion 4- 4 4- 4 5 4+ 5 5   4/12/24 PN: unable to test due to time constraints   5/9/24 PN: see above              6/6/24 RECERT: see above      Patient will improve MiniBESTest score to 25/28 to demonstrate decreased risk for falls.  Status at IE: 10/28  4/12/24 PN: 17/28 Progressing              5/9/24 PN: 19/28 6/14/24 RECERT: 21/28     Patient will ambulate 1000ft on level surface with no AD and normalized gait of step over step gait with heel toe pattern.  Status at IE: narrow DAMARI, decreased step length, decreased heel strike  4/12/24 PN: pt able to amb without AD with good step through gait mechanics with mild lateral deviations Progressing  5/9/24 PN: 150 feet

## 2024-06-25 ENCOUNTER — HOSPITAL ENCOUNTER (OUTPATIENT)
Facility: HOSPITAL | Age: 69
Setting detail: RECURRING SERIES
Discharge: HOME | End: 2024-06-28
Payer: MEDICARE

## 2024-06-25 PROCEDURE — 97530 THERAPEUTIC ACTIVITIES: CPT

## 2024-06-25 PROCEDURE — 97110 THERAPEUTIC EXERCISES: CPT

## 2024-06-25 PROCEDURE — 97535 SELF CARE MNGMENT TRAINING: CPT

## 2024-06-25 NOTE — PROGRESS NOTES
applicable:            Details if applicable:     40  [facility] Totals Reminder: bill using total billable min of TIMED therapeutic procedures (example: do not include dry needle or estim unattended, both untimed codes, in totals to left)  8-22 min = 1 unit; 23-37 min = 2 units; 38-52 min = 3 units; 53-67 min = 4 units; 68-82 min = 5 units   Total Total     TOTAL TREATMENT TIME:        40     [x]  Patient Education billed concurrently with other procedures   [x] Review HEP    [] Progressed/Changed HEP, detail:    [] Other detail:       Objective Information/Functional Measures/Assessment    Pt arrived in clinic reporting pain in bilateral knees. Pt was able to perform 3 sit to stands without UE support before becoming fatigued and requiring UE assistance. Pt questioned about transfers in and out of tub from baths. Pt demonstrated good initial transfer but, required education to avoid pull to stand with objects that are not secure such as chair pt reported utilizing. Pt was able to perform simulated tub transfer from floor with supervision. Pt perform LE strengthening therex with good control and minimal cueing required for side lying hip abduction postural awareness. Pt will benefit from continued therapy to address unmet goals and to return to prior level of activity.       Patient will continue to benefit from skilled PT / OT services to modify and progress therapeutic interventions, analyze and address functional mobility deficits, analyze and address ROM deficits, analyze and address strength deficits, analyze and address soft tissue restrictions, analyze and cue for proper movement patterns, and analyze and modify for postural abnormalities to address functional deficits and attain remaining goals.    Progress toward goals / Updated goals:  []  See Progress Note/Recertification    Short Term Goals: To be accomplished in 12 treatments:  Patient will report compliance with HEP at least 1x/day to aid in rehabilitation  normal

## 2024-06-27 ENCOUNTER — HOSPITAL ENCOUNTER (OUTPATIENT)
Facility: HOSPITAL | Age: 69
Setting detail: RECURRING SERIES
Discharge: HOME | End: 2024-06-30
Payer: MEDICARE

## 2024-06-27 PROCEDURE — 97110 THERAPEUTIC EXERCISES: CPT

## 2024-06-27 PROCEDURE — 97112 NEUROMUSCULAR REEDUCATION: CPT

## 2024-06-27 PROCEDURE — 97530 THERAPEUTIC ACTIVITIES: CPT

## 2024-06-27 NOTE — PROGRESS NOTES
PHYSICAL / OCCUPATIONAL THERAPY - DAILY TREATMENT NOTE     Patient Name: Eryn Howell    Date: 2024    : 1955  Insurance: Payor: [employer] MEDICARE / Plan: [employer] CHOICE-PPO MEDICARE / Product Type: *No Product type* /      Patient  verified Yes     Visit #   Current / Total 26 47   Time   In / Out 510 550   Pain   In / Out 5/10 5/10   Subjective Functional Status/Changes: Pt reported that her shoulder is hurting more than anything today   Changes to:  Allergies, Med Hx, Sx Hx?   no       TREATMENT AREA =  Other abnormalities of gait and mobility [R26.89]    OBJECTIVE    Therapeutic Procedures:  Tx Min Billable or 1:1 Min (if diff from Tx Min) Procedure, Rationale, Specifics   15 15 43519 Therapeutic Exercise (timed):  increase ROM, strength, coordination, balance, and proprioception to improve patient's ability to progress to PLOF and address remaining functional goals. (see flow sheet as applicable)    Details if applicable:       15 15 [id number] Neuromuscular Re-Education (timed):  improve balance, coordination, kinesthetic sense, posture, core stability and proprioception to improve patient's ability to develop conscious control of individual muscles and awareness of position of extremities in order to progress to PLOF and address remaining functional goals. (see flow sheet as applicable)    Details if applicable:     10 10 81101 Therapeutic Activity (timed):  use of dynamic activities replicating functional movements to increase ROM, strength, coordination, balance, and proprioception in order to improve patient's ability to progress to PLOF and address remaining functional goals.  (see flow sheet as applicable)     Details if applicable:     40 40 [facility] Totals Reminder: bill using total billable min of TIMED therapeutic procedures (example: do not include dry needle or estim unattended, both untimed codes, in totals to left)  8-22 min = 1 unit; 23-37 min = 2 units; 38-52 min = 3 units; 53-67 min =

## 2024-07-02 ENCOUNTER — APPOINTMENT (OUTPATIENT)
Facility: HOSPITAL | Age: 69
DRG: 207 | End: 2024-07-02
Payer: MEDICARE

## 2024-07-02 ENCOUNTER — HOSPITAL ENCOUNTER (INPATIENT)
Facility: HOSPITAL | Age: 69
LOS: 14 days | Discharge: NURSING, REHAB OR LONG-TERM CARE | DRG: 207 | End: 2024-07-16
Attending: EMERGENCY MEDICINE | Admitting: INTERNAL MEDICINE
Payer: MEDICARE

## 2024-07-02 DIAGNOSIS — R07.9 CHEST PAIN, UNSPECIFIED TYPE: ICD-10-CM

## 2024-07-02 DIAGNOSIS — J96.02 ACUTE RESPIRATORY FAILURE WITH HYPERCAPNIA (HCC): Primary | ICD-10-CM

## 2024-07-02 DIAGNOSIS — R06.02 SHORTNESS OF BREATH: ICD-10-CM

## 2024-07-02 DIAGNOSIS — I50.810 RIGHT-SIDED CONGESTIVE HEART FAILURE, UNSPECIFIED HF CHRONICITY (HCC): ICD-10-CM

## 2024-07-02 DIAGNOSIS — G89.4 CHRONIC PAIN SYNDROME: ICD-10-CM

## 2024-07-02 DIAGNOSIS — F41.9 ANXIETY: ICD-10-CM

## 2024-07-02 DIAGNOSIS — Z79.891 CHRONIC PRESCRIPTION OPIATE USE: ICD-10-CM

## 2024-07-02 DIAGNOSIS — J44.1 COPD EXACERBATION (HCC): ICD-10-CM

## 2024-07-02 DIAGNOSIS — Z79.899 CHRONIC PRESCRIPTION BENZODIAZEPINE USE: ICD-10-CM

## 2024-07-02 DIAGNOSIS — D72.829 LEUKOCYTOSIS, UNSPECIFIED TYPE: ICD-10-CM

## 2024-07-02 PROBLEM — J96.00 ACUTE RESPIRATORY FAILURE (HCC): Status: ACTIVE | Noted: 2024-07-02

## 2024-07-02 LAB
ALBUMIN SERPL-MCNC: 4 G/DL (ref 3.4–5)
ALBUMIN/GLOB SERPL: 1 (ref 0.8–1.7)
ALP SERPL-CCNC: 101 U/L (ref 45–117)
ALT SERPL-CCNC: 41 U/L (ref 13–56)
AMPHET UR QL SCN: NEGATIVE
ANION GAP SERPL CALC-SCNC: 7 MMOL/L (ref 3–18)
ARTERIAL PATENCY WRIST A: POSITIVE
AST SERPL-CCNC: 38 U/L (ref 10–38)
BARBITURATES UR QL SCN: NEGATIVE
BASE EXCESS BLD CALC-SCNC: 0.1 MMOL/L
BASOPHILS # BLD: 0 K/UL (ref 0–0.1)
BASOPHILS NFR BLD: 0 % (ref 0–2)
BDY SITE: ABNORMAL
BENZODIAZ UR QL: NEGATIVE
BILIRUB SERPL-MCNC: 0.6 MG/DL (ref 0.2–1)
BUN SERPL-MCNC: 24 MG/DL (ref 7–18)
BUN/CREAT SERPL: 20 (ref 12–20)
CALCIUM SERPL-MCNC: 9.7 MG/DL (ref 8.5–10.1)
CANNABINOIDS UR QL SCN: NEGATIVE
CHLORIDE SERPL-SCNC: 94 MMOL/L (ref 100–111)
CO2 SERPL-SCNC: 30 MMOL/L (ref 21–32)
COCAINE UR QL SCN: NEGATIVE
CREAT SERPL-MCNC: 1.22 MG/DL (ref 0.6–1.3)
DIFFERENTIAL METHOD BLD: ABNORMAL
EKG ATRIAL RATE: 80 BPM
EKG DIAGNOSIS: NORMAL
EKG P-R INTERVAL: 134 MS
EKG Q-T INTERVAL: 390 MS
EKG QRS DURATION: 114 MS
EKG QTC CALCULATION (BAZETT): 449 MS
EKG R AXIS: 83 DEGREES
EKG T AXIS: 116 DEGREES
EKG VENTRICULAR RATE: 80 BPM
EOSINOPHIL # BLD: 0.2 K/UL (ref 0–0.4)
EOSINOPHIL NFR BLD: 1 % (ref 0–5)
ERYTHROCYTE [DISTWIDTH] IN BLOOD BY AUTOMATED COUNT: 13.1 % (ref 11.6–14.5)
FLUAV RNA SPEC QL NAA+PROBE: NOT DETECTED
FLUBV RNA SPEC QL NAA+PROBE: NOT DETECTED
GAS FLOW.O2 O2 DELIVERY SYS: ABNORMAL
GAS FLOW.O2 SETTING OXYMISER: 18 BPM
GLOBULIN SER CALC-MCNC: 4.1 G/DL (ref 2–4)
GLUCOSE SERPL-MCNC: 213 MG/DL (ref 74–99)
HCO3 BLD-SCNC: 29.2 MMOL/L (ref 22–26)
HCT VFR BLD AUTO: 45.9 % (ref 35–45)
HGB BLD-MCNC: 15.1 G/DL (ref 12–16)
IMM GRANULOCYTES # BLD AUTO: 0 K/UL
IMM GRANULOCYTES NFR BLD AUTO: 0 %
LACTATE BLD-SCNC: <0.4 MMOL/L (ref 0.4–2)
LYMPHOCYTES # BLD: 1.8 K/UL (ref 0.9–3.6)
LYMPHOCYTES NFR BLD: 9 % (ref 21–52)
Lab: ABNORMAL
MAGNESIUM SERPL-MCNC: 2.6 MG/DL (ref 1.6–2.6)
MCH RBC QN AUTO: 31.3 PG (ref 24–34)
MCHC RBC AUTO-ENTMCNC: 32.9 G/DL (ref 31–37)
MCV RBC AUTO: 95 FL (ref 78–100)
METHADONE UR QL: NEGATIVE
MONOCYTES # BLD: 0.8 K/UL (ref 0.05–1.2)
MONOCYTES NFR BLD: 4 % (ref 3–10)
NEUTS BAND NFR BLD MANUAL: 4 % (ref 0–5)
NEUTS SEG # BLD: 16.7 K/UL (ref 1.8–8)
NEUTS SEG NFR BLD: 82 % (ref 40–73)
NRBC # BLD: 0 K/UL (ref 0–0.01)
NRBC BLD-RTO: 0 PER 100 WBC
NT PRO BNP: 155 PG/ML (ref 0–900)
O2/TOTAL GAS SETTING VFR VENT: 100 %
OPIATES UR QL: POSITIVE
PCO2 BLD: 65 MMHG (ref 35–45)
PCP UR QL: POSITIVE
PEEP RESPIRATORY: 5 CMH2O
PH BLD: 7.26 (ref 7.35–7.45)
PLATELET # BLD AUTO: 262 K/UL (ref 135–420)
PLATELET COMMENT: ABNORMAL
PMV BLD AUTO: 11.1 FL (ref 9.2–11.8)
PO2 BLD: 604 MMHG (ref 80–100)
POTASSIUM SERPL-SCNC: 4.6 MMOL/L (ref 3.5–5.5)
PROT SERPL-MCNC: 8.1 G/DL (ref 6.4–8.2)
RBC # BLD AUTO: 4.83 M/UL (ref 4.2–5.3)
RBC MORPH BLD: ABNORMAL
SAO2 % BLD: 100 % (ref 92–97)
SARS-COV-2 RNA RESP QL NAA+PROBE: NOT DETECTED
SERVICE CMNT-IMP: ABNORMAL
SODIUM SERPL-SCNC: 131 MMOL/L (ref 136–145)
SPECIMEN TYPE: ABNORMAL
TROPONIN I SERPL HS-MCNC: 4 NG/L (ref 0–54)
VENTILATION MODE VENT: ABNORMAL
VT SETTING VENT: 400 ML
WBC # BLD AUTO: 19.5 K/UL (ref 4.6–13.2)

## 2024-07-02 PROCEDURE — 99285 EMERGENCY DEPT VISIT HI MDM: CPT

## 2024-07-02 PROCEDURE — 2580000003 HC RX 258: Performed by: EMERGENCY MEDICINE

## 2024-07-02 PROCEDURE — 93005 ELECTROCARDIOGRAM TRACING: CPT | Performed by: EMERGENCY MEDICINE

## 2024-07-02 PROCEDURE — 6360000002 HC RX W HCPCS: Performed by: INTERNAL MEDICINE

## 2024-07-02 PROCEDURE — 85025 COMPLETE CBC W/AUTO DIFF WBC: CPT

## 2024-07-02 PROCEDURE — 70450 CT HEAD/BRAIN W/O DYE: CPT

## 2024-07-02 PROCEDURE — 84484 ASSAY OF TROPONIN QUANT: CPT

## 2024-07-02 PROCEDURE — 6370000000 HC RX 637 (ALT 250 FOR IP): Performed by: HOSPITALIST

## 2024-07-02 PROCEDURE — 2580000003 HC RX 258: Performed by: INTERNAL MEDICINE

## 2024-07-02 PROCEDURE — 80307 DRUG TEST PRSMV CHEM ANLYZR: CPT

## 2024-07-02 PROCEDURE — 2000000000 HC ICU R&B

## 2024-07-02 PROCEDURE — 94002 VENT MGMT INPAT INIT DAY: CPT

## 2024-07-02 PROCEDURE — 96374 THER/PROPH/DIAG INJ IV PUSH: CPT

## 2024-07-02 PROCEDURE — 2580000003 HC RX 258: Performed by: HOSPITALIST

## 2024-07-02 PROCEDURE — 6360000002 HC RX W HCPCS: Performed by: EMERGENCY MEDICINE

## 2024-07-02 PROCEDURE — 87449 NOS EACH ORGANISM AG IA: CPT

## 2024-07-02 PROCEDURE — 6370000000 HC RX 637 (ALT 250 FOR IP): Performed by: INTERNAL MEDICINE

## 2024-07-02 PROCEDURE — 87040 BLOOD CULTURE FOR BACTERIA: CPT

## 2024-07-02 PROCEDURE — 71045 X-RAY EXAM CHEST 1 VIEW: CPT

## 2024-07-02 PROCEDURE — 6360000004 HC RX CONTRAST MEDICATION: Performed by: EMERGENCY MEDICINE

## 2024-07-02 PROCEDURE — 96365 THER/PROPH/DIAG IV INF INIT: CPT

## 2024-07-02 PROCEDURE — 6360000002 HC RX W HCPCS: Performed by: HOSPITALIST

## 2024-07-02 PROCEDURE — P9047 ALBUMIN (HUMAN), 25%, 50ML: HCPCS | Performed by: INTERNAL MEDICINE

## 2024-07-02 PROCEDURE — 74018 RADEX ABDOMEN 1 VIEW: CPT

## 2024-07-02 PROCEDURE — 87077 CULTURE AEROBIC IDENTIFY: CPT

## 2024-07-02 PROCEDURE — 83880 ASSAY OF NATRIURETIC PEPTIDE: CPT

## 2024-07-02 PROCEDURE — 80053 COMPREHEN METABOLIC PANEL: CPT

## 2024-07-02 PROCEDURE — 96375 TX/PRO/DX INJ NEW DRUG ADDON: CPT

## 2024-07-02 PROCEDURE — 83735 ASSAY OF MAGNESIUM: CPT

## 2024-07-02 PROCEDURE — 71275 CT ANGIOGRAPHY CHEST: CPT

## 2024-07-02 PROCEDURE — 94640 AIRWAY INHALATION TREATMENT: CPT

## 2024-07-02 PROCEDURE — 93010 ELECTROCARDIOGRAM REPORT: CPT | Performed by: INTERNAL MEDICINE

## 2024-07-02 PROCEDURE — 87636 SARSCOV2 & INF A&B AMP PRB: CPT

## 2024-07-02 PROCEDURE — 5A1955Z RESPIRATORY VENTILATION, GREATER THAN 96 CONSECUTIVE HOURS: ICD-10-PCS | Performed by: HOSPITALIST

## 2024-07-02 PROCEDURE — 83605 ASSAY OF LACTIC ACID: CPT

## 2024-07-02 PROCEDURE — 0BH17EZ INSERTION OF ENDOTRACHEAL AIRWAY INTO TRACHEA, VIA NATURAL OR ARTIFICIAL OPENING: ICD-10-PCS | Performed by: HOSPITALIST

## 2024-07-02 PROCEDURE — 6370000000 HC RX 637 (ALT 250 FOR IP): Performed by: EMERGENCY MEDICINE

## 2024-07-02 PROCEDURE — 2500000003 HC RX 250 WO HCPCS: Performed by: HOSPITALIST

## 2024-07-02 PROCEDURE — 82803 BLOOD GASES ANY COMBINATION: CPT

## 2024-07-02 RX ORDER — FAMOTIDINE 20 MG/1
20 TABLET, FILM COATED ORAL 2 TIMES DAILY
Status: DISCONTINUED | OUTPATIENT
Start: 2024-07-02 | End: 2024-07-16 | Stop reason: HOSPADM

## 2024-07-02 RX ORDER — IMIPRAMINE PAMOATE 75 MG
75 CAPSULE ORAL NIGHTLY
Status: ON HOLD | COMMUNITY
Start: 2024-04-29 | End: 2024-07-16 | Stop reason: HOSPADM

## 2024-07-02 RX ORDER — NOREPINEPHRINE BITARTRATE 0.06 MG/ML
1-30 INJECTION, SOLUTION INTRAVENOUS CONTINUOUS
Status: DISCONTINUED | OUTPATIENT
Start: 2024-07-02 | End: 2024-07-07

## 2024-07-02 RX ORDER — ARFORMOTEROL TARTRATE 15 UG/2ML
15 SOLUTION RESPIRATORY (INHALATION)
Status: DISCONTINUED | OUTPATIENT
Start: 2024-07-02 | End: 2024-07-16 | Stop reason: HOSPADM

## 2024-07-02 RX ORDER — NITROFURANTOIN 25; 75 MG/1; MG/1
100 CAPSULE ORAL 2 TIMES DAILY
Status: ON HOLD | COMMUNITY
End: 2024-07-16 | Stop reason: HOSPADM

## 2024-07-02 RX ORDER — ROSUVASTATIN CALCIUM 10 MG/1
10 TABLET, COATED ORAL DAILY
COMMUNITY
Start: 2024-06-28

## 2024-07-02 RX ORDER — SODIUM CHLORIDE 9 MG/ML
INJECTION, SOLUTION INTRAVENOUS ONCE
Status: COMPLETED | OUTPATIENT
Start: 2024-07-02 | End: 2024-07-02

## 2024-07-02 RX ORDER — IPRATROPIUM BROMIDE AND ALBUTEROL SULFATE 2.5; .5 MG/3ML; MG/3ML
1 SOLUTION RESPIRATORY (INHALATION)
Status: DISCONTINUED | OUTPATIENT
Start: 2024-07-02 | End: 2024-07-16 | Stop reason: HOSPADM

## 2024-07-02 RX ORDER — POTASSIUM CHLORIDE 7.45 MG/ML
10 INJECTION INTRAVENOUS PRN
Status: DISCONTINUED | OUTPATIENT
Start: 2024-07-02 | End: 2024-07-16 | Stop reason: HOSPADM

## 2024-07-02 RX ORDER — ASPIRIN 81 MG/1
81 TABLET ORAL DAILY
Status: DISCONTINUED | OUTPATIENT
Start: 2024-07-02 | End: 2024-07-16 | Stop reason: HOSPADM

## 2024-07-02 RX ORDER — ENOXAPARIN SODIUM 100 MG/ML
40 INJECTION SUBCUTANEOUS DAILY
Status: DISCONTINUED | OUTPATIENT
Start: 2024-07-02 | End: 2024-07-16 | Stop reason: HOSPADM

## 2024-07-02 RX ORDER — FENTANYL CITRATE-0.9 % NACL/PF 10 MCG/ML
25-200 PLASTIC BAG, INJECTION (ML) INTRAVENOUS CONTINUOUS
Status: DISCONTINUED | OUTPATIENT
Start: 2024-07-02 | End: 2024-07-08

## 2024-07-02 RX ORDER — ENOXAPARIN SODIUM 100 MG/ML
40 INJECTION SUBCUTANEOUS DAILY
Status: DISCONTINUED | OUTPATIENT
Start: 2024-07-02 | End: 2024-07-02 | Stop reason: SDUPTHER

## 2024-07-02 RX ORDER — VALSARTAN/HYDROCHLOROTHIAZIDE 160MG-25MG
1 TABLET ORAL DAILY
COMMUNITY
Start: 2024-02-09

## 2024-07-02 RX ORDER — SODIUM CHLORIDE 9 MG/ML
INJECTION, SOLUTION INTRAVENOUS CONTINUOUS
Status: DISCONTINUED | OUTPATIENT
Start: 2024-07-02 | End: 2024-07-08

## 2024-07-02 RX ORDER — BUPROPION HYDROCHLORIDE 300 MG/1
300 TABLET ORAL EVERY MORNING
COMMUNITY
Start: 2024-03-28

## 2024-07-02 RX ORDER — HYDROCODONE BITARTRATE AND ACETAMINOPHEN 7.5; 325 MG/1; MG/1
1 TABLET ORAL EVERY 6 HOURS PRN
Status: ON HOLD | COMMUNITY
Start: 2024-07-01 | End: 2024-07-16

## 2024-07-02 RX ORDER — MIDAZOLAM HYDROCHLORIDE 1 MG/ML
1-10 INJECTION, SOLUTION INTRAVENOUS CONTINUOUS
Status: DISCONTINUED | OUTPATIENT
Start: 2024-07-02 | End: 2024-07-08

## 2024-07-02 RX ORDER — FLUTICASONE FUROATE, UMECLIDINIUM BROMIDE AND VILANTEROL TRIFENATATE 100; 62.5; 25 UG/1; UG/1; UG/1
1 POWDER RESPIRATORY (INHALATION) DAILY
Status: ON HOLD | COMMUNITY
Start: 2024-05-10 | End: 2024-07-16 | Stop reason: HOSPADM

## 2024-07-02 RX ORDER — ACETAMINOPHEN 325 MG/1
650 TABLET ORAL EVERY 4 HOURS PRN
Status: DISCONTINUED | OUTPATIENT
Start: 2024-07-02 | End: 2024-07-16 | Stop reason: HOSPADM

## 2024-07-02 RX ORDER — MAGNESIUM SULFATE IN WATER 40 MG/ML
2000 INJECTION, SOLUTION INTRAVENOUS PRN
Status: DISCONTINUED | OUTPATIENT
Start: 2024-07-02 | End: 2024-07-16 | Stop reason: HOSPADM

## 2024-07-02 RX ORDER — BUDESONIDE 0.5 MG/2ML
0.5 INHALANT ORAL
Status: DISCONTINUED | OUTPATIENT
Start: 2024-07-02 | End: 2024-07-16 | Stop reason: HOSPADM

## 2024-07-02 RX ORDER — POTASSIUM CHLORIDE 20 MEQ/1
40 TABLET, EXTENDED RELEASE ORAL PRN
Status: DISCONTINUED | OUTPATIENT
Start: 2024-07-02 | End: 2024-07-16 | Stop reason: HOSPADM

## 2024-07-02 RX ORDER — ALBUMIN (HUMAN) 12.5 G/50ML
25 SOLUTION INTRAVENOUS EVERY 6 HOURS
Status: DISCONTINUED | OUTPATIENT
Start: 2024-07-02 | End: 2024-07-04

## 2024-07-02 RX ORDER — IPRATROPIUM BROMIDE AND ALBUTEROL SULFATE 2.5; .5 MG/3ML; MG/3ML
1 SOLUTION RESPIRATORY (INHALATION) ONCE
Status: COMPLETED | OUTPATIENT
Start: 2024-07-02 | End: 2024-07-02

## 2024-07-02 RX ORDER — CLONAZEPAM 1 MG/1
1 TABLET ORAL 3 TIMES DAILY PRN
Status: ON HOLD | COMMUNITY
End: 2024-07-16 | Stop reason: HOSPADM

## 2024-07-02 RX ADMIN — SODIUM CHLORIDE: 9 INJECTION, SOLUTION INTRAVENOUS at 20:40

## 2024-07-02 RX ADMIN — IPRATROPIUM BROMIDE AND ALBUTEROL SULFATE 1 DOSE: .5; 3 SOLUTION RESPIRATORY (INHALATION) at 16:32

## 2024-07-02 RX ADMIN — WATER 1000 MG: 1 INJECTION INTRAMUSCULAR; INTRAVENOUS; SUBCUTANEOUS at 15:59

## 2024-07-02 RX ADMIN — ENOXAPARIN SODIUM 40 MG: 100 INJECTION SUBCUTANEOUS at 18:19

## 2024-07-02 RX ADMIN — IPRATROPIUM BROMIDE AND ALBUTEROL SULFATE 1 DOSE: .5; 3 SOLUTION RESPIRATORY (INHALATION) at 16:10

## 2024-07-02 RX ADMIN — SODIUM CHLORIDE: 9 INJECTION, SOLUTION INTRAVENOUS at 16:29

## 2024-07-02 RX ADMIN — MIDAZOLAM 2 MG/HR: 5 INJECTION INTRAMUSCULAR; INTRAVENOUS at 17:04

## 2024-07-02 RX ADMIN — ALBUMIN (HUMAN) 25 G: 0.25 INJECTION, SOLUTION INTRAVENOUS at 21:47

## 2024-07-02 RX ADMIN — ASPIRIN 81 MG: 81 TABLET, COATED ORAL at 18:19

## 2024-07-02 RX ADMIN — BUDESONIDE 500 MCG: 0.5 INHALANT RESPIRATORY (INHALATION) at 19:53

## 2024-07-02 RX ADMIN — FENTANYL CITRATE 50 MCG/HR: 0.05 INJECTION, SOLUTION INTRAMUSCULAR; INTRAVENOUS at 15:33

## 2024-07-02 RX ADMIN — IOPAMIDOL 71 ML: 755 INJECTION, SOLUTION INTRAVENOUS at 16:53

## 2024-07-02 RX ADMIN — WATER 40 MG: 1 INJECTION INTRAMUSCULAR; INTRAVENOUS; SUBCUTANEOUS at 20:59

## 2024-07-02 RX ADMIN — ARFORMOTEROL TARTRATE 15 MCG: 15 SOLUTION RESPIRATORY (INHALATION) at 19:53

## 2024-07-02 RX ADMIN — FAMOTIDINE 20 MG: 10 INJECTION, SOLUTION INTRAVENOUS at 20:49

## 2024-07-02 RX ADMIN — AZITHROMYCIN MONOHYDRATE 500 MG: 500 INJECTION, POWDER, LYOPHILIZED, FOR SOLUTION INTRAVENOUS at 15:54

## 2024-07-02 ASSESSMENT — PULMONARY FUNCTION TESTS
PIF_VALUE: 28
PIF_VALUE: 26

## 2024-07-02 NOTE — ED PROVIDER NOTES
Facility-Administered Medications   Medication Dose Route Frequency Provider Last Rate Last Admin    OLANZapine (ZYPREXA) tablet 5 mg  5 mg Oral BID Minerva Staley MD   5 mg at 07/04/24 0930    clonazePAM (KlonoPIN) disintegrating tablet 2 mg  2 mg Oral TID Minerva Staley MD   2 mg at 07/04/24 1458    hydrOXYzine HCl (ATARAX) tablet 25 mg  25 mg Oral QHS Minerva Staley MD        midazolam PF (VERSED) injection 1 mg  1 mg IntraVENous Q4H PRN Minerva Staley MD   1 mg at 07/04/24 1644    buPROPion (WELLBUTRIN) tablet 100 mg  100 mg Oral TID Minerva Staley MD   100 mg at 07/04/24 1458    imipramine (TOFRANIL) tablet 50 mg  50 mg Oral Nightly Minerva Staley MD   50 mg at 07/03/24 1934    midodrine (PROAMATINE) tablet 2.5 mg  2.5 mg Oral TID WC Minerva Staley MD   2.5 mg at 07/04/24 0855    oxyCODONE-acetaminophen (PERCOCET) 5-325 MG per tablet 2 tablet  2 tablet Oral TID Minerva Staley MD   2 tablet at 07/04/24 1641    sennosides-docusate sodium (SENOKOT-S) 8.6-50 MG tablet 1 tablet  1 tablet Oral Nightly Minerva Staley MD   1 tablet at 07/03/24 1936    hydrOXYzine HCl (ATARAX) tablet 25 mg  25 mg Oral TID PRN Minerva Staley MD        dexmedeTOMIDine (PRECEDEX) 400 mcg in sodium chloride 0.9 % 100 mL infusion  0.1-1.5 mcg/kg/hr IntraVENous Continuous Minerva Staley MD 23 mL/hr at 07/04/24 1638 1.2 mcg/kg/hr at 07/04/24 1638    glucose chewable tablet 16 g  4 tablet Oral PRN Irlanda Rosenthal MD        dextrose bolus 10% 125 mL  125 mL IntraVENous PRN Irlanda Rosenthal MD        Or    dextrose bolus 10% 250 mL  250 mL IntraVENous PRN Irlanda Rosenthal MD        glucagon injection 1 mg  1 mg SubCUTAneous PRN Irlanda Rosenthal MD        dextrose 10 % infusion   IntraVENous Continuous PRN Irlanda Rosenthal MD        insulin lispro (HUMALOG,ADMELOG) injection vial 0-8 Units  0-8 Units SubCUTAneous Q4H Irlanda Rosenthal MD   2 Units at 07/04/24 8990    fentaNYL (SUBLIMAZE) 1,000 mcg in sodium chloride 0.9% 100 mL

## 2024-07-02 NOTE — ED TRIAGE NOTES
Pt to room 2 from home for evaluation od SB.  EMS reports SOB that started this AM.. Pt arrives on cpap with respirations in the 30s sating in the high 90s 125 solumedrol, one and a and addition albuterol given by EMS.  2 G of Mag infusing on arrival.

## 2024-07-03 ENCOUNTER — TELEPHONE (OUTPATIENT)
Facility: HOSPITAL | Age: 69
End: 2024-07-03

## 2024-07-03 PROBLEM — Z79.899 CHRONIC PRESCRIPTION BENZODIAZEPINE USE: Status: ACTIVE | Noted: 2024-07-03

## 2024-07-03 PROBLEM — Z79.891 CHRONIC PRESCRIPTION OPIATE USE: Status: ACTIVE | Noted: 2024-07-03

## 2024-07-03 LAB
ANION GAP SERPL CALC-SCNC: 6 MMOL/L (ref 3–18)
ARTERIAL PATENCY WRIST A: POSITIVE
ARTERIAL PATENCY WRIST A: POSITIVE
BASE EXCESS BLD CALC-SCNC: 0.1 MMOL/L
BASE EXCESS BLD CALC-SCNC: 0.7 MMOL/L
BASOPHILS # BLD: 0 K/UL (ref 0–0.1)
BASOPHILS NFR BLD: 0 % (ref 0–2)
BDY SITE: ABNORMAL
BDY SITE: ABNORMAL
BUN SERPL-MCNC: 24 MG/DL (ref 7–18)
BUN/CREAT SERPL: 26 (ref 12–20)
CALCIUM SERPL-MCNC: 9 MG/DL (ref 8.5–10.1)
CHLORIDE SERPL-SCNC: 101 MMOL/L (ref 100–111)
CO2 SERPL-SCNC: 27 MMOL/L (ref 21–32)
CREAT SERPL-MCNC: 0.91 MG/DL (ref 0.6–1.3)
DIFFERENTIAL METHOD BLD: ABNORMAL
EOSINOPHIL # BLD: 0 K/UL (ref 0–0.4)
EOSINOPHIL NFR BLD: 0 % (ref 0–5)
ERYTHROCYTE [DISTWIDTH] IN BLOOD BY AUTOMATED COUNT: 13.2 % (ref 11.6–14.5)
GAS FLOW.O2 O2 DELIVERY SYS: ABNORMAL
GAS FLOW.O2 O2 DELIVERY SYS: ABNORMAL
GAS FLOW.O2 SETTING OXYMISER: 18 BPM
GAS FLOW.O2 SETTING OXYMISER: 18 BPM
GLUCOSE BLD STRIP.AUTO-MCNC: 161 MG/DL (ref 70–110)
GLUCOSE BLD STRIP.AUTO-MCNC: 270 MG/DL (ref 70–110)
GLUCOSE SERPL-MCNC: 197 MG/DL (ref 74–99)
HCO3 BLD-SCNC: 25.5 MMOL/L (ref 22–26)
HCO3 BLD-SCNC: 27.7 MMOL/L (ref 22–26)
HCT VFR BLD AUTO: 33.4 % (ref 35–45)
HGB BLD-MCNC: 11.1 G/DL (ref 12–16)
IMM GRANULOCYTES # BLD AUTO: 0.1 K/UL (ref 0–0.04)
IMM GRANULOCYTES NFR BLD AUTO: 0 % (ref 0–0.5)
INR PPP: 1.2 (ref 0.9–1.1)
L PNEUMO AG UR QL IA: NEGATIVE
LYMPHOCYTES # BLD: 0.6 K/UL (ref 0.9–3.6)
LYMPHOCYTES NFR BLD: 5 % (ref 21–52)
MAGNESIUM SERPL-MCNC: 2.5 MG/DL (ref 1.6–2.6)
MCH RBC QN AUTO: 31 PG (ref 24–34)
MCHC RBC AUTO-ENTMCNC: 33.2 G/DL (ref 31–37)
MCV RBC AUTO: 93.3 FL (ref 78–100)
MONOCYTES # BLD: 0.2 K/UL (ref 0.05–1.2)
MONOCYTES NFR BLD: 1 % (ref 3–10)
NEUTS SEG # BLD: 12.5 K/UL (ref 1.8–8)
NEUTS SEG NFR BLD: 93 % (ref 40–73)
NRBC # BLD: 0 K/UL (ref 0–0.01)
NRBC BLD-RTO: 0 PER 100 WBC
O2/TOTAL GAS SETTING VFR VENT: 40 %
O2/TOTAL GAS SETTING VFR VENT: 40 %
PCO2 BLD: 43.8 MMHG (ref 35–45)
PCO2 BLD: 54.6 MMHG (ref 35–45)
PEEP RESPIRATORY: 5 CMH2O
PEEP RESPIRATORY: 5 CMH2O
PH BLD: 7.31 (ref 7.35–7.45)
PH BLD: 7.37 (ref 7.35–7.45)
PLATELET # BLD AUTO: 177 K/UL (ref 135–420)
PMV BLD AUTO: 10.9 FL (ref 9.2–11.8)
PO2 BLD: 64 MMHG (ref 80–100)
PO2 BLD: 65 MMHG (ref 80–100)
POTASSIUM SERPL-SCNC: 4.1 MMOL/L (ref 3.5–5.5)
PROTHROMBIN TIME: 15 SEC (ref 11.9–14.9)
RBC # BLD AUTO: 3.58 M/UL (ref 4.2–5.3)
S PNEUM AG UR QL: NEGATIVE
SAO2 % BLD: 89.4 % (ref 92–97)
SAO2 % BLD: 91.7 % (ref 92–97)
SERVICE CMNT-IMP: ABNORMAL
SERVICE CMNT-IMP: ABNORMAL
SODIUM SERPL-SCNC: 134 MMOL/L (ref 136–145)
SPECIMEN TYPE: ABNORMAL
SPECIMEN TYPE: ABNORMAL
VENTILATION MODE VENT: ABNORMAL
VENTILATION MODE VENT: ABNORMAL
VT SETTING VENT: 400 ML
VT SETTING VENT: 400 ML
WBC # BLD AUTO: 13.4 K/UL (ref 4.6–13.2)

## 2024-07-03 PROCEDURE — 2500000003 HC RX 250 WO HCPCS: Performed by: INTERNAL MEDICINE

## 2024-07-03 PROCEDURE — 51702 INSERT TEMP BLADDER CATH: CPT

## 2024-07-03 PROCEDURE — 6360000002 HC RX W HCPCS: Performed by: INTERNAL MEDICINE

## 2024-07-03 PROCEDURE — 2700000000 HC OXYGEN THERAPY PER DAY

## 2024-07-03 PROCEDURE — P9047 ALBUMIN (HUMAN), 25%, 50ML: HCPCS | Performed by: INTERNAL MEDICINE

## 2024-07-03 PROCEDURE — 6370000000 HC RX 637 (ALT 250 FOR IP): Performed by: HOSPITALIST

## 2024-07-03 PROCEDURE — 87070 CULTURE OTHR SPECIMN AEROBIC: CPT

## 2024-07-03 PROCEDURE — 2000000000 HC ICU R&B

## 2024-07-03 PROCEDURE — 36600 WITHDRAWAL OF ARTERIAL BLOOD: CPT

## 2024-07-03 PROCEDURE — 2580000003 HC RX 258: Performed by: HOSPITALIST

## 2024-07-03 PROCEDURE — 82962 GLUCOSE BLOOD TEST: CPT

## 2024-07-03 PROCEDURE — 85610 PROTHROMBIN TIME: CPT

## 2024-07-03 PROCEDURE — 80048 BASIC METABOLIC PNL TOTAL CA: CPT

## 2024-07-03 PROCEDURE — 6370000000 HC RX 637 (ALT 250 FOR IP): Performed by: INTERNAL MEDICINE

## 2024-07-03 PROCEDURE — 2580000003 HC RX 258: Performed by: EMERGENCY MEDICINE

## 2024-07-03 PROCEDURE — 82803 BLOOD GASES ANY COMBINATION: CPT

## 2024-07-03 PROCEDURE — 2580000003 HC RX 258: Performed by: INTERNAL MEDICINE

## 2024-07-03 PROCEDURE — 6360000002 HC RX W HCPCS: Performed by: EMERGENCY MEDICINE

## 2024-07-03 PROCEDURE — 2500000003 HC RX 250 WO HCPCS: Performed by: HOSPITALIST

## 2024-07-03 PROCEDURE — 83735 ASSAY OF MAGNESIUM: CPT

## 2024-07-03 PROCEDURE — 93005 ELECTROCARDIOGRAM TRACING: CPT | Performed by: INTERNAL MEDICINE

## 2024-07-03 PROCEDURE — 87205 SMEAR GRAM STAIN: CPT

## 2024-07-03 PROCEDURE — 85025 COMPLETE CBC W/AUTO DIFF WBC: CPT

## 2024-07-03 PROCEDURE — 94003 VENT MGMT INPAT SUBQ DAY: CPT

## 2024-07-03 PROCEDURE — 94640 AIRWAY INHALATION TREATMENT: CPT

## 2024-07-03 PROCEDURE — 6360000002 HC RX W HCPCS: Performed by: HOSPITALIST

## 2024-07-03 RX ORDER — SENNA AND DOCUSATE SODIUM 50; 8.6 MG/1; MG/1
1 TABLET, FILM COATED ORAL NIGHTLY
Status: DISCONTINUED | OUTPATIENT
Start: 2024-07-03 | End: 2024-07-06

## 2024-07-03 RX ORDER — OLANZAPINE 2.5 MG/1
2.5 TABLET, FILM COATED ORAL 2 TIMES DAILY
Status: DISCONTINUED | OUTPATIENT
Start: 2024-07-03 | End: 2024-07-04

## 2024-07-03 RX ORDER — CLONAZEPAM 0.5 MG/1
1 TABLET, ORALLY DISINTEGRATING ORAL 3 TIMES DAILY
Status: DISCONTINUED | OUTPATIENT
Start: 2024-07-03 | End: 2024-07-04

## 2024-07-03 RX ORDER — GLUCAGON 1 MG/ML
1 KIT INJECTION PRN
Status: DISCONTINUED | OUTPATIENT
Start: 2024-07-03 | End: 2024-07-16 | Stop reason: HOSPADM

## 2024-07-03 RX ORDER — INSULIN LISPRO 100 [IU]/ML
0-8 INJECTION, SOLUTION INTRAVENOUS; SUBCUTANEOUS EVERY 4 HOURS
Status: DISCONTINUED | OUTPATIENT
Start: 2024-07-03 | End: 2024-07-08

## 2024-07-03 RX ORDER — MIDODRINE HYDROCHLORIDE 2.5 MG/1
2.5 TABLET ORAL
Status: DISCONTINUED | OUTPATIENT
Start: 2024-07-03 | End: 2024-07-06

## 2024-07-03 RX ORDER — IMIPRAMINE HCL 25 MG
50 TABLET ORAL NIGHTLY
Status: DISCONTINUED | OUTPATIENT
Start: 2024-07-03 | End: 2024-07-16 | Stop reason: HOSPADM

## 2024-07-03 RX ORDER — HYDROXYZINE HYDROCHLORIDE 25 MG/1
25 TABLET, FILM COATED ORAL 3 TIMES DAILY PRN
Status: DISCONTINUED | OUTPATIENT
Start: 2024-07-03 | End: 2024-07-16 | Stop reason: HOSPADM

## 2024-07-03 RX ORDER — DEXMEDETOMIDINE HYDROCHLORIDE 4 UG/ML
.1-1.5 INJECTION, SOLUTION INTRAVENOUS CONTINUOUS
Status: DISCONTINUED | OUTPATIENT
Start: 2024-07-03 | End: 2024-07-10

## 2024-07-03 RX ORDER — DEXTROSE MONOHYDRATE 100 MG/ML
INJECTION, SOLUTION INTRAVENOUS CONTINUOUS PRN
Status: DISCONTINUED | OUTPATIENT
Start: 2024-07-03 | End: 2024-07-16 | Stop reason: HOSPADM

## 2024-07-03 RX ORDER — OXYCODONE HYDROCHLORIDE AND ACETAMINOPHEN 5; 325 MG/1; MG/1
2 TABLET ORAL 3 TIMES DAILY
Status: DISCONTINUED | OUTPATIENT
Start: 2024-07-03 | End: 2024-07-05

## 2024-07-03 RX ORDER — CLONAZEPAM 0.5 MG/1
0.5 TABLET, ORALLY DISINTEGRATING ORAL 3 TIMES DAILY
Status: DISCONTINUED | OUTPATIENT
Start: 2024-07-03 | End: 2024-07-03

## 2024-07-03 RX ORDER — BUPROPION HYDROCHLORIDE 100 MG/1
100 TABLET ORAL 3 TIMES DAILY
Status: DISCONTINUED | OUTPATIENT
Start: 2024-07-03 | End: 2024-07-16 | Stop reason: HOSPADM

## 2024-07-03 RX ADMIN — Medication 5 MCG/MIN: at 03:46

## 2024-07-03 RX ADMIN — WATER 40 MG: 1 INJECTION INTRAMUSCULAR; INTRAVENOUS; SUBCUTANEOUS at 09:04

## 2024-07-03 RX ADMIN — CLONAZEPAM 1 MG: 0.5 TABLET, ORALLY DISINTEGRATING ORAL at 15:49

## 2024-07-03 RX ADMIN — BUPROPION HYDROCHLORIDE 100 MG: 100 TABLET, FILM COATED ORAL at 09:55

## 2024-07-03 RX ADMIN — IPRATROPIUM BROMIDE AND ALBUTEROL SULFATE 1 DOSE: .5; 3 SOLUTION RESPIRATORY (INHALATION) at 21:25

## 2024-07-03 RX ADMIN — AZITHROMYCIN MONOHYDRATE 500 MG: 500 INJECTION, POWDER, LYOPHILIZED, FOR SOLUTION INTRAVENOUS at 15:51

## 2024-07-03 RX ADMIN — DOXYCYCLINE 100 MG: 100 INJECTION, POWDER, LYOPHILIZED, FOR SOLUTION INTRAVENOUS at 12:13

## 2024-07-03 RX ADMIN — WATER 40 MG: 1 INJECTION INTRAMUSCULAR; INTRAVENOUS; SUBCUTANEOUS at 15:49

## 2024-07-03 RX ADMIN — BUPROPION HYDROCHLORIDE 100 MG: 100 TABLET, FILM COATED ORAL at 19:36

## 2024-07-03 RX ADMIN — OLANZAPINE 2.5 MG: 2.5 TABLET, FILM COATED ORAL at 19:36

## 2024-07-03 RX ADMIN — BUDESONIDE 500 MCG: 0.5 INHALANT RESPIRATORY (INHALATION) at 07:11

## 2024-07-03 RX ADMIN — MIDODRINE HYDROCHLORIDE 2.5 MG: 2.5 TABLET ORAL at 10:16

## 2024-07-03 RX ADMIN — ASPIRIN 81 MG: 81 TABLET, COATED ORAL at 09:03

## 2024-07-03 RX ADMIN — MIDODRINE HYDROCHLORIDE 2.5 MG: 2.5 TABLET ORAL at 17:18

## 2024-07-03 RX ADMIN — ALBUMIN (HUMAN) 25 G: 0.25 INJECTION, SOLUTION INTRAVENOUS at 02:54

## 2024-07-03 RX ADMIN — OXYCODONE AND ACETAMINOPHEN 2 TABLET: 5; 325 TABLET ORAL at 10:18

## 2024-07-03 RX ADMIN — MIDAZOLAM 8 MG/HR: 5 INJECTION INTRAMUSCULAR; INTRAVENOUS at 18:49

## 2024-07-03 RX ADMIN — OLANZAPINE 2.5 MG: 2.5 TABLET, FILM COATED ORAL at 10:16

## 2024-07-03 RX ADMIN — ARFORMOTEROL TARTRATE 15 MCG: 15 SOLUTION RESPIRATORY (INHALATION) at 07:11

## 2024-07-03 RX ADMIN — ALBUMIN (HUMAN) 25 G: 0.25 INJECTION, SOLUTION INTRAVENOUS at 17:21

## 2024-07-03 RX ADMIN — DOXYCYCLINE 100 MG: 100 INJECTION, POWDER, LYOPHILIZED, FOR SOLUTION INTRAVENOUS at 00:45

## 2024-07-03 RX ADMIN — IPRATROPIUM BROMIDE AND ALBUTEROL SULFATE 1 DOSE: .5; 3 SOLUTION RESPIRATORY (INHALATION) at 07:11

## 2024-07-03 RX ADMIN — CLONAZEPAM 0.5 MG: 0.5 TABLET, ORALLY DISINTEGRATING ORAL at 09:03

## 2024-07-03 RX ADMIN — INSULIN LISPRO 4 UNITS: 100 INJECTION, SOLUTION INTRAVENOUS; SUBCUTANEOUS at 17:53

## 2024-07-03 RX ADMIN — SODIUM CHLORIDE: 9 INJECTION, SOLUTION INTRAVENOUS at 00:43

## 2024-07-03 RX ADMIN — SENNOSIDES AND DOCUSATE SODIUM 1 TABLET: 50; 8.6 TABLET ORAL at 19:36

## 2024-07-03 RX ADMIN — IPRATROPIUM BROMIDE AND ALBUTEROL SULFATE 1 DOSE: .5; 3 SOLUTION RESPIRATORY (INHALATION) at 12:55

## 2024-07-03 RX ADMIN — DEXMEDETOMIDINE 0.2 MCG/KG/HR: 100 INJECTION, SOLUTION INTRAVENOUS at 10:36

## 2024-07-03 RX ADMIN — WATER 1000 MG: 1 INJECTION INTRAMUSCULAR; INTRAVENOUS; SUBCUTANEOUS at 15:50

## 2024-07-03 RX ADMIN — ENOXAPARIN SODIUM 40 MG: 100 INJECTION SUBCUTANEOUS at 17:18

## 2024-07-03 RX ADMIN — FENTANYL CITRATE 100 MCG/HR: 0.05 INJECTION, SOLUTION INTRAMUSCULAR; INTRAVENOUS at 15:29

## 2024-07-03 RX ADMIN — ALBUMIN (HUMAN) 25 G: 0.25 INJECTION, SOLUTION INTRAVENOUS at 21:01

## 2024-07-03 RX ADMIN — WATER 40 MG: 1 INJECTION INTRAMUSCULAR; INTRAVENOUS; SUBCUTANEOUS at 20:46

## 2024-07-03 RX ADMIN — ALBUMIN (HUMAN) 25 G: 0.25 INJECTION, SOLUTION INTRAVENOUS at 09:13

## 2024-07-03 RX ADMIN — WATER 40 MG: 1 INJECTION INTRAMUSCULAR; INTRAVENOUS; SUBCUTANEOUS at 02:54

## 2024-07-03 RX ADMIN — OXYCODONE AND ACETAMINOPHEN 2 TABLET: 5; 325 TABLET ORAL at 17:21

## 2024-07-03 RX ADMIN — BUPROPION HYDROCHLORIDE 100 MG: 100 TABLET, FILM COATED ORAL at 15:49

## 2024-07-03 RX ADMIN — MIDAZOLAM 6 MG/HR: 5 INJECTION INTRAMUSCULAR; INTRAVENOUS at 06:41

## 2024-07-03 RX ADMIN — ARFORMOTEROL TARTRATE 15 MCG: 15 SOLUTION RESPIRATORY (INHALATION) at 21:25

## 2024-07-03 RX ADMIN — FENTANYL CITRATE 75 MCG/HR: 0.05 INJECTION, SOLUTION INTRAMUSCULAR; INTRAVENOUS at 04:21

## 2024-07-03 RX ADMIN — FAMOTIDINE 20 MG: 10 INJECTION, SOLUTION INTRAVENOUS at 09:04

## 2024-07-03 RX ADMIN — FAMOTIDINE 20 MG: 10 INJECTION, SOLUTION INTRAVENOUS at 20:46

## 2024-07-03 RX ADMIN — CLONAZEPAM 1 MG: 0.5 TABLET, ORALLY DISINTEGRATING ORAL at 20:38

## 2024-07-03 RX ADMIN — IPRATROPIUM BROMIDE AND ALBUTEROL SULFATE 1 DOSE: .5; 3 SOLUTION RESPIRATORY (INHALATION) at 15:33

## 2024-07-03 RX ADMIN — IMIPRAMINE HYDROCHLORIDE 50 MG: 25 TABLET ORAL at 19:34

## 2024-07-03 RX ADMIN — BUDESONIDE 500 MCG: 0.5 INHALANT RESPIRATORY (INHALATION) at 21:25

## 2024-07-03 ASSESSMENT — PULMONARY FUNCTION TESTS
PIF_VALUE: 28
PIF_VALUE: 28
PIF_VALUE: 26
PIF_VALUE: 26
PIF_VALUE: 22
PIF_VALUE: 29

## 2024-07-03 NOTE — H&P
add her daily aspirin and Lovenox for DVT prophylaxis and we are going to put her on Pepcid for stress ulcer prophylaxis.  She has fluids ordered.  Sedatives per intensivist protocol.  Electrolyte repletion protocols.  Repeat her CBC, metabolic panel in the morning as well as magnesium.  Follow up the results of the CT of the head, and patient is expected at this point to be in the hospital least 3-5 days considering the severity of her respiratory failure and required mechanical intubation at this point in time.        WILI VENCES MD      RHI/AQS  D:  07/02/2024 17:35:43  T:  07/02/2024 20:32:25  JOB #:  [id number]    CC:   Nish Sims DO

## 2024-07-04 ENCOUNTER — APPOINTMENT (OUTPATIENT)
Facility: HOSPITAL | Age: 69
DRG: 207 | End: 2024-07-04
Payer: MEDICARE

## 2024-07-04 LAB
ALBUMIN SERPL-MCNC: 4.3 G/DL (ref 3.4–5)
ALBUMIN/GLOB SERPL: 2.3 (ref 0.8–1.7)
ALP SERPL-CCNC: 47 U/L (ref 45–117)
ALT SERPL-CCNC: 26 U/L (ref 13–56)
ANION GAP SERPL CALC-SCNC: 6 MMOL/L (ref 3–18)
APPEARANCE UR: CLEAR
ARTERIAL PATENCY WRIST A: POSITIVE
AST SERPL-CCNC: 11 U/L (ref 10–38)
BACTERIA URNS QL MICRO: ABNORMAL /HPF
BASE DEFICIT BLD-SCNC: 1.7 MMOL/L
BASOPHILS # BLD: 0 K/UL (ref 0–0.1)
BASOPHILS NFR BLD: 0 % (ref 0–2)
BDY SITE: ABNORMAL
BILIRUB DIRECT SERPL-MCNC: 0.2 MG/DL (ref 0–0.2)
BILIRUB SERPL-MCNC: 0.4 MG/DL (ref 0.2–1)
BILIRUB UR QL: NEGATIVE
BUN SERPL-MCNC: 20 MG/DL (ref 7–18)
BUN/CREAT SERPL: 25 (ref 12–20)
CALCIUM SERPL-MCNC: 9 MG/DL (ref 8.5–10.1)
CHLORIDE SERPL-SCNC: 108 MMOL/L (ref 100–111)
CO2 SERPL-SCNC: 26 MMOL/L (ref 21–32)
COLOR UR: YELLOW
CREAT SERPL-MCNC: 0.8 MG/DL (ref 0.6–1.3)
DIFFERENTIAL METHOD BLD: ABNORMAL
EKG ATRIAL RATE: 96 BPM
EKG DIAGNOSIS: NORMAL
EKG P AXIS: 70 DEGREES
EKG P-R INTERVAL: 218 MS
EKG Q-T INTERVAL: 350 MS
EKG QRS DURATION: 110 MS
EKG QTC CALCULATION (BAZETT): 442 MS
EKG R AXIS: 93 DEGREES
EKG T AXIS: 77 DEGREES
EKG VENTRICULAR RATE: 96 BPM
EOSINOPHIL # BLD: 0 K/UL (ref 0–0.4)
EOSINOPHIL NFR BLD: 0 % (ref 0–5)
EPITH CASTS URNS QL MICRO: ABNORMAL /LPF (ref 0–5)
ERYTHROCYTE [DISTWIDTH] IN BLOOD BY AUTOMATED COUNT: 14 % (ref 11.6–14.5)
GAS FLOW.O2 O2 DELIVERY SYS: ABNORMAL
GAS FLOW.O2 SETTING OXYMISER: 16 BPM
GLOBULIN SER CALC-MCNC: 1.9 G/DL (ref 2–4)
GLUCOSE BLD STRIP.AUTO-MCNC: 206 MG/DL (ref 70–110)
GLUCOSE BLD STRIP.AUTO-MCNC: 212 MG/DL (ref 70–110)
GLUCOSE BLD STRIP.AUTO-MCNC: 224 MG/DL (ref 70–110)
GLUCOSE BLD STRIP.AUTO-MCNC: 233 MG/DL (ref 70–110)
GLUCOSE BLD STRIP.AUTO-MCNC: 235 MG/DL (ref 70–110)
GLUCOSE SERPL-MCNC: 212 MG/DL (ref 74–99)
GLUCOSE UR STRIP.AUTO-MCNC: NEGATIVE MG/DL
HCO3 BLD-SCNC: 23.9 MMOL/L (ref 22–26)
HCT VFR BLD AUTO: 30.2 % (ref 35–45)
HGB BLD-MCNC: 9.8 G/DL (ref 12–16)
HGB UR QL STRIP: NEGATIVE
IMM GRANULOCYTES # BLD AUTO: 0.1 K/UL (ref 0–0.04)
IMM GRANULOCYTES NFR BLD AUTO: 1 % (ref 0–0.5)
KETONES UR QL STRIP.AUTO: NEGATIVE MG/DL
LEUKOCYTE ESTERASE UR QL STRIP.AUTO: ABNORMAL
LYMPHOCYTES # BLD: 0.6 K/UL (ref 0.9–3.6)
LYMPHOCYTES NFR BLD: 6 % (ref 21–52)
MAGNESIUM SERPL-MCNC: 2.4 MG/DL (ref 1.6–2.6)
MCH RBC QN AUTO: 30.8 PG (ref 24–34)
MCHC RBC AUTO-ENTMCNC: 32.5 G/DL (ref 31–37)
MCV RBC AUTO: 95 FL (ref 78–100)
MONOCYTES # BLD: 0.3 K/UL (ref 0.05–1.2)
MONOCYTES NFR BLD: 3 % (ref 3–10)
NEUTS SEG # BLD: 9.2 K/UL (ref 1.8–8)
NEUTS SEG NFR BLD: 91 % (ref 40–73)
NITRITE UR QL STRIP.AUTO: NEGATIVE
NRBC # BLD: 0 K/UL (ref 0–0.01)
NRBC BLD-RTO: 0 PER 100 WBC
O2/TOTAL GAS SETTING VFR VENT: 40 %
PCO2 BLD: 42.9 MMHG (ref 35–45)
PEEP RESPIRATORY: 5 CMH2O
PH BLD: 7.35 (ref 7.35–7.45)
PH UR STRIP: 5.5 (ref 5–8)
PLATELET # BLD AUTO: 162 K/UL (ref 135–420)
PMV BLD AUTO: 10.9 FL (ref 9.2–11.8)
PO2 BLD: 69 MMHG (ref 80–100)
POTASSIUM SERPL-SCNC: 3.5 MMOL/L (ref 3.5–5.5)
PROT SERPL-MCNC: 6.2 G/DL (ref 6.4–8.2)
PROT UR STRIP-MCNC: ABNORMAL MG/DL
RBC # BLD AUTO: 3.18 M/UL (ref 4.2–5.3)
RBC #/AREA URNS HPF: ABNORMAL /HPF (ref 0–5)
SAO2 % BLD: 92.5 % (ref 92–97)
SERVICE CMNT-IMP: ABNORMAL
SODIUM SERPL-SCNC: 140 MMOL/L (ref 136–145)
SP GR UR REFRACTOMETRY: 1.01 (ref 1–1.03)
SPECIMEN TYPE: ABNORMAL
UROBILINOGEN UR QL STRIP.AUTO: 0.2 EU/DL (ref 0.2–1)
VENTILATION MODE VENT: ABNORMAL
VT SETTING VENT: 426 ML
WBC # BLD AUTO: 10.1 K/UL (ref 4.6–13.2)
WBC URNS QL MICRO: ABNORMAL /HPF (ref 0–5)

## 2024-07-04 PROCEDURE — 6360000002 HC RX W HCPCS: Performed by: INTERNAL MEDICINE

## 2024-07-04 PROCEDURE — 36600 WITHDRAWAL OF ARTERIAL BLOOD: CPT

## 2024-07-04 PROCEDURE — 83735 ASSAY OF MAGNESIUM: CPT

## 2024-07-04 PROCEDURE — 80076 HEPATIC FUNCTION PANEL: CPT

## 2024-07-04 PROCEDURE — 6360000002 HC RX W HCPCS: Performed by: EMERGENCY MEDICINE

## 2024-07-04 PROCEDURE — 81001 URINALYSIS AUTO W/SCOPE: CPT

## 2024-07-04 PROCEDURE — 6370000000 HC RX 637 (ALT 250 FOR IP): Performed by: HOSPITALIST

## 2024-07-04 PROCEDURE — P9047 ALBUMIN (HUMAN), 25%, 50ML: HCPCS | Performed by: INTERNAL MEDICINE

## 2024-07-04 PROCEDURE — 93005 ELECTROCARDIOGRAM TRACING: CPT | Performed by: INTERNAL MEDICINE

## 2024-07-04 PROCEDURE — 87040 BLOOD CULTURE FOR BACTERIA: CPT

## 2024-07-04 PROCEDURE — 6370000000 HC RX 637 (ALT 250 FOR IP): Performed by: INTERNAL MEDICINE

## 2024-07-04 PROCEDURE — 2580000003 HC RX 258: Performed by: HOSPITALIST

## 2024-07-04 PROCEDURE — 85025 COMPLETE CBC W/AUTO DIFF WBC: CPT

## 2024-07-04 PROCEDURE — 94640 AIRWAY INHALATION TREATMENT: CPT

## 2024-07-04 PROCEDURE — 94003 VENT MGMT INPAT SUBQ DAY: CPT

## 2024-07-04 PROCEDURE — 2500000003 HC RX 250 WO HCPCS: Performed by: INTERNAL MEDICINE

## 2024-07-04 PROCEDURE — 82962 GLUCOSE BLOOD TEST: CPT

## 2024-07-04 PROCEDURE — 6360000002 HC RX W HCPCS: Performed by: HOSPITALIST

## 2024-07-04 PROCEDURE — 2500000003 HC RX 250 WO HCPCS: Performed by: HOSPITALIST

## 2024-07-04 PROCEDURE — 82803 BLOOD GASES ANY COMBINATION: CPT

## 2024-07-04 PROCEDURE — 93010 ELECTROCARDIOGRAM REPORT: CPT | Performed by: INTERNAL MEDICINE

## 2024-07-04 PROCEDURE — 2580000003 HC RX 258: Performed by: INTERNAL MEDICINE

## 2024-07-04 PROCEDURE — 80048 BASIC METABOLIC PNL TOTAL CA: CPT

## 2024-07-04 PROCEDURE — 2700000000 HC OXYGEN THERAPY PER DAY

## 2024-07-04 PROCEDURE — 2000000000 HC ICU R&B

## 2024-07-04 PROCEDURE — 71045 X-RAY EXAM CHEST 1 VIEW: CPT

## 2024-07-04 PROCEDURE — 2580000003 HC RX 258: Performed by: EMERGENCY MEDICINE

## 2024-07-04 RX ORDER — FENTANYL CITRATE 50 UG/ML
25 INJECTION, SOLUTION INTRAMUSCULAR; INTRAVENOUS
Status: CANCELLED | OUTPATIENT
Start: 2024-07-04

## 2024-07-04 RX ORDER — FUROSEMIDE 10 MG/ML
20 INJECTION INTRAMUSCULAR; INTRAVENOUS ONCE
Status: COMPLETED | OUTPATIENT
Start: 2024-07-04 | End: 2024-07-04

## 2024-07-04 RX ORDER — HYDROXYZINE HYDROCHLORIDE 25 MG/1
25 TABLET, FILM COATED ORAL
Status: DISCONTINUED | OUTPATIENT
Start: 2024-07-04 | End: 2024-07-16 | Stop reason: HOSPADM

## 2024-07-04 RX ORDER — MIDAZOLAM HYDROCHLORIDE 2 MG/2ML
1 INJECTION, SOLUTION INTRAMUSCULAR; INTRAVENOUS EVERY 4 HOURS PRN
Status: DISCONTINUED | OUTPATIENT
Start: 2024-07-04 | End: 2024-07-07

## 2024-07-04 RX ORDER — CLONAZEPAM 0.5 MG/1
2 TABLET, ORALLY DISINTEGRATING ORAL 3 TIMES DAILY
Status: DISCONTINUED | OUTPATIENT
Start: 2024-07-04 | End: 2024-07-05 | Stop reason: ALTCHOICE

## 2024-07-04 RX ORDER — OLANZAPINE 2.5 MG/1
5 TABLET, FILM COATED ORAL 2 TIMES DAILY
Status: DISCONTINUED | OUTPATIENT
Start: 2024-07-04 | End: 2024-07-07

## 2024-07-04 RX ADMIN — FAMOTIDINE 20 MG: 10 INJECTION, SOLUTION INTRAVENOUS at 08:52

## 2024-07-04 RX ADMIN — OXYCODONE AND ACETAMINOPHEN 2 TABLET: 5; 325 TABLET ORAL at 09:00

## 2024-07-04 RX ADMIN — DEXMEDETOMIDINE 1.2 MCG/KG/HR: 100 INJECTION, SOLUTION INTRAVENOUS at 18:29

## 2024-07-04 RX ADMIN — IPRATROPIUM BROMIDE AND ALBUTEROL SULFATE 1 DOSE: .5; 3 SOLUTION RESPIRATORY (INHALATION) at 20:19

## 2024-07-04 RX ADMIN — FENTANYL CITRATE 100 MCG/HR: 0.05 INJECTION, SOLUTION INTRAMUSCULAR; INTRAVENOUS at 01:37

## 2024-07-04 RX ADMIN — IPRATROPIUM BROMIDE AND ALBUTEROL SULFATE 1 DOSE: .5; 3 SOLUTION RESPIRATORY (INHALATION) at 11:02

## 2024-07-04 RX ADMIN — CLONAZEPAM 2 MG: 0.5 TABLET, ORALLY DISINTEGRATING ORAL at 20:51

## 2024-07-04 RX ADMIN — BUPROPION HYDROCHLORIDE 100 MG: 100 TABLET, FILM COATED ORAL at 20:51

## 2024-07-04 RX ADMIN — INSULIN LISPRO 2 UNITS: 100 INJECTION, SOLUTION INTRAVENOUS; SUBCUTANEOUS at 21:45

## 2024-07-04 RX ADMIN — ALBUMIN (HUMAN) 25 G: 0.25 INJECTION, SOLUTION INTRAVENOUS at 02:07

## 2024-07-04 RX ADMIN — MIDAZOLAM 1 MG: 1 INJECTION INTRAMUSCULAR; INTRAVENOUS at 21:17

## 2024-07-04 RX ADMIN — DEXMEDETOMIDINE 0.8 MCG/KG/HR: 100 INJECTION, SOLUTION INTRAVENOUS at 13:18

## 2024-07-04 RX ADMIN — ENOXAPARIN SODIUM 40 MG: 100 INJECTION SUBCUTANEOUS at 17:54

## 2024-07-04 RX ADMIN — INSULIN LISPRO 2 UNITS: 100 INJECTION, SOLUTION INTRAVENOUS; SUBCUTANEOUS at 14:58

## 2024-07-04 RX ADMIN — WATER 40 MG: 1 INJECTION INTRAMUSCULAR; INTRAVENOUS; SUBCUTANEOUS at 08:52

## 2024-07-04 RX ADMIN — CLONAZEPAM 2 MG: 0.5 TABLET, ORALLY DISINTEGRATING ORAL at 09:30

## 2024-07-04 RX ADMIN — BUDESONIDE 500 MCG: 0.5 INHALANT RESPIRATORY (INHALATION) at 20:19

## 2024-07-04 RX ADMIN — IPRATROPIUM BROMIDE AND ALBUTEROL SULFATE 1 DOSE: .5; 3 SOLUTION RESPIRATORY (INHALATION) at 07:35

## 2024-07-04 RX ADMIN — ASPIRIN 81 MG: 81 TABLET, COATED ORAL at 08:55

## 2024-07-04 RX ADMIN — DEXMEDETOMIDINE 1.4 MCG/KG/HR: 100 INJECTION, SOLUTION INTRAVENOUS at 23:06

## 2024-07-04 RX ADMIN — OLANZAPINE 5 MG: 2.5 TABLET, FILM COATED ORAL at 20:51

## 2024-07-04 RX ADMIN — SENNOSIDES AND DOCUSATE SODIUM 1 TABLET: 50; 8.6 TABLET ORAL at 20:51

## 2024-07-04 RX ADMIN — MIDAZOLAM 1 MG: 1 INJECTION INTRAMUSCULAR; INTRAVENOUS at 16:44

## 2024-07-04 RX ADMIN — OXYCODONE AND ACETAMINOPHEN 2 TABLET: 5; 325 TABLET ORAL at 16:41

## 2024-07-04 RX ADMIN — BUPROPION HYDROCHLORIDE 100 MG: 100 TABLET, FILM COATED ORAL at 08:59

## 2024-07-04 RX ADMIN — INSULIN LISPRO 2 UNITS: 100 INJECTION, SOLUTION INTRAVENOUS; SUBCUTANEOUS at 19:42

## 2024-07-04 RX ADMIN — OXYCODONE AND ACETAMINOPHEN 2 TABLET: 5; 325 TABLET ORAL at 01:38

## 2024-07-04 RX ADMIN — FENTANYL CITRATE 50 MCG/HR: 0.05 INJECTION, SOLUTION INTRAMUSCULAR; INTRAVENOUS at 22:21

## 2024-07-04 RX ADMIN — WATER 40 MG: 1 INJECTION INTRAMUSCULAR; INTRAVENOUS; SUBCUTANEOUS at 16:43

## 2024-07-04 RX ADMIN — SODIUM CHLORIDE: 9 INJECTION, SOLUTION INTRAVENOUS at 04:18

## 2024-07-04 RX ADMIN — INSULIN LISPRO 2 UNITS: 100 INJECTION, SOLUTION INTRAVENOUS; SUBCUTANEOUS at 01:57

## 2024-07-04 RX ADMIN — POTASSIUM BICARBONATE 40 MEQ: 782 TABLET, EFFERVESCENT ORAL at 19:41

## 2024-07-04 RX ADMIN — ARFORMOTEROL TARTRATE 15 MCG: 15 SOLUTION RESPIRATORY (INHALATION) at 20:19

## 2024-07-04 RX ADMIN — DOXYCYCLINE 100 MG: 100 INJECTION, POWDER, LYOPHILIZED, FOR SOLUTION INTRAVENOUS at 00:04

## 2024-07-04 RX ADMIN — WATER 40 MG: 1 INJECTION INTRAMUSCULAR; INTRAVENOUS; SUBCUTANEOUS at 20:51

## 2024-07-04 RX ADMIN — BUDESONIDE 500 MCG: 0.5 INHALANT RESPIRATORY (INHALATION) at 07:35

## 2024-07-04 RX ADMIN — ARFORMOTEROL TARTRATE 15 MCG: 15 SOLUTION RESPIRATORY (INHALATION) at 07:35

## 2024-07-04 RX ADMIN — DOXYCYCLINE 100 MG: 100 INJECTION, POWDER, LYOPHILIZED, FOR SOLUTION INTRAVENOUS at 12:19

## 2024-07-04 RX ADMIN — INSULIN LISPRO 2 UNITS: 100 INJECTION, SOLUTION INTRAVENOUS; SUBCUTANEOUS at 06:30

## 2024-07-04 RX ADMIN — FAMOTIDINE 20 MG: 10 INJECTION, SOLUTION INTRAVENOUS at 20:51

## 2024-07-04 RX ADMIN — IMIPRAMINE HYDROCHLORIDE 50 MG: 25 TABLET ORAL at 20:51

## 2024-07-04 RX ADMIN — WATER 40 MG: 1 INJECTION INTRAMUSCULAR; INTRAVENOUS; SUBCUTANEOUS at 04:21

## 2024-07-04 RX ADMIN — ALBUMIN (HUMAN) 25 G: 0.25 INJECTION, SOLUTION INTRAVENOUS at 08:53

## 2024-07-04 RX ADMIN — DEXMEDETOMIDINE 0.4 MCG/KG/HR: 100 INJECTION, SOLUTION INTRAVENOUS at 02:22

## 2024-07-04 RX ADMIN — BUPROPION HYDROCHLORIDE 100 MG: 100 TABLET, FILM COATED ORAL at 14:58

## 2024-07-04 RX ADMIN — CLONAZEPAM 2 MG: 0.5 TABLET, ORALLY DISINTEGRATING ORAL at 14:58

## 2024-07-04 RX ADMIN — WATER 1000 MG: 1 INJECTION INTRAMUSCULAR; INTRAVENOUS; SUBCUTANEOUS at 16:43

## 2024-07-04 RX ADMIN — MIDODRINE HYDROCHLORIDE 2.5 MG: 2.5 TABLET ORAL at 08:55

## 2024-07-04 RX ADMIN — FUROSEMIDE 20 MG: 10 INJECTION, SOLUTION INTRAMUSCULAR; INTRAVENOUS at 11:22

## 2024-07-04 RX ADMIN — OLANZAPINE 5 MG: 2.5 TABLET, FILM COATED ORAL at 09:30

## 2024-07-04 RX ADMIN — HYDROXYZINE HYDROCHLORIDE 25 MG: 25 TABLET, FILM COATED ORAL at 20:51

## 2024-07-04 RX ADMIN — DOXYCYCLINE 100 MG: 100 INJECTION, POWDER, LYOPHILIZED, FOR SOLUTION INTRAVENOUS at 23:23

## 2024-07-04 RX ADMIN — IPRATROPIUM BROMIDE AND ALBUTEROL SULFATE 1 DOSE: .5; 3 SOLUTION RESPIRATORY (INHALATION) at 15:41

## 2024-07-04 ASSESSMENT — PAIN SCALES - GENERAL
PAINLEVEL_OUTOF10: 0
PAINLEVEL_OUTOF10: 0

## 2024-07-04 ASSESSMENT — PULMONARY FUNCTION TESTS
PIF_VALUE: 25
PIF_VALUE: 22
PIF_VALUE: 27
PIF_VALUE: 41
PIF_VALUE: 23

## 2024-07-05 ENCOUNTER — APPOINTMENT (OUTPATIENT)
Facility: HOSPITAL | Age: 69
DRG: 207 | End: 2024-07-05
Payer: MEDICARE

## 2024-07-05 ENCOUNTER — APPOINTMENT (OUTPATIENT)
Facility: HOSPITAL | Age: 69
DRG: 207 | End: 2024-07-05
Attending: INTERNAL MEDICINE
Payer: MEDICARE

## 2024-07-05 LAB
ANION GAP SERPL CALC-SCNC: 3 MMOL/L (ref 3–18)
ARTERIAL PATENCY WRIST A: POSITIVE
ARTERIAL PATENCY WRIST A: POSITIVE
BACTERIA SPEC CULT: NORMAL
BASE EXCESS BLD CALC-SCNC: 0.1 MMOL/L
BASE EXCESS BLD CALC-SCNC: 1.7 MMOL/L
BASOPHILS # BLD: 0 K/UL (ref 0–0.1)
BASOPHILS NFR BLD: 0 % (ref 0–2)
BDY SITE: ABNORMAL
BDY SITE: ABNORMAL
BUN SERPL-MCNC: 29 MG/DL (ref 7–18)
BUN/CREAT SERPL: 35 (ref 12–20)
CALCIUM SERPL-MCNC: 9.9 MG/DL (ref 8.5–10.1)
CHLORIDE SERPL-SCNC: 108 MMOL/L (ref 100–111)
CO2 SERPL-SCNC: 28 MMOL/L (ref 21–32)
CREAT SERPL-MCNC: 0.82 MG/DL (ref 0.6–1.3)
DIFFERENTIAL METHOD BLD: ABNORMAL
ECHO AV MEAN GRADIENT: 4 MMHG
ECHO AV MEAN VELOCITY: 0.9 M/S
ECHO AV PEAK GRADIENT: 8 MMHG
ECHO AV PEAK VELOCITY: 1.4 M/S
ECHO AV VELOCITY RATIO: 0.57
ECHO AV VTI: 25.9 CM
ECHO BSA: 1.92 M2
ECHO IVC OSTIUM: 1.7 CM
ECHO IVC PROX: 2.6 CM
ECHO LA DIAMETER INDEX: 2.11 CM/M2
ECHO LA DIAMETER: 4 CM
ECHO LV EDV A2C: 91 ML
ECHO LV EDV A4C: 103 ML
ECHO LV EDV BP: 98 ML (ref 56–104)
ECHO LV EDV INDEX A4C: 54 ML/M2
ECHO LV EDV INDEX BP: 52 ML/M2
ECHO LV EDV NDEX A2C: 48 ML/M2
ECHO LV EJECTION FRACTION A2C: 57 %
ECHO LV EJECTION FRACTION A4C: 51 %
ECHO LV EJECTION FRACTION BIPLANE: 54 % (ref 55–100)
ECHO LV ESV A2C: 39 ML
ECHO LV ESV A4C: 50 ML
ECHO LV ESV BP: 45 ML (ref 19–49)
ECHO LV ESV INDEX A2C: 21 ML/M2
ECHO LV ESV INDEX A4C: 26 ML/M2
ECHO LV ESV INDEX BP: 24 ML/M2
ECHO LV FRACTIONAL SHORTENING: 18 % (ref 28–44)
ECHO LV INTERNAL DIMENSION DIASTOLE INDEX: 2.37 CM/M2
ECHO LV INTERNAL DIMENSION DIASTOLIC: 4.5 CM (ref 3.9–5.3)
ECHO LV INTERNAL DIMENSION SYSTOLIC INDEX: 1.95 CM/M2
ECHO LV INTERNAL DIMENSION SYSTOLIC: 3.7 CM
ECHO LV IVSD: 0.7 CM (ref 0.6–0.9)
ECHO LV MASS 2D: 123.1 G (ref 67–162)
ECHO LV MASS INDEX 2D: 64.8 G/M2 (ref 43–95)
ECHO LV POSTERIOR WALL DIASTOLIC: 1 CM (ref 0.6–0.9)
ECHO LV RELATIVE WALL THICKNESS RATIO: 0.44
ECHO LVOT AV VTI INDEX: 0.61
ECHO LVOT MEAN GRADIENT: 1 MMHG
ECHO LVOT PEAK GRADIENT: 2 MMHG
ECHO LVOT PEAK VELOCITY: 0.8 M/S
ECHO LVOT VTI: 15.9 CM
ECHO MV A VELOCITY: 0.94 M/S
ECHO MV E DECELERATION TIME (DT): 143.6 MS
ECHO MV E VELOCITY: 0.92 M/S
ECHO MV E/A RATIO: 0.98
ECHO RV INTERNAL DIMENSION: 3.4 CM
ECHO RVOT MEAN GRADIENT: 1 MMHG
ECHO RVOT PEAK GRADIENT: 3 MMHG
ECHO RVOT PEAK VELOCITY: 0.8 M/S
ECHO RVOT VTI: 19 CM
EKG ATRIAL RATE: 87 BPM
EKG DIAGNOSIS: NORMAL
EKG P AXIS: 88 DEGREES
EKG P-R INTERVAL: 182 MS
EKG Q-T INTERVAL: 376 MS
EKG QRS DURATION: 104 MS
EKG QTC CALCULATION (BAZETT): 452 MS
EKG R AXIS: 91 DEGREES
EKG T AXIS: 85 DEGREES
EKG VENTRICULAR RATE: 87 BPM
EOSINOPHIL # BLD: 0 K/UL (ref 0–0.4)
EOSINOPHIL NFR BLD: 0 % (ref 0–5)
ERYTHROCYTE [DISTWIDTH] IN BLOOD BY AUTOMATED COUNT: 14.4 % (ref 11.6–14.5)
GAS FLOW.O2 O2 DELIVERY SYS: ABNORMAL
GAS FLOW.O2 O2 DELIVERY SYS: ABNORMAL
GAS FLOW.O2 SETTING OXYMISER: 16 BPM
GLUCOSE BLD STRIP.AUTO-MCNC: 190 MG/DL (ref 70–110)
GLUCOSE BLD STRIP.AUTO-MCNC: 225 MG/DL (ref 70–110)
GLUCOSE BLD STRIP.AUTO-MCNC: 233 MG/DL (ref 70–110)
GLUCOSE BLD STRIP.AUTO-MCNC: 238 MG/DL (ref 70–110)
GLUCOSE BLD STRIP.AUTO-MCNC: 247 MG/DL (ref 70–110)
GLUCOSE SERPL-MCNC: 187 MG/DL (ref 74–99)
GRAM STN SPEC: NORMAL
HCO3 BLD-SCNC: 25.3 MMOL/L (ref 22–26)
HCO3 BLD-SCNC: 27 MMOL/L (ref 22–26)
HCT VFR BLD AUTO: 32.8 % (ref 35–45)
HGB BLD-MCNC: 11 G/DL (ref 12–16)
IMM GRANULOCYTES # BLD AUTO: 0 K/UL (ref 0–0.04)
IMM GRANULOCYTES NFR BLD AUTO: 0 % (ref 0–0.5)
LYMPHOCYTES # BLD: 0.6 K/UL (ref 0.9–3.6)
LYMPHOCYTES NFR BLD: 7 % (ref 21–52)
MAGNESIUM SERPL-MCNC: 2.3 MG/DL (ref 1.6–2.6)
MCH RBC QN AUTO: 31.3 PG (ref 24–34)
MCHC RBC AUTO-ENTMCNC: 33.5 G/DL (ref 31–37)
MCV RBC AUTO: 93.2 FL (ref 78–100)
MONOCYTES # BLD: 0.4 K/UL (ref 0.05–1.2)
MONOCYTES NFR BLD: 5 % (ref 3–10)
NEUTS SEG # BLD: 7.8 K/UL (ref 1.8–8)
NEUTS SEG NFR BLD: 88 % (ref 40–73)
NRBC # BLD: 0 K/UL (ref 0–0.01)
NRBC BLD-RTO: 0 PER 100 WBC
O2/TOTAL GAS SETTING VFR VENT: 30 %
O2/TOTAL GAS SETTING VFR VENT: 40 %
PCO2 BLD: 42.4 MMHG (ref 35–45)
PCO2 BLD: 44.4 MMHG (ref 35–45)
PEEP RESPIRATORY: 5 CMH2O
PEEP RESPIRATORY: 5 CMH2O
PH BLD: 7.38 (ref 7.35–7.45)
PH BLD: 7.39 (ref 7.35–7.45)
PLATELET # BLD AUTO: 186 K/UL (ref 135–420)
PMV BLD AUTO: 10.8 FL (ref 9.2–11.8)
PO2 BLD: 59 MMHG (ref 80–100)
PO2 BLD: 60 MMHG (ref 80–100)
POTASSIUM SERPL-SCNC: 4 MMOL/L (ref 3.5–5.5)
PRESSURE SUPPORT SETTING VENT: 5 CMH2O
RBC # BLD AUTO: 3.52 M/UL (ref 4.2–5.3)
SAO2 % BLD: 89.5 % (ref 92–97)
SAO2 % BLD: 90.3 % (ref 92–97)
SERVICE CMNT-IMP: ABNORMAL
SERVICE CMNT-IMP: ABNORMAL
SERVICE CMNT-IMP: NORMAL
SODIUM SERPL-SCNC: 139 MMOL/L (ref 136–145)
SPECIMEN TYPE: ABNORMAL
SPECIMEN TYPE: ABNORMAL
VENTILATION MODE VENT: ABNORMAL
VENTILATION MODE VENT: ABNORMAL
VT SETTING VENT: 427 ML
WBC # BLD AUTO: 8.9 K/UL (ref 4.6–13.2)

## 2024-07-05 PROCEDURE — 82962 GLUCOSE BLOOD TEST: CPT

## 2024-07-05 PROCEDURE — 2580000003 HC RX 258: Performed by: INTERNAL MEDICINE

## 2024-07-05 PROCEDURE — 94640 AIRWAY INHALATION TREATMENT: CPT

## 2024-07-05 PROCEDURE — 80048 BASIC METABOLIC PNL TOTAL CA: CPT

## 2024-07-05 PROCEDURE — 6370000000 HC RX 637 (ALT 250 FOR IP): Performed by: HOSPITALIST

## 2024-07-05 PROCEDURE — 6360000002 HC RX W HCPCS: Performed by: INTERNAL MEDICINE

## 2024-07-05 PROCEDURE — 6370000000 HC RX 637 (ALT 250 FOR IP): Performed by: INTERNAL MEDICINE

## 2024-07-05 PROCEDURE — 2000000000 HC ICU R&B

## 2024-07-05 PROCEDURE — 2580000003 HC RX 258: Performed by: EMERGENCY MEDICINE

## 2024-07-05 PROCEDURE — 93010 ELECTROCARDIOGRAM REPORT: CPT | Performed by: INTERNAL MEDICINE

## 2024-07-05 PROCEDURE — 94003 VENT MGMT INPAT SUBQ DAY: CPT

## 2024-07-05 PROCEDURE — 93325 DOPPLER ECHO COLOR FLOW MAPG: CPT | Performed by: INTERNAL MEDICINE

## 2024-07-05 PROCEDURE — 93321 DOPPLER ECHO F-UP/LMTD STD: CPT | Performed by: INTERNAL MEDICINE

## 2024-07-05 PROCEDURE — 83735 ASSAY OF MAGNESIUM: CPT

## 2024-07-05 PROCEDURE — 2500000003 HC RX 250 WO HCPCS: Performed by: HOSPITALIST

## 2024-07-05 PROCEDURE — 6360000002 HC RX W HCPCS: Performed by: EMERGENCY MEDICINE

## 2024-07-05 PROCEDURE — 85025 COMPLETE CBC W/AUTO DIFF WBC: CPT

## 2024-07-05 PROCEDURE — 36600 WITHDRAWAL OF ARTERIAL BLOOD: CPT

## 2024-07-05 PROCEDURE — 93308 TTE F-UP OR LMTD: CPT

## 2024-07-05 PROCEDURE — 82803 BLOOD GASES ANY COMBINATION: CPT

## 2024-07-05 PROCEDURE — 71045 X-RAY EXAM CHEST 1 VIEW: CPT

## 2024-07-05 PROCEDURE — 2500000003 HC RX 250 WO HCPCS: Performed by: INTERNAL MEDICINE

## 2024-07-05 PROCEDURE — 51702 INSERT TEMP BLADDER CATH: CPT

## 2024-07-05 PROCEDURE — 2580000003 HC RX 258: Performed by: HOSPITALIST

## 2024-07-05 PROCEDURE — 6360000002 HC RX W HCPCS: Performed by: HOSPITALIST

## 2024-07-05 PROCEDURE — 93308 TTE F-UP OR LMTD: CPT | Performed by: INTERNAL MEDICINE

## 2024-07-05 RX ORDER — OXYCODONE HYDROCHLORIDE AND ACETAMINOPHEN 5; 325 MG/1; MG/1
1 TABLET ORAL EVERY 8 HOURS PRN
Status: DISCONTINUED | OUTPATIENT
Start: 2024-07-05 | End: 2024-07-06

## 2024-07-05 RX ORDER — FENTANYL CITRATE-0.9 % NACL/PF 10 MCG/ML
25-200 PLASTIC BAG, INJECTION (ML) INTRAVENOUS CONTINUOUS
Status: DISCONTINUED | OUTPATIENT
Start: 2024-07-05 | End: 2024-07-07

## 2024-07-05 RX ORDER — FUROSEMIDE 10 MG/ML
20 INJECTION INTRAMUSCULAR; INTRAVENOUS DAILY
Status: DISCONTINUED | OUTPATIENT
Start: 2024-07-05 | End: 2024-07-09

## 2024-07-05 RX ORDER — CLONAZEPAM 0.5 MG/1
2 TABLET ORAL 3 TIMES DAILY
Status: DISCONTINUED | OUTPATIENT
Start: 2024-07-05 | End: 2024-07-16 | Stop reason: HOSPADM

## 2024-07-05 RX ADMIN — OLANZAPINE 5 MG: 2.5 TABLET, FILM COATED ORAL at 08:32

## 2024-07-05 RX ADMIN — CLONAZEPAM 2 MG: 0.5 TABLET, ORALLY DISINTEGRATING ORAL at 08:31

## 2024-07-05 RX ADMIN — ARFORMOTEROL TARTRATE 15 MCG: 15 SOLUTION RESPIRATORY (INHALATION) at 20:03

## 2024-07-05 RX ADMIN — ENOXAPARIN SODIUM 40 MG: 100 INJECTION SUBCUTANEOUS at 17:58

## 2024-07-05 RX ADMIN — BUDESONIDE 500 MCG: 0.5 INHALANT RESPIRATORY (INHALATION) at 20:03

## 2024-07-05 RX ADMIN — INSULIN LISPRO 2 UNITS: 100 INJECTION, SOLUTION INTRAVENOUS; SUBCUTANEOUS at 16:32

## 2024-07-05 RX ADMIN — ASPIRIN 81 MG: 81 TABLET, COATED ORAL at 08:31

## 2024-07-05 RX ADMIN — BUDESONIDE 500 MCG: 0.5 INHALANT RESPIRATORY (INHALATION) at 07:21

## 2024-07-05 RX ADMIN — OLANZAPINE 5 MG: 2.5 TABLET, FILM COATED ORAL at 20:01

## 2024-07-05 RX ADMIN — FAMOTIDINE 20 MG: 20 TABLET, FILM COATED ORAL at 20:01

## 2024-07-05 RX ADMIN — IPRATROPIUM BROMIDE AND ALBUTEROL SULFATE 1 DOSE: .5; 3 SOLUTION RESPIRATORY (INHALATION) at 20:03

## 2024-07-05 RX ADMIN — DEXMEDETOMIDINE 1.5 MCG/KG/HR: 100 INJECTION, SOLUTION INTRAVENOUS at 22:26

## 2024-07-05 RX ADMIN — SENNOSIDES AND DOCUSATE SODIUM 1 TABLET: 50; 8.6 TABLET ORAL at 20:01

## 2024-07-05 RX ADMIN — MIDAZOLAM 1 MG: 1 INJECTION INTRAMUSCULAR; INTRAVENOUS at 04:33

## 2024-07-05 RX ADMIN — VANCOMYCIN HYDROCHLORIDE 1000 MG: 1 INJECTION, POWDER, LYOPHILIZED, FOR SOLUTION INTRAVENOUS at 19:54

## 2024-07-05 RX ADMIN — MIDAZOLAM 1 MG: 1 INJECTION INTRAMUSCULAR; INTRAVENOUS at 14:19

## 2024-07-05 RX ADMIN — BUPROPION HYDROCHLORIDE 100 MG: 100 TABLET, FILM COATED ORAL at 20:01

## 2024-07-05 RX ADMIN — BUPROPION HYDROCHLORIDE 100 MG: 100 TABLET, FILM COATED ORAL at 08:31

## 2024-07-05 RX ADMIN — BUPROPION HYDROCHLORIDE 100 MG: 100 TABLET, FILM COATED ORAL at 14:18

## 2024-07-05 RX ADMIN — HYDROXYZINE HYDROCHLORIDE 25 MG: 25 TABLET, FILM COATED ORAL at 20:01

## 2024-07-05 RX ADMIN — INSULIN LISPRO 2 UNITS: 100 INJECTION, SOLUTION INTRAVENOUS; SUBCUTANEOUS at 01:42

## 2024-07-05 RX ADMIN — WATER 1000 MG: 1 INJECTION INTRAMUSCULAR; INTRAVENOUS; SUBCUTANEOUS at 16:31

## 2024-07-05 RX ADMIN — DOXYCYCLINE 100 MG: 100 INJECTION, POWDER, LYOPHILIZED, FOR SOLUTION INTRAVENOUS at 11:40

## 2024-07-05 RX ADMIN — CLONAZEPAM 2 MG: 0.5 TABLET ORAL at 20:01

## 2024-07-05 RX ADMIN — FAMOTIDINE 20 MG: 10 INJECTION, SOLUTION INTRAVENOUS at 08:32

## 2024-07-05 RX ADMIN — WATER 40 MG: 1 INJECTION INTRAMUSCULAR; INTRAVENOUS; SUBCUTANEOUS at 21:30

## 2024-07-05 RX ADMIN — FUROSEMIDE 20 MG: 10 INJECTION, SOLUTION INTRAMUSCULAR; INTRAVENOUS at 09:52

## 2024-07-05 RX ADMIN — MIDAZOLAM 1 MG: 1 INJECTION INTRAMUSCULAR; INTRAVENOUS at 10:09

## 2024-07-05 RX ADMIN — DEXMEDETOMIDINE 1.4 MCG/KG/HR: 100 INJECTION, SOLUTION INTRAVENOUS at 03:13

## 2024-07-05 RX ADMIN — MIDAZOLAM 1 MG: 1 INJECTION INTRAMUSCULAR; INTRAVENOUS at 19:03

## 2024-07-05 RX ADMIN — WATER 40 MG: 1 INJECTION INTRAMUSCULAR; INTRAVENOUS; SUBCUTANEOUS at 16:32

## 2024-07-05 RX ADMIN — IMIPRAMINE HYDROCHLORIDE 50 MG: 25 TABLET ORAL at 20:01

## 2024-07-05 RX ADMIN — OXYCODONE HYDROCHLORIDE AND ACETAMINOPHEN 1 TABLET: 5; 325 TABLET ORAL at 16:39

## 2024-07-05 RX ADMIN — IPRATROPIUM BROMIDE AND ALBUTEROL SULFATE 1 DOSE: .5; 3 SOLUTION RESPIRATORY (INHALATION) at 15:26

## 2024-07-05 RX ADMIN — OXYCODONE HYDROCHLORIDE AND ACETAMINOPHEN 1 TABLET: 5; 325 TABLET ORAL at 08:31

## 2024-07-05 RX ADMIN — WATER 40 MG: 1 INJECTION INTRAMUSCULAR; INTRAVENOUS; SUBCUTANEOUS at 04:32

## 2024-07-05 RX ADMIN — IPRATROPIUM BROMIDE AND ALBUTEROL SULFATE 1 DOSE: .5; 3 SOLUTION RESPIRATORY (INHALATION) at 11:21

## 2024-07-05 RX ADMIN — FENTANYL CITRATE 25 MCG/HR: 0.05 INJECTION, SOLUTION INTRAMUSCULAR; INTRAVENOUS at 19:13

## 2024-07-05 RX ADMIN — CLONAZEPAM 2 MG: 0.5 TABLET ORAL at 16:32

## 2024-07-05 RX ADMIN — OXYCODONE AND ACETAMINOPHEN 2 TABLET: 5; 325 TABLET ORAL at 01:15

## 2024-07-05 RX ADMIN — ARFORMOTEROL TARTRATE 15 MCG: 15 SOLUTION RESPIRATORY (INHALATION) at 07:21

## 2024-07-05 RX ADMIN — IPRATROPIUM BROMIDE AND ALBUTEROL SULFATE 1 DOSE: .5; 3 SOLUTION RESPIRATORY (INHALATION) at 07:21

## 2024-07-05 RX ADMIN — DOXYCYCLINE 100 MG: 100 INJECTION, POWDER, LYOPHILIZED, FOR SOLUTION INTRAVENOUS at 23:35

## 2024-07-05 RX ADMIN — VANCOMYCIN HYDROCHLORIDE 1500 MG: 10 INJECTION, POWDER, LYOPHILIZED, FOR SOLUTION INTRAVENOUS at 07:39

## 2024-07-05 RX ADMIN — DEXMEDETOMIDINE 1.3 MCG/KG/HR: 100 INJECTION, SOLUTION INTRAVENOUS at 15:10

## 2024-07-05 RX ADMIN — DEXMEDETOMIDINE 1.3 MCG/KG/HR: 100 INJECTION, SOLUTION INTRAVENOUS at 07:05

## 2024-07-05 RX ADMIN — WATER 40 MG: 1 INJECTION INTRAMUSCULAR; INTRAVENOUS; SUBCUTANEOUS at 08:32

## 2024-07-05 RX ADMIN — DEXMEDETOMIDINE 1.5 MCG/KG/HR: 100 INJECTION, SOLUTION INTRAVENOUS at 11:05

## 2024-07-05 RX ADMIN — INSULIN LISPRO 2 UNITS: 100 INJECTION, SOLUTION INTRAVENOUS; SUBCUTANEOUS at 11:37

## 2024-07-05 ASSESSMENT — PAIN SCALES - GENERAL
PAINLEVEL_OUTOF10: 0
PAINLEVEL_OUTOF10: 0

## 2024-07-05 ASSESSMENT — PULMONARY FUNCTION TESTS
PIF_VALUE: 18
PIF_VALUE: 22
PIF_VALUE: 18
PIF_VALUE: 26
PIF_VALUE: 17
PIF_VALUE: 17

## 2024-07-06 ENCOUNTER — APPOINTMENT (OUTPATIENT)
Facility: HOSPITAL | Age: 69
DRG: 207 | End: 2024-07-06
Payer: MEDICARE

## 2024-07-06 LAB
ANION GAP SERPL CALC-SCNC: 6 MMOL/L (ref 3–18)
ARTERIAL PATENCY WRIST A: POSITIVE
ARTERIAL PATENCY WRIST A: POSITIVE
BACTERIA SPEC CULT: ABNORMAL
BACTERIA SPEC CULT: ABNORMAL
BASE EXCESS BLD CALC-SCNC: 5.2 MMOL/L
BASE EXCESS BLD CALC-SCNC: 6.6 MMOL/L
BDY SITE: ABNORMAL
BDY SITE: ABNORMAL
BUN SERPL-MCNC: 28 MG/DL (ref 7–18)
BUN/CREAT SERPL: 38 (ref 12–20)
CALCIUM SERPL-MCNC: 9.2 MG/DL (ref 8.5–10.1)
CHLORIDE SERPL-SCNC: 105 MMOL/L (ref 100–111)
CO2 SERPL-SCNC: 29 MMOL/L (ref 21–32)
CREAT SERPL-MCNC: 0.74 MG/DL (ref 0.6–1.3)
GAS FLOW.O2 O2 DELIVERY SYS: ABNORMAL
GAS FLOW.O2 O2 DELIVERY SYS: ABNORMAL
GAS FLOW.O2 SETTING OXYMISER: 16 BPM
GLUCOSE BLD STRIP.AUTO-MCNC: 195 MG/DL (ref 70–110)
GLUCOSE BLD STRIP.AUTO-MCNC: 219 MG/DL (ref 70–110)
GLUCOSE BLD STRIP.AUTO-MCNC: 230 MG/DL (ref 70–110)
GLUCOSE BLD STRIP.AUTO-MCNC: 237 MG/DL (ref 70–110)
GLUCOSE BLD STRIP.AUTO-MCNC: 239 MG/DL (ref 70–110)
GLUCOSE BLD STRIP.AUTO-MCNC: 257 MG/DL (ref 70–110)
GLUCOSE SERPL-MCNC: 265 MG/DL (ref 74–99)
GRAM STN SPEC: ABNORMAL
GRAM STN SPEC: ABNORMAL
HCO3 BLD-SCNC: 29.9 MMOL/L (ref 22–26)
HCO3 BLD-SCNC: 32.6 MMOL/L (ref 22–26)
O2/TOTAL GAS SETTING VFR VENT: 35 %
O2/TOTAL GAS SETTING VFR VENT: 40 %
PCO2 BLD: 43.3 MMHG (ref 35–45)
PCO2 BLD: 51.1 MMHG (ref 35–45)
PEEP RESPIRATORY: 5 CMH2O
PEEP RESPIRATORY: 5 CMH2O
PH BLD: 7.41 (ref 7.35–7.45)
PH BLD: 7.45 (ref 7.35–7.45)
PO2 BLD: 70 MMHG (ref 80–100)
PO2 BLD: 75 MMHG (ref 80–100)
POTASSIUM SERPL-SCNC: 3.2 MMOL/L (ref 3.5–5.5)
PRESSURE SUPPORT SETTING VENT: 5 CMH2O
SAO2 % BLD: 93.6 % (ref 92–97)
SAO2 % BLD: 95.4 % (ref 92–97)
SERVICE CMNT-IMP: ABNORMAL
SODIUM SERPL-SCNC: 140 MMOL/L (ref 136–145)
SPECIMEN TYPE: ABNORMAL
SPECIMEN TYPE: ABNORMAL
VENTILATION MODE VENT: ABNORMAL
VENTILATION MODE VENT: ABNORMAL
VT SETTING VENT: 398 ML

## 2024-07-06 PROCEDURE — 6370000000 HC RX 637 (ALT 250 FOR IP): Performed by: INTERNAL MEDICINE

## 2024-07-06 PROCEDURE — 6360000002 HC RX W HCPCS: Performed by: INTERNAL MEDICINE

## 2024-07-06 PROCEDURE — 2580000003 HC RX 258: Performed by: INTERNAL MEDICINE

## 2024-07-06 PROCEDURE — 2000000000 HC ICU R&B

## 2024-07-06 PROCEDURE — 82803 BLOOD GASES ANY COMBINATION: CPT

## 2024-07-06 PROCEDURE — 36600 WITHDRAWAL OF ARTERIAL BLOOD: CPT

## 2024-07-06 PROCEDURE — 6360000002 HC RX W HCPCS: Performed by: EMERGENCY MEDICINE

## 2024-07-06 PROCEDURE — 6370000000 HC RX 637 (ALT 250 FOR IP): Performed by: HOSPITALIST

## 2024-07-06 PROCEDURE — 2500000003 HC RX 250 WO HCPCS: Performed by: HOSPITALIST

## 2024-07-06 PROCEDURE — 6360000002 HC RX W HCPCS: Performed by: HOSPITALIST

## 2024-07-06 PROCEDURE — 2580000003 HC RX 258: Performed by: HOSPITALIST

## 2024-07-06 PROCEDURE — 94003 VENT MGMT INPAT SUBQ DAY: CPT

## 2024-07-06 PROCEDURE — 82962 GLUCOSE BLOOD TEST: CPT

## 2024-07-06 PROCEDURE — 94640 AIRWAY INHALATION TREATMENT: CPT

## 2024-07-06 PROCEDURE — 2580000003 HC RX 258: Performed by: EMERGENCY MEDICINE

## 2024-07-06 PROCEDURE — 80048 BASIC METABOLIC PNL TOTAL CA: CPT

## 2024-07-06 PROCEDURE — 2500000003 HC RX 250 WO HCPCS: Performed by: INTERNAL MEDICINE

## 2024-07-06 PROCEDURE — 36415 COLL VENOUS BLD VENIPUNCTURE: CPT

## 2024-07-06 PROCEDURE — 71045 X-RAY EXAM CHEST 1 VIEW: CPT

## 2024-07-06 RX ORDER — METOPROLOL TARTRATE 1 MG/ML
2.5 INJECTION, SOLUTION INTRAVENOUS EVERY 6 HOURS PRN
Status: DISCONTINUED | OUTPATIENT
Start: 2024-07-06 | End: 2024-07-14

## 2024-07-06 RX ORDER — OXYCODONE HYDROCHLORIDE AND ACETAMINOPHEN 5; 325 MG/1; MG/1
2 TABLET ORAL 3 TIMES DAILY
Status: DISCONTINUED | OUTPATIENT
Start: 2024-07-06 | End: 2024-07-07

## 2024-07-06 RX ORDER — LOSARTAN POTASSIUM 25 MG/1
25 TABLET ORAL DAILY
Status: DISCONTINUED | OUTPATIENT
Start: 2024-07-06 | End: 2024-07-16 | Stop reason: HOSPADM

## 2024-07-06 RX ORDER — SENNA AND DOCUSATE SODIUM 50; 8.6 MG/1; MG/1
1 TABLET, FILM COATED ORAL 2 TIMES DAILY
Status: DISCONTINUED | OUTPATIENT
Start: 2024-07-06 | End: 2024-07-16 | Stop reason: HOSPADM

## 2024-07-06 RX ORDER — INSULIN GLARGINE 100 [IU]/ML
5 INJECTION, SOLUTION SUBCUTANEOUS 2 TIMES DAILY
Status: DISCONTINUED | OUTPATIENT
Start: 2024-07-06 | End: 2024-07-07

## 2024-07-06 RX ADMIN — WATER 40 MG: 1 INJECTION INTRAMUSCULAR; INTRAVENOUS; SUBCUTANEOUS at 15:42

## 2024-07-06 RX ADMIN — BUPROPION HYDROCHLORIDE 100 MG: 100 TABLET, FILM COATED ORAL at 15:42

## 2024-07-06 RX ADMIN — INSULIN LISPRO 2 UNITS: 100 INJECTION, SOLUTION INTRAVENOUS; SUBCUTANEOUS at 23:53

## 2024-07-06 RX ADMIN — WATER 40 MG: 1 INJECTION INTRAMUSCULAR; INTRAVENOUS; SUBCUTANEOUS at 03:55

## 2024-07-06 RX ADMIN — SENNOSIDES AND DOCUSATE SODIUM 1 TABLET: 50; 8.6 TABLET ORAL at 11:02

## 2024-07-06 RX ADMIN — OLANZAPINE 5 MG: 2.5 TABLET, FILM COATED ORAL at 20:20

## 2024-07-06 RX ADMIN — IPRATROPIUM BROMIDE AND ALBUTEROL SULFATE 1 DOSE: .5; 3 SOLUTION RESPIRATORY (INHALATION) at 20:37

## 2024-07-06 RX ADMIN — LOSARTAN POTASSIUM 25 MG: 25 TABLET, FILM COATED ORAL at 10:54

## 2024-07-06 RX ADMIN — WATER 40 MG: 1 INJECTION INTRAMUSCULAR; INTRAVENOUS; SUBCUTANEOUS at 21:11

## 2024-07-06 RX ADMIN — INSULIN LISPRO 4 UNITS: 100 INJECTION, SOLUTION INTRAVENOUS; SUBCUTANEOUS at 05:47

## 2024-07-06 RX ADMIN — FAMOTIDINE 20 MG: 10 INJECTION, SOLUTION INTRAVENOUS at 08:24

## 2024-07-06 RX ADMIN — IPRATROPIUM BROMIDE AND ALBUTEROL SULFATE 1 DOSE: .5; 3 SOLUTION RESPIRATORY (INHALATION) at 11:30

## 2024-07-06 RX ADMIN — MIDAZOLAM 1 MG: 1 INJECTION INTRAMUSCULAR; INTRAVENOUS at 03:55

## 2024-07-06 RX ADMIN — BUPROPION HYDROCHLORIDE 100 MG: 100 TABLET, FILM COATED ORAL at 08:24

## 2024-07-06 RX ADMIN — FAMOTIDINE 20 MG: 20 TABLET, FILM COATED ORAL at 20:21

## 2024-07-06 RX ADMIN — VANCOMYCIN HYDROCHLORIDE 1000 MG: 1 INJECTION, POWDER, LYOPHILIZED, FOR SOLUTION INTRAVENOUS at 08:17

## 2024-07-06 RX ADMIN — FENTANYL CITRATE 75 MCG/HR: 0.05 INJECTION, SOLUTION INTRAMUSCULAR; INTRAVENOUS at 23:52

## 2024-07-06 RX ADMIN — FENTANYL CITRATE 75 MCG/HR: 0.05 INJECTION, SOLUTION INTRAMUSCULAR; INTRAVENOUS at 14:43

## 2024-07-06 RX ADMIN — DEXMEDETOMIDINE 1.1 MCG/KG/HR: 100 INJECTION, SOLUTION INTRAVENOUS at 17:49

## 2024-07-06 RX ADMIN — OXYCODONE HYDROCHLORIDE AND ACETAMINOPHEN 2 TABLET: 5; 325 TABLET ORAL at 20:21

## 2024-07-06 RX ADMIN — WATER 1000 MG: 1 INJECTION INTRAMUSCULAR; INTRAVENOUS; SUBCUTANEOUS at 15:42

## 2024-07-06 RX ADMIN — MIDAZOLAM 1 MG: 1 INJECTION INTRAMUSCULAR; INTRAVENOUS at 18:23

## 2024-07-06 RX ADMIN — DOXYCYCLINE 100 MG: 100 INJECTION, POWDER, LYOPHILIZED, FOR SOLUTION INTRAVENOUS at 23:46

## 2024-07-06 RX ADMIN — INSULIN LISPRO 2 UNITS: 100 INJECTION, SOLUTION INTRAVENOUS; SUBCUTANEOUS at 16:05

## 2024-07-06 RX ADMIN — HYDROXYZINE HYDROCHLORIDE 25 MG: 25 TABLET, FILM COATED ORAL at 20:20

## 2024-07-06 RX ADMIN — DEXMEDETOMIDINE 1.5 MCG/KG/HR: 100 INJECTION, SOLUTION INTRAVENOUS at 05:24

## 2024-07-06 RX ADMIN — DEXMEDETOMIDINE 1.5 MCG/KG/HR: 100 INJECTION, SOLUTION INTRAVENOUS at 08:58

## 2024-07-06 RX ADMIN — WATER 40 MG: 1 INJECTION INTRAMUSCULAR; INTRAVENOUS; SUBCUTANEOUS at 10:38

## 2024-07-06 RX ADMIN — ARFORMOTEROL TARTRATE 15 MCG: 15 SOLUTION RESPIRATORY (INHALATION) at 20:37

## 2024-07-06 RX ADMIN — INSULIN LISPRO 2 UNITS: 100 INJECTION, SOLUTION INTRAVENOUS; SUBCUTANEOUS at 20:32

## 2024-07-06 RX ADMIN — CLONAZEPAM 2 MG: 0.5 TABLET ORAL at 08:24

## 2024-07-06 RX ADMIN — MIDAZOLAM 1 MG: 1 INJECTION INTRAMUSCULAR; INTRAVENOUS at 10:38

## 2024-07-06 RX ADMIN — OXYCODONE HYDROCHLORIDE AND ACETAMINOPHEN 2 TABLET: 5; 325 TABLET ORAL at 10:38

## 2024-07-06 RX ADMIN — ENOXAPARIN SODIUM 40 MG: 100 INJECTION SUBCUTANEOUS at 18:19

## 2024-07-06 RX ADMIN — SENNOSIDES AND DOCUSATE SODIUM 1 TABLET: 50; 8.6 TABLET ORAL at 20:21

## 2024-07-06 RX ADMIN — OXYCODONE HYDROCHLORIDE AND ACETAMINOPHEN 2 TABLET: 5; 325 TABLET ORAL at 15:42

## 2024-07-06 RX ADMIN — CLONAZEPAM 2 MG: 0.5 TABLET ORAL at 20:20

## 2024-07-06 RX ADMIN — CLONAZEPAM 2 MG: 0.5 TABLET ORAL at 15:42

## 2024-07-06 RX ADMIN — BUDESONIDE 500 MCG: 0.5 INHALANT RESPIRATORY (INHALATION) at 20:37

## 2024-07-06 RX ADMIN — IPRATROPIUM BROMIDE AND ALBUTEROL SULFATE 1 DOSE: .5; 3 SOLUTION RESPIRATORY (INHALATION) at 15:17

## 2024-07-06 RX ADMIN — ASPIRIN 81 MG: 81 TABLET, COATED ORAL at 08:24

## 2024-07-06 RX ADMIN — OLANZAPINE 5 MG: 2.5 TABLET, FILM COATED ORAL at 08:23

## 2024-07-06 RX ADMIN — INSULIN LISPRO 2 UNITS: 100 INJECTION, SOLUTION INTRAVENOUS; SUBCUTANEOUS at 01:55

## 2024-07-06 RX ADMIN — DOXYCYCLINE 100 MG: 100 INJECTION, POWDER, LYOPHILIZED, FOR SOLUTION INTRAVENOUS at 11:46

## 2024-07-06 RX ADMIN — FUROSEMIDE 20 MG: 10 INJECTION, SOLUTION INTRAMUSCULAR; INTRAVENOUS at 08:24

## 2024-07-06 RX ADMIN — BUDESONIDE 500 MCG: 0.5 INHALANT RESPIRATORY (INHALATION) at 07:17

## 2024-07-06 RX ADMIN — BUPROPION HYDROCHLORIDE 100 MG: 100 TABLET, FILM COATED ORAL at 20:22

## 2024-07-06 RX ADMIN — INSULIN GLARGINE 5 UNITS: 100 INJECTION, SOLUTION SUBCUTANEOUS at 20:44

## 2024-07-06 RX ADMIN — DEXMEDETOMIDINE 1.3 MCG/KG/HR: 100 INJECTION, SOLUTION INTRAVENOUS at 12:47

## 2024-07-06 RX ADMIN — DEXMEDETOMIDINE 1.5 MCG/KG/HR: 100 INJECTION, SOLUTION INTRAVENOUS at 21:08

## 2024-07-06 RX ADMIN — IMIPRAMINE HYDROCHLORIDE 50 MG: 25 TABLET ORAL at 20:20

## 2024-07-06 RX ADMIN — DEXMEDETOMIDINE 1.5 MCG/KG/HR: 100 INJECTION, SOLUTION INTRAVENOUS at 01:50

## 2024-07-06 RX ADMIN — IPRATROPIUM BROMIDE AND ALBUTEROL SULFATE 1 DOSE: .5; 3 SOLUTION RESPIRATORY (INHALATION) at 07:17

## 2024-07-06 RX ADMIN — ARFORMOTEROL TARTRATE 15 MCG: 15 SOLUTION RESPIRATORY (INHALATION) at 07:17

## 2024-07-06 RX ADMIN — INSULIN GLARGINE 5 UNITS: 100 INJECTION, SOLUTION SUBCUTANEOUS at 11:02

## 2024-07-06 RX ADMIN — POTASSIUM BICARBONATE 40 MEQ: 782 TABLET, EFFERVESCENT ORAL at 08:24

## 2024-07-06 ASSESSMENT — PULMONARY FUNCTION TESTS
PIF_VALUE: 24
PIF_VALUE: 20
PIF_VALUE: 19
PIF_VALUE: 21
PIF_VALUE: 23
PIF_VALUE: 24

## 2024-07-06 ASSESSMENT — PAIN DESCRIPTION - LOCATION: LOCATION: BACK;SHOULDER

## 2024-07-07 ENCOUNTER — APPOINTMENT (OUTPATIENT)
Facility: HOSPITAL | Age: 69
DRG: 207 | End: 2024-07-07
Payer: MEDICARE

## 2024-07-07 ENCOUNTER — APPOINTMENT (OUTPATIENT)
Facility: HOSPITAL | Age: 69
DRG: 207 | End: 2024-07-07
Attending: INTERNAL MEDICINE
Payer: MEDICARE

## 2024-07-07 LAB
ANION GAP SERPL CALC-SCNC: 6 MMOL/L (ref 3–18)
ARTERIAL PATENCY WRIST A: POSITIVE
ARTERIAL PATENCY WRIST A: POSITIVE
BASE EXCESS BLD CALC-SCNC: 12 MMOL/L
BASE EXCESS BLD CALC-SCNC: 9.1 MMOL/L
BASOPHILS # BLD: 0 K/UL (ref 0–0.1)
BASOPHILS NFR BLD: 0 % (ref 0–2)
BDY SITE: ABNORMAL
BDY SITE: ABNORMAL
BUN SERPL-MCNC: 29 MG/DL (ref 7–18)
BUN/CREAT SERPL: 41 (ref 12–20)
CALCIUM SERPL-MCNC: 9.4 MG/DL (ref 8.5–10.1)
CHLORIDE SERPL-SCNC: 102 MMOL/L (ref 100–111)
CO2 SERPL-SCNC: 34 MMOL/L (ref 21–32)
CREAT SERPL-MCNC: 0.71 MG/DL (ref 0.6–1.3)
DIFFERENTIAL METHOD BLD: ABNORMAL
ECHO BSA: 1.98 M2
ECHO RV TAPSE: 2.2 CM (ref 1.7–?)
EKG ATRIAL RATE: 79 BPM
EKG DIAGNOSIS: NORMAL
EKG P AXIS: 90 DEGREES
EKG P-R INTERVAL: 158 MS
EKG Q-T INTERVAL: 364 MS
EKG QRS DURATION: 86 MS
EKG QTC CALCULATION (BAZETT): 417 MS
EKG R AXIS: 83 DEGREES
EKG T AXIS: 86 DEGREES
EKG VENTRICULAR RATE: 79 BPM
EOSINOPHIL # BLD: 0 K/UL (ref 0–0.4)
EOSINOPHIL NFR BLD: 0 % (ref 0–5)
ERYTHROCYTE [DISTWIDTH] IN BLOOD BY AUTOMATED COUNT: 14 % (ref 11.6–14.5)
GAS FLOW.O2 O2 DELIVERY SYS: ABNORMAL
GAS FLOW.O2 O2 DELIVERY SYS: ABNORMAL
GAS FLOW.O2 SETTING OXYMISER: 16 BPM
GLUCOSE BLD STRIP.AUTO-MCNC: 170 MG/DL (ref 70–110)
GLUCOSE BLD STRIP.AUTO-MCNC: 181 MG/DL (ref 70–110)
GLUCOSE BLD STRIP.AUTO-MCNC: 182 MG/DL (ref 70–110)
GLUCOSE BLD STRIP.AUTO-MCNC: 185 MG/DL (ref 70–110)
GLUCOSE BLD STRIP.AUTO-MCNC: 195 MG/DL (ref 70–110)
GLUCOSE BLD STRIP.AUTO-MCNC: 206 MG/DL (ref 70–110)
GLUCOSE SERPL-MCNC: 185 MG/DL (ref 74–99)
HCO3 BLD-SCNC: 33.2 MMOL/L (ref 22–26)
HCO3 BLD-SCNC: 36 MMOL/L (ref 22–26)
HCT VFR BLD AUTO: 37.8 % (ref 35–45)
HGB BLD-MCNC: 12.6 G/DL (ref 12–16)
IMM GRANULOCYTES # BLD AUTO: 0 K/UL (ref 0–0.04)
IMM GRANULOCYTES NFR BLD AUTO: 1 % (ref 0–0.5)
LYMPHOCYTES # BLD: 0.8 K/UL (ref 0.9–3.6)
LYMPHOCYTES NFR BLD: 11 % (ref 21–52)
MAGNESIUM SERPL-MCNC: 2.1 MG/DL (ref 1.6–2.6)
MCH RBC QN AUTO: 30.6 PG (ref 24–34)
MCHC RBC AUTO-ENTMCNC: 33.3 G/DL (ref 31–37)
MCV RBC AUTO: 91.7 FL (ref 78–100)
MONOCYTES # BLD: 0.5 K/UL (ref 0.05–1.2)
MONOCYTES NFR BLD: 7 % (ref 3–10)
NEUTS SEG # BLD: 5.6 K/UL (ref 1.8–8)
NEUTS SEG NFR BLD: 81 % (ref 40–73)
NRBC # BLD: 0 K/UL (ref 0–0.01)
NRBC BLD-RTO: 0 PER 100 WBC
NT PRO BNP: 3262 PG/ML (ref 0–900)
O2/TOTAL GAS SETTING VFR VENT: 40 %
O2/TOTAL GAS SETTING VFR VENT: 40 %
PCO2 BLD: 42.2 MMHG (ref 35–45)
PCO2 BLD: 42.6 MMHG (ref 35–45)
PEEP RESPIRATORY: 5 CMH2O
PEEP RESPIRATORY: 5 CMH2O
PH BLD: 7.5 (ref 7.35–7.45)
PH BLD: 7.54 (ref 7.35–7.45)
PLATELET # BLD AUTO: 214 K/UL (ref 135–420)
PMV BLD AUTO: 10.5 FL (ref 9.2–11.8)
PO2 BLD: 58 MMHG (ref 80–100)
PO2 BLD: 62 MMHG (ref 80–100)
POTASSIUM SERPL-SCNC: 3.3 MMOL/L (ref 3.5–5.5)
POTASSIUM SERPL-SCNC: 4.4 MMOL/L (ref 3.5–5.5)
PRESSURE SUPPORT SETTING VENT: 5 CMH2O
RBC # BLD AUTO: 4.12 M/UL (ref 4.2–5.3)
SAO2 % BLD: 92.5 % (ref 92–97)
SAO2 % BLD: 93.3 % (ref 92–97)
SERVICE CMNT-IMP: ABNORMAL
SERVICE CMNT-IMP: ABNORMAL
SODIUM SERPL-SCNC: 142 MMOL/L (ref 136–145)
SPECIMEN TYPE: ABNORMAL
SPECIMEN TYPE: ABNORMAL
TROPONIN I SERPL HS-MCNC: 10 NG/L (ref 0–54)
VENTILATION MODE VENT: ABNORMAL
VENTILATION MODE VENT: ABNORMAL
VT SETTING VENT: 431 ML
WBC # BLD AUTO: 6.9 K/UL (ref 4.6–13.2)

## 2024-07-07 PROCEDURE — 36600 WITHDRAWAL OF ARTERIAL BLOOD: CPT

## 2024-07-07 PROCEDURE — 83735 ASSAY OF MAGNESIUM: CPT

## 2024-07-07 PROCEDURE — 94640 AIRWAY INHALATION TREATMENT: CPT

## 2024-07-07 PROCEDURE — 6360000002 HC RX W HCPCS: Performed by: INTERNAL MEDICINE

## 2024-07-07 PROCEDURE — 89220 SPUTUM SPECIMEN COLLECTION: CPT

## 2024-07-07 PROCEDURE — 93321 DOPPLER ECHO F-UP/LMTD STD: CPT

## 2024-07-07 PROCEDURE — 71045 X-RAY EXAM CHEST 1 VIEW: CPT

## 2024-07-07 PROCEDURE — 2000000000 HC ICU R&B

## 2024-07-07 PROCEDURE — 6370000000 HC RX 637 (ALT 250 FOR IP): Performed by: HOSPITALIST

## 2024-07-07 PROCEDURE — 84132 ASSAY OF SERUM POTASSIUM: CPT

## 2024-07-07 PROCEDURE — 2500000003 HC RX 250 WO HCPCS: Performed by: INTERNAL MEDICINE

## 2024-07-07 PROCEDURE — 93325 DOPPLER ECHO COLOR FLOW MAPG: CPT | Performed by: INTERNAL MEDICINE

## 2024-07-07 PROCEDURE — 82962 GLUCOSE BLOOD TEST: CPT

## 2024-07-07 PROCEDURE — 2580000003 HC RX 258: Performed by: HOSPITALIST

## 2024-07-07 PROCEDURE — 84484 ASSAY OF TROPONIN QUANT: CPT

## 2024-07-07 PROCEDURE — 94003 VENT MGMT INPAT SUBQ DAY: CPT

## 2024-07-07 PROCEDURE — 93308 TTE F-UP OR LMTD: CPT | Performed by: INTERNAL MEDICINE

## 2024-07-07 PROCEDURE — 6360000002 HC RX W HCPCS: Performed by: HOSPITALIST

## 2024-07-07 PROCEDURE — 93321 DOPPLER ECHO F-UP/LMTD STD: CPT | Performed by: INTERNAL MEDICINE

## 2024-07-07 PROCEDURE — 85025 COMPLETE CBC W/AUTO DIFF WBC: CPT

## 2024-07-07 PROCEDURE — 6370000000 HC RX 637 (ALT 250 FOR IP): Performed by: INTERNAL MEDICINE

## 2024-07-07 PROCEDURE — 2500000003 HC RX 250 WO HCPCS: Performed by: HOSPITALIST

## 2024-07-07 PROCEDURE — 2580000003 HC RX 258: Performed by: INTERNAL MEDICINE

## 2024-07-07 PROCEDURE — 82803 BLOOD GASES ANY COMBINATION: CPT

## 2024-07-07 PROCEDURE — 80048 BASIC METABOLIC PNL TOTAL CA: CPT

## 2024-07-07 PROCEDURE — 93010 ELECTROCARDIOGRAM REPORT: CPT | Performed by: INTERNAL MEDICINE

## 2024-07-07 PROCEDURE — 93005 ELECTROCARDIOGRAM TRACING: CPT | Performed by: INTERNAL MEDICINE

## 2024-07-07 PROCEDURE — 83880 ASSAY OF NATRIURETIC PEPTIDE: CPT

## 2024-07-07 RX ORDER — OLANZAPINE 2.5 MG/1
2.5 TABLET, FILM COATED ORAL 2 TIMES DAILY
Status: DISCONTINUED | OUTPATIENT
Start: 2024-07-07 | End: 2024-07-16 | Stop reason: HOSPADM

## 2024-07-07 RX ORDER — OXYCODONE HYDROCHLORIDE AND ACETAMINOPHEN 5; 325 MG/1; MG/1
2 TABLET ORAL 3 TIMES DAILY
Status: DISCONTINUED | OUTPATIENT
Start: 2024-07-07 | End: 2024-07-14

## 2024-07-07 RX ORDER — OXYCODONE HYDROCHLORIDE AND ACETAMINOPHEN 5; 325 MG/1; MG/1
1 TABLET ORAL 3 TIMES DAILY
Status: DISCONTINUED | OUTPATIENT
Start: 2024-07-07 | End: 2024-07-07

## 2024-07-07 RX ORDER — INSULIN GLARGINE 100 [IU]/ML
7 INJECTION, SOLUTION SUBCUTANEOUS 2 TIMES DAILY
Status: DISCONTINUED | OUTPATIENT
Start: 2024-07-07 | End: 2024-07-09

## 2024-07-07 RX ORDER — MIDAZOLAM HYDROCHLORIDE 2 MG/2ML
2 INJECTION, SOLUTION INTRAMUSCULAR; INTRAVENOUS ONCE
Status: COMPLETED | OUTPATIENT
Start: 2024-07-07 | End: 2024-07-07

## 2024-07-07 RX ORDER — FENTANYL CITRATE 50 UG/ML
50 INJECTION, SOLUTION INTRAMUSCULAR; INTRAVENOUS EVERY 4 HOURS PRN
OUTPATIENT
Start: 2024-07-07

## 2024-07-07 RX ORDER — FENTANYL CITRATE-0.9 % NACL/PF 10 MCG/ML
25-200 PLASTIC BAG, INJECTION (ML) INTRAVENOUS CONTINUOUS
Status: DISCONTINUED | OUTPATIENT
Start: 2024-07-07 | End: 2024-07-08

## 2024-07-07 RX ORDER — MIDAZOLAM HYDROCHLORIDE 2 MG/2ML
2 INJECTION, SOLUTION INTRAMUSCULAR; INTRAVENOUS EVERY 4 HOURS PRN
Status: DISCONTINUED | OUTPATIENT
Start: 2024-07-07 | End: 2024-07-14

## 2024-07-07 RX ADMIN — LOSARTAN POTASSIUM 25 MG: 25 TABLET, FILM COATED ORAL at 08:09

## 2024-07-07 RX ADMIN — ARFORMOTEROL TARTRATE 15 MCG: 15 SOLUTION RESPIRATORY (INHALATION) at 07:31

## 2024-07-07 RX ADMIN — ASPIRIN 81 MG: 81 TABLET, COATED ORAL at 08:09

## 2024-07-07 RX ADMIN — WATER 40 MG: 1 INJECTION INTRAMUSCULAR; INTRAVENOUS; SUBCUTANEOUS at 21:31

## 2024-07-07 RX ADMIN — BUPROPION HYDROCHLORIDE 100 MG: 100 TABLET, FILM COATED ORAL at 13:37

## 2024-07-07 RX ADMIN — BUDESONIDE 500 MCG: 0.5 INHALANT RESPIRATORY (INHALATION) at 07:31

## 2024-07-07 RX ADMIN — INSULIN GLARGINE 7 UNITS: 100 INJECTION, SOLUTION SUBCUTANEOUS at 20:47

## 2024-07-07 RX ADMIN — SENNOSIDES AND DOCUSATE SODIUM 1 TABLET: 50; 8.6 TABLET ORAL at 20:46

## 2024-07-07 RX ADMIN — IPRATROPIUM BROMIDE AND ALBUTEROL SULFATE 1 DOSE: .5; 3 SOLUTION RESPIRATORY (INHALATION) at 11:26

## 2024-07-07 RX ADMIN — IPRATROPIUM BROMIDE AND ALBUTEROL SULFATE 1 DOSE: .5; 3 SOLUTION RESPIRATORY (INHALATION) at 07:31

## 2024-07-07 RX ADMIN — WATER 40 MG: 1 INJECTION INTRAMUSCULAR; INTRAVENOUS; SUBCUTANEOUS at 03:35

## 2024-07-07 RX ADMIN — MIDAZOLAM HYDROCHLORIDE 2 MG: 1 INJECTION, SOLUTION INTRAMUSCULAR; INTRAVENOUS at 13:37

## 2024-07-07 RX ADMIN — OXYCODONE HYDROCHLORIDE AND ACETAMINOPHEN 2 TABLET: 5; 325 TABLET ORAL at 20:46

## 2024-07-07 RX ADMIN — WATER 40 MG: 1 INJECTION INTRAMUSCULAR; INTRAVENOUS; SUBCUTANEOUS at 16:38

## 2024-07-07 RX ADMIN — ARFORMOTEROL TARTRATE 15 MCG: 15 SOLUTION RESPIRATORY (INHALATION) at 18:05

## 2024-07-07 RX ADMIN — OLANZAPINE 2.5 MG: 2.5 TABLET, FILM COATED ORAL at 20:45

## 2024-07-07 RX ADMIN — METOPROLOL TARTRATE 2.5 MG: 5 INJECTION INTRAVENOUS at 17:39

## 2024-07-07 RX ADMIN — BUPROPION HYDROCHLORIDE 100 MG: 100 TABLET, FILM COATED ORAL at 20:51

## 2024-07-07 RX ADMIN — DEXMEDETOMIDINE 1.5 MCG/KG/HR: 100 INJECTION, SOLUTION INTRAVENOUS at 04:14

## 2024-07-07 RX ADMIN — IPRATROPIUM BROMIDE AND ALBUTEROL SULFATE 1 DOSE: .5; 3 SOLUTION RESPIRATORY (INHALATION) at 18:05

## 2024-07-07 RX ADMIN — FENTANYL CITRATE 75 MCG/HR: 0.05 INJECTION, SOLUTION INTRAMUSCULAR; INTRAVENOUS at 16:50

## 2024-07-07 RX ADMIN — CLONAZEPAM 2 MG: 0.5 TABLET ORAL at 16:38

## 2024-07-07 RX ADMIN — DEXMEDETOMIDINE 1.3 MCG/KG/HR: 100 INJECTION, SOLUTION INTRAVENOUS at 19:06

## 2024-07-07 RX ADMIN — DOXYCYCLINE 100 MG: 100 INJECTION, POWDER, LYOPHILIZED, FOR SOLUTION INTRAVENOUS at 11:44

## 2024-07-07 RX ADMIN — MIDAZOLAM 1 MG: 1 INJECTION INTRAMUSCULAR; INTRAVENOUS at 10:23

## 2024-07-07 RX ADMIN — FAMOTIDINE 20 MG: 20 TABLET, FILM COATED ORAL at 20:45

## 2024-07-07 RX ADMIN — OLANZAPINE 5 MG: 2.5 TABLET, FILM COATED ORAL at 08:09

## 2024-07-07 RX ADMIN — WATER 40 MG: 1 INJECTION INTRAMUSCULAR; INTRAVENOUS; SUBCUTANEOUS at 10:29

## 2024-07-07 RX ADMIN — CLONAZEPAM 2 MG: 0.5 TABLET ORAL at 20:45

## 2024-07-07 RX ADMIN — BUDESONIDE 500 MCG: 0.5 INHALANT RESPIRATORY (INHALATION) at 18:05

## 2024-07-07 RX ADMIN — DEXMEDETOMIDINE 1.5 MCG/KG/HR: 100 INJECTION, SOLUTION INTRAVENOUS at 15:55

## 2024-07-07 RX ADMIN — FUROSEMIDE 20 MG: 10 INJECTION, SOLUTION INTRAMUSCULAR; INTRAVENOUS at 08:09

## 2024-07-07 RX ADMIN — INSULIN LISPRO 2 UNITS: 100 INJECTION, SOLUTION INTRAVENOUS; SUBCUTANEOUS at 20:49

## 2024-07-07 RX ADMIN — OXYCODONE HYDROCHLORIDE AND ACETAMINOPHEN 2 TABLET: 5; 325 TABLET ORAL at 08:09

## 2024-07-07 RX ADMIN — MIDAZOLAM HYDROCHLORIDE 2 MG: 1 INJECTION, SOLUTION INTRAMUSCULAR; INTRAVENOUS at 14:40

## 2024-07-07 RX ADMIN — MIDAZOLAM HYDROCHLORIDE 2 MG: 1 INJECTION, SOLUTION INTRAMUSCULAR; INTRAVENOUS at 21:31

## 2024-07-07 RX ADMIN — DEXMEDETOMIDINE 1.5 MCG/KG/HR: 100 INJECTION, SOLUTION INTRAVENOUS at 07:36

## 2024-07-07 RX ADMIN — ENOXAPARIN SODIUM 40 MG: 100 INJECTION SUBCUTANEOUS at 17:40

## 2024-07-07 RX ADMIN — DEXMEDETOMIDINE 1.5 MCG/KG/HR: 100 INJECTION, SOLUTION INTRAVENOUS at 22:48

## 2024-07-07 RX ADMIN — FAMOTIDINE 20 MG: 10 INJECTION, SOLUTION INTRAVENOUS at 08:09

## 2024-07-07 RX ADMIN — BUPROPION HYDROCHLORIDE 100 MG: 100 TABLET, FILM COATED ORAL at 08:09

## 2024-07-07 RX ADMIN — HYDROXYZINE HYDROCHLORIDE 25 MG: 25 TABLET, FILM COATED ORAL at 20:45

## 2024-07-07 RX ADMIN — DEXMEDETOMIDINE 1.5 MCG/KG/HR: 100 INJECTION, SOLUTION INTRAVENOUS at 12:03

## 2024-07-07 RX ADMIN — IMIPRAMINE HYDROCHLORIDE 50 MG: 25 TABLET ORAL at 20:45

## 2024-07-07 RX ADMIN — INSULIN GLARGINE 5 UNITS: 100 INJECTION, SOLUTION SUBCUTANEOUS at 08:09

## 2024-07-07 RX ADMIN — OXYCODONE HYDROCHLORIDE AND ACETAMINOPHEN 2 TABLET: 5; 325 TABLET ORAL at 13:37

## 2024-07-07 RX ADMIN — DEXMEDETOMIDINE 1.5 MCG/KG/HR: 100 INJECTION, SOLUTION INTRAVENOUS at 00:42

## 2024-07-07 RX ADMIN — IPRATROPIUM BROMIDE AND ALBUTEROL SULFATE 1 DOSE: .5; 3 SOLUTION RESPIRATORY (INHALATION) at 15:22

## 2024-07-07 RX ADMIN — SENNOSIDES AND DOCUSATE SODIUM 1 TABLET: 50; 8.6 TABLET ORAL at 08:09

## 2024-07-07 RX ADMIN — CLONAZEPAM 2 MG: 0.5 TABLET ORAL at 08:08

## 2024-07-07 RX ADMIN — MIDAZOLAM 1 MG: 1 INJECTION INTRAMUSCULAR; INTRAVENOUS at 03:33

## 2024-07-07 RX ADMIN — POTASSIUM BICARBONATE 40 MEQ: 782 TABLET, EFFERVESCENT ORAL at 06:55

## 2024-07-07 ASSESSMENT — PULMONARY FUNCTION TESTS
PIF_VALUE: 18
PIF_VALUE: 17
PIF_VALUE: 16
PIF_VALUE: 23
PIF_VALUE: 22
PIF_VALUE: 20
PIF_VALUE: 21

## 2024-07-07 ASSESSMENT — PAIN DESCRIPTION - LOCATION: LOCATION: BACK;SHOULDER

## 2024-07-08 ENCOUNTER — APPOINTMENT (OUTPATIENT)
Facility: HOSPITAL | Age: 69
DRG: 207 | End: 2024-07-08
Payer: MEDICARE

## 2024-07-08 PROBLEM — J96.01 ACUTE RESPIRATORY FAILURE WITH HYPOXIA AND HYPERCAPNIA (HCC): Status: ACTIVE | Noted: 2024-07-02

## 2024-07-08 PROBLEM — F41.9 ANXIETY: Status: ACTIVE | Noted: 2024-07-08

## 2024-07-08 LAB
ANION GAP SERPL CALC-SCNC: 3 MMOL/L (ref 3–18)
ARTERIAL PATENCY WRIST A: POSITIVE
BACTERIA SPEC CULT: NORMAL
BASE EXCESS BLD CALC-SCNC: 10 MMOL/L
BASOPHILS # BLD: 0 K/UL (ref 0–0.1)
BASOPHILS NFR BLD: 0 % (ref 0–2)
BDY SITE: ABNORMAL
BODY TEMPERATURE: 98
BUN SERPL-MCNC: 32 MG/DL (ref 7–18)
BUN/CREAT SERPL: 43 (ref 12–20)
CALCIUM SERPL-MCNC: 9.3 MG/DL (ref 8.5–10.1)
CHLORIDE SERPL-SCNC: 103 MMOL/L (ref 100–111)
CO2 SERPL-SCNC: 35 MMOL/L (ref 21–32)
CREAT SERPL-MCNC: 0.74 MG/DL (ref 0.6–1.3)
DIFFERENTIAL METHOD BLD: ABNORMAL
EOSINOPHIL # BLD: 0 K/UL (ref 0–0.4)
EOSINOPHIL NFR BLD: 0 % (ref 0–5)
ERYTHROCYTE [DISTWIDTH] IN BLOOD BY AUTOMATED COUNT: 14.4 % (ref 11.6–14.5)
GAS FLOW.O2 O2 DELIVERY SYS: ABNORMAL
GAS FLOW.O2 SETTING OXYMISER: 12 BPM
GLUCOSE BLD STRIP.AUTO-MCNC: 100 MG/DL (ref 70–110)
GLUCOSE BLD STRIP.AUTO-MCNC: 133 MG/DL (ref 70–110)
GLUCOSE BLD STRIP.AUTO-MCNC: 150 MG/DL (ref 70–110)
GLUCOSE BLD STRIP.AUTO-MCNC: 159 MG/DL (ref 70–110)
GLUCOSE BLD STRIP.AUTO-MCNC: 163 MG/DL (ref 70–110)
GLUCOSE SERPL-MCNC: 180 MG/DL (ref 74–99)
HCO3 BLD-SCNC: 34.9 MMOL/L (ref 22–26)
HCT VFR BLD AUTO: 38.2 % (ref 35–45)
HGB BLD-MCNC: 12.6 G/DL (ref 12–16)
IMM GRANULOCYTES # BLD AUTO: 0.1 K/UL (ref 0–0.04)
IMM GRANULOCYTES NFR BLD AUTO: 1 % (ref 0–0.5)
LYMPHOCYTES # BLD: 0.8 K/UL (ref 0.9–3.6)
LYMPHOCYTES NFR BLD: 10 % (ref 21–52)
MAGNESIUM SERPL-MCNC: 2.3 MG/DL (ref 1.6–2.6)
MCH RBC QN AUTO: 30.8 PG (ref 24–34)
MCHC RBC AUTO-ENTMCNC: 33 G/DL (ref 31–37)
MCV RBC AUTO: 93.4 FL (ref 78–100)
MONOCYTES # BLD: 0.6 K/UL (ref 0.05–1.2)
MONOCYTES NFR BLD: 7 % (ref 3–10)
NEUTS SEG # BLD: 6.6 K/UL (ref 1.8–8)
NEUTS SEG NFR BLD: 82 % (ref 40–73)
NRBC # BLD: 0 K/UL (ref 0–0.01)
NRBC BLD-RTO: 0 PER 100 WBC
O2/TOTAL GAS SETTING VFR VENT: 40 %
PCO2 BLD: 46.1 MMHG (ref 35–45)
PEEP RESPIRATORY: 5 CMH2O
PH BLD: 7.49 (ref 7.35–7.45)
PHOSPHATE SERPL-MCNC: 3.7 MG/DL (ref 2.5–4.9)
PLATELET # BLD AUTO: 209 K/UL (ref 135–420)
PMV BLD AUTO: 10.8 FL (ref 9.2–11.8)
PO2 BLD: 69 MMHG (ref 80–100)
POTASSIUM SERPL-SCNC: 4.1 MMOL/L (ref 3.5–5.5)
RBC # BLD AUTO: 4.09 M/UL (ref 4.2–5.3)
SAO2 % BLD: 94.8 % (ref 92–97)
SERVICE CMNT-IMP: ABNORMAL
SERVICE CMNT-IMP: NORMAL
SODIUM SERPL-SCNC: 141 MMOL/L (ref 136–145)
SPECIMEN TYPE: ABNORMAL
VENTILATION MODE VENT: ABNORMAL
VT SETTING VENT: 400 ML
WBC # BLD AUTO: 8.1 K/UL (ref 4.6–13.2)

## 2024-07-08 PROCEDURE — 2580000003 HC RX 258: Performed by: INTERNAL MEDICINE

## 2024-07-08 PROCEDURE — 2500000003 HC RX 250 WO HCPCS: Performed by: HOSPITALIST

## 2024-07-08 PROCEDURE — 6370000000 HC RX 637 (ALT 250 FOR IP): Performed by: HOSPITALIST

## 2024-07-08 PROCEDURE — 2500000003 HC RX 250 WO HCPCS: Performed by: INTERNAL MEDICINE

## 2024-07-08 PROCEDURE — 85025 COMPLETE CBC W/AUTO DIFF WBC: CPT

## 2024-07-08 PROCEDURE — 6360000002 HC RX W HCPCS: Performed by: INTERNAL MEDICINE

## 2024-07-08 PROCEDURE — 94003 VENT MGMT INPAT SUBQ DAY: CPT

## 2024-07-08 PROCEDURE — 82962 GLUCOSE BLOOD TEST: CPT

## 2024-07-08 PROCEDURE — 2000000000 HC ICU R&B

## 2024-07-08 PROCEDURE — 6360000002 HC RX W HCPCS: Performed by: HOSPITALIST

## 2024-07-08 PROCEDURE — 6370000000 HC RX 637 (ALT 250 FOR IP): Performed by: INTERNAL MEDICINE

## 2024-07-08 PROCEDURE — 80048 BASIC METABOLIC PNL TOTAL CA: CPT

## 2024-07-08 PROCEDURE — 2700000000 HC OXYGEN THERAPY PER DAY

## 2024-07-08 PROCEDURE — 2580000003 HC RX 258: Performed by: HOSPITALIST

## 2024-07-08 PROCEDURE — 83735 ASSAY OF MAGNESIUM: CPT

## 2024-07-08 PROCEDURE — 71045 X-RAY EXAM CHEST 1 VIEW: CPT

## 2024-07-08 PROCEDURE — 94640 AIRWAY INHALATION TREATMENT: CPT

## 2024-07-08 PROCEDURE — 82803 BLOOD GASES ANY COMBINATION: CPT

## 2024-07-08 PROCEDURE — 36600 WITHDRAWAL OF ARTERIAL BLOOD: CPT

## 2024-07-08 PROCEDURE — 84100 ASSAY OF PHOSPHORUS: CPT

## 2024-07-08 RX ORDER — INSULIN LISPRO 100 [IU]/ML
0-8 INJECTION, SOLUTION INTRAVENOUS; SUBCUTANEOUS EVERY 6 HOURS
Status: DISCONTINUED | OUTPATIENT
Start: 2024-07-08 | End: 2024-07-11

## 2024-07-08 RX ORDER — FENTANYL CITRATE 50 UG/ML
25 INJECTION, SOLUTION INTRAMUSCULAR; INTRAVENOUS EVERY 4 HOURS PRN
Status: DISCONTINUED | OUTPATIENT
Start: 2024-07-08 | End: 2024-07-14

## 2024-07-08 RX ADMIN — ARFORMOTEROL TARTRATE 15 MCG: 15 SOLUTION RESPIRATORY (INHALATION) at 21:08

## 2024-07-08 RX ADMIN — INSULIN GLARGINE 7 UNITS: 100 INJECTION, SOLUTION SUBCUTANEOUS at 07:55

## 2024-07-08 RX ADMIN — IPRATROPIUM BROMIDE AND ALBUTEROL SULFATE 1 DOSE: .5; 3 SOLUTION RESPIRATORY (INHALATION) at 21:08

## 2024-07-08 RX ADMIN — WATER 40 MG: 1 INJECTION INTRAMUSCULAR; INTRAVENOUS; SUBCUTANEOUS at 04:49

## 2024-07-08 RX ADMIN — LOSARTAN POTASSIUM 25 MG: 25 TABLET, FILM COATED ORAL at 07:55

## 2024-07-08 RX ADMIN — SENNOSIDES AND DOCUSATE SODIUM 1 TABLET: 50; 8.6 TABLET ORAL at 07:55

## 2024-07-08 RX ADMIN — IPRATROPIUM BROMIDE AND ALBUTEROL SULFATE 1 DOSE: .5; 3 SOLUTION RESPIRATORY (INHALATION) at 07:25

## 2024-07-08 RX ADMIN — DEXMEDETOMIDINE 1.5 MCG/KG/HR: 100 INJECTION, SOLUTION INTRAVENOUS at 05:57

## 2024-07-08 RX ADMIN — DEXMEDETOMIDINE 1.3 MCG/KG/HR: 100 INJECTION, SOLUTION INTRAVENOUS at 22:02

## 2024-07-08 RX ADMIN — FENTANYL CITRATE 100 MCG/HR: 0.05 INJECTION, SOLUTION INTRAMUSCULAR; INTRAVENOUS at 03:31

## 2024-07-08 RX ADMIN — OXYCODONE HYDROCHLORIDE AND ACETAMINOPHEN 2 TABLET: 5; 325 TABLET ORAL at 07:55

## 2024-07-08 RX ADMIN — IPRATROPIUM BROMIDE AND ALBUTEROL SULFATE 1 DOSE: .5; 3 SOLUTION RESPIRATORY (INHALATION) at 15:53

## 2024-07-08 RX ADMIN — BUDESONIDE 500 MCG: 0.5 INHALANT RESPIRATORY (INHALATION) at 21:08

## 2024-07-08 RX ADMIN — WATER 40 MG: 1 INJECTION INTRAMUSCULAR; INTRAVENOUS; SUBCUTANEOUS at 09:54

## 2024-07-08 RX ADMIN — CLONAZEPAM 2 MG: 0.5 TABLET ORAL at 07:55

## 2024-07-08 RX ADMIN — BUPROPION HYDROCHLORIDE 100 MG: 100 TABLET, FILM COATED ORAL at 07:55

## 2024-07-08 RX ADMIN — WATER 40 MG: 1 INJECTION INTRAMUSCULAR; INTRAVENOUS; SUBCUTANEOUS at 22:33

## 2024-07-08 RX ADMIN — IPRATROPIUM BROMIDE AND ALBUTEROL SULFATE 1 DOSE: .5; 3 SOLUTION RESPIRATORY (INHALATION) at 11:25

## 2024-07-08 RX ADMIN — FAMOTIDINE 20 MG: 10 INJECTION, SOLUTION INTRAVENOUS at 22:33

## 2024-07-08 RX ADMIN — DEXMEDETOMIDINE 1.3 MCG/KG/HR: 100 INJECTION, SOLUTION INTRAVENOUS at 17:25

## 2024-07-08 RX ADMIN — DEXMEDETOMIDINE 1.5 MCG/KG/HR: 100 INJECTION, SOLUTION INTRAVENOUS at 02:26

## 2024-07-08 RX ADMIN — WATER 40 MG: 1 INJECTION INTRAMUSCULAR; INTRAVENOUS; SUBCUTANEOUS at 17:43

## 2024-07-08 RX ADMIN — ASPIRIN 81 MG: 81 TABLET, COATED ORAL at 07:54

## 2024-07-08 RX ADMIN — METOPROLOL TARTRATE 2.5 MG: 5 INJECTION INTRAVENOUS at 03:36

## 2024-07-08 RX ADMIN — INSULIN GLARGINE 7 UNITS: 100 INJECTION, SOLUTION SUBCUTANEOUS at 23:13

## 2024-07-08 RX ADMIN — OLANZAPINE 2.5 MG: 2.5 TABLET, FILM COATED ORAL at 07:56

## 2024-07-08 RX ADMIN — FAMOTIDINE 20 MG: 10 INJECTION, SOLUTION INTRAVENOUS at 07:55

## 2024-07-08 RX ADMIN — DEXMEDETOMIDINE 1.5 MCG/KG/HR: 100 INJECTION, SOLUTION INTRAVENOUS at 09:28

## 2024-07-08 RX ADMIN — ARFORMOTEROL TARTRATE 15 MCG: 15 SOLUTION RESPIRATORY (INHALATION) at 07:25

## 2024-07-08 RX ADMIN — ENOXAPARIN SODIUM 40 MG: 100 INJECTION SUBCUTANEOUS at 17:43

## 2024-07-08 RX ADMIN — DEXMEDETOMIDINE 1.3 MCG/KG/HR: 100 INJECTION, SOLUTION INTRAVENOUS at 13:23

## 2024-07-08 RX ADMIN — BUDESONIDE 500 MCG: 0.5 INHALANT RESPIRATORY (INHALATION) at 07:25

## 2024-07-08 ASSESSMENT — PULMONARY FUNCTION TESTS
PIF_VALUE: 23
PIF_VALUE: 12

## 2024-07-08 ASSESSMENT — PAIN SCALES - GENERAL
PAINLEVEL_OUTOF10: 0

## 2024-07-08 NOTE — CONSULTS
Comprehensive Nutrition Assessment    Type and Reason for Visit:  Initial, Consult (TF ordering and management)    Nutrition Recommendations/Plan:   Formula Jevity 1.5 or standard with fiber        Initiate at 10mL/hr         Advance by 10mL q 8 hours        Goal rate 40mL x 24 hrs        Modulars Prosource daily        Water flushes 100mL q 4 hours        Goal rate with modulars and water flushes 1500kcal, 76g protein and 1330mL water  2.   Monitor mag, phos and K; replete per protocol  3.   Monitor EN tolerance, labs, weights and plan of care while admitted     Malnutrition Assessment:  Malnutrition Status:  Mild malnutrition (less than 50% of energy needs met for 5 or more days and fluid accumulation) (07/08/24 1624)    Context:  Acute Illness     Findings of the 6 clinical characteristics of malnutrition:  Energy Intake:  50% or less of estimated energy requirements for 5 or more days (NPO status for 5 days prior)  Weight Loss:  No significant weight loss     Body Fat Loss:  Unable to assess     Muscle Mass Loss:  Unable to assess    Fluid Accumulation:  Mild Generalized, Extremities   Strength:  Not Performed    Nutrition Assessment:    Pt admitted for management of acute respiratory failure. Pt presented via EMS d/t shortness of breath; Pt with similar admission in early 2024. Pt extubated on 7/8/24. See above for enteral nutrition recs.    Nutrition Related Findings:    Generalized edema. Labs glu 133-180 x 24 hrs. Meds pepcid, klonopin, lantus, humalog, senokot, lasix, solumedrol Wound Type: None       Current Nutrition Intake & Therapies:    Average Meal Intake: NPO     Diet NPO Exceptions are: Sips of Water with Meds    Anthropometric Measures:  Height: 172.7 cm (5' 8\")  Ideal Body Weight (IBW): 140 lbs (64 kg)    Admission Body Weight: 76.7 kg (169 lb)  Current Body Weight: 82.1 kg (181 lb), 129.3 % IBW. Weight Source: Bed Scale  Current BMI (kg/m2): 27.5        Weight Adjustment For: No Adjustment   
hydronephrosis.    Left kidney: 10.6 cm in length with increased echogenicity but normal thickness  cortex. Incomplete visualization of the lower pole. No hydronephrosis. Echogenic  nodule which appears solid and measures 7 x 5 x 7 mm in the lower pole. This  appears about the same as prior. There are several cysts. The largest is in the  lower pole measuring 1.2 x 1.1 x 1.4 cm. This cyst appears benign. Another cyst  in the upper pole measures 8 mm in size and appears benign.    Urinary bladder: There are bilateral ureteral jets present. Prevoid volume 1 67  cc. Post void volume 7.4 cc. Normal thickness urinary bladder wall.    Other: No ascites.    Impression  IMPRESSION:  1. 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  2. Mildly complex cyst in the right kidney also appears unchanged from March 2017. Possibly there is been some bleeding into it which would account for the  increased density on CT. Would suggest routine follow up with ultrasound another  exam 6 months.  3. Other cysts in the left kidney appear benign today but could represent cyst  into which there is been bleeding, as seen on CT. This too should be followed up  with another ultrasound exam in 6 months.  4. Increased echogenicity of both kidneys can be seen in medical renal disease.  No hydronephrosis. No evidence of obstruction.         LE Doppler       Images report reviewed by me:  CT (Most Recent) (CT chest reviewed by me)        CXR reviewed by me:  XR (Most Recent). CXR  reviewed by me and compared with previous CXR        XR CHEST 1 VIEW    Result Date: 7/2/2024  1. The ET tube is in satisfactory position. 2. Lungs demonstrate severe changes of emphysema.. Electronically signed by JAIDA LONGORIA          ·Please note: Voice-recognition software may have been used to generate this report, which may have resulted in some phonetic-based errors in grammar and

## 2024-07-09 ENCOUNTER — APPOINTMENT (OUTPATIENT)
Facility: HOSPITAL | Age: 69
DRG: 207 | End: 2024-07-09
Payer: MEDICARE

## 2024-07-09 LAB
ANION GAP SERPL CALC-SCNC: 4 MMOL/L (ref 3–18)
BUN SERPL-MCNC: 38 MG/DL (ref 7–18)
BUN/CREAT SERPL: 54 (ref 12–20)
CALCIUM SERPL-MCNC: 9.8 MG/DL (ref 8.5–10.1)
CHLORIDE SERPL-SCNC: 104 MMOL/L (ref 100–111)
CO2 SERPL-SCNC: 34 MMOL/L (ref 21–32)
CREAT SERPL-MCNC: 0.71 MG/DL (ref 0.6–1.3)
ERYTHROCYTE [DISTWIDTH] IN BLOOD BY AUTOMATED COUNT: 14.3 % (ref 11.6–14.5)
GLUCOSE BLD STRIP.AUTO-MCNC: 146 MG/DL (ref 70–110)
GLUCOSE BLD STRIP.AUTO-MCNC: 149 MG/DL (ref 70–110)
GLUCOSE BLD STRIP.AUTO-MCNC: 187 MG/DL (ref 70–110)
GLUCOSE BLD STRIP.AUTO-MCNC: 221 MG/DL (ref 70–110)
GLUCOSE SERPL-MCNC: 151 MG/DL (ref 74–99)
HCT VFR BLD AUTO: 42.1 % (ref 35–45)
HGB BLD-MCNC: 13.7 G/DL (ref 12–16)
MCH RBC QN AUTO: 30.7 PG (ref 24–34)
MCHC RBC AUTO-ENTMCNC: 32.5 G/DL (ref 31–37)
MCV RBC AUTO: 94.4 FL (ref 78–100)
NRBC # BLD: 0 K/UL (ref 0–0.01)
NRBC BLD-RTO: 0 PER 100 WBC
PLATELET # BLD AUTO: 203 K/UL (ref 135–420)
PMV BLD AUTO: 11.2 FL (ref 9.2–11.8)
POTASSIUM SERPL-SCNC: 4 MMOL/L (ref 3.5–5.5)
RBC # BLD AUTO: 4.46 M/UL (ref 4.2–5.3)
SODIUM SERPL-SCNC: 142 MMOL/L (ref 136–145)
WBC # BLD AUTO: 8.5 K/UL (ref 4.6–13.2)

## 2024-07-09 PROCEDURE — 85027 COMPLETE CBC AUTOMATED: CPT

## 2024-07-09 PROCEDURE — 80048 BASIC METABOLIC PNL TOTAL CA: CPT

## 2024-07-09 PROCEDURE — 2700000000 HC OXYGEN THERAPY PER DAY

## 2024-07-09 PROCEDURE — 6360000002 HC RX W HCPCS: Performed by: INTERNAL MEDICINE

## 2024-07-09 PROCEDURE — 2500000003 HC RX 250 WO HCPCS: Performed by: INTERNAL MEDICINE

## 2024-07-09 PROCEDURE — 6370000000 HC RX 637 (ALT 250 FOR IP): Performed by: INTERNAL MEDICINE

## 2024-07-09 PROCEDURE — 92526 ORAL FUNCTION THERAPY: CPT

## 2024-07-09 PROCEDURE — 71045 X-RAY EXAM CHEST 1 VIEW: CPT

## 2024-07-09 PROCEDURE — 2000000000 HC ICU R&B

## 2024-07-09 PROCEDURE — 6360000002 HC RX W HCPCS: Performed by: HOSPITALIST

## 2024-07-09 PROCEDURE — 82962 GLUCOSE BLOOD TEST: CPT

## 2024-07-09 PROCEDURE — 6370000000 HC RX 637 (ALT 250 FOR IP): Performed by: HOSPITALIST

## 2024-07-09 PROCEDURE — 2580000003 HC RX 258: Performed by: INTERNAL MEDICINE

## 2024-07-09 PROCEDURE — 94640 AIRWAY INHALATION TREATMENT: CPT

## 2024-07-09 PROCEDURE — 92610 EVALUATE SWALLOWING FUNCTION: CPT

## 2024-07-09 RX ORDER — INSULIN GLARGINE 100 [IU]/ML
7 INJECTION, SOLUTION SUBCUTANEOUS NIGHTLY
Status: DISCONTINUED | OUTPATIENT
Start: 2024-07-10 | End: 2024-07-09

## 2024-07-09 RX ORDER — INSULIN GLARGINE 100 [IU]/ML
7 INJECTION, SOLUTION SUBCUTANEOUS NIGHTLY
Status: DISCONTINUED | OUTPATIENT
Start: 2024-07-09 | End: 2024-07-10

## 2024-07-09 RX ORDER — FUROSEMIDE 10 MG/ML
20 INJECTION INTRAMUSCULAR; INTRAVENOUS ONCE
Status: COMPLETED | OUTPATIENT
Start: 2024-07-09 | End: 2024-07-09

## 2024-07-09 RX ADMIN — BUPROPION HYDROCHLORIDE 100 MG: 100 TABLET, FILM COATED ORAL at 14:28

## 2024-07-09 RX ADMIN — FAMOTIDINE 20 MG: 20 TABLET, FILM COATED ORAL at 20:58

## 2024-07-09 RX ADMIN — DEXMEDETOMIDINE 1.5 MCG/KG/HR: 100 INJECTION, SOLUTION INTRAVENOUS at 04:40

## 2024-07-09 RX ADMIN — ARFORMOTEROL TARTRATE 15 MCG: 15 SOLUTION RESPIRATORY (INHALATION) at 07:22

## 2024-07-09 RX ADMIN — HYDROXYZINE HYDROCHLORIDE 25 MG: 25 TABLET, FILM COATED ORAL at 20:57

## 2024-07-09 RX ADMIN — SENNOSIDES AND DOCUSATE SODIUM 1 TABLET: 50; 8.6 TABLET ORAL at 11:15

## 2024-07-09 RX ADMIN — WATER 40 MG: 1 INJECTION INTRAMUSCULAR; INTRAVENOUS; SUBCUTANEOUS at 16:59

## 2024-07-09 RX ADMIN — BUPROPION HYDROCHLORIDE 100 MG: 100 TABLET, FILM COATED ORAL at 21:48

## 2024-07-09 RX ADMIN — FUROSEMIDE 20 MG: 10 INJECTION, SOLUTION INTRAMUSCULAR; INTRAVENOUS at 09:30

## 2024-07-09 RX ADMIN — IPRATROPIUM BROMIDE AND ALBUTEROL SULFATE 1 DOSE: .5; 3 SOLUTION RESPIRATORY (INHALATION) at 20:28

## 2024-07-09 RX ADMIN — DEXMEDETOMIDINE 1.5 MCG/KG/HR: 100 INJECTION, SOLUTION INTRAVENOUS at 00:54

## 2024-07-09 RX ADMIN — CLONAZEPAM 2 MG: 0.5 TABLET ORAL at 21:00

## 2024-07-09 RX ADMIN — OXYCODONE HYDROCHLORIDE AND ACETAMINOPHEN 2 TABLET: 5; 325 TABLET ORAL at 14:28

## 2024-07-09 RX ADMIN — FENTANYL CITRATE 25 MCG: 50 INJECTION INTRAMUSCULAR; INTRAVENOUS at 06:10

## 2024-07-09 RX ADMIN — WATER 40 MG: 1 INJECTION INTRAMUSCULAR; INTRAVENOUS; SUBCUTANEOUS at 11:11

## 2024-07-09 RX ADMIN — SENNOSIDES AND DOCUSATE SODIUM 1 TABLET: 50; 8.6 TABLET ORAL at 20:56

## 2024-07-09 RX ADMIN — OLANZAPINE 2.5 MG: 2.5 TABLET, FILM COATED ORAL at 11:33

## 2024-07-09 RX ADMIN — OLANZAPINE 2.5 MG: 2.5 TABLET, FILM COATED ORAL at 20:56

## 2024-07-09 RX ADMIN — WATER 40 MG: 1 INJECTION INTRAMUSCULAR; INTRAVENOUS; SUBCUTANEOUS at 21:25

## 2024-07-09 RX ADMIN — DEXMEDETOMIDINE 1.5 MCG/KG/HR: 100 INJECTION, SOLUTION INTRAVENOUS at 07:47

## 2024-07-09 RX ADMIN — IPRATROPIUM BROMIDE AND ALBUTEROL SULFATE 1 DOSE: .5; 3 SOLUTION RESPIRATORY (INHALATION) at 07:22

## 2024-07-09 RX ADMIN — OXYCODONE HYDROCHLORIDE AND ACETAMINOPHEN 2 TABLET: 5; 325 TABLET ORAL at 21:01

## 2024-07-09 RX ADMIN — DEXMEDETOMIDINE 1.3 MCG/KG/HR: 100 INJECTION, SOLUTION INTRAVENOUS at 11:38

## 2024-07-09 RX ADMIN — BUDESONIDE 500 MCG: 0.5 INHALANT RESPIRATORY (INHALATION) at 07:22

## 2024-07-09 RX ADMIN — INSULIN GLARGINE 7 UNITS: 100 INJECTION, SOLUTION SUBCUTANEOUS at 20:51

## 2024-07-09 RX ADMIN — FAMOTIDINE 20 MG: 20 TABLET, FILM COATED ORAL at 11:15

## 2024-07-09 RX ADMIN — IMIPRAMINE HYDROCHLORIDE 50 MG: 25 TABLET ORAL at 20:53

## 2024-07-09 RX ADMIN — BUDESONIDE 500 MCG: 0.5 INHALANT RESPIRATORY (INHALATION) at 20:29

## 2024-07-09 RX ADMIN — ENOXAPARIN SODIUM 40 MG: 100 INJECTION SUBCUTANEOUS at 18:54

## 2024-07-09 RX ADMIN — MIDAZOLAM HYDROCHLORIDE 2 MG: 1 INJECTION, SOLUTION INTRAMUSCULAR; INTRAVENOUS at 00:54

## 2024-07-09 RX ADMIN — CLONAZEPAM 2 MG: 0.5 TABLET ORAL at 11:17

## 2024-07-09 RX ADMIN — WATER 40 MG: 1 INJECTION INTRAMUSCULAR; INTRAVENOUS; SUBCUTANEOUS at 04:42

## 2024-07-09 RX ADMIN — IPRATROPIUM BROMIDE AND ALBUTEROL SULFATE 1 DOSE: .5; 3 SOLUTION RESPIRATORY (INHALATION) at 16:09

## 2024-07-09 RX ADMIN — FENTANYL CITRATE 25 MCG: 50 INJECTION INTRAMUSCULAR; INTRAVENOUS at 01:18

## 2024-07-09 RX ADMIN — INSULIN LISPRO 2 UNITS: 100 INJECTION, SOLUTION INTRAVENOUS; SUBCUTANEOUS at 21:24

## 2024-07-09 RX ADMIN — ARFORMOTEROL TARTRATE 15 MCG: 15 SOLUTION RESPIRATORY (INHALATION) at 20:29

## 2024-07-09 RX ADMIN — CLONAZEPAM 2 MG: 0.5 TABLET ORAL at 16:56

## 2024-07-09 RX ADMIN — ASPIRIN 81 MG: 81 TABLET, COATED ORAL at 11:09

## 2024-07-09 RX ADMIN — IPRATROPIUM BROMIDE AND ALBUTEROL SULFATE 1 DOSE: .5; 3 SOLUTION RESPIRATORY (INHALATION) at 12:12

## 2024-07-09 RX ADMIN — LOSARTAN POTASSIUM 25 MG: 25 TABLET, FILM COATED ORAL at 11:10

## 2024-07-09 ASSESSMENT — PAIN DESCRIPTION - PAIN TYPE: TYPE: CHRONIC PAIN

## 2024-07-09 ASSESSMENT — PAIN SCALES - GENERAL
PAINLEVEL_OUTOF10: 0
PAINLEVEL_OUTOF10: 4
PAINLEVEL_OUTOF10: 3
PAINLEVEL_OUTOF10: 7
PAINLEVEL_OUTOF10: 0

## 2024-07-09 ASSESSMENT — PAIN DESCRIPTION - DESCRIPTORS: DESCRIPTORS: ACHING

## 2024-07-09 ASSESSMENT — PAIN - FUNCTIONAL ASSESSMENT: PAIN_FUNCTIONAL_ASSESSMENT: ACTIVITIES ARE NOT PREVENTED

## 2024-07-09 ASSESSMENT — PAIN DESCRIPTION - ORIENTATION: ORIENTATION: POSTERIOR;RIGHT

## 2024-07-10 LAB
BACTERIA SPEC CULT: NORMAL
GLUCOSE BLD STRIP.AUTO-MCNC: 100 MG/DL (ref 70–110)
GLUCOSE BLD STRIP.AUTO-MCNC: 122 MG/DL (ref 70–110)
GLUCOSE BLD STRIP.AUTO-MCNC: 148 MG/DL (ref 70–110)
GLUCOSE BLD STRIP.AUTO-MCNC: 174 MG/DL (ref 70–110)
GLUCOSE BLD STRIP.AUTO-MCNC: 187 MG/DL (ref 70–110)
SERVICE CMNT-IMP: NORMAL

## 2024-07-10 PROCEDURE — 82962 GLUCOSE BLOOD TEST: CPT

## 2024-07-10 PROCEDURE — 6370000000 HC RX 637 (ALT 250 FOR IP): Performed by: INTERNAL MEDICINE

## 2024-07-10 PROCEDURE — 6370000000 HC RX 637 (ALT 250 FOR IP): Performed by: HOSPITALIST

## 2024-07-10 PROCEDURE — 94660 CPAP INITIATION&MGMT: CPT

## 2024-07-10 PROCEDURE — 97162 PT EVAL MOD COMPLEX 30 MIN: CPT

## 2024-07-10 PROCEDURE — 97530 THERAPEUTIC ACTIVITIES: CPT

## 2024-07-10 PROCEDURE — 1100000003 HC PRIVATE W/ TELEMETRY

## 2024-07-10 PROCEDURE — 6360000002 HC RX W HCPCS: Performed by: INTERNAL MEDICINE

## 2024-07-10 PROCEDURE — 2580000003 HC RX 258: Performed by: INTERNAL MEDICINE

## 2024-07-10 PROCEDURE — 94640 AIRWAY INHALATION TREATMENT: CPT

## 2024-07-10 PROCEDURE — 97112 NEUROMUSCULAR REEDUCATION: CPT

## 2024-07-10 PROCEDURE — 2700000000 HC OXYGEN THERAPY PER DAY

## 2024-07-10 PROCEDURE — 97110 THERAPEUTIC EXERCISES: CPT

## 2024-07-10 PROCEDURE — 97166 OT EVAL MOD COMPLEX 45 MIN: CPT

## 2024-07-10 PROCEDURE — 6360000002 HC RX W HCPCS: Performed by: HOSPITALIST

## 2024-07-10 RX ORDER — INSULIN GLARGINE 100 [IU]/ML
5 INJECTION, SOLUTION SUBCUTANEOUS NIGHTLY
Status: DISCONTINUED | OUTPATIENT
Start: 2024-07-10 | End: 2024-07-11

## 2024-07-10 RX ORDER — LANOLIN ALCOHOL/MO/W.PET/CERES
3 CREAM (GRAM) TOPICAL NIGHTLY PRN
Status: DISCONTINUED | OUTPATIENT
Start: 2024-07-10 | End: 2024-07-16 | Stop reason: HOSPADM

## 2024-07-10 RX ADMIN — CLONAZEPAM 2 MG: 0.5 TABLET ORAL at 14:47

## 2024-07-10 RX ADMIN — Medication 3 MG: at 22:33

## 2024-07-10 RX ADMIN — BUPROPION HYDROCHLORIDE 100 MG: 100 TABLET, FILM COATED ORAL at 14:47

## 2024-07-10 RX ADMIN — OXYCODONE HYDROCHLORIDE AND ACETAMINOPHEN 2 TABLET: 5; 325 TABLET ORAL at 14:47

## 2024-07-10 RX ADMIN — OLANZAPINE 2.5 MG: 2.5 TABLET, FILM COATED ORAL at 20:27

## 2024-07-10 RX ADMIN — CLONAZEPAM 2 MG: 0.5 TABLET ORAL at 08:25

## 2024-07-10 RX ADMIN — SENNOSIDES AND DOCUSATE SODIUM 1 TABLET: 50; 8.6 TABLET ORAL at 20:27

## 2024-07-10 RX ADMIN — IPRATROPIUM BROMIDE AND ALBUTEROL SULFATE 1 DOSE: .5; 3 SOLUTION RESPIRATORY (INHALATION) at 20:45

## 2024-07-10 RX ADMIN — HYDROXYZINE HYDROCHLORIDE 25 MG: 25 TABLET, FILM COATED ORAL at 20:26

## 2024-07-10 RX ADMIN — BUDESONIDE 500 MCG: 0.5 INHALANT RESPIRATORY (INHALATION) at 20:45

## 2024-07-10 RX ADMIN — WATER 40 MG: 1 INJECTION INTRAMUSCULAR; INTRAVENOUS; SUBCUTANEOUS at 03:47

## 2024-07-10 RX ADMIN — FAMOTIDINE 20 MG: 20 TABLET, FILM COATED ORAL at 08:25

## 2024-07-10 RX ADMIN — OXYCODONE HYDROCHLORIDE AND ACETAMINOPHEN 2 TABLET: 5; 325 TABLET ORAL at 08:24

## 2024-07-10 RX ADMIN — ARFORMOTEROL TARTRATE 15 MCG: 15 SOLUTION RESPIRATORY (INHALATION) at 20:45

## 2024-07-10 RX ADMIN — BUPROPION HYDROCHLORIDE 100 MG: 100 TABLET, FILM COATED ORAL at 20:26

## 2024-07-10 RX ADMIN — OLANZAPINE 2.5 MG: 2.5 TABLET, FILM COATED ORAL at 08:25

## 2024-07-10 RX ADMIN — ENOXAPARIN SODIUM 40 MG: 100 INJECTION SUBCUTANEOUS at 20:25

## 2024-07-10 RX ADMIN — CLONAZEPAM 2 MG: 0.5 TABLET ORAL at 20:26

## 2024-07-10 RX ADMIN — INSULIN GLARGINE 5 UNITS: 100 INJECTION, SOLUTION SUBCUTANEOUS at 20:30

## 2024-07-10 RX ADMIN — BUDESONIDE 500 MCG: 0.5 INHALANT RESPIRATORY (INHALATION) at 07:23

## 2024-07-10 RX ADMIN — LOSARTAN POTASSIUM 25 MG: 25 TABLET, FILM COATED ORAL at 08:25

## 2024-07-10 RX ADMIN — ARFORMOTEROL TARTRATE 15 MCG: 15 SOLUTION RESPIRATORY (INHALATION) at 07:24

## 2024-07-10 RX ADMIN — ASPIRIN 81 MG: 81 TABLET, COATED ORAL at 08:25

## 2024-07-10 RX ADMIN — BUPROPION HYDROCHLORIDE 100 MG: 100 TABLET, FILM COATED ORAL at 08:25

## 2024-07-10 RX ADMIN — WATER 40 MG: 1 INJECTION INTRAMUSCULAR; INTRAVENOUS; SUBCUTANEOUS at 10:20

## 2024-07-10 RX ADMIN — FAMOTIDINE 20 MG: 20 TABLET, FILM COATED ORAL at 20:27

## 2024-07-10 RX ADMIN — OXYCODONE HYDROCHLORIDE AND ACETAMINOPHEN 2 TABLET: 5; 325 TABLET ORAL at 20:27

## 2024-07-10 RX ADMIN — SENNOSIDES AND DOCUSATE SODIUM 1 TABLET: 50; 8.6 TABLET ORAL at 08:25

## 2024-07-10 RX ADMIN — IPRATROPIUM BROMIDE AND ALBUTEROL SULFATE 1 DOSE: .5; 3 SOLUTION RESPIRATORY (INHALATION) at 07:24

## 2024-07-10 RX ADMIN — IPRATROPIUM BROMIDE AND ALBUTEROL SULFATE 1 DOSE: .5; 3 SOLUTION RESPIRATORY (INHALATION) at 15:52

## 2024-07-10 RX ADMIN — IMIPRAMINE HYDROCHLORIDE 50 MG: 25 TABLET ORAL at 22:49

## 2024-07-10 ASSESSMENT — PAIN SCALES - GENERAL
PAINLEVEL_OUTOF10: 0
PAINLEVEL_OUTOF10: 6
PAINLEVEL_OUTOF10: 0

## 2024-07-10 ASSESSMENT — PAIN DESCRIPTION - ORIENTATION: ORIENTATION: RIGHT;LEFT

## 2024-07-10 ASSESSMENT — PAIN DESCRIPTION - LOCATION: LOCATION: KNEE

## 2024-07-11 ENCOUNTER — APPOINTMENT (OUTPATIENT)
Facility: HOSPITAL | Age: 69
DRG: 207 | End: 2024-07-11
Payer: MEDICARE

## 2024-07-11 LAB
ANION GAP SERPL CALC-SCNC: 4 MMOL/L (ref 3–18)
ARTERIAL PATENCY WRIST A: POSITIVE
BASE EXCESS BLD CALC-SCNC: 10.9 MMOL/L
BASOPHILS # BLD: 0.1 K/UL (ref 0–0.1)
BASOPHILS NFR BLD: 0 % (ref 0–2)
BDY SITE: ABNORMAL
BUN SERPL-MCNC: 27 MG/DL (ref 7–18)
BUN/CREAT SERPL: 38 (ref 12–20)
CALCIUM SERPL-MCNC: 9.7 MG/DL (ref 8.5–10.1)
CHLORIDE SERPL-SCNC: 104 MMOL/L (ref 100–111)
CO2 SERPL-SCNC: 35 MMOL/L (ref 21–32)
CREAT SERPL-MCNC: 0.72 MG/DL (ref 0.6–1.3)
DIFFERENTIAL METHOD BLD: ABNORMAL
EOSINOPHIL # BLD: 0.2 K/UL (ref 0–0.4)
EOSINOPHIL NFR BLD: 1 % (ref 0–5)
ERYTHROCYTE [DISTWIDTH] IN BLOOD BY AUTOMATED COUNT: 14.5 % (ref 11.6–14.5)
GAS FLOW.O2 O2 DELIVERY SYS: ABNORMAL
GLUCOSE BLD STRIP.AUTO-MCNC: 105 MG/DL (ref 70–110)
GLUCOSE BLD STRIP.AUTO-MCNC: 115 MG/DL (ref 70–110)
GLUCOSE BLD STRIP.AUTO-MCNC: 162 MG/DL (ref 70–110)
GLUCOSE BLD STRIP.AUTO-MCNC: 235 MG/DL (ref 70–110)
GLUCOSE SERPL-MCNC: 110 MG/DL (ref 74–99)
HCO3 BLD-SCNC: 36.4 MMOL/L (ref 22–26)
HCT VFR BLD AUTO: 40.5 % (ref 35–45)
HGB BLD-MCNC: 13.1 G/DL (ref 12–16)
IMM GRANULOCYTES # BLD AUTO: 0.2 K/UL (ref 0–0.04)
IMM GRANULOCYTES NFR BLD AUTO: 1 % (ref 0–0.5)
LYMPHOCYTES # BLD: 1 K/UL (ref 0.9–3.6)
LYMPHOCYTES NFR BLD: 4 % (ref 21–52)
MAGNESIUM SERPL-MCNC: 2.1 MG/DL (ref 1.6–2.6)
MCH RBC QN AUTO: 30.6 PG (ref 24–34)
MCHC RBC AUTO-ENTMCNC: 32.3 G/DL (ref 31–37)
MCV RBC AUTO: 94.6 FL (ref 78–100)
MONOCYTES # BLD: 1.9 K/UL (ref 0.05–1.2)
MONOCYTES NFR BLD: 8 % (ref 3–10)
NEUTS SEG # BLD: 21.7 K/UL (ref 1.8–8)
NEUTS SEG NFR BLD: 87 % (ref 40–73)
NRBC # BLD: 0 K/UL (ref 0–0.01)
NRBC BLD-RTO: 0 PER 100 WBC
O2/TOTAL GAS SETTING VFR VENT: 36 %
PCO2 BLD: 50.4 MMHG (ref 35–45)
PH BLD: 7.47 (ref 7.35–7.45)
PHOSPHATE SERPL-MCNC: 3.5 MG/DL (ref 2.5–4.9)
PLATELET # BLD AUTO: 234 K/UL (ref 135–420)
PMV BLD AUTO: 10.8 FL (ref 9.2–11.8)
PO2 BLD: 92 MMHG (ref 80–100)
POTASSIUM SERPL-SCNC: 3.2 MMOL/L (ref 3.5–5.5)
RBC # BLD AUTO: 4.28 M/UL (ref 4.2–5.3)
SAO2 % BLD: 97.4 % (ref 92–97)
SERVICE CMNT-IMP: ABNORMAL
SODIUM SERPL-SCNC: 143 MMOL/L (ref 136–145)
SPECIMEN TYPE: ABNORMAL
WBC # BLD AUTO: 25 K/UL (ref 4.6–13.2)

## 2024-07-11 PROCEDURE — 36415 COLL VENOUS BLD VENIPUNCTURE: CPT

## 2024-07-11 PROCEDURE — 84100 ASSAY OF PHOSPHORUS: CPT

## 2024-07-11 PROCEDURE — 71046 X-RAY EXAM CHEST 2 VIEWS: CPT

## 2024-07-11 PROCEDURE — 36600 WITHDRAWAL OF ARTERIAL BLOOD: CPT

## 2024-07-11 PROCEDURE — 94660 CPAP INITIATION&MGMT: CPT

## 2024-07-11 PROCEDURE — 6370000000 HC RX 637 (ALT 250 FOR IP): Performed by: INTERNAL MEDICINE

## 2024-07-11 PROCEDURE — 83735 ASSAY OF MAGNESIUM: CPT

## 2024-07-11 PROCEDURE — 85025 COMPLETE CBC W/AUTO DIFF WBC: CPT

## 2024-07-11 PROCEDURE — 97110 THERAPEUTIC EXERCISES: CPT

## 2024-07-11 PROCEDURE — 1100000003 HC PRIVATE W/ TELEMETRY

## 2024-07-11 PROCEDURE — 94640 AIRWAY INHALATION TREATMENT: CPT

## 2024-07-11 PROCEDURE — 6360000002 HC RX W HCPCS: Performed by: HOSPITALIST

## 2024-07-11 PROCEDURE — 80048 BASIC METABOLIC PNL TOTAL CA: CPT

## 2024-07-11 PROCEDURE — 82803 BLOOD GASES ANY COMBINATION: CPT

## 2024-07-11 PROCEDURE — 6360000002 HC RX W HCPCS: Performed by: INTERNAL MEDICINE

## 2024-07-11 PROCEDURE — 92526 ORAL FUNCTION THERAPY: CPT

## 2024-07-11 PROCEDURE — 97530 THERAPEUTIC ACTIVITIES: CPT

## 2024-07-11 PROCEDURE — 6370000000 HC RX 637 (ALT 250 FOR IP): Performed by: HOSPITALIST

## 2024-07-11 PROCEDURE — 2700000000 HC OXYGEN THERAPY PER DAY

## 2024-07-11 PROCEDURE — 82962 GLUCOSE BLOOD TEST: CPT

## 2024-07-11 RX ORDER — INSULIN LISPRO 100 [IU]/ML
0-8 INJECTION, SOLUTION INTRAVENOUS; SUBCUTANEOUS
Status: DISCONTINUED | OUTPATIENT
Start: 2024-07-11 | End: 2024-07-16 | Stop reason: HOSPADM

## 2024-07-11 RX ORDER — PREDNISONE 20 MG/1
40 TABLET ORAL DAILY
Status: DISCONTINUED | OUTPATIENT
Start: 2024-07-12 | End: 2024-07-14

## 2024-07-11 RX ORDER — POTASSIUM CHLORIDE 20 MEQ/1
40 TABLET, EXTENDED RELEASE ORAL ONCE
Status: COMPLETED | OUTPATIENT
Start: 2024-07-11 | End: 2024-07-11

## 2024-07-11 RX ADMIN — OLANZAPINE 2.5 MG: 2.5 TABLET, FILM COATED ORAL at 09:15

## 2024-07-11 RX ADMIN — BUPROPION HYDROCHLORIDE 100 MG: 100 TABLET, FILM COATED ORAL at 21:39

## 2024-07-11 RX ADMIN — HYDROXYZINE HYDROCHLORIDE 25 MG: 25 TABLET, FILM COATED ORAL at 21:39

## 2024-07-11 RX ADMIN — ARFORMOTEROL TARTRATE 15 MCG: 15 SOLUTION RESPIRATORY (INHALATION) at 08:59

## 2024-07-11 RX ADMIN — OLANZAPINE 2.5 MG: 2.5 TABLET, FILM COATED ORAL at 21:39

## 2024-07-11 RX ADMIN — FAMOTIDINE 20 MG: 20 TABLET, FILM COATED ORAL at 21:40

## 2024-07-11 RX ADMIN — BUPROPION HYDROCHLORIDE 100 MG: 100 TABLET, FILM COATED ORAL at 09:15

## 2024-07-11 RX ADMIN — POTASSIUM CHLORIDE 40 MEQ: 1500 TABLET, EXTENDED RELEASE ORAL at 11:03

## 2024-07-11 RX ADMIN — IPRATROPIUM BROMIDE AND ALBUTEROL SULFATE 1 DOSE: .5; 3 SOLUTION RESPIRATORY (INHALATION) at 12:51

## 2024-07-11 RX ADMIN — OXYCODONE HYDROCHLORIDE AND ACETAMINOPHEN 2 TABLET: 5; 325 TABLET ORAL at 15:39

## 2024-07-11 RX ADMIN — ASPIRIN 81 MG: 81 TABLET, COATED ORAL at 09:15

## 2024-07-11 RX ADMIN — SENNOSIDES AND DOCUSATE SODIUM 1 TABLET: 50; 8.6 TABLET ORAL at 21:41

## 2024-07-11 RX ADMIN — IPRATROPIUM BROMIDE AND ALBUTEROL SULFATE 1 DOSE: .5; 3 SOLUTION RESPIRATORY (INHALATION) at 19:21

## 2024-07-11 RX ADMIN — INSULIN LISPRO 2 UNITS: 100 INJECTION, SOLUTION INTRAVENOUS; SUBCUTANEOUS at 21:51

## 2024-07-11 RX ADMIN — CLONAZEPAM 2 MG: 0.5 TABLET ORAL at 11:02

## 2024-07-11 RX ADMIN — BUDESONIDE 500 MCG: 0.5 INHALANT RESPIRATORY (INHALATION) at 08:59

## 2024-07-11 RX ADMIN — ARFORMOTEROL TARTRATE 15 MCG: 15 SOLUTION RESPIRATORY (INHALATION) at 19:21

## 2024-07-11 RX ADMIN — OXYCODONE HYDROCHLORIDE AND ACETAMINOPHEN 2 TABLET: 5; 325 TABLET ORAL at 09:14

## 2024-07-11 RX ADMIN — LOSARTAN POTASSIUM 25 MG: 25 TABLET, FILM COATED ORAL at 09:15

## 2024-07-11 RX ADMIN — BUPROPION HYDROCHLORIDE 100 MG: 100 TABLET, FILM COATED ORAL at 15:39

## 2024-07-11 RX ADMIN — CLONAZEPAM 2 MG: 0.5 TABLET ORAL at 15:39

## 2024-07-11 RX ADMIN — IPRATROPIUM BROMIDE AND ALBUTEROL SULFATE 1 DOSE: .5; 3 SOLUTION RESPIRATORY (INHALATION) at 15:45

## 2024-07-11 RX ADMIN — FAMOTIDINE 20 MG: 20 TABLET, FILM COATED ORAL at 09:15

## 2024-07-11 RX ADMIN — OXYCODONE HYDROCHLORIDE AND ACETAMINOPHEN 2 TABLET: 5; 325 TABLET ORAL at 21:41

## 2024-07-11 RX ADMIN — ENOXAPARIN SODIUM 40 MG: 100 INJECTION SUBCUTANEOUS at 17:45

## 2024-07-11 RX ADMIN — Medication 3 MG: at 21:39

## 2024-07-11 RX ADMIN — IPRATROPIUM BROMIDE AND ALBUTEROL SULFATE 1 DOSE: .5; 3 SOLUTION RESPIRATORY (INHALATION) at 08:59

## 2024-07-11 RX ADMIN — CLONAZEPAM 2 MG: 0.5 TABLET ORAL at 21:40

## 2024-07-11 RX ADMIN — SENNOSIDES AND DOCUSATE SODIUM 1 TABLET: 50; 8.6 TABLET ORAL at 09:15

## 2024-07-11 RX ADMIN — BUDESONIDE 500 MCG: 0.5 INHALANT RESPIRATORY (INHALATION) at 19:21

## 2024-07-11 RX ADMIN — IMIPRAMINE HYDROCHLORIDE 50 MG: 25 TABLET ORAL at 21:39

## 2024-07-11 ASSESSMENT — PAIN SCALES - GENERAL
PAINLEVEL_OUTOF10: 5
PAINLEVEL_OUTOF10: 4
PAINLEVEL_OUTOF10: 0

## 2024-07-11 ASSESSMENT — PAIN DESCRIPTION - ORIENTATION
ORIENTATION: LEFT
ORIENTATION: LEFT

## 2024-07-11 ASSESSMENT — PAIN DESCRIPTION - LOCATION
LOCATION: SHOULDER
LOCATION: SHOULDER

## 2024-07-12 LAB
ANION GAP SERPL CALC-SCNC: 4 MMOL/L (ref 3–18)
BASOPHILS # BLD: 0 K/UL (ref 0–0.1)
BASOPHILS NFR BLD: 0 % (ref 0–2)
BUN SERPL-MCNC: 27 MG/DL (ref 7–18)
BUN/CREAT SERPL: 29 (ref 12–20)
CALCIUM SERPL-MCNC: 9.4 MG/DL (ref 8.5–10.1)
CHLORIDE SERPL-SCNC: 102 MMOL/L (ref 100–111)
CO2 SERPL-SCNC: 34 MMOL/L (ref 21–32)
CREAT SERPL-MCNC: 0.93 MG/DL (ref 0.6–1.3)
DIFFERENTIAL METHOD BLD: ABNORMAL
EOSINOPHIL # BLD: 0.1 K/UL (ref 0–0.4)
EOSINOPHIL NFR BLD: 1 % (ref 0–5)
ERYTHROCYTE [DISTWIDTH] IN BLOOD BY AUTOMATED COUNT: 14.6 % (ref 11.6–14.5)
GLUCOSE BLD STRIP.AUTO-MCNC: 215 MG/DL (ref 70–110)
GLUCOSE BLD STRIP.AUTO-MCNC: 249 MG/DL (ref 70–110)
GLUCOSE BLD STRIP.AUTO-MCNC: 274 MG/DL (ref 70–110)
GLUCOSE BLD STRIP.AUTO-MCNC: 64 MG/DL (ref 70–110)
GLUCOSE SERPL-MCNC: 267 MG/DL (ref 74–99)
HCT VFR BLD AUTO: 39.5 % (ref 35–45)
HGB BLD-MCNC: 12.5 G/DL (ref 12–16)
IMM GRANULOCYTES # BLD AUTO: 0.1 K/UL (ref 0–0.04)
IMM GRANULOCYTES NFR BLD AUTO: 1 % (ref 0–0.5)
LYMPHOCYTES # BLD: 0.9 K/UL (ref 0.9–3.6)
LYMPHOCYTES NFR BLD: 6 % (ref 21–52)
MCH RBC QN AUTO: 30.4 PG (ref 24–34)
MCHC RBC AUTO-ENTMCNC: 31.6 G/DL (ref 31–37)
MCV RBC AUTO: 96.1 FL (ref 78–100)
MONOCYTES # BLD: 0.7 K/UL (ref 0.05–1.2)
MONOCYTES NFR BLD: 4 % (ref 3–10)
NEUTS SEG # BLD: 13.7 K/UL (ref 1.8–8)
NEUTS SEG NFR BLD: 88 % (ref 40–73)
NRBC # BLD: 0 K/UL (ref 0–0.01)
NRBC BLD-RTO: 0 PER 100 WBC
PLATELET # BLD AUTO: 167 K/UL (ref 135–420)
PMV BLD AUTO: 10.9 FL (ref 9.2–11.8)
POTASSIUM SERPL-SCNC: 3.9 MMOL/L (ref 3.5–5.5)
RBC # BLD AUTO: 4.11 M/UL (ref 4.2–5.3)
SODIUM SERPL-SCNC: 140 MMOL/L (ref 136–145)
WBC # BLD AUTO: 15.5 K/UL (ref 4.6–13.2)

## 2024-07-12 PROCEDURE — 6360000002 HC RX W HCPCS: Performed by: INTERNAL MEDICINE

## 2024-07-12 PROCEDURE — 97530 THERAPEUTIC ACTIVITIES: CPT

## 2024-07-12 PROCEDURE — 6370000000 HC RX 637 (ALT 250 FOR IP): Performed by: HOSPITALIST

## 2024-07-12 PROCEDURE — 6370000000 HC RX 637 (ALT 250 FOR IP): Performed by: INTERNAL MEDICINE

## 2024-07-12 PROCEDURE — 97116 GAIT TRAINING THERAPY: CPT

## 2024-07-12 PROCEDURE — 85025 COMPLETE CBC W/AUTO DIFF WBC: CPT

## 2024-07-12 PROCEDURE — 6360000002 HC RX W HCPCS: Performed by: HOSPITALIST

## 2024-07-12 PROCEDURE — 36415 COLL VENOUS BLD VENIPUNCTURE: CPT

## 2024-07-12 PROCEDURE — 92526 ORAL FUNCTION THERAPY: CPT

## 2024-07-12 PROCEDURE — 80048 BASIC METABOLIC PNL TOTAL CA: CPT

## 2024-07-12 PROCEDURE — 97110 THERAPEUTIC EXERCISES: CPT

## 2024-07-12 PROCEDURE — 94660 CPAP INITIATION&MGMT: CPT

## 2024-07-12 PROCEDURE — 94640 AIRWAY INHALATION TREATMENT: CPT

## 2024-07-12 PROCEDURE — 1100000003 HC PRIVATE W/ TELEMETRY

## 2024-07-12 PROCEDURE — 2700000000 HC OXYGEN THERAPY PER DAY

## 2024-07-12 PROCEDURE — 82962 GLUCOSE BLOOD TEST: CPT

## 2024-07-12 RX ADMIN — HYDROXYZINE HYDROCHLORIDE 25 MG: 25 TABLET, FILM COATED ORAL at 20:44

## 2024-07-12 RX ADMIN — ASPIRIN 81 MG: 81 TABLET, COATED ORAL at 09:36

## 2024-07-12 RX ADMIN — LOSARTAN POTASSIUM 25 MG: 25 TABLET, FILM COATED ORAL at 09:38

## 2024-07-12 RX ADMIN — SENNOSIDES AND DOCUSATE SODIUM 1 TABLET: 50; 8.6 TABLET ORAL at 09:39

## 2024-07-12 RX ADMIN — INSULIN LISPRO 2 UNITS: 100 INJECTION, SOLUTION INTRAVENOUS; SUBCUTANEOUS at 12:29

## 2024-07-12 RX ADMIN — ENOXAPARIN SODIUM 40 MG: 100 INJECTION SUBCUTANEOUS at 17:12

## 2024-07-12 RX ADMIN — FAMOTIDINE 20 MG: 20 TABLET, FILM COATED ORAL at 09:38

## 2024-07-12 RX ADMIN — OXYCODONE HYDROCHLORIDE AND ACETAMINOPHEN 2 TABLET: 5; 325 TABLET ORAL at 14:41

## 2024-07-12 RX ADMIN — CLONAZEPAM 2 MG: 0.5 TABLET ORAL at 14:41

## 2024-07-12 RX ADMIN — OLANZAPINE 2.5 MG: 2.5 TABLET, FILM COATED ORAL at 20:44

## 2024-07-12 RX ADMIN — IMIPRAMINE HYDROCHLORIDE 50 MG: 25 TABLET ORAL at 20:45

## 2024-07-12 RX ADMIN — ARFORMOTEROL TARTRATE 15 MCG: 15 SOLUTION RESPIRATORY (INHALATION) at 20:03

## 2024-07-12 RX ADMIN — IPRATROPIUM BROMIDE AND ALBUTEROL SULFATE 1 DOSE: .5; 3 SOLUTION RESPIRATORY (INHALATION) at 16:35

## 2024-07-12 RX ADMIN — IPRATROPIUM BROMIDE AND ALBUTEROL SULFATE 1 DOSE: .5; 3 SOLUTION RESPIRATORY (INHALATION) at 08:42

## 2024-07-12 RX ADMIN — SENNOSIDES AND DOCUSATE SODIUM 1 TABLET: 50; 8.6 TABLET ORAL at 20:43

## 2024-07-12 RX ADMIN — CLONAZEPAM 2 MG: 0.5 TABLET ORAL at 09:36

## 2024-07-12 RX ADMIN — CLONAZEPAM 2 MG: 0.5 TABLET ORAL at 20:46

## 2024-07-12 RX ADMIN — OXYCODONE HYDROCHLORIDE AND ACETAMINOPHEN 2 TABLET: 5; 325 TABLET ORAL at 20:44

## 2024-07-12 RX ADMIN — BUDESONIDE 500 MCG: 0.5 INHALANT RESPIRATORY (INHALATION) at 20:03

## 2024-07-12 RX ADMIN — BUPROPION HYDROCHLORIDE 100 MG: 100 TABLET, FILM COATED ORAL at 20:46

## 2024-07-12 RX ADMIN — ARFORMOTEROL TARTRATE 15 MCG: 15 SOLUTION RESPIRATORY (INHALATION) at 08:43

## 2024-07-12 RX ADMIN — BUDESONIDE 500 MCG: 0.5 INHALANT RESPIRATORY (INHALATION) at 08:42

## 2024-07-12 RX ADMIN — IPRATROPIUM BROMIDE AND ALBUTEROL SULFATE 1 DOSE: .5; 3 SOLUTION RESPIRATORY (INHALATION) at 12:47

## 2024-07-12 RX ADMIN — OLANZAPINE 2.5 MG: 2.5 TABLET, FILM COATED ORAL at 09:39

## 2024-07-12 RX ADMIN — FAMOTIDINE 20 MG: 20 TABLET, FILM COATED ORAL at 20:43

## 2024-07-12 RX ADMIN — INSULIN LISPRO 4 UNITS: 100 INJECTION, SOLUTION INTRAVENOUS; SUBCUTANEOUS at 22:28

## 2024-07-12 RX ADMIN — BUPROPION HYDROCHLORIDE 100 MG: 100 TABLET, FILM COATED ORAL at 09:38

## 2024-07-12 RX ADMIN — OXYCODONE HYDROCHLORIDE AND ACETAMINOPHEN 2 TABLET: 5; 325 TABLET ORAL at 09:40

## 2024-07-12 RX ADMIN — PREDNISONE 40 MG: 20 TABLET ORAL at 09:38

## 2024-07-12 RX ADMIN — BUPROPION HYDROCHLORIDE 100 MG: 100 TABLET, FILM COATED ORAL at 14:41

## 2024-07-12 RX ADMIN — IPRATROPIUM BROMIDE AND ALBUTEROL SULFATE 1 DOSE: .5; 3 SOLUTION RESPIRATORY (INHALATION) at 20:03

## 2024-07-12 ASSESSMENT — PAIN DESCRIPTION - LOCATION
LOCATION: SHOULDER

## 2024-07-12 ASSESSMENT — PAIN DESCRIPTION - ORIENTATION
ORIENTATION: LEFT
ORIENTATION: RIGHT
ORIENTATION: LEFT

## 2024-07-12 ASSESSMENT — PAIN SCALES - GENERAL: PAINLEVEL_OUTOF10: 6

## 2024-07-13 LAB
GLUCOSE BLD STRIP.AUTO-MCNC: 219 MG/DL (ref 70–110)
GLUCOSE BLD STRIP.AUTO-MCNC: 254 MG/DL (ref 70–110)
GLUCOSE BLD STRIP.AUTO-MCNC: 260 MG/DL (ref 70–110)
GLUCOSE BLD STRIP.AUTO-MCNC: 274 MG/DL (ref 70–110)
GLUCOSE BLD STRIP.AUTO-MCNC: 98 MG/DL (ref 70–110)

## 2024-07-13 PROCEDURE — 1100000003 HC PRIVATE W/ TELEMETRY

## 2024-07-13 PROCEDURE — 97530 THERAPEUTIC ACTIVITIES: CPT

## 2024-07-13 PROCEDURE — 6370000000 HC RX 637 (ALT 250 FOR IP): Performed by: INTERNAL MEDICINE

## 2024-07-13 PROCEDURE — 6370000000 HC RX 637 (ALT 250 FOR IP): Performed by: HOSPITALIST

## 2024-07-13 PROCEDURE — 94640 AIRWAY INHALATION TREATMENT: CPT

## 2024-07-13 PROCEDURE — 6360000002 HC RX W HCPCS: Performed by: INTERNAL MEDICINE

## 2024-07-13 PROCEDURE — 2700000000 HC OXYGEN THERAPY PER DAY

## 2024-07-13 PROCEDURE — 97116 GAIT TRAINING THERAPY: CPT

## 2024-07-13 PROCEDURE — 6360000002 HC RX W HCPCS: Performed by: HOSPITALIST

## 2024-07-13 PROCEDURE — 94660 CPAP INITIATION&MGMT: CPT

## 2024-07-13 RX ORDER — BISACODYL 10 MG
10 SUPPOSITORY, RECTAL RECTAL
Status: COMPLETED | OUTPATIENT
Start: 2024-07-13 | End: 2024-07-13

## 2024-07-13 RX ADMIN — IPRATROPIUM BROMIDE AND ALBUTEROL SULFATE 1 DOSE: .5; 3 SOLUTION RESPIRATORY (INHALATION) at 16:14

## 2024-07-13 RX ADMIN — OXYCODONE HYDROCHLORIDE AND ACETAMINOPHEN 2 TABLET: 5; 325 TABLET ORAL at 21:24

## 2024-07-13 RX ADMIN — CLONAZEPAM 2 MG: 0.5 TABLET ORAL at 09:24

## 2024-07-13 RX ADMIN — OXYCODONE HYDROCHLORIDE AND ACETAMINOPHEN 2 TABLET: 5; 325 TABLET ORAL at 15:03

## 2024-07-13 RX ADMIN — FAMOTIDINE 20 MG: 20 TABLET, FILM COATED ORAL at 21:25

## 2024-07-13 RX ADMIN — IPRATROPIUM BROMIDE AND ALBUTEROL SULFATE 1 DOSE: .5; 3 SOLUTION RESPIRATORY (INHALATION) at 09:30

## 2024-07-13 RX ADMIN — ARFORMOTEROL TARTRATE 15 MCG: 15 SOLUTION RESPIRATORY (INHALATION) at 09:30

## 2024-07-13 RX ADMIN — CLONAZEPAM 2 MG: 0.5 TABLET ORAL at 15:03

## 2024-07-13 RX ADMIN — ARFORMOTEROL TARTRATE 15 MCG: 15 SOLUTION RESPIRATORY (INHALATION) at 20:17

## 2024-07-13 RX ADMIN — BUDESONIDE 500 MCG: 0.5 INHALANT RESPIRATORY (INHALATION) at 09:30

## 2024-07-13 RX ADMIN — IPRATROPIUM BROMIDE AND ALBUTEROL SULFATE 1 DOSE: .5; 3 SOLUTION RESPIRATORY (INHALATION) at 12:35

## 2024-07-13 RX ADMIN — HYDROXYZINE HYDROCHLORIDE 25 MG: 25 TABLET, FILM COATED ORAL at 21:25

## 2024-07-13 RX ADMIN — IMIPRAMINE HYDROCHLORIDE 50 MG: 25 TABLET ORAL at 21:25

## 2024-07-13 RX ADMIN — INSULIN LISPRO 4 UNITS: 100 INJECTION, SOLUTION INTRAVENOUS; SUBCUTANEOUS at 21:24

## 2024-07-13 RX ADMIN — NYSTATIN 500000 UNITS: 100000 SUSPENSION ORAL at 09:24

## 2024-07-13 RX ADMIN — FAMOTIDINE 20 MG: 20 TABLET, FILM COATED ORAL at 09:25

## 2024-07-13 RX ADMIN — PREDNISONE 40 MG: 20 TABLET ORAL at 09:24

## 2024-07-13 RX ADMIN — ASPIRIN 81 MG: 81 TABLET, COATED ORAL at 09:25

## 2024-07-13 RX ADMIN — SENNOSIDES AND DOCUSATE SODIUM 1 TABLET: 50; 8.6 TABLET ORAL at 21:25

## 2024-07-13 RX ADMIN — INSULIN LISPRO 4 UNITS: 100 INJECTION, SOLUTION INTRAVENOUS; SUBCUTANEOUS at 17:40

## 2024-07-13 RX ADMIN — OLANZAPINE 2.5 MG: 2.5 TABLET, FILM COATED ORAL at 09:24

## 2024-07-13 RX ADMIN — LOSARTAN POTASSIUM 25 MG: 25 TABLET, FILM COATED ORAL at 09:24

## 2024-07-13 RX ADMIN — OLANZAPINE 2.5 MG: 2.5 TABLET, FILM COATED ORAL at 21:25

## 2024-07-13 RX ADMIN — NYSTATIN 500000 UNITS: 100000 SUSPENSION ORAL at 11:53

## 2024-07-13 RX ADMIN — IPRATROPIUM BROMIDE AND ALBUTEROL SULFATE 1 DOSE: .5; 3 SOLUTION RESPIRATORY (INHALATION) at 20:17

## 2024-07-13 RX ADMIN — BUPROPION HYDROCHLORIDE 100 MG: 100 TABLET, FILM COATED ORAL at 09:24

## 2024-07-13 RX ADMIN — SENNOSIDES AND DOCUSATE SODIUM 1 TABLET: 50; 8.6 TABLET ORAL at 09:24

## 2024-07-13 RX ADMIN — BISACODYL 10 MG: 10 SUPPOSITORY RECTAL at 11:53

## 2024-07-13 RX ADMIN — INSULIN LISPRO 4 UNITS: 100 INJECTION, SOLUTION INTRAVENOUS; SUBCUTANEOUS at 11:53

## 2024-07-13 RX ADMIN — CLONAZEPAM 2 MG: 0.5 TABLET ORAL at 21:24

## 2024-07-13 RX ADMIN — BUDESONIDE 500 MCG: 0.5 INHALANT RESPIRATORY (INHALATION) at 20:17

## 2024-07-13 RX ADMIN — BUPROPION HYDROCHLORIDE 100 MG: 100 TABLET, FILM COATED ORAL at 15:06

## 2024-07-13 RX ADMIN — BUPROPION HYDROCHLORIDE 100 MG: 100 TABLET, FILM COATED ORAL at 21:25

## 2024-07-13 RX ADMIN — OXYCODONE HYDROCHLORIDE AND ACETAMINOPHEN 2 TABLET: 5; 325 TABLET ORAL at 09:25

## 2024-07-13 RX ADMIN — ENOXAPARIN SODIUM 40 MG: 100 INJECTION SUBCUTANEOUS at 17:40

## 2024-07-13 ASSESSMENT — PAIN SCALES - GENERAL
PAINLEVEL_OUTOF10: 6
PAINLEVEL_OUTOF10: 6
PAINLEVEL_OUTOF10: 10

## 2024-07-13 ASSESSMENT — PAIN DESCRIPTION - DESCRIPTORS: DESCRIPTORS: OTHER (COMMENT)

## 2024-07-14 LAB
BASOPHILS # BLD: 0 K/UL (ref 0–0.1)
BASOPHILS NFR BLD: 0 % (ref 0–2)
DIFFERENTIAL METHOD BLD: ABNORMAL
EOSINOPHIL # BLD: 0.1 K/UL (ref 0–0.4)
EOSINOPHIL NFR BLD: 1 % (ref 0–5)
ERYTHROCYTE [DISTWIDTH] IN BLOOD BY AUTOMATED COUNT: 14.4 % (ref 11.6–14.5)
GLUCOSE BLD STRIP.AUTO-MCNC: 159 MG/DL (ref 70–110)
GLUCOSE BLD STRIP.AUTO-MCNC: 197 MG/DL (ref 70–110)
GLUCOSE BLD STRIP.AUTO-MCNC: 250 MG/DL (ref 70–110)
GLUCOSE BLD STRIP.AUTO-MCNC: 326 MG/DL (ref 70–110)
GLUCOSE BLD STRIP.AUTO-MCNC: 510 MG/DL (ref 70–110)
HCT VFR BLD AUTO: 35.2 % (ref 35–45)
HGB BLD-MCNC: 11.2 G/DL (ref 12–16)
IMM GRANULOCYTES # BLD AUTO: 0.1 K/UL (ref 0–0.04)
IMM GRANULOCYTES NFR BLD AUTO: 1 % (ref 0–0.5)
LYMPHOCYTES # BLD: 1.5 K/UL (ref 0.9–3.6)
LYMPHOCYTES NFR BLD: 18 % (ref 21–52)
MCH RBC QN AUTO: 30.4 PG (ref 24–34)
MCHC RBC AUTO-ENTMCNC: 31.8 G/DL (ref 31–37)
MCV RBC AUTO: 95.7 FL (ref 78–100)
MONOCYTES # BLD: 0.7 K/UL (ref 0.05–1.2)
MONOCYTES NFR BLD: 9 % (ref 3–10)
NEUTS SEG # BLD: 5.7 K/UL (ref 1.8–8)
NEUTS SEG NFR BLD: 71 % (ref 40–73)
NRBC # BLD: 0 K/UL (ref 0–0.01)
NRBC BLD-RTO: 0 PER 100 WBC
PLATELET # BLD AUTO: 159 K/UL (ref 135–420)
PMV BLD AUTO: 11.4 FL (ref 9.2–11.8)
RBC # BLD AUTO: 3.68 M/UL (ref 4.2–5.3)
WBC # BLD AUTO: 8 K/UL (ref 4.6–13.2)

## 2024-07-14 PROCEDURE — 6360000002 HC RX W HCPCS: Performed by: HOSPITALIST

## 2024-07-14 PROCEDURE — 6370000000 HC RX 637 (ALT 250 FOR IP): Performed by: INTERNAL MEDICINE

## 2024-07-14 PROCEDURE — 2500000003 HC RX 250 WO HCPCS: Performed by: INTERNAL MEDICINE

## 2024-07-14 PROCEDURE — 36415 COLL VENOUS BLD VENIPUNCTURE: CPT

## 2024-07-14 PROCEDURE — 6370000000 HC RX 637 (ALT 250 FOR IP): Performed by: HOSPITALIST

## 2024-07-14 PROCEDURE — 94640 AIRWAY INHALATION TREATMENT: CPT

## 2024-07-14 PROCEDURE — 97530 THERAPEUTIC ACTIVITIES: CPT

## 2024-07-14 PROCEDURE — 2700000000 HC OXYGEN THERAPY PER DAY

## 2024-07-14 PROCEDURE — 1100000003 HC PRIVATE W/ TELEMETRY

## 2024-07-14 PROCEDURE — 97110 THERAPEUTIC EXERCISES: CPT

## 2024-07-14 PROCEDURE — 82962 GLUCOSE BLOOD TEST: CPT

## 2024-07-14 PROCEDURE — 6360000002 HC RX W HCPCS: Performed by: INTERNAL MEDICINE

## 2024-07-14 PROCEDURE — 94660 CPAP INITIATION&MGMT: CPT

## 2024-07-14 PROCEDURE — 85025 COMPLETE CBC W/AUTO DIFF WBC: CPT

## 2024-07-14 RX ORDER — GUAIFENESIN 200 MG/10ML
200 LIQUID ORAL EVERY 6 HOURS PRN
Status: DISCONTINUED | OUTPATIENT
Start: 2024-07-14 | End: 2024-07-16 | Stop reason: HOSPADM

## 2024-07-14 RX ORDER — INSULIN GLARGINE 100 [IU]/ML
5 INJECTION, SOLUTION SUBCUTANEOUS NIGHTLY
Status: DISCONTINUED | OUTPATIENT
Start: 2024-07-14 | End: 2024-07-16 | Stop reason: HOSPADM

## 2024-07-14 RX ORDER — OXYCODONE HYDROCHLORIDE AND ACETAMINOPHEN 5; 325 MG/1; MG/1
2 TABLET ORAL EVERY 6 HOURS PRN
Status: DISCONTINUED | OUTPATIENT
Start: 2024-07-14 | End: 2024-07-16 | Stop reason: HOSPADM

## 2024-07-14 RX ADMIN — SENNOSIDES AND DOCUSATE SODIUM 1 TABLET: 50; 8.6 TABLET ORAL at 21:46

## 2024-07-14 RX ADMIN — HYDROXYZINE HYDROCHLORIDE 25 MG: 25 TABLET, FILM COATED ORAL at 21:46

## 2024-07-14 RX ADMIN — BUDESONIDE 500 MCG: 0.5 INHALANT RESPIRATORY (INHALATION) at 18:23

## 2024-07-14 RX ADMIN — INSULIN LISPRO 4 UNITS: 100 INJECTION, SOLUTION INTRAVENOUS; SUBCUTANEOUS at 12:07

## 2024-07-14 RX ADMIN — BUPROPION HYDROCHLORIDE 100 MG: 100 TABLET, FILM COATED ORAL at 21:45

## 2024-07-14 RX ADMIN — OLANZAPINE 2.5 MG: 2.5 TABLET, FILM COATED ORAL at 21:59

## 2024-07-14 RX ADMIN — LOSARTAN POTASSIUM 25 MG: 25 TABLET, FILM COATED ORAL at 08:41

## 2024-07-14 RX ADMIN — OXYCODONE HYDROCHLORIDE AND ACETAMINOPHEN 2 TABLET: 5; 325 TABLET ORAL at 08:40

## 2024-07-14 RX ADMIN — CLONAZEPAM 2 MG: 0.5 TABLET ORAL at 14:36

## 2024-07-14 RX ADMIN — GUAIFENESIN 200 MG: 200 SOLUTION ORAL at 14:18

## 2024-07-14 RX ADMIN — INSULIN GLARGINE 5 UNITS: 100 INJECTION, SOLUTION SUBCUTANEOUS at 21:58

## 2024-07-14 RX ADMIN — ENOXAPARIN SODIUM 40 MG: 100 INJECTION SUBCUTANEOUS at 18:45

## 2024-07-14 RX ADMIN — IPRATROPIUM BROMIDE AND ALBUTEROL SULFATE 1 DOSE: .5; 3 SOLUTION RESPIRATORY (INHALATION) at 12:03

## 2024-07-14 RX ADMIN — CLONAZEPAM 2 MG: 0.5 TABLET ORAL at 08:40

## 2024-07-14 RX ADMIN — BUDESONIDE 500 MCG: 0.5 INHALANT RESPIRATORY (INHALATION) at 07:48

## 2024-07-14 RX ADMIN — OLANZAPINE 2.5 MG: 2.5 TABLET, FILM COATED ORAL at 08:40

## 2024-07-14 RX ADMIN — FAMOTIDINE 20 MG: 20 TABLET, FILM COATED ORAL at 21:45

## 2024-07-14 RX ADMIN — BUPROPION HYDROCHLORIDE 100 MG: 100 TABLET, FILM COATED ORAL at 14:36

## 2024-07-14 RX ADMIN — IPRATROPIUM BROMIDE AND ALBUTEROL SULFATE 1 DOSE: .5; 3 SOLUTION RESPIRATORY (INHALATION) at 07:48

## 2024-07-14 RX ADMIN — SENNOSIDES AND DOCUSATE SODIUM 1 TABLET: 50; 8.6 TABLET ORAL at 08:40

## 2024-07-14 RX ADMIN — IPRATROPIUM BROMIDE AND ALBUTEROL SULFATE 1 DOSE: .5; 3 SOLUTION RESPIRATORY (INHALATION) at 18:23

## 2024-07-14 RX ADMIN — OXYCODONE HYDROCHLORIDE AND ACETAMINOPHEN 2 TABLET: 5; 325 TABLET ORAL at 14:36

## 2024-07-14 RX ADMIN — PREDNISONE 40 MG: 20 TABLET ORAL at 08:40

## 2024-07-14 RX ADMIN — BUPROPION HYDROCHLORIDE 100 MG: 100 TABLET, FILM COATED ORAL at 08:40

## 2024-07-14 RX ADMIN — IMIPRAMINE HYDROCHLORIDE 50 MG: 25 TABLET ORAL at 21:45

## 2024-07-14 RX ADMIN — INSULIN LISPRO 8 UNITS: 100 INJECTION, SOLUTION INTRAVENOUS; SUBCUTANEOUS at 21:40

## 2024-07-14 RX ADMIN — ARFORMOTEROL TARTRATE 15 MCG: 15 SOLUTION RESPIRATORY (INHALATION) at 18:23

## 2024-07-14 RX ADMIN — FAMOTIDINE 20 MG: 20 TABLET, FILM COATED ORAL at 08:41

## 2024-07-14 RX ADMIN — IPRATROPIUM BROMIDE AND ALBUTEROL SULFATE 1 DOSE: .5; 3 SOLUTION RESPIRATORY (INHALATION) at 15:22

## 2024-07-14 RX ADMIN — ARFORMOTEROL TARTRATE 15 MCG: 15 SOLUTION RESPIRATORY (INHALATION) at 07:48

## 2024-07-14 RX ADMIN — ASPIRIN 81 MG: 81 TABLET, COATED ORAL at 08:41

## 2024-07-14 RX ADMIN — CLONAZEPAM 2 MG: 0.5 TABLET ORAL at 21:45

## 2024-07-14 ASSESSMENT — PAIN DESCRIPTION - LOCATION
LOCATION: HEAD
LOCATION: ARM;SHOULDER

## 2024-07-14 ASSESSMENT — PAIN SCALES - GENERAL
PAINLEVEL_OUTOF10: 10
PAINLEVEL_OUTOF10: 0
PAINLEVEL_OUTOF10: 8

## 2024-07-14 ASSESSMENT — PAIN DESCRIPTION - DESCRIPTORS
DESCRIPTORS: ACHING
DESCRIPTORS: ACHING

## 2024-07-14 ASSESSMENT — PAIN DESCRIPTION - ORIENTATION: ORIENTATION: RIGHT

## 2024-07-15 LAB
GLUCOSE BLD STRIP.AUTO-MCNC: 153 MG/DL (ref 70–110)
GLUCOSE BLD STRIP.AUTO-MCNC: 194 MG/DL (ref 70–110)
GLUCOSE BLD STRIP.AUTO-MCNC: 301 MG/DL (ref 70–110)
GLUCOSE BLD STRIP.AUTO-MCNC: 479 MG/DL (ref 70–110)
GLUCOSE BLD STRIP.AUTO-MCNC: 67 MG/DL (ref 70–110)

## 2024-07-15 PROCEDURE — 6370000000 HC RX 637 (ALT 250 FOR IP): Performed by: INTERNAL MEDICINE

## 2024-07-15 PROCEDURE — 94640 AIRWAY INHALATION TREATMENT: CPT

## 2024-07-15 PROCEDURE — 97110 THERAPEUTIC EXERCISES: CPT

## 2024-07-15 PROCEDURE — 97116 GAIT TRAINING THERAPY: CPT

## 2024-07-15 PROCEDURE — 6360000002 HC RX W HCPCS: Performed by: INTERNAL MEDICINE

## 2024-07-15 PROCEDURE — 94660 CPAP INITIATION&MGMT: CPT

## 2024-07-15 PROCEDURE — 1100000003 HC PRIVATE W/ TELEMETRY

## 2024-07-15 PROCEDURE — 82962 GLUCOSE BLOOD TEST: CPT

## 2024-07-15 PROCEDURE — 2700000000 HC OXYGEN THERAPY PER DAY

## 2024-07-15 PROCEDURE — 97530 THERAPEUTIC ACTIVITIES: CPT

## 2024-07-15 PROCEDURE — 6370000000 HC RX 637 (ALT 250 FOR IP): Performed by: HOSPITALIST

## 2024-07-15 PROCEDURE — 6360000002 HC RX W HCPCS: Performed by: HOSPITALIST

## 2024-07-15 RX ADMIN — FAMOTIDINE 20 MG: 20 TABLET, FILM COATED ORAL at 08:17

## 2024-07-15 RX ADMIN — OLANZAPINE 2.5 MG: 2.5 TABLET, FILM COATED ORAL at 21:53

## 2024-07-15 RX ADMIN — CLONAZEPAM 2 MG: 0.5 TABLET ORAL at 08:19

## 2024-07-15 RX ADMIN — INSULIN GLARGINE 5 UNITS: 100 INJECTION, SOLUTION SUBCUTANEOUS at 21:43

## 2024-07-15 RX ADMIN — IPRATROPIUM BROMIDE AND ALBUTEROL SULFATE 1 DOSE: .5; 3 SOLUTION RESPIRATORY (INHALATION) at 10:59

## 2024-07-15 RX ADMIN — HYDROXYZINE HYDROCHLORIDE 25 MG: 25 TABLET, FILM COATED ORAL at 21:44

## 2024-07-15 RX ADMIN — ARFORMOTEROL TARTRATE 15 MCG: 15 SOLUTION RESPIRATORY (INHALATION) at 06:57

## 2024-07-15 RX ADMIN — INSULIN LISPRO 6 UNITS: 100 INJECTION, SOLUTION INTRAVENOUS; SUBCUTANEOUS at 16:55

## 2024-07-15 RX ADMIN — IPRATROPIUM BROMIDE AND ALBUTEROL SULFATE 1 DOSE: .5; 3 SOLUTION RESPIRATORY (INHALATION) at 15:09

## 2024-07-15 RX ADMIN — BUDESONIDE 500 MCG: 0.5 INHALANT RESPIRATORY (INHALATION) at 20:34

## 2024-07-15 RX ADMIN — OXYCODONE HYDROCHLORIDE AND ACETAMINOPHEN 2 TABLET: 5; 325 TABLET ORAL at 21:46

## 2024-07-15 RX ADMIN — SENNOSIDES AND DOCUSATE SODIUM 1 TABLET: 50; 8.6 TABLET ORAL at 08:16

## 2024-07-15 RX ADMIN — ARFORMOTEROL TARTRATE 15 MCG: 15 SOLUTION RESPIRATORY (INHALATION) at 20:34

## 2024-07-15 RX ADMIN — LOSARTAN POTASSIUM 25 MG: 25 TABLET, FILM COATED ORAL at 08:17

## 2024-07-15 RX ADMIN — IMIPRAMINE HYDROCHLORIDE 50 MG: 25 TABLET ORAL at 21:45

## 2024-07-15 RX ADMIN — CLONAZEPAM 2 MG: 0.5 TABLET ORAL at 21:45

## 2024-07-15 RX ADMIN — FAMOTIDINE 20 MG: 20 TABLET, FILM COATED ORAL at 21:44

## 2024-07-15 RX ADMIN — OLANZAPINE 2.5 MG: 2.5 TABLET, FILM COATED ORAL at 08:18

## 2024-07-15 RX ADMIN — OXYCODONE HYDROCHLORIDE AND ACETAMINOPHEN 2 TABLET: 5; 325 TABLET ORAL at 15:17

## 2024-07-15 RX ADMIN — BUDESONIDE 500 MCG: 0.5 INHALANT RESPIRATORY (INHALATION) at 06:57

## 2024-07-15 RX ADMIN — BUPROPION HYDROCHLORIDE 100 MG: 100 TABLET, FILM COATED ORAL at 08:16

## 2024-07-15 RX ADMIN — HYDROXYZINE HYDROCHLORIDE 25 MG: 25 TABLET, FILM COATED ORAL at 15:17

## 2024-07-15 RX ADMIN — SENNOSIDES AND DOCUSATE SODIUM 1 TABLET: 50; 8.6 TABLET ORAL at 21:53

## 2024-07-15 RX ADMIN — PREDNISONE 30 MG: 20 TABLET ORAL at 08:17

## 2024-07-15 RX ADMIN — BUPROPION HYDROCHLORIDE 100 MG: 100 TABLET, FILM COATED ORAL at 21:44

## 2024-07-15 RX ADMIN — ENOXAPARIN SODIUM 40 MG: 100 INJECTION SUBCUTANEOUS at 17:42

## 2024-07-15 RX ADMIN — BUPROPION HYDROCHLORIDE 100 MG: 100 TABLET, FILM COATED ORAL at 13:11

## 2024-07-15 RX ADMIN — NYSTATIN 500000 UNITS: 100000 SUSPENSION ORAL at 08:17

## 2024-07-15 RX ADMIN — ASPIRIN 81 MG: 81 TABLET, COATED ORAL at 08:17

## 2024-07-15 RX ADMIN — ACETAMINOPHEN 650 MG: 325 TABLET ORAL at 13:11

## 2024-07-15 RX ADMIN — IPRATROPIUM BROMIDE AND ALBUTEROL SULFATE 1 DOSE: .5; 3 SOLUTION RESPIRATORY (INHALATION) at 06:57

## 2024-07-15 RX ADMIN — CLONAZEPAM 2 MG: 0.5 TABLET ORAL at 15:06

## 2024-07-15 RX ADMIN — IPRATROPIUM BROMIDE AND ALBUTEROL SULFATE 1 DOSE: .5; 3 SOLUTION RESPIRATORY (INHALATION) at 20:34

## 2024-07-15 RX ADMIN — OXYCODONE HYDROCHLORIDE AND ACETAMINOPHEN 2 TABLET: 5; 325 TABLET ORAL at 08:15

## 2024-07-15 ASSESSMENT — PAIN DESCRIPTION - ORIENTATION
ORIENTATION: RIGHT;LEFT;POSTERIOR
ORIENTATION: OTHER (COMMENT)
ORIENTATION: OTHER (COMMENT)
ORIENTATION: LOWER

## 2024-07-15 ASSESSMENT — PAIN - FUNCTIONAL ASSESSMENT
PAIN_FUNCTIONAL_ASSESSMENT: ACTIVITIES ARE NOT PREVENTED
PAIN_FUNCTIONAL_ASSESSMENT: ACTIVITIES ARE NOT PREVENTED
PAIN_FUNCTIONAL_ASSESSMENT: PREVENTS OR INTERFERES WITH MANY ACTIVE NOT PASSIVE ACTIVITIES
PAIN_FUNCTIONAL_ASSESSMENT: PREVENTS OR INTERFERES SOME ACTIVE ACTIVITIES AND ADLS

## 2024-07-15 ASSESSMENT — PAIN SCALES - GENERAL
PAINLEVEL_OUTOF10: 0
PAINLEVEL_OUTOF10: 6
PAINLEVEL_OUTOF10: 7
PAINLEVEL_OUTOF10: 6
PAINLEVEL_OUTOF10: 7
PAINLEVEL_OUTOF10: 10
PAINLEVEL_OUTOF10: 0
PAINLEVEL_OUTOF10: 0
PAINLEVEL_OUTOF10: 10

## 2024-07-15 ASSESSMENT — PAIN DESCRIPTION - DESCRIPTORS
DESCRIPTORS: ACHING
DESCRIPTORS: THROBBING;SHOOTING
DESCRIPTORS: ACHING;DISCOMFORT
DESCRIPTORS: ACHING

## 2024-07-15 ASSESSMENT — PAIN DESCRIPTION - LOCATION
LOCATION: BACK
LOCATION: HEAD
LOCATION: BACK;SACRUM
LOCATION: SACRUM;HIP

## 2024-07-15 ASSESSMENT — PAIN DESCRIPTION - FREQUENCY
FREQUENCY: CONTINUOUS
FREQUENCY: CONTINUOUS

## 2024-07-15 ASSESSMENT — PAIN DESCRIPTION - ONSET
ONSET: ON-GOING
ONSET: ON-GOING

## 2024-07-15 ASSESSMENT — PAIN DESCRIPTION - PAIN TYPE
TYPE: CHRONIC PAIN
TYPE: ACUTE PAIN;CHRONIC PAIN

## 2024-07-16 VITALS
OXYGEN SATURATION: 100 % | WEIGHT: 162.7 LBS | SYSTOLIC BLOOD PRESSURE: 126 MMHG | HEART RATE: 96 BPM | DIASTOLIC BLOOD PRESSURE: 86 MMHG | TEMPERATURE: 97.9 F | BODY MASS INDEX: 24.66 KG/M2 | HEIGHT: 68 IN | RESPIRATION RATE: 16 BRPM

## 2024-07-16 LAB
GLUCOSE BLD STRIP.AUTO-MCNC: 128 MG/DL (ref 70–110)
GLUCOSE BLD STRIP.AUTO-MCNC: 264 MG/DL (ref 70–110)
GLUCOSE BLD STRIP.AUTO-MCNC: 88 MG/DL (ref 70–110)

## 2024-07-16 PROCEDURE — 6370000000 HC RX 637 (ALT 250 FOR IP): Performed by: INTERNAL MEDICINE

## 2024-07-16 PROCEDURE — 6360000002 HC RX W HCPCS: Performed by: INTERNAL MEDICINE

## 2024-07-16 PROCEDURE — 82962 GLUCOSE BLOOD TEST: CPT

## 2024-07-16 PROCEDURE — 6370000000 HC RX 637 (ALT 250 FOR IP): Performed by: HOSPITALIST

## 2024-07-16 PROCEDURE — 94640 AIRWAY INHALATION TREATMENT: CPT

## 2024-07-16 PROCEDURE — 2700000000 HC OXYGEN THERAPY PER DAY

## 2024-07-16 RX ORDER — LOSARTAN POTASSIUM 25 MG/1
25 TABLET ORAL DAILY
Qty: 30 TABLET | Refills: 3 | Status: SHIPPED | OUTPATIENT
Start: 2024-07-17 | End: 2024-07-16

## 2024-07-16 RX ORDER — HYDROCODONE BITARTRATE AND ACETAMINOPHEN 7.5; 325 MG/1; MG/1
1 TABLET ORAL EVERY 6 HOURS PRN
Qty: 12 TABLET | Refills: 0 | Status: SHIPPED | OUTPATIENT
Start: 2024-07-16 | End: 2024-07-21

## 2024-07-16 RX ORDER — INSULIN GLARGINE 100 [IU]/ML
5 INJECTION, SOLUTION SUBCUTANEOUS NIGHTLY
Qty: 10 ML | Refills: 3
Start: 2024-07-16

## 2024-07-16 RX ORDER — LOSARTAN POTASSIUM 25 MG/1
25 TABLET ORAL DAILY
Qty: 30 TABLET | Refills: 3
Start: 2024-07-17 | End: 2024-07-16 | Stop reason: HOSPADM

## 2024-07-16 RX ORDER — CLONAZEPAM 2 MG/1
2 TABLET ORAL 3 TIMES DAILY
Qty: 30 TABLET | Refills: 0 | Status: SHIPPED | OUTPATIENT
Start: 2024-07-16 | End: 2024-07-26

## 2024-07-16 RX ORDER — INSULIN LISPRO 100 [IU]/ML
0-8 INJECTION, SOLUTION INTRAVENOUS; SUBCUTANEOUS
Qty: 3 ML | Refills: 0
Start: 2024-07-16 | End: 2024-08-15

## 2024-07-16 RX ORDER — PREDNISONE 20 MG/1
20 TABLET ORAL DAILY
Qty: 5 TABLET | Refills: 0 | Status: SHIPPED | OUTPATIENT
Start: 2024-07-17 | End: 2024-07-16

## 2024-07-16 RX ORDER — PREDNISONE 20 MG/1
20 TABLET ORAL DAILY
Status: DISCONTINUED | OUTPATIENT
Start: 2024-07-17 | End: 2024-07-16 | Stop reason: HOSPADM

## 2024-07-16 RX ORDER — BUDESONIDE 0.5 MG/2ML
0.5 INHALANT ORAL
Qty: 40 ML | Refills: 0
Start: 2024-07-16 | End: 2024-07-26

## 2024-07-16 RX ORDER — PREDNISONE 20 MG/1
20 TABLET ORAL DAILY
Qty: 5 TABLET | Refills: 0
Start: 2024-07-17 | End: 2024-07-22

## 2024-07-16 RX ORDER — OLANZAPINE 2.5 MG/1
2.5 TABLET, FILM COATED ORAL 2 TIMES DAILY
Qty: 14 TABLET | Refills: 0
Start: 2024-07-16 | End: 2024-07-23

## 2024-07-16 RX ORDER — OLANZAPINE 2.5 MG/1
2.5 TABLET, FILM COATED ORAL 2 TIMES DAILY
Qty: 14 TABLET | Refills: 0 | Status: SHIPPED | OUTPATIENT
Start: 2024-07-16 | End: 2024-07-16

## 2024-07-16 RX ADMIN — OLANZAPINE 2.5 MG: 2.5 TABLET, FILM COATED ORAL at 08:31

## 2024-07-16 RX ADMIN — ASPIRIN 81 MG: 81 TABLET, COATED ORAL at 08:31

## 2024-07-16 RX ADMIN — FAMOTIDINE 20 MG: 20 TABLET, FILM COATED ORAL at 08:30

## 2024-07-16 RX ADMIN — CLONAZEPAM 2 MG: 0.5 TABLET ORAL at 15:37

## 2024-07-16 RX ADMIN — INSULIN LISPRO 4 UNITS: 100 INJECTION, SOLUTION INTRAVENOUS; SUBCUTANEOUS at 11:51

## 2024-07-16 RX ADMIN — LOSARTAN POTASSIUM 25 MG: 25 TABLET, FILM COATED ORAL at 08:31

## 2024-07-16 RX ADMIN — OXYCODONE HYDROCHLORIDE AND ACETAMINOPHEN 2 TABLET: 5; 325 TABLET ORAL at 08:37

## 2024-07-16 RX ADMIN — BUPROPION HYDROCHLORIDE 100 MG: 100 TABLET, FILM COATED ORAL at 15:37

## 2024-07-16 RX ADMIN — IPRATROPIUM BROMIDE AND ALBUTEROL SULFATE 1 DOSE: .5; 3 SOLUTION RESPIRATORY (INHALATION) at 11:49

## 2024-07-16 RX ADMIN — BUPROPION HYDROCHLORIDE 100 MG: 100 TABLET, FILM COATED ORAL at 08:30

## 2024-07-16 RX ADMIN — SENNOSIDES AND DOCUSATE SODIUM 1 TABLET: 50; 8.6 TABLET ORAL at 08:31

## 2024-07-16 RX ADMIN — PREDNISONE 30 MG: 20 TABLET ORAL at 08:29

## 2024-07-16 RX ADMIN — IPRATROPIUM BROMIDE AND ALBUTEROL SULFATE 1 DOSE: .5; 3 SOLUTION RESPIRATORY (INHALATION) at 07:27

## 2024-07-16 RX ADMIN — CLONAZEPAM 2 MG: 0.5 TABLET ORAL at 08:30

## 2024-07-16 RX ADMIN — OXYCODONE HYDROCHLORIDE AND ACETAMINOPHEN 2 TABLET: 5; 325 TABLET ORAL at 15:41

## 2024-07-16 RX ADMIN — ARFORMOTEROL TARTRATE 15 MCG: 15 SOLUTION RESPIRATORY (INHALATION) at 07:27

## 2024-07-16 RX ADMIN — BUDESONIDE 500 MCG: 0.5 INHALANT RESPIRATORY (INHALATION) at 07:27

## 2024-07-16 RX ADMIN — HYDROXYZINE HYDROCHLORIDE 25 MG: 25 TABLET, FILM COATED ORAL at 13:54

## 2024-07-16 ASSESSMENT — PAIN DESCRIPTION - PAIN TYPE
TYPE: CHRONIC PAIN
TYPE: CHRONIC PAIN

## 2024-07-16 ASSESSMENT — PAIN - FUNCTIONAL ASSESSMENT
PAIN_FUNCTIONAL_ASSESSMENT: ACTIVITIES ARE NOT PREVENTED
PAIN_FUNCTIONAL_ASSESSMENT: PREVENTS OR INTERFERES SOME ACTIVE ACTIVITIES AND ADLS

## 2024-07-16 ASSESSMENT — PAIN DESCRIPTION - FREQUENCY
FREQUENCY: CONTINUOUS
FREQUENCY: CONTINUOUS

## 2024-07-16 ASSESSMENT — PAIN DESCRIPTION - LOCATION
LOCATION: BACK
LOCATION: BACK

## 2024-07-16 ASSESSMENT — PAIN SCALES - GENERAL
PAINLEVEL_OUTOF10: 3
PAINLEVEL_OUTOF10: 7
PAINLEVEL_OUTOF10: 3
PAINLEVEL_OUTOF10: 8

## 2024-07-16 ASSESSMENT — PAIN DESCRIPTION - ONSET
ONSET: ON-GOING
ONSET: ON-GOING

## 2024-07-16 ASSESSMENT — PAIN DESCRIPTION - ORIENTATION: ORIENTATION: RIGHT;LEFT;LOWER;POSTERIOR

## 2024-07-16 ASSESSMENT — PAIN DESCRIPTION - DESCRIPTORS
DESCRIPTORS: ACHING;DISCOMFORT
DESCRIPTORS: ACHING;DISCOMFORT

## 2024-07-16 NOTE — CARE COORDINATION
Assisting  on obtaining auth for [facility]. Submitted.     Will follow up  
CM continues to follow for discharge planning, pt remains not medically stable on oxygen therapy, pending PT/OT and Psych evals. Pt wishes to continue outpatient PT with [facility]. CM will continue to follow for discharge planning.   
CM continues to follow, pt SBTs, Precedex drip, possible trial extubation. CM will continue to follow for discharge planning.   
Following up with auth for SNF. Uploading Respiratory Therapy and Physician, and PT notes from last night and this morning.      Will continue to follow up. Auth is still pending.   
Pt Auth for [facility].   Approval dates is  07/16/2024-07/18/2024   Plan Auth ID -[id number]   Auth ID- [id number]     Will follow up with   
Pt remains not medically stable due to intubation. RAMO NW spoke to pts family pt is currently receiving outpatient PT with [facility]. CM continues to follow for discharge planning.   
Reference Number  [id number]   For auth  
Therapy is recommending the patient DC to [facility] for further rehab. The patient and family are declining this and wanting to take the patient home. The family would like to have HH. FOC was given and [facility] was chosen. Will continue to follow.  
Transition of Care Plan to SNF/Rehab      Patient Name: Eryn Howell                   YOB: 1955    SNF/Rehab Transition:  Patient has been accepted to [facility] and meets criteria for admission.   Patient will transported by medical transportation and expected to leave at 4:45pm.    Medicaid Long-term Services and Supports(LTSS) screening completion: No    Three Inpatient Midnights for Medicare: Yes    Current Code Status:   Code Status: Full Code     Last Weight:   Wt Readings from Last 1 Encounters:   07/09/24 73.8 kg (162 lb 11.2 oz)       Weightbearing Status: N/A    IV Medication, IV Site, Device Type: No    Dialysis: No      O2 Needs (including O2, Bipap, Cpap, ect.): No    Covid Vaccine Dates/ if known:    Internal Administration   First Dose      Second Dose           Last COVID Lab SARS-CoV-2, PCR ( )   Date Value   07/02/2024 Not detected            Last COVID Lab SARS-CoV-2, PCR ( )   Date Value   07/02/2024 Not detected     Rapid Influenza A By PCR ( )   Date Value   07/02/2024 Not detected     Rapid Influenza B By PCR ( )   Date Value   07/02/2024 Not detected              Current Diet: ADULT DIET; Regular  ADULT ORAL NUTRITION SUPPLEMENT; Breakfast, Lunch, Dinner; Standard High Calorie/High Protein Oral Supplement    Wound Vac or Other Equipment Needs: N/A    Patient requires Isolation: No    Follow-up Appointment needed or scheduled: No    Communication to SNF/Rehab:  Bedside RN has been notified to update the transition plan to the facility and call report [phone].  Discharge information has been updated on the AVS and communicated to facility via [facility].    Nursing Please include all hard scripts for controlled substances, med rec and dc summary, and AVS in packet.     Kevyn Jones RN  Case Management Department     
At/After Discharge Home Health;Skilled Nursing Facility (SNF)    Resource Information Provided? No   Mode of Transport at Discharge Self   Confirm Follow Up Transport Family   Condition of Participation: Discharge Planning   The Plan for Transition of Care is related to the following treatment goals: Home with family support   The Patient and/or Patient Representative was provided with a Choice of Provider? Patient   The Patient and/Or Patient Representative agree with the Discharge Plan? Yes   Freedom of Choice list was provided with basic dialogue that supports the patient's individualized plan of care/goals, treatment preferences, and shares the quality data associated with the providers?  Yes

## 2024-07-16 NOTE — DISCHARGE SUMMARY
[address]  [address]                            DISCHARGE SUMMARY      PATIENT NAME: ADAM FAM              : 1955  MED REC NO: [id number]                       ROOM: [id number]  ACCOUNT NO: [id number]                       ADMIT DATE: 2024  PROVIDER: Joelle Del Cid MD    DISCHARGE DATE:  2024    ATTENDING PHYSICIAN:  Joelle Del Cid MD    DIAGNOSES AT DISCHARGE:    1. Acute respiratory failure, requiring intubation.  2. Severe chronic obstructive pulmonary disease exacerbation.  3. Posttraumatic stress disorder and anxiety.  4. Generalized weakness.    The patient is going to a skilled nursing facility.  She will use a BiPAP machine when she is there.  She will continue physical therapy and occupational therapy for strengthening.    CONSULTANTS:  During her stay, Critical Care Pulmonology, Case Management, Physical Therapy.    HOSPITAL SUMMARY:  This is a 68-year-old female who came in respiratory distress via EMS.  She required CPAP and then urgent intubation in the emergency room.  She has chronic lung disease, PTSD, COPD, GERD, hypertension, history of shingles, and arthritis.  The patient has history of extensive smoking, quit smoking in .  No alcohol or drug use.  She had been admitted in January for COPD with influenza infection and noted to have severe upper lobe emphysema by x-rays at that time and had a normal ejection fraction noted per echo.  The patient was admitted to the intensive care unit where she had an extensive hospitalization followed by Critical Care Pulmonology.  She required steroid administration, nebulizer treatments, ABG monitoring, daily chest x-ray, ventilation adjustments for mechanical ventilation, antibiotic therapy, respiratory cultures were done and ultimately, she was stabilized enough to be able to successfully wean from the ventilator and be followed in the intensive care unit for

## 2024-07-16 NOTE — PROGRESS NOTES
P[facility] Specialists  Pulmonary, Critical Care, and Sleep Medicine    Name: Eryn Howell MRN: [id number]   : 1955 Hospital: [facility]    Date: 2024  Room: [address] [place] [address] Note                                              Consult requesting physician: Dr. Fonseca   Reason for Consult: respiratory failuire    IMPRESSION:   Acute respiratory failure with hypercapnia J96.02  Acute COPD exacerbation                           Leukocytosis  Shortness of breath  Change in mental status        Active Hospital Problems    Diagnosis Date Noted    Chronic prescription opiate use [Z79.891] 2024    Chronic prescription benzodiazepine use [Z79.899] 2024    Acute respiratory failure with hypercapnia (HCC) [J96.02] 2024    Leucocytosis [D72.829] 2024    Acute respiratory failure (HCC) [J96.00] 2024    PTSD (post-traumatic stress disorder) [F43.10] 2017    COPD with acute exacerbation (HCC) [J44.1] 2017        Code status: full code       RECOMMENDATIONS:     Respiratory:   Acute hypercarbic respiratory failure  Acute COPD exacerbation, severity of COPD unknown   Intubated on arrival to Ed on 2024  CXR daily   AC 16/420/40/5  ABG 7.35/42/69/92  Vent adjusted daily   Wean sedation daily to start SBT but severe anxiety   CTA of the chest on 2024 no PE emphysema  Bronchodilators  Steroids  Antibiotics  Respiratory cultures final pending but viral negative     Initial ABG after intubation 7.26/65/604/100    Sedation: versed and fentanyl very agitated add precedex   CTA     IMPRESSION:  1.  No evidence of pulmonary embolism.  2.  No other acute abnormalities.  3.  Extensive emphysema.        Electronically signed by KAMLA Biswas           Specimen Collected: 24 17:02 EDT           Ventilator bundle & Sedation protocol followed. Daily sedation holiday, assessment for readiness for SBT and then re-titrate if required. Chlorhexidine mouth 
                     Pulmonary Specialists  Pulmonary, Critical Care, and Sleep Medicine    Name: Eryn Howell MRN: [id number]   : 1955 Hospital: [facility]    Date: 2024  Room: [address] [place] [address] Note                                              Consult requesting physician: Dr. Fonseca   Reason for Consult: respiratory failuire    IMPRESSION:   Acute respiratory failure with hypercapnia J96.02  Acute COPD exacerbation                         J 44.1  Leukocytosis                                              D 72.829  Shortness of breath  Change in mental status        Active Hospital Problems    Diagnosis Date Noted    Chronic prescription opiate use [Z79.891] 2024    Chronic prescription benzodiazepine use [Z79.899] 2024    Acute respiratory failure with hypercapnia (HCC) [J96.02] 2024    Leucocytosis [D72.829] 2024    Acute respiratory failure (HCC) [J96.00] 2024    PTSD (post-traumatic stress disorder) [F43.10] 2017    COPD with acute exacerbation (HCC) [J44.1] 2017        Code status: full code       RECOMMENDATIONS:     Respiratory:   Acute hypercarbic respiratory failure  Acute COPD exacerbation, severity of COPD unknown but suspect severe   Intubated on arrival to Ed on 2024  CXR daily stable  AC 16/420/40/5  ABG 7.39/44/60/90  Vent adjusted daily   Failed SBT rapid shallow breathing and worsening oxygenation   Add gentle diuresis   CTA of the chest on 2024 no PE, emphysema  Bronchodilators  Steroids  Antibiotics  Respiratory cultures negative     Initial ABG after intubation 7.26/65/604/100    Sedation: versed now prn off the drip,fentanyl drip, on precedex   CTA     IMPRESSION:  1.  No evidence of pulmonary embolism.  2.  No other acute abnormalities.  3.  Extensive emphysema.        Electronically signed by KAMLA Biswas           Specimen Collected: 24 17:02 EDT           Ventilator bundle & Sedation protocol followed. Daily 
                     Pulmonary Specialists  Pulmonary, Critical Care, and Sleep Medicine    Name: Eryn Howell MRN: [id number]   : 1955 Hospital: [facility]    Date: 7/15/2024  Room: [address] [place] [address] Note                                              Consult requesting physician: Dr. Fonseca   Reason for Consult: respiratory failuire    IMPRESSION:   Acute on chronic respiratory failure with hypoxia and hypercapnia J96.21, J96.22  Acute COPD exacerbation  J44.1  Leukocytosis                                           Anxiety  PTSD    Active Hospital Problems    Diagnosis Date Noted    Anxiety [F41.9] 2024    Chronic prescription opiate use [Z79.891] 2024    Chronic prescription benzodiazepine use [Z79.899] 2024    Acute respiratory failure with hypoxia and hypercapnia (HCC) [J96.01, J96.02] 2024    Leucocytosis [D72.829] 2024    Acute respiratory failure (HCC) [J96.00] 2024    PTSD (post-traumatic stress disorder) [F43.10] 2017    COPD with acute exacerbation (HCC) [J44.1] 2017        Code status: full code       RECOMMENDATIONS:     Patient with advanced COPD, severe anxiety at baseline presenting with COPD exacerbation; initially intubated; extubated on 2024.    Respiratory: Patient extubated 24.  Respiratory status/COPD has improved close to baseline and remained stable.  Patient and  reports anxiety/[panic predominant trigger for respiratory symptoms  CT chest on admission-shows severe bilateral upper lobe emphysematous changes  ABG 24-pH 7.49, pCO2 46.1, pO2 69, O2 sats 95%, bicarb 35-FiO2 40% on vent; extubated on 24  Microbiology: Sputum culture: Heavy normal guille.  Urine antigen panel, influenza, COVID-19, blood culture all negative.    CXR 2024: Improved left basilar atelectasis.  Trace left pleural effusion.    Continue O2 3 LPM NC; this is her baseline O2 at home; she has oxygen at home.    Patient is using 
                     Pulmonary Specialists  Pulmonary, Critical Care, and Sleep Medicine    Name: Eryn Howell MRN: [id number]   : 1955 Hospital: [facility]    Date: 7/10/2024  Room: [address] [place] [address] Note                                              Consult requesting physician: Dr. Fonseca   Reason for Consult: respiratory failuire    IMPRESSION:   Acute respiratory failure with hypoxia and hypercapnia  Acute COPD exacerbation                         Leukocytosis                                           Anxiety  PTSD        Active Hospital Problems    Diagnosis Date Noted    Anxiety [F41.9] 2024    Chronic prescription opiate use [Z79.891] 2024    Chronic prescription benzodiazepine use [Z79.899] 2024    Acute respiratory failure with hypoxia and hypercapnia (HCC) [J96.01, J96.02] 2024    Leucocytosis [D72.829] 2024    Acute respiratory failure (HCC) [J96.00] 2024    PTSD (post-traumatic stress disorder) [F43.10] 2017    COPD with acute exacerbation (HCC) [J44.1] 2017        Code status: full code       RECOMMENDATIONS:     Patient with advanced COPD, anxiety at baseline presenting with COPD exacerbation and intubated    Respiratory: Patient extubated    COPD remain stable  Patient on nasal cannula oxygen-remains at 4 L  Recommend incentive spirometry and flutter valve therapy as tolerated-discussed with patient and RN  Counseled patient again about using BiPAP at nighttime to see how she tolerates  Patient has home O2; [facility]  Patient anxious about getting PFT in my office; discussed about not needing body box for lung volumes, but can get spirometry done sitting outside the asher  Chest x-ray done  -rotated film; mild left basilar atelectasis; no focal consolidations or pleural effusions; baseline emphysema; check chest x-ray tomorrow  CT chest on admission-shows severe bilateral upper lobe emphysematous changes  ABG -pH 7.49, 
                     Pulmonary Specialists  Pulmonary, Critical Care, and Sleep Medicine    Name: Eryn Howell MRN: [id number]   : 1955 Hospital: [facility]    Date: 2024  Room: [address] [place] [address] Note                                              Consult requesting physician: Dr. Fonseca   Reason for Consult: respiratory failuire    IMPRESSION:   Acute respiratory failure with hypercapnia J96.02  Acute COPD exacerbation                         J 44.1  Leukocytosis                                              D 72.829  Shortness of breath  Change in mental status        Active Hospital Problems    Diagnosis Date Noted    Chronic prescription opiate use [Z79.891] 2024    Chronic prescription benzodiazepine use [Z79.899] 2024    Acute respiratory failure with hypercapnia (HCC) [J96.02] 2024    Leucocytosis [D72.829] 2024    Acute respiratory failure (HCC) [J96.00] 2024    PTSD (post-traumatic stress disorder) [F43.10] 2017    COPD with acute exacerbation (HCC) [J44.1] 2017        Code status: full code       RECOMMENDATIONS:     Respiratory:   Acute hypercarbic respiratory failure  Acute COPD exacerbation, severity of COPD unknown but suspect severe   Intubated on arrival to Ed on 2024 after a short trial of BIPAP  AC /40/5  ABG 7.//62/93   After SBT  7.//58/92 unable to extubate due to hypoxemia lower RR to 12 and TV to 400 baseline Co2 around 55-60?  Vent adjusted daily   Continue gentle diuresis prn   CTA of the chest on 2024 no PE, emphysema  Bronchodilators  Steroids  Antibiotics  Respiratory cultures negative   Initial ABG after intubation 7./65/604/100    Sedation: versed now prn off the drip,fentanyl drip low dose only at night, daytime prn, , on precedex drip  Anxiety medication resumed and updated  daily  CXR 2024 normal  CXR 2024  IMPRESSION:  Satisfactory endotracheal tube position.  Improved left lower lobe 
                     Pulmonary Specialists  Pulmonary, Critical Care, and Sleep Medicine    Name: Eryn Howell MRN: [id number]   : 1955 Hospital: [facility]    Date: 2024  Room: [address] [place] [address] Note                                              Consult requesting physician: Dr. Fonseca   Reason for Consult: respiratory failuire    IMPRESSION:   Acute respiratory failure with hypoxia and hypercapnia  Acute COPD exacerbation                         Leukocytosis                                           Anxiety  PTSD        Active Hospital Problems    Diagnosis Date Noted    Anxiety [F41.9] 2024    Chronic prescription opiate use [Z79.891] 2024    Chronic prescription benzodiazepine use [Z79.899] 2024    Acute respiratory failure with hypoxia and hypercapnia (HCC) [J96.01, J96.02] 2024    Leucocytosis [D72.829] 2024    Acute respiratory failure (HCC) [J96.00] 2024    PTSD (post-traumatic stress disorder) [F43.10] 2017    COPD with acute exacerbation (HCC) [J44.1] 2017        Code status: full code       RECOMMENDATIONS:     Patient with advanced COPD, anxiety at baseline presenting with COPD exacerbation and intubated    Respiratory: VCV mode of ventilation  Ventilator and sedation bundles reviewed  Sedation-fentanyl and midazolam as needed  Chest x-ray-stable ET tube and OG tube position; no focal consolidations  CT chest on admission-shows severe bilateral upper lobe emphysematous changes  ABG shows improved hypercapnia  Daily weaning trials as tolerated  Cuff leak present  Bronchodilators-budesonide and Brovana nebs twice daily  Ipratropium/albuterol nebs as needed  IV Solu-Medrol-40 mg every 6 hours  Ventilator bundle & Sedation protocol followed. Daily sedation holiday, assessment for readiness for SBT and then re-titrate if required. Chlorhexidine mouth washes.   Keep SPO2 >=92%. HOB 30 degree elevation all the time. Aggressive pulmonary 
                     Pulmonary Specialists  Pulmonary, Critical Care, and Sleep Medicine    Name: Eryn Howell MRN: [id number]   : 1955 Hospital: [facility]    Date: 2024  Room: [address] [place] [address] Note                                              Consult requesting physician: Dr. Fonseca   Reason for Consult: respiratory failuire    IMPRESSION:   Acute respiratory failure with hypoxia and hypercapnia  Acute COPD exacerbation                         Leukocytosis                                           Anxiety  PTSD        Active Hospital Problems    Diagnosis Date Noted    Anxiety [F41.9] 2024    Chronic prescription opiate use [Z79.891] 2024    Chronic prescription benzodiazepine use [Z79.899] 2024    Acute respiratory failure with hypoxia and hypercapnia (HCC) [J96.01, J96.02] 2024    Leucocytosis [D72.829] 2024    Acute respiratory failure (HCC) [J96.00] 2024    PTSD (post-traumatic stress disorder) [F43.10] 2017    COPD with acute exacerbation (HCC) [J44.1] 2017        Code status: full code       RECOMMENDATIONS:     Patient with advanced COPD, anxiety at baseline presenting with COPD exacerbation and intubated    Respiratory: Patient extubated   Stable COPD symptoms; on nasal cannula oxygen during the daytime-wean as tolerated  Chest x-ray done today reviewed images and report-cardiac size normal, improved aeration at the bases, lungs clear, emphysematous changes, no focal consolidations or pleural effusions  Continue incentive spirometry and flutter valve therapy  Continue BiPAP at nighttime as tolerated; recommend patient going home on TRILOGY noninvasive ventilator; patient DERECK Soliman  Patient has home O2  Patient anxious about getting PFT in my office; discussed about not needing body box for lung volumes, but can get spirometry done sitting outside the asher    CT chest on admission-shows severe bilateral upper lobe 
           conducted a follow up visit with Eryn GALDINO GrangerLynda, who is a 68 y.o.,female.     Patient is still intubated, family was by the bedside. Nurse Montse suggested that I not go in and so I didn't.      Continued the relationship of care and support.   Offered prayer and assurance of continued prayer on patients behalf.   Chart reviewed.  Chaplains will continue to follow and will provide pastoral care as needed or requested.   recommends bedside caregivers page the  on duty if patient shows signs of acute spiritual or emotional distress.     Sister Anne Caba MA, [facility]     Spiritual Care  [phone]   
    Hospitalist Progress Note    Patient: Eryn Howell MRN: [id number]  CSN: [id number]    YOB: 1955  Age: 68 y.o.  Sex: female    DOA: 7/2/2024 LOS:  LOS: 3 days          Chief Complaint:    Respiratory failure      Assessment/Plan     Acute respiratory failure, requiring intubation.  Severe COPD exacerbation with anxiety  SBT planned  7/6: Patient failed SBT due to tachypnea.  Patient is currently intubated and sedated.  Discussed with family at bedside.  They understand patient cannot be safely extubated today.     Continue scheduled nebs  IV solumedrol  IV abx empirically-doxycycline and vancomycin     Blood cx called as + - may be contaminant     PCP positive on UDS     Chronic opiate and benzo use-continue klonopin and percocet for now  Zyprexa for agitation  On imipramine  7/6: Patient is currently on Percocet 3 times a day, clonazepam 2 mg 3 times daily, Zyprexa twice daily, Atarax 25 mg nightly X, imipramine nightly, Wellbutrin 100 mg 3 times a day.     PTSD     Hypotension resolved     SSI for hyperglycemia with steroids     Ams like due to hypercapnia   Ct head :No acute intracranial abnormality on 7/2      Nutritional support-tube feedings    Follow with intensivist     Full code      Disposition :rehab  Active Hospital Problems    Diagnosis     Chronic prescription opiate use [Z79.891]     Chronic prescription benzodiazepine use [Z79.899]     Acute respiratory failure with hypercapnia (HCC) [J96.02]     Leucocytosis [D72.829]     Acute respiratory failure (HCC) [J96.00]     PTSD (post-traumatic stress disorder) [F43.10]     COPD with acute exacerbation (HCC) [J44.1]        Subjective:  Patient is intubated and sedated.  Failed SBT this morning due to tachypnea.      Vital signs/Intake and Output:  Visit Vitals  BP (!) 145/78   Pulse 84   Temp 98.4 °F (36.9 °C) (Oral)   Resp 19   Ht 1.727 m (5' 8\")   Wt 76.7 kg (169 lb)   SpO2 92%   BMI 25.70 kg/m²     Current Shift:  07/05 0701 - 07/05 
    Hospitalist Progress Note    Patient: Eryn Howell MRN: [id number]  CSN: [id number]    YOB: 1955  Age: 68 y.o.  Sex: female    DOA: 7/2/2024 LOS:  LOS: 10 days          Chief Complaint:    Resp failure      Assessment/Plan       Acute respiratory failure, requiring intubation.  Severe COPD exacerbation with anxiety  PTSD  weakness     Extubated now since 7/8     Continue scheduled nebs  Now on oral prednisone  CPAP at night    PT working with her    Encouraged oral hydration and PO intake     Chronic opiate and benzo use-continue klonopin and percocet for now  Continue current medication plan     PTSD stable     Ams like due to hypercapnia resolved  Ct head :No acute intracranial abnormality on 7/2      Hyperglycemia with steroids, monitor off lantus as off solumedrol now    Leukocytosis-monitor, repeat CBC     Hypokalemia-recheck BMP     DVT prophylaxis     PT, ambulation  D/c planning  D/w pt and       Disposition :N  Active Hospital Problems    Diagnosis     Anxiety [F41.9]     Chronic prescription opiate use [Z79.891]     Chronic prescription benzodiazepine use [Z79.899]     Acute respiratory failure with hypoxia and hypercapnia (HCC) [J96.01, J96.02]     Leucocytosis [D72.829]     Acute respiratory failure (HCC) [J96.00]     PTSD (post-traumatic stress disorder) [F43.10]     COPD with acute exacerbation (HCC) [J44.1]        Subjective:  I'm not drinking much but I know I have to  No new complaints  Had asked about IV saline  But she has vup of water she is drinking  No signs she is not tolerating drinking orally  Encouraged PO intake  Breathing is fine  No new concerns about SOB, cough,       Review of systems:    Constitutional: denies fevers, chills, myalgias    Gastrointestinal: denies nausea, vomiting, diarrhea      Vital signs/Intake and Output:  Visit Vitals  BP (!) 130/101   Pulse 98   Temp 98 °F (36.7 °C) (Temporal)   Resp 19   Ht 1.727 m (5' 8\")   Wt 73.8 kg (162 lb 11.2 
    Hospitalist Progress Note    Patient: Eryn Howell MRN: [id number]  CSN: [id number]    YOB: 1955  Age: 68 y.o.  Sex: female    DOA: 7/2/2024 LOS:  LOS: 9 days          Chief Complaint:    Resp failure      Assessment/Plan       Acute respiratory failure, requiring intubation.  Severe COPD exacerbation with anxiety     Extubated now since 7/8     Continue scheduled nebs  Wean steroids/solumedrol     PCP positive on UDS     Chronic opiate and benzo use-continue klonopin and percocet for now     Patient is currently on Percocet 3 times a day, clonazepam 2 mg 3 times daily, Zyprexa twice daily, Atarax 25 mg nightly X, imipramine nightly, Wellbutrin 100 mg 3 times a day.     PTSD     Ams like due to hypercapnia resolved  Ct head :No acute intracranial abnormality on 7/2      Can stop lantus as steroids dose down and BG better  Wbc elevated with steroids    Hypokalemia-PO Kdur     DVT prophylaxis     PT, ambulation  Can d/c tele    D/c planning  D/w pt and   Disposition :  Active Hospital Problems    Diagnosis     Anxiety [F41.9]     Chronic prescription opiate use [Z79.891]     Chronic prescription benzodiazepine use [Z79.899]     Acute respiratory failure with hypoxia and hypercapnia (HCC) [J96.01, J96.02]     Leucocytosis [D72.829]     Acute respiratory failure (HCC) [J96.00]     PTSD (post-traumatic stress disorder) [F43.10]     COPD with acute exacerbation (HCC) [J44.1]        Subjective:  Cannot sleep well  She has no new physical complaints  Breathing better  Out of ICU yesterday  No inc SOB, or chest pain  Ate BF    Review of systems:    Constitutional: denies fevers, chills  Respiratory: denies SOB, cough  Cardiovascular: denies chest pain  Gastrointestinal: denies nausea, vomiting, diarrhea      Vital signs/Intake and Output:  Visit Vitals  /77   Pulse (!) 101   Temp 98.6 °F (37 °C) (Oral)   Resp 24   Ht 1.727 m (5' 8\")   Wt 73.8 kg (162 lb 11.2 oz)   SpO2 96%   BMI 24.74 kg/m² 
    Hospitalist Progress Note    Patient: Eryn Howell MRN: [id number]  CSN: [id number]    YOB: 1955  Age: 68 y.o.  Sex: female    DOA: 7/2/2024 LOS:  LOS: 2 days          Chief Complaint:    Resp failure      Assessment/Plan       Acute respiratory failure, requiring intubation.  Severe COPD exacerbation with anxiety  SBT planned    Continue scheduled nebs  IV solumedrol  IV abx empirically    Blood cx called as + but no clear ID yet, ? Contaminant    PCP positive on UDS    Chronic opiate and benzo use-continue klonopin and percocet for now  Zyprexa added for agitation  On imipramine    PTSD    Hypotension resolved    SSI for hyperglycemia with steroids    Ams like due to hypercapnia   Ct head :No acute intracranial abnormality on 7/2      D/w family at bedside    Full code  Disposition :  Active Hospital Problems    Diagnosis     Chronic prescription opiate use [Z79.891]     Chronic prescription benzodiazepine use [Z79.899]     Acute respiratory failure with hypercapnia (HCC) [J96.02]     Leucocytosis [D72.829]     Acute respiratory failure (HCC) [J96.00]     PTSD (post-traumatic stress disorder) [F43.10]     COPD with acute exacerbation (HCC) [J44.1]        Subjective:    Family at bedside  Eryn is still intubated and sedate on exam  No new reports per nursing to me    Review of systems:  As above      Vital signs/Intake and Output:  Visit Vitals  /64   Pulse 70   Temp 98 °F (36.7 °C) (Oral)   Resp 16   Ht 1.727 m (5' 8\")   Wt 76.7 kg (169 lb 1.5 oz)   SpO2 (!) 89%   BMI 25.71 kg/m²     Current Shift:  No intake/output data recorded.  Last three shifts:  07/02 1901 - 07/04 0700  In: 5162.5 [I.V.:3548.7]  Out: 2727 [Urine:2727]    Exam:    General: well dev WF intubated NAD  CVS:Regular rate and rhythm, no M/R/G, S1/S2 heard, no thrill  Lungs:Clear to auscultation bilaterally, no wheezes, rhonchi, or rales  Abdomen: Soft, Nontender, No distention, Normal Bowel sounds  Extremities: No C/C/E, 
    Hospitalist Progress Note    Patient: Eryn Howell MRN: [id number]  CSN: [id number]    YOB: 1955  Age: 68 y.o.  Sex: female    DOA: 7/2/2024 LOS:  LOS: 5 days          Chief Complaint:    Respiratory failure      Assessment/Plan     Acute respiratory failure, requiring intubation.  Severe COPD exacerbation with anxiety  SBT planned  7/6: Patient failed SBT due to tachypnea.  Patient is currently intubated and sedated.  Discussed with family at bedside.  They understand patient cannot be safely extubated today.  7/7: Patient failed SBT today due to hypoxemia.  Discussed with intensivist: Repeat echocardiogram tomorrow.     Continue scheduled nebs  IV solumedrol  IV abx empirically-doxycycline and vancomycin     Blood cx called as + - may be contaminant     PCP positive on UDS     Chronic opiate and benzo use-continue klonopin and percocet for now  Zyprexa for agitation  On imipramine  7/6: Patient is currently on Percocet 3 times a day, clonazepam 2 mg 3 times daily, Zyprexa twice daily, Atarax 25 mg nightly X, imipramine nightly, Wellbutrin 100 mg 3 times a day.     PTSD     Hypotension resolved     SSI for hyperglycemia with steroids     Ams like due to hypercapnia   Ct head :No acute intracranial abnormality on 7/2      Nutritional support-tube feedings    Follow with intensivist     Full code      Disposition :rehab  Active Hospital Problems    Diagnosis     Chronic prescription opiate use [Z79.891]     Chronic prescription benzodiazepine use [Z79.899]     Acute respiratory failure with hypercapnia (HCC) [J96.02]     Leucocytosis [D72.829]     Acute respiratory failure (HCC) [J96.00]     PTSD (post-traumatic stress disorder) [F43.10]     COPD with acute exacerbation (HCC) [J44.1]        Subjective:  Patient is intubated and sedated.  Awake.  Denies for pain.  Patient failed SBT.  No new issues otherwise per nursing.    Vital signs/Intake and Output:  Visit Vitals  BP (!) 143/78   Pulse 91 
    Hospitalist Progress Note    Patient: Eryn Howell MRN: [id number]  CSN: [id number]    YOB: 1955  Age: 68 y.o.  Sex: female    DOA: 7/2/2024 LOS:  LOS: 12 days          Chief Complaint:    Resp failure      Assessment/Plan       Acute respiratory failure, requiring intubation.  Severe COPD exacerbation with anxiety  PTSD  weakness     Extubated now since 7/8     Continue scheduled nebs  Now on oral prednisone  CPAP at night    PT working with her    Encouraged oral hydration and PO intake     Chronic opiate and benzo use-continue klonopin and percocet for now  Continue current medication plan  7/14: Will go down on steroid and monitor.  Will also add Robitussin liquid as needed.     PTSD stable     Ams like due to hypercapnia resolved  Ct head :No acute intracranial abnormality on 7/2      Hyperglycemia with steroids, monitor off lantus as off solumedrol now  7/14: Will add low-dose Lantus and continue insulin sliding scale    Leukocytosis-monitor, repeat CBC  7/14: Resolved.    Constipation:   7/13:Patient will be given Dulcolax suppository x 1  7/14: Had a bowel movement yesterday.     Hypokalemia-recheck BMP     DVT prophylaxis     PT, ambulation  D/c planning  D/w pt and     Total time greater than 50 minutes    Disposition : Can be discharged to SNF tomorrow if bed is found.  Discussed with  on call.    Active Hospital Problems    Diagnosis     Anxiety [F41.9]     Chronic prescription opiate use [Z79.891]     Chronic prescription benzodiazepine use [Z79.899]     Acute respiratory failure with hypoxia and hypercapnia (HCC) [J96.01, J96.02]     Leucocytosis [D72.829]     Acute respiratory failure (HCC) [J96.00]     PTSD (post-traumatic stress disorder) [F43.10]     COPD with acute exacerbation (HCC) [J44.1]        Subjective:    Patient was seen in presence of her .  Patient is feeling better.  She is sitting up in a recliner.  Has significant anxiety.  Dyspnea on 
    Hospitalist Progress Note    Patient: Eryn Howell MRN: [id number]  CSN: [id number]    YOB: 1955  Age: 68 y.o.  Sex: female    DOA: 7/2/2024 LOS:  LOS: 7 days          Chief Complaint:    Resp failure      Assessment/Plan     Acute respiratory failure, requiring intubation.  Severe COPD exacerbation with anxiety     Extubated now since 7/8     Continue scheduled nebs  IV solumedrol  Precedex gtt     PCP positive on UDS     Chronic opiate and benzo use-continue klonopin and percocet for now     Patient is currently on Percocet 3 times a day, clonazepam 2 mg 3 times daily, Zyprexa twice daily, Atarax 25 mg nightly X, imipramine nightly, Wellbutrin 100 mg 3 times a day.     PTSD     Ams like due to hypercapnia   Ct head :No acute intracranial abnormality on 7/2     Diabetic diet     Lantus low dose     DVT prophylaxis     Full code      Disposition :  Active Hospital Problems    Diagnosis     Anxiety [F41.9]     Chronic prescription opiate use [Z79.891]     Chronic prescription benzodiazepine use [Z79.899]     Acute respiratory failure with hypoxia and hypercapnia (HCC) [J96.01, J96.02]     Leucocytosis [D72.829]     Acute respiratory failure (HCC) [J96.00]     PTSD (post-traumatic stress disorder) [F43.10]     COPD with acute exacerbation (HCC) [J44.1]        Subjective:    She is surrounded by family this am  No nursing reports to me  Extibated and on NC 02    Review of systems:    As above      Vital signs/Intake and Output:  Visit Vitals  BP (!) 169/65   Pulse 72   Temp 98.3 °F (36.8 °C) (Oral)   Resp 15   Ht 1.727 m (5' 8\")   Wt 73.8 kg (162 lb 11.2 oz)   SpO2 100%   BMI 24.74 kg/m²     Current Shift:  No intake/output data recorded.  Last three shifts:  07/07 1901 - 07/09 0700  In: 1197.5 [I.V.:917.5]  Out: 2642 [Urine:2642]    Exam:    She is alert and talking  Family surrounds bed this am, will defer exam    She is not clear to come out of ICU yet    Defer to intensivist    Will follow 
    Hospitalist Progress Note    Patient: Eryn Howell MRN: [id number]  CSN: [id number]    YOB: 1955  Age: 68 y.o.  Sex: female    DOA: 7/2/2024 LOS:  LOS: 6 days          Chief Complaint:    Ac resp failure      Assessment/Plan       Acute respiratory failure, requiring intubation.  Severe COPD exacerbation with anxiety    Extubated this afternoon after passing SB trials     Continue scheduled nebs  IV solumedrol    PCP positive on UDS     Chronic opiate and benzo use-continue klonopin and percocet for now    Patient is currently on Percocet 3 times a day, clonazepam 2 mg 3 times daily, Zyprexa twice daily, Atarax 25 mg nightly X, imipramine nightly, Wellbutrin 100 mg 3 times a day.     PTSD     SSI for hyperglycemia with steroids  lantus     Ams like due to hypercapnia   Ct head :No acute intracranial abnormality on 7/2      PO will be at recs as per speech when safe    Precedex gtt    Steroids IV    Lantus low dose    DVT prophylaxis     Full code  Disposition :  Active Hospital Problems    Diagnosis     Chronic prescription opiate use [Z79.891]     Chronic prescription benzodiazepine use [Z79.899]     Acute respiratory failure with hypercapnia (HCC) [J96.02]     Leucocytosis [D72.829]     Acute respiratory failure (HCC) [J96.00]     PTSD (post-traumatic stress disorder) [F43.10]     COPD with acute exacerbation (HCC) [J44.1]        Subjective:    Intubated this am but doing breathing trials  Resp therapy and daughter at bedside    Review of systems:    Limited this am per condition      Vital signs/Intake and Output:  Visit Vitals  BP (!) 118/93   Pulse 77   Temp 98.3 °F (36.8 °C) (Axillary)   Resp 15   Ht 1.727 m (5' 8\")   Wt 82.1 kg (181 lb)   SpO2 96%   BMI 27.52 kg/m²     Current Shift:  No intake/output data recorded.  Last three shifts:  07/06 1901 - 07/08 0700  In: 2239.9 [I.V.:1243.3]  Out: 2955 [Urine:2950]    Exam:    General: intubated female NAD  CVS:Regular rate and rhythm, no 
    Hospitalist Progress Note    Patient: Eryn Howell MRN: [id number]  CSN: [id number]    YOB: 1955  Age: 68 y.o.  Sex: female    DOA: 7/2/2024 LOS:  LOS: 11 days          Chief Complaint:    Resp failure      Assessment/Plan       Acute respiratory failure, requiring intubation.  Severe COPD exacerbation with anxiety  PTSD  weakness     Extubated now since 7/8     Continue scheduled nebs  Now on oral prednisone  CPAP at night    PT working with her    Encouraged oral hydration and PO intake     Chronic opiate and benzo use-continue klonopin and percocet for now  Continue current medication plan     PTSD stable     Ams like due to hypercapnia resolved  Ct head :No acute intracranial abnormality on 7/2      Hyperglycemia with steroids, monitor off lantus as off solumedrol now    Leukocytosis-monitor, repeat CBC    Constipation: Patient will be given Dulcolax suppository x 1     Hypokalemia-recheck BMP     DVT prophylaxis     PT, ambulation  D/c planning  D/w pt and       Disposition :N  Active Hospital Problems    Diagnosis     Anxiety [F41.9]     Chronic prescription opiate use [Z79.891]     Chronic prescription benzodiazepine use [Z79.899]     Acute respiratory failure with hypoxia and hypercapnia (HCC) [J96.01, J96.02]     Leucocytosis [D72.829]     Acute respiratory failure (HCC) [J96.00]     PTSD (post-traumatic stress disorder) [F43.10]     COPD with acute exacerbation (HCC) [J44.1]        Subjective:  Patient was seen in presence of her .  Patient is feeling better.  Continues to be on nasal cannula.  Dyspnea on exertion present.  Intermittent cough present.  Denies any chest or abdominal pain.  No bowel movement yet.        Vital signs/Intake and Output:  Visit Vitals  BP (!) 148/80   Pulse 99   Temp 97.7 °F (36.5 °C) (Oral)   Resp 18   Ht 1.727 m (5' 8\")   Wt 73.8 kg (162 lb 11.2 oz)   SpO2 100%   BMI 24.74 kg/m²     Current Shift:  No intake/output data recorded.  Last 
    Hospitalist Progress Note    Patient: Eryn Howell MRN: [id number]  CSN: [id number]    YOB: 1955  Age: 68 y.o.  Sex: female    DOA: 7/2/2024 LOS:  LOS: 14 days            Updated discharge summary, updated meds and verified with her     Printed new rx necessary for her d/c to SNF    Discharge for SNF and bipap at night arranged  
    Hospitalist Progress Note    Patient: Eryn Howell MRN: [id number]  CSN: [id number]    YOB: 1955  Age: 68 y.o.  Sex: female    DOA: 7/2/2024 LOS:  LOS: 8 days          Chief Complaint:    Resp failure      Assessment/Plan     Acute respiratory failure, requiring intubation.  Severe COPD exacerbation with anxiety     Extubated now since 7/8     Continue scheduled nebs  IV solumedrol  Precedex gtt stopped yesterday     PCP positive on UDS     Chronic opiate and benzo use-continue klonopin and percocet for now     Patient is currently on Percocet 3 times a day, clonazepam 2 mg 3 times daily, Zyprexa twice daily, Atarax 25 mg nightly X, imipramine nightly, Wellbutrin 100 mg 3 times a day.     PTSD     Ams like due to hypercapnia   Ct head :No acute intracranial abnormality on 7/2     Diabetic diet     Lantus low dose     DVT prophylaxis     Full code    Meatonin prn   PT OT    Disposition :  Active Hospital Problems    Diagnosis     Anxiety [F41.9]     Chronic prescription opiate use [Z79.891]     Chronic prescription benzodiazepine use [Z79.899]     Acute respiratory failure with hypoxia and hypercapnia (HCC) [J96.01, J96.02]     Leucocytosis [D72.829]     Acute respiratory failure (HCC) [J96.00]     PTSD (post-traumatic stress disorder) [F43.10]     COPD with acute exacerbation (HCC) [J44.1]        Subjective:    Review of systems:    As above      Vital signs/Intake and Output:  Visit Vitals  /81   Pulse 93   Temp 97.9 °F (36.6 °C)   Resp (!) 31   Ht 1.727 m (5' 8\")   Wt 73.8 kg (162 lb 11.2 oz)   SpO2 98%   BMI 24.74 kg/m²     Current Shift:  No intake/output data recorded.  Last three shifts:  07/08 1901 - 07/10 0700  In: 764.1 [P.O.:210; I.V.:554.1]  Out: 3067 [Urine:3067]    Exam:    She is alert and talking  Family surrounds bed this am, will defer exam    She is not clear to come out of ICU yet    Defer to intensivist    Will follow along    Labs: Results:       Chemistry Recent Labs 
    Hospitalist Progress Note    Patient: Eryn Howell MRN: [id number]  CSN: [id number]    YOB: 1955  Age: 68 y.o.  Sex: female    DOA: 7/2/2024 LOS:  LOS: 3 days          Chief Complaint:    Respiratory failure      Assessment/Plan     Acute respiratory failure, requiring intubation.  Severe COPD exacerbation with anxiety  SBT planned     Continue scheduled nebs  IV solumedrol  IV abx empirically-doxycycline and vancomycin     Blood cx called as + - may be contaminant     PCP positive on UDS     Chronic opiate and benzo use-continue klonopin and percocet for now  Zyprexa for agitation  On imipramine     PTSD     Hypotension resolved     SSI for hyperglycemia with steroids     Ams like due to hypercapnia   Ct head :No acute intracranial abnormality on 7/2      Nutritional support-tube feedings    Follow with intensivist     Full code      Disposition :rehab  Active Hospital Problems    Diagnosis     Chronic prescription opiate use [Z79.891]     Chronic prescription benzodiazepine use [Z79.899]     Acute respiratory failure with hypercapnia (HCC) [J96.02]     Leucocytosis [D72.829]     Acute respiratory failure (HCC) [J96.00]     PTSD (post-traumatic stress disorder) [F43.10]     COPD with acute exacerbation (HCC) [J44.1]        Subjective:  She is intubated and sedate this am      Review of systems:    She is not awake to give ROS  No nursing issues reported      Vital signs/Intake and Output:  Visit Vitals  BP (!) 145/78   Pulse 84   Temp 98.4 °F (36.9 °C) (Oral)   Resp 19   Ht 1.727 m (5' 8\")   Wt 76.7 kg (169 lb)   SpO2 92%   BMI 25.70 kg/m²     Current Shift:  07/05 0701 - 07/05 1900  In: 230   Out: 200 [Urine:200]  Last three shifts:  07/03 1901 - 07/05 0700  In: 3669.2 [I.V.:1857.2]  Out: 2517 [Urine:2517]    Exam:    General: sedate intubated WF NAD  CVS:Regular rate and rhythm, no M/R/G, S1/S2 heard, no thrill  Lungs:Clear to auscultation bilaterally, no wheezes, rhonchi, or rales  Abdomen: 
    Hospitalist Progress Note    Patient: Eryn Hwoell MRN: [id number]  CSN: [id number]    YOB: 1955  Age: 68 y.o.  Sex: female    DOA: 7/2/2024 LOS:  LOS: 13 days          Chief Complaint:    Resp failure      Assessment/Plan       Acute respiratory failure, requiring intubation.  Severe COPD exacerbation with anxiety  PTSD  weakness     Extubated now since 7/8     Continue scheduled nebs  Now on oral prednisone  CPAP at night  7/15: Patient can use skilled nursing home BiPAP when she gets there.  Spoke to pulmonology on-call to discuss with patient's family member about home BiPAP/CPAP set up and the process.    PT working with her    Encouraged oral hydration and PO intake     Chronic opiate and benzo use-continue klonopin and percocet for now  Continue current medication plan  7/14: Will go down on steroid and monitor.  Will also add Robitussin liquid as needed.     PTSD stable     Ams like due to hypercapnia resolved  Ct head :No acute intracranial abnormality on 7/2      Hyperglycemia with steroids, monitor off lantus as off solumedrol now  7/14: Will add low-dose Lantus and continue insulin sliding scale    Leukocytosis-monitor, repeat CBC  7/14: Resolved.    Constipation:   7/13:Patient will be given Dulcolax suppository x 1  7/14: Had a bowel movement yesterday.     Hypokalemia-recheck BMP     DVT prophylaxis     PT, ambulation  D/c planning  D/w pt and     Total time greater than 50 minutes    Disposition : Can be discharged to SNF tomorrow if bed is found.  Discussed with     Active Hospital Problems    Diagnosis     Anxiety [F41.9]     Chronic prescription opiate use [Z79.891]     Chronic prescription benzodiazepine use [Z79.899]     Acute respiratory failure with hypoxia and hypercapnia (HCC) [J96.01, J96.02]     Leucocytosis [D72.829]     Acute respiratory failure (HCC) [J96.00]     PTSD (post-traumatic stress disorder) [F43.10]     COPD with acute exacerbation (HCC) 
  Hospitalist Progress Note-critical care note     Patient: Eryn Howell MRN: [id number]  CSN: [id number]    YOB: 1955  Age: 68 y.o.  Sex: female    DOA: 7/2/2024 LOS:  LOS: 1 day            Chief complaint: acute respiratory failure with hypercapnia , copd exacerbation       Assessment/Plan         Active Hospital Problems    Diagnosis Date Noted    Acute respiratory failure with hypercapnia (HCC) [J96.02] 07/02/2024    Leucocytosis [D72.829] 07/02/2024    Acute respiratory failure (HCC) [J96.00] 07/02/2024    PTSD (post-traumatic stress disorder) [F43.10] 03/09/2017    COPD with acute exacerbation (HCC) [J44.1] 03/06/2017      Resp   Acute respiratory failure, requiring intubation.  Cta no PE, extensive emphysema   Vent managed per intensivist   Due to cope exacerbation   Continue breathing tx , wean off vent as tolerated     Chronic obstructive pulmonary disease exacerbation.  On iv steroid breathing tx and iv abx for empiric tx     Cvs   Hypotension like due to sedation   Received iv fluid and albumin   Levophed per iv to keep MAP>65     ID pan cx so pending will f/u covid and flu negative    Continue f/u with the cx     Renal continue monitoring urine out pt replace electrolytes as needed     Heme f/u hh  leukocytosis -improving     Endo will put ssi for glucose     Neuro on sedation   Ams like due to hypercapnia   Ct head :No acute intracranial abnormality on 7/2     Psych ptsd need appreciated psych expertise -will call back after extubation     Gi ppi     30 minutes of critical care time spent in the direct evaluation and treatment of this high risk patient. The reason for providing this level of medical care for this critically ill patient was due a critical illness that impaired one or more vital organ systems such that there was a high probability of imminent or life threatening deterioration in the patients condition. This care involved high complexity decision making to assess, 
  Physical Therapy Goals:  Initiated 7/13/2024 to be met within 7-10 days.  Short Term Goals  Short Term Goal 1: Patient will perform supine to/from sit with supervision.  Short Term Goal 2: Patient will perform sit to/from stand with S/RW in prep for ambulation activity.  Short Term Goal 3: Patient will perform ambulation 50 ft with S/RW for increased functional mobility.  Short Term Goal 4: Patient will perform step nego x3-5 with min/CGA and HR for home re-entry.    []  Patient has met MD omar serrano for d/c home   []  Recommend HH with 24 hour adult care   [x]  Benefit from additional acute PT session to address:  rehab placement      PHYSICAL THERAPY TREATMENT    Patient: Eryn Howell (68 y.o. female)  Date: 7/13/2024  Diagnosis: Acute respiratory failure (HCC) [J96.00] Acute respiratory failure (HCC)      Precautions: Fall Risk,  ,  ,  ,  ,  ,  ,    PLOF: severe anxiety    ASSESSMENT:  Assessment  Assessment: Pt in bed upon arrival.  Reports needing to have a BM.  Log roll performed and modA to sit up EOB.  Difficulty obtaining SpO2, L hand 76%, R hand 99%.  Kept Nasal cannula on to assist with anxiety. ModA for sit to stand.  1st standing attempt the pt immediately sat back down.  2nd attempt pt able to take steps with RW, modA, constant VC for technique, RW mgmt.  Pt tolerated 5 ft the suddenly became to weak to get to the bathroom,maxA for 2 more ft of ambulation then dependently placed pt on EOB.  Pt could no longer stand up, pt supported on therapist thigh, spouse assisted with moving pt feet in an attempt to get back to the EOB before dependent TF.  Retrieved nursing assistance to get pt on bed pan.  Call bell left with pt and spouse present.  Rehab placement is highly recommended.  Activity Tolerance: Patient limited by fatigue;Patient limited by endurance;Other (comment) (fear/anxiety)    Progression toward goals:   []      Improving appropriately and progressing toward goals  [x]      
  Physician Progress Note      PATIENT:               ADAM FAM  [facility] #:                  [id number]  :                       1955  ADMIT DATE:       2024 3:01 PM  DISCH DATE:  RESPONDING  PROVIDER #:        Joelle Dorado MD          QUERY TEXT:    Good Morning.    This patient admitted for Severe COPD.    On 2024- Pulmonary progress notes \"UTI on admission Finish ceftriaxone.\"    In order to support the diagnosis of UTI, please include additional clinical   indicators in your documentation.  Or please document if the diagnosis of UTI  has been ruled out after further   study.    The medical record reflects the following:  Risk Factors: Female, Respiratory failure, COPD, AMS  Clinical Indicators: On 2024 progress notes from pulmonary \"UTI on   admission, Thai ceftriaxone, Ceftriaxone and doxy 7 days and stop. D/C   azihtro. No signs of Sepsis only UTI.  UA on 2024- Trace protein, Few   bacteria, Trace Leuk Est. No urine cx sent  Treatment: UA, On Ceftriazone and doxy 7 days and then stopped. No urine cx.    Thank you  LEXI GalarzaN,RN, CPHQ, CCDS, SMART  Options provided:  -- UTI is  present as evidenced by, Please document evidence.  -- UTI was ruled out  -- Other - I will add my own diagnosis  -- Disagree - Not applicable / Not valid  -- Disagree - Clinically unable to determine / Unknown  -- Refer to Clinical Documentation Reviewer    PROVIDER RESPONSE TEXT:    UTI was ruled out after study.    Query created by: Chacha Lawler on 2024 7:34 AM      Electronically signed by:  Joelle Dorado MD 2024 3:18 PM          
 conducted a follow up visit with Eryn Howell, who is a 68 y.o.,female.      Patient is still intubated but no longer contact. Met with family her  and daughter.  They wanted prayer for her especially the daughter and  told them that a    was coming later to give her the Sacraments and he did later on.    Continued the relationship of care and support.   Listened empathically to the family.  Offered prayer and assurance of continued prayer on patient's behalf.   Chart reviewed.  Family expressed gratitude for Spiritual Care visit.    Chaplains will continue to follow and will provide pastoral care as needed or requested.   recommends bedside caregivers page the  on duty if patient shows signs of acute spiritual or emotional distress.     Sister Anne Caba MA, [facility]     Spiritual Care  [phone]   
 conducted an initial consultation and spiritual assessment for Eryn Howell, who is a 68 y.o.,female.     Patient is vented and not responsive to me.  and daughter were at the bedside.  They are both very worried about her and how her PTSD and anxiety are impacting her health.  Patient is [employer] and her family requested SOS for her on Friday when Fr Quinones is here.  Left a message for Fr Quinones.    Initiated a relationship of care and support.   Explored issues of taryn, belief, spirituality and Lutheran/ritual needs.  Listened empathically.  Provided information about Spiritual Care Services.   confirmed Patient's [employer] Affiliation.  Offered assurance of continued prayers on patients behalf.   Chart reviewed.  Family expressed gratitude for Spiritual Care visit.  Patient was reviewed in ICU Interdisciplinary Rounds.     Chaplains will continue to follow and will provide pastoral care as needed or requested.    recommends bedside caregivers page  on duty if patient shows signs of acute spiritual or emotional distress.    Chaplain Linh Yun M.Div.  Board Certified   [phone] - Office  
-Had a very pleasant conversation with the patient and spouse.  -They expressed their frustration with case management not following up with them before leaving for the day. Per patient's spouse, he was told by case management that the process with their insurance could take an hour or a day. He waited to hear something back from case management, \"as a courtesy,\" and did not receive an update. He made a call and was told this process could take up to 2 weeks and that it should have been started earlier than it has.  -The patient and spouse also let me know they talked with Dr. Paredes earlier and discussed possibly leaving tomorrow with home care options for rehab if nothing is heard back.  
1600 breathing tx held.  Pt is very anxious at this time, discussed with pt's  and agreed to hold at this time.    Pt or pt's  will call for tx if needed.  
Attempted to see Ms. Howell for psych consult, patient is intubated, unable to do a Psych eval now. her daughter is present in the room.  Chart review note Pt has h/0 PTSD, anxiety, She was taking Klonopin 1mg TID as outpatient  (pt follows up at \[facility] Psych[facility]ry services in [place])  it has marla restarted, which can help with avoiding Benzo withdrawals.  For agitation management, Olanzapine 2.5mg is started, Antipsychotics may provide a limited benefit only.  if needed can try dose increase up to 5mg BID . For severe agitation, can try Ziprasidone IM 10mg QID PRN for brief period, monitoring Qtc.    After patient is extubated and able to talk well, if further Pscyh eval is needed, please contact on call psychiatrist.    Todd Chavez MD   Psychiatrist  
Auth has been stated for [facility]. Bi-Pap machine has been ordered for facility use. Patient and spouse updated. SW to follow.   
Bi-pap to be delivered to [facility] today. Auth remains pending with [employer] at this time. SW to follow.   
CM received call from PT that patient and spouse are interested in rehab now. CM m=and PT met with patient and spouse at bedside. CM offered FOC and patient and spouse agreed to referrals to all area SNFs. The patient's preferred facilities are [facility], [facility] and [facility] and the spouse plans to visit the facilties this weekend.   
Called for positive  blood cultures  Unable to ID gram stain  
Called for positive cultures  GPC after difficult ID  On rocephin and doxy    
Daughter at bedside during the night, pt refused 12 AM and 4 AM VS, educated pt on importance of VS  
Daughter of patient concerned about her mother's mentality due to confusion/delirium from lack of sleep. She feels like the BiPAP makes it harder to rest well. I spoke with them both and the patient agreed to wear the machine tonight.  
Decrease the AC Rate from 18 to 16.  
Extubated Pt and placed on 4L NC   
ICU    Patient doing well on SBT with pressure support of 7 and PEEP 5  Stable oxygenation  Stable mentation; awake and following simple commands  Cuff leak present  RSBI 60s; but patient able to improve RSBI even more    Plan for trial of extubation  BiPAP as needed postextubation    Teddy Pagan MD    
In-Patient Recommendation for Pulmonary Rehab post Hosital Discharge:    This patient has been identified as possibly benefiting from attending Outpatient Pulmonary Rehabilitation after discharge from hospital.  This recommendation is based on the following diagnosis found in their chart:    Acute on chronic respiratory failure with hypoxia and hypercapnia  Acute COPD exacerbation      Pt was given information on benefits of Pulmonary Rehab    If you feel that patient is appropriate and would benefit, please send a referral to Pulmonary Rehab prior to discharge.    Ivette Godinez RRT  Outpatient Cardio Pulmonary Rehabilitation   (P) [phone]  (F) [phone]        
[facility]   Pharmacy Pharmacokinetic Monitoring Service - Vancomycin     Eryn Howell is a 68 y.o. female starting on vancomycin therapy for Bloodstream Infection. Pharmacy consulted by Dr. Moffett for monitoring and adjustment.    Target Concentration: Goal AUC/IRIS 400-600 mg*hr/L    Additional Antimicrobials: Doxycycline    Pertinent Laboratory Values:   Wt Readings from Last 1 Encounters:   07/04/24 76.7 kg (169 lb 1.5 oz)     Temp Readings from Last 1 Encounters:   07/05/24 98.6 °F (37 °C) (Oral)     Estimated Creatinine Clearance: 72 mL/min (based on SCr of 0.82 mg/dL).  Recent Labs     07/04/24  0627 07/05/24  0444   CREATININE 0.80 0.82   BUN 20* 29*   WBC 10.1 8.9       Plan:  Dosing recommendations based on Bayesian software  Start Vancomycin 1500 mg, then 1000 mg IV q12hrs  Anticipated AUC of 583 mg/l.hr and Trough concentration of 17.4 mg/l at steady state  Renal labs as indicated   Pharmacy will continue to monitor patient and adjust therapy as indicated    ARUNA COLUNGA RPH, BCPS  7/5/2024 7:22 AM   
Leak test done Pt has a good leak. Replaced water for vent  
Nutrition Assessment     Type and Reason for Visit: Reassess    Nutrition Recommendations/Plan:   Continue current diet order  Monitor PO intake, weights, labs and plan of care while admitted     Malnutrition Assessment:  Malnutrition Status: Mild malnutrition (less than 50% of energy needs met for 5 or more days and fluid accumulation)    Nutrition Assessment:  Pt extubated 7/8/24 and advanced to PO diet. Previously order for enteral nutrition recs.    Estimated Daily Nutrient Needs:  Energy (kcal):  ~1500 Weight Used for Energy Requirements: Admission     Protein (g):  77-92 Weight Used for Protein Requirements: Admission        Fluid (ml/day):    Method Used for Fluid Requirements: 1 ml/kcal    Nutrition Related Findings:   Non-pitting generalized edema. Labs and meds reviewed Wound Type: None    Current Nutrition Therapies:    ADULT DIET; Regular  ADULT ORAL NUTRITION SUPPLEMENT; Breakfast, Lunch, Dinner; Standard High Calorie/High Protein Oral Supplement    Anthropometric Measures:  Height: 172.7 cm (5' 8\")  Current Body Wt: 73.5 kg (162 lb)   BMI: 24.6    Nutrition Diagnosis:   Inadequate protein-energy intake related to acute injury/trauma as evidenced by intubation (recently extubated)    Nutrition Interventions:   Food and/or Nutrient Delivery: Continue Current Diet  Nutrition Education/Counseling: No recommendation at this time  Coordination of Nutrition Care: Continue to monitor while inpatient       Goals:  Previous Goal Met: Progressing toward Goal(s)  Goals: Meet at least 75% of estimated needs, prior to discharge       Nutrition Monitoring and Evaluation:   Behavioral-Environmental Outcomes: None Identified  Food/Nutrient Intake Outcomes: Enteral Nutrition Intake/Tolerance  Physical Signs/Symptoms Outcomes: Biochemical Data, Nutrition Focused Physical Findings, Meal Time Behavior    Discharge Planning:    Too soon to determine     Annie Brower RD  Contact: [phone]    
PHYSICAL THERAPY TREATMENT    Patient: Eryn Howell (68 y.o. female)  Date: 7/12/2024  Diagnosis: Acute respiratory failure (HCC) [J96.00] Acute respiratory failure (HCC)      Precautions: Fall Risk,  ,  ,  ,  ,  ,  ,      ASSESSMENT:  Pt found in good spirits, with  at bedside. Although able to increase to ambulation to/from restroom, pt gradually declined to being unable to stand. Pt's LE coordination, ability to reach erect posture and SOB are all exacerbated by anxiety. Placment was initially refused, but is now being reconsidered after attempt to have  assitin with transfersto simulator home needs. D/w Shelby, RN and CM.will comntinue with POC     Progression toward goals: Fair-  []      Improving appropriately and progressing toward goals  [x]      Improving slowly and progressing toward goals  []      Not making progress toward goals and plan of care will be adjusted     PLAN:  Patient continues to benefit from skilled intervention to address the above impairments.  Continue treatment per established plan of care.    Further Equipment Recommendations for Discharge: gait belt, rolling walker, and wheelchair      AMPAC:   AM-PAC Inpatient Mobility without Stair Climbing Raw Score : 11    Current research shows that an AM-PAC score of 17 (13 without stairs) or less is not associated with a discharge to the patient's home setting. Based on an AM-PAC score and their current functional mobility deficits, it is recommended that the patient have 3-5 sessions per week of Physical Therapy at d/c to increase the patient's independence.     This AMPAC score should be considered in conjunction with interdisciplinary team recommendations to determine the most appropriate discharge setting. Patient's social support, diagnosis, medical stability, and prior level of function should also be taken into consideration.     SUBJECTIVE:   Patient stated, \"I got weak so fast .\"    OBJECTIVE DATA SUMMARY:   Functional 
PHYSICAL THERAPY TREATMENT    Patient: Eryn Howell (68 y.o. female)  Date: 7/14/2024  Diagnosis: Acute respiratory failure (HCC) [J96.00] Acute respiratory failure (HCC)      Precautions: Fall Risk,  ,  ,  ,  ,  ,  ,      ASSESSMENT:  Pt found sitting up in bed, conversing with  and son. Bot note pt having been EOB and OOB numerous times today. Pt is agreeable to additional activity. Session used to work toward standing and ambulation endurance 4 standing trials, with marches and timed static standing (2min). After session. Pt reports needed to use restroom. Provided stand pivot transfer to/from [facility] for safety, as pt's strength had depleted. Placment is still suggested, due to ongoing fluctuations in function ability;      Progression toward goals: Fair   [x]      Improving appropriately and progressing toward goals  []      Improving slowly and progressing toward goals  []      Not making progress toward goals and plan of care will be adjusted     PLAN:  Patient continues to benefit from skilled intervention to address the above impairments.  Continue treatment per established plan of care.    Further Equipment Recommendations for Discharge: gait belt and rolling walker    AMPAC:   AM-PAC Inpatient Mobility without Stair Climbing Raw Score : 11    Current research shows that an AM-PAC score of 17 (13 without stairs) or less is not associated with a discharge to the patient's home setting. Based on an AM-PAC score and their current functional mobility deficits, it is recommended that the patient have 3-5 sessions per week of Physical Therapy at d/c to increase the patient's independence.     This AMPAC score should be considered in conjunction with interdisciplinary team recommendations to determine the most appropriate discharge setting. Patient's social support, diagnosis, medical stability, and prior level of function should also be taken into consideration.     SUBJECTIVE:   Patient stated, \"What do 
Packaging for prescription fluconazole 150 mg found in pt room. When questioned, pt states that she took the pill this morning for thrush.   
Patient agreeable to SNF at this time. Patient would like [facility] at this time. SW reached out to [facility], bed available and clinically accepted. Awaiting bi-pap rental from [facility] ( SNF reaching out to see if they are in network with [facility] ). Auth to be started once DME has been ordered. SW reached out to [employer] regarding NIV needed per notes. SW faxed over clinical notes to [phone] for review. [facility] to see if they are able to do contract with [facility]. Spouse at bedside. SW to update accordingly. SW to follow.   
Patient had bipap on at night for a couple of minutes and asked respiratory therapist to take it off,   She refused iv solumedrol, she says it make her not sleep.    0330: RN educated patient the need to have bipap on and accepted to have it.  Respiratory therapist called to put bipap on, pt had it on for about an hour and 30 minutes.    0535; patient resp 28, using accessaory muscles to support breathing, but pt denies short of breath, hospitalist notified new orders given.  
Patient had shallow respiration, using accessory muscles to support breathing, she had some expiratory wheezing, she never accepted using the cpap all night. Respiratory therapist notified, patient put on cpap (nasal mask)  
Patient placed on bipap for the night  
Patient very agitated, biting ET tube hard time weaning off Versed and fentanyl drip I will increase oral medication including Zyprexa and prece dex goal is SBT  Patient is following commands on Versed 5 and Fentanyl 75  PCP tox was positive but family denied ever using illicit drug [perhaps from cold medications  patient has been on benzos for years but opiates prn  JOSE JUAN Staley MD      
Patient walked from bathroom to bedside chair with physical therapist.  Patient did well but was short of breath and anxious afterwards. Educated patient this was normal feeling after being in bed since 7/2/2024.  Patient in agreement and understood.  
Patient was ordered Clonazepam 1 mg ( total of 2, 0.5 mg tab). Prior to administering, Dr. Staley increased the patient's dose to 2 mg. Initial dose returned to omnicell witnessed by RAUL Morataya. However, upon returning the omnicell only requested to scan one tablet. Two tablets returned. Ultimately, this created a disrcepancy in the count. New dose of 2 mg (total of 4, 0.5 mg tabs) removed and adminstered. This nurse spoke with Estee (pharmacy) to explain situation and discrepancy cleared.   
Patient wore Hospital ResMed BiPAP for approximately 6.5 hours this shift from 22:25 - 0500.    Settings  Mode: AutoPAP  Pmax: 8  Pmin: 4  O2: 2-Lpm  Mask: Med Nasal Mask    
Physical Therapy    1339: Family eager for pt to have PT per nurse, but upon arrival family stated the pt just fell asleep and want her to rest.  The pt was easily aroused and asked to nap, will follow up as schedule permits.    
Physical Therapy Goals:  Initiated 7/11/2024 to be met within 7-10 days.  Short Term Goals  Short Term Goal 1: Patient will perform supine to/from sit with supervision.  Short Term Goal 2: Patient will perform sit to/from stand with S/RW in prep for ambulation activity.  Short Term Goal 3: Patient will perform ambulation 50 ft with S/RW for increased functional mobility.  Short Term Goal 4: Patient will perform step nego x3-5 with min/CGA and HR for home re-entry.  PHYSICAL THERAPY TREATMENT    Patient: Eryn oHwell (68 y.o. female)  Date: 7/11/2024  Diagnosis: Acute respiratory failure (HCC) [J96.00] Acute respiratory failure (HCC)  Precautions: Fall Risk    ASSESSMENT:  Patient motivated to participate with PT. Spouse at bedside and very supportive. Patient performed rolling and supine to sit with SBA. Patient performed seated there ex for LE strengthening; cues to decrease speed and for overall technique. Patient required MaxA for stand transfers. Facilitation at trunk and verbal tactile cues to achieve upright position. Patient tolerated 3 stand transfers requiring MaxA each time and able to tolerate 10-30 seconds. Patient required MaxA for 2 lateral steps with RW. Requires intermittent rest breaks. Recommend IPR as pt independent PLOF and motivated to participate with PT to improve functional mobility.     Progression toward goals:   []      Improving appropriately and progressing toward goals  [x]      Improving slowly and progressing toward goals  []      Not making progress toward goals and plan of care will be adjusted     PLAN:  Patient continues to benefit from skilled intervention to address the above impairments.  Continue treatment per established plan of care.    Further Equipment Recommendations for Discharge:   Yes    Discharge Recommendations: IP Rehab    AMPAC:   AM-PAC Inpatient Mobility without Stair Climbing Raw Score : 12    This AMPA score should be considered in conjunction with 
Physical Therapy Goals:  Initiated 7/15/2024 to be met within 7-10 days.  Short Term Goals  Short Term Goal 1: Patient will perform supine to/from sit with supervision.  Short Term Goal 2: Patient will perform sit to/from stand with S/RW in prep for ambulation activity.  Short Term Goal 3: Patient will perform ambulation 50 ft with S/RW for increased functional mobility.  Short Term Goal 4: Patient will perform step nego x3-5 with min/CGA and HR for home re-entry.  PHYSICAL THERAPY TREATMENT    Patient: Eryn Howell (68 y.o. female)  Date: 7/15/2024  Diagnosis: Acute respiratory failure (HCC) [J96.00] Acute respiratory failure (HCC)  Precautions: Fall Risk    ASSESSMENT:  Patient progressing slowly with mobility. Patient required Jen for transition to sitting EOB. Patient performed stand transfers with ModA intially however with fatigued required up to MaxA for subsequent stands. Patient ambulated 5 ft with RW and ModA for weight shifting and balance. After short sitting rest pt ambulated 5 ft with RW and MaxA. Patient tolerated seated therex sitting EOB for LE and core/postual strengthening. Patient demonstrated good motivated to participate with PT and progress mobility as tolerated.    Progression toward goals:   []      Improving appropriately and progressing toward goals  [x]      Improving slowly and progressing toward goals  []      Not making progress toward goals and plan of care will be adjusted     PLAN:  Patient continues to benefit from skilled intervention to address the above impairments.  Continue treatment per established plan of care.    Further Equipment Recommendations for Discharge:  , Yes (TBD at rehab),      Discharge Recommendations: IP Rehab    AMPA:   AM-PAC Inpatient Mobility without Stair Climbing Raw Score : 11    This AMPA score should be considered in conjunction with interdisciplinary team recommendations to determine the most appropriate discharge setting. Patient's social 
Placed back on previous vent settings Pt tolerate SBT well  
Placed on SBT 7 PS FIO2 40% peep 5    
Placed on SBT 7 PS peep 5 fio2 40%    
Pt initially declining use of ovn CPAP when prompted by therapist at bedtime.  At approximately 0500 on morning of 7/12, RRT called to bedside by RN as per reports of pt presenting with increased WOB/accessory muscle use.  RRT assessing pt and discussing importance of CPAP therapy at this time.  Pt then amicable to device use.  Utilizing NIPPV S/T settings of 8/4/3 LPM O2 bleed via nasal mask c appropriate tolerance.  WOB immediately improving c pt vocalizing improvement in subjective breathing effort.  Continue to monitor and assist with device application at bedtime and during periods of rest.  Continue to provide education regarding importance of compliance with therapy.  
Pt maintained ovn in AC/VC settings of 22/400/60%/+5 and tolerating appropriately w/o issue.  WOB WNL and VSS.  RR increased from to 22 c FiO@ increased to 60% following AM ABG to address hypercarbia/hypoxemia.  OET remains clean/dry/intact/secure and patent.  Continue to monitor and titrate ventilatory settings as clinically indicated.   
Pt placed on Resmed for the night with medium full face mask & o2 @ 4 lpm bleed; pt tolerating well @ this time with all alarms on & functioning  
Pt placed on SBT with settings of 5/5, and FIO2 of 30%  
Pt started on SBT with settings of 5/5, and FIO2 of 40%.  
Pt utilizing ovn Auto-PAP settings of 4-8/3 LPM O2 bleed via nasal mask and tolerating appropriately w/o issue.  WOB WNL and VSS.  Continue to montitor and assist with device application at bedtime and during periods of rest.   
Pt utilizing ovn Auto-PAP therapy settings of 4-8/3 LPM O2 bleed via nasal mask ovn for approximately two hours before moving mask stating \"I have too much anxiety to wear it.\"  Maintained on 3 LPM NC as per HOT and tolerating appropriately.  WOB WNL and VSS.  Continue to monitor and assist with NIPPV application at bedtime and during periods of rest.  
Pt. And family refused vitals because they wanted pt. To sleep at this time.    
[facility]  CB # given should she have any further questions or concerns.  Will verify packet is complete and includes any needed hard Rx's.  
Rt initially placed on bipap & pt wore for 30 min d/t getting night meds & not being sleepy yet; rt then called to place pt back on bipap @ 0330 with pt very anxious & restless; pt placed on resmed with full face mask & all alarms on & functioning with o2 @ 4 lpm bleed in  
SBT started on patient with settings of 5/5, and FIO2 of 35%.  
SBT stopped after ABG was obtained and PO2 was 58. Pt placed back on regular vent settings of AC , RR of 12, peep of 5, FIO2 of 40%.  
SBT stopped after ABG was obtained and po2 was 58. Pt placed back on prior settings of , 16, FIO2 of 40%.  
SBT stopped after patient became tachycardic, WOB increased, and BP increased.   
Shift report received from GRISELDA Alexis RN Report included the following information SBAR, Kardex, ED Summary, Procedure Summary, Intake/Output, MAR, and Recent Results    0800- Shift assessment complete, pt resting comfortably in bed.     0953- Fentanyl stopped for SAT/SBT.    1240- New orders to extubate patient, respiratory therapist. Pt extubated to 4L NC, tolerating well.     1600- Reassessment complete, see MAR  and Flowsheets for more details.     Shift report given to SAI Dsouza RN Report included the following information SBAR, Kardex, ED Summary, Procedure Summary, Intake/Output, MAR, and Recent Results        
Shift report received from SAI Dsouza RN Report included the following information SBAR, Kardex, ED Summary, Procedure Summary, Intake/Output, MAR, and Recent Results    0800- Shift assessment complete   
Shift report received from SAI Dsouza RN Report included the following information SBAR, Kardex, ED Summary, Procedure Summary, Intake/Output, MAR, and Recent Results    0800- Shift assessment complete, pt resting comfortably in bed. Opening eyes spontaneously and following commands appropriately. Dr. Staley at bedside, verbal orders to start weaning sedation for SAT/SBT.     1058- Versed and Fentanyl stopped for SAT/SBT.     1644- PRN Versed given for anxiety.     1600- Reassessment complete, see MAR and Flowsheets for more details.     Shift report given to SAI Dsouza RN Report included the following information SBAR, Kardex, ED Summary, Procedure Summary, Intake/Output, MAR, and Recent Results    
Speech-Language Pathology Treatment Attempt     Chart reviewed. Attempted Speech-Language Pathology Treatment, however, patient unable to be seen due to:  []  Nausea/vomiting  []  Eating  []  Pain  []  Patient too lethargic  []  Off Unit for testing/procedure  []  Dialysis treatment in progress   []  Telemetry Results  [x]  Other: incontinence care in progress. Will follow up as patient's schedule permits.   Kristi Freed M.S., CCC-SLP      
Wasted 600mcg (60ml) of Fentanyl with MICHAELA Lizarraga RN  
Wasted versed 90cc with Deann Reddy RN.  
Wasted versed 90cc with Emily Ghosh  
Weaned the FIO2 x2 from 60% to 40% and weaned the AC Rate x 2 from 22 to 18 BPM.  Will obtain a follow-up ABG.  
32---Blood Cx 1:2 positive Gram negative  Treatment : IC level care , Telemetry, oximetry, CT head , psych consulted,   Zyprexa for agitation, and will cont. klonopin and Percocet for now.    Thank you  GERRY Galarza,RN, CPHQ, CCDS, SMART  Options provided:  -- Metabolic encephalopathy  -- Septic encephalopathy  -- Toxic metabolic encephalopathy  -- Other - I will add my own diagnosis  -- Disagree - Not applicable / Not valid  -- Disagree - Clinically unable to determine / Unknown  -- Refer to Clinical Documentation Reviewer    PROVIDER RESPONSE TEXT:    This patient has metabolic encephalopathy.    Query created by: Chacha Lawler on 7/5/2024 1:54 PM      QUERY TEXT:    Good Afternoon    This patient admitted on for Acute Respiratory failure and required   intubation.    If possible, please document in the progress notes and discharge summary if   you are evaluating and/or treating any of the following:    The medical record reflects the following:  Risk Factors: COPD Severe with anxiety, Chronic opaite and benzo use  Clinical Indicators: H&P notes \"Hypotension\", documented systolic BP in the   80-90s---  Treatment: IVF boluses  IVF NS @ 75cc/hr---IV Albumin, IV pressors (levophed),   IV Zithromax, IV Rocephin, IV Vibramycin, Blood cx      Thank you  GERRY Galarza,RN, CPHQ, CCDS, SMART  Options provided:  -- Septic Shock  -- Hypovolemic Shock  -- Hypovolemia without Shock  -- Hypotension without Shock  -- Other - I will add my own diagnosis  -- Disagree - Not applicable / Not valid  -- Disagree - Clinically unable to determine / Unknown  -- Refer to Clinical Documentation Reviewer    PROVIDER RESPONSE TEXT:    This patient has hypotension without shock.    Query created by: Chacha Lawler on 7/5/2024 2:00 PM      Electronically signed by:  Herber Padilla MD 7/8/2024 1:39 PM          
influenza.  Severity of patient COPD is unknown.    History of severe anxiety    2024 :     Seen in room 343.  Sitting in the chair;  at bedside.  Patient is very anxious but not in acute distress.  O2 NC 3 LPM.  Patient refused CPAP/BiPAP last night; but 5 AM she had respiratory distress and RRT was called; she used BiPAP for couple hours then.  No fever.  Leukocytosis improving.  Denies cough, sputum production, dyspnea at rest, wheezing, leg edema, nausea, vomiting, chest pain.  Complains of TORRES when walking, not associated with wheezing, relieved with rest.  Blood pressure stable.  No other overnight pulmonary issues reported.    Review of Systems:  - No fever or chills  - No chest pain or palpitation  - No hemoptysis  - No vomiting or diarrhea        Allergies   Allergen Reactions    Sulfacetamide Anaphylaxis    Sulfamethoxazole-Trimethoprim Anaphylaxis      Past Medical History:   Diagnosis Date    Arthritis     Asthma     Back pain     Chronic lung disease     COPD (chronic obstructive pulmonary disease) ([facility])     GERD (gastroesophageal reflux disease)     Hypertension     PTSD (post-traumatic stress disorder)     Scarlet fever 1976    age 21    Shingles       Past Surgical History:   Procedure Laterality Date    HYSTERECTOMY (CERVIX STATUS UNKNOWN)      LAMINECTOMY,LUMBAR      ORTHOPEDIC SURGERY Right     thumb re-attachment    TUBAL LIGATION      TYMPANOSTOMY TUBE PLACEMENT        Social History     Tobacco Use    Smoking status: Former     Current packs/day: 0.00     Types: Cigarettes     Quit date: 2020     Years since quittin.1    Smokeless tobacco: Never   Substance Use Topics    Alcohol use: Not Currently      No family history on file.   Prior to Admission medications    Medication Sig Start Date End Date Taking? Authorizing Provider   buPROPion (WELLBUTRIN XL) 300 MG extended release tablet Take 1 tablet by mouth every morning 3/28/24  Yes Provider, MD Zenon 
research shows that an AM-PAC score of 17 or less is not associated with a discharge to the patient's home setting.  Based on an AM-PAC score and their current ADL deficits; it is recommended that the patient have 5-7 sessions per week of Occupational Therapy at d/c to increase the patient's independence.  Currently, this patient demonstrates the potential endurance, and/or tolerance for 3 hours of therapy each day at d/c.    This [facility] score should be considered in conjunction with interdisciplinary team recommendations to determine the most appropriate discharge setting. Patient's social support, diagnosis, medical stability, and prior level of function should also be taken into consideration.     SUBJECTIVE:   Patient stated “I go across the street for PT.”    OBJECTIVE DATA SUMMARY:     Past Medical History:   Diagnosis Date    Arthritis     Asthma     Back pain     Chronic lung disease     COPD (chronic obstructive pulmonary disease) (HCC)     GERD (gastroesophageal reflux disease)     Hypertension     PTSD (post-traumatic stress disorder)     Scarlet fever 1976    age 21    Shingles      Past Surgical History:   Procedure Laterality Date    HYSTERECTOMY (CERVIX STATUS UNKNOWN)      LAMINECTOMY,LUMBAR      ORTHOPEDIC SURGERY Right 2021    thumb re-attachment    TUBAL LIGATION      TYMPANOSTOMY TUBE PLACEMENT       Barriers to Learning/Limitations: emotional, cognitive, and physical  Compensate with: visual, verbal, tactile, kinesthetic cues/model    Home Situation:   Social/Functional History  Lives With: Spouse  Type of Home: House  Home Layout: Two level, 1/2 bath on main level, Bed/Bath upstairs (chair lfit to second floor)  Home Access: Stairs to enter with rails  Entrance Stairs - Number of Steps: 3  Entrance Stairs - Rails: Both  Bathroom Shower/Tub: Walk-in shower  Bathroom Equipment: Built-in shower seat  Home Equipment: Cane, Rollator, Walker - Rolling  ADL Assistance: Independent  Homemaking 
treatment.    After treatment:   []         Patient left in no apparent distress sitting up in chair  [x]         Patient left in no apparent distress in bed  [x]         Call bell left within reach  [x]         Nursing notified  [x]         Caregiver present  []         Bed alarm activated  [x]         SCDs applied    COMMUNICATION/EDUCATION:   Patient Education  Education Given To: Patient;Family  Education Provided: Role of Therapy;Plan of Care  Education Method: Verbal  Barriers to Learning: None  Education Outcome: Verbalized understanding;Demonstrated understanding;Continued education needed    Thank you for this referral.  Sher Patel, PT  Minutes: 38      Eval Complexity: Decision Making: Medium Complexity          
0924    hydrOXYzine HCl (ATARAX) tablet 25 mg  25 mg Oral QHS Minerva Staley MD   25 mg at 07/12/24 2044    buPROPion (WELLBUTRIN) tablet 100 mg  100 mg Oral TID Minerva Staley MD   100 mg at 07/13/24 0924    imipramine (TOFRANIL) tablet 50 mg  50 mg Oral Nightly Minerva Staley MD   50 mg at 07/12/24 2045    hydrOXYzine HCl (ATARAX) tablet 25 mg  25 mg Oral TID PRN Minerva Staley MD        glucose chewable tablet 16 g  4 tablet Oral PRN Irlanda Rosenthal MD        dextrose bolus 10% 125 mL  125 mL IntraVENous PRN Irlanda Rosenthal MD        Or    dextrose bolus 10% 250 mL  250 mL IntraVENous PRN Irlanda Rosenthal MD        glucagon injection 1 mg  1 mg SubCUTAneous PRN Irlanda Rosenthal MD        dextrose 10 % infusion   IntraVENous Continuous PRN Irlanda Rosenthal MD        arformoterol tartrate (BROVANA) nebulizer solution 15 mcg  15 mcg Nebulization BID RT Minerva Staley MD   15 mcg at 07/13/24 0930    budesonide (PULMICORT) nebulizer suspension 500 mcg  0.5 mg Nebulization BID RT Minerva Staley MD   500 mcg at 07/13/24 0930    ipratropium 0.5 mg-albuterol 2.5 mg (DUONEB) nebulizer solution 1 Dose  1 Dose Inhalation 4x Daily RT Minerva Staley MD   1 Dose at 07/13/24 0930    potassium chloride (KLOR-CON M) extended release tablet 40 mEq  40 mEq Oral PRN Minerva Staley MD        Or    potassium bicarb-citric acid (EFFER-K) effervescent tablet 40 mEq  40 mEq Oral PRN Minerva Staley MD   40 mEq at 07/07/24 0655    Or    potassium chloride 10 mEq/100 mL IVPB (Peripheral Line)  10 mEq IntraVENous PRN Minerva Staley MD        magnesium sulfate 2000 mg in 50 mL IVPB premix  2,000 mg IntraVENous PRN Minerva Staley MD        sodium phosphate 15 mmol in sodium chloride 0.9 % 250 mL IVPB  15 mmol IntraVENous PRN Minerva Staley MD        enoxaparin (LOVENOX) injection 40 mg  40 mg SubCUTAneous Daily Joelle Del Cid MD   40 mg at 07/12/24 1712    aspirin EC tablet 81 mg  81 mg Oral Daily Joelle Del Cid 
      Ventilator Settings:  Lab Results   Component Value Date/Time    MODE CPAP/PS 07/06/2024 10:20 AM       VENT SETTINGS (Comprehensive)  Vent Information  Ventilator Day(s): 5  Ventilator ID: [id number]  Ventilator Initiate: Yes  Vent Mode: AC/VC  Additional Respiratory Assessments  Pulse: 97  Respirations: 16  SpO2: 94 %  Humidification Source: Heated wire  Humidification Temp: 37  Circuit Condensation: Drained      Physical Exam:     General/Neurology: lightly intubated , sedated but opens eyes and follows commands   Head:   Normocephalic, without obvious abnormality, atraumatic.  Eye:   EOM intact. No scleral icterus, no pallor, no cyanosis.  Nose:   No sinus tenderness  Throat:  Lips, mucosa, and tongue normal. No oral thrush.  Neck:   Supple, symmetric. No lymphadenopathy. Trachea midline.  Lung:   Moderate air entry bilateral equal. No rales. No rhonchi. Mild  exp wheezing on SBT . No stridors. No prolongded expiration. No accessory muscle use.  Heart:   Regular rate & rhythm. S1 S2 present. No murmur.  No JVD.  Abdomen:  Soft. NT. ND. +BS. No masses.  Extremities:  No pedal edema. No cyanosis. No clubbing.  Pulses: 2+ and symmetric in DP. Capillary refill: normal.   Lymphatic:  No cervical or supraclavicular palpable lymphadenopathy.  Musculoskeletal: No joint swelling. No tenderness.   Skin:   Color, texture, turgor normal. No rashes or lesions.     Data:       Recent Results (from the past 24 hour(s))   POCT Glucose    Collection Time: 07/05/24  4:25 PM   Result Value Ref Range    POC Glucose 238 (H) 70 - 110 mg/dL   POCT Glucose    Collection Time: 07/05/24  9:30 PM   Result Value Ref Range    POC Glucose 233 (H) 70 - 110 mg/dL   POCT Glucose    Collection Time: 07/06/24  1:54 AM   Result Value Ref Range    POC Glucose 237 (H) 70 - 110 mg/dL   POCT Glucose    Collection Time: 07/06/24  5:15 AM   Result Value Ref Range    POC Glucose 257 (H) 70 - 110 mg/dL   POC G3: BLOOD GASES INCLUDES CALC. TCO2, HCO3, 
Procedure Component Value Units Date/Time    Culture, Respiratory [[id number]] Collected: 07/07/24 0915    Order Status: Canceled Specimen: Sputum Aspirated     Culture, Blood 1 [[id number]] Collected: 07/04/24 1835    Order Status: Completed Specimen: Blood Updated: 07/10/24 0721     Special Requests NO SPECIAL REQUESTS        Culture NO GROWTH 6 DAYS       Culture, Blood 2 [[id number]] Collected: 07/04/24 0715    Order Status: Canceled Specimen: Blood     Respiratory Panel, Molecular, with COVID-19 (Restricted: peds pts or suitable admitted adults) [[id number]]     Order Status: Canceled Specimen: Nasopharyngeal     Culture, Respiratory [[id number]] Collected: 07/03/24 0946    Order Status: Completed Specimen: Sputum Updated: 07/05/24 1138     Special Requests NO SPECIAL REQUESTS        Gram Stain FEW WBCS SEEN         FEW EPITHELIAL CELLS SEEN               3+ GRAM POS COCCI IN CLUSTERS            2+ Gram negative rods         FEW BUDDING YEAST        Culture       HEAVY NORMAL RESPIRATORY PATTI          Legionella antigen, urine [[id number]] Collected: 07/02/24 1600    Order Status: Completed Specimen: Urine, random Updated: 07/03/24 1052     Legionella Antigen, Urine Negative       Strep Pneumoniae Antigen [[id number]] Collected: 07/02/24 1600    Order Status: Completed Specimen: Urine, random Updated: 07/03/24 1051     STREP PNEUMONIAE ANTIGEN, URINE Negative       Culture, Blood 2 [[id number]]  (Abnormal) Collected: 07/02/24 1539    Order Status: Completed Specimen: Blood Updated: 07/06/24 0940     Special Requests NO SPECIAL REQUESTS        Gram Stain       NO ORGANISMS SEEN AEROBIC BOTTLE                  SMEAR CALLED TO AND CORRECTLY REPEATED BY: YOEL WEI RN ICU AT 0645 ON 7/4/24           Culture       Rothia species MOST CLOSELY RESEMBLING ROTHERIA DENTOCARIOSA GROWING IN 1 OF 2 BOTTLES DRAWN No Site Indicated            (NOTE) APPARENT G+C IN 1 BOTTLE CALLED TO AMARILIS WEI AT 0600 7/5/24. JATINDER 
Image quality is technically difficult.  Technically difficult study due to patient's body habitus and  low parasternal window.    Left Ventricle: Low normal left ventricular systolic function with a visually estimated EF of 50 - 55%. Left ventricle size is normal. Normal wall thickness. Normal wall motion. Grade I diastolic dysfunction present with normal LV EF.    Tricuspid Valve:  Mild regurgitation. The estimated RVSP is 29 mmHg.    Pericardium: Trivial localized pericardial effusion present around the right ventricle and right atrium. No indication of cardiac tamponade.    Signed by: Margaret EVANS MD on 1/5/2024 10:02 PM         Ultrasound  US Result (most recent):  US RETROPERITONEAL COMPLETE 06/24/2017    Narrative  History renal cyst acquired.    COMPARISON: 3/4/2017 complete abdominal ultrasound. 6/5/2017 chest CT.    Real-time ultrasonography performed the patient's kidneys.    Right kidney: 11.1 cm in length with increased echogenicity throughout. Cortex  appears of normal thickness. In the upper polar region there is a  benign-appearing cyst measuring 1.9 x 1.2 x 1.6 cm. On CT this appeared of  increased density. There may be a small amount of debris within it and a small  benign appearing septa running through it. No stones. No hydronephrosis.    Left kidney: 10.6 cm in length with increased echogenicity but normal thickness  cortex. Incomplete visualization of the lower pole. No hydronephrosis. Echogenic  nodule which appears solid and measures 7 x 5 x 7 mm in the lower pole. This  appears about the same as prior. There are several cysts. The largest is in the  lower pole measuring 1.2 x 1.1 x 1.4 cm. This cyst appears benign. Another cyst  in the upper pole measures 8 mm in size and appears benign.    Urinary bladder: There are bilateral ureteral jets present. Prevoid volume 1 67  cc. Post void volume 7.4 cc. Normal thickness urinary bladder wall.    Other: No ascites.    Impression  IMPRESSION:  1. 
only.     Fact sheet for Patients: https://www.fda.gov/media/256274/download  Fact sheet for Healthcare Providers: https://www.fda.fov/media/997786/download         Culture, Blood 1 [[id number]] Collected: 07/02/24 1508    Order Status: Completed Specimen: Blood Updated: 07/03/24 0726     Special Requests NO SPECIAL REQUESTS        Culture NO GROWTH AFTER 15 HOURS                   Last EKG Results for orders placed or performed during the hospital encounter of 07/02/24   EKG 12 Lead   Result Value Ref Range    Ventricular Rate 96 BPM    Atrial Rate 96 BPM    P-R Interval 218 ms    QRS Duration 110 ms    Q-T Interval 350 ms    QTc Calculation (Bazett) 442 ms    P Axis 70 degrees    R Axis 93 degrees    T Axis 77 degrees    Diagnosis       Sinus rhythm with 1st degree AV block  Right atrial enlargement  Rightward axis  Borderline ECG  When compared with ECG of 02-JUL-2024 15:13,  NM interval has increased  Criteria for Anterolateral infarct are no longer present  Nonspecific T wave abnormality no longer evident in Lateral leads            ECHO  01/04/24    ECHO (TTE) COMPLETE (PRN CONTRAST/BUBBLE/STRAIN/3D) 01/05/2024 10:02 PM (Final)    Interpretation Summary  •  Image quality is technically difficult.  Technically difficult study due to patient's body habitus and  low parasternal window.  •  Left Ventricle: Low normal left ventricular systolic function with a visually estimated EF of 50 - 55%. Left ventricle size is normal. Normal wall thickness. Normal wall motion. Grade I diastolic dysfunction present with normal LV EF.  •  Tricuspid Valve:  Mild regurgitation. The estimated RVSP is 29 mmHg.  •  Pericardium: Trivial localized pericardial effusion present around the right ventricle and right atrium. No indication of cardiac tamponade.    Signed by: Margaret EVANS MD on 1/5/2024 10:02 PM         Ultrasound  US Result (most recent):  US RETROPERITONEAL COMPLETE 06/24/2017    Narrative  History renal cyst

## 2024-07-16 NOTE — PLAN OF CARE
Problem: Discharge Planning  Goal: Discharge to home or other facility with appropriate resources  7/12/2024 1019 by Joann Steward RN  Outcome: Progressing  7/11/2024 2339 by Edgardo Bass RN  Outcome: Progressing     Problem: Pain  Goal: Verbalizes/displays adequate comfort level or baseline comfort level  7/12/2024 1019 by Joann Steward RN  Outcome: Progressing  7/11/2024 2339 by Edgardo Bass RN  Outcome: Progressing     Problem: Skin/Tissue Integrity  Goal: Absence of new skin breakdown  Description: 1.  Monitor for areas of redness and/or skin breakdown  2.  Assess vascular access sites hourly  3.  Every 4-6 hours minimum:  Change oxygen saturation probe site  4.  Every 4-6 hours:  If on nasal continuous positive airway pressure, respiratory therapy assess nares and determine need for appliance change or resting period.  7/12/2024 1019 by Joann Steward RN  Outcome: Progressing  7/11/2024 2339 by Edgardo Bass RN  Outcome: Progressing     Problem: Safety - Adult  Goal: Free from fall injury  7/12/2024 1019 by Joann Steward RN  Outcome: Progressing  7/11/2024 2339 by Edgardo Bass RN  Outcome: Progressing     Problem: Nutrition Deficit:  Goal: Optimize nutritional status  7/12/2024 1019 by Joann Steward RN  Outcome: Progressing  7/11/2024 2339 by Edgardo Bass RN  Outcome: Progressing     
  Problem: Discharge Planning  Goal: Discharge to home or other facility with appropriate resources  7/12/2024 2346 by Gabriel Azar RN  Outcome: Progressing  7/12/2024 1019 by Joann Steward RN  Outcome: Progressing     Problem: Pain  Goal: Verbalizes/displays adequate comfort level or baseline comfort level  7/12/2024 2346 by Gabriel Azar RN  Outcome: Progressing  7/12/2024 1019 by Joann Steward RN  Outcome: Progressing     Problem: Skin/Tissue Integrity  Goal: Absence of new skin breakdown  Description: 1.  Monitor for areas of redness and/or skin breakdown  2.  Assess vascular access sites hourly  3.  Every 4-6 hours minimum:  Change oxygen saturation probe site  4.  Every 4-6 hours:  If on nasal continuous positive airway pressure, respiratory therapy assess nares and determine need for appliance change or resting period.  7/12/2024 2346 by Gabriel Azar RN  Outcome: Progressing  7/12/2024 1019 by Joann Steward RN  Outcome: Progressing     Problem: Safety - Adult  Goal: Free from fall injury  7/12/2024 2346 by Gabriel Azar RN  Outcome: Progressing  7/12/2024 1019 by Joann Steward RN  Outcome: Progressing     Problem: Nutrition Deficit:  Goal: Optimize nutritional status  7/12/2024 2346 by Gabriel Azar RN  Outcome: Progressing  7/12/2024 1019 by Joann Steward RN  Outcome: Progressing     
  Problem: Discharge Planning  Goal: Discharge to home or other facility with appropriate resources  7/13/2024 1936 by Venecia Noble RN  Outcome: Progressing  7/13/2024 1935 by Venecia Noble RN  Outcome: Progressing  Flowsheets (Taken 7/13/2024 6130)  Discharge to home or other facility with appropriate resources:   Identify barriers to discharge with patient and caregiver   Arrange for needed discharge resources and transportation as appropriate   Identify discharge learning needs (meds, wound care, etc)     Problem: Pain  Goal: Verbalizes/displays adequate comfort level or baseline comfort level  7/13/2024 1936 by Venecia Noble RN  Outcome: Progressing  7/13/2024 1935 by Venecia Noble RN  Outcome: Progressing     Problem: Skin/Tissue Integrity  Goal: Absence of new skin breakdown  Description: 1.  Monitor for areas of redness and/or skin breakdown  2.  Assess vascular access sites hourly  3.  Every 4-6 hours minimum:  Change oxygen saturation probe site  4.  Every 4-6 hours:  If on nasal continuous positive airway pressure, respiratory therapy assess nares and determine need for appliance change or resting period.  7/13/2024 1936 by Venecia Noble RN  Outcome: Progressing  7/13/2024 1935 by Venecia Noble RN  Outcome: Progressing     Problem: Safety - Adult  Goal: Free from fall injury  7/13/2024 1936 by Venecia Noble RN  Outcome: Progressing  7/13/2024 1935 by Venecia Noble RN  Outcome: Progressing     Problem: Nutrition Deficit:  Goal: Optimize nutritional status  7/13/2024 1936 by Venecia Noble RN  Outcome: Progressing  7/13/2024 1935 by Venecia Noble, RN  Outcome: Progressing     
  Problem: Discharge Planning  Goal: Discharge to home or other facility with appropriate resources  7/15/2024 0451 by Herlinda Alexandre RN  Outcome: Progressing  7/15/2024 0450 by Herlinda Alexandre RN  Outcome: Progressing  7/14/2024 1457 by Venecia Noble RN  Outcome: Progressing  Flowsheets (Taken 7/14/2024 0705)  Discharge to home or other facility with appropriate resources:   Identify barriers to discharge with patient and caregiver   Arrange for needed discharge resources and transportation as appropriate   Identify discharge learning needs (meds, wound care, etc)     Problem: Pain  Goal: Verbalizes/displays adequate comfort level or baseline comfort level  7/15/2024 0451 by Herlinda Alexandre RN  Outcome: Progressing  7/15/2024 0450 by Herlinda Alexandre RN  Outcome: Progressing  7/14/2024 1457 by Venecia Noble RN  Outcome: Progressing     Problem: Skin/Tissue Integrity  Goal: Absence of new skin breakdown  Description: 1.  Monitor for areas of redness and/or skin breakdown  2.  Assess vascular access sites hourly  3.  Every 4-6 hours minimum:  Change oxygen saturation probe site  4.  Every 4-6 hours:  If on nasal continuous positive airway pressure, respiratory therapy assess nares and determine need for appliance change or resting period.  7/15/2024 0451 by Herlinda Alexandre RN  Outcome: Progressing  7/15/2024 0450 by Herlinda Alexandre RN  Outcome: Progressing  7/14/2024 1457 by Venecia Noble RN  Outcome: Progressing     Problem: Safety - Adult  Goal: Free from fall injury  7/15/2024 0451 by Herlinda Alexandre RN  Outcome: Progressing  7/15/2024 0450 by Herlinda Alexandre RN  Outcome: Progressing  7/14/2024 1457 by Venecia Noble RN  Outcome: Progressing     Problem: Nutrition Deficit:  Goal: Optimize nutritional status  7/15/2024 0451 by Herlinda Alexandre RN  Outcome: Progressing  7/15/2024 0450 by Herlinda Alexandre RN  Outcome: Progressing  7/14/2024 1457 
  Problem: Discharge Planning  Goal: Discharge to home or other facility with appropriate resources  7/9/2024 2248 by Dolly Kong RN  Outcome: Progressing  Flowsheets (Taken 7/9/2024 2000)  Discharge to home or other facility with appropriate resources: Identify barriers to discharge with patient and caregiver  7/9/2024 1415 by Hood Steele RN  Outcome: Progressing  Flowsheets (Taken 7/9/2024 1415)  Discharge to home or other facility with appropriate resources:   Identify barriers to discharge with patient and caregiver   Arrange for needed discharge resources and transportation as appropriate   Identify discharge learning needs (meds, wound care, etc)   Refer to discharge planning if patient needs post-hospital services based on physician order or complex needs related to functional status, cognitive ability or social support system     Problem: Pain  Goal: Verbalizes/displays adequate comfort level or baseline comfort level  7/9/2024 2248 by Dolly Kong RN  Outcome: Progressing  7/9/2024 1415 by Hood Steele RN  Outcome: Progressing  Flowsheets  Taken 7/9/2024 1415 by Hood Steele RN  Verbalizes/displays adequate comfort level or baseline comfort level:   Encourage patient to monitor pain and request assistance   Assess pain using appropriate pain scale   Administer analgesics based on type and severity of pain and evaluate response   Implement non-pharmacological measures as appropriate and evaluate response   Consider cultural and social influences on pain and pain management   Notify Licensed Independent Practitioner if interventions unsuccessful or patient reports new pain  Taken 7/9/2024 0800 by Sydnie Jean RN  Verbalizes/displays adequate comfort level or baseline comfort level:   Encourage patient to monitor pain and request assistance   Assess pain using appropriate pain scale   Administer analgesics based on type and severity of pain and evaluate response   Implement 
  Problem: Discharge Planning  Goal: Discharge to home or other facility with appropriate resources  Outcome: Adequate for Discharge  Flowsheets (Taken 7/16/2024 8715)  Discharge to home or other facility with appropriate resources:   Identify barriers to discharge with patient and caregiver   Arrange for needed discharge resources and transportation as appropriate   Identify discharge learning needs (meds, wound care, etc)   Refer to discharge planning if patient needs post-hospital services based on physician order or complex needs related to functional status, cognitive ability or social support system     Problem: Pain  Goal: Verbalizes/displays adequate comfort level or baseline comfort level  Outcome: Adequate for Discharge     Problem: Skin/Tissue Integrity  Goal: Absence of new skin breakdown  Description: 1.  Monitor for areas of redness and/or skin breakdown  2.  Assess vascular access sites hourly  3.  Every 4-6 hours minimum:  Change oxygen saturation probe site  4.  Every 4-6 hours:  If on nasal continuous positive airway pressure, respiratory therapy assess nares and determine need for appliance change or resting period.  Outcome: Adequate for Discharge     Problem: Safety - Adult  Goal: Free from fall injury  Outcome: Adequate for Discharge     Problem: Nutrition Deficit:  Goal: Optimize nutritional status  Outcome: Adequate for Discharge    Patient remains on 3L O2 and is anxious - medicated as per MAR.  Report given to facility and packet complete to include Rx's for controlled meds.  Transport present and taking to rehab facility.   
  Problem: Discharge Planning  Goal: Discharge to home or other facility with appropriate resources  Outcome: Progressing  Flowsheets  Taken 7/5/2024 0800 by Emily Ghosh, RN  Discharge to home or other facility with appropriate resources: Identify barriers to discharge with patient and caregiver  Taken 7/4/2024 2030 by Kylah Dsouza RN  Discharge to home or other facility with appropriate resources:   Identify barriers to discharge with patient and caregiver   Arrange for needed discharge resources and transportation as appropriate   Identify discharge learning needs (meds, wound care, etc)   Refer to discharge planning if patient needs post-hospital services based on physician order or complex needs related to functional status, cognitive ability or social support system     
  Problem: Discharge Planning  Goal: Discharge to home or other facility with appropriate resources  Outcome: Progressing  Flowsheets (Taken 7/13/2024 8569)  Discharge to home or other facility with appropriate resources:   Identify barriers to discharge with patient and caregiver   Arrange for needed discharge resources and transportation as appropriate   Identify discharge learning needs (meds, wound care, etc)     Problem: Pain  Goal: Verbalizes/displays adequate comfort level or baseline comfort level  Outcome: Progressing     Problem: Skin/Tissue Integrity  Goal: Absence of new skin breakdown  Description: 1.  Monitor for areas of redness and/or skin breakdown  2.  Assess vascular access sites hourly  3.  Every 4-6 hours minimum:  Change oxygen saturation probe site  4.  Every 4-6 hours:  If on nasal continuous positive airway pressure, respiratory therapy assess nares and determine need for appliance change or resting period.  Outcome: Progressing     Problem: Safety - Adult  Goal: Free from fall injury  Outcome: Progressing     Problem: Nutrition Deficit:  Goal: Optimize nutritional status  Outcome: Progressing     
  Problem: Discharge Planning  Goal: Discharge to home or other facility with appropriate resources  Outcome: Progressing  Flowsheets (Taken 7/14/2024 0705)  Discharge to home or other facility with appropriate resources:   Identify barriers to discharge with patient and caregiver   Arrange for needed discharge resources and transportation as appropriate   Identify discharge learning needs (meds, wound care, etc)     Problem: Pain  Goal: Verbalizes/displays adequate comfort level or baseline comfort level  Outcome: Progressing     Problem: Skin/Tissue Integrity  Goal: Absence of new skin breakdown  Description: 1.  Monitor for areas of redness and/or skin breakdown  2.  Assess vascular access sites hourly  3.  Every 4-6 hours minimum:  Change oxygen saturation probe site  4.  Every 4-6 hours:  If on nasal continuous positive airway pressure, respiratory therapy assess nares and determine need for appliance change or resting period.  Outcome: Progressing     Problem: Safety - Adult  Goal: Free from fall injury  Outcome: Progressing     Problem: Nutrition Deficit:  Goal: Optimize nutritional status  Outcome: Progressing     
  Problem: Discharge Planning  Goal: Discharge to home or other facility with appropriate resources  Outcome: Progressing  Flowsheets (Taken 7/15/2024 2041)  Discharge to home or other facility with appropriate resources:   Identify barriers to discharge with patient and caregiver   Arrange for needed discharge resources and transportation as appropriate   Identify discharge learning needs (meds, wound care, etc)     Problem: Pain  Goal: Verbalizes/displays adequate comfort level or baseline comfort level  Outcome: Progressing     Problem: Skin/Tissue Integrity  Goal: Absence of new skin breakdown  Description: 1.  Monitor for areas of redness and/or skin breakdown  2.  Assess vascular access sites hourly  3.  Every 4-6 hours minimum:  Change oxygen saturation probe site  4.  Every 4-6 hours:  If on nasal continuous positive airway pressure, respiratory therapy assess nares and determine need for appliance change or resting period.  Outcome: Progressing     Problem: Safety - Adult  Goal: Free from fall injury  Outcome: Progressing     Problem: Nutrition Deficit:  Goal: Optimize nutritional status  Outcome: Progressing     
  Problem: Pain  Goal: Verbalizes/displays adequate comfort level or baseline comfort level  Outcome: Progressing  Flowsheets (Taken 7/10/2024 1230 by Annie Desouza RN)  Verbalizes/displays adequate comfort level or baseline comfort level:   Encourage patient to monitor pain and request assistance   Administer analgesics based on type and severity of pain and evaluate response   Assess pain using appropriate pain scale   Implement non-pharmacological measures as appropriate and evaluate response     Problem: Skin/Tissue Integrity  Goal: Absence of new skin breakdown  Description: 1.  Monitor for areas of redness and/or skin breakdown  2.  Assess vascular access sites hourly  3.  Every 4-6 hours minimum:  Change oxygen saturation probe site  4.  Every 4-6 hours:  If on nasal continuous positive airway pressure, respiratory therapy assess nares and determine need for appliance change or resting period.  Outcome: Progressing     Problem: Safety - Adult  Goal: Free from fall injury  Outcome: Progressing     Problem: Discharge Planning  Goal: Discharge to home or other facility with appropriate resources  Outcome: Progressing     
  Problem: Safety - Adult  Goal: Free from fall injury  7/11/2024 2339 by Edgardo Bass RN  Outcome: Progressing  7/11/2024 1043 by Joann Steward RN  Outcome: Progressing     Problem: Skin/Tissue Integrity  Goal: Absence of new skin breakdown  Description: 1.  Monitor for areas of redness and/or skin breakdown  2.  Assess vascular access sites hourly  3.  Every 4-6 hours minimum:  Change oxygen saturation probe site  4.  Every 4-6 hours:  If on nasal continuous positive airway pressure, respiratory therapy assess nares and determine need for appliance change or resting period.  7/11/2024 2339 by Edgardo Bass, RN  Outcome: Progressing  7/11/2024 1043 by Joann Steward RN  Outcome: Progressing     Problem: Pain  Goal: Verbalizes/displays adequate comfort level or baseline comfort level  7/11/2024 2339 by Edgardo Bass RN  Outcome: Progressing  7/11/2024 1043 by Joann Steward RN  Outcome: Progressing     Problem: Discharge Planning  Goal: Discharge to home or other facility with appropriate resources  7/11/2024 2339 by Edgardo Bass RN  Outcome: Progressing  7/11/2024 1043 by Joann Steward RN  Outcome: Progressing     
  Problem: Skin/Tissue Integrity  Goal: Absence of new skin breakdown  Description: 1.  Monitor for areas of redness and/or skin breakdown  2.  Assess vascular access sites hourly  3.  Every 4-6 hours minimum:  Change oxygen saturation probe site  4.  Every 4-6 hours:  If on nasal continuous positive airway pressure, respiratory therapy assess nares and determine need for appliance change or resting period.  7/3/2024 1418 by Emily Ghosh, RN  Outcome: Progressing  7/3/2024 0100 by Lucrecia Bangura, RN  Outcome: Progressing     
and Planned Interventions:  Maximum therapeutic gains met; safest, least restrictive diet achieved. D/C ST intervention at this time.      SUBJECTIVE:   Patient stated “I\"m supposed to drink that whole cup by 11:30.”    OBJECTIVE:   Daily Assessment:  Baseline Assessment  Temperature Spikes Noted: No  Respiratory Status: Room air  O2 Device: None (Room air)  Liters of Oxygen: 4 L  Communication Observation: Functional  Follows Directions: Complex  Current Diet : Regular  Current Liquid Diet : Thin  Dentition: Adequate  Patient Positioning: Upright in bed  Baseline Vocal Quality: Normal    Orientation:  Person, Place, Time, and Situation    Dysphagia Treatment:  Oral Assessment:  Oral Motor   Labial: No impairment  Dentition: Full  Oral Hygiene: Xerostomia, Clean  Lingual: No impairment  Velum: No Impairment  Mandible: No impairment  Gag: Other (comment)  Pharyngeal Phase: WFL     P.O. Trials:   PO Trials  Neuromuscular Estim Used: No  Assessment Method(s): Observation, Palpation  Vocal Quality: No Impairment  Consistency Presented: Regular, Pureed, Thin  How Presented: Self-fed/presented  Bolus Acceptance: Impaired  Bolus Formation/Control: Impaired  Type of Impairment: Spillage  Propulsion: Delayed (# of seconds)  Oral Residue: 10-50% of bolus  Initiation of Swallow: No impairment  Laryngeal Elevation: Functional  Aspiration Signs/Symptoms: None  Pharyngeal Phase Characteristics: No impairment, issues, or problems  Effective Modifications: Straw  Cues for Modifications: Minimal          PAIN:  Start of Tx: 0  End of Tx: 0     After treatment:   []              Patient left in no apparent distress sitting up in chair  [x]              Patient left in no apparent distress in bed  [x]              Call bell left within reach  []              Nursing notified  [x]              Caregiver present  []              Bed alarm activated      COMMUNICATION/EDUCATION:   [x]            Aspiration precautions; swallow safety; 
diets, oral nutritional supplements, and vitamin/mineral supplements  7/10/2024 2323 by Edgardo Bass, RN  Outcome: Progressing     
the reason for restraint   Every 2 hours: Monitor safety, psychosocial status, comfort, nutrition and hydration     
Bed alarm activated    COMMUNICATION/EDUCATION:   [x]            Aspiration precautions; swallow safety; as well as compensatory speech/language/comprehension techniques provided via demonstration, verbalization and teach back of comprehension  []         Patient/family have participated as able in goal setting and plan of care.  []            Patient/family agree to work toward stated goals and plan of care.  []            Patient understands intent and goals of therapy, neutral about participation.  []            Patient unable to participate in goal setting/plan of care secondary to cognition, hearing/vision deficits; education ongoing with interdisciplinary staff   []            Handout regarding diet recommendations and thickener instructions provided.  []         Posted safety precautions in patient's room.    Thank you for this referral,  Kristi Freed M.S., CCC-SLP        
SLP  Outcome: Progressing  Note: Patient will:  1. Tolerate PO trials with 0 s/s overt distress in 4/5 trials  2. Utilize compensatory swallow strategies/maneuvers (decrease bite/sip, size/rate, alt. liq/sol) with min cues in 4/5 trials      Rec:     regular diet with thin liquids  Aspiration precautions  HOB >45 during po intake, remain >30 for 30-45 minutes after po   Small bites/sips; alternate liquid/solid with slow feeding rate   Oral care TID  Meds per pt preference     
    DYSPHAGIA DIAGNOSIS: Dysphagia Diagnosis: Mild oral stage dysphagia    PAIN:  Start of Eval: 0  End of Eval: 0     After treatment:   []            Patient left in no apparent distress sitting up in chair  [x]            Patient left in no apparent distress in bed  [x]            Call bell left within reach  [x]            Nursing notified  [x]            Family present  []            Caregiver present  []            Bed alarm activated    COMMUNICATION/EDUCATION:   [x]            Aspiration precautions; swallow safety; compensatory techniques provided via demonstration, verbalization and teach back of comprehension  []         Patient/family have participated as able in goal setting and plan of care.  []            Patient/family agree to work toward stated goals and plan of care.  []            Patient understands intent and goals of therapy, neutral about participation.  []            Patient unable to participate in goal setting/plan of care secondary to cognition, hearing/vision deficits; education ongoing with interdisciplinary staff   []            Handout regarding diet recommendations and thickener instructions provided.  []         Posted safety precautions in patient's room.    Thank you for this referral,  Kristi Freed M.S., CCC-SLP

## 2024-07-22 ENCOUNTER — TELEPHONE (OUTPATIENT)
Facility: HOSPITAL | Age: 69
End: 2024-07-22

## 2024-07-22 NOTE — TELEPHONE ENCOUNTER
Called  to confirm appt, pts  stated it is best that we cxl for today due to getting adjusted to being out of rehab. Will call back Wednesday to see about Thursdays Appt.

## 2024-07-23 ENCOUNTER — TELEPHONE (OUTPATIENT)
Facility: HOSPITAL | Age: 69
End: 2024-07-23

## 2024-07-23 NOTE — TELEPHONE ENCOUNTER
Pt. called to cxl due to still recovering from ICU visit. Declined to r/s at this time. Confirmed 8/1 appt. with Karrie.

## 2024-08-01 ENCOUNTER — APPOINTMENT (OUTPATIENT)
Facility: HOSPITAL | Age: 69
End: 2024-08-01
Payer: MEDICARE

## 2024-08-12 ENCOUNTER — HOSPITAL ENCOUNTER (OUTPATIENT)
Facility: HOSPITAL | Age: 69
Setting detail: RECURRING SERIES
Discharge: HOME | End: 2024-08-15
Payer: MEDICARE

## 2024-08-12 PROCEDURE — 97162 PT EVAL MOD COMPLEX 30 MIN: CPT

## 2024-08-12 NOTE — PROGRESS NOTES
PT DAILY TREATMENT NOTE/NEURO EVAL 10-18      Patient Name: Eryn Howell    Date: 2024    : 1955  Insurance: Payor: [employer] MEDICARE / Plan: [employer] CHOICE-PPO MEDICARE / Product Type: *No Product type* /      Patient  verified yes     Visit #   Current / Total 1 24   Time   In / Out 115 150     TREATMENT AREA =  Muscle weakness (generalized) [M62.81]      SUBJECTIVE  Pain Level (0-10 scale): 4  []constant []intermittent []improving []worsening []no change since onset    Any medication changes, allergies to medications, adverse drug reactions, diagnosis change, or new procedure performed?: [x] No    [] Yes (see summary sheet for update)  Subjective functional status/changes:     PLOF:  Pt has an active lifestyle likes to garden   Limitations to PLOF: difficulty walking, difficulty standing, difficulty with transfers, difficulty with bed mobility  Mechanism of Injury: Pt is well known to our agency and was being treated from 2024-2024 for balance and strengthening.  Pt was making good progress until she had a respiratory event and ended up on ventilator for 6 days and was hospitalized for 18 days.  Pt returned home with HHPT and is now DC and returning for OP PT.    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   Work Hx:  retired   Living Situation:  2 story home, [address], lives with  and support dog has elevator coming   Pt Goals: back to when I was walking prior to my incident in July  Barriers: []pain []financial []time []transportation []other  Motivation: good  Substance use: []Alcohol []Tobacco []other:   Cognition: A & O x 3    Other:  Patient Self Reported Health Status: poor  Rehabilitation Potential: good    OBJECTIVE    35 min [x]Eval - untimed                      [x]  Patient Education billed concurrently with 
deficits, address ROM deficits, address strength deficits, analyze and cue movement patterns, analyze and modify body mechanics/ergonomics, assess and modify postural abnormalities, address imbalance/dizziness and instruct in home and community integration to attain remaining goals.     Evaluation Complexity HistoryHIGH Complexity :3+ comorbidities / personal factors will impact the outcome/ POC  ; Examination HIGH Complexity : 4+ Standardized tests and measures addressing body structure, function, activity limitation and / or participation in recreation  ;Presentation MEDIUM Complexity : Evolving with changing characteristics  ;Clinical Decision Making Other Assessment Form or Objective Measure: Tinetti = HIGH ComplexityF  Overall Complexity Rating: MEDIUM  Problem List: pain affecting function, decrease ROM, decrease strength, edema affection function, impaired gait/balance, decrease ADL/functional abilities, decrease activity tolerance, decrease flexibility/joint mobility, and decrease transfer abilities    Treatment Plan may include any combination of the followin Therapeutic Exercise, 34320 Neuromuscular Re-Education, 69019 Manual Therapy, 43928 Therapeutic Activity, 33279 Self Care/Home Management, and 68631 Gait Training  Patient / Family readiness to learn indicated by: asking questions, trying to perform skills, interest, return verbalization , and return demonstration   Persons(s) to be included in education: patient (P) and family support person (FSP); list   Barriers to Learning/Limitations: yes;  emotional and other PTSD significant   Measures taken if barriers to learning present: keep therapist to 2 different ones, treat in a back room  Patient Goal (s): “ back to when I was walking prior to my incident in July ”  Patient Self Reported Health Status: poor  Rehabilitation Potential: good    Short Term Goals: To be accomplished in 12 treatments:  Patient will report compliance with HEP at

## 2024-08-15 DIAGNOSIS — J44.9 CHRONIC OBSTRUCTIVE PULMONARY DISEASE, UNSPECIFIED COPD TYPE (HCC): Primary | ICD-10-CM

## 2024-08-19 ENCOUNTER — APPOINTMENT (OUTPATIENT)
Facility: HOSPITAL | Age: 69
End: 2024-08-19
Payer: MEDICARE

## 2024-08-20 ENCOUNTER — HOSPITAL ENCOUNTER (OUTPATIENT)
Facility: HOSPITAL | Age: 69
Setting detail: RECURRING SERIES
End: 2024-08-20
Payer: MEDICARE

## 2024-08-20 ENCOUNTER — TELEPHONE (OUTPATIENT)
Facility: HOSPITAL | Age: 69
End: 2024-08-20

## 2024-08-20 NOTE — PROGRESS NOTES
Therapeutic Procedures:  Tx Min Billable or 1:1 Min (if diff from Tx Min) Procedure, Rationale, Specifics   ***  01157 Therapeutic Exercise (timed):  increase ROM, strength, coordination, balance, and proprioception to improve patient's ability to progress to PLOF and address remaining functional goals. (see flow sheet as applicable)    Details if applicable:       ***  70081 Neuromuscular Re-Education (timed):  improve balance, coordination, kinesthetic sense, posture, core stability and proprioception to improve patient's ability to develop conscious control of individual muscles and awareness of position of extremities in order to progress to PLOF and address remaining functional goals. (see flow sheet as applicable)    Details if applicable:     ***  34757 Therapeutic Activity (timed):  use of dynamic activities replicating functional movements to increase ROM, strength, coordination, balance, and proprioception in order to improve patient's ability to progress to PLOF and address remaining functional goals.  (see flow sheet as applicable)     Details if applicable:       61420 Self Care/Home Management (timed):  improve patient knowledge and understanding of pain reducing techniques, positioning, and posture/ergonomics  to improve patient's ability to progress to PLOF and address remaining functional goals.  (see flow sheet as applicable)    Details if applicable:            Details if applicable:     ***  [facility] Totals Reminder: bill using total billable min of TIMED therapeutic procedures (example: do not include dry needle or estim unattended, both untimed codes, in totals to left)  8-22 min = 1 unit; 23-37 min = 2 units; 38-52 min = 3 units; 53-67 min = 4 units; 68-82 min = 5 units   Total Total     TOTAL TREATMENT TIME:        ***     [x]  Patient Education billed concurrently with other procedures   [x] Review HEP    [] Progressed/Changed HEP, detail:    [] Other detail:       Objective  dressing.  Status at IE:30 sec  Current:    PLAN  Yes  Continue plan of care  []  Upgrade activities as tolerated  []  Discharge due to :  []  Other:    Surendra Rodriguez PTA    8/20/2024    7:28 AM    Future Appointments   Date Time Provider Department Center   8/20/2024  2:30 PM Surendra Rodriguez, PTA [facility]   8/21/2024  2:30 PM Surendra Rodriguez, PTA [facility]   8/26/2024  1:50 PM Surendra Rodriguez, PTA [facility]   8/28/2024  1:10 PM Surendra Rodriguez, PTA [facility]   9/3/2024  2:30 PM Surendra Rodriguez, PTA [facility]   9/5/2024  1:50 PM Surendra Rodriguez, PTA [facility]   9/9/2024  2:30 PM Slarody, Karrie, PT [facility]   9/11/2024  2:30 PM Surendra Rodriguez, PTA [facility]   9/16/2024  2:30 PM Slack, Karrie, PT [facility]   9/18/2024 10:30 AM [facility] PULM INTAKE [facility]   9/18/2024  1:10 PM Surendra Rodriguez, PTA [facility]   9/23/2024  1:50 PM Slack, Karrie, PT [facility]   9/25/2024  1:50 PM Slack, Karrie, PT [facility]   9/30/2024  1:50 PM Slack, Karrie, PT [facility]   10/2/2024  1:50 PM Slack, Karrie, PT [facility]   10/7/2024  1:50 PM Slack, Karrie, PT [facility]   10/9/2024  1:50 PM Slack, Karrie, PT [facility]   10/14/2024  1:10 PM Slack, Karrie, PT [facility]   10/16/2024  1:10 PM Slack, Karrie, PT [facility]   10/21/2024  1:10 PM Slack, Karrie, PT [facility]   10/23/2024  1:10 PM Karrie De La Cruz, PT [facility]   10/28/2024  1:50 PM Karrie De La Cruz, PT [facility]   10/30/2024  1:50 PM Karrie De La Cruz, PT [facility]

## 2024-08-21 ENCOUNTER — HOSPITAL ENCOUNTER (OUTPATIENT)
Facility: HOSPITAL | Age: 69
Setting detail: RECURRING SERIES
Discharge: HOME | End: 2024-08-24
Payer: MEDICARE

## 2024-08-21 PROCEDURE — 97535 SELF CARE MNGMENT TRAINING: CPT

## 2024-08-21 PROCEDURE — 97110 THERAPEUTIC EXERCISES: CPT

## 2024-08-21 PROCEDURE — 97530 THERAPEUTIC ACTIVITIES: CPT

## 2024-08-21 NOTE — PROGRESS NOTES
min with no UE assist in order to improve transfers and dressing.  Status at IE:30 sec  Current:    PLAN  Yes  Continue plan of care  []  Upgrade activities as tolerated  []  Discharge due to :  []  Other:    Surendra Rodriguez PTA    8/21/2024    10:22 AM    Future Appointments   Date Time Provider Department Center   8/21/2024  2:30 PM Surendra Rodriguez, PTA [facility]   8/26/2024  1:50 PM Surendra Rodriguez, PTA [facility]   8/28/2024  1:10 PM Surendra Rodriguez, PTA [facility]   9/3/2024  2:30 PM Surendra Rodriguez, PTA [facility]   9/5/2024  1:50 PM Surendra Rodriguez, PTA [facility]   9/9/2024  2:30 PM Slarody, Karrie, PT [facility]   9/11/2024  2:30 PM Surendra Rodriguez, PTA [facility]   9/16/2024  2:30 PM Slack, Karrie, PT [facility]   9/18/2024 10:30 AM [facility] PULM INTAKE [facility]   9/18/2024  1:10 PM Surendra Rodriguez, PTA [facility]   9/23/2024  1:50 PM Slack, Karrie, PT [facility]   9/25/2024  1:50 PM Slack, Karrie, PT [facility]   9/30/2024  1:50 PM Slack, Karrie, PT [facility]   10/2/2024  1:50 PM Slack, Karrie, PT [facility]   10/7/2024  1:50 PM Slack, Karrie, PT [facility]   10/9/2024  1:50 PM Slack, Akrrie, PT [facility]   10/14/2024  1:10 PM Slack, Karrie, PT [facility]   10/16/2024  1:10 PM Slack, Karrie, PT [facility]   10/21/2024  1:10 PM Slack, Karrie, PT [facility]   10/23/2024  1:10 PM Karrie De La Cruz, PT [facility]   10/28/2024  1:50 PM Karrie De La Cruz, PT [facility]   10/30/2024  1:50 PM Karrie De La Cruz, PT [facility]

## 2024-08-22 NOTE — PROGRESS NOTES
Hospitalist Progress Note    Patient: Eryn Howell MRN: [id number]  [facility]: [id number]    YOB: 1955  Age: 68 y.o.  Sex: female    DOA: 1/4/2024 LOS:  LOS: 2 days          Chief Complaint:    COPD exacerbation.    Assessment/Plan   68 y.o. female with a history of COPD, PTSD, NSTEMI due to respiratory failure, chronic oxygen requirement and insomnia who presents to the emergency department for symptoms of malaise and worsening respiratory distress.  Admitted for acute respiratory failure with hypoxia due to COPD exacerbation.      Acute respiratory failure with hypoxia -  Continuous pulse oximetry  Oxygen supplementation to keep O2 sats between 91% and 92%     COPD exacerbation-  Oxygen supplementation  continue Solu-Medrol 60 mg IV every 6 hours  Brovana nebs every 12 hours  Pulmicort nebs every 12 hours  DuoNebs every 4 hours while awake   ABG pending   Consulted pulm today, d/w Dr. Pagan   Started 5 day course of IV rocephin and azithromycin  Added diflucan 150mg po one dose per pt request for thrush     SSI while on steroids for hyperglycemia      Acute influenza-  Influenza A positive  Cont Tamiflu  Supportive care     Elevated troponin-  Cardiology consulted  Trend cardiac enzymes every 6 hours >trending down   Echo pending      History of PTSD -supportive care, restarted home dose of ativan 2mg po tid      History of insomnia -supportive care, resume home medications when appropriate     History of NSTEMI -telemetry monitoring     DVT and GI prophylaxis ordered     Patient's daughter at bedside, spent the night     Will order PT/OT     Active Hospital Problems    Diagnosis Date Noted    Influenza A [J10.1] 01/05/2024    Elevated troponin [R79.89] 01/05/2024    Acute respiratory failure with hypoxemia (HCC) [J96.01] 03/11/2017    PTSD (post-traumatic stress disorder) [F43.10] 03/09/2017    COPD with acute exacerbation (HCC) [J44.1] 03/06/2017       Disposition:  Patient was seen in office today. Advised to follow up in 3 years with US VASC AORTA AND ILIAC ARTERIES DUPLEX BILATERAL for ectatic aorta prior to office visit. Routing to [place] to contact patient and enter orders/schedule prior to 8/2027.    input(s): \"CKTOTAL\", \"CKMB\", \"CKMBINDEX\", \"TROPONINI\", \"TENISHA\" in the last 72 hours.   Coagulation No results for input(s): \"PROTIME\", \"INR\", \"APTT\" in the last 72 hours.    Lipid Panel No results found for: \"CHOL\", \"TRIG\", \"HDL\", \"LDLCHOLESTEROL\", \"LDLCALC\", \"LABVLDL\", \"VLDL\", \"CHOLHDLRATIO\"   BNP No results for input(s): \"BNP\" in the last 72 hours.   Liver Enzymes Recent Labs     01/04/24  1347   ALT 35   AST 38   ALKPHOS 84   BILITOT 0.5        Thyroid Studies No results found for: \"A3LQYLF\", \"W1XNTVX\", \"FT3\", \"T4FREE\", \"TSH\"       Procedures/imaging: see electronic medical records for all procedures/Xrays and details which were not copied into this note but were reviewed prior to creation of Plan      Jimmy Alaniz MD    TIME: E/M Time spent with patient and patient care issues: [] 15-20 min  [] 21-30 min  [] 31-44 min  [x] 45 min or more.     This time also includes physician non-face-to-face service time visit on the date of service such as  Preparing to see the patient (eg, review of tests)  Obtaining and/or reviewing separately obtained history  Performing a medically necessary appropriate examination and/or evaluation  Counseling and educating the patient/family/caregiver  Ordering medications, tests, or procedures  Referring and communicating with other health care professionals as needed  Documenting clinical information in the electronic or other health record  Independently interpreting results (not reported separately) and communicating results to the patient/family/caregiver  Care coordination and discharge planning with Case Management.

## 2024-08-26 ENCOUNTER — HOSPITAL ENCOUNTER (OUTPATIENT)
Facility: HOSPITAL | Age: 69
Setting detail: RECURRING SERIES
Discharge: HOME | End: 2024-08-29
Payer: MEDICARE

## 2024-08-26 PROCEDURE — 97530 THERAPEUTIC ACTIVITIES: CPT

## 2024-08-26 PROCEDURE — 97110 THERAPEUTIC EXERCISES: CPT

## 2024-08-26 PROCEDURE — 97535 SELF CARE MNGMENT TRAINING: CPT

## 2024-08-26 NOTE — PROGRESS NOTES
PHYSICAL / OCCUPATIONAL THERAPY - DAILY TREATMENT NOTE     Patient Name: Eryn Howell    Date: 2024    : 1955  Insurance: Payor: [employer] MEDICARE / Plan: [employer] CHOICE-PPO MEDICARE / Product Type: *No Product type* /      Patient  verified Yes     Visit #   Current / Total 3 24   Time   In / Out 1:50 2:30   Pain   In / Out 6/10 5/10   Subjective Functional Status/Changes: \"I have pain today.\"   Changes to:  Allergies, Med Hx, Sx Hx?   no       TREATMENT AREA =  Muscle weakness (generalized) [M62.81]    If an interpreting service is utilized for treatment of this patient, the contents of this document represent the material reviewed with the patient via the .     OBJECTIVE    Therapeutic Procedures:  Tx Min Billable or 1:1 Min (if diff from Tx Min) Procedure, Rationale, Specifics   18  60674 Therapeutic Exercise (timed):  increase ROM, strength, coordination, balance, and proprioception to improve patient's ability to progress to PLOF and address remaining functional goals. (see flow sheet as applicable)    Details if applicable:         53018 Neuromuscular Re-Education (timed):  improve balance, coordination, kinesthetic sense, posture, core stability and proprioception to improve patient's ability to develop conscious control of individual muscles and awareness of position of extremities in order to progress to PLOF and address remaining functional goals. (see flow sheet as applicable)    Details if applicable:     12  91205 Therapeutic Activity (timed):  use of dynamic activities replicating functional movements to increase ROM, strength, coordination, balance, and proprioception in order to improve patient's ability to progress to PLOF and address remaining functional goals.  (see flow sheet as applicable)     Details if applicable:     10  79148 Self Care/Home Management (timed):  improve patient knowledge and understanding of pain reducing techniques, positioning, and  Distributed on visit 2 8/21/24     Patient will decrease TUG to 30 seconds to display MCID and progression to decreased falls risk.  Status at IE: to be taken at first progress note   Current:     Patient will improve 30 second STS test by 2 repititions to display MCID and progression to decreased falls risk.  Status at IE: 4 reps uisng simon UE  Current: Pt unable to perform sit to stands without UE support at this time 8/26/24     Patient will be able to perform STS with no UE x1 in order to improve transfers and toilet ing.  Status at IE: unable  Current:     Long Term Goals: To be accomplished in 24 treatments:  Patient will increase strength to 5/5 throughout Simon LEs to aid in return recreational activities and ADLs.  Status at IE:   Hip Left Right   Flexion 3+ 4   Abduction 3+ 3+   Adduction 3+ 3+   Knee Left Right   Extension 4+ 5   Flexion 3+ 3+   Ankle       Plantar Flexion 2 2   Dorsiflexion 3+ 3+   Current:     Patient will improve Tinetti score to 26/28 to demonstrate decreased risk for falls.  Status at IE: 8/28  Current:     Patient will ambulate 200ft on level surface with SPC and good step over step gait pattern  Status at IE:2x 20 feet with RW  Current:     4.  Patient will be able to  stand for 5 min with no UE assist in order to improve transfers and dressing.  Status at IE:30 sec  Current:    PLAN  Yes  Continue plan of care  []  Upgrade activities as tolerated  []  Discharge due to :  []  Other:    Surendra Rodriguez PTA    8/26/2024    10:51 AM    Future Appointments   Date Time Provider Department Center   8/26/2024  1:50 PM Surendra Rodriguez PTA [facility]   8/28/2024  1:10 PM Surendra Rodriguez PTA [facility]   9/3/2024  2:30 PM Surendra Rodriguez PTA [facility]   9/5/2024  1:50 PM Surendra Rodriguez PTA [facility]   9/9/2024  2:30 PM [facility]   9/11/2024  2:30 PM Surendra Rodriguez PTA [facility]   9/16/2024  2:30 PM [facility]   9/18/2024 10:30 AM [facility] PULM

## 2024-08-28 ENCOUNTER — HOSPITAL ENCOUNTER (OUTPATIENT)
Facility: HOSPITAL | Age: 69
Setting detail: RECURRING SERIES
Discharge: HOME | End: 2024-08-31
Payer: MEDICARE

## 2024-08-28 PROCEDURE — 97535 SELF CARE MNGMENT TRAINING: CPT

## 2024-08-28 PROCEDURE — 97530 THERAPEUTIC ACTIVITIES: CPT

## 2024-08-28 PROCEDURE — 97110 THERAPEUTIC EXERCISES: CPT

## 2024-08-28 NOTE — PROGRESS NOTES
in 12 treatments:  Patient will report compliance with HEP at least 1x/day to aid in rehabilitation program.  Status at IE: to be given at visit 2  Current: Distributed on visit 2 8/21/24     Patient will decrease TUG to 30 seconds to display MCID and progression to decreased falls risk.  Status at IE: to be taken at first progress note   Current:     Patient will improve 30 second STS test by 2 repititions to display MCID and progression to decreased falls risk.  Status at IE: 4 reps uisng simon UE  Current: Pt unable to perform sit to stands without UE support at this time 8/26/24     Patient will be able to perform STS with no UE x1 in order to improve transfers and toilet ing.  Status at IE: unable  Current: Pt able to perform sit to stands from low chair with bilateral UE support 8/28/24     Long Term Goals: To be accomplished in 24 treatments:  Patient will increase strength to 5/5 throughout Simon LEs to aid in return recreational activities and ADLs.  Status at IE:   Hip Left Right   Flexion 3+ 4   Abduction 3+ 3+   Adduction 3+ 3+   Knee Left Right   Extension 4+ 5   Flexion 3+ 3+   Ankle       Plantar Flexion 2 2   Dorsiflexion 3+ 3+   Current:     Patient will improve Tinetti score to 26/28 to demonstrate decreased risk for falls.  Status at IE: 8/28  Current:     Patient will ambulate 200ft on level surface with SPC and good step over step gait pattern  Status at IE:2x 20 feet with RW  Current:     4.  Patient will be able to  stand for 5 min with no UE assist in order to improve transfers and dressing.  Status at IE:30 sec  Current:    PLAN  Yes  Continue plan of care  []  Upgrade activities as tolerated  []  Discharge due to :  []  Other:    Surendra Rodriguez PTA    8/28/2024    10:56 AM    Future Appointments   Date Time Provider Department Center   8/28/2024  1:10 PM [facility]   9/3/2024  2:30 PM Surendra Rodriguez PTA [facility]   9/5/2024  1:50 PM [facility]

## 2024-09-03 ENCOUNTER — HOSPITAL ENCOUNTER (OUTPATIENT)
Facility: HOSPITAL | Age: 69
Setting detail: RECURRING SERIES
Discharge: HOME | End: 2024-09-06
Payer: MEDICARE

## 2024-09-03 PROCEDURE — 97530 THERAPEUTIC ACTIVITIES: CPT

## 2024-09-03 PROCEDURE — 97535 SELF CARE MNGMENT TRAINING: CPT

## 2024-09-03 PROCEDURE — 97110 THERAPEUTIC EXERCISES: CPT

## 2024-09-03 PROCEDURE — 97112 NEUROMUSCULAR REEDUCATION: CPT

## 2024-09-03 NOTE — PROGRESS NOTES
program.  Status at IE: to be given at visit 2  Current: Distributed on visit 2 8/21/24     Patient will decrease TUG to 30 seconds to display MCID and progression to decreased falls risk.  Status at IE: to be taken at first progress note   Current:     Patient will improve 30 second STS test by 2 repititions to display MCID and progression to decreased falls risk.  Status at IE: 4 reps uisng simon UE  Current: Pt unable to perform sit to stands without UE support at this time 8/26/24     Patient will be able to perform STS with no UE x1 in order to improve transfers and toilet ing.  Status at IE: unable  Current: Pt able to perform sit to stands from low chair with bilateral UE support 8/28/24     Long Term Goals: To be accomplished in 24 treatments:  Patient will increase strength to 5/5 throughout Simon LEs to aid in return recreational activities and ADLs.  Status at IE:   Hip Left Right   Flexion 3+ 4   Abduction 3+ 3+   Adduction 3+ 3+   Knee Left Right   Extension 4+ 5   Flexion 3+ 3+   Ankle       Plantar Flexion 2 2   Dorsiflexion 3+ 3+   Current: Pt continues to progress with general PRE's in order to improved general Bilateral LE strength 9/3/24     Patient will improve Tinetti score to 26/28 to demonstrate decreased risk for falls.  Status at IE: 8/28  Current:     Patient will ambulate 200ft on level surface with SPC and good step over step gait pattern  Status at IE:2x 20 feet with RW  Current:     4.  Patient will be able to  stand for 5 min with no UE assist in order to improve transfers and dressing.  Status at IE:30 sec  Current:    PLAN  Yes  Continue plan of care  []  Upgrade activities as tolerated  []  Discharge due to :  []  Other:    Surendra Rodriguez PTA    9/3/2024    8:28 AM    Future Appointments   Date Time Provider Department Center   9/3/2024  2:30 PM Surendra Rodriguez PTA [facility]   9/5/2024  1:50 PM Surendra Rodriguez PTA [facility]   9/9/2024  2:30 PM Karrie De La Cruz, PT [facility]

## 2024-09-05 ENCOUNTER — HOSPITAL ENCOUNTER (OUTPATIENT)
Facility: HOSPITAL | Age: 69
Setting detail: RECURRING SERIES
Discharge: HOME | End: 2024-09-08
Payer: MEDICARE

## 2024-09-05 PROCEDURE — 97112 NEUROMUSCULAR REEDUCATION: CPT

## 2024-09-05 PROCEDURE — 97530 THERAPEUTIC ACTIVITIES: CPT

## 2024-09-05 PROCEDURE — 97110 THERAPEUTIC EXERCISES: CPT

## 2024-09-09 ENCOUNTER — HOSPITAL ENCOUNTER (OUTPATIENT)
Facility: HOSPITAL | Age: 69
Setting detail: RECURRING SERIES
Discharge: HOME | End: 2024-09-12
Payer: MEDICARE

## 2024-09-09 PROCEDURE — 97112 NEUROMUSCULAR REEDUCATION: CPT

## 2024-09-09 PROCEDURE — 97530 THERAPEUTIC ACTIVITIES: CPT

## 2024-09-11 ENCOUNTER — HOSPITAL ENCOUNTER (OUTPATIENT)
Facility: HOSPITAL | Age: 69
Setting detail: RECURRING SERIES
Discharge: HOME | End: 2024-09-14
Payer: MEDICARE

## 2024-09-11 PROCEDURE — 97535 SELF CARE MNGMENT TRAINING: CPT

## 2024-09-11 PROCEDURE — 97530 THERAPEUTIC ACTIVITIES: CPT

## 2024-09-11 PROCEDURE — 97110 THERAPEUTIC EXERCISES: CPT

## 2024-09-16 ENCOUNTER — HOSPITAL ENCOUNTER (OUTPATIENT)
Facility: HOSPITAL | Age: 69
Setting detail: RECURRING SERIES
Discharge: HOME | End: 2024-09-19
Payer: MEDICARE

## 2024-09-16 PROCEDURE — 97530 THERAPEUTIC ACTIVITIES: CPT

## 2024-09-16 PROCEDURE — 97112 NEUROMUSCULAR REEDUCATION: CPT

## 2024-09-16 PROCEDURE — 97110 THERAPEUTIC EXERCISES: CPT

## 2024-09-18 ENCOUNTER — HOSPITAL ENCOUNTER (OUTPATIENT)
Facility: HOSPITAL | Age: 69
Setting detail: RECURRING SERIES
Discharge: HOME | End: 2024-09-21
Payer: MEDICARE

## 2024-09-18 ENCOUNTER — APPOINTMENT (OUTPATIENT)
Facility: HOSPITAL | Age: 69
End: 2024-09-18
Payer: MEDICARE

## 2024-09-18 VITALS — OXYGEN SATURATION: 90 %

## 2024-09-18 PROCEDURE — 94618 PULMONARY STRESS TESTING: CPT

## 2024-09-18 ASSESSMENT — PATIENT HEALTH QUESTIONNAIRE - PHQ9
6. FEELING BAD ABOUT YOURSELF - OR THAT YOU ARE A FAILURE OR HAVE LET YOURSELF OR YOUR FAMILY DOWN: NEARLY EVERY DAY
3. TROUBLE FALLING OR STAYING ASLEEP: NOT AT ALL
SUM OF ALL RESPONSES TO PHQ QUESTIONS 1-9: 8
SUM OF ALL RESPONSES TO PHQ QUESTIONS 1-9: 8
10. IF YOU CHECKED OFF ANY PROBLEMS, HOW DIFFICULT HAVE THESE PROBLEMS MADE IT FOR YOU TO DO YOUR WORK, TAKE CARE OF THINGS AT HOME, OR GET ALONG WITH OTHER PEOPLE: SOMEWHAT DIFFICULT
9. THOUGHTS THAT YOU WOULD BE BETTER OFF DEAD, OR OF HURTING YOURSELF: NOT AT ALL
1. LITTLE INTEREST OR PLEASURE IN DOING THINGS: SEVERAL DAYS
SUM OF ALL RESPONSES TO PHQ QUESTIONS 1-9: 8
4. FEELING TIRED OR HAVING LITTLE ENERGY: SEVERAL DAYS
SUM OF ALL RESPONSES TO PHQ9 QUESTIONS 1 & 2: 1
SUM OF ALL RESPONSES TO PHQ QUESTIONS 1-9: 8
2. FEELING DOWN, DEPRESSED OR HOPELESS: NOT AT ALL
7. TROUBLE CONCENTRATING ON THINGS, SUCH AS READING THE NEWSPAPER OR WATCHING TELEVISION: NOT AT ALL
5. POOR APPETITE OR OVEREATING: MORE THAN HALF THE DAYS
8. MOVING OR SPEAKING SO SLOWLY THAT OTHER PEOPLE COULD HAVE NOTICED. OR THE OPPOSITE, BEING SO FIGETY OR RESTLESS THAT YOU HAVE BEEN MOVING AROUND A LOT MORE THAN USUAL: SEVERAL DAYS

## 2024-09-18 ASSESSMENT — EXERCISE STRESS TEST
PEAK_BP: 138/91
PEAK_HR: 101
PEAK_BP: 138/91
PEAK_METS: 1.6
PEAK_RPE: 4

## 2024-09-18 ASSESSMENT — EJECTION FRACTION: EF_VALUE: 55

## 2024-09-20 ENCOUNTER — HOSPITAL ENCOUNTER (OUTPATIENT)
Facility: HOSPITAL | Age: 69
Setting detail: RECURRING SERIES
Discharge: HOME | End: 2024-09-23
Payer: MEDICARE

## 2024-09-20 PROCEDURE — 97110 THERAPEUTIC EXERCISES: CPT

## 2024-09-20 PROCEDURE — 97530 THERAPEUTIC ACTIVITIES: CPT

## 2024-09-20 PROCEDURE — 97535 SELF CARE MNGMENT TRAINING: CPT

## 2024-09-23 ENCOUNTER — HOSPITAL ENCOUNTER (OUTPATIENT)
Facility: HOSPITAL | Age: 69
Setting detail: RECURRING SERIES
Discharge: HOME | End: 2024-09-26
Payer: MEDICARE

## 2024-09-23 PROCEDURE — 97535 SELF CARE MNGMENT TRAINING: CPT

## 2024-09-23 PROCEDURE — 97110 THERAPEUTIC EXERCISES: CPT

## 2024-09-23 PROCEDURE — 97530 THERAPEUTIC ACTIVITIES: CPT

## 2024-09-24 ENCOUNTER — HOSPITAL ENCOUNTER (OUTPATIENT)
Facility: HOSPITAL | Age: 69
Setting detail: RECURRING SERIES
Discharge: HOME | End: 2024-09-27
Payer: MEDICARE

## 2024-09-24 PROCEDURE — G0239 OTH RESP PROC, GROUP: HCPCS

## 2024-09-24 PROCEDURE — G0237 THERAPEUTIC PROCD STRG ENDUR: HCPCS

## 2024-09-25 ENCOUNTER — HOSPITAL ENCOUNTER (OUTPATIENT)
Facility: HOSPITAL | Age: 69
Setting detail: RECURRING SERIES
Discharge: HOME | End: 2024-09-28
Payer: MEDICARE

## 2024-09-25 PROCEDURE — 97530 THERAPEUTIC ACTIVITIES: CPT

## 2024-09-25 PROCEDURE — 97110 THERAPEUTIC EXERCISES: CPT

## 2024-09-25 PROCEDURE — 97112 NEUROMUSCULAR REEDUCATION: CPT

## 2024-09-26 ENCOUNTER — HOSPITAL ENCOUNTER (OUTPATIENT)
Facility: HOSPITAL | Age: 69
Discharge: HOME | End: 2024-09-29
Payer: MEDICARE

## 2024-09-26 ENCOUNTER — TRANSCRIBE ORDERS (OUTPATIENT)
Facility: HOSPITAL | Age: 69
End: 2024-09-26

## 2024-09-26 LAB
ARTERIAL PATENCY WRIST A: ABNORMAL
BASE EXCESS BLD CALC-SCNC: 5.2 MMOL/L
BDY SITE: ABNORMAL
GAS FLOW.O2 O2 DELIVERY SYS: ABNORMAL
HCO3 BLD-SCNC: 30.9 MMOL/L (ref 21–28)
O2/TOTAL GAS SETTING VFR VENT: 21 %
PCO2 BLD: 48 MMHG (ref 35–48)
PH BLD: 7.42 (ref 7.35–7.45)
PO2 BLD: 53 MMHG (ref 83–108)
SAO2 % BLD: 87.1 % (ref 92–97)
SERVICE CMNT-IMP: ABNORMAL
SPECIMEN TYPE: ABNORMAL

## 2024-09-26 PROCEDURE — 82803 BLOOD GASES ANY COMBINATION: CPT

## 2024-09-26 PROCEDURE — 36600 WITHDRAWAL OF ARTERIAL BLOOD: CPT

## 2024-09-27 ENCOUNTER — HOSPITAL ENCOUNTER (OUTPATIENT)
Facility: HOSPITAL | Age: 69
Setting detail: RECURRING SERIES
Discharge: HOME | End: 2024-09-30
Payer: MEDICARE

## 2024-09-27 PROCEDURE — G0237 THERAPEUTIC PROCD STRG ENDUR: HCPCS

## 2024-09-27 PROCEDURE — G0239 OTH RESP PROC, GROUP: HCPCS

## 2024-09-27 NOTE — PROGRESS NOTES
Pulmonary/RT Note    Visit #        3/36       Eryn Howell is a 68 y.o. female presented today for pulmonary rehab. She states that there have been no changes in medication or health since the last visit.          She has a target heart rate of 109-125. She tolerated the exercise session well and has a pain level of 5. Patient states RPE for session today was 14 and RPD was a 3. Today the pt did the following.    PhysioMax: 30 min  Breathing retraining: 15 min  Recovery and Monitoring:15 min             Physician available for emergencies: Emanuel JACOB, RT 9/27/2024 3:51 PM

## 2024-09-30 ENCOUNTER — APPOINTMENT (OUTPATIENT)
Facility: HOSPITAL | Age: 69
End: 2024-09-30
Payer: MEDICARE

## 2024-09-30 ENCOUNTER — TELEPHONE (OUTPATIENT)
Facility: HOSPITAL | Age: 69
End: 2024-09-30

## 2024-09-30 NOTE — TELEPHONE ENCOUNTER
Pts  called to [facility] due to pt spraining her ankle. Will call back to let us know if they can make wednesday.

## 2024-10-01 ENCOUNTER — TELEPHONE (OUTPATIENT)
Facility: HOSPITAL | Age: 69
End: 2024-10-01

## 2024-10-07 ENCOUNTER — TELEPHONE (OUTPATIENT)
Facility: HOSPITAL | Age: 69
End: 2024-10-07

## 2024-10-07 NOTE — TELEPHONE ENCOUNTER
Pt's  called today to inform us that Mrs. Howell has broken her ankle in three places this past weekend.  And Mrs. Howell questioned if she'd be able to continue with pulmonary rehab while she recovers from this injury and imminent surgery. I informed them that the only thing that the pt would be able to do is our arm-bike and resistance bands, which would totally exhaust her upper body after a session or two given her current physical condition. The patients agreed that the best course of action would be to d/c the pt from pulmonary rehab as she recovers and attends PT/OT post-op. The pt and her  were informed that when the pt is done with PT/OT and has regained the ability to bear weight on her ankle, that she would be able to restart pulmonary rehab with a new referral.

## 2024-10-09 ENCOUNTER — TELEPHONE (OUTPATIENT)
Facility: HOSPITAL | Age: 69
End: 2024-10-09

## 2024-10-09 NOTE — TELEPHONE ENCOUNTER
Pt no showed appt. PTA called and pt answered. Pt reported that they had fractured their ankle recently and had been in a immobilizer boot at this time. Pt reported they would not be able to return to therapy until after surgery. PTA confirmed D/C from therapy at this time with pt.

## 2024-10-14 ENCOUNTER — TELEPHONE (OUTPATIENT)
Facility: HOSPITAL | Age: 69
End: 2024-10-14

## 2024-10-15 ENCOUNTER — ANESTHESIA EVENT (OUTPATIENT)
Facility: HOSPITAL | Age: 69
End: 2024-10-15
Payer: MEDICARE

## 2024-10-15 ENCOUNTER — APPOINTMENT (OUTPATIENT)
Facility: HOSPITAL | Age: 69
End: 2024-10-15
Attending: PODIATRIST
Payer: MEDICARE

## 2024-10-15 ENCOUNTER — ANESTHESIA (OUTPATIENT)
Facility: HOSPITAL | Age: 69
End: 2024-10-15
Payer: MEDICARE

## 2024-10-15 ENCOUNTER — HOSPITAL ENCOUNTER (OUTPATIENT)
Facility: HOSPITAL | Age: 69
Setting detail: OUTPATIENT SURGERY
Discharge: HOME | End: 2024-10-15
Attending: PODIATRIST | Admitting: PODIATRIST
Payer: MEDICARE

## 2024-10-15 VITALS
DIASTOLIC BLOOD PRESSURE: 76 MMHG | BODY MASS INDEX: 23.21 KG/M2 | HEIGHT: 68 IN | TEMPERATURE: 98.5 F | OXYGEN SATURATION: 98 % | RESPIRATION RATE: 20 BRPM | HEART RATE: 82 BPM | WEIGHT: 153.13 LBS | SYSTOLIC BLOOD PRESSURE: 122 MMHG

## 2024-10-15 PROCEDURE — 64445 NJX AA&/STRD SCIATIC NRV IMG: CPT | Performed by: ANESTHESIOLOGY

## 2024-10-15 PROCEDURE — 2500000003 HC RX 250 WO HCPCS

## 2024-10-15 PROCEDURE — 6370000000 HC RX 637 (ALT 250 FOR IP): Performed by: ANESTHESIOLOGY

## 2024-10-15 PROCEDURE — 3600000012 HC SURGERY LEVEL 2 ADDTL 15MIN: Performed by: PODIATRIST

## 2024-10-15 PROCEDURE — 6360000002 HC RX W HCPCS: Performed by: ANESTHESIOLOGY

## 2024-10-15 PROCEDURE — 7100000011 HC PHASE II RECOVERY - ADDTL 15 MIN: Performed by: PODIATRIST

## 2024-10-15 PROCEDURE — 2580000003 HC RX 258: Performed by: PODIATRIST

## 2024-10-15 PROCEDURE — 3600000002 HC SURGERY LEVEL 2 BASE: Performed by: PODIATRIST

## 2024-10-15 PROCEDURE — C1769 GUIDE WIRE: HCPCS | Performed by: PODIATRIST

## 2024-10-15 PROCEDURE — 2580000003 HC RX 258

## 2024-10-15 PROCEDURE — 3700000000 HC ANESTHESIA ATTENDED CARE: Performed by: PODIATRIST

## 2024-10-15 PROCEDURE — C1713 ANCHOR/SCREW BN/BN,TIS/BN: HCPCS | Performed by: PODIATRIST

## 2024-10-15 PROCEDURE — 2720000010 HC SURG SUPPLY STERILE: Performed by: PODIATRIST

## 2024-10-15 PROCEDURE — A4217 STERILE WATER/SALINE, 500 ML: HCPCS | Performed by: PODIATRIST

## 2024-10-15 PROCEDURE — 2709999900 HC NON-CHARGEABLE SUPPLY: Performed by: PODIATRIST

## 2024-10-15 PROCEDURE — 3700000001 HC ADD 15 MINUTES (ANESTHESIA): Performed by: PODIATRIST

## 2024-10-15 PROCEDURE — 2500000003 HC RX 250 WO HCPCS: Performed by: ANESTHESIOLOGY

## 2024-10-15 PROCEDURE — 6360000002 HC RX W HCPCS: Performed by: PODIATRIST

## 2024-10-15 PROCEDURE — 7100000000 HC PACU RECOVERY - FIRST 15 MIN: Performed by: PODIATRIST

## 2024-10-15 PROCEDURE — 7100000010 HC PHASE II RECOVERY - FIRST 15 MIN: Performed by: PODIATRIST

## 2024-10-15 PROCEDURE — 7100000001 HC PACU RECOVERY - ADDTL 15 MIN: Performed by: PODIATRIST

## 2024-10-15 PROCEDURE — 6360000002 HC RX W HCPCS

## 2024-10-15 DEVICE — LOCKING SCREW
Type: IMPLANTABLE DEVICE | Site: ANKLE | Status: FUNCTIONAL
Brand: VARIAX

## 2024-10-15 DEVICE — DISTAL LATERAL FIBULA PLATE, 4 HOLE
Type: IMPLANTABLE DEVICE | Site: ANKLE | Status: FUNCTIONAL
Brand: VARIAX

## 2024-10-15 DEVICE — IMPLANTABLE DEVICE
Type: IMPLANTABLE DEVICE | Site: ANKLE | Status: FUNCTIONAL
Brand: GRAVITY SYNCHFIX

## 2024-10-15 DEVICE — CANNULATED SCREW
Type: IMPLANTABLE DEVICE | Site: ANKLE | Status: FUNCTIONAL
Brand: ASNIS

## 2024-10-15 RX ORDER — ONDANSETRON 2 MG/ML
INJECTION INTRAMUSCULAR; INTRAVENOUS
Status: DISCONTINUED | OUTPATIENT
Start: 2024-10-15 | End: 2024-10-15 | Stop reason: SDUPTHER

## 2024-10-15 RX ORDER — PROPOFOL 10 MG/ML
INJECTION, EMULSION INTRAVENOUS
Status: DISCONTINUED | OUTPATIENT
Start: 2024-10-15 | End: 2024-10-15 | Stop reason: SDUPTHER

## 2024-10-15 RX ORDER — FENTANYL CITRATE 50 UG/ML
INJECTION, SOLUTION INTRAMUSCULAR; INTRAVENOUS
Status: DISCONTINUED | OUTPATIENT
Start: 2024-10-15 | End: 2024-10-15 | Stop reason: SDUPTHER

## 2024-10-15 RX ORDER — NALOXONE HYDROCHLORIDE 0.4 MG/ML
INJECTION, SOLUTION INTRAMUSCULAR; INTRAVENOUS; SUBCUTANEOUS PRN
Status: DISCONTINUED | OUTPATIENT
Start: 2024-10-15 | End: 2024-10-15 | Stop reason: HOSPADM

## 2024-10-15 RX ORDER — MIDAZOLAM HYDROCHLORIDE 1 MG/ML
INJECTION INTRAMUSCULAR; INTRAVENOUS
Status: DISCONTINUED | OUTPATIENT
Start: 2024-10-15 | End: 2024-10-15 | Stop reason: SDUPTHER

## 2024-10-15 RX ORDER — IPRATROPIUM BROMIDE AND ALBUTEROL SULFATE 2.5; .5 MG/3ML; MG/3ML
1 SOLUTION RESPIRATORY (INHALATION)
Status: DISCONTINUED | OUTPATIENT
Start: 2024-10-15 | End: 2024-10-15 | Stop reason: HOSPADM

## 2024-10-15 RX ORDER — MIDAZOLAM HYDROCHLORIDE 1 MG/ML
INJECTION INTRAMUSCULAR; INTRAVENOUS
Status: COMPLETED
Start: 2024-10-15 | End: 2024-10-15

## 2024-10-15 RX ORDER — ROPIVACAINE HYDROCHLORIDE 5 MG/ML
INJECTION, SOLUTION EPIDURAL; INFILTRATION; PERINEURAL
Status: COMPLETED | OUTPATIENT
Start: 2024-10-15 | End: 2024-10-15

## 2024-10-15 RX ORDER — MEPERIDINE HYDROCHLORIDE 50 MG/ML
12.5 INJECTION INTRAMUSCULAR; INTRAVENOUS; SUBCUTANEOUS AS NEEDED
Status: DISCONTINUED | OUTPATIENT
Start: 2024-10-15 | End: 2024-10-15 | Stop reason: HOSPADM

## 2024-10-15 RX ORDER — SODIUM CHLORIDE, SODIUM LACTATE, POTASSIUM CHLORIDE, CALCIUM CHLORIDE 600; 310; 30; 20 MG/100ML; MG/100ML; MG/100ML; MG/100ML
INJECTION, SOLUTION INTRAVENOUS CONTINUOUS
Status: DISCONTINUED | OUTPATIENT
Start: 2024-10-15 | End: 2024-10-15 | Stop reason: HOSPADM

## 2024-10-15 RX ORDER — DIPHENHYDRAMINE HYDROCHLORIDE 50 MG/ML
12.5 INJECTION INTRAMUSCULAR; INTRAVENOUS
Status: DISCONTINUED | OUTPATIENT
Start: 2024-10-15 | End: 2024-10-15 | Stop reason: HOSPADM

## 2024-10-15 RX ORDER — SODIUM CHLORIDE 0.9 % (FLUSH) 0.9 %
5-40 SYRINGE (ML) INJECTION EVERY 12 HOURS SCHEDULED
Status: DISCONTINUED | OUTPATIENT
Start: 2024-10-15 | End: 2024-10-15 | Stop reason: HOSPADM

## 2024-10-15 RX ORDER — FENTANYL CITRATE 50 UG/ML
25 INJECTION, SOLUTION INTRAMUSCULAR; INTRAVENOUS EVERY 5 MIN PRN
Status: DISCONTINUED | OUTPATIENT
Start: 2024-10-15 | End: 2024-10-15 | Stop reason: HOSPADM

## 2024-10-15 RX ORDER — PHENYLEPHRINE HCL IN 0.9% NACL 1 MG/10 ML
SYRINGE (ML) INTRAVENOUS
Status: DISCONTINUED | OUTPATIENT
Start: 2024-10-15 | End: 2024-10-15 | Stop reason: SDUPTHER

## 2024-10-15 RX ORDER — SODIUM CHLORIDE 0.9 % (FLUSH) 0.9 %
5-40 SYRINGE (ML) INJECTION PRN
Status: DISCONTINUED | OUTPATIENT
Start: 2024-10-15 | End: 2024-10-15 | Stop reason: HOSPADM

## 2024-10-15 RX ORDER — LABETALOL HYDROCHLORIDE 5 MG/ML
10 INJECTION, SOLUTION INTRAVENOUS
Status: DISCONTINUED | OUTPATIENT
Start: 2024-10-15 | End: 2024-10-15 | Stop reason: HOSPADM

## 2024-10-15 RX ORDER — MAGNESIUM HYDROXIDE 1200 MG/15ML
LIQUID ORAL CONTINUOUS PRN
Status: COMPLETED | OUTPATIENT
Start: 2024-10-15 | End: 2024-10-15

## 2024-10-15 RX ORDER — LIDOCAINE HYDROCHLORIDE 20 MG/ML
INJECTION, SOLUTION EPIDURAL; INFILTRATION; INTRACAUDAL; PERINEURAL
Status: DISCONTINUED | OUTPATIENT
Start: 2024-10-15 | End: 2024-10-15 | Stop reason: SDUPTHER

## 2024-10-15 RX ORDER — OXYCODONE HYDROCHLORIDE 5 MG/1
5 TABLET ORAL
Status: COMPLETED | OUTPATIENT
Start: 2024-10-15 | End: 2024-10-15

## 2024-10-15 RX ORDER — DROPERIDOL 2.5 MG/ML
0.62 INJECTION, SOLUTION INTRAMUSCULAR; INTRAVENOUS
Status: DISCONTINUED | OUTPATIENT
Start: 2024-10-15 | End: 2024-10-15 | Stop reason: HOSPADM

## 2024-10-15 RX ORDER — HYDROMORPHONE HYDROCHLORIDE 1 MG/ML
0.5 INJECTION, SOLUTION INTRAMUSCULAR; INTRAVENOUS; SUBCUTANEOUS EVERY 5 MIN PRN
Status: DISCONTINUED | OUTPATIENT
Start: 2024-10-15 | End: 2024-10-15 | Stop reason: HOSPADM

## 2024-10-15 RX ORDER — ONDANSETRON 2 MG/ML
4 INJECTION INTRAMUSCULAR; INTRAVENOUS
Status: COMPLETED | OUTPATIENT
Start: 2024-10-15 | End: 2024-10-15

## 2024-10-15 RX ORDER — DEXMEDETOMIDINE HYDROCHLORIDE 100 UG/ML
INJECTION, SOLUTION INTRAVENOUS
Status: DISCONTINUED | OUTPATIENT
Start: 2024-10-15 | End: 2024-10-15 | Stop reason: SDUPTHER

## 2024-10-15 RX ORDER — EPHEDRINE SULFATE 5 MG/ML
INJECTION INTRAVENOUS
Status: DISCONTINUED | OUTPATIENT
Start: 2024-10-15 | End: 2024-10-15 | Stop reason: SDUPTHER

## 2024-10-15 RX ORDER — SODIUM CHLORIDE 9 MG/ML
INJECTION, SOLUTION INTRAVENOUS
Status: DISCONTINUED | OUTPATIENT
Start: 2024-10-15 | End: 2024-10-15 | Stop reason: SDUPTHER

## 2024-10-15 RX ORDER — SODIUM CHLORIDE 9 MG/ML
INJECTION, SOLUTION INTRAVENOUS PRN
Status: DISCONTINUED | OUTPATIENT
Start: 2024-10-15 | End: 2024-10-15 | Stop reason: HOSPADM

## 2024-10-15 RX ADMIN — FENTANYL CITRATE 25 MCG: 50 INJECTION, SOLUTION INTRAMUSCULAR; INTRAVENOUS at 17:14

## 2024-10-15 RX ADMIN — FENTANYL CITRATE 25 MCG: 50 INJECTION, SOLUTION INTRAMUSCULAR; INTRAVENOUS at 17:18

## 2024-10-15 RX ADMIN — ONDANSETRON 4 MG: 2 INJECTION INTRAMUSCULAR; INTRAVENOUS at 16:19

## 2024-10-15 RX ADMIN — PROPOFOL 150 MG: 10 INJECTION, EMULSION INTRAVENOUS at 15:53

## 2024-10-15 RX ADMIN — HYDROMORPHONE HYDROCHLORIDE 0.5 MG: 1 INJECTION, SOLUTION INTRAMUSCULAR; INTRAVENOUS; SUBCUTANEOUS at 17:40

## 2024-10-15 RX ADMIN — FENTANYL CITRATE 25 MCG: 50 INJECTION, SOLUTION INTRAMUSCULAR; INTRAVENOUS at 17:17

## 2024-10-15 RX ADMIN — Medication 20 MG: at 15:12

## 2024-10-15 RX ADMIN — OXYCODONE 5 MG: 5 TABLET ORAL at 18:38

## 2024-10-15 RX ADMIN — Medication 150 MCG: at 16:29

## 2024-10-15 RX ADMIN — ROPIVACAINE HYDROCHLORIDE 20 ML: 5 INJECTION, SOLUTION EPIDURAL; INFILTRATION; PERINEURAL at 15:18

## 2024-10-15 RX ADMIN — MIDAZOLAM 5 MG: 1 INJECTION INTRAMUSCULAR; INTRAVENOUS at 15:12

## 2024-10-15 RX ADMIN — FENTANYL CITRATE 25 MCG: 50 INJECTION INTRAMUSCULAR; INTRAVENOUS at 18:00

## 2024-10-15 RX ADMIN — SODIUM CHLORIDE, POTASSIUM CHLORIDE, SODIUM LACTATE AND CALCIUM CHLORIDE: 600; 310; 30; 20 INJECTION, SOLUTION INTRAVENOUS at 14:45

## 2024-10-15 RX ADMIN — Medication 100 MCG: at 16:24

## 2024-10-15 RX ADMIN — WATER 2000 MG: 1 INJECTION INTRAMUSCULAR; INTRAVENOUS; SUBCUTANEOUS at 16:00

## 2024-10-15 RX ADMIN — PROPOFOL 50 MG: 10 INJECTION, EMULSION INTRAVENOUS at 16:36

## 2024-10-15 RX ADMIN — MEPIVACAINE HYDROCHLORIDE 10 ML: 20 INJECTION, SOLUTION EPIDURAL; INFILTRATION at 15:18

## 2024-10-15 RX ADMIN — FENTANYL CITRATE 25 MCG: 50 INJECTION, SOLUTION INTRAMUSCULAR; INTRAVENOUS at 17:15

## 2024-10-15 RX ADMIN — LIDOCAINE HYDROCHLORIDE 100 MG: 20 INJECTION, SOLUTION EPIDURAL; INFILTRATION; INTRACAUDAL; PERINEURAL at 15:52

## 2024-10-15 RX ADMIN — EPHEDRINE SULFATE 10 MG: 5 INJECTION INTRAVENOUS at 16:19

## 2024-10-15 RX ADMIN — DEXMEDETOMIDINE 4 MCG: 100 INJECTION, SOLUTION, CONCENTRATE INTRAVENOUS at 17:06

## 2024-10-15 RX ADMIN — FENTANYL CITRATE 25 MCG: 50 INJECTION INTRAMUSCULAR; INTRAVENOUS at 17:55

## 2024-10-15 RX ADMIN — HYDROMORPHONE HYDROCHLORIDE 0.5 MG: 1 INJECTION, SOLUTION INTRAMUSCULAR; INTRAVENOUS; SUBCUTANEOUS at 17:34

## 2024-10-15 RX ADMIN — ONDANSETRON HYDROCHLORIDE 4 MG: 2 INJECTION INTRAMUSCULAR; INTRAVENOUS at 18:00

## 2024-10-15 RX ADMIN — DEXMEDETOMIDINE 8 MCG: 100 INJECTION, SOLUTION, CONCENTRATE INTRAVENOUS at 16:44

## 2024-10-15 RX ADMIN — SODIUM CHLORIDE: 9 INJECTION, SOLUTION INTRAVENOUS at 16:20

## 2024-10-15 ASSESSMENT — PAIN SCALES - GENERAL
PAINLEVEL_OUTOF10: 8
PAINLEVEL_OUTOF10: 4
PAINLEVEL_OUTOF10: 6
PAINLEVEL_OUTOF10: 4
PAINLEVEL_OUTOF10: 8
PAINLEVEL_OUTOF10: 4

## 2024-10-15 ASSESSMENT — PAIN DESCRIPTION - LOCATION
LOCATION: ANKLE

## 2024-10-15 ASSESSMENT — PAIN DESCRIPTION - DESCRIPTORS
DESCRIPTORS: ACHING;THROBBING
DESCRIPTORS: ACHING;THROBBING
DESCRIPTORS: ACHING
DESCRIPTORS: ACHING;THROBBING
DESCRIPTORS: ACHING;SHARP
DESCRIPTORS: ACHING;THROBBING

## 2024-10-15 ASSESSMENT — PAIN DESCRIPTION - ORIENTATION
ORIENTATION: LEFT

## 2024-10-15 ASSESSMENT — PAIN - FUNCTIONAL ASSESSMENT: PAIN_FUNCTIONAL_ASSESSMENT: 0-10

## 2024-10-15 NOTE — DISCHARGE INSTRUCTIONS
after surgery. Other causes include medicines such as aspirin or anticoagulants (blood thinners).  To help stop the bleeding, your doctor may have put pressure on the incision or sewn up or cauterized (sealed) the incision. Or you may have had surgery to stop bleeding inside the surgery area. Your doctor also may have given you medicines that help stop the bleeding.  How can you care for yourself at home?  If you have strips of tape on the incision, leave the tape on until it falls off. Or follow your doctor's instructions for removing the tape. Keep the area dry at all times.  You will have a dressing over the incision. A dressing helps the incision heal and protects it. Your doctor will tell you how to take care of this.  If you do not have tape on the incision, wash the area daily with warm, soapy water, and pat it dry. Don't use hydrogen peroxide or alcohol, which can slow healing.    When should you call for help?    Call your doctor now or seek immediate medical care if:    You are dizzy or lightheaded, or you feel like you may faint.     You have bleeding that starts again or gets worse, such as soaking one or more bandages over 2 to 4 hours, even after holding pressure on the area.         __________________________________________________________________________________________________________________________________

## 2024-10-15 NOTE — PERIOP NOTE
Pt took Aspirin yesterday at 0800 am. Dr. Simpson notified. No new order received. Ok to proceed.

## 2024-10-15 NOTE — PERIOP NOTE
Reviewed PTA medication list with patient/caregiver and patient/caregiver denies any additional medications.     Patient admits to having a responsible adult care for them at home for at least 24 hours after surgery.    Patient encouraged to use gown warming system and informed that using said warming gown to regulate body temperature prior to a procedure has been shown to help reduce the risks of blood clots and infection.    Patient's pharmacy of choice verified and documented in PTA medication section.    Dual skin assessment & fall risk band verification completed with RAUL Michaels.

## 2024-10-15 NOTE — ANESTHESIA PROCEDURE NOTES
Peripheral Block    Patient location during procedure: pre-op  Reason for block: procedure for pain, post-op pain management and at surgeon's request  Start time: 10/15/2024 3:12 PM  End time: 10/15/2024 3:19 PM  Staffing  Performed: anesthesiologist   Anesthesiologist: Trev Malave MD  Performed by: Trev Malave MD  Authorized by: Trev Malave MD    Preanesthetic Checklist  Completed: patient identified, IV checked, site marked, risks and benefits discussed, surgical/procedural consents, equipment checked, pre-op evaluation, timeout performed, anesthesia consent given, oxygen available, monitors applied/VS acknowledged, fire risk safety assessment completed and verbalized and blood product R/B/A discussed and consented  Peripheral Block   Patient position: right lateral decubitus  Prep: ChloraPrep  Patient monitoring: frequent blood pressure checks, responsive to questions, IV access, oxygen and continuous pulse ox  Block type: Sciatic (Popliteal)  Popliteal  Laterality: left  Injection technique: single-shot  Guidance: nerve stimulator and ultrasound guided  Local infiltration: lidocaine  Infiltration strength: 2 %  Local infiltration: lidocaine  Dose: 5 mL    Needle   Needle type: insulated echogenic nerve stimulator needle   Needle gauge: 21 G  Needle localization: anatomical landmarks, nerve stimulator and ultrasound guidance  Needle length: 10 cm  Assessment   Injection assessment: negative aspiration for heme, no paresthesia on injection, local visualized surrounding nerve on ultrasound and no intravascular symptoms  Paresthesia pain: none  Slow fractionated injection: no  Hemodynamics: stable  Outcomes: uncomplicated    Medications Administered  ropivacaine (NAROPIN) injection 0.5% - Perineural   20 mL - 10/15/2024 3:18:00 PM  mepivacaine (CARBOCAINE) injection 2% - Perineural   10 mL - 10/15/2024 3:18:00 PM

## 2024-10-15 NOTE — ANESTHESIA PRE PROCEDURE
Department of Anesthesiology  Preprocedure Note       Name:  Eryn Howell   Age:  68 y.o.  :  1955                                          MRN:  [id number]         Date:  10/15/2024      Surgeon: Surgeon(s):  Aguila Simpson DPM    Procedure: Procedure(s):  LEFT ANKLE OPEN REDUCTION INTERNAL FIXATION TRIMALLEOLAR FRACTURE, SYNDESMOSIS REPAIR, ANKLE ARTHROTOMY SYNDESMOSIS REPAIR, ANKLE ARTHROTOMY WITH REMOVAL OF LOOSE BODY WITH C-ARM 'SPEC POP\"    Medications prior to admission:   Prior to Admission medications    Medication Sig Start Date End Date Taking? Authorizing Provider   HYDROcodone-acetaminophen (NORCO) 5-325 MG per tablet Take 1 tablet by mouth every 6 hours as needed for Pain.   Yes Zenon Rosenberg MD   melatonin 3 MG TABS tablet Take 1 tablet by mouth daily   Yes Zenon Rosenberg MD   Doxylamine Succinate, Sleep, (UNISOM PO) Take by mouth   Yes Zenon Rosenberg MD   clonazePAM (KLONOPIN) 2 MG tablet Take 1 tablet by mouth in the morning, at noon, and at bedtime for 10 days. Max Daily Amount: 6 mg 7/16/24 10/15/24 Yes Joelle Del Cid MD   buPROPion (WELLBUTRIN XL) 300 MG extended release tablet Take 1 tablet by mouth every morning 3/28/24  Yes ProviderZenon MD   DIOVAN -25 MG per tablet Take 1 tablet by mouth daily 24  Yes Zenon Rosenberg MD   ipratropium 0.5 mg-albuterol 2.5 mg (DUONEB) 0.5-2.5 (3) MG/3ML SOLN nebulizer solution Inhale 3 mLs into the lungs every 4 hours as needed for Shortness of Breath 24  Yes Irlanda Rosenthal MD   guaiFENesin (MUCINEX) 600 MG extended release tablet Take 1 tablet by mouth 2 times daily 24  Yes Irlanda Rosenthal MD   fluticasone-umeclidin-vilant (TRELEGY ELLIPTA) 200-62.5-25 MCG/ACT AEPB inhaler Inhale 1 puff into the lungs daily 24  Yes Irlanda Rosenthal MD   albuterol sulfate HFA (PROVENTIL;VENTOLIN;PROAIR) 108 (90 Base) MCG/ACT inhaler Inhale 2 puffs into the lungs every 4 hours as needed   Yes Automatic Reconciliation, Ar

## 2024-10-15 NOTE — ANESTHESIA POSTPROCEDURE EVALUATION
Department of Anesthesiology  Postprocedure Note    Patient: Eryn Howell  MRN: [id number]  YOB: 1955  Date of evaluation: 10/15/2024    Procedure Summary       Date: 10/15/24 Room / Location: [facility] MAIN 01 / [facility] MAIN OR    Anesthesia Start: 1545 Anesthesia Stop: 1723    Procedure: LEFT ANKLE OPEN REDUCTION INTERNAL FIXATION TRIMALLEOLAR FRACTURE, SYNDESMOSIS REPAIR, ANKLE ARTHROTOMY SYNDESMOSIS REPAIR, ANKLE ARTHROTOMY WITH REMOVAL OF LOOSE BODY WITH C-ARM 'SPEC POP\" (Left: Ankle) Diagnosis:       Closed bimalleolar fracture of left ankle, initial encounter      Sprain of tibiofibular ligament of left ankle, initial encounter      Closed fracture of proximal end of left fibula, unspecified fracture morphology, initial encounter      Left leg pain      (Closed bimalleolar fracture of left ankle, initial encounter [S82.842A])      (Sprain of tibiofibular ligament of left ankle, initial encounter [S93.432A])      (Closed fracture of proximal end of left fibula, unspecified fracture morphology, initial encounter [S82.832A])      (Left leg pain [M79.605])    Surgeons: Aguila Simpson DPM Responsible Provider: Trev Malave MD    Anesthesia Type: General, Regional ASA Status: 3            Anesthesia Type: General, Regional    Alejandro Phase I:      Alejandro Phase II:      Anesthesia Post Evaluation    Patient location during evaluation: bedside  Patient participation: complete - patient participated  Level of consciousness: awake  Airway patency: patent  Nausea & Vomiting: no nausea and no vomiting  Cardiovascular status: hemodynamically stable  Respiratory status: acceptable  Hydration status: stable  Pain management: satisfactory to patient    No notable events documented.

## 2024-10-15 NOTE — H&P
on file     Social Determinants of Health     Financial Resource Strain: Not on file   Food Insecurity: Not on file   Transportation Needs: Not on file   Physical Activity: Not on file   Stress: Not on file   Social Connections: Not on file   Intimate Partner Violence: Not on file   Housing Stability: Not on file       Allergies:  Sulfacetamide and Sulfamethoxazole-trimethoprim     Medications:    Current Facility-Administered Medications:     lactated ringers IV soln infusion, , IntraVENous, Continuous, Aguila Simpson DPM, Last Rate: 125 mL/hr at 10/15/24 1445, New Bag at 10/15/24 1445    ceFAZolin (ANCEF) 2,000 mg in sterile water 20 mL IV syringe, 2,000 mg, IntraVENous, Once, Aguila Simpson DPM    Facility-Administered Medications Ordered in Other Encounters:     midazolam (VERSED) injection, , IntraVENous, Once PRN, Trev Malave MD, 5 mg at 10/15/24 1512    ketamine (KETALAR) injection, , IntraVENous, Once PRN, Trev Malave MD, 20 mg at 10/15/24 1512       ROS:  A comprehensive review of systems was negative except for that written in the History of Present Illness.     Physical Exam:  Temp (24hrs), Av.1 °F (36.7 °C), Min:98.1 °F (36.7 °C), Max:98.1 °F (36.7 °C)    BP (!) 116/45   Pulse 85   Temp 98.1 °F (36.7 °C) (Oral)   Resp 28   Ht 1.727 m (5' 8\")   Wt 69.5 kg (153 lb 2 oz)   SpO2 98%   BMI 23.28 kg/m²     Ace wrap in place. Pain and swelling left ankle. Ecchymosis.  Limited ROM and strength due to pain       Lab results:    Chemistry  No results for input(s): \"GLU\", \"NA\", \"K\", \"CL\", \"CO2\", \"BUN\", \"TP\", \"GLOB\" in the last 72 hours.    Invalid input(s): \"CREA\", \"CA\", \"AGAP\", \"BUCR\", \"TBIL\", \"GPT\", \"AP\", \"ALB\", \"AGRAT\"    CBC w/ Diff  No results for input(s): \"WBC\", \"RBC\", \"HGB\", \"HCT\", \"PLT\" in the last 72 hours.    Invalid input(s): \"GRANS\", \"LYMPH\", \"EOS\"    Microbiology  Results       ** No results found for the last 336 hours. **                Assessment/Plan     Trimalleolar

## 2024-10-15 NOTE — BRIEF OP NOTE
Brief Postoperative Note      Patient: Eryn Howell  YOB: 1955  MRN: [id number]    Date of Procedure: 10/15/2024    Pre-Op Diagnosis Codes:      * Closed bimalleolar fracture of left ankle, initial encounter [S82.842A]     * Sprain of tibiofibular ligament of left ankle, initial encounter [S93.432A]     * Closed fracture of proximal end of left fibula, unspecified fracture morphology, initial encounter [S82.832A]     * Left leg pain [M79.605]    Post-Op Diagnosis: Same       Procedure(s):  LEFT ANKLE OPEN REDUCTION INTERNAL FIXATION TRIMALLEOLAR FRACTURE, SYNDESMOSIS REPAIR, ANKLE ARTHROTOMY SYNDESMOSIS REPAIR, ANKLE ARTHROTOMY WITH REMOVAL OF LOOSE BODY WITH C-ARM 'SPEC POP\"    Surgeon(s):  Aguila Simpson DPM    Assistant:  Surgical Assistant: Sowmya Garcia    Anesthesia: General    Estimated Blood Loss (mL): less than 50     Complications: None    Specimens:   * No specimens in log *    Implants:  Implant Name Type Inv. Item Serial No.  Lot No. LRB No. Used Action   WIRE FIX L150MM DIA1.6MM FOR FIXOS FOREFOOT MIDFOOT SCR SYS - [id number]  WIRE FIX L150MM DIA1.6MM FOR FIXOS FOREFOOT MIDFOOT SCR SYS  [employer] [facility] 000 Left 1 Implanted   PLATE BNE L89MM THK1.3MM-2MM 4 H LAT DST FIBULAR TI STR PAULO - [id number]  PLATE BNE L89MM THK1.3MM-2MM 4 H LAT DST FIBULAR TI STR PAULO  [employer] [facility] 000 Left 1 Implanted   SCREW BNE L18MM DIA3.5MM ARACELI TI PAULO FULL THRD T10 DRV FOR - [id number]  SCREW BNE L18MM DIA3.5MM ARACELI TI PAULO FULL THRD T10 DRV FOR  [employer] [facility] 000 Left 1 Implanted   SCREW BNE L16MM DIA3.5MM ARACELI TI PAULO FULL THRD T10 DRV FOR - [id number]  SCREW BNE L16MM DIA3.5MM ARACELI TI PAULO FULL THRD T10 DRV FOR  [employer] [facility] 000 Left 1 Implanted   SCREW BNE L12MM DIA3.5MM CANC TI PAULO FULL THRD T10 DRV FOR - [id number]  SCREW BNE L12MM DIA3.5MM CANC TI PAULO FULL THRD T10 DRV FOR  [employer]- Left 1 Implanted

## 2024-10-16 NOTE — PERIOP NOTE
TRANSFER - IN REPORT:    Verbal report received from OR CRNA AND RN on Eryn Howell  being received from OR for routine post-op      Report consisted of patient's Situation, Background, Assessment and   Recommendations(SBAR).     Information from the following report(s) Nurse Handoff Report, Surgery Report, Intake/Output, MAR, and Recent Results was reviewed with the receiving nurse.    Opportunity for questions and clarification was provided.      Assessment completed upon patient's arrival to unit and care assumed.

## 2024-10-16 NOTE — OP NOTE
[address]  [address]                            OPERATIVE REPORT      PATIENT NAME: ADAM FAM              : 1955  MED REC NO: [id number]                       ROOM: OR  ACCOUNT NO: [id number]                       ADMIT DATE: 10/15/2024  PROVIDER: Aguila Simpson DPM    DATE OF SERVICE:  10/15/2024    PREOPERATIVE DIAGNOSES:       1. Displaced trimalleolar ankle fracture, left.     2. Syndesmosis tear, left.     3. Loose body, left ankle.    POSTOPERATIVE DIAGNOSES:       1. Displaced trimalleolar ankle fracture, left.     2. Syndesmosis tear, left.     3. Loose body, left ankle.    PROCEDURES PERFORMED:       1. Open reduction, internal fixation trimalleolar ankle fracture, left.     2. Syndesmosis repair, left.     3. Left ankle arthrotomy with removal of loose body.    SURGEON:  Aguila Simpson DPM    ASSISTANT:  None    ANESTHESIA:  General with sciatic nerve block.    ESTIMATED BLOOD LOSS:  None    SPECIMENS REMOVED:  None    INTRAOPERATIVE FINDINGS:  Severe osteopenia     COMPLICATIONS:  None    IMPLANTS:  Obinna variAx plate and screws, Tightrope    INDICATIONS:  A very pleasant, but unfortunate 68-year-old female who has ongoing pain and discomfort associated with the surgery with the left ankle secondary to a trimalleolar ankle fracture.  After careful consideration of risks, benefits, alternatives, we decided to move forward with surgical repair given the displaced unstable nature of her fracture.  Complications include delayed healing, nonhealing, continued pain, and discomfort.  No guarantees were given.  Informed consent was obtained.    DESCRIPTION OF PROCEDURE:  The patient was taken to the operating room and placed on the operating room table in a supine position.  After the induction of IV sedation and administration of general anesthetic and sciatic nerve block, a well-padded pneumatic thigh

## 2024-10-16 NOTE — PERIOP NOTE
Discharge instructions reviewed with patient and caregiver. All questions and concerns addressed.   [Disease:__________________________] : Disease: [unfilled] [de-identified] : Ratna Vincent has been treated for 5 years with a 1% estradiol patch when first seen by GYN service in 01/2021 for vaginal spotting. She was treated with  a dilatation and curettage on 04/16/2021 and post operatively she received oral Megace. She was bleeding through out April. She discontinued Megace on 05/18 /2021 and was  was first seen by Dr Shannon Hewitt for evaluation for outpatient GYN surgery including abdominal hysterectomy.\par On May 25 2021 she had a near syncopal episode prior to  her shower and she presented to the [facility] ER with heavy vaginal bleeding and leg pain.CT scan of the abdomen showed a enlarged myomatous uterus with abnormal thickening of the endometrium  and presence of blood in the uterus cavity. CBC WBC 12.8 HGB 6.6  000.\par  She was diagnosed to have bilateral deep venous thrombosis located below the knee. Deep venous clots were noted in the left soleal vein (calf) and right saphenous system below the knee. She was seen by hematology service and anticoagulation was held due to low probability of pulmonary embolism due to status of clot below the knee; although venous filter was considered it was not placed.  \par She received multiple units of packed red cells and on 05/26/2021 she was treated with inpatient surgery for exploratory laparotomy,EDISON and BSO. Uterine pathology showed simple and complex endometrial hyperplasia with atypia. \par 6/24/21 Initial office visit  She  was changed to rivaroxaban 20 mg po bid. Counseled regarding Iron 2 tabs po every other day with citrus for WILLOW.Hgb 9.1. She is negative for  Protein S deficiency. \par 7/1/21 Venous Doppler bilateral LE: thrombus involving the right mid and distal gastrocnemius vein as well as small amount clot persistent right gastrocnemius vein\par 7/28/21+ cramping right foot but improving fatigue and exercise tolerance.\par 8/23/21 Stable \par \par  [FreeTextEntry1] : rivaroxaban 20 mg PO daily [de-identified] : Feels great, has not had vaginal bleeding. She has no more cramping in the legs. Hgb 12 \par She is planning vacation next month\par

## 2024-12-12 ENCOUNTER — HOSPITAL ENCOUNTER (OUTPATIENT)
Facility: HOSPITAL | Age: 69
Setting detail: RECURRING SERIES
End: 2024-12-12
Payer: MEDICARE

## 2024-12-12 PROCEDURE — 97163 PT EVAL HIGH COMPLEX 45 MIN: CPT

## 2024-12-12 NOTE — PROGRESS NOTES
Physical Therapy Evaluation  - Foot and Ankle        Patient Name: Eryn Howell    Date: 2024    : 1955  Insurance: Payor: [employer] MEDICARE / Plan: [employer] CHOICE-PPO MEDICARE / Product Type: *No Product type* /      Patient  verified yes     Visit #   Current / Total 1 24   Time   In / Out 415 500     TREATMENT AREA =  Left ankle pain [M25.572]  Left foot pain [M79.672]      SUBJECTIVE  Pain Level (0-10 scale): 4/10  []constant []intermittent []improving []worsening []no change since onset    Any medication changes, allergies to medications, adverse drug reactions, diagnosis change, or new procedure performed?: [x] No    [] Yes (see summary sheet for update)  Subjective functional status/changes:     PLOF: functionally independent, no AD, active lifestyle  Limitations to PLOF: difficulty with walking, difficulty with transfers, difficulty with balance, difficulty with stairs  Mechanism of Injury: Pt is s/p left ankle fixation on 10/15/24 after fall with fracture. Pt was NWB for 6 weeks.  Pt has been removed from walking boot at this time.   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

## 2024-12-12 NOTE — PROGRESS NOTES
[facility]  [address] [place] [address]  Ph [phone]  Fx [phone]    Plan of Care/ Statement of Necessity for Physical Therapy Services      Patient name: Eryn Howell Start of Care: 2024   Referral source: Aguila Simpson DPM : 1955    Medical Diagnosis: Left ankle pain [M25.572]  Left foot pain [M79.672]   Onset Date:10/15/24    Treatment Diagnosis: M25.572  LEFT ANKLE PAIN and M79.672  LEFT FOOT PAIN      Prior Hospitalization: see medical history Provider#: [id number]     Comorbidities: Psychological disorders and Other: hyper parathyroid removal, Shoulder replacement right, OA, lumbar radiculopathy, 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  Assessment/ key information: 69 yo female who presents to [facility] with c/o Left ankle pain. Pt is s/p left ankle fixation on 10/15/24 after fall with fracture. Pt was NWB for 6 weeks.  Pt has been removed from walking boot at this time. Patient reports difficulty with walking, difficulty with transfers, difficulty with balance, difficulty with stairs. Patient demonstrates decreased ROM, decreased strength, impaired posture, impaired gait mechancis, pain and decreased functional mobility tolerance.     Patient will continue to benefit from skilled PT services to modify and progress therapeutic interventions, address functional mobility deficits, address ROM deficits, address strength deficits, analyze and address soft tissue restrictions, analyze and cue movement patterns, analyze and modify body mechanics/ergonomics, assess and modify postural abnormalities, address imbalance/dizziness, and instruct in home and community integration to attain remaining goals.

## 2024-12-17 ENCOUNTER — TELEPHONE (OUTPATIENT)
Facility: HOSPITAL | Age: 69
End: 2024-12-17

## 2024-12-17 ENCOUNTER — HOSPITAL ENCOUNTER (OUTPATIENT)
Facility: HOSPITAL | Age: 69
Setting detail: RECURRING SERIES
Discharge: HOME | End: 2024-12-20
Payer: MEDICARE

## 2024-12-17 PROCEDURE — 97112 NEUROMUSCULAR REEDUCATION: CPT

## 2024-12-17 PROCEDURE — 97530 THERAPEUTIC ACTIVITIES: CPT

## 2024-12-17 PROCEDURE — 97110 THERAPEUTIC EXERCISES: CPT

## 2024-12-17 NOTE — PROGRESS NOTES
PHYSICAL / OCCUPATIONAL THERAPY - DAILY TREATMENT NOTE     Patient Name: Eryn Howell    Date: 2024    : 1955  Insurance: Payor: [employer] MEDICARE / Plan: [employer] CHOICE-PPO MEDICARE / Product Type: *No Product type* /      Patient  verified Yes     Visit #   Current / Total 2 24   Time   In / Out 550 630   Pain   In / Out 4 4   Subjective Functional Status/Changes: Pt reported that she has been practicing her exercises at home   Changes to:  Allergies, Med Hx, Sx Hx?   no       TREATMENT AREA =  Left ankle pain [M25.572]  Left foot pain [M79.672]    If an interpreting service is utilized for treatment of this patient, the contents of this document represent the material reviewed with the patient via the .     OBJECTIVE    Therapeutic Procedures:  Tx Min Billable or 1:1 Min (if diff from Tx Min) Procedure, Rationale, Specifics   15  37572 Therapeutic Exercise (timed):  increase ROM, strength, coordination, balance, and proprioception to improve patient's ability to progress to PLOF and address remaining functional goals. (see flow sheet as applicable)    Details if applicable:       15  50599 Neuromuscular Re-Education (timed):  improve balance, coordination, kinesthetic sense, posture, core stability and proprioception to improve patient's ability to develop conscious control of individual muscles and awareness of position of extremities in order to progress to PLOF and address remaining functional goals. (see flow sheet as applicable)    Details if applicable:     10  31234 Therapeutic Activity (timed):  use of dynamic activities replicating functional movements to increase ROM, strength, coordination, balance, and proprioception in order to improve patient's ability to progress to PLOF and address remaining functional goals.  (see flow sheet as applicable)     Details if applicable:     40  MC BC Totals Reminder: bill using total billable min of TIMED therapeutic procedures (example:

## 2024-12-17 NOTE — TELEPHONE ENCOUNTER
Called patient to schedule fu. No answer, LVM requesting call back and offering tonight at 550 with Karrie.

## 2024-12-19 ENCOUNTER — HOSPITAL ENCOUNTER (OUTPATIENT)
Facility: HOSPITAL | Age: 69
Setting detail: RECURRING SERIES
Discharge: HOME | End: 2024-12-22
Payer: MEDICARE

## 2024-12-19 PROCEDURE — 97530 THERAPEUTIC ACTIVITIES: CPT

## 2024-12-19 PROCEDURE — 97112 NEUROMUSCULAR REEDUCATION: CPT

## 2024-12-19 PROCEDURE — 97110 THERAPEUTIC EXERCISES: CPT

## 2024-12-19 NOTE — PROGRESS NOTES
return to goals of improved walking and gait.  Eval Status:     Left  Right   Dorsiflexion 3 5   Plantarflexion 3 2   Inversion 3 5   Eversion 3 5   Hip flexion 3 3   Hip abduction NT NT   Knee extension 3 3         Patient will improve pain in left ankle to 2/10 at worst to improve standing and walking tolerance and restore prior level of function.  Eval Status: 7-8/10 at worst     Pt will be able to Perform TUG in 20 sec using RW to aid in functional mechanics for ambulation/ADLs.  Eval Status: unable     PLAN  Yes  Continue plan of care  []  Upgrade activities as tolerated  []  Discharge due to :  []  Other:    Karrie De La Cruz PT    12/19/2024    5:04 PM    Future Appointments   Date Time Provider Department Center   12/19/2024  5:10 PM Karrie De La Cruz, PT [facility]   1/7/2025  4:30 PM Karrie De La Cruz, PT [facility]   1/9/2025  4:30 PM Karrie De La Cruz, PT [facility]   1/14/2025  3:10 PM Karrie De La Cruz, PT [facility]   1/16/2025  4:30 PM Karrie De La Cruz, PT [facility]   1/22/2025  3:10 PM Michael, Surendra H, PTA [facility]   1/27/2025  3:50 PM Michael, Surendra H, PTA [facility]   1/29/2025  3:10 PM Michael, Surendra H, PTA [facility]   2/3/2025  5:50 PM Michael, Surendra H, PTA [facility]   2/5/2025  5:50 PM Michael, Surendra H, PTA [facility]   2/10/2025  5:50 PM Michael, Surendra H, PTA [facility]   2/12/2025  5:50 PM Michael, Surendra H, PTA [facility]   2/17/2025  5:50 PM Michael, Surendra H, PTA [facility]   2/19/2025  5:50 PM Michael, Surendra H, PTA [facility]   2/24/2025  5:50 PM Michael, Surendra H, PTA [facility]   2/26/2025  5:50 PM Michael, Surendra H, PTA [facility]   3/3/2025  5:50 PM Michael, Surendra H, PTA [facility]   3/5/2025  5:50 PM Michael, Surendra H, PTA [facility]   3/10/2025  5:50 PM Michael, Surendra H, PTA [facility]   3/12/2025  5:50 PM Michael, Surendra H, PTA [facility]   3/17/2025  5:50 PM Michael, Surendra H, PTA [facility]   3/19/2025  5:50 PM Michael, Surendra H, PTA [facility]   3/24/2025  5:50 PM Michael, Surendra H, PTA [facility]

## 2024-12-27 ENCOUNTER — TELEPHONE (OUTPATIENT)
Facility: HOSPITAL | Age: 69
End: 2024-12-27

## 2024-12-27 NOTE — TELEPHONE ENCOUNTER
Called patient to offer 310 slot today. Pt's  and daughter are unavailable for transportation. Confirmed next appt.

## 2025-01-07 ENCOUNTER — APPOINTMENT (OUTPATIENT)
Facility: HOSPITAL | Age: 70
End: 2025-01-07
Payer: MEDICARE

## 2025-01-08 ENCOUNTER — HOSPITAL ENCOUNTER (OUTPATIENT)
Facility: HOSPITAL | Age: 70
Setting detail: RECURRING SERIES
Discharge: HOME | End: 2025-01-11
Payer: MEDICARE

## 2025-01-08 PROCEDURE — 97535 SELF CARE MNGMENT TRAINING: CPT

## 2025-01-08 PROCEDURE — 97110 THERAPEUTIC EXERCISES: CPT

## 2025-01-08 PROCEDURE — 97530 THERAPEUTIC ACTIVITIES: CPT

## 2025-01-08 NOTE — PROGRESS NOTES
[facility]  [address] [place] [address]  Ph [phone]  Fx [phone]    Physical Therapy Progress Note  Patient name: Eryn Howell Start of Care: 2024   Referral source: Aguila Simpson DPM : 1955               Medical Diagnosis: Left ankle pain [M25.572]  Left foot pain [M79.672]    Onset Date:10/15/24               Treatment Diagnosis: M25.572  LEFT ANKLE PAIN and M79.672  LEFT FOOT PAIN      Prior Hospitalization: see medical history Provider#: [id number]      Comorbidities: Psychological disorders and Other: hyper parathyroid removal, Shoulder replacement right, OA, lumbar radiculopathy, volodymyr ear drum surgery with resulting mastitis, spondylolistheses L4; COPD, Major depressive disorder, Htn,       Prior Level of Function: functionally independent, no AD, active lifestyle     Reporting Period: 24-25    Visits from Start of Care: 4    Missed Visits: 1    Updated Goals/Measure of Progress: To be achieved in 24 treatments:    ***    Summary of Care/ Key Functional Changes: ***    ASSESSMENT/RECOMMENDATIONS:   Patient would benefit from the continuation of skilled rehab interventions for functional progress to achieving above stated clinically significant goals. Continue per plan of care.       Thank you for this referral.   Karrie De La Cruz, PT 2025 8:30 AM    
toward goals and restore functional mobility.   Eval Status: issued at eval  12/19/24: Pt reported daily compliance      Patient will score 40/80 points or higher on Lower Extremity Functional Scale (LEFS) to meet MCID and show improvement with functional activities and ADL's.        Scoring:           MCID = 9 points                                              21-40 = moderate limitation                                61-80 = minimal to no limitation                     0-20 = severe limitation                                41-60 = mild to moderate limitation  Eval: 22/80 on LEFS     Patient will improve pain in Left  ankle to 5/10  to improve standing and walking tolerance and restore prior level of function.  Eval Status: 7-8/10 at worst  Current: Pt reports 8/10 at worst in the past week 1/8/25    Pt will have 4/5 volodymyr ankle strength to return to goals of improved standing and transfers.  Eval Status:     Left  Right   Dorsiflexion 3 5   Plantarflexion 2 2   Inversion 3 5   Eversion 3 5         Pt will have painfree Left AROM WFL to aid in functional mechanics for ambulation/ADLs.  Eval Status:     Left Right   Dorsiflexion 3 10   Plantarflexion 150 150   Inversion 35 40   Eversion 5 15      Long Term Goals: To be accomplished in 24 treatments:  Patient will score 50/80 points or higher on Lower Extremity Functional Scale (LEFS) to meet MCID and show improvement with functional activities and ADL's.              Scoring:           MCID = 9 points                                              21-40 = moderate limitation                                      61-80 = minimal to no limitation                     0-20 = severe limitation                                      41-60 = mild to moderate limitation  Eval: 22/80 on LEFS     Pt will be able to perform 5 reps sit to  30 sec with Sup A ONLY to aid in functional mechanics for ambulation/ADLs.  Eval Status: mod A for transfers   12/19/24: CGA STS transfer 
will have 5/5 volodymyr LE strength to return to goals of improved walking and gait.  Eval Status:     Left  Right   Dorsiflexion 3 5   Plantarflexion 3 2   Inversion 3 5   Eversion 3 5   Hip flexion 3 3   Hip abduction NT NT   Knee extension 3 3   PN 1/8/2025: ***   ***      Patient will improve pain in left ankle to 2/10 at worst to improve standing and walking tolerance and restore prior level of function.  Eval Status: 7-8/10 at worst     PN 1/8/2025: ***   ***    Pt will be able to Perform TUG in 20 sec using RW to aid in functional mechanics for ambulation/ADLs.  Eval Status: unable     PN 1/8/2025: ***   ***    PLAN  Yes  Continue plan of care  []  Upgrade activities as tolerated  []  Discharge due to :  []  Other:    Karrie De La Cruz PT    1/8/2025    8:27 AM    Future Appointments   Date Time Provider Department Center   1/8/2025  3:10 PM Karrie De La Cruz, PT [facility]   1/9/2025  4:30 PM Karrie De La Cruz, PT [facility]   1/14/2025  3:10 PM Karrie De La Cruz, PT [facility]   1/16/2025  4:30 PM Karrie De La Cruz, PT [facility]   1/22/2025  3:10 PM Michael, Surendra H, PTA [facility]   1/27/2025  3:50 PM Michael, Surendra H, PTA [facility]   1/29/2025  3:10 PM Michael, Surendra H, PTA [facility]   2/3/2025  5:50 PM Michael, Surendra H, PTA [facility]   2/5/2025  5:50 PM Michael, Surendra H, PTA [facility]   2/10/2025  5:50 PM Michael, Surendra H, PTA [facility]   2/12/2025  5:50 PM Michael, Surendra H, PTA [facility]   2/17/2025  5:50 PM Michael, Surendra H, PTA [facility]   2/19/2025  5:50 PM Michael, Surendra H, PTA [facility]   2/24/2025  5:50 PM Michael, Surendra H, PTA [facility]   2/26/2025  5:50 PM Michael, Surendra H, PTA [facility]   3/3/2025  5:50 PM Michael, Surendra H, PTA [facility]   3/5/2025  5:50 PM Michael, Surendra H, PTA [facility]   3/10/2025  5:50 PM Michael, Surendra H, PTA [facility]   3/12/2025  5:50 PM Michael, Surendra H, PTA [facility]   3/17/2025  5:50 PM Michael, Surendra H, PTA [facility]   3/19/2025  5:50 PM Michael, Surendra H, PTA [facility]

## 2025-01-09 ENCOUNTER — TELEPHONE (OUTPATIENT)
Facility: HOSPITAL | Age: 70
End: 2025-01-09

## 2025-01-09 ENCOUNTER — APPOINTMENT (OUTPATIENT)
Facility: HOSPITAL | Age: 70
End: 2025-01-09
Payer: MEDICARE

## 2025-01-09 NOTE — TELEPHONE ENCOUNTER
Pt called to state that she wasn't going to make it today to therapy.  She took an albuterol treatment and was dizzy and fell at home.  Pt denied injury.

## 2025-01-14 ENCOUNTER — HOSPITAL ENCOUNTER (OUTPATIENT)
Facility: HOSPITAL | Age: 70
Setting detail: RECURRING SERIES
Discharge: HOME | End: 2025-01-17
Payer: MEDICARE

## 2025-01-14 PROCEDURE — 97110 THERAPEUTIC EXERCISES: CPT

## 2025-01-14 PROCEDURE — 97112 NEUROMUSCULAR REEDUCATION: CPT

## 2025-01-14 PROCEDURE — 97530 THERAPEUTIC ACTIVITIES: CPT

## 2025-01-14 NOTE — PROGRESS NOTES
PHYSICAL / OCCUPATIONAL THERAPY - DAILY TREATMENT NOTE     Patient Name: Eryn Howell    Date: 2025    : 1955  Insurance: Payor: [facility] MEDICARE / Plan: [facility] MEDICARE ADVANTAGE / Product Type: *No Product type* /      Patient  verified Yes     Visit #   Current / Total 5 24   Time   In / Out 305 350   Pain   In / Out 4 4   Subjective Functional Status/Changes: Pt reported that she has a red spot on her ankle    Changes to:  Allergies, Med Hx, Sx Hx?   no       TREATMENT AREA =  Left ankle pain [M25.572]  Left foot pain [M79.672]    If an interpreting service is utilized for treatment of this patient, the contents of this document represent the material reviewed with the patient via the .     OBJECTIVE    Therapeutic Procedures:  Tx Min Billable or 1:1 Min (if diff from Tx Min) Procedure, Rationale, Specifics   15  54088 Therapeutic Exercise (timed):  increase ROM, strength, coordination, balance, and proprioception to improve patient's ability to progress to PLOF and address remaining functional goals. (see flow sheet as applicable)    Details if applicable:       10  73053 Neuromuscular Re-Education (timed):  improve balance, coordination, kinesthetic sense, posture, core stability and proprioception to improve patient's ability to develop conscious control of individual muscles and awareness of position of extremities in order to progress to PLOF and address remaining functional goals. (see flow sheet as applicable)    Details if applicable:     12  84593 Therapeutic Activity (timed):  use of dynamic activities replicating functional movements to increase ROM, strength, coordination, balance, and proprioception in order to improve patient's ability to progress to PLOF and address remaining functional goals.  (see flow sheet as applicable)     Details if applicable:     8  35814 Self Care/Home Management (timed):  improve patient knowledge and understanding of home

## 2025-01-15 NOTE — PROGRESS NOTES
[facility]  [address] [place] [address]  Ph [phone]  Fx [phone]    Physical Therapy Progress Note  Patient name: Eryn Howell Start of Care: 2024   Referral source: Aguila Simpson DPM : 1955               Medical Diagnosis: Left ankle pain [M25.572]  Left foot pain [M79.672]    Onset Date:10/15/24               Treatment Diagnosis: M25.572  LEFT ANKLE PAIN and M79.672  LEFT FOOT PAIN      Prior Hospitalization: see medical history Provider#: [id number]      Comorbidities: Psychological disorders and Other: hyper parathyroid removal, Shoulder replacement right, OA, lumbar radiculopathy, volodymyr ear drum surgery with resulting mastitis, spondylolistheses L4; COPD, Major depressive disorder, Htn,       Prior Level of Function: functionally independent, no AD, active lifestyle      Reporting Period: 24-25     Visits from Start of Care: 5                                    Missed Visits: 2    Updated Goals/Measure of Progress: To be achieved in 24 treatments:    Short Term Goals: To be accomplished in 12 treatments:  Patient will be independent and compliant with HEP to progress toward goals and restore functional mobility.   Eval Status: issued at eval  PN 25: reported compliance except marbles      Patient will score 40/80 points or higher on Lower Extremity Functional Scale (LEFS) to meet MCID and show improvement with functional activities and ADL's.        Scoring:           MCID = 9 points                                              21-40 = moderate limitation                                61-80 = minimal to no limitation                     0-20 = severe limitation                                41-60 = mild to moderate limitation  Eval: 22/80 on LEFS  PN 25: to be assessed at future visit      Patient will improve pain in Left  ankle to 5/10  to improve standing and walking tolerance and restore prior level of function.  Eval

## 2025-01-16 ENCOUNTER — HOSPITAL ENCOUNTER (OUTPATIENT)
Facility: HOSPITAL | Age: 70
Setting detail: RECURRING SERIES
Discharge: HOME | End: 2025-01-19
Payer: MEDICARE

## 2025-01-16 PROCEDURE — 97112 NEUROMUSCULAR REEDUCATION: CPT

## 2025-01-16 PROCEDURE — 97530 THERAPEUTIC ACTIVITIES: CPT

## 2025-01-16 PROCEDURE — 97110 THERAPEUTIC EXERCISES: CPT

## 2025-01-16 NOTE — PROGRESS NOTES
Status:     Left Right Left 1/14/25   Dorsiflexion 3 10 10   Plantarflexion 150 150 150   Inversion 35 40 30   Eversion 5 15 20    PN 1/14/25: see above PROGRESSING      Long Term Goals: To be accomplished in 24 treatments:  Patient will score 50/80 points or higher on Lower Extremity Functional Scale (LEFS) to meet MCID and show improvement with functional activities and ADL's.              Scoring:           MCID = 9 points                                              21-40 = moderate limitation                                      61-80 = minimal to no limitation                     0-20 = severe limitation                                      41-60 = mild to moderate limitation  Eval: 22/80 on LEFS  PN 1/14/25: to be assessed at future visit      Pt will be able to perform 5 reps sit to  30 sec with Sup A ONLY to aid in functional mechanics for ambulation/ADLs.  Eval Status: mod A for transfers   PN 1/14/25: 18 sec using Simon UE CGA; unable to stand with out arm support      Pt will have 5/5 simon LE strength to return to goals of improved walking and gait.  Eval Status:     Left  Right Left Right   Dorsiflexion 3 5 5 5   Plantarflexion 3 2 2+ 2+   Inversion 3 5 5 4   Eversion 3 5 5 3   Hip flexion 3 3 5 4   Hip abduction NT NT 2+ 2+   Knee extension 3 3 5 5    PN 1/14/2025: see above progressing      Patient will improve pain in left ankle to 2/10 at worst to improve standing and walking tolerance and restore prior level of function.  Eval Status: 7-8/10 at worst  PN 1/14/2025: 4/10 at worst    Progressing      Pt will be able to Perform TUG in 20 sec using RW to aid in functional mechanics for ambulation/ADLs.  Eval Status: unable   PN 1/14/25:31 sec using RW Progressing     PLAN  Yes  Continue plan of care  []  Upgrade activities as tolerated  []  Discharge due to :  []  Other:    Karrie De La Cruz, PT    1/16/2025    4:25 PM    Future Appointments   Date Time Provider Department Center   1/16/2025  4:30 PM

## 2025-01-22 ENCOUNTER — HOSPITAL ENCOUNTER (OUTPATIENT)
Facility: HOSPITAL | Age: 70
Setting detail: RECURRING SERIES
Discharge: HOME | End: 2025-01-25
Payer: MEDICARE

## 2025-01-22 PROCEDURE — 97530 THERAPEUTIC ACTIVITIES: CPT

## 2025-01-22 PROCEDURE — 97112 NEUROMUSCULAR REEDUCATION: CPT

## 2025-01-22 PROCEDURE — 97110 THERAPEUTIC EXERCISES: CPT

## 2025-01-22 PROCEDURE — 97535 SELF CARE MNGMENT TRAINING: CPT

## 2025-01-22 NOTE — PROGRESS NOTES
be accomplished in 12 treatments:  Patient will be independent and compliant with HEP to progress toward goals and restore functional mobility.   Eval Status: issued at eval  PN 1/14/25: reported compliance except marbles   1/16/25: Pt has been performing marbles at home and practicing      Patient will score 40/80 points or higher on Lower Extremity Functional Scale (LEFS) to meet MCID and show improvement with functional activities and ADL's.        Scoring:           MCID = 9 points                                              21-40 = moderate limitation                                61-80 = minimal to no limitation                     0-20 = severe limitation                                41-60 = mild to moderate limitation  Eval: 22/80 on LEFS  PN 1/14/25: to be assessed at future visit      Patient will improve pain in Left  ankle to 5/10  to improve standing and walking tolerance and restore prior level of function.  Eval Status: 7-8/10 at worst  PN 1/14/2025: 4/10 at worst    Progressing         Pt will have 4/5 volodymyr ankle strength to return to goals of improved standing and transfers.  Eval Status:     Left  Right Left 1/14/25   Dorsiflexion 3 5 5   Plantarflexion 2 2 2+   Inversion 3 5 4   Eversion 3 5 3     PN 1/14/25: see above progressing      Pt will have painfree Left AROM WFL to aid in functional mechanics for ambulation/ADLs.  Eval Status:     Left Right Left 1/14/25   Dorsiflexion 3 10 10   Plantarflexion 150 150 150   Inversion 35 40 30   Eversion 5 15 20    PN 1/14/25: see above PROGRESSING      Long Term Goals: To be accomplished in 24 treatments:  Patient will score 50/80 points or higher on Lower Extremity Functional Scale (LEFS) to meet MCID and show improvement with functional activities and ADL's.              Scoring:           MCID = 9 points                                              21-40 = moderate limitation                                      61-80 = minimal to no limitation

## 2025-01-27 ENCOUNTER — HOSPITAL ENCOUNTER (OUTPATIENT)
Facility: HOSPITAL | Age: 70
Setting detail: RECURRING SERIES
Discharge: HOME | End: 2025-01-30
Payer: MEDICARE

## 2025-01-27 PROCEDURE — 97112 NEUROMUSCULAR REEDUCATION: CPT

## 2025-01-27 PROCEDURE — 97110 THERAPEUTIC EXERCISES: CPT

## 2025-01-27 PROCEDURE — 97535 SELF CARE MNGMENT TRAINING: CPT

## 2025-01-27 NOTE — PROGRESS NOTES
21-40 = moderate limitation                                      61-80 = minimal to no limitation                     0-20 = severe limitation                                      41-60 = mild to moderate limitation  Eval: 22/80 on LEFS  PN 1/14/25: to be assessed at future visit      Pt will be able to perform 5 reps sit to  30 sec with Sup A ONLY to aid in functional mechanics for ambulation/ADLs.  Eval Status: mod A for transfers   PN 1/14/25: 18 sec using Simon UE CGA; unable to stand with out arm support                Current: Pt continue to require Bilateral UE for sit to stands from low chair 1/22/5     Pt will have 5/5 simon LE strength to return to goals of improved walking and gait.  Eval Status:     Left  Right Left Right   Dorsiflexion 3 5 5 5   Plantarflexion 3 2 2+ 2+   Inversion 3 5 5 4   Eversion 3 5 5 3   Hip flexion 3 3 5 4   Hip abduction NT NT 2+ 2+   Knee extension 3 3 5 5    PN 1/14/2025: see above progressing      Patient will improve pain in left ankle to 2/10 at worst to improve standing and walking tolerance and restore prior level of function.  Eval Status: 7-8/10 at worst  PN 1/14/2025: 4/10 at worst    Progressing      Pt will be able to Perform TUG in 20 sec using RW to aid in functional mechanics for ambulation/ADLs.  Eval Status: unable   PN 1/14/25:31 sec using RW Progressing     PLAN  Yes  Continue plan of care  []  Upgrade activities as tolerated  []  Discharge due to :  []  Other:    Surendra Rdoriguez PTA    1/27/2025    11:44 AM    Future Appointments   Date Time Provider Department Center   1/27/2025  3:50 PM uSrendra Rodriguez PTA [facility]   1/29/2025  3:10 PM Surendra Rodriguez PTA [facility]   2/3/2025  5:50 PM Surendra Rodriguez PTA [facility]   2/5/2025  5:50 PM Surendra Rodriguez, [facility]   2/10/2025  5:50 PM Surendra Rodriguez PTA [facility]   2/12/2025  5:50 PM Surendra Rodriguez PTA [facility]   2/17/2025  5:50 PM Surendra Rodrgiuez, PTA [facility]

## 2025-01-29 ENCOUNTER — HOSPITAL ENCOUNTER (OUTPATIENT)
Facility: HOSPITAL | Age: 70
Setting detail: RECURRING SERIES
Discharge: HOME | End: 2025-02-01
Payer: MEDICARE

## 2025-01-29 PROCEDURE — 97535 SELF CARE MNGMENT TRAINING: CPT

## 2025-01-29 PROCEDURE — 97530 THERAPEUTIC ACTIVITIES: CPT

## 2025-01-29 PROCEDURE — 97112 NEUROMUSCULAR REEDUCATION: CPT

## 2025-01-29 PROCEDURE — 97110 THERAPEUTIC EXERCISES: CPT

## 2025-01-29 NOTE — PROGRESS NOTES
PHYSICAL / OCCUPATIONAL THERAPY - DAILY TREATMENT NOTE     Patient Name: Eryn Howell    Date: 2025    : 1955  Insurance: Payor: [facility] MEDICARE / Plan: [facility] MEDICARE ADVANTAGE / Product Type: *No Product type* /      Patient  verified Yes     Visit #   Current / Total 9 24   Time   In / Out 3:10 4:03   Pain   In / Out 0/10 0/10   Subjective Functional Status/Changes: \"I don't have any pain right now.\"   Changes to:  Allergies, Med Hx, Sx Hx?   no       TREATMENT AREA =  Left ankle pain [M25.572]  Left foot pain [M79.672]    If an interpreting service is utilized for treatment of this patient, the contents of this document represent the material reviewed with the patient via the .     OBJECTIVE  Therapeutic Procedures:  Tx Min Billable or 1:1 Min (if diff from Tx Min) Procedure, Rationale, Specifics     94016 Therapeutic Exercise (timed):  increase ROM, strength, coordination, balance, and proprioception to improve patient's ability to progress to PLOF and address remaining functional goals. (see flow sheet as applicable)    Details if applicable:       10  89993 Neuromuscular Re-Education (timed):  improve balance, coordination, kinesthetic sense, posture, core stability and proprioception to improve patient's ability to develop conscious control of individual muscles and awareness of position of extremities in order to progress to PLOF and address remaining functional goals. (see flow sheet as applicable)    Details if applicable:     14  56321 Therapeutic Activity (timed):  use of dynamic activities replicating functional movements to increase ROM, strength, coordination, balance, and proprioception in order to improve patient's ability to progress to PLOF and address remaining functional goals.  (see flow sheet as applicable)     Details if applicable:     10  97749 Self Care/Home Management (timed):  improve patient knowledge and understanding of home injury/symptom/pain

## 2025-02-03 ENCOUNTER — HOSPITAL ENCOUNTER (OUTPATIENT)
Facility: HOSPITAL | Age: 70
Setting detail: RECURRING SERIES
Discharge: HOME | End: 2025-02-06
Payer: MEDICARE

## 2025-02-03 PROCEDURE — 97110 THERAPEUTIC EXERCISES: CPT

## 2025-02-03 PROCEDURE — 97535 SELF CARE MNGMENT TRAINING: CPT

## 2025-02-03 PROCEDURE — 97112 NEUROMUSCULAR REEDUCATION: CPT

## 2025-02-03 PROCEDURE — 97530 THERAPEUTIC ACTIVITIES: CPT

## 2025-02-03 NOTE — PROGRESS NOTES
PHYSICAL / OCCUPATIONAL THERAPY - DAILY TREATMENT NOTE     Patient Name: Eryn Howell    Date: 2/3/2025    : 1955  Insurance: Payor: [facility] MEDICARE / Plan: [facility] MEDICARE ADVANTAGE / Product Type: *No Product type* /      Patient  verified Yes     Visit #   Current / Total 10 24   Time   In / Out 5:53 6:53   Pain   In / Out 0/10 Left Ankle, 9/10 Left Shoulder 0/10 Left Ankle, 7/10 Left Shoulder   Subjective Functional Status/Changes: \"I don't have any pain in my ankle but, my shoulder is still terrible.\"   Changes to:  Allergies, Med Hx, Sx Hx?   no       TREATMENT AREA =  Left ankle pain [M25.572]  Left foot pain [M79.672]    If an interpreting service is utilized for treatment of this patient, the contents of this document represent the material reviewed with the patient via the .     OBJECTIVE  Therapeutic Procedures:  Tx Min Billable or 1:1 Min (if diff from Tx Min) Procedure, Rationale, Specifics   15 15 75488 Therapeutic Exercise (timed):  increase ROM, strength, coordination, balance, and proprioception to improve patient's ability to progress to PLOF and address remaining functional goals. (see flow sheet as applicable)    Details if applicable:        84311 Neuromuscular Re-Education (timed):  improve balance, coordination, kinesthetic sense, posture, core stability and proprioception to improve patient's ability to develop conscious control of individual muscles and awareness of position of extremities in order to progress to PLOF and address remaining functional goals. (see flow sheet as applicable)    Details if applicable:      03752 Therapeutic Activity (timed):  use of dynamic activities replicating functional movements to increase ROM, strength, coordination, balance, and proprioception in order to improve patient's ability to progress to PLOF and address remaining functional goals.  (see flow sheet as applicable)     Details if applicable:     10 10 81221 Self

## 2025-02-05 ENCOUNTER — HOSPITAL ENCOUNTER (OUTPATIENT)
Facility: HOSPITAL | Age: 70
Setting detail: RECURRING SERIES
Discharge: HOME | End: 2025-02-08
Payer: MEDICARE

## 2025-02-05 PROCEDURE — 97530 THERAPEUTIC ACTIVITIES: CPT

## 2025-02-05 PROCEDURE — 97110 THERAPEUTIC EXERCISES: CPT

## 2025-02-05 PROCEDURE — 97112 NEUROMUSCULAR REEDUCATION: CPT

## 2025-02-05 PROCEDURE — 97535 SELF CARE MNGMENT TRAINING: CPT

## 2025-02-05 NOTE — PROGRESS NOTES
to no limitation                     0-20 = severe limitation                                      41-60 = mild to moderate limitation  Eval: 22/80 on LEFS  PN 1/14/25: to be assessed at future visit      Pt will be able to perform 5 reps sit to  30 sec with Sup A ONLY to aid in functional mechanics for ambulation/ADLs.  Eval Status: mod A for transfers   PN 1/14/25: 18 sec using Simon UE CGA; unable to stand with out arm support               Current: Pt able to perform sit to stand with Single UE at this time with forward lean/weight shift 2/3/25     Pt will have 5/5 simon LE strength to return to goals of improved walking and gait.  Eval Status:     Left  Right Left Right   Dorsiflexion 3 5 5 5   Plantarflexion 3 2 2+ 2+   Inversion 3 5 5 4   Eversion 3 5 5 3   Hip flexion 3 3 5 4   Hip abduction NT NT 2+ 2+   Knee extension 3 3 5 5    PN 1/14/2025: see above progressing                Current: Pt continues to progress with general PRE's for continued improved general tolerance to walking and gait 1/29/25     Patient will improve pain in left ankle to 2/10 at worst to improve standing and walking tolerance and restore prior level of function.  Eval Status: 7-8/10 at worst  PN 1/14/2025: 4/10 at worst    Progressing      Pt will be able to Perform TUG in 20 sec using RW to aid in functional mechanics for ambulation/ADLs.  Eval Status: unable   PN 1/14/25:31 sec using RW Progressing     Current: Pt able to perform sit to stand from chair with SPC and amb with CGA 2/5/25    PLAN  Yes  Continue plan of care  []  Upgrade activities as tolerated  []  Discharge due to :  []  Other:    Surendra Rodriguez PTA    2/5/2025    11:00 AM    Future Appointments   Date Time Provider Department Center   2/5/2025  5:50 PM Surendra Rodriguez PTA [facility]   2/10/2025  5:50 PM Surendra Rodriguez PTA [facility]   2/12/2025  5:50 PM Surendra Rodriguez PTA [facility]   2/17/2025  5:50 PM Surendra Rodriguez PTA [facility]   2/19/2025

## 2025-02-10 ENCOUNTER — HOSPITAL ENCOUNTER (OUTPATIENT)
Facility: HOSPITAL | Age: 70
Setting detail: RECURRING SERIES
Discharge: HOME | End: 2025-02-13
Payer: MEDICARE

## 2025-02-10 PROCEDURE — 97110 THERAPEUTIC EXERCISES: CPT

## 2025-02-10 PROCEDURE — 97530 THERAPEUTIC ACTIVITIES: CPT

## 2025-02-10 PROCEDURE — 97535 SELF CARE MNGMENT TRAINING: CPT

## 2025-02-10 PROCEDURE — 97112 NEUROMUSCULAR REEDUCATION: CPT

## 2025-02-10 NOTE — PROGRESS NOTES
PHYSICAL / OCCUPATIONAL THERAPY - DAILY TREATMENT NOTE     Patient Name: Eryn Howell    Date: 2/10/2025    : 1955  Insurance: Payor: [facility] MEDICARE / Plan: [facility] MEDICARE ADVANTAGE / Product Type: *No Product type* /      Patient  verified Yes     Visit #   Current / Total 12 24   Time   In / Out 5:50 6:43   Pain   In / Out 0/10 0/10   Subjective Functional Status/Changes: \"I don't have any pain right now.\"   Changes to:  Allergies, Med Hx, Sx Hx?   no       TREATMENT AREA =  Left ankle pain [M25.572]  Left foot pain [M79.672]    If an interpreting service is utilized for treatment of this patient, the contents of this document represent the material reviewed with the patient via the .     OBJECTIVE    Therapeutic Procedures:  Tx Min Billable or 1:1 Min (if diff from Tx Min) Procedure, Rationale, Specifics   16  17495 Therapeutic Exercise (timed):  increase ROM, strength, coordination, balance, and proprioception to improve patient's ability to progress to PLOF and address remaining functional goals. (see flow sheet as applicable)    Details if applicable:       10  36878 Neuromuscular Re-Education (timed):  improve balance, coordination, kinesthetic sense, posture, core stability and proprioception to improve patient's ability to develop conscious control of individual muscles and awareness of position of extremities in order to progress to PLOF and address remaining functional goals. (see flow sheet as applicable)    Details if applicable:     15  59414 Therapeutic Activity (timed):  use of dynamic activities replicating functional movements to increase ROM, strength, coordination, balance, and proprioception in order to improve patient's ability to progress to PLOF and address remaining functional goals.  (see flow sheet as applicable)     Details if applicable:     12  67126 Self Care/Home Management (timed):  improve patient knowledge and understanding of home injury/symptom/pain

## 2025-02-11 ENCOUNTER — HOSPITAL ENCOUNTER (OUTPATIENT)
Facility: HOSPITAL | Age: 70
Discharge: HOME | End: 2025-02-14
Payer: MEDICARE

## 2025-02-11 DIAGNOSIS — M19.012 ARTHRITIS OF LEFT GLENOHUMERAL JOINT: ICD-10-CM

## 2025-02-11 DIAGNOSIS — M25.512 LEFT SHOULDER PAIN, UNSPECIFIED CHRONICITY: ICD-10-CM

## 2025-02-11 PROCEDURE — 73200 CT UPPER EXTREMITY W/O DYE: CPT

## 2025-02-12 ENCOUNTER — HOSPITAL ENCOUNTER (OUTPATIENT)
Facility: HOSPITAL | Age: 70
Setting detail: RECURRING SERIES
Discharge: HOME | End: 2025-02-15
Payer: MEDICARE

## 2025-02-12 PROCEDURE — 97110 THERAPEUTIC EXERCISES: CPT

## 2025-02-12 PROCEDURE — 97112 NEUROMUSCULAR REEDUCATION: CPT

## 2025-02-12 PROCEDURE — 97530 THERAPEUTIC ACTIVITIES: CPT

## 2025-02-12 PROCEDURE — 97535 SELF CARE MNGMENT TRAINING: CPT

## 2025-02-12 NOTE — PROGRESS NOTES
PHYSICAL / OCCUPATIONAL THERAPY - DAILY TREATMENT NOTE     Patient Name: Eryn Howell    Date: 2025    : 1955  Insurance: Payor: [facility] MEDICARE / Plan: [facility] MEDICARE ADVANTAGE / Product Type: *No Product type* /      Patient  verified Yes     Visit #   Current / Total 13 24   Time   In / Out 5:53 6:47   Pain   In / Out 0/10 010   Subjective Functional Status/Changes: \"I don't have any pain right now.\"   Changes to:  Allergies, Med Hx, Sx Hx?   no       TREATMENT AREA =  Left ankle pain [M25.572]  Left foot pain [M79.672]    If an interpreting service is utilized for treatment of this patient, the contents of this document represent the material reviewed with the patient via the .     OBJECTIVE    Therapeutic Procedures:  Tx Min Billable or 1:1 Min (if diff from Tx Min) Procedure, Rationale, Specifics   10  98437 Therapeutic Exercise (timed):  increase ROM, strength, coordination, balance, and proprioception to improve patient's ability to progress to PLOF and address remaining functional goals. (see flow sheet as applicable)    Details if applicable:       15  17654 Neuromuscular Re-Education (timed):  improve balance, coordination, kinesthetic sense, posture, core stability and proprioception to improve patient's ability to develop conscious control of individual muscles and awareness of position of extremities in order to progress to PLOF and address remaining functional goals. (see flow sheet as applicable)    Details if applicable:     17  41260 Therapeutic Activity (timed):  use of dynamic activities replicating functional movements to increase ROM, strength, coordination, balance, and proprioception in order to improve patient's ability to progress to PLOF and address remaining functional goals.  (see flow sheet as applicable)     Details if applicable:     12  34289 Self Care/Home Management (timed):  improve patient knowledge and understanding of home injury/symptom/pain

## 2025-02-12 NOTE — PROGRESS NOTES
[facility]  [address] [place] [address]  Ph [phone]  Fx [phone]    Physical Therapy Progress Note  Patient name: Eryn Howell Start of Care: 2024   Referral source: Aguila Simpson DPM : 1955   Medical/Treatment Diagnosis: Left ankle pain [M25.572]  Left foot pain [M79.672] Onset Date:10/15/2024   Prior Hospitalization: see medical history Provider#: [id number]   Comorbidities:  Psychological disorders and Other: hyper parathyroid removal, Shoulder replacement right, OA, lumbar radiculopathy, volodymyr ear drum surgery with resulting mastitis, spondylolistheses L4; COPD, Major depressive disorder, Htn,    Prior Level of Function:functionally independent, no AD, active lifestyle     Reporting Period: 24-25    Visits from Start of Care: 13    Missed Visits: 2    Updated Goals/Measure of Progress:   Short Term Goals: To be accomplished in 12 treatments:  Patient will be independent and compliant with HEP to progress toward goals and restore functional mobility.   Eval Status: issued at eval  PN 25: reported compliance except marbles   PN 2025: Pt reports daily compliance with HEP   Progressing        Patient will score 40/80 points or higher on Lower Extremity Functional Scale (LEFS) to meet MCID and show improvement with functional activities and ADL's.        Scoring:           MCID = 9 points                                              21-40 = moderate limitation                                61-80 = minimal to no limitation                     0-20 = severe limitation                                41-60 = mild to moderate limitation  Eval: 22/80 on LEFS  PN 25: to be assessed at future visit   PN 2025: LEFS: 35/80   Progressing     Patient will improve pain in Left  ankle to 5/10  to improve standing and walking tolerance and restore prior level of function.  Eval Status: 7-8/10 at worst  PN 2025: 4/10 at worst

## 2025-02-17 ENCOUNTER — TELEPHONE (OUTPATIENT)
Facility: HOSPITAL | Age: 70
End: 2025-02-17

## 2025-02-17 ENCOUNTER — HOSPITAL ENCOUNTER (OUTPATIENT)
Facility: HOSPITAL | Age: 70
Setting detail: RECURRING SERIES
End: 2025-02-17
Payer: MEDICARE

## 2025-02-18 ENCOUNTER — TELEPHONE (OUTPATIENT)
Facility: HOSPITAL | Age: 70
End: 2025-02-18

## 2025-02-18 ENCOUNTER — HOSPITAL ENCOUNTER (OUTPATIENT)
Facility: HOSPITAL | Age: 70
Setting detail: RECURRING SERIES
Discharge: HOME | End: 2025-02-21
Payer: MEDICARE

## 2025-02-18 DIAGNOSIS — J44.9 CHRONIC OBSTRUCTIVE PULMONARY DISEASE, UNSPECIFIED COPD TYPE (HCC): Primary | ICD-10-CM

## 2025-02-18 PROCEDURE — 97110 THERAPEUTIC EXERCISES: CPT

## 2025-02-18 PROCEDURE — 97535 SELF CARE MNGMENT TRAINING: CPT

## 2025-02-18 PROCEDURE — 97530 THERAPEUTIC ACTIVITIES: CPT

## 2025-02-18 NOTE — PROGRESS NOTES
PHYSICAL / OCCUPATIONAL THERAPY - DAILY TREATMENT NOTE     Patient Name: Eryn Howell    Date: 2025    : 1955  Insurance: Payor: [facility] MEDICARE / Plan: [facility] MEDICARE ADVANTAGE / Product Type: *No Product type* /      Patient  verified Yes     Visit #   Current / Total 14 24   Time   In / Out 1:50 2:30   Pain   In / Out 0/10 0/10   Subjective Functional Status/Changes: \"I don't have any pain right now.\"   Changes to:  Allergies, Med Hx, Sx Hx?   no       TREATMENT AREA =  Left ankle pain [M25.572]  Left foot pain [M79.672]    If an interpreting service is utilized for treatment of this patient, the contents of this document represent the material reviewed with the patient via the .     OBJECTIVE    Therapeutic Procedures:  Tx Min Billable or 1:1 Min (if diff from Tx Min) Procedure, Rationale, Specifics   14  89151 Therapeutic Exercise (timed):  increase ROM, strength, coordination, balance, and proprioception to improve patient's ability to progress to PLOF and address remaining functional goals. (see flow sheet as applicable)    Details if applicable:         83785 Neuromuscular Re-Education (timed):  improve balance, coordination, kinesthetic sense, posture, core stability and proprioception to improve patient's ability to develop conscious control of individual muscles and awareness of position of extremities in order to progress to PLOF and address remaining functional goals. (see flow sheet as applicable)    Details if applicable:     16  69007 Therapeutic Activity (timed):  use of dynamic activities replicating functional movements to increase ROM, strength, coordination, balance, and proprioception in order to improve patient's ability to progress to PLOF and address remaining functional goals.  (see flow sheet as applicable)     Details if applicable:     10  41276 Self Care/Home Management (timed):  improve patient knowledge and understanding of home injury/symptom/pain

## 2025-02-18 NOTE — TELEPHONE ENCOUNTER
I called the pt today to inform her of her referral to pulmonary rehab. The pt was aware of the referral and scheduled her evaluation appt for 2/26/2025 at 12:30PM.    RAULITO

## 2025-02-19 ENCOUNTER — APPOINTMENT (OUTPATIENT)
Facility: HOSPITAL | Age: 70
End: 2025-02-19
Payer: MEDICARE

## 2025-02-24 ENCOUNTER — TELEPHONE (OUTPATIENT)
Facility: HOSPITAL | Age: 70
End: 2025-02-24

## 2025-02-24 ENCOUNTER — HOSPITAL ENCOUNTER (OUTPATIENT)
Facility: HOSPITAL | Age: 70
Setting detail: RECURRING SERIES
End: 2025-02-24
Payer: MEDICARE

## 2025-02-25 ENCOUNTER — HOSPITAL ENCOUNTER (OUTPATIENT)
Facility: HOSPITAL | Age: 70
Setting detail: RECURRING SERIES
Discharge: HOME | End: 2025-02-28
Payer: MEDICARE

## 2025-02-25 PROCEDURE — 97110 THERAPEUTIC EXERCISES: CPT

## 2025-02-25 PROCEDURE — 97112 NEUROMUSCULAR REEDUCATION: CPT

## 2025-02-25 PROCEDURE — 97530 THERAPEUTIC ACTIVITIES: CPT

## 2025-02-25 NOTE — PROGRESS NOTES
PHYSICAL / OCCUPATIONAL THERAPY - DAILY TREATMENT NOTE     Patient Name: Eryn Howell    Date: 2025    : 1955  Insurance: Payor: [facility] MEDICARE / Plan: [facility] MEDICARE ADVANTAGE / Product Type: *No Product type* /      Patient  verified Yes     Visit #   Current / Total 15 24   Time   In / Out 550 629   Pain   In / Out 0 0   Subjective Functional Status/Changes: Pt reported increased shoulder pain 8/10 today   Changes to:  Allergies, Med Hx, Sx Hx?   no       TREATMENT AREA =  Left ankle pain [M25.572]  Left foot pain [M79.672]    If an interpreting service is utilized for treatment of this patient, the contents of this document represent the material reviewed with the patient via the .     OBJECTIVE    Therapeutic Procedures:  Tx Min Billable or 1:1 Min (if diff from Tx Min) Procedure, Rationale, Specifics   15  13453 Therapeutic Exercise (timed):  increase ROM, strength, coordination, balance, and proprioception to improve patient's ability to progress to PLOF and address remaining functional goals. (see flow sheet as applicable)    Details if applicable:       15  13395 Neuromuscular Re-Education (timed):  improve balance, coordination, kinesthetic sense, posture, core stability and proprioception to improve patient's ability to develop conscious control of individual muscles and awareness of position of extremities in order to progress to PLOF and address remaining functional goals. (see flow sheet as applicable)    Details if applicable:     9  61125 Therapeutic Activity (timed):  use of dynamic activities replicating functional movements to increase ROM, strength, coordination, balance, and proprioception in order to improve patient's ability to progress to PLOF and address remaining functional goals.  (see flow sheet as applicable)     Details if applicable:     39  MC BC Totals Reminder: bill using total billable min of TIMED therapeutic procedures (example: do not include dry

## 2025-02-26 ENCOUNTER — HOSPITAL ENCOUNTER (OUTPATIENT)
Facility: HOSPITAL | Age: 70
Setting detail: RECURRING SERIES
Discharge: HOME | End: 2025-03-01
Payer: MEDICARE

## 2025-02-26 PROCEDURE — 97110 THERAPEUTIC EXERCISES: CPT

## 2025-02-26 PROCEDURE — 97112 NEUROMUSCULAR REEDUCATION: CPT

## 2025-02-26 PROCEDURE — 97530 THERAPEUTIC ACTIVITIES: CPT

## 2025-02-26 PROCEDURE — 97535 SELF CARE MNGMENT TRAINING: CPT

## 2025-02-26 NOTE — PROGRESS NOTES
goals.    Progress toward goals / Updated goals:  []  See Progress Note/Recertification    Short Term Goals: To be accomplished in 12 treatments:  Patient will be independent and compliant with HEP to progress toward goals and restore functional mobility.   Eval Status: issued at eval  PN 1/14/25: reported compliance except marbles   PN 2/12/2025: Pt reports daily compliance with HEP   Progressing  Current: Pt reports continued practice of sit to stands 2/26/25      Patient will score 40/80 points or higher on Lower Extremity Functional Scale (LEFS) to meet MCID and show improvement with functional activities and ADL's.        Scoring:           MCID = 9 points                                              21-40 = moderate limitation                                61-80 = minimal to no limitation                     0-20 = severe limitation                                41-60 = mild to moderate limitation  Eval: 22/80 on LEFS  PN 1/14/25: to be assessed at future visit   PN 2/12/2025: LEFS: 35/80   Progressing     Patient will improve pain in Left  ankle to 5/10  to improve standing and walking tolerance and restore prior level of function.  Eval Status: 7-8/10 at worst  PN 1/14/2025: 4/10 at worst    Progressing   PN 2/12/2025: 6/10 at worst in the past week    Progressing  Current: 0/10 pain in the Left Ankle in the past week MET      Pt will have 4/5 volodymyr ankle strength to return to goals of improved standing and transfers.  Eval Status:     Left  Right Left 1/14/25 Left 2/12/25   Dorsiflexion 3 5 5 5   Plantarflexion 2 2 2+ 2+   Inversion 3 5 4 5   Eversion 3 5 3  5   PN 1/14/25: see above progressing   PN 2/12/2025: see above  Progressing     Pt will have painfree Left AROM WFL to aid in functional mechanics for ambulation/ADLs.  Eval Status:     Left Right Left 1/14/25 Left 2/12/25   Dorsiflexion 3 10 10 10   Plantarflexion 150 150 150 60    Inversion 35 40 30 28   Eversion 5 15 20 21   PN 1/14/25: see above

## 2025-03-03 ENCOUNTER — HOSPITAL ENCOUNTER (OUTPATIENT)
Facility: HOSPITAL | Age: 70
Setting detail: RECURRING SERIES
Discharge: HOME | End: 2025-03-06
Payer: MEDICARE

## 2025-03-03 PROCEDURE — 97535 SELF CARE MNGMENT TRAINING: CPT

## 2025-03-03 PROCEDURE — 97112 NEUROMUSCULAR REEDUCATION: CPT

## 2025-03-03 PROCEDURE — 97110 THERAPEUTIC EXERCISES: CPT

## 2025-03-03 PROCEDURE — 97530 THERAPEUTIC ACTIVITIES: CPT

## 2025-03-03 NOTE — PROGRESS NOTES
Pt will be able to Perform TUG in 20 sec using RW to aid in functional mechanics for ambulation/ADLs.  Eval Status: unable   PN 1/14/25:31 sec using RW Progressing               PN 2/12/2025: 11 sec without AD   MET    PLAN  Yes  Continue plan of care  []  Upgrade activities as tolerated  []  Discharge due to :  []  Other:    Surendra Rodriguez PTA    3/3/2025    10:44 AM    Future Appointments   Date Time Provider Department Center   3/3/2025  5:50 PM Surendra Rodriguez, PTA [facility]   3/5/2025  5:50 PM Surendra Rodriguez, PTA [facility]   3/10/2025  5:50 PM Surendra Rodriguez, PTA [facility]   3/12/2025  5:50 PM Surendra Rodriguez, PTA [facility]   3/17/2025  5:50 PM Surendra Rodriguez, PTA [facility]   3/19/2025  5:50 PM Surendra Rodriguez, PTA [facility]   3/25/2025  3:10 PM Karire De La Cruz, PT [facility]   3/27/2025  5:50 PM Karrie De La Cruz, PT [facility]

## 2025-03-05 ENCOUNTER — TELEPHONE (OUTPATIENT)
Facility: HOSPITAL | Age: 70
End: 2025-03-05

## 2025-03-05 ENCOUNTER — HOSPITAL ENCOUNTER (OUTPATIENT)
Facility: HOSPITAL | Age: 70
Setting detail: RECURRING SERIES
End: 2025-03-05
Payer: MEDICARE

## 2025-03-10 ENCOUNTER — HOSPITAL ENCOUNTER (OUTPATIENT)
Facility: HOSPITAL | Age: 70
Setting detail: RECURRING SERIES
Discharge: HOME | End: 2025-03-13
Payer: MEDICARE

## 2025-03-10 PROCEDURE — 97110 THERAPEUTIC EXERCISES: CPT

## 2025-03-10 PROCEDURE — 97112 NEUROMUSCULAR REEDUCATION: CPT

## 2025-03-10 PROCEDURE — 97535 SELF CARE MNGMENT TRAINING: CPT

## 2025-03-10 PROCEDURE — 97530 THERAPEUTIC ACTIVITIES: CPT

## 2025-03-10 NOTE — PROGRESS NOTES
PHYSICAL / OCCUPATIONAL THERAPY - DAILY TREATMENT NOTE     Patient Name: Eryn Howell    Date: 3/10/2025    : 1955  Insurance: Payor: [facility] MEDICARE / Plan: [facility] MEDICARE ADVANTAGE / Product Type: *No Product type* /      Patient  verified Yes     Visit #   Current / Total 18 24   Time   In / Out 5:50 6:43   Pain   In / Out 0/10 Ankle, 3/10 Left Shoulder 0/10 Bridget, 3/10 Left Shoulder   Subjective Functional Status/Changes: \"I don't have too much pain today.\"   Changes to:  Allergies, Med Hx, Sx Hx?   no       TREATMENT AREA =  Left ankle pain [M25.572]  Left foot pain [M79.672]    If an interpreting service is utilized for treatment of this patient, the contents of this document represent the material reviewed with the patient via the .     OBJECTIVE  Therapeutic Procedures:  Tx Min Billable or 1:1 Min (if diff from Tx Min) Procedure, Rationale, Specifics     55917 Therapeutic Exercise (timed):  increase ROM, strength, coordination, balance, and proprioception to improve patient's ability to progress to PLOF and address remaining functional goals. (see flow sheet as applicable)    Details if applicable:       10  67822 Neuromuscular Re-Education (timed):  improve balance, coordination, kinesthetic sense, posture, core stability and proprioception to improve patient's ability to develop conscious control of individual muscles and awareness of position of extremities in order to progress to PLOF and address remaining functional goals. (see flow sheet as applicable)    Details if applicable:     12  05673 Therapeutic Activity (timed):  use of dynamic activities replicating functional movements to increase ROM, strength, coordination, balance, and proprioception in order to improve patient's ability to progress to PLOF and address remaining functional goals.  (see flow sheet as applicable)     Details if applicable:     10  95144 Self Care/Home Management (timed):  improve patient knowledge

## 2025-03-12 ENCOUNTER — HOSPITAL ENCOUNTER (OUTPATIENT)
Facility: HOSPITAL | Age: 70
Setting detail: RECURRING SERIES
Discharge: HOME | End: 2025-03-15
Payer: MEDICARE

## 2025-03-12 PROCEDURE — 97535 SELF CARE MNGMENT TRAINING: CPT

## 2025-03-12 PROCEDURE — 97530 THERAPEUTIC ACTIVITIES: CPT

## 2025-03-12 PROCEDURE — 97112 NEUROMUSCULAR REEDUCATION: CPT

## 2025-03-12 PROCEDURE — 97110 THERAPEUTIC EXERCISES: CPT

## 2025-03-13 NOTE — PROGRESS NOTES
[facility]  [address] [place] [address]  Ph [phone]  Fx [phone]    Physical Therapy Progress Note  Patient name: Eryn Howell Start of Care: 1955   Referral source: Aguila Simpson DPM : 1955   Medical/Treatment Diagnosis: Left ankle pain [M25.572]  Left foot pain [M79.672] Onset Date:10/15/2024   Prior Hospitalization: see medical history Provider#: [id number]   Comorbidities: Psychological disorders and Other: hyper parathyroid removal, Shoulder replacement right, OA, lumbar radiculopathy, volodymyr ear drum surgery with resulting mastitis, spondylolistheses L4; COPD, Major depressive disorder, Htn,    Prior Level of Function:functionally independent, no AD, active lifestyle     Reporting Period: 25-3/12/25    Visits from Start of Care: 19    Missed Visits: 4    Updated Goals/Measure of Progress:   ***    Summary of Care/ Key Functional Changes: ***    ASSESSMENT/RECOMMENDATIONS: ***  Patient would benefit from the continuation of skilled rehab interventions for functional progress to achieving above stated clinically significant goals. Continue per plan of care.       Thank you for this referral.   Surendra Rodriguez, PTA 3/12/2025 12:26 PM    
PHYSICAL / OCCUPATIONAL THERAPY - DAILY TREATMENT NOTE     Patient Name: Eryn Howell    Date: 3/12/2025    : 1955  Insurance: Payor: [facility] MEDICARE / Plan: [facility] MEDICARE ADVANTAGE / Product Type: *No Product type* /      Patient  verified Yes     Visit #   Current / Total 19 24   Time   In / Out 5:50 6:45   Pain   In / Out 0/10 Left Ankle, 5/10 Left Shoulder  0/10 Left Ankle, 5/10 Left Shoulder   Subjective Functional Status/Changes: \"I have pain in my shoulder like normal.\"   Changes to:  Allergies, Med Hx, Sx Hx?   no       TREATMENT AREA =  Left ankle pain [M25.572]  Left foot pain [M79.672]    If an interpreting service is utilized for treatment of this patient, the contents of this document represent the material reviewed with the patient via the .     OBJECTIVE    Therapeutic Procedures:  Tx Min Billable or 1:1 Min (if diff from Tx Min) Procedure, Rationale, Specifics   10  38700 Therapeutic Exercise (timed):  increase ROM, strength, coordination, balance, and proprioception to improve patient's ability to progress to PLOF and address remaining functional goals. (see flow sheet as applicable)    Details if applicable:       10  36896 Neuromuscular Re-Education (timed):  improve balance, coordination, kinesthetic sense, posture, core stability and proprioception to improve patient's ability to develop conscious control of individual muscles and awareness of position of extremities in order to progress to PLOF and address remaining functional goals. (see flow sheet as applicable)    Details if applicable:     25  86261 Therapeutic Activity (timed):  use of dynamic activities replicating functional movements to increase ROM, strength, coordination, balance, and proprioception in order to improve patient's ability to progress to PLOF and address remaining functional goals.  (see flow sheet as applicable)     Details if applicable:     10  51142 Self Care/Home Management (timed):  
Physical Therapy Discharge Instructions    [facility]  [address] [place] [address]  Ph [phone]  Fx [phone]      Patient: Eryn Howell  : 1955      Continue Home Exercise Program 1 times per day for 3 weeks, then decrease to 3 times per week      Continue with    [x] Ice  as needed      [x] Heat           Follow up with MD:     [] Upon completion of therapy     [x] As needed      Recommendations:     [x]   Return to activity with home program    []   Return to activity with the following modifications:       []Post Rehab Program    []Join Independent aquatic program     []Return to/join local gym        Additional Comments:       Surendra Rodriguez, PTA 3/12/2025 7:06 PM  
activity.     RECOMMENDATIONS:  []Discontinue therapy: []Patient has reached or is progressing toward set goals      []Patient is non-compliant or has abdicated      []Due to lack of appreciable progress towards set goals    Karrie De La Cruz, PT 3/13/2025 12:31 PM

## 2025-03-17 ENCOUNTER — APPOINTMENT (OUTPATIENT)
Facility: HOSPITAL | Age: 70
End: 2025-03-17
Payer: MEDICARE

## 2025-03-19 ENCOUNTER — APPOINTMENT (OUTPATIENT)
Facility: HOSPITAL | Age: 70
End: 2025-03-19
Payer: MEDICARE

## 2025-03-24 ENCOUNTER — APPOINTMENT (OUTPATIENT)
Facility: HOSPITAL | Age: 70
End: 2025-03-24
Payer: MEDICARE

## 2025-03-25 ENCOUNTER — TELEPHONE (OUTPATIENT)
Facility: HOSPITAL | Age: 70
End: 2025-03-25

## 2025-03-25 ENCOUNTER — APPOINTMENT (OUTPATIENT)
Facility: HOSPITAL | Age: 70
End: 2025-03-25
Payer: MEDICARE

## 2025-03-25 NOTE — TELEPHONE ENCOUNTER
I left a voice message for the pt regarding her referral to pulmonary rehab. The pt initially had an evaluation appt scheduled for 2/26/2025 at 12:30PM.     RAULITO

## 2025-03-26 ENCOUNTER — APPOINTMENT (OUTPATIENT)
Facility: HOSPITAL | Age: 70
End: 2025-03-26
Payer: MEDICARE

## 2025-03-27 ENCOUNTER — APPOINTMENT (OUTPATIENT)
Facility: HOSPITAL | Age: 70
End: 2025-03-27
Payer: MEDICARE

## 2025-04-02 ENCOUNTER — HOSPITAL ENCOUNTER (OUTPATIENT)
Facility: HOSPITAL | Age: 70
Discharge: HOME | End: 2025-04-05
Payer: MEDICARE

## 2025-04-02 DIAGNOSIS — Z01.818 PREOP TESTING: Primary | ICD-10-CM

## 2025-04-02 LAB
APPEARANCE UR: ABNORMAL
APTT PPP: 27.2 SEC (ref 23–36.4)
BACTERIA URNS QL MICRO: ABNORMAL /HPF
BILIRUB UR QL: NEGATIVE
COLOR UR: YELLOW
EPITH CASTS URNS QL MICRO: ABNORMAL /LPF (ref 0–5)
GLUCOSE UR STRIP.AUTO-MCNC: NEGATIVE MG/DL
HGB UR QL STRIP: NEGATIVE
INR PPP: 1 (ref 0.9–1.1)
KETONES UR QL STRIP.AUTO: NEGATIVE MG/DL
LEUKOCYTE ESTERASE UR QL STRIP.AUTO: ABNORMAL
NITRITE UR QL STRIP.AUTO: NEGATIVE
PH UR STRIP: 5.5 (ref 5–8)
PROT UR STRIP-MCNC: NEGATIVE MG/DL
PROTHROMBIN TIME: 13.1 SEC (ref 11.9–14.9)
RBC #/AREA URNS HPF: NEGATIVE /HPF (ref 0–5)
SP GR UR REFRACTOMETRY: 1.02 (ref 1–1.03)
UROBILINOGEN UR QL STRIP.AUTO: 1 EU/DL (ref 0.2–1)
WBC URNS QL MICRO: ABNORMAL /HPF (ref 0–5)

## 2025-04-02 PROCEDURE — 85730 THROMBOPLASTIN TIME PARTIAL: CPT

## 2025-04-02 PROCEDURE — 81001 URINALYSIS AUTO W/SCOPE: CPT

## 2025-04-02 PROCEDURE — 87086 URINE CULTURE/COLONY COUNT: CPT

## 2025-04-02 PROCEDURE — 85610 PROTHROMBIN TIME: CPT

## 2025-04-03 LAB
BACTERIA SPEC CULT: NORMAL
BACTERIA SPEC CULT: NORMAL
SERVICE CMNT-IMP: NORMAL

## 2025-04-06 LAB
BACTERIA SPEC CULT: ABNORMAL
BACTERIA SPEC CULT: ABNORMAL
CC UR VC: ABNORMAL
SERVICE CMNT-IMP: ABNORMAL

## 2025-04-11 ENCOUNTER — HOSPITAL ENCOUNTER (OUTPATIENT)
Facility: HOSPITAL | Age: 70
Discharge: HOME | End: 2025-04-14
Payer: MEDICARE

## 2025-04-11 DIAGNOSIS — Z01.811 PRE-OPERATIVE RESPIRATORY EXAMINATION: ICD-10-CM

## 2025-04-11 PROCEDURE — 71045 X-RAY EXAM CHEST 1 VIEW: CPT

## 2025-04-13 RX ORDER — IPRATROPIUM BROMIDE AND ALBUTEROL SULFATE 2.5; .5 MG/3ML; MG/3ML
1 SOLUTION RESPIRATORY (INHALATION)
Status: CANCELLED | OUTPATIENT
Start: 2025-04-13

## 2025-04-13 RX ORDER — ONDANSETRON 2 MG/ML
4 INJECTION INTRAMUSCULAR; INTRAVENOUS
Status: CANCELLED | OUTPATIENT
Start: 2025-04-13

## 2025-04-13 RX ORDER — DIPHENHYDRAMINE HYDROCHLORIDE 50 MG/ML
12.5 INJECTION, SOLUTION INTRAMUSCULAR; INTRAVENOUS
Status: CANCELLED | OUTPATIENT
Start: 2025-04-13

## 2025-04-13 RX ORDER — LABETALOL HYDROCHLORIDE 5 MG/ML
10 INJECTION, SOLUTION INTRAVENOUS
Status: CANCELLED | OUTPATIENT
Start: 2025-04-13

## 2025-04-13 RX ORDER — FENTANYL CITRATE 50 UG/ML
25 INJECTION, SOLUTION INTRAMUSCULAR; INTRAVENOUS EVERY 5 MIN PRN
Refills: 0 | Status: CANCELLED | OUTPATIENT
Start: 2025-04-13

## 2025-04-13 RX ORDER — OXYCODONE HYDROCHLORIDE 5 MG/1
5 TABLET ORAL
Refills: 0 | Status: CANCELLED | OUTPATIENT
Start: 2025-04-13

## 2025-04-13 RX ORDER — HYDROMORPHONE HYDROCHLORIDE 1 MG/ML
0.5 INJECTION, SOLUTION INTRAMUSCULAR; INTRAVENOUS; SUBCUTANEOUS EVERY 5 MIN PRN
Refills: 0 | Status: CANCELLED | OUTPATIENT
Start: 2025-04-13

## 2025-04-13 RX ORDER — SODIUM CHLORIDE 9 MG/ML
INJECTION, SOLUTION INTRAVENOUS PRN
Status: CANCELLED | OUTPATIENT
Start: 2025-04-13

## 2025-04-13 RX ORDER — SODIUM CHLORIDE 0.9 % (FLUSH) 0.9 %
5-40 SYRINGE (ML) INJECTION PRN
Status: CANCELLED | OUTPATIENT
Start: 2025-04-13

## 2025-04-13 RX ORDER — SODIUM CHLORIDE 0.9 % (FLUSH) 0.9 %
5-40 SYRINGE (ML) INJECTION EVERY 12 HOURS SCHEDULED
Status: CANCELLED | OUTPATIENT
Start: 2025-04-13

## 2025-04-13 RX ORDER — MEPERIDINE HYDROCHLORIDE 50 MG/ML
12.5 INJECTION INTRAMUSCULAR; INTRAVENOUS; SUBCUTANEOUS AS NEEDED
Refills: 0 | Status: CANCELLED | OUTPATIENT
Start: 2025-04-13

## 2025-04-13 RX ORDER — NALOXONE HYDROCHLORIDE 0.4 MG/ML
INJECTION, SOLUTION INTRAMUSCULAR; INTRAVENOUS; SUBCUTANEOUS PRN
Status: CANCELLED | OUTPATIENT
Start: 2025-04-13

## 2025-04-13 RX ORDER — DROPERIDOL 2.5 MG/ML
0.62 INJECTION, SOLUTION INTRAMUSCULAR; INTRAVENOUS
Status: CANCELLED | OUTPATIENT
Start: 2025-04-13

## 2025-04-13 RX ORDER — SODIUM CHLORIDE, SODIUM LACTATE, POTASSIUM CHLORIDE, CALCIUM CHLORIDE 600; 310; 30; 20 MG/100ML; MG/100ML; MG/100ML; MG/100ML
INJECTION, SOLUTION INTRAVENOUS CONTINUOUS
Status: CANCELLED | OUTPATIENT
Start: 2025-04-13

## 2025-04-14 ENCOUNTER — HOSPITAL ENCOUNTER (OUTPATIENT)
Facility: HOSPITAL | Age: 70
Setting detail: OUTPATIENT SURGERY
Discharge: HOME | End: 2025-04-14
Attending: ORTHOPAEDIC SURGERY | Admitting: ORTHOPAEDIC SURGERY
Payer: MEDICARE

## 2025-04-14 VITALS
DIASTOLIC BLOOD PRESSURE: 70 MMHG | WEIGHT: 162.4 LBS | OXYGEN SATURATION: 98 % | SYSTOLIC BLOOD PRESSURE: 114 MMHG | HEART RATE: 90 BPM | RESPIRATION RATE: 18 BRPM | TEMPERATURE: 97.5 F | BODY MASS INDEX: 24.61 KG/M2 | HEIGHT: 68 IN

## 2025-04-14 LAB
ABO + RH BLD: NORMAL
BLOOD GROUP ANTIBODIES SERPL: NORMAL
GLUCOSE BLD STRIP.AUTO-MCNC: 147 MG/DL (ref 70–110)
SPECIMEN EXP DATE BLD: NORMAL

## 2025-04-14 PROCEDURE — 86900 BLOOD TYPING SEROLOGIC ABO: CPT

## 2025-04-14 PROCEDURE — 82962 GLUCOSE BLOOD TEST: CPT

## 2025-04-14 PROCEDURE — 86850 RBC ANTIBODY SCREEN: CPT

## 2025-04-14 PROCEDURE — 6370000000 HC RX 637 (ALT 250 FOR IP): Performed by: ANESTHESIOLOGY

## 2025-04-14 PROCEDURE — 2580000003 HC RX 258: Performed by: ORTHOPAEDIC SURGERY

## 2025-04-14 PROCEDURE — 86901 BLOOD TYPING SEROLOGIC RH(D): CPT

## 2025-04-14 PROCEDURE — 36415 COLL VENOUS BLD VENIPUNCTURE: CPT

## 2025-04-14 RX ORDER — SODIUM CHLORIDE 9 MG/ML
INJECTION, SOLUTION INTRAVENOUS CONTINUOUS
Status: DISCONTINUED | OUTPATIENT
Start: 2025-04-14 | End: 2025-04-14 | Stop reason: HOSPADM

## 2025-04-14 RX ORDER — ACETAMINOPHEN 500 MG
1000 TABLET ORAL ONCE
Status: COMPLETED | OUTPATIENT
Start: 2025-04-14 | End: 2025-04-14

## 2025-04-14 RX ADMIN — ACETAMINOPHEN 1000 MG: 500 TABLET ORAL at 09:13

## 2025-04-14 RX ADMIN — SODIUM CHLORIDE: 900 INJECTION, SOLUTION INTRAVENOUS at 09:29

## 2025-04-14 RX ADMIN — SODIUM CHLORIDE: 900 INJECTION, SOLUTION INTRAVENOUS at 11:50

## 2025-04-14 ASSESSMENT — PAIN - FUNCTIONAL ASSESSMENT: PAIN_FUNCTIONAL_ASSESSMENT: 0-10

## 2025-04-14 ASSESSMENT — PAIN DESCRIPTION - DESCRIPTORS: DESCRIPTORS: ACHING

## 2025-04-14 NOTE — H&P
[facility]  History and Physical Exam    Patient: Eryn Howell MRN: [id number]  SSN: [id number]    YOB: 1955  Age: 69 y.o.  Sex: female      Subjective:      Chief Complaint: Left shoulder pain    History of Present Illness:  Patient complains of left shoulder pain and difficulty with ROM    Past Medical History:   Diagnosis Date    Ankle fracture 2024    closed of the left ankle, s/p surgery    Asthma     denies asthma/ states \"COPD\"    Asthmatic bronchitis 2010    noted in [facility]    Chronic back pain 2010    COPD (chronic obstructive pulmonary disease) ([facility]) 2017    Severe Stage 4, cpap and oxygen 4-lpm at night, sees -Dr. Box, [place] 2/3/2025    ALESSANDRO (generalized anxiety disorder) 2009    treated with meds    GERD (gastroesophageal reflux disease) 2021    treated with med    Hearing loss 2015    both ears-per , noncompliant with hearing aids.    History of COVID-19 02/2021    no hospitalization, moderate sxs per patient    History of recurrent UTIs 2015    recent on 4/2/2025- currently treated with antibiotics    Hyperlipidemia 2007    treated with meds    Hyperparathyroidism 2022    Primary, s/p Left parathyroidectomy    Hypertension 2009    treated with med, managed PCP-Dr. Cahpman, [place]    Impaired exercise tolerance 04/09/2025    Patient not able to climb one flight of stairs without stopping for SOB.Denies chest pain, can only climb 4 steps.    Influenza A 01/03/2024    hospitalized at [facility] for 5-days    Lumbar radiculopathy 2003    x1 back surgery    Major depressive disorder, recurrent, unspecified 2024    noted in Encounters, patient not aware    OA (osteoarthritis) 2022    both shoulders, hips, back, right hand    Osteopenia 2024    multiple sites    Prediabetes     HgA1c -6.0 on 2/5/2025, on and off, not sure of time frame, never been treated per patient and     PTSD (post-traumatic stress disorder) 1960    Complex, chronic trauma from

## 2025-04-14 NOTE — PERIOP NOTE
Reviewed PTA medication list with patient/caregiver and patient/caregiver denies any additional medications.     Patient admits to having a responsible adult care for them at home for at least 24 hours after surgery.    Patient encouraged to use gown warming system and informed that using said warming gown to regulate body temperature prior to a procedure has been shown to help reduce the risks of blood clots and infection.    Patient's pharmacy of choice verified and documented in PTA medication section.    Dual skin assessment & fall risk band verification completed with Radha CLEMENT RN.

## 2025-04-14 NOTE — PERIOP NOTE
Procedure cancelled by anesthesia due to potential airway complications.  Dr. Fuentes and Giselle spoke with pt and family.

## 2025-04-14 NOTE — DISCHARGE INSTRUCTIONS
Upper Extremity Surgery Discharge Instructions  Dr. Fuentes    Please take the time to review the following instructions before you leave the hospital and use them as guidelines during your recovery from surgery. If you have any questions, you may contact my office at [phone].    Wound Care / Dressing Change    __________ Do NOT remove your dressing or get them wet.     _____x_____ You may change/remove your dressings two days after your surgery date. A big, bulky dressing isn't necessary as long as there isn't any drainage from the incisions. If there is drainage you can put a band-aid, primapore, or mepilex dressing over the incision and change it daily until drainage stops. It isn't necessary to apply antibiotic ointment to your incisions. If you have glue over your incision DO NOT peel it off. Keep a towel or gauze in any skin folds that may hang over the incision so that it stays dry.     Showering / Bathing    ____x______ You may shower 2 days after your surgery. Your dressing may be removed for showering, except the glued on dressing. You may get your incision and the dressing adhered to your skin wet in the shower. Do not vigorously scrub your incision or the adherent dressing. Apply a clean, dry dressing after you have dried your incisions if there is drainage. Do not take a bath or get into a swimming pool or Jacuzzi until further instruction. Do not soak your incisions under water.       Sling    ___X__ Keep your arm in the immobilizer/sling at all times except when showering and changing your clothes, and doing the exercises shown to you by Dr. Fuentes or your physical/occupational therapist prior to your discharge from the hospital. Keep your arm at your side when changing your clothes and showering. Do not move it away from your body.     Ice and Elevation    Continue ice consistently for 48 hours after surgery. After 48 hours, you should ice 3 times per day for 20 minutes at a time for the

## 2025-04-18 ENCOUNTER — TELEPHONE (OUTPATIENT)
Facility: HOSPITAL | Age: 70
End: 2025-04-18

## 2025-04-18 NOTE — TELEPHONE ENCOUNTER
Called pt back. Pt reported that she wants to come back to therapy for balance. Her shoulder surgery didnt happen so she would like to work on her gait. She is reaching out to Dr. Sims for script. She does not want to work on shoulder at this time.

## 2025-06-24 ENCOUNTER — TELEPHONE (OUTPATIENT)
Facility: HOSPITAL | Age: 70
End: 2025-06-24

## 2025-06-24 NOTE — TELEPHONE ENCOUNTER
returned call to pts  who had questions regarding post op care for shoulder. PT encouraged him to reach out to MD regarding transition to OP PT. He reported that they have an appt scheduled on 7/2 and will ask them.

## 2025-06-28 ENCOUNTER — HOSPITAL ENCOUNTER (INPATIENT)
Facility: HOSPITAL | Age: 70
LOS: 6 days | Discharge: HOME | End: 2025-07-04
Attending: EMERGENCY MEDICINE | Admitting: INTERNAL MEDICINE
Payer: MEDICARE

## 2025-06-28 ENCOUNTER — APPOINTMENT (OUTPATIENT)
Facility: HOSPITAL | Age: 70
End: 2025-06-28
Payer: MEDICARE

## 2025-06-28 DIAGNOSIS — R06.02 SOB (SHORTNESS OF BREATH): ICD-10-CM

## 2025-06-28 DIAGNOSIS — R65.21 SEPTIC SHOCK (HCC): ICD-10-CM

## 2025-06-28 DIAGNOSIS — Z96.612 HISTORY OF LEFT SHOULDER REPLACEMENT: ICD-10-CM

## 2025-06-28 DIAGNOSIS — J44.1 COPD WITH ACUTE EXACERBATION (HCC): ICD-10-CM

## 2025-06-28 DIAGNOSIS — E87.1 HYPONATREMIA: ICD-10-CM

## 2025-06-28 DIAGNOSIS — J96.01 ACUTE RESPIRATORY FAILURE WITH HYPOXIA AND HYPERCAPNIA (HCC): Primary | ICD-10-CM

## 2025-06-28 DIAGNOSIS — J96.02 ACUTE RESPIRATORY FAILURE WITH HYPOXIA AND HYPERCAPNIA (HCC): Primary | ICD-10-CM

## 2025-06-28 DIAGNOSIS — A41.9 SEPTIC SHOCK (HCC): ICD-10-CM

## 2025-06-28 DIAGNOSIS — R79.89 ELEVATED D-DIMER: ICD-10-CM

## 2025-06-28 LAB
ALBUMIN SERPL-MCNC: 2.6 G/DL (ref 3.4–5)
ALBUMIN/GLOB SERPL: 0.7 (ref 0.8–1.7)
ALP SERPL-CCNC: 128 U/L (ref 45–117)
ALT SERPL-CCNC: 52 U/L (ref 10–35)
ANION GAP SERPL CALC-SCNC: 12 MMOL/L (ref 3–18)
ANION GAP SERPL CALC-SCNC: 17 MMOL/L (ref 3–18)
APTT PPP: 24.3 SEC (ref 23–36.4)
ARTERIAL PATENCY WRIST A: POSITIVE
AST SERPL-CCNC: 54 U/L (ref 10–38)
BASE EXCESS BLD CALC-SCNC: 4.5 MMOL/L
BASOPHILS # BLD: 0 K/UL (ref 0–0.1)
BASOPHILS NFR BLD: 0 % (ref 0–2)
BDY SITE: ABNORMAL
BILIRUB SERPL-MCNC: 0.3 MG/DL (ref 0.2–1)
BUN SERPL-MCNC: 20 MG/DL (ref 6–23)
BUN SERPL-MCNC: 24 MG/DL (ref 6–23)
BUN/CREAT SERPL: 24 (ref 12–20)
BUN/CREAT SERPL: 26 (ref 12–20)
CALCIUM SERPL-MCNC: 9.3 MG/DL (ref 8.5–10.1)
CALCIUM SERPL-MCNC: 9.9 MG/DL (ref 8.5–10.1)
CHLORIDE SERPL-SCNC: 85 MMOL/L (ref 98–107)
CHLORIDE SERPL-SCNC: 93 MMOL/L (ref 98–107)
CO2 SERPL-SCNC: 26 MMOL/L (ref 21–32)
CO2 SERPL-SCNC: 26 MMOL/L (ref 21–32)
CREAT SERPL-MCNC: 0.84 MG/DL (ref 0.6–1.3)
CREAT SERPL-MCNC: 0.93 MG/DL (ref 0.6–1.3)
D DIMER PPP FEU-MCNC: 3.62 UG/ML(FEU)
DIFFERENTIAL METHOD BLD: ABNORMAL
EOSINOPHIL # BLD: 0 K/UL (ref 0–0.4)
EOSINOPHIL NFR BLD: 0 % (ref 0–5)
ERYTHROCYTE [DISTWIDTH] IN BLOOD BY AUTOMATED COUNT: 14.8 % (ref 11.6–14.5)
ERYTHROCYTE [DISTWIDTH] IN BLOOD BY AUTOMATED COUNT: 14.9 % (ref 11.6–14.5)
FLUAV RNA SPEC QL NAA+PROBE: NOT DETECTED
FLUBV RNA SPEC QL NAA+PROBE: NOT DETECTED
GAS FLOW.O2 O2 DELIVERY SYS: ABNORMAL
GAS FLOW.O2 SETTING OXYMISER: 10 BPM
GLOBULIN SER CALC-MCNC: 3.6 G/DL (ref 2–4)
GLUCOSE BLD STRIP.AUTO-MCNC: 224 MG/DL (ref 70–110)
GLUCOSE SERPL-MCNC: 133 MG/DL (ref 74–108)
GLUCOSE SERPL-MCNC: 210 MG/DL (ref 74–108)
HCO3 BLD-SCNC: 30.2 MMOL/L (ref 21–28)
HCT VFR BLD AUTO: 28.6 % (ref 35–45)
HCT VFR BLD AUTO: 33.4 % (ref 35–45)
HGB BLD-MCNC: 11.2 G/DL (ref 12–16)
HGB BLD-MCNC: 9.7 G/DL (ref 12–16)
IMM GRANULOCYTES # BLD AUTO: 0 K/UL
IMM GRANULOCYTES NFR BLD AUTO: 0 %
INSPIRATION.DURATION SETTING TIME VENT: 1 SEC
LACTATE BLD-SCNC: 1.62 MMOL/L (ref 0.4–2)
LYMPHOCYTES # BLD: 1.73 K/UL (ref 0.9–3.6)
LYMPHOCYTES NFR BLD: 11 % (ref 21–52)
MCH RBC QN AUTO: 28.9 PG (ref 24–34)
MCH RBC QN AUTO: 29.4 PG (ref 24–34)
MCHC RBC AUTO-ENTMCNC: 33.5 G/DL (ref 31–37)
MCHC RBC AUTO-ENTMCNC: 33.9 G/DL (ref 31–37)
MCV RBC AUTO: 86.3 FL (ref 78–100)
MCV RBC AUTO: 86.7 FL (ref 78–100)
MONOCYTES # BLD: 1.57 K/UL (ref 0.05–1.2)
MONOCYTES NFR BLD: 10 % (ref 3–10)
NEUTS SEG # BLD: 12.4 K/UL (ref 1.8–8)
NEUTS SEG NFR BLD: 79 % (ref 40–73)
NRBC # BLD: 0 K/UL (ref 0–0.01)
NRBC # BLD: 0 K/UL (ref 0–0.01)
NRBC BLD-RTO: 0 PER 100 WBC
NRBC BLD-RTO: 0 PER 100 WBC
O2/TOTAL GAS SETTING VFR VENT: 60 %
PCO2 BLD: 48.9 MMHG (ref 35–48)
PEEP RESPIRATORY: 8 CMH2O
PH BLD: 7.4 (ref 7.35–7.45)
PLATELET # BLD AUTO: 213 K/UL (ref 135–420)
PLATELET # BLD AUTO: 259 K/UL (ref 135–420)
PLATELET COMMENT: ABNORMAL
PMV BLD AUTO: 10.3 FL (ref 9.2–11.8)
PMV BLD AUTO: 10.7 FL (ref 9.2–11.8)
PO2 BLD: 268 MMHG (ref 83–108)
POTASSIUM SERPL-SCNC: 3.1 MMOL/L (ref 3.5–5.5)
POTASSIUM SERPL-SCNC: 3.6 MMOL/L (ref 3.5–5.5)
PRESSURE SUPPORT SETTING VENT: 8 CMH2O
PROT SERPL-MCNC: 6.2 G/DL (ref 6.4–8.2)
RBC # BLD AUTO: 3.3 M/UL (ref 4.2–5.3)
RBC # BLD AUTO: 3.87 M/UL (ref 4.2–5.3)
RBC MORPH BLD: ABNORMAL
SAO2 % BLD: 99.9 % (ref 92–97)
SARS-COV-2 RNA RESP QL NAA+PROBE: NOT DETECTED
SERVICE CMNT-IMP: ABNORMAL
SODIUM SERPL-SCNC: 127 MMOL/L (ref 136–145)
SODIUM SERPL-SCNC: 130 MMOL/L (ref 136–145)
SOURCE: NORMAL
SPECIMEN TYPE: ABNORMAL
TROPONIN T SERPL HS-MCNC: 13.7 NG/L (ref 0–14)
VENTILATION MODE VENT: ABNORMAL
WBC # BLD AUTO: 11.8 K/UL (ref 4.6–13.2)
WBC # BLD AUTO: 15.7 K/UL (ref 4.6–13.2)

## 2025-06-28 PROCEDURE — 83605 ASSAY OF LACTIC ACID: CPT

## 2025-06-28 PROCEDURE — 96375 TX/PRO/DX INJ NEW DRUG ADDON: CPT

## 2025-06-28 PROCEDURE — 80053 COMPREHEN METABOLIC PANEL: CPT

## 2025-06-28 PROCEDURE — 6370000000 HC RX 637 (ALT 250 FOR IP): Performed by: INTERNAL MEDICINE

## 2025-06-28 PROCEDURE — 2700000000 HC OXYGEN THERAPY PER DAY

## 2025-06-28 PROCEDURE — 85379 FIBRIN DEGRADATION QUANT: CPT

## 2025-06-28 PROCEDURE — 2500000003 HC RX 250 WO HCPCS: Performed by: INTERNAL MEDICINE

## 2025-06-28 PROCEDURE — 94640 AIRWAY INHALATION TREATMENT: CPT

## 2025-06-28 PROCEDURE — 71045 X-RAY EXAM CHEST 1 VIEW: CPT

## 2025-06-28 PROCEDURE — 2500000003 HC RX 250 WO HCPCS: Performed by: EMERGENCY MEDICINE

## 2025-06-28 PROCEDURE — 6360000002 HC RX W HCPCS: Performed by: EMERGENCY MEDICINE

## 2025-06-28 PROCEDURE — 85025 COMPLETE CBC W/AUTO DIFF WBC: CPT

## 2025-06-28 PROCEDURE — 85027 COMPLETE CBC AUTOMATED: CPT

## 2025-06-28 PROCEDURE — 05HN33Z INSERTION OF INFUSION DEVICE INTO LEFT INTERNAL JUGULAR VEIN, PERCUTANEOUS APPROACH: ICD-10-PCS | Performed by: EMERGENCY MEDICINE

## 2025-06-28 PROCEDURE — 02HV33Z INSERTION OF INFUSION DEVICE INTO SUPERIOR VENA CAVA, PERCUTANEOUS APPROACH: ICD-10-PCS | Performed by: EMERGENCY MEDICINE

## 2025-06-28 PROCEDURE — 99285 EMERGENCY DEPT VISIT HI MDM: CPT

## 2025-06-28 PROCEDURE — 84484 ASSAY OF TROPONIN QUANT: CPT

## 2025-06-28 PROCEDURE — 36600 WITHDRAWAL OF ARTERIAL BLOOD: CPT

## 2025-06-28 PROCEDURE — 87040 BLOOD CULTURE FOR BACTERIA: CPT

## 2025-06-28 PROCEDURE — 94660 CPAP INITIATION&MGMT: CPT

## 2025-06-28 PROCEDURE — 2580000003 HC RX 258: Performed by: EMERGENCY MEDICINE

## 2025-06-28 PROCEDURE — 87086 URINE CULTURE/COLONY COUNT: CPT

## 2025-06-28 PROCEDURE — 2000000000 HC ICU R&B

## 2025-06-28 PROCEDURE — 93005 ELECTROCARDIOGRAM TRACING: CPT | Performed by: EMERGENCY MEDICINE

## 2025-06-28 PROCEDURE — 81001 URINALYSIS AUTO W/SCOPE: CPT

## 2025-06-28 PROCEDURE — 85730 THROMBOPLASTIN TIME PARTIAL: CPT

## 2025-06-28 PROCEDURE — 5A09357 ASSISTANCE WITH RESPIRATORY VENTILATION, LESS THAN 24 CONSECUTIVE HOURS, CONTINUOUS POSITIVE AIRWAY PRESSURE: ICD-10-PCS | Performed by: EMERGENCY MEDICINE

## 2025-06-28 PROCEDURE — 96374 THER/PROPH/DIAG INJ IV PUSH: CPT

## 2025-06-28 PROCEDURE — 6360000002 HC RX W HCPCS: Performed by: INTERNAL MEDICINE

## 2025-06-28 PROCEDURE — 82803 BLOOD GASES ANY COMBINATION: CPT

## 2025-06-28 PROCEDURE — 87636 SARSCOV2 & INF A&B AMP PRB: CPT

## 2025-06-28 PROCEDURE — 82962 GLUCOSE BLOOD TEST: CPT

## 2025-06-28 RX ORDER — M-VIT,TX,IRON,MINS/CALC/FOLIC 27MG-0.4MG
1 TABLET ORAL DAILY
Status: DISCONTINUED | OUTPATIENT
Start: 2025-06-28 | End: 2025-07-04 | Stop reason: HOSPADM

## 2025-06-28 RX ORDER — HEPARIN SODIUM 10000 [USP'U]/100ML
5-30 INJECTION, SOLUTION INTRAVENOUS CONTINUOUS
Status: DISCONTINUED | OUTPATIENT
Start: 2025-06-28 | End: 2025-06-29

## 2025-06-28 RX ORDER — SODIUM CHLORIDE 9 MG/ML
INJECTION, SOLUTION INTRAVENOUS PRN
Status: DISCONTINUED | OUTPATIENT
Start: 2025-06-28 | End: 2025-07-04 | Stop reason: HOSPADM

## 2025-06-28 RX ORDER — POTASSIUM CHLORIDE 7.45 MG/ML
10 INJECTION INTRAVENOUS PRN
Status: DISCONTINUED | OUTPATIENT
Start: 2025-06-28 | End: 2025-07-04 | Stop reason: HOSPADM

## 2025-06-28 RX ORDER — PANTOPRAZOLE SODIUM 40 MG/1
40 TABLET, DELAYED RELEASE ORAL
Status: DISCONTINUED | OUTPATIENT
Start: 2025-06-29 | End: 2025-07-04 | Stop reason: HOSPADM

## 2025-06-28 RX ORDER — SODIUM CHLORIDE AND POTASSIUM CHLORIDE 150; 900 MG/100ML; MG/100ML
INJECTION, SOLUTION INTRAVENOUS CONTINUOUS
Status: DISCONTINUED | OUTPATIENT
Start: 2025-06-28 | End: 2025-06-29

## 2025-06-28 RX ORDER — MAGNESIUM SULFATE IN WATER 40 MG/ML
2000 INJECTION, SOLUTION INTRAVENOUS PRN
Status: DISCONTINUED | OUTPATIENT
Start: 2025-06-28 | End: 2025-07-04 | Stop reason: HOSPADM

## 2025-06-28 RX ORDER — NOREPINEPHRINE BITARTRATE 0.03 MG/ML
1-100 INJECTION, SOLUTION INTRAVENOUS CONTINUOUS
Status: DISCONTINUED | OUTPATIENT
Start: 2025-06-28 | End: 2025-06-30

## 2025-06-28 RX ORDER — POLYETHYLENE GLYCOL 3350 17 G/17G
17 POWDER, FOR SOLUTION ORAL DAILY PRN
Status: DISCONTINUED | OUTPATIENT
Start: 2025-06-28 | End: 2025-07-04 | Stop reason: HOSPADM

## 2025-06-28 RX ORDER — CETIRIZINE HYDROCHLORIDE 10 MG/1
10 TABLET ORAL EVERY MORNING
Status: DISCONTINUED | OUTPATIENT
Start: 2025-06-29 | End: 2025-07-04 | Stop reason: HOSPADM

## 2025-06-28 RX ORDER — HEPARIN SODIUM 1000 [USP'U]/ML
80 INJECTION, SOLUTION INTRAVENOUS; SUBCUTANEOUS PRN
Status: DISCONTINUED | OUTPATIENT
Start: 2025-06-28 | End: 2025-06-29

## 2025-06-28 RX ORDER — ROSUVASTATIN CALCIUM 10 MG/1
10 TABLET, COATED ORAL NIGHTLY
Status: DISCONTINUED | OUTPATIENT
Start: 2025-06-28 | End: 2025-07-04 | Stop reason: HOSPADM

## 2025-06-28 RX ORDER — IPRATROPIUM BROMIDE AND ALBUTEROL SULFATE 2.5; .5 MG/3ML; MG/3ML
1 SOLUTION RESPIRATORY (INHALATION)
Status: DISCONTINUED | OUTPATIENT
Start: 2025-06-28 | End: 2025-06-29

## 2025-06-28 RX ORDER — HYDROCODONE BITARTRATE AND ACETAMINOPHEN 7.5; 325 MG/1; MG/1
1 TABLET ORAL EVERY 8 HOURS PRN
Status: DISCONTINUED | OUTPATIENT
Start: 2025-06-28 | End: 2025-07-04 | Stop reason: HOSPADM

## 2025-06-28 RX ORDER — ACETAMINOPHEN 650 MG/1
650 SUPPOSITORY RECTAL EVERY 6 HOURS PRN
Status: DISCONTINUED | OUTPATIENT
Start: 2025-06-28 | End: 2025-07-04 | Stop reason: HOSPADM

## 2025-06-28 RX ORDER — ONDANSETRON 2 MG/ML
4 INJECTION INTRAMUSCULAR; INTRAVENOUS EVERY 6 HOURS PRN
Status: DISCONTINUED | OUTPATIENT
Start: 2025-06-28 | End: 2025-07-04 | Stop reason: HOSPADM

## 2025-06-28 RX ORDER — 0.9 % SODIUM CHLORIDE 0.9 %
30 INTRAVENOUS SOLUTION INTRAVENOUS ONCE
Status: COMPLETED | OUTPATIENT
Start: 2025-06-28 | End: 2025-06-28

## 2025-06-28 RX ORDER — ONDANSETRON 4 MG/1
4 TABLET, ORALLY DISINTEGRATING ORAL EVERY 8 HOURS PRN
Status: DISCONTINUED | OUTPATIENT
Start: 2025-06-28 | End: 2025-07-04 | Stop reason: HOSPADM

## 2025-06-28 RX ORDER — SODIUM CHLORIDE 0.9 % (FLUSH) 0.9 %
5-40 SYRINGE (ML) INJECTION EVERY 12 HOURS SCHEDULED
Status: DISCONTINUED | OUTPATIENT
Start: 2025-06-28 | End: 2025-07-04 | Stop reason: HOSPADM

## 2025-06-28 RX ORDER — ENOXAPARIN SODIUM 100 MG/ML
40 INJECTION SUBCUTANEOUS DAILY
Status: DISCONTINUED | OUTPATIENT
Start: 2025-06-28 | End: 2025-06-28

## 2025-06-28 RX ORDER — BUPROPION HYDROCHLORIDE 150 MG/1
300 TABLET ORAL EVERY MORNING
Status: DISCONTINUED | OUTPATIENT
Start: 2025-06-29 | End: 2025-07-04 | Stop reason: HOSPADM

## 2025-06-28 RX ORDER — SODIUM CHLORIDE 0.9 % (FLUSH) 0.9 %
5-40 SYRINGE (ML) INJECTION PRN
Status: DISCONTINUED | OUTPATIENT
Start: 2025-06-28 | End: 2025-07-04 | Stop reason: HOSPADM

## 2025-06-28 RX ORDER — ACETAMINOPHEN 325 MG/1
650 TABLET ORAL EVERY 6 HOURS PRN
Status: DISCONTINUED | OUTPATIENT
Start: 2025-06-28 | End: 2025-07-04 | Stop reason: HOSPADM

## 2025-06-28 RX ORDER — HEPARIN SODIUM 1000 [USP'U]/ML
40 INJECTION, SOLUTION INTRAVENOUS; SUBCUTANEOUS PRN
Status: DISCONTINUED | OUTPATIENT
Start: 2025-06-28 | End: 2025-06-29

## 2025-06-28 RX ORDER — VALSARTAN 160 MG/1
160 TABLET ORAL DAILY
Status: DISCONTINUED | OUTPATIENT
Start: 2025-06-28 | End: 2025-07-04 | Stop reason: HOSPADM

## 2025-06-28 RX ORDER — POTASSIUM CHLORIDE 1500 MG/1
40 TABLET, EXTENDED RELEASE ORAL PRN
Status: DISCONTINUED | OUTPATIENT
Start: 2025-06-28 | End: 2025-07-04 | Stop reason: HOSPADM

## 2025-06-28 RX ORDER — MORPHINE SULFATE 4 MG/ML
4 INJECTION, SOLUTION INTRAMUSCULAR; INTRAVENOUS
Refills: 0 | Status: COMPLETED | OUTPATIENT
Start: 2025-06-28 | End: 2025-06-28

## 2025-06-28 RX ORDER — GUAIFENESIN 600 MG/1
600 TABLET, EXTENDED RELEASE ORAL 2 TIMES DAILY PRN
Status: DISCONTINUED | OUTPATIENT
Start: 2025-06-28 | End: 2025-07-04 | Stop reason: HOSPADM

## 2025-06-28 RX ORDER — HEPARIN SODIUM 1000 [USP'U]/ML
80 INJECTION, SOLUTION INTRAVENOUS; SUBCUTANEOUS ONCE
Status: COMPLETED | OUTPATIENT
Start: 2025-06-28 | End: 2025-06-28

## 2025-06-28 RX ADMIN — SODIUM CHLORIDE AND POTASSIUM CHLORIDE: .9; .15 SOLUTION INTRAVENOUS at 21:52

## 2025-06-28 RX ADMIN — WATER 1000 MG: 1 INJECTION INTRAMUSCULAR; INTRAVENOUS; SUBCUTANEOUS at 17:00

## 2025-06-28 RX ADMIN — IPRATROPIUM BROMIDE AND ALBUTEROL SULFATE 1 DOSE: .5; 3 SOLUTION RESPIRATORY (INHALATION) at 19:33

## 2025-06-28 RX ADMIN — SODIUM CHLORIDE, PRESERVATIVE FREE 10 ML: 5 INJECTION INTRAVENOUS at 22:06

## 2025-06-28 RX ADMIN — SODIUM CHLORIDE 2400 ML: 0.9 INJECTION, SOLUTION INTRAVENOUS at 16:45

## 2025-06-28 RX ADMIN — WATER 60 MG: 1 INJECTION INTRAMUSCULAR; INTRAVENOUS; SUBCUTANEOUS at 22:15

## 2025-06-28 RX ADMIN — MORPHINE SULFATE 4 MG: 4 INJECTION, SOLUTION INTRAMUSCULAR; INTRAVENOUS at 18:17

## 2025-06-28 RX ADMIN — AZITHROMYCIN MONOHYDRATE 500 MG: 500 INJECTION, POWDER, LYOPHILIZED, FOR SOLUTION INTRAVENOUS at 17:21

## 2025-06-28 RX ADMIN — HEPARIN SODIUM 18 UNITS/KG/HR: 10000 INJECTION, SOLUTION INTRAVENOUS at 22:21

## 2025-06-28 RX ADMIN — HEPARIN SODIUM 6400 UNITS: 1000 INJECTION INTRAVENOUS; SUBCUTANEOUS at 22:19

## 2025-06-28 NOTE — ED TRIAGE NOTES
Patient daughter brought patient to ER for shortness of breath x 2 days, worse last night. Triage SPO2 was 71% with non-rebreather mask on.    Normally on 2LPM oxygen at home, Normal SPO2 88-90's.      Active Ambulatory Problems     Diagnosis Date Noted    COPD with acute exacerbation (HCC) 03/06/2017    COPD (chronic obstructive pulmonary disease) with emphysema (HCC) 03/04/2017    PTSD (post-traumatic stress disorder) 03/09/2017    Acute respiratory failure with hypoxemia (HCC) 03/11/2017    Acute respiratory failure with hypoxia and hypercapnia ([facility]) 07/02/2024    Leucocytosis 07/02/2024    Acute respiratory failure (HCC) 07/02/2024    Chronic prescription opiate use 07/03/2024    Chronic prescription benzodiazepine use 07/03/2024    Anxiety 07/08/2024     Resolved Ambulatory Problems     Diagnosis Date Noted    Respiratory insufficiency 03/04/2017    Mass of sinus 03/04/2017    Sinusitis 03/04/2017    Transaminitis 03/04/2017    Nodule of kidney 03/04/2017    Hypokalemia 03/04/2017    On home oxygen therapy 01/05/2024    Influenza A 01/05/2024    Elevated troponin 01/05/2024     Past Medical History:   Diagnosis Date    Ankle fracture 2024    Asthma     Asthmatic bronchitis 2010    Chronic back pain 2010    COPD (chronic obstructive pulmonary disease) ([facility]) 2017    ALESSANDRO (generalized anxiety disorder) 2009    GERD (gastroesophageal reflux disease) 2021    Hearing loss 2015    History of COVID-19 02/2021    History of recurrent UTIs 2015    Hyperlipidemia 2007    Hyperparathyroidism 2022    Hypertension 2009    Impaired exercise tolerance 04/09/2025    Lumbar radiculopathy 2003    Major depressive disorder, recurrent, unspecified 2024    OA (osteoarthritis) 2022    Osteopenia 2024    Prediabetes     Pulmonary nodule 2017    Recurrent falls 2005    Respiratory failure (HCC) 2017    Scarlet fever 1976    Shingles 1980    Simple renal cyst 2010    SOB (shortness of breath) on exertion 2024    Wears reading eyeglasses

## 2025-06-28 NOTE — H&P
MD Zenon   clonazePAM (KLONOPIN) 2 MG tablet Take 1 tablet by mouth in the morning, at noon, and at bedtime for 10 days. Max Daily Amount: 6 mg  Patient taking differently: Take 1 tablet by mouth 3 times daily. Indications: anxiety 7/16/24 4/14/25  Joelle Del Cid MD   buPROPion (WELLBUTRIN XL) 300 MG extended release tablet Take 1 tablet by mouth every morning 3/28/24   ProviderZenon MD   ipratropium 0.5 mg-albuterol 2.5 mg (DUONEB) 0.5-2.5 (3) MG/3ML SOLN nebulizer solution Inhale 3 mLs into the lungs every 4 hours as needed for Shortness of Breath  Patient taking differently: Take 3 mLs by nebulization every 4 hours as needed for Shortness of Breath or Wheezing 1/8/24   Irlanda Rosenthal MD   guaiFENesin (MUCINEX) 600 MG extended release tablet Take 1 tablet by mouth 2 times daily  Patient taking differently: Take 1 tablet by mouth 2 times daily as needed for Congestion 1/8/24   Irlanda Rosenthal MD   fluticasone-umeclidin-vilant (TRELEGY ELLIPTA) 200-62.5-25 MCG/ACT AEPB inhaler Inhale 1 puff into the lungs daily  Patient taking differently: Inhale 1 puff into the lungs every morning Indications: COPD 1/8/24   Irlanda Rosenthal MD   albuterol sulfate HFA (PROVENTIL;VENTOLIN;PROAIR) 108 (90 Base) MCG/ACT inhaler Inhale 2 puffs into the lungs every 4 hours as needed    Automatic Reconciliation, Ar   naloxone 4 MG/0.1ML LIQD nasal spray Use 1 spray intranasally, then discard. Repeat with new spray every 2 min as needed for opioid overdose symptoms, alternating nostrils.  Patient not taking: Reported on 4/14/2025 2/7/22   Automatic Reconciliation, Ar   omeprazole (PRILOSEC) 40 MG delayed release capsule Take 1 capsule by mouth every morning Indications: GERD    Automatic Reconciliation, Ar       Allergies   Allergen Reactions    Sulfa Antibiotics Anaphylaxis    Sulfacetamide Anaphylaxis    Sulfamethoxazole-Trimethoprim Anaphylaxis    Ativan [Lorazepam] Other (See Comments) and Hallucinations     Patient reported falls and  Hematocrit 33.4 (L) 35.0 - 45.0 %    MCV 86.3 78.0 - 100.0 FL    MCH 28.9 24.0 - 34.0 PG    MCHC 33.5 31.0 - 37.0 g/dL    RDW 14.8 (H) 11.6 - 14.5 %    Platelets 259 135 - 420 K/uL    MPV 10.7 9.2 - 11.8 FL    Nucleated RBCs 0.0 0  WBC    nRBC 0.00 0.00 - 0.01 K/uL    Neutrophils % 79 (H) 40 - 73 %    Lymphocytes % 11 (L) 21 - 52 %    Monocytes % 10 3 - 10 %    Eosinophils % 0 0 - 5 %    Basophils % 0 0 - 2 %    Immature Granulocytes % 0 %    Neutrophils Absolute 12.40 (H) 1.8 - 8.0 K/UL    Lymphocytes Absolute 1.73 0.9 - 3.6 K/UL    Monocytes Absolute 1.57 (H) 0.05 - 1.2 K/UL    Eosinophils Absolute 0.00 0.0 - 0.4 K/UL    Basophils Absolute 0.00 0.0 - 0.1 K/UL    Immature Granulocytes Absolute 0.00 K/UL    Differential Type MANUAL      Platelet Comment ADEQUATE PLATELETS      RBC Comment NORMOCYTIC, NORMOCHROMIC     Comprehensive Metabolic Panel    Collection Time: 06/28/25  3:39 PM   Result Value Ref Range    Sodium 127 (L) 136 - 145 mmol/L    Potassium 3.6 3.5 - 5.5 mmol/L    Chloride 85 (L) 98 - 107 mmol/L    CO2 26 21 - 32 mmol/L    Anion Gap 17 3 - 18 mmol/L    Glucose 210 (H) 74 - 108 mg/dL    BUN 24 (H) 6 - 23 MG/DL    Creatinine 0.93 0.60 - 1.30 MG/DL    BUN/Creatinine Ratio 26 (H) 12 - 20      Est, Glom Filt Rate 66 >60 ml/min/1.73m2    Calcium 9.9 8.5 - 10.1 MG/DL    Total Bilirubin 0.3 0.2 - 1.0 MG/DL    ALT 52 (H) 10 - 35 U/L    AST 54 (H) 10 - 38 U/L    Alk Phosphatase 128 (H) 45 - 117 U/L    Total Protein 6.2 (L) 6.4 - 8.2 g/dL    Albumin 2.6 (L) 3.4 - 5.0 g/dL    Globulin 3.6 2.0 - 4.0 g/dL    Albumin/Globulin Ratio 0.7 (L) 0.8 - 1.7     Troponin    Collection Time: 06/28/25  3:39 PM   Result Value Ref Range    Troponin T 13.7 0.0 - 14.0 ng/L   COVID-19 & Influenza Combo    Collection Time: 06/28/25  3:39 PM    Specimen: Nasopharyngeal   Result Value Ref Range    Source Nasopharyngeal      SARS-CoV-2, PCR Not detected NOTD      Rapid Influenza A By PCR Not detected NOTD      Rapid Influenza B

## 2025-06-28 NOTE — ED PROVIDER NOTES
with the care plan formulated and outlined with them.  I have encouraged them to ask questions as they arise throughout their visit.    RECORDS REVIEWED: Nursing Notes    MEDICATIONS ADMINISTERED IN THE ED:  Medications   guaiFENesin (MUCINEX) extended release tablet 600 mg (has no administration in time range)   Therapeutic-M TABS 1 tablet (has no administration in time range)   HYDROcodone-acetaminophen (NORCO) 7.5-325 MG per tablet 1 tablet (has no administration in time range)   rosuvastatin (CRESTOR) tablet 10 mg (has no administration in time range)   cetirizine (ZYRTEC) tablet 10 mg (has no administration in time range)   pantoprazole (PROTONIX) tablet 40 mg (has no administration in time range)   ipratropium 0.5 mg-albuterol 2.5 mg (DUONEB) nebulizer solution 1 Dose (1 Dose Nebulization Given 6/28/25 1933)   valsartan (DIOVAN) tablet 160 mg (has no administration in time range)   methylPREDNISolone sodium succ (SOLU-MEDROL) 60 mg in sterile water 0.96 mL injection (has no administration in time range)   buPROPion (WELLBUTRIN XL) extended release tablet 300 mg (has no administration in time range)   cefTRIAXone (ROCEPHIN) 1,000 mg in sterile water 10 mL IV syringe (has no administration in time range)   azithromycin (ZITHROMAX) 500 mg in sodium chloride 0.9 % 250 mL (addEASE) IVPB (has no administration in time range)   sodium chloride flush 0.9 % injection 5-40 mL (has no administration in time range)   sodium chloride flush 0.9 % injection 5-40 mL (has no administration in time range)   0.9 % sodium chloride infusion (has no administration in time range)   potassium chloride (KLOR-CON M) extended release tablet 40 mEq (has no administration in time range)     Or   potassium bicarb-citric acid (EFFER-K) effervescent tablet 40 mEq (has no administration in time range)     Or   potassium chloride 10 mEq/100 mL IVPB (Peripheral Line) (has no administration in time range)   magnesium sulfate 2000 mg in 50 mL  there was a high probability of imminent or life threatening deterioration in the patients condition. This care involved high complexity decision making to assess, manipulate, and support vital system functions, to treat this degreee vital organ system failure and to prevent further life threatening deterioration of the patient’s condition.    Organ systems affected: Respiratory, cardiovascular    Rudi Mendoza MD    Diagnosis and Disposition     DISPOSITION Admitted 06/28/2025 07:25:31 PM         ADMISSION NOTE:    Critical Care Time: 99 minutes    Core Measures (for hospitalized patients):    1. Hospitalization Decision Time:  The decision to hospitalize the patient was made by Rudi Mendoza MD @ at 4:32 PM on 6/28/2025    2. Aspirin: Aspirin was not given because the patient did not present with a stroke at the time of their Emergency Department evaluation    7:21 PM. Patient is being admitted to the hospital by Dr. Mason. The results of their tests and reasons for their admission have been discussed with them and/or available family. They convey agreement and understanding for the need to be admitted and for their admission diagnosis.      CONDITIONS ON ADMISSION:  Sepsis is  present at the time of admission. Deep Vein Thrombosis is not present at the time of admission. Thrombosis is not present at the time of admission. Urinary Tract Infection is not present at the time of admission. Pneumonia is not present at the time of admission. MRSA is not present at the time of admission. Wound infection is not present at the time of admission. Pressure Ulcer is not present at the time of admission.      CLINICAL IMPRESSION:  1. Acute respiratory failure with hypoxia and hypercapnia (HCC)    2. Hyponatremia    3. History of left shoulder replacement    4. Septic shock (HCC)        PLAN:  Admit to ICU    I Rudi Mendoza MD am the primary clinician of record.    Dragon Disclaimer     Please note that this

## 2025-06-29 ENCOUNTER — APPOINTMENT (OUTPATIENT)
Facility: HOSPITAL | Age: 70
End: 2025-06-29
Attending: INTERNAL MEDICINE
Payer: MEDICARE

## 2025-06-29 ENCOUNTER — HOSPITAL ENCOUNTER (INPATIENT)
Facility: HOSPITAL | Age: 70
Discharge: HOME | End: 2025-07-02
Payer: MEDICARE

## 2025-06-29 PROBLEM — J96.22 ACUTE ON CHRONIC RESPIRATORY FAILURE WITH HYPOXIA AND HYPERCAPNIA (HCC): Status: ACTIVE | Noted: 2017-03-11

## 2025-06-29 PROBLEM — Z98.890 STATUS POST SURGERY: Status: ACTIVE | Noted: 2025-06-29

## 2025-06-29 PROBLEM — J96.21 ACUTE ON CHRONIC RESPIRATORY FAILURE WITH HYPOXIA AND HYPERCAPNIA (HCC): Status: ACTIVE | Noted: 2017-03-11

## 2025-06-29 PROBLEM — E87.1 HYPONATREMIA: Status: ACTIVE | Noted: 2025-06-29

## 2025-06-29 LAB
AMMONIA PLAS-SCNC: 19 UMOL/L (ref 11–60)
ANION GAP SERPL CALC-SCNC: 12 MMOL/L (ref 3–18)
APPEARANCE UR: CLEAR
APTT PPP: 59.4 SEC (ref 23–36.4)
APTT PPP: 75.8 SEC (ref 23–36.4)
ARTERIAL PATENCY WRIST A: ABNORMAL
ARTERIAL PATENCY WRIST A: NORMAL
BACTERIA URNS QL MICRO: ABNORMAL /HPF
BASE EXCESS BLD CALC-SCNC: 0.6 MMOL/L
BASE EXCESS BLD CALC-SCNC: 0.8 MMOL/L
BASOPHILS # BLD: 0.04 K/UL (ref 0–0.1)
BASOPHILS NFR BLD: 0.3 % (ref 0–2)
BDY SITE: ABNORMAL
BDY SITE: NORMAL
BILIRUB UR QL: NEGATIVE
BODY TEMPERATURE: 101.3
BUN SERPL-MCNC: 20 MG/DL (ref 6–23)
BUN/CREAT SERPL: 26 (ref 12–20)
CALCIUM SERPL-MCNC: 9.1 MG/DL (ref 8.5–10.1)
CHLORIDE SERPL-SCNC: 94 MMOL/L (ref 98–107)
CO2 SERPL-SCNC: 24 MMOL/L (ref 21–32)
COLOR UR: YELLOW
CREAT SERPL-MCNC: 0.8 MG/DL (ref 0.6–1.3)
DIFFERENTIAL METHOD BLD: ABNORMAL
ECHO AO ROOT DIAM: 2.9 CM
ECHO AO ROOT INDEX: 1.5 CM/M2
ECHO AV AREA PEAK VELOCITY: 1.4 CM2
ECHO AV AREA VTI: 1.5 CM2
ECHO AV AREA/BSA PEAK VELOCITY: 0.7 CM2/M2
ECHO AV AREA/BSA VTI: 0.8 CM2/M2
ECHO AV MEAN GRADIENT: 6 MMHG
ECHO AV MEAN VELOCITY: 1.2 M/S
ECHO AV PEAK GRADIENT: 10 MMHG
ECHO AV PEAK VELOCITY: 1.6 M/S
ECHO AV VELOCITY RATIO: 0.38
ECHO AV VTI: 37.1 CM
ECHO BSA: 1.95 M2
ECHO EST RA PRESSURE: 15 MMHG
ECHO LA DIAMETER INDEX: 2.02 CM/M2
ECHO LA DIAMETER: 3.9 CM
ECHO LA TO AORTIC ROOT RATIO: 1.34
ECHO LA VOL A-L A4C: 63 ML (ref 22–52)
ECHO LA VOL MOD A4C: 60 ML (ref 22–52)
ECHO LA VOLUME INDEX A-L A4C: 33 ML/M2 (ref 16–34)
ECHO LA VOLUME INDEX MOD A4C: 31 ML/M2 (ref 16–34)
ECHO LV E' LATERAL VELOCITY: 9.35 CM/S
ECHO LV E' SEPTAL VELOCITY: 10.9 CM/S
ECHO LV EF PHYSICIAN: 55 %
ECHO LV FRACTIONAL SHORTENING: 33 % (ref 28–44)
ECHO LV INTERNAL DIMENSION DIASTOLE INDEX: 2.38 CM/M2
ECHO LV INTERNAL DIMENSION DIASTOLIC: 4.6 CM (ref 3.9–5.3)
ECHO LV INTERNAL DIMENSION SYSTOLIC INDEX: 1.61 CM/M2
ECHO LV INTERNAL DIMENSION SYSTOLIC: 3.1 CM
ECHO LV IVSD: 0.8 CM (ref 0.6–0.9)
ECHO LV MASS 2D: 127.7 G (ref 67–162)
ECHO LV MASS INDEX 2D: 66.1 G/M2 (ref 43–95)
ECHO LV POSTERIOR WALL DIASTOLIC: 0.9 CM (ref 0.6–0.9)
ECHO LV RELATIVE WALL THICKNESS RATIO: 0.39
ECHO LVOT AREA: 3.5 CM2
ECHO LVOT AV VTI INDEX: 0.42
ECHO LVOT DIAM: 2.1 CM
ECHO LVOT MEAN GRADIENT: 1 MMHG
ECHO LVOT PEAK GRADIENT: 2 MMHG
ECHO LVOT PEAK VELOCITY: 0.6 M/S
ECHO LVOT STROKE VOLUME INDEX: 28.2 ML/M2
ECHO LVOT SV: 54.4 ML
ECHO LVOT VTI: 15.7 CM
ECHO MV A VELOCITY: 0.67 M/S
ECHO MV AREA VTI: 2.4 CM2
ECHO MV E DECELERATION TIME (DT): 152.7 MS
ECHO MV E VELOCITY: 0.78 M/S
ECHO MV E/A RATIO: 1.16
ECHO MV E/E' LATERAL: 8.34
ECHO MV E/E' RATIO (AVERAGED): 7.75
ECHO MV E/E' SEPTAL: 7.16
ECHO MV LVOT VTI INDEX: 1.47
ECHO MV MAX VELOCITY: 0.9 M/S
ECHO MV MEAN GRADIENT: 1 MMHG
ECHO MV MEAN VELOCITY: 0.6 M/S
ECHO MV PEAK GRADIENT: 3 MMHG
ECHO MV VTI: 23.1 CM
ECHO PULMONARY ARTERY SYSTOLIC PRESSURE (PASP): 47 MMHG
ECHO PV MAX VELOCITY: 1.1 M/S
ECHO PV MEAN GRADIENT: 3 MMHG
ECHO PV MEAN VELOCITY: 0.8 M/S
ECHO PV PEAK GRADIENT: 4 MMHG
ECHO RA VOLUME: 34 ML
ECHO RA VOLUME: 37 ML
ECHO RIGHT VENTRICULAR SYSTOLIC PRESSURE (RVSP): 47 MMHG
ECHO RV INTERNAL DIMENSION: 4.3 CM
ECHO RV TAPSE: 1.9 CM (ref 1.7–?)
ECHO RVOT MEAN GRADIENT: 1 MMHG
ECHO RVOT PEAK GRADIENT: 2 MMHG
ECHO RVOT PEAK VELOCITY: 0.8 M/S
ECHO RVOT VTI: 17.5 CM
ECHO TV REGURGITANT MAX VELOCITY: 2.83 M/S
ECHO TV REGURGITANT PEAK GRADIENT: 32 MMHG
EOSINOPHIL # BLD: 0 K/UL (ref 0–0.4)
EOSINOPHIL NFR BLD: 0 % (ref 0–5)
EPITH CASTS URNS QL MICRO: ABNORMAL /LPF (ref 0–5)
ERYTHROCYTE [DISTWIDTH] IN BLOOD BY AUTOMATED COUNT: 15.1 % (ref 11.6–14.5)
GAS FLOW.O2 O2 DELIVERY SYS: ABNORMAL
GAS FLOW.O2 O2 DELIVERY SYS: NORMAL
GAS FLOW.O2 SETTING OXYMISER: 10 BPM
GAS FLOW.O2 SETTING OXYMISER: 10 BPM
GLUCOSE BLD STRIP.AUTO-MCNC: 233 MG/DL (ref 70–110)
GLUCOSE BLD STRIP.AUTO-MCNC: 235 MG/DL (ref 70–110)
GLUCOSE BLD STRIP.AUTO-MCNC: 261 MG/DL (ref 70–110)
GLUCOSE BLD STRIP.AUTO-MCNC: 280 MG/DL (ref 70–110)
GLUCOSE SERPL-MCNC: 221 MG/DL (ref 74–108)
GLUCOSE UR STRIP.AUTO-MCNC: NEGATIVE MG/DL
HCO3 BLD-SCNC: 26.4 MMOL/L (ref 21–28)
HCO3 BLD-SCNC: 27.2 MMOL/L (ref 21–28)
HCT VFR BLD AUTO: 26.8 % (ref 35–45)
HGB BLD-MCNC: 9 G/DL (ref 12–16)
HGB UR QL STRIP: NEGATIVE
IMM GRANULOCYTES # BLD AUTO: 0.23 K/UL (ref 0–0.04)
IMM GRANULOCYTES NFR BLD AUTO: 2 % (ref 0–0.5)
INSPIRATION.DURATION SETTING TIME VENT: 0.85 SEC
INSPIRATION.DURATION SETTING TIME VENT: 1 SEC
IPAP/PIP/HIGH PEEP: 15
IPAP/PIP/HIGH PEEP: 17
KETONES UR QL STRIP.AUTO: NEGATIVE MG/DL
LACTATE SERPL-SCNC: 0.9 MMOL/L (ref 0.4–2)
LEUKOCYTE ESTERASE UR QL STRIP.AUTO: ABNORMAL
LYMPHOCYTES # BLD: 0.57 K/UL (ref 0.9–3.3)
LYMPHOCYTES NFR BLD: 5 % (ref 21–52)
MCH RBC QN AUTO: 29.3 PG (ref 24–34)
MCHC RBC AUTO-ENTMCNC: 33.6 G/DL (ref 31–37)
MCV RBC AUTO: 87.3 FL (ref 78–100)
MONOCYTES # BLD: 0.26 K/UL (ref 0.05–1.2)
MONOCYTES NFR BLD: 2.3 % (ref 3–10)
NEUTS SEG # BLD: 10.33 K/UL (ref 1.8–8)
NEUTS SEG NFR BLD: 90.4 % (ref 40–73)
NITRITE UR QL STRIP.AUTO: POSITIVE
NRBC # BLD: 0 K/UL (ref 0–0.01)
NRBC BLD-RTO: 0 PER 100 WBC
O2/TOTAL GAS SETTING VFR VENT: 32 %
O2/TOTAL GAS SETTING VFR VENT: 40 %
PCO2 BLD: 47.3 MMHG (ref 35–48)
PCO2 BLD: 53.8 MMHG (ref 35–48)
PEEP RESPIRATORY: 5 CMH2O
PEEP RESPIRATORY: 5 CMH2O
PH BLD: 7.32 (ref 7.35–7.45)
PH BLD: 7.36 (ref 7.35–7.45)
PH UR STRIP: 6 (ref 5–8)
PLATELET # BLD AUTO: 199 K/UL (ref 135–420)
PMV BLD AUTO: 10.3 FL (ref 9.2–11.8)
PO2 BLD: 138 MMHG (ref 83–108)
PO2 BLD: 89 MMHG (ref 83–108)
POTASSIUM SERPL-SCNC: 3.7 MMOL/L (ref 3.5–5.5)
PRESSURE SUPPORT SETTING VENT: 10 CMH2O
PRESSURE SUPPORT SETTING VENT: 8 CMH2O
PROT UR STRIP-MCNC: 30 MG/DL
RBC # BLD AUTO: 3.07 M/UL (ref 4.2–5.3)
RBC #/AREA URNS HPF: NEGATIVE /HPF (ref 0–5)
RESPIRATORY RATE, POC: 18 (ref 5–40)
RESPIRATORY RATE, POC: 36 (ref 5–40)
SAO2 % BLD: 96.4 % (ref 92–97)
SAO2 % BLD: 98.6 % (ref 92–97)
SERVICE CMNT-IMP: ABNORMAL
SERVICE CMNT-IMP: NORMAL
SODIUM SERPL-SCNC: 131 MMOL/L (ref 136–145)
SP GR UR REFRACTOMETRY: 1.02 (ref 1–1.03)
SPECIMEN TYPE: ABNORMAL
SPECIMEN TYPE: NORMAL
TROPONIN T SERPL HS-MCNC: 13.1 NG/L (ref 0–14)
UROBILINOGEN UR QL STRIP.AUTO: 1 EU/DL (ref 0.2–1)
VENTILATION MODE VENT: ABNORMAL
VENTILATION MODE VENT: NORMAL
VT SETTING VENT: 522 ML
VT SETTING VENT: 525 ML
WBC # BLD AUTO: 11.4 K/UL (ref 4.6–13.2)
WBC URNS QL MICRO: ABNORMAL /HPF (ref 0–5)

## 2025-06-29 PROCEDURE — 70450 CT HEAD/BRAIN W/O DYE: CPT

## 2025-06-29 PROCEDURE — P9047 ALBUMIN (HUMAN), 25%, 50ML: HCPCS | Performed by: INTERNAL MEDICINE

## 2025-06-29 PROCEDURE — 80048 BASIC METABOLIC PNL TOTAL CA: CPT

## 2025-06-29 PROCEDURE — 71275 CT ANGIOGRAPHY CHEST: CPT

## 2025-06-29 PROCEDURE — 6360000002 HC RX W HCPCS: Performed by: INTERNAL MEDICINE

## 2025-06-29 PROCEDURE — 2500000003 HC RX 250 WO HCPCS: Performed by: INTERNAL MEDICINE

## 2025-06-29 PROCEDURE — 85730 THROMBOPLASTIN TIME PARTIAL: CPT

## 2025-06-29 PROCEDURE — 92526 ORAL FUNCTION THERAPY: CPT

## 2025-06-29 PROCEDURE — 36600 WITHDRAWAL OF ARTERIAL BLOOD: CPT

## 2025-06-29 PROCEDURE — 94660 CPAP INITIATION&MGMT: CPT

## 2025-06-29 PROCEDURE — 82803 BLOOD GASES ANY COMBINATION: CPT

## 2025-06-29 PROCEDURE — 6360000004 HC RX CONTRAST MEDICATION: Performed by: HOSPITALIST

## 2025-06-29 PROCEDURE — 2700000000 HC OXYGEN THERAPY PER DAY

## 2025-06-29 PROCEDURE — 94640 AIRWAY INHALATION TREATMENT: CPT

## 2025-06-29 PROCEDURE — 2580000003 HC RX 258: Performed by: INTERNAL MEDICINE

## 2025-06-29 PROCEDURE — 92610 EVALUATE SWALLOWING FUNCTION: CPT

## 2025-06-29 PROCEDURE — 82140 ASSAY OF AMMONIA: CPT

## 2025-06-29 PROCEDURE — 83605 ASSAY OF LACTIC ACID: CPT

## 2025-06-29 PROCEDURE — 2000000000 HC ICU R&B

## 2025-06-29 PROCEDURE — 82962 GLUCOSE BLOOD TEST: CPT

## 2025-06-29 PROCEDURE — 6370000000 HC RX 637 (ALT 250 FOR IP): Performed by: INTERNAL MEDICINE

## 2025-06-29 PROCEDURE — 6370000000 HC RX 637 (ALT 250 FOR IP): Performed by: HOSPITALIST

## 2025-06-29 PROCEDURE — 93306 TTE W/DOPPLER COMPLETE: CPT

## 2025-06-29 PROCEDURE — 84484 ASSAY OF TROPONIN QUANT: CPT

## 2025-06-29 PROCEDURE — 93970 EXTREMITY STUDY: CPT

## 2025-06-29 PROCEDURE — 85025 COMPLETE CBC W/AUTO DIFF WBC: CPT

## 2025-06-29 RX ORDER — BUDESONIDE 0.5 MG/2ML
0.5 INHALANT ORAL
Status: DISCONTINUED | OUTPATIENT
Start: 2025-06-29 | End: 2025-07-04 | Stop reason: HOSPADM

## 2025-06-29 RX ORDER — INSULIN LISPRO 100 [IU]/ML
0-4 INJECTION, SOLUTION INTRAVENOUS; SUBCUTANEOUS
Status: DISCONTINUED | OUTPATIENT
Start: 2025-06-29 | End: 2025-07-01

## 2025-06-29 RX ORDER — MECOBALAMIN 5000 MCG
10 TABLET,DISINTEGRATING ORAL NIGHTLY PRN
Status: DISCONTINUED | OUTPATIENT
Start: 2025-06-29 | End: 2025-07-04 | Stop reason: HOSPADM

## 2025-06-29 RX ORDER — SODIUM CHLORIDE 9 MG/ML
INJECTION, SOLUTION INTRAVENOUS CONTINUOUS
Status: DISPENSED | OUTPATIENT
Start: 2025-06-29 | End: 2025-06-29

## 2025-06-29 RX ORDER — CLONAZEPAM 0.5 MG/1
2 TABLET ORAL EVERY 8 HOURS
Status: DISCONTINUED | OUTPATIENT
Start: 2025-06-29 | End: 2025-07-01

## 2025-06-29 RX ORDER — CLONAZEPAM 0.5 MG/1
1 TABLET ORAL EVERY 8 HOURS
Status: DISCONTINUED | OUTPATIENT
Start: 2025-06-29 | End: 2025-07-04 | Stop reason: HOSPADM

## 2025-06-29 RX ORDER — DEXTROSE MONOHYDRATE 100 MG/ML
INJECTION, SOLUTION INTRAVENOUS CONTINUOUS PRN
Status: DISCONTINUED | OUTPATIENT
Start: 2025-06-29 | End: 2025-07-04 | Stop reason: HOSPADM

## 2025-06-29 RX ORDER — IMIPRAMINE HYDROCHLORIDE 10 MG/1
75 TABLET, FILM COATED ORAL NIGHTLY
Status: DISCONTINUED | OUTPATIENT
Start: 2025-06-29 | End: 2025-06-29 | Stop reason: CLARIF

## 2025-06-29 RX ORDER — IPRATROPIUM BROMIDE AND ALBUTEROL SULFATE 2.5; .5 MG/3ML; MG/3ML
1 SOLUTION RESPIRATORY (INHALATION)
Status: DISCONTINUED | OUTPATIENT
Start: 2025-06-29 | End: 2025-07-04 | Stop reason: HOSPADM

## 2025-06-29 RX ORDER — IOPAMIDOL 755 MG/ML
100 INJECTION, SOLUTION INTRAVASCULAR
Status: COMPLETED | OUTPATIENT
Start: 2025-06-29 | End: 2025-06-29

## 2025-06-29 RX ORDER — ARFORMOTEROL TARTRATE 15 UG/2ML
15 SOLUTION RESPIRATORY (INHALATION)
Status: DISCONTINUED | OUTPATIENT
Start: 2025-06-29 | End: 2025-07-04 | Stop reason: HOSPADM

## 2025-06-29 RX ORDER — POTASSIUM CHLORIDE 7.45 MG/ML
10 INJECTION INTRAVENOUS
Status: COMPLETED | OUTPATIENT
Start: 2025-06-29 | End: 2025-06-29

## 2025-06-29 RX ORDER — BENZONATATE 100 MG/1
100 CAPSULE ORAL 3 TIMES DAILY PRN
Status: DISCONTINUED | OUTPATIENT
Start: 2025-06-29 | End: 2025-07-04 | Stop reason: HOSPADM

## 2025-06-29 RX ORDER — IMIPRAMINE HYDROCHLORIDE 25 MG/1
75 TABLET, FILM COATED ORAL NIGHTLY
Status: DISCONTINUED | OUTPATIENT
Start: 2025-06-30 | End: 2025-07-04 | Stop reason: HOSPADM

## 2025-06-29 RX ORDER — ALBUMIN (HUMAN) 12.5 G/50ML
25 SOLUTION INTRAVENOUS EVERY 8 HOURS
Status: COMPLETED | OUTPATIENT
Start: 2025-06-29 | End: 2025-06-30

## 2025-06-29 RX ORDER — GUAIFENESIN 600 MG/1
600 TABLET, EXTENDED RELEASE ORAL 2 TIMES DAILY
Status: DISCONTINUED | OUTPATIENT
Start: 2025-06-29 | End: 2025-07-04 | Stop reason: HOSPADM

## 2025-06-29 RX ORDER — ENOXAPARIN SODIUM 100 MG/ML
40 INJECTION SUBCUTANEOUS DAILY
Status: DISCONTINUED | OUTPATIENT
Start: 2025-06-30 | End: 2025-07-04 | Stop reason: HOSPADM

## 2025-06-29 RX ORDER — GLUCAGON 1 MG/ML
1 KIT INJECTION PRN
Status: DISCONTINUED | OUTPATIENT
Start: 2025-06-29 | End: 2025-07-04 | Stop reason: HOSPADM

## 2025-06-29 RX ADMIN — ACETAMINOPHEN 650 MG: 650 SUPPOSITORY RECTAL at 01:01

## 2025-06-29 RX ADMIN — AZITHROMYCIN MONOHYDRATE 500 MG: 500 INJECTION, POWDER, LYOPHILIZED, FOR SOLUTION INTRAVENOUS at 18:34

## 2025-06-29 RX ADMIN — WATER 60 MG: 1 INJECTION INTRAMUSCULAR; INTRAVENOUS; SUBCUTANEOUS at 09:55

## 2025-06-29 RX ADMIN — BENZONATATE 100 MG: 100 CAPSULE ORAL at 18:52

## 2025-06-29 RX ADMIN — WATER 60 MG: 1 INJECTION INTRAMUSCULAR; INTRAVENOUS; SUBCUTANEOUS at 21:52

## 2025-06-29 RX ADMIN — ALBUMIN (HUMAN) 25 G: 0.25 INJECTION, SOLUTION INTRAVENOUS at 18:33

## 2025-06-29 RX ADMIN — INSULIN LISPRO 2 UNITS: 100 INJECTION, SOLUTION INTRAVENOUS; SUBCUTANEOUS at 21:49

## 2025-06-29 RX ADMIN — WATER 60 MG: 1 INJECTION INTRAMUSCULAR; INTRAVENOUS; SUBCUTANEOUS at 13:49

## 2025-06-29 RX ADMIN — BUDESONIDE 500 MCG: 0.5 INHALANT RESPIRATORY (INHALATION) at 19:11

## 2025-06-29 RX ADMIN — ARFORMOTEROL TARTRATE 15 MCG: 15 SOLUTION RESPIRATORY (INHALATION) at 08:42

## 2025-06-29 RX ADMIN — INSULIN LISPRO 1 UNITS: 100 INJECTION, SOLUTION INTRAVENOUS; SUBCUTANEOUS at 11:06

## 2025-06-29 RX ADMIN — ACETAMINOPHEN 650 MG: 325 TABLET ORAL at 21:50

## 2025-06-29 RX ADMIN — SODIUM CHLORIDE, PRESERVATIVE FREE 10 ML: 5 INJECTION INTRAVENOUS at 10:11

## 2025-06-29 RX ADMIN — IPRATROPIUM BROMIDE AND ALBUTEROL SULFATE 1 DOSE: .5; 3 SOLUTION RESPIRATORY (INHALATION) at 05:39

## 2025-06-29 RX ADMIN — WATER 1000 MG: 1 INJECTION INTRAMUSCULAR; INTRAVENOUS; SUBCUTANEOUS at 18:34

## 2025-06-29 RX ADMIN — ROSUVASTATIN CALCIUM 10 MG: 10 TABLET, FILM COATED ORAL at 21:51

## 2025-06-29 RX ADMIN — BUDESONIDE 500 MCG: 0.5 INHALANT RESPIRATORY (INHALATION) at 08:42

## 2025-06-29 RX ADMIN — IPRATROPIUM BROMIDE 0.5 MG: 0.5 SOLUTION RESPIRATORY (INHALATION) at 19:11

## 2025-06-29 RX ADMIN — GUAIFENESIN 600 MG: 600 TABLET, EXTENDED RELEASE ORAL at 21:50

## 2025-06-29 RX ADMIN — IOPAMIDOL 100 ML: 755 INJECTION, SOLUTION INTRAVENOUS at 12:27

## 2025-06-29 RX ADMIN — HYDROCODONE BITARTRATE AND ACETAMINOPHEN 1 TABLET: 7.5; 325 TABLET ORAL at 10:23

## 2025-06-29 RX ADMIN — WATER 80 MG: 1 INJECTION INTRAMUSCULAR; INTRAVENOUS; SUBCUTANEOUS at 01:12

## 2025-06-29 RX ADMIN — POTASSIUM CHLORIDE 10 MEQ: 7.46 INJECTION, SOLUTION INTRAVENOUS at 04:05

## 2025-06-29 RX ADMIN — POTASSIUM CHLORIDE 10 MEQ: 7.46 INJECTION, SOLUTION INTRAVENOUS at 03:12

## 2025-06-29 RX ADMIN — Medication 1 TABLET: at 10:15

## 2025-06-29 RX ADMIN — ALBUMIN (HUMAN) 25 G: 0.25 INJECTION, SOLUTION INTRAVENOUS at 12:49

## 2025-06-29 RX ADMIN — HYDROCODONE BITARTRATE AND ACETAMINOPHEN 1 TABLET: 7.5; 325 TABLET ORAL at 18:51

## 2025-06-29 RX ADMIN — IPRATROPIUM BROMIDE 0.5 MG: 0.5 SOLUTION RESPIRATORY (INHALATION) at 16:53

## 2025-06-29 RX ADMIN — IPRATROPIUM BROMIDE AND ALBUTEROL SULFATE 1 DOSE: .5; 3 SOLUTION RESPIRATORY (INHALATION) at 08:42

## 2025-06-29 RX ADMIN — SODIUM CHLORIDE: 0.9 INJECTION, SOLUTION INTRAVENOUS at 12:47

## 2025-06-29 RX ADMIN — CLONAZEPAM 2 MG: 0.5 TABLET ORAL at 10:10

## 2025-06-29 RX ADMIN — IPRATROPIUM BROMIDE 0.5 MG: 0.5 SOLUTION RESPIRATORY (INHALATION) at 12:29

## 2025-06-29 RX ADMIN — WATER 60 MG: 1 INJECTION INTRAMUSCULAR; INTRAVENOUS; SUBCUTANEOUS at 03:07

## 2025-06-29 RX ADMIN — BUPROPION HYDROCHLORIDE 300 MG: 150 TABLET, EXTENDED RELEASE ORAL at 10:10

## 2025-06-29 RX ADMIN — INSULIN LISPRO 1 UNITS: 100 INJECTION, SOLUTION INTRAVENOUS; SUBCUTANEOUS at 18:51

## 2025-06-29 RX ADMIN — GUAIFENESIN 600 MG: 600 TABLET, EXTENDED RELEASE ORAL at 11:11

## 2025-06-29 RX ADMIN — IPRATROPIUM BROMIDE AND ALBUTEROL SULFATE 1 DOSE: .5; 3 SOLUTION RESPIRATORY (INHALATION) at 00:42

## 2025-06-29 RX ADMIN — ARFORMOTEROL TARTRATE 15 MCG: 15 SOLUTION RESPIRATORY (INHALATION) at 19:11

## 2025-06-29 RX ADMIN — CETIRIZINE HYDROCHLORIDE 10 MG: 10 TABLET, FILM COATED ORAL at 10:09

## 2025-06-29 RX ADMIN — CLONAZEPAM 2 MG: 0.5 TABLET ORAL at 16:27

## 2025-06-29 ASSESSMENT — PAIN DESCRIPTION - ORIENTATION
ORIENTATION: LEFT

## 2025-06-29 ASSESSMENT — PAIN SCALES - GENERAL
PAINLEVEL_OUTOF10: 10

## 2025-06-29 ASSESSMENT — PAIN DESCRIPTION - LOCATION
LOCATION: SHOULDER

## 2025-06-29 NOTE — PLAN OF CARE
Problem: Pain  Goal: Verbalizes/displays adequate comfort level or baseline comfort level  Outcome: Progressing  Flowsheets (Taken 6/29/2025 0424)  Verbalizes/displays adequate comfort level or baseline comfort level:   Encourage patient to monitor pain and request assistance   Assess pain using appropriate pain scale   Administer analgesics based on type and severity of pain and evaluate response   Implement non-pharmacological measures as appropriate and evaluate response   Notify Licensed Independent Practitioner if interventions unsuccessful or patient reports new pain     Problem: Safety - Adult  Goal: Free from fall injury  Outcome: Progressing  Flowsheets (Taken 6/29/2025 0424)  Free From Fall Injury: Instruct family/caregiver on patient safety     Problem: Respiratory - Adult  Goal: Achieves optimal ventilation and oxygenation  Outcome: Progressing  Flowsheets (Taken 6/29/2025 0425)  Achieves optimal ventilation and oxygenation:   Assess for changes in respiratory status   Assess for changes in mentation and behavior   Position to facilitate oxygenation and minimize respiratory effort   Oxygen supplementation based on oxygen saturation or arterial blood gases   Assess and instruct to report shortness of breath or any respiratory difficulty   Respiratory therapy support as indicated   Assess the need for suctioning and aspirate as needed   Encourage broncho-pulmonary hygiene including cough, deep breathe, incentive spirometry     Problem: Cardiovascular - Adult  Goal: Absence of cardiac dysrhythmias or at baseline  Outcome: Progressing  Flowsheets (Taken 6/29/2025 0425)  Absence of cardiac dysrhythmias or at baseline:   Monitor cardiac rate and rhythm   Assess for signs of decreased cardiac output   Administer antiarrhythmia medication and electrolyte replacement as ordered     Problem: Cardiovascular - Adult  Goal: Maintains optimal cardiac output and hemodynamic stability  Outcome: Progressing  Flowsheets

## 2025-06-29 NOTE — PROGRESS NOTES
Off Bipap to 2L NC. Pt tolerating well.  Will cont to monitor.    Electronically signed by Maritza Garcia RRT on 6/29/25 at 9:41 AM EDT

## 2025-06-29 NOTE — PLAN OF CARE
Problem: Discharge Planning  Goal: Discharge to home or other facility with appropriate resources  Outcome: Progressing     Problem: Pain  Goal: Verbalizes/displays adequate comfort level or baseline comfort level  6/29/2025 1503 by Aishwarya Chin RN  Outcome: Progressing  6/29/2025 0424 by Margi Pimentel RN  Outcome: Progressing  Flowsheets (Taken 6/29/2025 0424)  Verbalizes/displays adequate comfort level or baseline comfort level:   Encourage patient to monitor pain and request assistance   Assess pain using appropriate pain scale   Administer analgesics based on type and severity of pain and evaluate response   Implement non-pharmacological measures as appropriate and evaluate response   Notify Licensed Independent Practitioner if interventions unsuccessful or patient reports new pain     Problem: Safety - Adult  Goal: Free from fall injury  6/29/2025 1503 by Aishwarya Chin RN  Outcome: Progressing  6/29/2025 0424 by Margi Pimentel RN  Outcome: Progressing  Flowsheets (Taken 6/29/2025 0424)  Free From Fall Injury: Instruct family/caregiver on patient safety     Problem: ABCDS Injury Assessment  Goal: Absence of physical injury  Outcome: Progressing     Problem: Skin/Tissue Integrity  Goal: Skin integrity remains intact  Description: 1.  Monitor for areas of redness and/or skin breakdown  2.  Assess vascular access sites hourly  3.  Every 4-6 hours minimum:  Change oxygen saturation probe site  4.  Every 4-6 hours:  If on nasal continuous positive airway pressure, respiratory therapy assess nares and determine need for appliance change or resting period  6/29/2025 1503 by Aishwarya Chin RN  Outcome: Progressing  6/29/2025 0424 by Margi Pimentel RN  Outcome: Progressing  Flowsheets (Taken 6/28/2025 2130)  Skin Integrity Remains Intact:   Monitor for areas of redness and/or skin breakdown   Turn and reposition as indicated   Positioning devices   Pressure redistribution  Assess for signs of decreased cardiac output   Administer fluid and/or volume expanders as ordered   Administer vasoactive medications as ordered  6/29/2025 0425 by Margi Pimentel RN  Outcome: Progressing  Flowsheets (Taken 6/29/2025 0425)  Maintains optimal cardiac output and hemodynamic stability:   Monitor blood pressure and heart rate   Monitor urine output and notify Licensed Independent Practitioner for values outside of normal range   Assess for signs of decreased cardiac output  Goal: Absence of cardiac dysrhythmias or at baseline  6/29/2025 1503 by Aishwarya Chin, RN  Outcome: Progressing  Flowsheets (Taken 6/29/2025 0800)  Absence of cardiac dysrhythmias or at baseline:   Monitor cardiac rate and rhythm   Assess for signs of decreased cardiac output   Administer antiarrhythmia medication and electrolyte replacement as ordered  6/29/2025 0425 by Margi Pimentel, RN  Outcome: Progressing  Flowsheets (Taken 6/29/2025 0425)  Absence of cardiac dysrhythmias or at baseline:   Monitor cardiac rate and rhythm   Assess for signs of decreased cardiac output   Administer antiarrhythmia medication and electrolyte replacement as ordered

## 2025-06-29 NOTE — CONSULTS
differently: Take 3 mLs by nebulization every 4 hours as needed for Shortness of Breath or Wheezing 24  Yes Irlanda Rosenthal MD   guaiFENesin (MUCINEX) 600 MG extended release tablet Take 1 tablet by mouth 2 times daily  Patient taking differently: Take 1 tablet by mouth 2 times daily as needed for Congestion 24  Yes Irlanda Rosenthal MD   fluticasone-umeclidin-vilant (TRELEGY ELLIPTA) 200-62.5-25 MCG/ACT AEPB inhaler Inhale 1 puff into the lungs daily  Patient taking differently: Inhale 1 puff into the lungs every morning Indications: COPD 24  Yes Irlanda Rosenthal MD   omeprazole (PRILOSEC) 40 MG delayed release capsule Take 1 capsule by mouth every morning Indications: GERD   Yes Automatic Reconciliation, Ar   nitrofurantoin, macrocrystal-monohydrate, (MACROBID) 100 MG capsule Take 1 capsule by mouth 2 times daily For 5 days    Provider, MD Zenon   albuterol sulfate HFA (PROVENTIL;VENTOLIN;PROAIR) 108 (90 Base) MCG/ACT inhaler Inhale 2 puffs into the lungs every 4 hours as needed    Automatic Reconciliation, Ar   naloxone 4 MG/0.1ML LIQD nasal spray Use 1 spray intranasally, then discard. Repeat with new spray every 2 min as needed for opioid overdose symptoms, alternating nostrils.  Patient not taking: Reported on 2025   Automatic Reconciliation, Ar             Objective:   Vital Signs:    BP (!) 124/59   Pulse 74   Temp 98 °F (36.7 °C) (Axillary)   Resp 26   Ht 1.727 m (5' 8\")   Wt 79.1 kg (174 lb 6.1 oz)   SpO2 97%   BMI 26.51 kg/m²             Temp (24hrs), Av.3 °F (37.4 °C), Min:98 °F (36.7 °C), Max:101.3 °F (38.5 °C)       Intake/Output:   Last shift:      No intake/output data recorded.    Last 3 shifts:  1901 -  0700  In: 989 [I.V.:798.1]  Out: 450 [Urine:450]      Intake/Output Summary (Last 24 hours) at 2025 0805  Last data filed at 2025 0700  Gross per 24 hour   Intake 988.97 ml   Output 450 ml   Net 538.97 ml         Weight:  Wt Readings from Last 3 Encounters:  Updated: 06/28/25 1641    COVID-19 & Influenza Combo [[id number]] Collected: 06/28/25 1539    Order Status: Completed Specimen: Nasopharyngeal Updated: 06/28/25 1625     Source Nasopharyngeal        SARS-CoV-2, PCR Not detected        Comment: Not Detected results do not preclude SARS-CoV-2 infection and should not be used as the sole basis for patient management decisions.Results must be combined with clinical observations, patient history, and epidemiological information.        Rapid Influenza A By PCR Not detected        Rapid Influenza B By PCR Not detected        Comment: Testing was performed using marguerite Tamie SARS-CoV-2 and Influenza A/B nucleic acid assay.  This test is a multiplex Real-Time Reverse Transcriptase Polymerase Chain Reaction (RT-PCR) based in vitro diagnostic test intended for the qualitative detection of nucleic acids from SARS-CoV-2, Influenza A, and Influenza B in nasopharyngeal specimens.                    Images report reviewed by me:  XR CHEST PORTABLE  Result Date: 6/28/2025  INDICATION: Line Placement Portable AP view of the chest. Direct comparison made to prior chest x-ray dated June 28, 2025. Cardiomediastinal silhouette is stable. There is a left internal jugular central venous catheters extends to the distal left brachiocephalic vein. There is no pneumothorax. Lungs are clear. No pleural fluid is visualized. There is no pneumothorax.     Central venous catheter in appropriate position. No pneumothorax. Electronically signed by Conrad Burks MD    XR CHEST PORTABLE  Result Date: 6/28/2025  INDICATION: Sepsis Portable AP view of the chest. Direct comparison made to prior chest x-ray dated April 2025. Cardiomediastinal silhouette is stable. There are biapical emphysematous changes. Lungs are otherwise clear bilaterally. Pleural spaces are normal and there is no pneumothorax. Right shoulder prosthesis is noted.     Biapical emphysematous changes. No focal airspace process.

## 2025-06-29 NOTE — PROGRESS NOTES
Hospitalist Progress Note      Patient: Eryn Howell MRN: [id number]  CSN: [id number]    YOB: 1955  Age: 69 y.o.  Sex: female    DOA: 6/28/2025 LOS:  LOS: 1 day      PCP: Nish Sims DO       Summary:      A 69 years old female who is admitted for COPD exacerbation and acute on chronic hypercapnic respiratory failure. She went to the ED with 2 days history of having shortness of breath, wheezing, and confusion that has gotten worse over the last day.  She was admitted as acute on chronic respiratory failure and COPD exacerbation    Subjective I needed my home medication, I have taken medication for over 20 years   She concerns her home medication, talking through BiPAP, she is very anxious and tearful, daughter was at bedside        Assessment/Plan:    Principal Problem:    COPD exacerbation (HCC)  Active Problems:    PTSD (post-traumatic stress disorder)    Acute on chronic respiratory failure with hypoxia and hypercapnia (HCC)    Anxiety    Status post surgery    Hyponatremia  Resolved Problems:    * No resolved hospital problems. *       Acute on chronic respiratory failure with hypoxia and hypercapnia  On BiPAP  Will have CTA chest to rule out PE  Continue treat COPD exacerbation  Intensivist on board  On at home     Acute COPD exacerbation  On Solu-Medrol 60 mg every 6 hours  Breathing treatment  Empiric antibiotics  Insulin ssi for glucose control     Hypotension  Resolved per IV fluid  Levophed hold for now    Elevated D-dimer  Recent surgery  CTA chest rule out PE still pending  Continue heparin drip to rule out blood clots due to recent surgery  Will check PVL rule out DVT    PTSD and anxiety  Continue home medication      DJD, with left shoulder replacement at [facility] a week ago  Continue shoulder sling, PT OT after patient getting better    Chronic pain    Hyponatremia, sodium 131, improving  Corrected sodium : 134         Discharge to:   [x] Home  []SNF/Rehab  []  Others  in

## 2025-06-29 NOTE — ED NOTES
TRANSFER - OUT REPORT:    Verbal report given to RAUL Kiser on Eryn Howell  being transferred to [facility] for routine progression of patient care       Report consisted of patient's Situation, Background, Assessment and   Recommendations(SBAR).     Information from the following report(s) Nurse Handoff Report, ED Encounter Summary, ED SBAR, MAR, Recent Results, and Med Rec Status was reviewed with the receiving nurse.    Norman Fall Assessment:    Presents to emergency department  because of falls (Syncope, seizure, or loss of consciousness): No  Age > 70: No  Altered Mental Status, Intoxication with alcohol or substance confusion (Disorientation, impaired judgment, poor safety awaremess, or inability to follow instructions): Yes  Impaired Mobility: Ambulates or transfers with assistive devices or assistance; Unable to ambulate or transer.: Yes  Nursing Judgement: Yes          Lines:   Peripheral IV 06/28/25 Right;Ventral Hand (Active)       Peripheral IV 06/28/25 Left Antecubital (Active)        Opportunity for questions and clarification was provided.      Patient transported with:  Monitor and Registered Nurse

## 2025-06-29 NOTE — PLAN OF CARE
Problem: SLP Adult - Impaired Swallowing  Goal: By Discharge: Advance to least restrictive diet without signs or symptoms of aspiration for planned discharge setting.  See evaluation for individualized goals.  Description: Patient will:  1. Tolerate PO trials with 0 s/s overt distress in 4/5 trials  2. Utilize compensatory swallow strategies/maneuvers (decrease bite/sip, size/rate, alt. liq/sol) with min cues in 4/5 trials  3. Perform oral-motor/laryngeal exercises to increase oropharyngeal swallow function with min cues  4. Complete an objective swallow study (i.e., MBSS) to assess swallow integrity, r/o aspiration, and determine of safest LRD, min A as indicated/ordered by MD     Rec:     Soft and bite size solid diet with thin liquids  Assistance w/ po for safety; alternate small bites/single sips  Aspiration precautions  HOB >45 during po intake, remain >30 for 30-45 minutes after po   Small bites/sips; alternate liquid/solid with slow feeding rate   Oral care TID  Meds whole in puree  Outcome: Progressing  SPEECH LANGUAGE PATHOLOGY BEDSIDE SWALLOW EVALUATION/TREATMENT    Patient: Eryn Howell (69 y.o. female)  Date: 6/29/2025  Primary Diagnosis: Hyponatremia [E87.1]  COPD exacerbation (HCC) [J44.1]  Elevated d-dimer [R79.89]  Septic shock (HCC) [A41.9, R65.21]  History of left shoulder replacement [Z96.612]  Acute respiratory failure with hypoxia and hypercapnia (HCC) [J96.01, J96.02]       Precautions: Aspiration  PLOF: As per H&P  ASSESSMENT:  Based on the objective data described below, the pt presents with mild oral dysphagia w/ concerns for possible esophageal dysphagia. Pt seen today for a bedside swallow evaluation. PMHx of COPD on home O2 and NIV, GERD, PTSD, anxiety, HTN, and HLD. Pt w/ recent L should surgery on 6/18. Pt presented to [facility] ED d/t AMA, increased SOB, and fever. Found to be hypoxic and placed on BiPAP. CTA chest significant for \"emphysematous change. Calcified granuloma in the

## 2025-06-30 PROBLEM — R91.1 LUNG NODULE: Status: ACTIVE | Noted: 2025-06-30

## 2025-06-30 LAB
ALBUMIN SERPL-MCNC: 2.7 G/DL (ref 3.4–5)
ALBUMIN/GLOB SERPL: 1 (ref 0.8–1.7)
ALP SERPL-CCNC: 101 U/L (ref 45–117)
ALT SERPL-CCNC: 56 U/L (ref 10–35)
ANION GAP SERPL CALC-SCNC: 12 MMOL/L (ref 3–18)
ARTERIAL PATENCY WRIST A: POSITIVE
AST SERPL-CCNC: 57 U/L (ref 10–38)
BACTERIA SPEC CULT: NORMAL
BASE EXCESS BLD CALC-SCNC: 0.8 MMOL/L
BASOPHILS # BLD: 0.03 K/UL (ref 0–0.1)
BASOPHILS NFR BLD: 0.2 % (ref 0–2)
BDY SITE: ABNORMAL
BILIRUB SERPL-MCNC: <0.2 MG/DL (ref 0.2–1)
BUN SERPL-MCNC: 26 MG/DL (ref 6–23)
BUN/CREAT SERPL: 34 (ref 12–20)
CA-I BLD-SCNC: 1.3 MMOL/L (ref 1.12–1.32)
CALCIUM SERPL-MCNC: 9.3 MG/DL (ref 8.5–10.1)
CC UR VC: NORMAL
CHLORIDE SERPL-SCNC: 95 MMOL/L (ref 98–107)
CO2 SERPL-SCNC: 26 MMOL/L (ref 21–32)
CREAT SERPL-MCNC: 0.74 MG/DL (ref 0.6–1.3)
DIFFERENTIAL METHOD BLD: ABNORMAL
ECHO BSA: 1.95 M2
EOSINOPHIL # BLD: 0 K/UL (ref 0–0.4)
EOSINOPHIL NFR BLD: 0 % (ref 0–5)
ERYTHROCYTE [DISTWIDTH] IN BLOOD BY AUTOMATED COUNT: 15.4 % (ref 11.6–14.5)
GAS FLOW.O2 O2 DELIVERY SYS: ABNORMAL
GLOBULIN SER CALC-MCNC: 2.6 G/DL (ref 2–4)
GLUCOSE BLD STRIP.AUTO-MCNC: 186 MG/DL (ref 70–110)
GLUCOSE BLD STRIP.AUTO-MCNC: 276 MG/DL (ref 70–110)
GLUCOSE BLD STRIP.AUTO-MCNC: 377 MG/DL (ref 70–110)
GLUCOSE SERPL-MCNC: 269 MG/DL (ref 74–108)
HCO3 BLD-SCNC: 26.6 MMOL/L (ref 21–28)
HCT VFR BLD AUTO: 25.6 % (ref 35–45)
HGB BLD-MCNC: 8.6 G/DL (ref 12–16)
IMM GRANULOCYTES # BLD AUTO: 0.29 K/UL (ref 0–0.04)
IMM GRANULOCYTES NFR BLD AUTO: 2.4 % (ref 0–0.5)
LYMPHOCYTES # BLD: 0.73 K/UL (ref 0.9–3.3)
LYMPHOCYTES NFR BLD: 6 % (ref 21–52)
MAGNESIUM SERPL-MCNC: 2.3 MG/DL (ref 1.6–2.6)
MCH RBC QN AUTO: 29.2 PG (ref 24–34)
MCHC RBC AUTO-ENTMCNC: 33.6 G/DL (ref 31–37)
MCV RBC AUTO: 86.8 FL (ref 78–100)
MONOCYTES # BLD: 0.55 K/UL (ref 0.05–1.2)
MONOCYTES NFR BLD: 4.5 % (ref 3–10)
NEUTS SEG # BLD: 10.62 K/UL (ref 1.8–8)
NEUTS SEG NFR BLD: 86.9 % (ref 40–73)
NRBC # BLD: 0 K/UL (ref 0–0.01)
NRBC BLD-RTO: 0 PER 100 WBC
O2/TOTAL GAS SETTING VFR VENT: 1.5 %
PCO2 BLD: 46.8 MMHG (ref 35–48)
PH BLD: 7.36 (ref 7.35–7.45)
PHOSPHATE SERPL-MCNC: 2.8 MG/DL (ref 2.5–4.9)
PLATELET # BLD AUTO: 249 K/UL (ref 135–420)
PMV BLD AUTO: 10.5 FL (ref 9.2–11.8)
PO2 BLD: 68 MMHG (ref 83–108)
POTASSIUM SERPL-SCNC: 3.3 MMOL/L (ref 3.5–5.5)
POTASSIUM SERPL-SCNC: 3.7 MMOL/L (ref 3.5–5.5)
PROT SERPL-MCNC: 5.4 G/DL (ref 6.4–8.2)
RBC # BLD AUTO: 2.95 M/UL (ref 4.2–5.3)
SAO2 % BLD: 92.3 % (ref 92–97)
SERVICE CMNT-IMP: ABNORMAL
SERVICE CMNT-IMP: NORMAL
SODIUM SERPL-SCNC: 132 MMOL/L (ref 136–145)
SPECIMEN TYPE: ABNORMAL
WBC # BLD AUTO: 12.2 K/UL (ref 4.6–13.2)

## 2025-06-30 PROCEDURE — 2500000003 HC RX 250 WO HCPCS: Performed by: INTERNAL MEDICINE

## 2025-06-30 PROCEDURE — 84100 ASSAY OF PHOSPHORUS: CPT

## 2025-06-30 PROCEDURE — 6370000000 HC RX 637 (ALT 250 FOR IP): Performed by: INTERNAL MEDICINE

## 2025-06-30 PROCEDURE — 84132 ASSAY OF SERUM POTASSIUM: CPT

## 2025-06-30 PROCEDURE — 6360000002 HC RX W HCPCS: Performed by: INTERNAL MEDICINE

## 2025-06-30 PROCEDURE — 85025 COMPLETE CBC W/AUTO DIFF WBC: CPT

## 2025-06-30 PROCEDURE — 36600 WITHDRAWAL OF ARTERIAL BLOOD: CPT

## 2025-06-30 PROCEDURE — 2580000003 HC RX 258: Performed by: INTERNAL MEDICINE

## 2025-06-30 PROCEDURE — 94660 CPAP INITIATION&MGMT: CPT

## 2025-06-30 PROCEDURE — 82962 GLUCOSE BLOOD TEST: CPT

## 2025-06-30 PROCEDURE — P9047 ALBUMIN (HUMAN), 25%, 50ML: HCPCS | Performed by: INTERNAL MEDICINE

## 2025-06-30 PROCEDURE — 83735 ASSAY OF MAGNESIUM: CPT

## 2025-06-30 PROCEDURE — 6370000000 HC RX 637 (ALT 250 FOR IP): Performed by: HOSPITALIST

## 2025-06-30 PROCEDURE — 2700000000 HC OXYGEN THERAPY PER DAY

## 2025-06-30 PROCEDURE — 82330 ASSAY OF CALCIUM: CPT

## 2025-06-30 PROCEDURE — 94640 AIRWAY INHALATION TREATMENT: CPT

## 2025-06-30 PROCEDURE — 82803 BLOOD GASES ANY COMBINATION: CPT

## 2025-06-30 PROCEDURE — 1100000003 HC PRIVATE W/ TELEMETRY

## 2025-06-30 PROCEDURE — 80053 COMPREHEN METABOLIC PANEL: CPT

## 2025-06-30 RX ORDER — POTASSIUM CHLORIDE 29.8 MG/ML
20 INJECTION INTRAVENOUS PRN
Status: DISCONTINUED | OUTPATIENT
Start: 2025-06-30 | End: 2025-07-04 | Stop reason: HOSPADM

## 2025-06-30 RX ORDER — POTASSIUM CHLORIDE 1500 MG/1
40 TABLET, EXTENDED RELEASE ORAL ONCE
Status: COMPLETED | OUTPATIENT
Start: 2025-06-30 | End: 2025-06-30

## 2025-06-30 RX ADMIN — PANTOPRAZOLE SODIUM 40 MG: 40 TABLET, DELAYED RELEASE ORAL at 08:14

## 2025-06-30 RX ADMIN — GUAIFENESIN 600 MG: 600 TABLET, EXTENDED RELEASE ORAL at 20:27

## 2025-06-30 RX ADMIN — INSULIN LISPRO 4 UNITS: 100 INJECTION, SOLUTION INTRAVENOUS; SUBCUTANEOUS at 12:56

## 2025-06-30 RX ADMIN — IPRATROPIUM BROMIDE 0.5 MG: 0.5 SOLUTION RESPIRATORY (INHALATION) at 15:30

## 2025-06-30 RX ADMIN — SODIUM CHLORIDE, PRESERVATIVE FREE 10 ML: 5 INJECTION INTRAVENOUS at 20:28

## 2025-06-30 RX ADMIN — INSULIN LISPRO 1 UNITS: 100 INJECTION, SOLUTION INTRAVENOUS; SUBCUTANEOUS at 17:43

## 2025-06-30 RX ADMIN — SODIUM CHLORIDE, PRESERVATIVE FREE 10 ML: 5 INJECTION INTRAVENOUS at 08:15

## 2025-06-30 RX ADMIN — WATER 60 MG: 1 INJECTION INTRAMUSCULAR; INTRAVENOUS; SUBCUTANEOUS at 08:15

## 2025-06-30 RX ADMIN — BUPROPION HYDROCHLORIDE 300 MG: 150 TABLET, EXTENDED RELEASE ORAL at 08:14

## 2025-06-30 RX ADMIN — CETIRIZINE HYDROCHLORIDE 10 MG: 10 TABLET, FILM COATED ORAL at 08:15

## 2025-06-30 RX ADMIN — ALBUMIN (HUMAN) 25 G: 0.25 INJECTION, SOLUTION INTRAVENOUS at 03:20

## 2025-06-30 RX ADMIN — BUDESONIDE 500 MCG: 0.5 INHALANT RESPIRATORY (INHALATION) at 07:20

## 2025-06-30 RX ADMIN — ARFORMOTEROL TARTRATE 15 MCG: 15 SOLUTION RESPIRATORY (INHALATION) at 19:56

## 2025-06-30 RX ADMIN — SODIUM CHLORIDE, PRESERVATIVE FREE 10 ML: 5 INJECTION INTRAVENOUS at 00:11

## 2025-06-30 RX ADMIN — CLONAZEPAM 1 MG: 0.5 TABLET ORAL at 20:27

## 2025-06-30 RX ADMIN — CLONAZEPAM 1 MG: 0.5 TABLET ORAL at 11:35

## 2025-06-30 RX ADMIN — IPRATROPIUM BROMIDE 0.5 MG: 0.5 SOLUTION RESPIRATORY (INHALATION) at 11:44

## 2025-06-30 RX ADMIN — IPRATROPIUM BROMIDE 0.5 MG: 0.5 SOLUTION RESPIRATORY (INHALATION) at 07:20

## 2025-06-30 RX ADMIN — POTASSIUM CHLORIDE 20 MEQ: 29.8 INJECTION, SOLUTION INTRAVENOUS at 06:19

## 2025-06-30 RX ADMIN — CLONAZEPAM 2 MG: 0.5 TABLET ORAL at 00:06

## 2025-06-30 RX ADMIN — IPRATROPIUM BROMIDE 0.5 MG: 0.5 SOLUTION RESPIRATORY (INHALATION) at 19:56

## 2025-06-30 RX ADMIN — BUDESONIDE 500 MCG: 0.5 INHALANT RESPIRATORY (INHALATION) at 19:56

## 2025-06-30 RX ADMIN — GUAIFENESIN 600 MG: 600 TABLET, EXTENDED RELEASE ORAL at 08:14

## 2025-06-30 RX ADMIN — WATER 1000 MG: 1 INJECTION INTRAMUSCULAR; INTRAVENOUS; SUBCUTANEOUS at 19:12

## 2025-06-30 RX ADMIN — POTASSIUM CHLORIDE 40 MEQ: 1500 TABLET, EXTENDED RELEASE ORAL at 11:35

## 2025-06-30 RX ADMIN — Medication 1 TABLET: at 08:14

## 2025-06-30 RX ADMIN — WATER 60 MG: 1 INJECTION INTRAMUSCULAR; INTRAVENOUS; SUBCUTANEOUS at 03:21

## 2025-06-30 RX ADMIN — ROSUVASTATIN CALCIUM 10 MG: 10 TABLET, FILM COATED ORAL at 20:28

## 2025-06-30 RX ADMIN — INSULIN LISPRO 2 UNITS: 100 INJECTION, SOLUTION INTRAVENOUS; SUBCUTANEOUS at 06:28

## 2025-06-30 RX ADMIN — POTASSIUM CHLORIDE 20 MEQ: 29.8 INJECTION, SOLUTION INTRAVENOUS at 05:22

## 2025-06-30 RX ADMIN — AZITHROMYCIN MONOHYDRATE 500 MG: 500 INJECTION, POWDER, LYOPHILIZED, FOR SOLUTION INTRAVENOUS at 19:11

## 2025-06-30 RX ADMIN — IMIPRAMINE HYDROCHLORIDE 75 MG: 25 TABLET ORAL at 20:27

## 2025-06-30 RX ADMIN — ENOXAPARIN SODIUM 40 MG: 100 INJECTION SUBCUTANEOUS at 08:14

## 2025-06-30 RX ADMIN — WATER 40 MG: 1 INJECTION INTRAMUSCULAR; INTRAVENOUS; SUBCUTANEOUS at 23:49

## 2025-06-30 RX ADMIN — WATER 40 MG: 1 INJECTION INTRAMUSCULAR; INTRAVENOUS; SUBCUTANEOUS at 15:53

## 2025-06-30 RX ADMIN — ARFORMOTEROL TARTRATE 15 MCG: 15 SOLUTION RESPIRATORY (INHALATION) at 07:20

## 2025-06-30 RX ADMIN — IMIPRAMINE HYDROCHLORIDE 75 MG: 25 TABLET ORAL at 00:08

## 2025-06-30 ASSESSMENT — PAIN DESCRIPTION - ORIENTATION: ORIENTATION: LEFT

## 2025-06-30 ASSESSMENT — PAIN DESCRIPTION - PAIN TYPE: TYPE: SURGICAL PAIN

## 2025-06-30 ASSESSMENT — PAIN SCALES - GENERAL
PAINLEVEL_OUTOF10: 0
PAINLEVEL_OUTOF10: 0
PAINLEVEL_OUTOF10: 10
PAINLEVEL_OUTOF10: 0

## 2025-06-30 ASSESSMENT — PAIN DESCRIPTION - LOCATION: LOCATION: SHOULDER

## 2025-06-30 NOTE — PROGRESS NOTES
Hospitalist Progress Note      Patient: Eryn Howell MRN: [id number]  CSN: [id number]    YOB: 1955  Age: 69 y.o.  Sex: female    DOA: 6/28/2025 LOS:  LOS: 2 days      PCP: Nish Sims DO       Summary:      A 69 years old female who is admitted for COPD exacerbation and acute on chronic hypercapnic respiratory failure. She went to the ED with 2 days history of having shortness of breath, wheezing, and confusion that has gotten worse over the last day.  She was admitted as acute on chronic respiratory failure and COPD exacerbation    Subjective I can not lying flat , they gave  me tons of medication .  Daughter and  were at the bedside.  All questions has been answered.  Patient looks confused, flight thought.   Labs and imaging discussed with patient at the family      Assessment/Plan:    Principal Problem:    COPD exacerbation (HCC)  Active Problems:    PTSD (post-traumatic stress disorder)    Acute on chronic respiratory failure with hypoxia and hypercapnia (HCC)    Anxiety    Status post surgery    Hyponatremia    Lung nodule  Resolved Problems:    * No resolved hospital problems. *       Acute on chronic respiratory failure with hypoxia and hypercapnia  On BiPAP  CTA chest no PE,   Continue treat COPD exacerbation  Intensivist on board  On at home     Acute COPD exacerbation, severe emphysema  Improving  On Solu-Medrol 60 mg every 6 hours  Breathing treatment  Empiric antibiotics  Insulin ssi for glucose control       Lung nodule  Need to follow-up with imaging in 3 to 6 months    Hypotension  Resolved per IV fluid  Levophed hold for now    Elevated D-dimer  Recent surgery  CTA chest rule out PE still pending  Continue heparin drip to rule out blood clots due to recent surgery  Will check PVL rule out DVT    PTSD and anxiety  Continue home medication    Delirium  CT head no acute process  Continue redirect    DJD, with left shoulder replacement at [facility] a week ago  Continue

## 2025-06-30 NOTE — PROGRESS NOTES
Moderate Risk Nutrition Assessment Initial    Type and Reason for Visit: Initial (ICU, SLP)    Nutrition Recommendations/Plan:   Diet consistency recs per SLP  Cont diet and Ensure Plus PO as gustavo provides 350kcal, 13g pro per carton  If with at least 50% PO intakes + taking Ensures will meet needs  Cont MVI   Optimize BG control >70 <180  Cont to monitor wt trends, lytes, UOP, bowel fx, skin integrity, and adjust recs as needed     Malnutrition Assessment:  Malnutrition Status: At risk for malnutrition    Nutrition Assessment:  68yo F in ICU with acute on chronic hypoxic hypercapnic resp failure, COPD exacerbation, leukocytosis, elevated D-dimer, hyponatremia, anemia, PTSD, anxiety, recent L TSA at [facility] 6/18/25, pulmonary nodules per MD. alert. placed back on bipap noted. passed SLP eval with poor to fair PO and several undoc %PO intakes recorded. Ensure Plus ordered. wt is up from wt hx if correct wt hx trends ~70-71kg Sept to Oct 2024. on steroids and MVI. elevated BG levels. 900ml UOP.  Wt Readings from Last 10 Encounters:   06/29/25 78.9 kg (174 lb)   04/14/25 73.7 kg (162 lb 6.4 oz)   04/09/25 74.8 kg (165 lb)   10/15/24 69.5 kg (153 lb 2 oz)   10/11/24 68 kg (150 lb)   07/09/24 73.8 kg (162 lb 11.2 oz)   04/01/24 72.6 kg (160 lb)   01/07/24 74.8 kg (165 lb)     Estimated Daily Nutrient Needs:  Energy (kcal):  2000 Weight Used for Energy Requirements: Current     Protein (g):  80-85 Weight Used for Protein Requirements: Current        Fluid (ml/day):  2000 Method Used for Fluid Requirements: 1 ml/kcal    Nutrition Related Findings:     Wound Type: None    Current Nutrition Therapies:    ADULT DIET; Dysphagia - Soft and Bite Sized; Low Fat/Low Chol/High Fiber/2 gm Na  ADULT ORAL NUTRITION SUPPLEMENT; Breakfast, Lunch, Dinner; Standard High Calorie/High Protein Oral Supplement    Anthropometric Measures:  Height: 172.7 cm (5' 7.99\")  Current Body Wt: 79 kg (174 lb 2.6 oz)   BMI: 26.5 overweight      Nutrition  Diagnosis:   Increased nutrient needs related to impaired respiratory function, increase demand for energy/nutrients, psychological cause or life stress, swallowing difficulty, endocrine dysfunction, cardiac dysfunction as evidenced by intake 26-50%, lab values (ST, bipap)    Nutrition Interventions:   Food and/or Nutrient Delivery: Continue Current Diet, Continue Oral Nutrition Supplement, Vitamin Supplement  Nutrition Education/Counseling: Education/Counseling not indicated  Coordination of Nutrition Care: Continue to monitor while inpatient    Goals:  Goals: Meet at least 75% of estimated needs  Type of Goal: New goal  Previous Goal Met: New Goal    Nutrition Monitoring and Evaluation:   Behavioral-Environmental Outcomes: None Identified  Food/Nutrient Intake Outcomes: Food and Nutrient Intake, Vitamin/Mineral Intake, Supplement Intake, Progression of Nutrition  Physical Signs/Symptoms Outcomes: Chewing or Swallowing, Biochemical Data, Skin    Discharge Planning:    Too soon to determine     Marilyn Fonseca MS, RD  Clinical Dietitian  E: [email or web address]  O:  [phone]  C:  [phone]

## 2025-06-30 NOTE — PROGRESS NOTES
Transfer order held. Pt experienced increased coughing episode with SOB and anxiety. Placed back on BiPAP to recover.   Will monitor status for transfer per Intensivist.     1200: Shift handoff, pt recovered/tolerated BiPAP well. Presentation improved with pt agreement. Decreased WOB, increased SpO2.

## 2025-06-30 NOTE — PROGRESS NOTES
met patient at bedside for an initial visit.    Patient said she came to the hospital for treatment for breathing problems. She said she has COPD. Patient thanked  for the visit.     provided presence and support for patient.    Chaplains will provide follow-up care for patient and family as needed.    Spiritual Health History and Assessment/Progress Note  [facility]    Palliative Care,  ,  ,      Name: Eryn Howell MRN: [id number]    Age: 69 y.o.     Sex: female   Language: English   Nondenominational: Religion   COPD exacerbation (HCC)     Date: 6/30/2025            Total Time Calculated: 25 min              Spiritual Assessment began in [facility] INTENSIVE CARE        Referral/Consult From: Palliative Care   Encounter Overview/Reason: Palliative Care  Service Provided For: Patient    Radha, Belief, Meaning:   Patient identifies as spiritual, is connected with a radha tradition or spiritual practice, and has beliefs or practices that help with coping during difficult times  Family/Friends No family/friends present      Importance and Influence:  Patient unable to assess at this time  Family/Friends No family/friends present    Community:  Patient feels well-supported. Support system includes: Spouse/Partner  Family/Friends No family/friends present    Assessment and Plan of Care:     Patient Interventions include: Facilitated expression of thoughts and feelings, Explored spiritual coping/struggle/distress, and Affirmed coping skills/support systems  Family/Friends Interventions include: No family/friends present    Patient Plan of Care: No spiritual needs identified for follow-up  Family/Friends Plan of Care: No family/friends present    Electronically signed by Chaplain Tom on 6/30/2025 at 3:36 PM

## 2025-06-30 NOTE — PLAN OF CARE
Problem: Discharge Planning  Goal: Discharge to home or other facility with appropriate resources  6/30/2025 1305 by Kevin Lizarraga RN  Outcome: Progressing  6/30/2025 0035 by Lucrecia Bangura RN  Outcome: Progressing  Flowsheets (Taken 6/29/2025 2000)  Discharge to home or other facility with appropriate resources:   Identify barriers to discharge with patient and caregiver   Arrange for needed discharge resources and transportation as appropriate   Identify discharge learning needs (meds, wound care, etc)   Arrange for interpreters to assist at discharge as needed   Refer to discharge planning if patient needs post-hospital services based on physician order or complex needs related to functional status, cognitive ability or social support system     Problem: Pain  Goal: Verbalizes/displays adequate comfort level or baseline comfort level  6/30/2025 1305 by Kevin Lizarraga RN  Outcome: Progressing  6/30/2025 0035 by Lucrecia Bangura RN  Outcome: Progressing  Flowsheets (Taken 6/29/2025 2000)  Verbalizes/displays adequate comfort level or baseline comfort level:   Encourage patient to monitor pain and request assistance   Assess pain using appropriate pain scale   Administer analgesics based on type and severity of pain and evaluate response   Implement non-pharmacological measures as appropriate and evaluate response   Consider cultural and social influences on pain and pain management   Notify Licensed Independent Practitioner if interventions unsuccessful or patient reports new pain     Problem: Safety - Adult  Goal: Free from fall injury  6/30/2025 1305 by Kevin Lizarraga, RN  Outcome: Progressing  6/30/2025 0035 by Lucrecia Bangura RN  Outcome: Progressing     Problem: ABCDS Injury Assessment  Goal: Absence of physical injury  6/30/2025 1305 by Kevin Lizarraga, RN  Outcome: Progressing  6/30/2025 0035 by Lucrecia Bangura RN  Outcome: Progressing     Problem: Skin/Tissue Integrity  Goal: Skin integrity

## 2025-06-30 NOTE — PLAN OF CARE
Problem: Discharge Planning  Goal: Discharge to home or other facility with appropriate resources  6/30/2025 0035 by Lucrecia Bangura, RN  Outcome: Progressing  Flowsheets (Taken 6/29/2025 2000)  Discharge to home or other facility with appropriate resources:   Identify barriers to discharge with patient and caregiver   Arrange for needed discharge resources and transportation as appropriate   Identify discharge learning needs (meds, wound care, etc)   Arrange for interpreters to assist at discharge as needed   Refer to discharge planning if patient needs post-hospital services based on physician order or complex needs related to functional status, cognitive ability or social support system  6/29/2025 1503 by Aishwarya Chin RN  Outcome: Progressing  Flowsheets (Taken 6/29/2025 0800)  Discharge to home or other facility with appropriate resources:   Identify barriers to discharge with patient and caregiver   Arrange for needed discharge resources and transportation as appropriate   Identify discharge learning needs (meds, wound care, etc)   Arrange for interpreters to assist at discharge as needed   Refer to discharge planning if patient needs post-hospital services based on physician order or complex needs related to functional status, cognitive ability or social support system     Problem: Pain  Goal: Verbalizes/displays adequate comfort level or baseline comfort level  6/30/2025 0035 by Lucrecia Bangura, RN  Outcome: Progressing  Flowsheets (Taken 6/29/2025 2000)  Verbalizes/displays adequate comfort level or baseline comfort level:   Encourage patient to monitor pain and request assistance   Assess pain using appropriate pain scale   Administer analgesics based on type and severity of pain and evaluate response   Implement non-pharmacological measures as appropriate and evaluate response   Consider cultural and social influences on pain and pain management   Notify Licensed Independent Practitioner if

## 2025-06-30 NOTE — PROGRESS NOTES
Palliative Medicine    Thank you for the consultation to the Palliative Medicine Team. We will review the chart and meet with the patient as soon as possible.    Patient was meeting with physician the first time we went to see and the second time she was performing personal hygiene with nursing staff.    Rukhsana Solorzano, RN, MSN  [phone]

## 2025-06-30 NOTE — PROGRESS NOTES
Source Nasopharyngeal        SARS-CoV-2, PCR Not detected        Comment: Not Detected results do not preclude SARS-CoV-2 infection and should not be used as the sole basis for patient management decisions.Results must be combined with clinical observations, patient history, and epidemiological information.        Rapid Influenza A By PCR Not detected        Rapid Influenza B By PCR Not detected        Comment: Testing was performed using marguerite Tamie SARS-CoV-2 and Influenza A/B nucleic acid assay.  This test is a multiplex Real-Time Reverse Transcriptase Polymerase Chain Reaction (RT-PCR) based in vitro diagnostic test intended for the qualitative detection of nucleic acids from SARS-CoV-2, Influenza A, and Influenza B in nasopharyngeal specimens.                    Images report reviewed by me:  Vascular duplex lower extremity venous bilateral  Result Date: 6/30/2025    No evidence of deep vein thrombosis in the right lower extremity.   No evidence of deep vein thrombosis in the left lower extremity.     Echo (TTE) complete (PRN contrast/bubble/strain/3D)  Result Date: 6/29/2025    Left Ventricle: Normal left ventricular systolic function with a visually estimated EF of 55 - 60%. Left ventricle size is normal. Normal wall thickness. No regional wall motion abnormalities identified. Decreased sensitivity due to off axis imaging apical views. Grade I diastolic dysfunction present with normal LV EF. E/E' Ratio (Averaged) is 7.75.   Right Ventricle: Right ventricle is mildly dilated. Normal systolic function. TAPSE is 1.9 cm.   Tricuspid Valve: Mild regurgitation. PASP is 47 mmHg.   IVC/SVC: IVC diameter is dilated and decreases less than 50% during inspiration; therefore the estimated right atrial pressure is elevated (~15 mmHg).   Image quality is adequate.   Technical qualifier - other: Low cardiac window's. Done supine d/t recent shoulder surgery. No contrast was given.     CTA CHEST W WO CONTRAST  Result Date:  cardiovascular testing etc. have been ordered but not yet resulted, will be resulted later and will be followed by the attending on the case and the respective consultants. At the time of sign off, the attending is aware of patient and will follow up with respective specialists or patient’s PCP following discharge regarding results of any tests, labs, pathology results, radiologic imaging studies, cardiovascular testing etc. that have been ordered but not yet resulted. [facility] will defer other respective systems problem management to primary team and other respective consultants. Any abnormalities on the tests, labs, pathology results, radiologic imaging studies, cardiovascular testing etc. that are not addressed during the hospital course, and if any pending results, should be conveyed to the patient’s PCP and respecrive consultants at the time of discharge. This is deferred to the primary team. Please call if any questions.    PCCM will follow from from pulmonary perspective along with attending primary service and other specialties.         ·Please note: Voice-recognition software may have been used to generate this report, which may have resulted in some phonetic-based errors in grammar and contents. Even though attempts were made to correct all the mistakes, some may have been missed, and remained in the body of the document.      Tyler Paredes MD  6/30/2025

## 2025-07-01 ENCOUNTER — APPOINTMENT (OUTPATIENT)
Facility: HOSPITAL | Age: 70
End: 2025-07-01
Payer: MEDICARE

## 2025-07-01 LAB
ALBUMIN SERPL-MCNC: 2.8 G/DL (ref 3.4–5)
ALBUMIN/GLOB SERPL: 0.9 (ref 0.8–1.7)
ALP SERPL-CCNC: 103 U/L (ref 45–117)
ALT SERPL-CCNC: 78 U/L (ref 10–35)
ANION GAP SERPL CALC-SCNC: 11 MMOL/L (ref 3–18)
ARTERIAL PATENCY WRIST A: POSITIVE
AST SERPL-CCNC: 46 U/L (ref 10–38)
BASE EXCESS BLD CALC-SCNC: 1.5 MMOL/L
BASOPHILS # BLD: 0.02 K/UL (ref 0–0.1)
BASOPHILS NFR BLD: 0.2 % (ref 0–2)
BDY SITE: ABNORMAL
BILIRUB SERPL-MCNC: <0.2 MG/DL (ref 0.2–1)
BUN SERPL-MCNC: 28 MG/DL (ref 6–23)
BUN/CREAT SERPL: 40 (ref 12–20)
CALCIUM SERPL-MCNC: 10.1 MG/DL (ref 8.5–10.1)
CHLORIDE SERPL-SCNC: 100 MMOL/L (ref 98–107)
CO2 SERPL-SCNC: 23 MMOL/L (ref 21–32)
CREAT SERPL-MCNC: 0.7 MG/DL (ref 0.6–1.3)
DIFFERENTIAL METHOD BLD: ABNORMAL
EKG ATRIAL RATE: 97 BPM
EKG DIAGNOSIS: NORMAL
EKG P AXIS: 73 DEGREES
EKG P-R INTERVAL: 168 MS
EKG Q-T INTERVAL: 342 MS
EKG QRS DURATION: 94 MS
EKG QTC CALCULATION (BAZETT): 434 MS
EKG R AXIS: 94 DEGREES
EKG T AXIS: 71 DEGREES
EKG VENTRICULAR RATE: 97 BPM
EOSINOPHIL # BLD: 0 K/UL (ref 0–0.4)
EOSINOPHIL NFR BLD: 0 % (ref 0–5)
ERYTHROCYTE [DISTWIDTH] IN BLOOD BY AUTOMATED COUNT: 15.9 % (ref 11.6–14.5)
GAS FLOW.O2 O2 DELIVERY SYS: ABNORMAL
GLOBULIN SER CALC-MCNC: 3.3 G/DL (ref 2–4)
GLUCOSE BLD STRIP.AUTO-MCNC: 283 MG/DL (ref 70–110)
GLUCOSE BLD STRIP.AUTO-MCNC: 300 MG/DL (ref 70–110)
GLUCOSE BLD STRIP.AUTO-MCNC: 303 MG/DL (ref 70–110)
GLUCOSE BLD STRIP.AUTO-MCNC: 314 MG/DL (ref 70–110)
GLUCOSE BLD STRIP.AUTO-MCNC: 345 MG/DL (ref 70–110)
GLUCOSE BLD STRIP.AUTO-MCNC: 443 MG/DL (ref 70–110)
GLUCOSE BLD STRIP.AUTO-MCNC: 444 MG/DL (ref 70–110)
GLUCOSE SERPL-MCNC: 314 MG/DL (ref 74–108)
HCO3 BLD-SCNC: 27.8 MMOL/L (ref 21–28)
HCT VFR BLD AUTO: 28.2 % (ref 35–45)
HGB BLD-MCNC: 9.2 G/DL (ref 12–16)
IMM GRANULOCYTES # BLD AUTO: 0.42 K/UL (ref 0–0.04)
IMM GRANULOCYTES NFR BLD AUTO: 3.9 % (ref 0–0.5)
LYMPHOCYTES # BLD: 0.72 K/UL (ref 0.9–3.3)
LYMPHOCYTES NFR BLD: 6.7 % (ref 21–52)
MCH RBC QN AUTO: 28.8 PG (ref 24–34)
MCHC RBC AUTO-ENTMCNC: 32.6 G/DL (ref 31–37)
MCV RBC AUTO: 88.1 FL (ref 78–100)
MONOCYTES # BLD: 0.44 K/UL (ref 0.05–1.2)
MONOCYTES NFR BLD: 4.1 % (ref 3–10)
NEUTS SEG # BLD: 9.15 K/UL (ref 1.8–8)
NEUTS SEG NFR BLD: 85.1 % (ref 40–73)
NRBC # BLD: 0 K/UL (ref 0–0.01)
NRBC BLD-RTO: 0 PER 100 WBC
O2/TOTAL GAS SETTING VFR VENT: 2 %
PCO2 BLD: 53.2 MMHG (ref 35–48)
PH BLD: 7.33 (ref 7.35–7.45)
PLATELET # BLD AUTO: 317 K/UL (ref 135–420)
PMV BLD AUTO: 10.2 FL (ref 9.2–11.8)
PO2 BLD: 76 MMHG (ref 83–108)
POTASSIUM SERPL-SCNC: 4.2 MMOL/L (ref 3.5–5.5)
PROT SERPL-MCNC: 6.1 G/DL (ref 6.4–8.2)
RBC # BLD AUTO: 3.2 M/UL (ref 4.2–5.3)
SAO2 % BLD: 93.5 % (ref 92–97)
SERVICE CMNT-IMP: ABNORMAL
SODIUM SERPL-SCNC: 134 MMOL/L (ref 136–145)
SPECIMEN TYPE: ABNORMAL
WBC # BLD AUTO: 10.8 K/UL (ref 4.6–13.2)

## 2025-07-01 PROCEDURE — 6370000000 HC RX 637 (ALT 250 FOR IP): Performed by: INTERNAL MEDICINE

## 2025-07-01 PROCEDURE — 6360000002 HC RX W HCPCS: Performed by: INTERNAL MEDICINE

## 2025-07-01 PROCEDURE — 82962 GLUCOSE BLOOD TEST: CPT

## 2025-07-01 PROCEDURE — 97530 THERAPEUTIC ACTIVITIES: CPT

## 2025-07-01 PROCEDURE — 36600 WITHDRAWAL OF ARTERIAL BLOOD: CPT

## 2025-07-01 PROCEDURE — 97110 THERAPEUTIC EXERCISES: CPT

## 2025-07-01 PROCEDURE — 85025 COMPLETE CBC W/AUTO DIFF WBC: CPT

## 2025-07-01 PROCEDURE — 6370000000 HC RX 637 (ALT 250 FOR IP): Performed by: HOSPITALIST

## 2025-07-01 PROCEDURE — 80053 COMPREHEN METABOLIC PANEL: CPT

## 2025-07-01 PROCEDURE — 94640 AIRWAY INHALATION TREATMENT: CPT

## 2025-07-01 PROCEDURE — 1100000003 HC PRIVATE W/ TELEMETRY

## 2025-07-01 PROCEDURE — 2500000003 HC RX 250 WO HCPCS: Performed by: INTERNAL MEDICINE

## 2025-07-01 PROCEDURE — 97162 PT EVAL MOD COMPLEX 30 MIN: CPT

## 2025-07-01 PROCEDURE — 36415 COLL VENOUS BLD VENIPUNCTURE: CPT

## 2025-07-01 PROCEDURE — 82803 BLOOD GASES ANY COMBINATION: CPT

## 2025-07-01 PROCEDURE — 99221 1ST HOSP IP/OBS SF/LOW 40: CPT

## 2025-07-01 PROCEDURE — 2700000000 HC OXYGEN THERAPY PER DAY

## 2025-07-01 PROCEDURE — 97166 OT EVAL MOD COMPLEX 45 MIN: CPT

## 2025-07-01 PROCEDURE — 71045 X-RAY EXAM CHEST 1 VIEW: CPT

## 2025-07-01 RX ORDER — INSULIN GLARGINE 100 [IU]/ML
20 INJECTION, SOLUTION SUBCUTANEOUS DAILY
Status: DISCONTINUED | OUTPATIENT
Start: 2025-07-02 | End: 2025-07-04 | Stop reason: HOSPADM

## 2025-07-01 RX ORDER — INSULIN LISPRO 100 [IU]/ML
10 INJECTION, SOLUTION INTRAVENOUS; SUBCUTANEOUS ONCE
Status: COMPLETED | OUTPATIENT
Start: 2025-07-01 | End: 2025-07-01

## 2025-07-01 RX ORDER — HYDROCODONE POLISTIREX AND CHLORPHENIRAMINE POLISTIREX 10; 8 MG/5ML; MG/5ML
5 SUSPENSION, EXTENDED RELEASE ORAL EVERY 12 HOURS PRN
Refills: 0 | Status: DISCONTINUED | OUTPATIENT
Start: 2025-07-01 | End: 2025-07-04 | Stop reason: HOSPADM

## 2025-07-01 RX ORDER — INSULIN LISPRO 100 [IU]/ML
0-16 INJECTION, SOLUTION INTRAVENOUS; SUBCUTANEOUS
Status: DISCONTINUED | OUTPATIENT
Start: 2025-07-01 | End: 2025-07-04 | Stop reason: HOSPADM

## 2025-07-01 RX ORDER — INSULIN LISPRO 100 [IU]/ML
10 INJECTION, SOLUTION INTRAVENOUS; SUBCUTANEOUS ONCE
Status: DISCONTINUED | OUTPATIENT
Start: 2025-07-01 | End: 2025-07-01 | Stop reason: SDUPTHER

## 2025-07-01 RX ORDER — INSULIN GLARGINE 100 [IU]/ML
5 INJECTION, SOLUTION SUBCUTANEOUS DAILY
Status: DISCONTINUED | OUTPATIENT
Start: 2025-07-01 | End: 2025-07-01

## 2025-07-01 RX ADMIN — SODIUM CHLORIDE, PRESERVATIVE FREE 10 ML: 5 INJECTION INTRAVENOUS at 09:40

## 2025-07-01 RX ADMIN — IPRATROPIUM BROMIDE 0.5 MG: 0.5 SOLUTION RESPIRATORY (INHALATION) at 15:48

## 2025-07-01 RX ADMIN — CETIRIZINE HYDROCHLORIDE 10 MG: 10 TABLET, FILM COATED ORAL at 09:12

## 2025-07-01 RX ADMIN — BUDESONIDE 500 MCG: 0.5 INHALANT RESPIRATORY (INHALATION) at 19:48

## 2025-07-01 RX ADMIN — WATER 40 MG: 1 INJECTION INTRAMUSCULAR; INTRAVENOUS; SUBCUTANEOUS at 09:36

## 2025-07-01 RX ADMIN — CLONAZEPAM 1 MG: 0.5 TABLET ORAL at 06:52

## 2025-07-01 RX ADMIN — BUPROPION HYDROCHLORIDE 300 MG: 150 TABLET, EXTENDED RELEASE ORAL at 09:13

## 2025-07-01 RX ADMIN — GUAIFENESIN 600 MG: 600 TABLET, EXTENDED RELEASE ORAL at 14:19

## 2025-07-01 RX ADMIN — HYDROCODONE BITARTRATE AND ACETAMINOPHEN 1 TABLET: 7.5; 325 TABLET ORAL at 21:48

## 2025-07-01 RX ADMIN — HYDROCODONE POLISTIREX AND CHLORPHENIRAMINE POLISTIREX 5 ML: 10; 8 SUSPENSION, EXTENDED RELEASE ORAL at 14:41

## 2025-07-01 RX ADMIN — ARFORMOTEROL TARTRATE 15 MCG: 15 SOLUTION RESPIRATORY (INHALATION) at 07:11

## 2025-07-01 RX ADMIN — IMIPRAMINE HYDROCHLORIDE 75 MG: 25 TABLET ORAL at 20:48

## 2025-07-01 RX ADMIN — ARFORMOTEROL TARTRATE 15 MCG: 15 SOLUTION RESPIRATORY (INHALATION) at 19:48

## 2025-07-01 RX ADMIN — ENOXAPARIN SODIUM 40 MG: 100 INJECTION SUBCUTANEOUS at 09:13

## 2025-07-01 RX ADMIN — INSULIN LISPRO 3 UNITS: 100 INJECTION, SOLUTION INTRAVENOUS; SUBCUTANEOUS at 06:54

## 2025-07-01 RX ADMIN — GUAIFENESIN 600 MG: 600 TABLET, EXTENDED RELEASE ORAL at 09:13

## 2025-07-01 RX ADMIN — INSULIN LISPRO 2 UNITS: 100 INJECTION, SOLUTION INTRAVENOUS; SUBCUTANEOUS at 18:47

## 2025-07-01 RX ADMIN — CLONAZEPAM 1 MG: 0.5 TABLET ORAL at 18:46

## 2025-07-01 RX ADMIN — IPRATROPIUM BROMIDE 0.5 MG: 0.5 SOLUTION RESPIRATORY (INHALATION) at 11:20

## 2025-07-01 RX ADMIN — WATER 40 MG: 1 INJECTION INTRAMUSCULAR; INTRAVENOUS; SUBCUTANEOUS at 14:31

## 2025-07-01 RX ADMIN — ROSUVASTATIN CALCIUM 10 MG: 10 TABLET, FILM COATED ORAL at 20:48

## 2025-07-01 RX ADMIN — CLONAZEPAM 1 MG: 0.5 TABLET ORAL at 13:37

## 2025-07-01 RX ADMIN — PANTOPRAZOLE SODIUM 40 MG: 40 TABLET, DELAYED RELEASE ORAL at 06:54

## 2025-07-01 RX ADMIN — GUAIFENESIN 600 MG: 600 TABLET, EXTENDED RELEASE ORAL at 20:52

## 2025-07-01 RX ADMIN — IPRATROPIUM BROMIDE 0.5 MG: 0.5 SOLUTION RESPIRATORY (INHALATION) at 07:11

## 2025-07-01 RX ADMIN — SODIUM CHLORIDE, PRESERVATIVE FREE 10 ML: 5 INJECTION INTRAVENOUS at 21:50

## 2025-07-01 RX ADMIN — HYDROCODONE BITARTRATE AND ACETAMINOPHEN 1 TABLET: 7.5; 325 TABLET ORAL at 00:06

## 2025-07-01 RX ADMIN — HYDROCODONE BITARTRATE AND ACETAMINOPHEN 1 TABLET: 7.5; 325 TABLET ORAL at 09:12

## 2025-07-01 RX ADMIN — INSULIN LISPRO 4 UNITS: 100 INJECTION, SOLUTION INTRAVENOUS; SUBCUTANEOUS at 13:37

## 2025-07-01 RX ADMIN — BENZONATATE 100 MG: 100 CAPSULE ORAL at 09:36

## 2025-07-01 RX ADMIN — INSULIN LISPRO 10 UNITS: 100 INJECTION, SOLUTION INTRAVENOUS; SUBCUTANEOUS at 23:15

## 2025-07-01 RX ADMIN — IPRATROPIUM BROMIDE 0.5 MG: 0.5 SOLUTION RESPIRATORY (INHALATION) at 19:48

## 2025-07-01 RX ADMIN — WATER 1000 MG: 1 INJECTION INTRAMUSCULAR; INTRAVENOUS; SUBCUTANEOUS at 18:47

## 2025-07-01 RX ADMIN — INSULIN LISPRO 10 UNITS: 100 INJECTION, SOLUTION INTRAVENOUS; SUBCUTANEOUS at 21:09

## 2025-07-01 RX ADMIN — INSULIN GLARGINE 5 UNITS: 100 INJECTION, SOLUTION SUBCUTANEOUS at 13:38

## 2025-07-01 RX ADMIN — BUDESONIDE 500 MCG: 0.5 INHALANT RESPIRATORY (INHALATION) at 07:11

## 2025-07-01 ASSESSMENT — PAIN DESCRIPTION - ORIENTATION
ORIENTATION: LEFT

## 2025-07-01 ASSESSMENT — PAIN DESCRIPTION - PAIN TYPE
TYPE: SURGICAL PAIN
TYPE: SURGICAL PAIN

## 2025-07-01 ASSESSMENT — PAIN DESCRIPTION - LOCATION
LOCATION: SHOULDER

## 2025-07-01 ASSESSMENT — PAIN SCALES - GENERAL
PAINLEVEL_OUTOF10: 0
PAINLEVEL_OUTOF10: 0
PAINLEVEL_OUTOF10: 8
PAINLEVEL_OUTOF10: 0
PAINLEVEL_OUTOF10: 0
PAINLEVEL_OUTOF10: 8
PAINLEVEL_OUTOF10: 0

## 2025-07-01 ASSESSMENT — PAIN - FUNCTIONAL ASSESSMENT: PAIN_FUNCTIONAL_ASSESSMENT: ACTIVITIES ARE NOT PREVENTED

## 2025-07-01 ASSESSMENT — PAIN DESCRIPTION - DESCRIPTORS
DESCRIPTORS: ACHING;THROBBING
DESCRIPTORS: ACHING;BURNING
DESCRIPTORS: ACHING;BURNING

## 2025-07-01 ASSESSMENT — PAIN SCALES - WONG BAKER: WONGBAKER_NUMERICALRESPONSE: NO HURT
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
## 2025-07-01 NOTE — PROGRESS NOTES
-Therapist working with the patient came and got me to assess the patient d/t severe coughing fit.  -PRN Mucinex administered.  -Message sent to Dr. Rosenthal via To[employer], \"Patient is having a severe coughing fit/turning red in the face. The patient mentioned you were going to add a new cough medicine.. Can we add something else? She's already on mucinex and tessalon with no relief\"  -See new orders.

## 2025-07-01 NOTE — PROGRESS NOTES
SW reached out to [employer] regarding home machine. [employer] to provide respiratory therapist to assess machine in acute setting today. Patient and family aware. Patient to work with PT/OT when appropriate. [employer] to assess patient for possible home vent.

## 2025-07-01 NOTE — PROGRESS NOTES
07/01/25 1950   NIV Type   Suction Setup and Functional Yes   NIV Started/Stopped On   Equipment Type home device   Mode AVAPS   Mask Type Nasal mask   Mask Size Small

## 2025-07-01 NOTE — PROGRESS NOTES
Speech-Language Pathology    Attempted follow up treatment; however, pt currently:     [ ]  Off unit   [ ]  Sleeping soundly/unable to arouse for safe po trials   [ ]  NPO for procedure   [ ]  Working with PT/OT  [ ]  Receiving direct nursing care  [X] Currently on CPAP and inappropriate for po trials     Will continue to follow per plan of care.     Yokasta Trent M.S., CCC-SLP

## 2025-07-01 NOTE — PROGRESS NOTES
Physical Therapy Goals:  Initiated 7/1/2025 to be met within 7-10 days.  Short Term Goals  Short Term Goal 1: Patient will perform rolling R/L with Jen  Short Term Goal 2: Patient will perform supine to/from sit with Jen  Short Term Goal 3: Patient will perform sit to/from stand with Jen in prep for gait training  Short Term Goal 4: Patient will perform bed to/from chair transfer with Jen to progress OOB mobility  Short Term Goal 5: Patient will ambulate 50 ft with RW and Jen to progress OOB mobility  PHYSICAL THERAPY EVALUATION    Patient: Eryn Howell (69 y.o. female)  Date: 7/1/2025  Diagnosis: Hyponatremia [E87.1]  COPD exacerbation ([facility]) [J44.1]  Elevated d-dimer [R79.89]  Septic shock ([facility]) [A41.9, R65.21]  History of left shoulder replacement [Z96.612]  Acute respiratory failure with hypoxia and hypercapnia ([facility]) [J96.01, J96.02] COPD exacerbation ([facility])  Precautions: Fall Risk  PLOF: Ambulation without AD. Lvies with  who appears to assist patient with all ADLs.    ASSESSMENT :  Patient received supine in bed. Pt presents with decreased LE strength, decreased endurance, decreased gait quailty, decreased activity tolerance, decreased bed mobility and transfers , impaired balance, and high pain levels limiting functional mobility. Patient performed supine to long sitting with ModA. Deferred OOB mobility due to continuous bipap and pain. Refer to OT assessment of L shoulder ROM/MMT. Patient appears to be most significantly limited over soft tissue restriction. Coughing at end of session; nursing aware. Patient appears below baseline level and would benefit from continued skilled PT to progress functional mobility.     DEFICITS/IMPAIRMENTS:    Body Structures, Functions, Activity Limitations Requiring Skilled Therapeutic Intervention: Decreased functional mobility ;Decreased ROM;Decreased strength;Decreased endurance;Decreased balance;Increased pain    Patient will benefit from skilled intervention

## 2025-07-01 NOTE — PROGRESS NOTES
Hospitalist Progress Note      Patient: Eryn Howell MRN: [id number]  CSN: [id number]    YOB: 1955  Age: 69 y.o.  Sex: female    DOA: 6/28/2025 LOS:  LOS: 3 days      PCP: Nish Sims DO       Summary:      A 69 years old female who is admitted for COPD exacerbation and acute on chronic hypercapnic respiratory failure. She went to the ED with 2 days history of having shortness of breath, wheezing, and confusion that has gotten worse over the last day.  She was admitted as acute on chronic respiratory failure and COPD exacerbation    Subjective: My breathing is okay, coughing makes worse, I want to walk around if I can    Patient had coughing spell this morning.,  She was put on home BiPAP, repeat ABG still CO2 elevated.  Hospital BiPAP was placed.  Will repeat ABG    Daughter and  were at the bedside  Magdalene added    Assessment/Plan:    Principal Problem:    COPD exacerbation (HCC)  Active Problems:    PTSD (post-traumatic stress disorder)    Acute on chronic respiratory failure with hypoxia and hypercapnia (HCC)    Anxiety    Status post surgery    Hyponatremia    Lung nodule  Resolved Problems:    * No resolved hospital problems. *       Acute on chronic respiratory failure with hypoxia and hypercapnia  On BiPAP  CTA chest no PE,   Continue treat COPD exacerbation  Intensivist on board  Company has been informed for BiPAP issue      Acute COPD exacerbation, severe emphysema  Improving  On Solu-Medrol 60 mg every 6 hours  Breathing treatment  Empiric antibiotics  Insulin ssi for glucose control       Lung nodule  Need to follow-up with imaging in 3 to 6 months-discussed with daughter and  on 6/30    Hypotension  Resolved per IV fluid  Levophed hold for now    Elevated D-dimer  Recent surgery  CTA chest rule out PE still pending  Continue heparin drip to rule out blood clots due to recent surgery  No dvt per pvl     PTSD and anxiety  Continue home  of deep vein thrombosis in the right lower extremity.   No evidence of deep vein thrombosis in the left lower extremity.     Echo (TTE) complete (PRN contrast/bubble/strain/3D)  Result Date: 6/29/2025    Left Ventricle: Normal left ventricular systolic function with a visually estimated EF of 55 - 60%. Left ventricle size is normal. Normal wall thickness. No regional wall motion abnormalities identified. Decreased sensitivity due to off axis imaging apical views. Grade I diastolic dysfunction present with normal LV EF. E/E' Ratio (Averaged) is 7.75.   Right Ventricle: Right ventricle is mildly dilated. Normal systolic function. TAPSE is 1.9 cm.   Tricuspid Valve: Mild regurgitation. PASP is 47 mmHg.   IVC/SVC: IVC diameter is dilated and decreases less than 50% during inspiration; therefore the estimated right atrial pressure is elevated (~15 mmHg).   Image quality is adequate.   Technical qualifier - other: Low cardiac window's. Done supine d/t recent shoulder surgery. No contrast was given.     CTA CHEST W WO CONTRAST  Result Date: 6/29/2025  EXAM:  CTA CHEST W WO CONTRAST INDICATION: Tachycardia with hypoxic respiratory failure. History of COPD. Left shoulder replacement 1 week ago COMPARISON: Prior day and 7/2/2024 TECHNIQUE: Helical thin section chest CT following intravenous administration of nonionic contrast 100 mL of isovue 370 according to departmental PE protocol. Coronal and sagittal reformats were performed. 3D post processing was performed.  CT dose reduction was achieved through the use of a standardized protocol tailored for this examination and automatic exposure control for dose modulation. FINDINGS: This is a good quality study for the evaluation of pulmonary embolism to the first subsegmental arterial level. There is no pulmonary embolism to this level. Borderline enlargement of the pulmonary arteries which can suggest pulmonary artery hypertension MEDIASTINUM: No mass or lymphadenopathy. JOHN:
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
## 2025-07-01 NOTE — PROGRESS NOTES
Pulmonary Specialists  Pulmonary, Critical Care, and Sleep Medicine    Name: Eryn Howell MRN: [id number]   : 1955 Hospital: [facility]    Date: 2025  Room: [address] [place] [address] Note                                              Consult requesting physician: Dr. Thompson  Reason for Consult: Assisting in ICU care for acute on chronic hypoxic hypercapnic respiratory failure    IMPRESSION:       Acute on chronic hypoxic hypercapnic respiratory failure  COPD exacerbation (HCC)  Leukocytosis  Elevated D-dimer  Hyponatremia  Anemia  PTSD (post-traumatic stress disorder)  Anxiety  Recent left TSA at [facility] on 2025  Pulmonary nodules    Active Hospital Problems    Diagnosis Date Noted    Lung nodule [R91.1] 2025    Status post surgery [Z98.890] 2025    Hyponatremia [E87.1] 2025    COPD exacerbation (HCC) [J44.1] 2025    Anxiety [F41.9] 2024    Acute on chronic respiratory failure with hypoxia and hypercapnia (HCC) [J96.21, J96.22] 2017    PTSD (post-traumatic stress disorder) [F43.10] 2017       Code status: Full Code      RECOMMENDATIONS:     Respiratory: Presented with worsening dyspnea, hypoxia associated with wheezing, mental status changes requiring continuous BiPAP support, multiple neb treatments, systemic steroids and admission to ICU. Protecting airway.   Follows with Dr. Pagan in our office for Gold class IV COPD, on home O2 and NIV    ABG on presentation 2025: pH 7.4, PCO2 48.9, PaO2 268 on 60% FiO2 BiPAP  Overall ABG was improved while using BiPAP 15/5 CWP with 2 LPM O2 while in ICU.  After using overnight home iVPAS with 2 LPM O2 (settings: PEEP 5, PS 5-25, backup rate 10, tidal volume 3 mL/kg); repeat ABG showed worsening hypercapnic respiratory failure.  ABG on the settings 2025: pH 7.33, pCO2 53.2, , SpO2 93.5%.  No change in the mental status.    CXR 2025: No acute pulmonary findings.  Severe

## 2025-07-01 NOTE — PLAN OF CARE
Problem: Discharge Planning  Goal: Discharge to home or other facility with appropriate resources  Outcome: Progressing     Problem: Pain  Goal: Verbalizes/displays adequate comfort level or baseline comfort level  Outcome: Progressing     Problem: Safety - Adult  Goal: Free from fall injury  Outcome: Progressing     Problem: ABCDS Injury Assessment  Goal: Absence of physical injury  Outcome: Progressing     Problem: Skin/Tissue Integrity  Goal: Skin integrity remains intact  Description: 1.  Monitor for areas of redness and/or skin breakdown  2.  Assess vascular access sites hourly  3.  Every 4-6 hours minimum:  Change oxygen saturation probe site  4.  Every 4-6 hours:  If on nasal continuous positive airway pressure, respiratory therapy assess nares and determine need for appliance change or resting period  Outcome: Progressing     Problem: Respiratory - Adult  Goal: Achieves optimal ventilation and oxygenation  Outcome: Progressing     Problem: Cardiovascular - Adult  Goal: Maintains optimal cardiac output and hemodynamic stability  Outcome: Progressing  Goal: Absence of cardiac dysrhythmias or at baseline  Outcome: Progressing

## 2025-07-02 ENCOUNTER — TELEPHONE (OUTPATIENT)
Facility: HOSPITAL | Age: 70
End: 2025-07-02

## 2025-07-02 LAB
ALBUMIN SERPL-MCNC: 2.9 G/DL (ref 3.4–5)
ALBUMIN/GLOB SERPL: 0.9 (ref 0.8–1.7)
ALP SERPL-CCNC: 94 U/L (ref 45–117)
ALT SERPL-CCNC: 64 U/L (ref 10–35)
ANION GAP SERPL CALC-SCNC: 10 MMOL/L (ref 3–18)
ARTERIAL PATENCY WRIST A: POSITIVE
AST SERPL-CCNC: 24 U/L (ref 10–38)
B PERT DNA SPEC QL NAA+PROBE: NOT DETECTED
BASE EXCESS BLD CALC-SCNC: 4.4 MMOL/L
BASOPHILS # BLD: 0 K/UL (ref 0–0.1)
BASOPHILS NFR BLD: 0 % (ref 0–2)
BDY SITE: ABNORMAL
BILIRUB SERPL-MCNC: 0.2 MG/DL (ref 0.2–1)
BORDETELLA PARAPERTUSSIS BY PCR: NOT DETECTED
BUN SERPL-MCNC: 32 MG/DL (ref 6–23)
BUN/CREAT SERPL: 46 (ref 12–20)
C PNEUM DNA SPEC QL NAA+PROBE: NOT DETECTED
CALCIUM SERPL-MCNC: 10.2 MG/DL (ref 8.5–10.1)
CHLORIDE SERPL-SCNC: 100 MMOL/L (ref 98–107)
CO2 SERPL-SCNC: 26 MMOL/L (ref 21–32)
CREAT SERPL-MCNC: 0.68 MG/DL (ref 0.6–1.3)
DIFFERENTIAL METHOD BLD: ABNORMAL
EOSINOPHIL # BLD: 0 K/UL (ref 0–0.4)
EOSINOPHIL NFR BLD: 0 % (ref 0–5)
ERYTHROCYTE [DISTWIDTH] IN BLOOD BY AUTOMATED COUNT: 16.2 % (ref 11.6–14.5)
FLUAV SUBTYP SPEC NAA+PROBE: NOT DETECTED
FLUBV RNA SPEC QL NAA+PROBE: NOT DETECTED
GAS FLOW.O2 O2 DELIVERY SYS: ABNORMAL
GLOBULIN SER CALC-MCNC: 3.2 G/DL (ref 2–4)
GLUCOSE BLD STRIP.AUTO-MCNC: 112 MG/DL (ref 70–110)
GLUCOSE BLD STRIP.AUTO-MCNC: 156 MG/DL (ref 70–110)
GLUCOSE BLD STRIP.AUTO-MCNC: 253 MG/DL (ref 70–110)
GLUCOSE BLD STRIP.AUTO-MCNC: 371 MG/DL (ref 70–110)
GLUCOSE SERPL-MCNC: 106 MG/DL (ref 74–108)
HADV DNA SPEC QL NAA+PROBE: NOT DETECTED
HCO3 BLD-SCNC: 29.3 MMOL/L (ref 21–28)
HCOV 229E RNA SPEC QL NAA+PROBE: NOT DETECTED
HCOV HKU1 RNA SPEC QL NAA+PROBE: NOT DETECTED
HCOV NL63 RNA SPEC QL NAA+PROBE: NOT DETECTED
HCOV OC43 RNA SPEC QL NAA+PROBE: NOT DETECTED
HCT VFR BLD AUTO: 29.6 % (ref 35–45)
HGB BLD-MCNC: 9.8 G/DL (ref 12–16)
HMPV RNA SPEC QL NAA+PROBE: NOT DETECTED
HPIV1 RNA SPEC QL NAA+PROBE: NOT DETECTED
HPIV2 RNA SPEC QL NAA+PROBE: NOT DETECTED
HPIV3 RNA SPEC QL NAA+PROBE: NOT DETECTED
HPIV4 RNA SPEC QL NAA+PROBE: NOT DETECTED
IMM GRANULOCYTES # BLD AUTO: 0 K/UL
IMM GRANULOCYTES NFR BLD AUTO: 0 %
L PNEUMO AG UR QL: NEGATIVE
LYMPHOCYTES # BLD: 0.98 K/UL (ref 0.9–3.6)
LYMPHOCYTES NFR BLD: 9 % (ref 21–52)
M PNEUMO DNA SPEC QL NAA+PROBE: NOT DETECTED
MCH RBC QN AUTO: 29.3 PG (ref 24–34)
MCHC RBC AUTO-ENTMCNC: 33.1 G/DL (ref 31–37)
MCV RBC AUTO: 88.4 FL (ref 78–100)
METAMYELOCYTES NFR BLD MANUAL: 1 %
MONOCYTES # BLD: 0.65 K/UL (ref 0.05–1.2)
MONOCYTES NFR BLD: 6 % (ref 3–10)
NEGATIVE CONTROL: NEGATIVE
NEUTS BAND NFR BLD MANUAL: 1 % (ref 0–5)
NEUTS SEG # BLD: 9.16 K/UL (ref 1.8–8)
NEUTS SEG NFR BLD: 83 % (ref 40–73)
NRBC # BLD: 0.03 K/UL (ref 0–0.01)
NRBC BLD-RTO: 0.3 PER 100 WBC
O2/TOTAL GAS SETTING VFR VENT: 45 %
PCO2 BLD: 44.6 MMHG (ref 35–48)
PH BLD: 7.43 (ref 7.35–7.45)
PH, URINE: 5 (ref 4.6–8)
PLATELET # BLD AUTO: 377 K/UL (ref 135–420)
PLATELET COMMENT: ABNORMAL
PMV BLD AUTO: 10 FL (ref 9.2–11.8)
PO2 BLD: 94 MMHG (ref 83–108)
POSITIVE CONTROL: POSITIVE
POTASSIUM SERPL-SCNC: 4.4 MMOL/L (ref 3.5–5.5)
PROT SERPL-MCNC: 6 G/DL (ref 6.4–8.2)
RBC # BLD AUTO: 3.35 M/UL (ref 4.2–5.3)
RBC MORPH BLD: ABNORMAL
RSV RNA SPEC QL NAA+PROBE: NOT DETECTED
RV+EV RNA SPEC QL NAA+PROBE: NOT DETECTED
S PNEUM AG UR QL IA.RAPID: NEGATIVE
SAO2 % BLD: 97.4 % (ref 92–97)
SARS-COV-2 RNA RESP QL NAA+PROBE: NOT DETECTED
SERVICE CMNT-IMP: ABNORMAL
SODIUM SERPL-SCNC: 136 MMOL/L (ref 136–145)
SPECIMEN TYPE: ABNORMAL
WBC # BLD AUTO: 10.9 K/UL (ref 4.6–13.2)

## 2025-07-02 PROCEDURE — 6370000000 HC RX 637 (ALT 250 FOR IP): Performed by: INTERNAL MEDICINE

## 2025-07-02 PROCEDURE — 2700000000 HC OXYGEN THERAPY PER DAY

## 2025-07-02 PROCEDURE — 6370000000 HC RX 637 (ALT 250 FOR IP): Performed by: HOSPITALIST

## 2025-07-02 PROCEDURE — 6360000002 HC RX W HCPCS: Performed by: INTERNAL MEDICINE

## 2025-07-02 PROCEDURE — 0202U NFCT DS 22 TRGT SARS-COV-2: CPT

## 2025-07-02 PROCEDURE — 82803 BLOOD GASES ANY COMBINATION: CPT

## 2025-07-02 PROCEDURE — 1100000003 HC PRIVATE W/ TELEMETRY

## 2025-07-02 PROCEDURE — 97110 THERAPEUTIC EXERCISES: CPT

## 2025-07-02 PROCEDURE — 94669 MECHANICAL CHEST WALL OSCILL: CPT

## 2025-07-02 PROCEDURE — 2500000003 HC RX 250 WO HCPCS: Performed by: INTERNAL MEDICINE

## 2025-07-02 PROCEDURE — 80053 COMPREHEN METABOLIC PANEL: CPT

## 2025-07-02 PROCEDURE — 87899 AGENT NOS ASSAY W/OPTIC: CPT

## 2025-07-02 PROCEDURE — 94664 DEMO&/EVAL PT USE INHALER: CPT

## 2025-07-02 PROCEDURE — 85025 COMPLETE CBC W/AUTO DIFF WBC: CPT

## 2025-07-02 PROCEDURE — 2580000003 HC RX 258: Performed by: INTERNAL MEDICINE

## 2025-07-02 PROCEDURE — 97530 THERAPEUTIC ACTIVITIES: CPT

## 2025-07-02 PROCEDURE — 92526 ORAL FUNCTION THERAPY: CPT

## 2025-07-02 PROCEDURE — 36600 WITHDRAWAL OF ARTERIAL BLOOD: CPT

## 2025-07-02 PROCEDURE — 94640 AIRWAY INHALATION TREATMENT: CPT

## 2025-07-02 PROCEDURE — 82962 GLUCOSE BLOOD TEST: CPT

## 2025-07-02 PROCEDURE — 36415 COLL VENOUS BLD VENIPUNCTURE: CPT

## 2025-07-02 RX ADMIN — GUAIFENESIN 600 MG: 600 TABLET, EXTENDED RELEASE ORAL at 08:53

## 2025-07-02 RX ADMIN — BUDESONIDE 500 MCG: 0.5 INHALANT RESPIRATORY (INHALATION) at 20:06

## 2025-07-02 RX ADMIN — IPRATROPIUM BROMIDE 0.5 MG: 0.5 SOLUTION RESPIRATORY (INHALATION) at 20:06

## 2025-07-02 RX ADMIN — HYDROCODONE BITARTRATE AND ACETAMINOPHEN 1 TABLET: 7.5; 325 TABLET ORAL at 15:57

## 2025-07-02 RX ADMIN — HYDROCODONE POLISTIREX AND CHLORPHENIRAMINE POLISTIREX 5 ML: 10; 8 SUSPENSION, EXTENDED RELEASE ORAL at 20:36

## 2025-07-02 RX ADMIN — IPRATROPIUM BROMIDE 0.5 MG: 0.5 SOLUTION RESPIRATORY (INHALATION) at 07:35

## 2025-07-02 RX ADMIN — INSULIN GLARGINE 20 UNITS: 100 INJECTION, SOLUTION SUBCUTANEOUS at 08:57

## 2025-07-02 RX ADMIN — BUPROPION HYDROCHLORIDE 300 MG: 150 TABLET, EXTENDED RELEASE ORAL at 08:52

## 2025-07-02 RX ADMIN — ENOXAPARIN SODIUM 40 MG: 100 INJECTION SUBCUTANEOUS at 08:52

## 2025-07-02 RX ADMIN — BUDESONIDE 500 MCG: 0.5 INHALANT RESPIRATORY (INHALATION) at 07:35

## 2025-07-02 RX ADMIN — IPRATROPIUM BROMIDE 0.5 MG: 0.5 SOLUTION RESPIRATORY (INHALATION) at 15:32

## 2025-07-02 RX ADMIN — CLONAZEPAM 1 MG: 0.5 TABLET ORAL at 03:39

## 2025-07-02 RX ADMIN — WATER 40 MG: 1 INJECTION INTRAMUSCULAR; INTRAVENOUS; SUBCUTANEOUS at 16:04

## 2025-07-02 RX ADMIN — INSULIN LISPRO 8 UNITS: 100 INJECTION, SOLUTION INTRAVENOUS; SUBCUTANEOUS at 20:46

## 2025-07-02 RX ADMIN — PANTOPRAZOLE SODIUM 40 MG: 40 TABLET, DELAYED RELEASE ORAL at 06:37

## 2025-07-02 RX ADMIN — AZITHROMYCIN MONOHYDRATE 500 MG: 500 INJECTION, POWDER, LYOPHILIZED, FOR SOLUTION INTRAVENOUS at 17:49

## 2025-07-02 RX ADMIN — WATER 40 MG: 1 INJECTION INTRAMUSCULAR; INTRAVENOUS; SUBCUTANEOUS at 03:41

## 2025-07-02 RX ADMIN — INSULIN LISPRO 16 UNITS: 100 INJECTION, SOLUTION INTRAVENOUS; SUBCUTANEOUS at 12:25

## 2025-07-02 RX ADMIN — IPRATROPIUM BROMIDE 0.5 MG: 0.5 SOLUTION RESPIRATORY (INHALATION) at 12:02

## 2025-07-02 RX ADMIN — Medication 1 TABLET: at 09:06

## 2025-07-02 RX ADMIN — HYDROCODONE POLISTIREX AND CHLORPHENIRAMINE POLISTIREX 5 ML: 10; 8 SUSPENSION, EXTENDED RELEASE ORAL at 08:50

## 2025-07-02 RX ADMIN — SODIUM CHLORIDE, PRESERVATIVE FREE 10 ML: 5 INJECTION INTRAVENOUS at 08:57

## 2025-07-02 RX ADMIN — IMIPRAMINE HYDROCHLORIDE 75 MG: 25 TABLET ORAL at 20:35

## 2025-07-02 RX ADMIN — CLONAZEPAM 1 MG: 0.5 TABLET ORAL at 11:53

## 2025-07-02 RX ADMIN — ARFORMOTEROL TARTRATE 15 MCG: 15 SOLUTION RESPIRATORY (INHALATION) at 07:35

## 2025-07-02 RX ADMIN — ROSUVASTATIN CALCIUM 10 MG: 10 TABLET, FILM COATED ORAL at 20:35

## 2025-07-02 RX ADMIN — WATER 1000 MG: 1 INJECTION INTRAMUSCULAR; INTRAVENOUS; SUBCUTANEOUS at 17:09

## 2025-07-02 RX ADMIN — CETIRIZINE HYDROCHLORIDE 10 MG: 10 TABLET, FILM COATED ORAL at 08:53

## 2025-07-02 RX ADMIN — SODIUM CHLORIDE, PRESERVATIVE FREE 10 ML: 5 INJECTION INTRAVENOUS at 20:48

## 2025-07-02 RX ADMIN — GUAIFENESIN 600 MG: 600 TABLET, EXTENDED RELEASE ORAL at 20:35

## 2025-07-02 RX ADMIN — CLONAZEPAM 1 MG: 0.5 TABLET ORAL at 20:36

## 2025-07-02 RX ADMIN — BENZONATATE 100 MG: 100 CAPSULE ORAL at 04:14

## 2025-07-02 RX ADMIN — ARFORMOTEROL TARTRATE 15 MCG: 15 SOLUTION RESPIRATORY (INHALATION) at 20:06

## 2025-07-02 ASSESSMENT — PAIN SCALES - GENERAL
PAINLEVEL_OUTOF10: 0

## 2025-07-02 NOTE — PROGRESS NOTES
PHYSICAL THERAPY TREATMENT    Patient: Eryn Howell (69 y.o. female)  Date: 7/2/2025  Diagnosis: Hyponatremia [E87.1]  COPD exacerbation (HCC) [J44.1]  Elevated d-dimer [R79.89]  Septic shock (HCC) [A41.9, R65.21]  History of left shoulder replacement [Z96.612]  Acute respiratory failure with hypoxia and hypercapnia (HCC) [J96.01, J96.02] COPD exacerbation (HCC)      Precautions: Fall Risk,  ,  ,  ,  ,  ,  ,        ASSESSMENT:  Pt received resting in bed with  at bedside and Bi-PAP donned. Pt is hesitant, be agrees to session. More assistance needed to EOB. Pt performed LE ex as listed, before transfer training. Provided R HHA (Pull to stand) for 3 standing trials.Unable to promote WT shift needed for side steps or ambulation. Respiratory response improves with each trial. Cues required for exhaling. After returning to supine, AROM for digits, wrist, forarm and elbow were provided. PROM to ~ 45 degrees of forward elevation. Sling adjusted. Will attempt use of renee-walker it appropriate at time of F/u.      Progression toward goals: Fair-  []      Improving appropriately and progressing toward goals  [x]      Improving slowly and progressing toward goals  []      Not making progress toward goals and plan of care will be adjusted     PLAN:  Patient continues to benefit from skilled intervention to address the above impairments.  Continue treatment per established plan of care.    Further Equipment Recommendations for Discharge: wheelchair      [facility]:   AM-PAC Inpatient Mobility without Stair Climbing Raw Score : 9    Current research shows that an AM-PAC score of 17 (13 without stairs) or less is not associated with a discharge to the patient's home setting. Based on an AM-PAC score and their current functional mobility deficits, it is recommended that the patient have 5-7 sessions per week of Physical Therapy at d/c to increase the patient's independence.  Currently, this patient demonstrates the

## 2025-07-02 NOTE — PROGRESS NOTES
Hospitalist Progress Note      Patient: Eryn Howell MRN: [id number]  CSN: [id number]    YOB: 1955  Age: 69 y.o.  Sex: female    DOA: 6/28/2025 LOS:  LOS: 4 days      PCP: Nish Sims DO       Summary:      A 69 years old female who is admitted for COPD exacerbation and acute on chronic hypercapnic respiratory failure. She went to the ED with 2 days history of having shortness of breath, wheezing, and confusion that has gotten worse over the last day.  She was admitted as acute on chronic respiratory failure and COPD exacerbation    Subjective: she had coughing spells AM, lung mild wheezing, coughing meds not given last night     CM reached the company for home bipap issue       was  at the bedside  Magdalene added    Will have pt/ot tomorrow     Assessment/Plan:    Principal Problem:    COPD exacerbation (HCC)  Active Problems:    PTSD (post-traumatic stress disorder)    Acute on chronic respiratory failure with hypoxia and hypercapnia (HCC)    Anxiety    Status post surgery    Hyponatremia    Lung nodule  Resolved Problems:    * No resolved hospital problems. *       Acute on chronic respiratory failure with hypoxia and hypercapnia  On BiPAP  CTA chest no PE,   Continue treat COPD exacerbation  Intensivist on board  Home bipap adjusted       Acute COPD exacerbation, severe emphysema  Improving  On Solu-Medrol 60 mg every 6 hours  Breathing treatment  Empiric antibiotics  Insulin ssi for glucose control       Lung nodule  Need to follow-up with imaging in 3 to 6 months-discussed with daughter and  on 6/30    Hypotension  Resolved per IV fluid  Levophed hold for now    Elevated D-dimer  Recent surgery  CTA chest rule out PE still pending  Continue heparin drip to rule out blood clots due to recent surgery  No dvt per pvl     PTSD and anxiety  Continue home medication    Delirium-resolved now   CT head no acute process      DJD, with [facility] at [facility]

## 2025-07-02 NOTE — PROGRESS NOTES
Danielle came to see patient 07/01 at bedside to assist with home machine. Family grateful for that. Per patient and spouse, machine worked well last night. [employer] rep spoke with pulmonologist regarding home vent machine. Plan to DC home with family when stable. SW to follow.

## 2025-07-02 NOTE — PROGRESS NOTES
Pulmonary Specialists  Pulmonary, Critical Care, and Sleep Medicine    Name: Eryn Howell MRN: [id number]   : 1955 Hospital: [facility]    Date: 2025  Room: [address] [place] [address] Note                                              Consult requesting physician: Dr. Thompson  Reason for Consult: Assisting in ICU care for acute on chronic hypoxic hypercapnic respiratory failure    IMPRESSION:       Acute on chronic hypoxic hypercapnic respiratory failure  COPD exacerbation (HCC)  Leukocytosis  Elevated D-dimer  Hyponatremia  Anemia  PTSD (post-traumatic stress disorder)  Anxiety  Recent left TSA at [facility] on 2025  Pulmonary nodules    Active Hospital Problems    Diagnosis Date Noted    Lung nodule [R91.1] 2025    Status post surgery [Z98.890] 2025    Hyponatremia [E87.1] 2025    COPD exacerbation (HCC) [J44.1] 2025    Anxiety [F41.9] 2024    Acute on chronic respiratory failure with hypoxia and hypercapnia (HCC) [J96.21, J96.22] 2017    PTSD (post-traumatic stress disorder) [F43.10] 2017       Code status: Full Code      RECOMMENDATIONS:     Respiratory: Presented with worsening dyspnea, hypoxia associated with wheezing, mental status changes requiring continuous BiPAP support, multiple neb treatments, systemic steroids and admission to ICU. Protecting airway.   Follows with Dr. Pagan in our office for Gold class IV COPD, on home O2 and NIV    CXR 2025: No acute pulmonary findings.  Severe emphysema.    Hypercapnic respiratory failure due to COPD/emphysema.  ABG had significantly improved on BiPAP 15/5 CWP with 2 LPM O2 NC.  When she used her home iVAPS last night; worsening ABG with decreased pH and increased pCO2; patient was placed back on hospital BiPAP 15/5 CWP with 2 LPM O2.  iVAP with BiPAP settings were changed to PEEP 5, PS 10-25, backup rate 12, tidal volume 6 to 10 mL/kg; with 2 LPM O2.  Repeat ABG on this showed

## 2025-07-02 NOTE — PLAN OF CARE
Problem: Discharge Planning  Goal: Discharge to home or other facility with appropriate resources  7/2/2025 0039 by Edgardo Bass RN  Outcome: Progressing  7/1/2025 1616 by Mariia Thompson RN  Outcome: Progressing  Flowsheets (Taken 7/1/2025 0800)  Discharge to home or other facility with appropriate resources:   Arrange for needed discharge resources and transportation as appropriate   Identify barriers to discharge with patient and caregiver     Problem: Pain  Goal: Verbalizes/displays adequate comfort level or baseline comfort level  7/2/2025 0039 by Edgardo Bass, RN  Outcome: Progressing  7/1/2025 1616 by Mariia Thompson, RN  Outcome: Progressing     Problem: Safety - Adult  Goal: Free from fall injury  7/2/2025 0039 by Edgardo Bass RN  Outcome: Progressing  7/1/2025 1616 by Mariia Thompson, RN  Outcome: Progressing

## 2025-07-02 NOTE — PLAN OF CARE
Problem: SLP Adult - Impaired Swallowing  Goal: By Discharge: Advance to least restrictive diet without signs or symptoms of aspiration for planned discharge setting.  See evaluation for individualized goals.  Description: Patient will:  1. Tolerate PO trials with 0 s/s overt distress in 4/5 trials  2. Utilize compensatory swallow strategies/maneuvers (decrease bite/sip, size/rate, alt. liq/sol) with min cues in 4/5 trials  3. Perform oral-motor/laryngeal exercises to increase oropharyngeal swallow function with min cues  4. Complete an objective swallow study (i.e., MBSS) to assess swallow integrity, r/o aspiration, and determine of safest LRD, min A as indicated/ordered by MD     Rec:     Regular solid diet with thin liquids: NO PO WHEN ON NIV  Assistance w/ po for safety; alternate small bites/single sips  Aspiration precautions  HOB >45 during po intake, remain >30 for 30-45 minutes after po   Small bites/sips; alternate liquid/solid with slow feeding rate   Oral care TID  Meds whole in puree  Outcome: Progressing  SPEECH LANGUAGE PATHOLOGY DYSPHAGIA TREATMENT    Patient: Eryn Howell (69 y.o. female)  Date: 7/2/2025  Diagnosis: Hyponatremia [E87.1]  COPD exacerbation (HCC) [J44.1]  Elevated d-dimer [R79.89]  Septic shock (HCC) [A41.9, R65.21]  History of left shoulder replacement [Z96.612]  Acute respiratory failure with hypoxia and hypercapnia (HCC) [J96.01, J96.02] COPD exacerbation (HCC)      Precautions: Standard, aspiration  PLOF: As per H&P      ASSESSMENT:  Pt seen today for a dysphagia management follow up. This writer was informed by RN that pt has been observed eating and drinking while wearing BiPAP that she independently dons throughout the day. Pt wearing BiPAP upon arrival. Spouse at bedside. Education provided to pt and spouse on the significantly increased risk of aspiration of food or liquid when on NIV and the importance of remaining strict NPO while on NIV; verbalized comprehension.  liquids;Eat/Feed slowly;Upright as possible for all oral intake;Remain upright for 30-45 minutes after meals;Small bites/sips;Assist feed  Recommended Form of Meds: Meds in puree  Therapeutic Interventions: Diet tolerance monitoring;Patient/Family education;Therapeutic PO trials with SLP  Patient Education: Educated pt on oral care, importance of NPO while on NIV, and safe swallow compensatory strategies  Patient Education Response: Verbalizes understanding;Needs reinforcement    Patient continues to benefit from skilled intervention to address the above impairments. Continue treatment per established plan of care.    Frequency/Duration: Patient will be followed by speech-language pathology 1-2 times per day/3-5 days per week to address goals.    Discharge Recommendations: D/C Recommendations: Ongoing speech therapy is recommended during this hospitalization     SUBJECTIVE:   Patient stated “I don't like to eat meat because it sometimes gets stuck in this part of my throat” gesturing to mid cervical region.    OBJECTIVE:   Daily Assessment:  Baseline Assessment  Temperature Spikes Noted: No  Respiratory Status: O2 via nasual cannula  O2 Device: Nasal cannula  Liters of Oxygen: 2 L  Communication Observation: Dysarthria  Follows Directions: Simple  Current Diet : NPO  Current Liquid Diet : Thin  Dentition: Dentures top, Dentures bottom  Patient Positioning: Upright in bed  Baseline Vocal Quality: Normal  Volitional Cough: Strong, Congested    Orientation:  Person, Place, and Situation    Dysphagia Treatment:  Oral Assessment:  Oral Motor   Labial: No impairment  Dentition: Upper & lower dentures  Oral Hygiene: Clean  Lingual: Incoordinated  Velum: Unable to visualize  Mandible: No impairment        P.O. Trials:   PO Trials  Neuromuscular Estim Used: No  Assessment Method(s): Observation, Palpation  Vocal Quality: No Impairment  Consistency Presented: Regular, Pureed, Thin, Ice Chips  How Presented:

## 2025-07-02 NOTE — PLAN OF CARE
Problem: Discharge Planning  Goal: Discharge to home or other facility with appropriate resources  7/2/2025 1350 by Mariia Thompson RN  Outcome: Progressing  Flowsheets (Taken 7/1/2025 0800)  Discharge to home or other facility with appropriate resources:   Arrange for needed discharge resources and transportation as appropriate   Identify barriers to discharge with patient and caregiver  7/2/2025 0039 by Edgardo Bass RN  Outcome: Progressing     Problem: Pain  Goal: Verbalizes/displays adequate comfort level or baseline comfort level  7/2/2025 1350 by Mariia Thompson RN  Outcome: Progressing  Flowsheets (Taken 7/2/2025 0800)  Verbalizes/displays adequate comfort level or baseline comfort level:   Encourage patient to monitor pain and request assistance   Administer analgesics based on type and severity of pain and evaluate response   Assess pain using appropriate pain scale  7/2/2025 0039 by Edgardo Bass RN  Outcome: Progressing     Problem: Safety - Adult  Goal: Free from fall injury  7/2/2025 1350 by Mariia Thompson RN  Outcome: Progressing  Flowsheets (Taken 6/30/2025 2058 by Aubrey Taylor RN)  Free From Fall Injury: Instruct family/caregiver on patient safety  7/2/2025 0039 by Edgardo Bass RN  Outcome: Progressing     Problem: ABCDS Injury Assessment  Goal: Absence of physical injury  7/2/2025 1350 by Mariia Thompson RN  Outcome: Progressing  Flowsheets (Taken 6/30/2025 2058 by Aubrey Taylor RN)  Absence of Physical Injury: Implement safety measures based on patient assessment  7/2/2025 0039 by Edgardo Bass RN  Outcome: Progressing     Problem: Skin/Tissue Integrity  Goal: Skin integrity remains intact  Description: 1.  Monitor for areas of redness and/or skin breakdown  2.  Assess vascular access sites hourly  3.  Every 4-6 hours minimum:  Change oxygen saturation probe site  4.  Every 4-6 hours:  If on nasal continuous positive airway pressure, respiratory

## 2025-07-03 ENCOUNTER — APPOINTMENT (OUTPATIENT)
Facility: HOSPITAL | Age: 70
End: 2025-07-03
Attending: INTERNAL MEDICINE
Payer: MEDICARE

## 2025-07-03 LAB
ECHO BSA: 1.95 M2
GLUCOSE BLD STRIP.AUTO-MCNC: 144 MG/DL (ref 70–110)
GLUCOSE BLD STRIP.AUTO-MCNC: 159 MG/DL (ref 70–110)
GLUCOSE BLD STRIP.AUTO-MCNC: 188 MG/DL (ref 70–110)
GLUCOSE BLD STRIP.AUTO-MCNC: 253 MG/DL (ref 70–110)

## 2025-07-03 PROCEDURE — 6370000000 HC RX 637 (ALT 250 FOR IP): Performed by: INTERNAL MEDICINE

## 2025-07-03 PROCEDURE — 2580000003 HC RX 258: Performed by: INTERNAL MEDICINE

## 2025-07-03 PROCEDURE — 99231 SBSQ HOSP IP/OBS SF/LOW 25: CPT

## 2025-07-03 PROCEDURE — 97530 THERAPEUTIC ACTIVITIES: CPT

## 2025-07-03 PROCEDURE — 6360000002 HC RX W HCPCS: Performed by: INTERNAL MEDICINE

## 2025-07-03 PROCEDURE — 6370000000 HC RX 637 (ALT 250 FOR IP): Performed by: HOSPITALIST

## 2025-07-03 PROCEDURE — 82962 GLUCOSE BLOOD TEST: CPT

## 2025-07-03 PROCEDURE — 2500000003 HC RX 250 WO HCPCS: Performed by: INTERNAL MEDICINE

## 2025-07-03 PROCEDURE — 1100000003 HC PRIVATE W/ TELEMETRY

## 2025-07-03 PROCEDURE — 99497 ADVNCD CARE PLAN 30 MIN: CPT

## 2025-07-03 PROCEDURE — 94640 AIRWAY INHALATION TREATMENT: CPT

## 2025-07-03 PROCEDURE — 93971 EXTREMITY STUDY: CPT

## 2025-07-03 PROCEDURE — 2700000000 HC OXYGEN THERAPY PER DAY

## 2025-07-03 PROCEDURE — 97116 GAIT TRAINING THERAPY: CPT

## 2025-07-03 PROCEDURE — 97535 SELF CARE MNGMENT TRAINING: CPT

## 2025-07-03 RX ORDER — PREDNISONE 10 MG/1
TABLET ORAL
Qty: 21 TABLET | Refills: 0 | Status: SHIPPED | OUTPATIENT
Start: 2025-07-03

## 2025-07-03 RX ORDER — PREDNISONE 10 MG/1
TABLET ORAL
Qty: 21 TABLET | Refills: 0 | Status: SHIPPED | OUTPATIENT
Start: 2025-07-03 | End: 2025-07-03

## 2025-07-03 RX ORDER — FLUCONAZOLE 100 MG/1
200 TABLET ORAL ONCE
Status: COMPLETED | OUTPATIENT
Start: 2025-07-03 | End: 2025-07-03

## 2025-07-03 RX ADMIN — BUDESONIDE 500 MCG: 0.5 INHALANT RESPIRATORY (INHALATION) at 06:43

## 2025-07-03 RX ADMIN — WATER 40 MG: 1 INJECTION INTRAMUSCULAR; INTRAVENOUS; SUBCUTANEOUS at 04:11

## 2025-07-03 RX ADMIN — GUAIFENESIN 600 MG: 600 TABLET, EXTENDED RELEASE ORAL at 09:37

## 2025-07-03 RX ADMIN — WATER 40 MG: 1 INJECTION INTRAMUSCULAR; INTRAVENOUS; SUBCUTANEOUS at 16:01

## 2025-07-03 RX ADMIN — HYDROCODONE POLISTIREX AND CHLORPHENIRAMINE POLISTIREX 5 ML: 10; 8 SUSPENSION, EXTENDED RELEASE ORAL at 09:39

## 2025-07-03 RX ADMIN — WATER 1000 MG: 1 INJECTION INTRAMUSCULAR; INTRAVENOUS; SUBCUTANEOUS at 18:19

## 2025-07-03 RX ADMIN — SODIUM CHLORIDE, PRESERVATIVE FREE 10 ML: 5 INJECTION INTRAVENOUS at 09:43

## 2025-07-03 RX ADMIN — Medication 1 TABLET: at 09:56

## 2025-07-03 RX ADMIN — SODIUM CHLORIDE, PRESERVATIVE FREE 10 ML: 5 INJECTION INTRAVENOUS at 22:31

## 2025-07-03 RX ADMIN — PANTOPRAZOLE SODIUM 40 MG: 40 TABLET, DELAYED RELEASE ORAL at 06:33

## 2025-07-03 RX ADMIN — CETIRIZINE HYDROCHLORIDE 10 MG: 10 TABLET, FILM COATED ORAL at 09:38

## 2025-07-03 RX ADMIN — ROSUVASTATIN CALCIUM 10 MG: 10 TABLET, FILM COATED ORAL at 20:57

## 2025-07-03 RX ADMIN — AZITHROMYCIN MONOHYDRATE 500 MG: 500 INJECTION, POWDER, LYOPHILIZED, FOR SOLUTION INTRAVENOUS at 20:55

## 2025-07-03 RX ADMIN — CLONAZEPAM 1 MG: 0.5 TABLET ORAL at 20:57

## 2025-07-03 RX ADMIN — GUAIFENESIN 600 MG: 600 TABLET, EXTENDED RELEASE ORAL at 20:57

## 2025-07-03 RX ADMIN — CLONAZEPAM 1 MG: 0.5 TABLET ORAL at 12:30

## 2025-07-03 RX ADMIN — IPRATROPIUM BROMIDE 0.5 MG: 0.5 SOLUTION RESPIRATORY (INHALATION) at 06:43

## 2025-07-03 RX ADMIN — IMIPRAMINE HYDROCHLORIDE 75 MG: 25 TABLET ORAL at 20:56

## 2025-07-03 RX ADMIN — IPRATROPIUM BROMIDE 0.5 MG: 0.5 SOLUTION RESPIRATORY (INHALATION) at 15:31

## 2025-07-03 RX ADMIN — INSULIN LISPRO 4 UNITS: 100 INJECTION, SOLUTION INTRAVENOUS; SUBCUTANEOUS at 18:21

## 2025-07-03 RX ADMIN — BUDESONIDE 500 MCG: 0.5 INHALANT RESPIRATORY (INHALATION) at 20:17

## 2025-07-03 RX ADMIN — IPRATROPIUM BROMIDE 0.5 MG: 0.5 SOLUTION RESPIRATORY (INHALATION) at 10:51

## 2025-07-03 RX ADMIN — HYDROCODONE BITARTRATE AND ACETAMINOPHEN 1 TABLET: 7.5; 325 TABLET ORAL at 00:35

## 2025-07-03 RX ADMIN — FLUCONAZOLE 200 MG: 100 TABLET ORAL at 18:19

## 2025-07-03 RX ADMIN — HYDROCODONE BITARTRATE AND ACETAMINOPHEN 1 TABLET: 7.5; 325 TABLET ORAL at 16:01

## 2025-07-03 RX ADMIN — ARFORMOTEROL TARTRATE 15 MCG: 15 SOLUTION RESPIRATORY (INHALATION) at 20:17

## 2025-07-03 RX ADMIN — ENOXAPARIN SODIUM 40 MG: 100 INJECTION SUBCUTANEOUS at 09:38

## 2025-07-03 RX ADMIN — CLONAZEPAM 1 MG: 0.5 TABLET ORAL at 04:11

## 2025-07-03 RX ADMIN — IPRATROPIUM BROMIDE 0.5 MG: 0.5 SOLUTION RESPIRATORY (INHALATION) at 20:17

## 2025-07-03 RX ADMIN — HYDROCODONE POLISTIREX AND CHLORPHENIRAMINE POLISTIREX 5 ML: 10; 8 SUSPENSION, EXTENDED RELEASE ORAL at 21:12

## 2025-07-03 RX ADMIN — ARFORMOTEROL TARTRATE 15 MCG: 15 SOLUTION RESPIRATORY (INHALATION) at 06:43

## 2025-07-03 RX ADMIN — INSULIN LISPRO 8 UNITS: 100 INJECTION, SOLUTION INTRAVENOUS; SUBCUTANEOUS at 07:25

## 2025-07-03 RX ADMIN — INSULIN GLARGINE 20 UNITS: 100 INJECTION, SOLUTION SUBCUTANEOUS at 09:39

## 2025-07-03 RX ADMIN — BUPROPION HYDROCHLORIDE 300 MG: 150 TABLET, EXTENDED RELEASE ORAL at 09:37

## 2025-07-03 ASSESSMENT — PAIN DESCRIPTION - DESCRIPTORS
DESCRIPTORS: SHARP;SHOOTING
DESCRIPTORS: ACHING;SHARP

## 2025-07-03 ASSESSMENT — PAIN DESCRIPTION - ORIENTATION
ORIENTATION: LEFT
ORIENTATION: LEFT

## 2025-07-03 ASSESSMENT — PAIN SCALES - WONG BAKER: WONGBAKER_NUMERICALRESPONSE: HURTS A LITTLE BIT

## 2025-07-03 ASSESSMENT — PAIN - FUNCTIONAL ASSESSMENT: PAIN_FUNCTIONAL_ASSESSMENT: ACTIVITIES ARE NOT PREVENTED

## 2025-07-03 ASSESSMENT — PAIN DESCRIPTION - LOCATION
LOCATION: SHOULDER
LOCATION: SHOULDER

## 2025-07-03 ASSESSMENT — PAIN SCALES - GENERAL
PAINLEVEL_OUTOF10: 3
PAINLEVEL_OUTOF10: 8
PAINLEVEL_OUTOF10: 6
PAINLEVEL_OUTOF10: 10
PAINLEVEL_OUTOF10: 0

## 2025-07-03 NOTE — PROGRESS NOTES
[facility]  Pulmonary, Critical Care, and Sleep Medicine    Name: Eryn Howell MRN: [id number]   : 1955 Hospital: [facility]    Date: 7/3/2025  Room: [address] [place] [address] Note                                              Consult requesting physician: Dr. Thompson  Reason for Consult: Assisting in ICU care for acute on chronic hypoxic hypercapnic respiratory failure    IMPRESSION:       Acute on chronic hypoxic hypercapnic respiratory failure  COPD exacerbation (HCC)  Leukocytosis  Elevated D-dimer  Hyponatremia  Anemia  PTSD (post-traumatic stress disorder)  Anxiety  Recent left TSA at [facility] on 2025  Pulmonary nodules    Active Hospital Problems    Diagnosis Date Noted    Lung nodule [R91.1] 2025    Status post surgery [Z98.890] 2025    Hyponatremia [E87.1] 2025    COPD exacerbation (HCC) [J44.1] 2025    Anxiety [F41.9] 2024    Acute on chronic respiratory failure with hypoxia and hypercapnia (HCC) [J96.21, J96.22] 2017    PTSD (post-traumatic stress disorder) [F43.10] 2017       Code status: Full Code      RECOMMENDATIONS:     Respiratory: Presented with worsening dyspnea, hypoxia associated with wheezing, mental status changes requiring continuous BiPAP support, multiple neb treatments, systemic steroids and admission to ICU. Protecting airway.   Follows with Dr. Pagan in our office for Gold class IV COPD, on home O2 and NIV    CXR 2025: No acute pulmonary findings.  Severe emphysema.    Hypercapnic respiratory failure due to COPD/emphysema.  ABG had significantly improved on BiPAP 15/5 CWP with 2 LPM O2 NC.  When she used her home iVAPS last night; worsening ABG with decreased pH and increased pCO2; patient was placed back on hospital BiPAP 15/5 CWP with 2 LPM O2.  iVAP with BiPAP settings were changed to PEEP 5, PS 10-25, backup rate 12, tidal volume 6 to 10 mL/kg; with 2 LPM O2.  Repeat ABG on this showed  nasopharyngeal specimens.                    Images report reviewed by me:  XR CHEST PORTABLE  Result Date: 7/1/2025  EXAM:  XR CHEST PORTABLE INDICATION: short of breath/cough COMPARISON: 6/28/2025 TECHNIQUE: 0944 hours portable chest AP view FINDINGS: The cardiac silhouette is within normal limits. Lungs are clear of acute process. There are severe changes of emphysema. Left IJ catheter has been removed. Post bilateral shoulder replacements. Skin staples project over the left shoulder.     1. Lungs are clear of an acute process. There are severe changes of emphysema. Electronically signed by Nba Gardiner    Vascular duplex lower extremity venous bilateral  Result Date: 6/30/2025    No evidence of deep vein thrombosis in the right lower extremity.   No evidence of deep vein thrombosis in the left lower extremity.     Echo (TTE) complete (PRN contrast/bubble/strain/3D)  Result Date: 6/29/2025    Left Ventricle: Normal left ventricular systolic function with a visually estimated EF of 55 - 60%. Left ventricle size is normal. Normal wall thickness. No regional wall motion abnormalities identified. Decreased sensitivity due to off axis imaging apical views. Grade I diastolic dysfunction present with normal LV EF. E/E' Ratio (Averaged) is 7.75.   Right Ventricle: Right ventricle is mildly dilated. Normal systolic function. TAPSE is 1.9 cm.   Tricuspid Valve: Mild regurgitation. PASP is 47 mmHg.   IVC/SVC: IVC diameter is dilated and decreases less than 50% during inspiration; therefore the estimated right atrial pressure is elevated (~15 mmHg).   Image quality is adequate.   Technical qualifier - other: Low cardiac window's. Done supine d/t recent shoulder surgery. No contrast was given.     CTA CHEST W WO CONTRAST  Result Date: 6/29/2025  EXAM:  CTA CHEST W WO CONTRAST INDICATION: Tachycardia with hypoxic respiratory failure. History of COPD. Left shoulder replacement 1 week ago COMPARISON: Prior day and 7/2/2024

## 2025-07-03 NOTE — ACP (ADVANCE CARE PLANNING)
Palliative Medicine    CODE STATUS: FULL CODE    AMD Status: completed today naming her , Nikolai, and daughter, Linda as hr MPOAs (see below)     1105 Seen today in room 348 along with Ana Toledo NP.  Lying supine with head of bed elevated. , Nikolai, at bedside.  Awake, alert. Oriented x 4 Respirations unlabored at rest. Oxygen on at 2 lpm per NC. Voiced frustration about difficulties with BiPAP machine and the pressures being too high.   Able to speak in full  sentences. Pain --had left arm swelling and pain overnight. Duplex ordered--results pending.      Patient completed an advance medical directive today. Copy scanned to the electronic medical record. Copies given to the patient.    Primary Decision Maker (Health Care Agent): Nikolai Howell  Relationship to patient:   Phone number: [phone]  [x] Named in a NEWLY COMPLETED scanned document     Secondary Decision Maker (First Alternate Health Care Agent): Linda Cat  Relationship to patient: daughter  Phone number:  [phone]  [x] Named in a NEWLY COMPLETED scanned document     ACP documents you currently have include:  [x] Advance Directive or Living Will  [] Durable Do Not Resuscitate  [] Physician Orders for Scope of Treatment (POST)  [] Medical Power of   [] Other    Disposition plan: anticipate home with family support    After meeting with patient and her , the goals of care have been defined.  Code status remains: FULL CODE  AMD status: completed today naming her  and daughter as her MPOAs Will sign off but remain available for reconsult as needed/if appropriate.     Thank you for the Palliative Medicine consult and allowing us to participate in the care of Eryn Howell      Rukhsana Solorzano RN, MSN  Palliative Medicine   [phone]

## 2025-07-03 NOTE — PROGRESS NOTES
0968 - Assessment completed as per flowsheet. Patient alert and oriented x4 with periods of confusion. Continues with home bipap and 3L of O2 when not using bipap. Continues with strong, dry cough. PRN Tussionex administered. Patient resting in bed with call light in reach.  at bedside.    1251 - Off floor for duplex of LUE.     1601 - PRN Norco administered for 10/10 shoulder pain and 6/10 pain to various other body parts.     1755 - On call MD, Dr. Lawrence, notified of patient c/o sensitive, burning tongue and history of thrush. One time dose of Diflucan ordered.     1820 - IV noted leaking at site and removed.    2000 - New 22 gauge IV started by another RN to Right posterior hand.

## 2025-07-03 NOTE — ACP (ADVANCE CARE PLANNING)
Advance Care Planning     Palliative Team Advance Care Planning (ACP) Conversation    Date of Conversation: 07/03/25    Individuals present for the conversation: Patient with decision making capacity and Spouse Nikolai Howell     ACP documents on file prior to discussion:  -None    Previously completed document/s not on file: Patient / participant reports that there are no previously executed ACP documents.    Healthcare Decision Maker:    Primary Decision Maker: Nikolai Howell - Spouse - [phone]    Secondary Decision Maker: Linda Cat - Child - [phone]     Conversation Summary:  Patient is alert and fully oriented. Her spouse Nikolai is present at bedside. They describe two previous experiences during which patient required intubation for about a week each time (~2017 and July 2024). They both agree patient is accepting of all efforts at resuscitation if necessary, including repeat intubation.    Patient says she has a will at home but she has not yet completed the advance medical directive portion. She verbalized understanding of the scope and purpose of an advance medical directive. We offered to help her complete one on this admission. Patient and spouse reviewed and completed together, naming spouse as primary and daughter as secondary. Palliative Medicine team served as witnesses, made copies, and scanned into Epic.    Resuscitation Status:   Code Status: Full Code     Documentation Completed:  -Power of  for Healthcare    I spent 16 minutes with the patient and/or surrogate decision maker discussing the patient's wishes and goals.      NATALIE De La Torre - CNP

## 2025-07-03 NOTE — PROGRESS NOTES
Occupational Therapy Goals:  Initiated 7/3/2025 to be met within 7-10 days.  Short Term Goals  Time Frame for Short Term Goals: 7 days  Short Term Goal 1: Patient will complete UBD with SBA  Short Term Goal 2: Patient will complete LBD with min A  Short Term Goal 3: Patietn will complete bathing with min A  Short Term Goal 4: Patient will complete toilet transfer with CGA  Short Term Goal 5: Patient will complete grooming with SBA      OCCUPATIONAL THERAPY TREATMENT    Patient: Eryn Howell (69 y.o. female)  Date: 7/3/2025  Diagnosis: Hyponatremia [E87.1]  COPD exacerbation (HCC) [J44.1]  Elevated d-dimer [R79.89]  Septic shock ([facility]) [A41.9, R65.21]  History of left shoulder replacement [Z96.612]  Acute respiratory failure with hypoxia and hypercapnia (HCC) [J96.01, J96.02] COPD exacerbation (HCC)      Precautions: Fall Risk,  ,  ,  ,  ,  ,  ,   Required Braces or Orthoses?: Yes     Left Upper Extremity Brace/Splint: Sling  PLOF: See evaluation    Chart, occupational therapy assessment, plan of care, and goals were reviewed.  ASSESSMENT:  Patient presented supine in bed with cardiac monitor and Bipap. Patient with visitor present for entire session.  Patient completed lower body dressing total A for socks, able to thread L foot into brief in sitting in figure four position and toileting on BSC. Patient having formed bowel movement. Patient completed functional mobility with HHA, and trial of renee walker. Patient with posterior lean during backwards steps. Patient returned to bed at end of session, c/o irritation in L axilla. No visible issue, washcloth placed for sweat absorption. Due to deficits in balance, coordination, strength, endurance, ROM, safety awareness, and pain tolerance/management patient has decreased independence with ADLs and functional mobility, and would benefit from continued occupational therapy services at discharge.     Progression toward goals:  [x]          Improving appropriately and

## 2025-07-03 NOTE — PROGRESS NOTES
0140: Patient took of her Bipap at around 0140, refused for most part of the night, used the nasal canula instead, and she only accepted bipap back on around 0610    0430:patient has her left hand more swollen than compared to the beginning of the shift, its tender and looks more like fluid accumulation only in the left hand, she has left arm restriction for recent shoulder replacement surgery, hospitalist notified, new orders placed.
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
## 2025-07-03 NOTE — CARE COORDINATION
Patient discussed in IDRs, patient is not medically ready for DC to home today. The patient plan will be to DC to home with spouse. The patient states she does not want to go to SNF. Will continue to follow.

## 2025-07-03 NOTE — PROGRESS NOTES
Hospitalist Progress Note      Patient: Eryn Howell MRN: [id number]  CSN: [id number]    YOB: 1955  Age: 69 y.o.  Sex: female    DOA: 6/28/2025 LOS:  LOS: 5 days      PCP: Nish Sims DO       Summary:      A 69 years old female who is admitted for COPD exacerbation and acute on chronic hypercapnic respiratory failure. She went to the ED with 2 days history of having shortness of breath, wheezing, and confusion that has gotten worse over the last day.  She was admitted as acute on chronic respiratory failure and COPD exacerbation  Was transferred out of icu, home bipap adjusted during her stay      Subjective: the pressure from the machine so high and I could not tolerate last night and no one will touch it and adjust it, my arm is swelling (post surgical shoulder one.   CM reached the company for home bipap issue  -like need adjust again vs retry new setting tonight again   Will have pvl ordered already per nocturnist.      was  at the bedside      Assessment/Plan:    Principal Problem:    COPD exacerbation (HCC)  Active Problems:    PTSD (post-traumatic stress disorder)    Acute on chronic respiratory failure with hypoxia and hypercapnia (HCC)    Anxiety    Status post surgery    Hyponatremia    Lung nodule  Resolved Problems:    * No resolved hospital problems. *       Acute on chronic respiratory failure with hypoxia and hypercapnia  On BiPAP- continue adjust as needed   CTA chest no PE,   Continue treat COPD exacerbation  Intensivist on board  Home bipap adjusted    Respiratory viral panel negative      Acute COPD exacerbation, severe emphysema  Improving  On Solu-Medrol 60 mg every 6 hours-down to 40 Q12 hr   Breathing treatment  Empiric antibiotics  Insulin ssi for glucose control   Pulm f/u       Lung nodule  Need to follow-up with imaging in 3 to 6 months-discussed with daughter and  on 6/30    Hypotension  Resolved per IV fluid  Levophed hold for

## 2025-07-03 NOTE — PROGRESS NOTES
PHYSICAL THERAPY TREATMENT    Patient: Eryn Howell (69 y.o. female)  Date: 7/3/2025  Diagnosis: Hyponatremia [E87.1]  COPD exacerbation (HCC) [J44.1]  Elevated d-dimer [R79.89]  Septic shock (HCC) [A41.9, R65.21]  History of left shoulder replacement [Z96.612]  Acute respiratory failure with hypoxia and hypercapnia (HCC) [J96.01, J96.02] COPD exacerbation (HCC)      Precautions: Fall Risk,  ,  ,  ,  ,  ,  ,        ASSESSMENT:  Pt received resting in bed with  at bedside and Bi-PAP donned. Agrees to session with L arm out of sling for natural GH distraction. Improvement observed in cognition, Resp awareness and activity initiation. Able to transition to EOB with Min A and pt has good static sitting posture today.  provided Jah-walk for adjustment and training. Standing transfer requires cue for R UE placement. Standing trials are much more stable and pt is able to shift wt onto Jah- walker. Coordination of sequence and AD placement were difficult and pt was switched to HHA. BSC transfer and two F/b amb trials to wall completed. More vocal about wanting to make progress and requesting instructions. Will benefit from continued IPPT, to improve safety with home transfers.       Progression toward goals: Fair   []      Improving appropriately and progressing toward goals  [x]      Improving slowly and progressing toward goals  []      Not making progress toward goals and plan of care will be adjusted     PLAN:  Patient continues to benefit from skilled intervention to address the above impairments.  Continue treatment per established plan of care.    Further Equipment Recommendations for Discharge: bedside commode and gait belt    [facility]:   AM-PAC Inpatient Mobility without Stair Climbing Raw Score : 13    Current research shows that an AM-PAC score of 17 (13 without stairs) or less is not associated with a discharge to the patient's home setting. Based on an AM-PAC score and their current

## 2025-07-03 NOTE — CARE COORDINATION
07/03/25 0823   IMM Letter   IMM Letter given to Patient/Family/Significant other/Guardian/POA/by: maximiliano davis   IMM Letter date given: 07/03/25   IMM Letter time given: 0823

## 2025-07-04 VITALS
OXYGEN SATURATION: 94 % | RESPIRATION RATE: 20 BRPM | SYSTOLIC BLOOD PRESSURE: 146 MMHG | DIASTOLIC BLOOD PRESSURE: 90 MMHG | TEMPERATURE: 98.4 F | WEIGHT: 174 LBS | HEART RATE: 89 BPM | HEIGHT: 68 IN | BODY MASS INDEX: 26.37 KG/M2

## 2025-07-04 LAB
BACTERIA SPEC CULT: NORMAL
BACTERIA SPEC CULT: NORMAL
GLUCOSE BLD STRIP.AUTO-MCNC: 139 MG/DL (ref 70–110)
SERVICE CMNT-IMP: NORMAL
SERVICE CMNT-IMP: NORMAL

## 2025-07-04 PROCEDURE — 6370000000 HC RX 637 (ALT 250 FOR IP): Performed by: HOSPITALIST

## 2025-07-04 PROCEDURE — 6370000000 HC RX 637 (ALT 250 FOR IP): Performed by: INTERNAL MEDICINE

## 2025-07-04 PROCEDURE — 97116 GAIT TRAINING THERAPY: CPT

## 2025-07-04 PROCEDURE — 97530 THERAPEUTIC ACTIVITIES: CPT

## 2025-07-04 PROCEDURE — 6360000002 HC RX W HCPCS: Performed by: INTERNAL MEDICINE

## 2025-07-04 PROCEDURE — 94640 AIRWAY INHALATION TREATMENT: CPT

## 2025-07-04 PROCEDURE — 82962 GLUCOSE BLOOD TEST: CPT

## 2025-07-04 PROCEDURE — 2500000003 HC RX 250 WO HCPCS: Performed by: INTERNAL MEDICINE

## 2025-07-04 PROCEDURE — 97535 SELF CARE MNGMENT TRAINING: CPT

## 2025-07-04 PROCEDURE — 2700000000 HC OXYGEN THERAPY PER DAY

## 2025-07-04 RX ORDER — HYDROCODONE POLISTIREX AND CHLORPHENIRAMINE POLISTIREX 10; 8 MG/5ML; MG/5ML
5 SUSPENSION, EXTENDED RELEASE ORAL EVERY 12 HOURS PRN
Qty: 30 ML | Refills: 0 | Status: SHIPPED | OUTPATIENT
Start: 2025-07-04 | End: 2025-07-07

## 2025-07-04 RX ORDER — HYDROCODONE POLISTIREX AND CHLORPHENIRAMINE POLISTIREX 10; 8 MG/5ML; MG/5ML
5 SUSPENSION, EXTENDED RELEASE ORAL EVERY 12 HOURS PRN
Qty: 10 ML | Refills: 0 | Status: SHIPPED | OUTPATIENT
Start: 2025-07-04 | End: 2025-07-04

## 2025-07-04 RX ORDER — IPRATROPIUM BROMIDE AND ALBUTEROL SULFATE 2.5; .5 MG/3ML; MG/3ML
3 SOLUTION RESPIRATORY (INHALATION) EVERY 4 HOURS PRN
Qty: 360 ML | Refills: 0 | Status: SHIPPED | OUTPATIENT
Start: 2025-07-04

## 2025-07-04 RX ORDER — IPRATROPIUM BROMIDE AND ALBUTEROL SULFATE 2.5; .5 MG/3ML; MG/3ML
3 SOLUTION RESPIRATORY (INHALATION)
Qty: 360 ML | Refills: 0
Start: 2025-07-04

## 2025-07-04 RX ADMIN — IPRATROPIUM BROMIDE 0.5 MG: 0.5 SOLUTION RESPIRATORY (INHALATION) at 12:49

## 2025-07-04 RX ADMIN — PANTOPRAZOLE SODIUM 40 MG: 40 TABLET, DELAYED RELEASE ORAL at 07:15

## 2025-07-04 RX ADMIN — Medication 1 TABLET: at 09:46

## 2025-07-04 RX ADMIN — HYDROCODONE BITARTRATE AND ACETAMINOPHEN 1 TABLET: 7.5; 325 TABLET ORAL at 09:47

## 2025-07-04 RX ADMIN — ARFORMOTEROL TARTRATE 15 MCG: 15 SOLUTION RESPIRATORY (INHALATION) at 08:21

## 2025-07-04 RX ADMIN — BUPROPION HYDROCHLORIDE 300 MG: 150 TABLET, EXTENDED RELEASE ORAL at 09:46

## 2025-07-04 RX ADMIN — HYDROCODONE POLISTIREX AND CHLORPHENIRAMINE POLISTIREX 5 ML: 10; 8 SUSPENSION, EXTENDED RELEASE ORAL at 08:15

## 2025-07-04 RX ADMIN — INSULIN GLARGINE 20 UNITS: 100 INJECTION, SOLUTION SUBCUTANEOUS at 09:47

## 2025-07-04 RX ADMIN — ENOXAPARIN SODIUM 40 MG: 100 INJECTION SUBCUTANEOUS at 09:47

## 2025-07-04 RX ADMIN — CLONAZEPAM 1 MG: 0.5 TABLET ORAL at 07:10

## 2025-07-04 RX ADMIN — CLONAZEPAM 1 MG: 0.5 TABLET ORAL at 13:03

## 2025-07-04 RX ADMIN — BUDESONIDE 500 MCG: 0.5 INHALANT RESPIRATORY (INHALATION) at 08:21

## 2025-07-04 RX ADMIN — GUAIFENESIN 600 MG: 600 TABLET, EXTENDED RELEASE ORAL at 09:47

## 2025-07-04 RX ADMIN — CETIRIZINE HYDROCHLORIDE 10 MG: 10 TABLET, FILM COATED ORAL at 09:47

## 2025-07-04 RX ADMIN — IPRATROPIUM BROMIDE 0.5 MG: 0.5 SOLUTION RESPIRATORY (INHALATION) at 08:21

## 2025-07-04 RX ADMIN — WATER 40 MG: 1 INJECTION INTRAMUSCULAR; INTRAVENOUS; SUBCUTANEOUS at 04:17

## 2025-07-04 ASSESSMENT — PAIN SCALES - GENERAL: PAINLEVEL_OUTOF10: 10

## 2025-07-04 NOTE — PROGRESS NOTES
Pt discharged to home with , patient and  verbalized understanding of all discharge instructions.

## 2025-07-04 NOTE — PROGRESS NOTES
DC Plan: home with home health  Care manager notified that patient will need home health services; spoke via phone to spouse who said they have had [facility] services in the past and would like them but that when they had tried to get them set up in the past there was an issue - he was also OK with [facility]; this care manager has not been able to find [facility] in current discharge process system so have sent in old system; [facility] referral has been sent in new system.

## 2025-07-04 NOTE — PLAN OF CARE
Problem: Discharge Planning  Goal: Discharge to home or other facility with appropriate resources  Outcome: Progressing     Problem: Pain  Goal: Verbalizes/displays adequate comfort level or baseline comfort level  Outcome: Progressing     Problem: Safety - Adult  Goal: Free from fall injury  Outcome: Progressing     Problem: ABCDS Injury Assessment  Goal: Absence of physical injury  Outcome: Progressing     Problem: Skin/Tissue Integrity  Goal: Skin integrity remains intact  Description: 1.  Monitor for areas of redness and/or skin breakdown  2.  Assess vascular access sites hourly  3.  Every 4-6 hours minimum:  Change oxygen saturation probe site  4.  Every 4-6 hours:  If on nasal continuous positive airway pressure, respiratory therapy assess nares and determine need for appliance change or resting period  Outcome: Progressing  Flowsheets (Taken 7/3/2025 1656)  Skin Integrity Remains Intact:   Monitor for areas of redness and/or skin breakdown   Pressure redistribution bed/mattress (bed type)     Problem: Respiratory - Adult  Goal: Achieves optimal ventilation and oxygenation  Outcome: Progressing     Problem: Cardiovascular - Adult  Goal: Maintains optimal cardiac output and hemodynamic stability  Outcome: Progressing  Goal: Absence of cardiac dysrhythmias or at baseline  Outcome: Progressing

## 2025-07-04 NOTE — PROGRESS NOTES
PHYSICAL THERAPY TREATMENT    Patient: Eryn Howell (69 y.o. female)  Date: 7/4/2025  Diagnosis: Hyponatremia [E87.1]  COPD exacerbation (HCC) [J44.1]  Elevated d-dimer [R79.89]  Septic shock (HCC) [A41.9, R65.21]  History of left shoulder replacement [Z96.612]  Acute respiratory failure with hypoxia and hypercapnia (HCC) [J96.01, J96.02] COPD exacerbation (HCC)      Precautions: Fall Risk,  ,  ,  ,  ,  ,  ,        ASSESSMENT:      Progression toward goals: Fair   [x]      Improving appropriately and progressing toward goals  []      Improving slowly and progressing toward goals  []      Not making progress toward goals and plan of care will be adjusted     PLAN:  Patient continues to benefit from skilled intervention to address the above impairments.  Continue treatment per established plan of care.    Further Equipment Recommendations for Discharge: gait belt and Jah-walker    [facility]:   AM-PAC Inpatient Mobility without Stair Climbing Raw Score : 13    Current research shows that an AM-PAC score of 17 (13 without stairs) or less is not associated with a discharge to the patient's home setting. Based on an AM-PAC score and their current functional mobility deficits, it is recommended that the patient have 5-7 sessions per week of Physical Therapy at d/c to increase the patient's independence.  Currently, this patient demonstrates the potential endurance, and/or tolerance for 3 hours of therapy each day at d/c.    Current research shows that an AM-PAC score of 17 (13 without stairs) or less is not associated with a discharge to the patient's home setting. Based on an AM-PAC score and their current functional mobility deficits, it is recommended that the patient have 3-5 sessions per week of Physical Therapy at d/c to increase the patient's independence.     Current research shows that an AM-PAC score of 18 (14 without stairs) or greater is associated with a discharge to the patient's home setting. Based on

## 2025-07-04 NOTE — PROGRESS NOTES
Occupational Therapy Goals:  Initiated 7/4/2025 to be met within 7-10 days.  Short Term Goals  Time Frame for Short Term Goals: 7 days  Short Term Goal 1: Patient will complete UBD with SBA  Short Term Goal 2: Patient will complete LBD with min A  Short Term Goal 3: Patietn will complete bathing with min A  Short Term Goal 4: Patient will complete toilet transfer with CGA  Short Term Goal 5: Patient will complete grooming with SBA      OCCUPATIONAL THERAPY TREATMENT    Patient: Eryn Howell (69 y.o. female)  Date: 7/4/2025  Diagnosis: Hyponatremia [E87.1]  COPD exacerbation (HCC) [J44.1]  Elevated d-dimer [R79.89]  Septic shock (HCC) [A41.9, R65.21]  History of left shoulder replacement [Z96.612]  Acute respiratory failure with hypoxia and hypercapnia (HCC) [J96.01, J96.02] COPD exacerbation (HCC)      Precautions: Fall Risk,  ,  ,  ,  ,  ,  ,   Required Braces or Orthoses?: Yes     Left Upper Extremity Brace/Splint: Sling  PLOF: See evaluation    Chart, occupational therapy assessment, plan of care, and goals were reviewed.  ASSESSMENT:  Patient presented supine in bed with [place], brief, and cardiac monitor. Patient with visitor present for part of session.  Patient completed hair washing and combing with total A in sitting at EOB, bathing with washcloth, assist to wring out cloth, assist for armpits, back and under breasts, total A to don deodorant from . Patient completed , upper body dressing with ma A, and lower body dressing with assist. Patient transferred to recliner d/t bed being wet. Patient left in recliner with clean brief and paper shorts on. Patient  present. Patient needing additional assist and time for all ADLs and mobility at this time. Continues to be anxious and benefits from cues for breathing.  Due to deficits in balance, coordination, strength, endurance, cognition, sequencing, safety awareness, and pain tolerance/management patient has decreased independence with ADLs and  Yes  Supine to Sit: Partial/Moderate assistance   Transfers:  Transfer Training  Transfer Training: Yes  Sit to Stand: Minimal assistance  Stand to Sit: Minimal assistance  Bed to Chair: Minimal assistance    Balance:  Balance  Sitting: With support;Impaired  Sitting - Static: Good (unsupported)  Sitting - Dynamic: Fair (occasional)  Standing: Impaired;With support  Standing - Static: Fair  Standing - Dynamic: Fair    ADL Intervention:        Grooming: Dependent/Total (for hair washing via shampoo cap and hair combing in sitting at EOB)  UE Bathing: Maximum assistance  LE Bathing: Maximum assistance  UE Dressing: Maximum assistance  LE Dressing:  (donned gripepr socks using figure four positioning with CGA, threaded shorts with min A, total A to manage clothing to/from waist in standing)       Pain:  Pain level pre-treatment: 4/10   Pain level post-treatment: 4/10  Pain Intervention(s): Rest, Ice, Repositioning   Response to intervention: Nurse notified    Activity Tolerance:    Activity Tolerance: Patient tolerated evaluation without incident  Please refer to the flowsheet for vital signs taken during this treatment.  After treatment:   Safety Devices  Type of Devices: Left in chair, Call light within reach ( present)     COMMUNICATION/EDUCATION:   Patient Education  Education Given To: Patient  Education Provided: Role of Therapy;ADL Adaptive Strategies;Plan of Care;Transfer Training;Family Education;Mobility Training  Education Method: Verbal  Barriers to Learning: None  Education Outcome: Verbalized understanding;Demonstrated understanding      Thank you for this referral.  TANIA Corrales, OTR/L  Minutes: 59

## 2025-07-04 NOTE — DISCHARGE SUMMARY
DISCHARGE SUMMARY  [facility]      Patient: Eryn Howell               Sex: female          DOA: 6/28/2025    YOB: 1955      Age:  69 y.o.        LOS:  LOS: 6 days                Admit Date: 6/28/2025  Discharge Date: 7/4/2025    Admission Diagnoses: Hyponatremia [E87.1]  COPD exacerbation (HCC) [J44.1]  Elevated d-dimer [R79.89]  Septic shock (HCC) [A41.9, R65.21]  History of left shoulder replacement [Z96.612]  Acute respiratory failure with hypoxia and hypercapnia (HCC) [J96.01, J96.02]    Discharge Diagnoses:    Hospital Problems           Last Modified POA    * (Principal) COPD exacerbation (HCC) 6/28/2025 Yes    PTSD (post-traumatic stress disorder) 6/29/2025 Yes    Acute on chronic respiratory failure with hypoxia and hypercapnia (HCC) 6/29/2025 Yes    Anxiety 6/29/2025 Yes    Status post surgery 6/29/2025 Yes    Hyponatremia 6/29/2025 Yes    Lung nodule 6/30/2025 Yes        Discharge Condition: Good      HOSPITAL COURSE:                        A 69 years old female who is admitted for COPD exacerbation and acute on chronic hypercapnic respiratory failure. She went to the ED with 2 days history of having shortness of breath, wheezing, and confusion that has gotten worse over the last day.  She was admitted as acute on chronic respiratory failure and COPD exacerbation  Was transferred out of icu, home bipap adjusted during her stay        Acute on chronic respiratory failure with hypoxia and hypercapnia  On BiPAP- continue adjust as needed   CTA chest no PE,   Continue treat COPD exacerbation  Intensivist on board  Home bipap adjusted    Respiratory viral panel negative      Acute COPD exacerbation, severe emphysema  Improving  On Solu-Medrol 60 mg every 6 hours-down to 40 Q12 hr   Breathing treatment  Empiric antibiotics  Insulin ssi for glucose control   Pulm f/u         Lung nodule  Need to follow-up with imaging in 3 to 6 months-discussed with daughter and  on  Reverse Transcriptase Polymerase Chain Reaction (RT-PCR) based in vitro diagnostic test intended for the qualitative detection of nucleic acids from SARS-CoV-2, Influenza A, and Influenza B in nasopharyngeal specimens.               No results found for the last 90 days.     Labs: Results:       Chemistry Recent Labs     07/02/25  0504      K 4.4      CO2 26   BUN 32*   GLOB 3.2      CBC w/Diff Recent Labs     07/02/25  0504   WBC 10.9   RBC 3.35*   HGB 9.8*   HCT 29.6*         Cardiac Enzymes No results for input(s): \"CPK\" in the last 72 hours.    Invalid input(s): \"CKRMB\", \"CKND1\", \"TROIP\", \"TENISHA\"   Coagulation No results for input(s): \"INR\", \"APTT\" in the last 72 hours.    Invalid input(s): \"PTP\"    Lipid Panel No results found for: \"CHOL\", \"CHOLPOCT\", \"CHLST\", \"CHOLV\", \"110177\", \"HDL\", \"HDLC\", \"LDL\", \"LDLC\", \"VLDLC\", \"VLDL\"   BNP Invalid input(s): \"BNPP\"   Liver Enzymes No results for input(s): \"TP\" in the last 72 hours.    Invalid input(s): \"ALB\", \"TBIL\", \"AP\", \"SGOT\", \"GPT\", \"DBIL\"   Thyroid Studies No results found for: \"T4\", \"T3RU\", \"TSH\"         Significant Diagnostic Studies:    Vascular duplex upper extremity venous left  Result Date: 7/3/2025  EXAM:  VAS DUP UPPER EXTREMITY VENOUS LEFT INDICATION:  Edema left forearm. COMPARISON: None. TECHNIQUE: Ultrasonography of left upper extremity was performed from the jugular vein to the forearm using grayscale, B-mode, with color and spectral Doppler techniques. Pulsed-wave Doppler was also performed. FINDINGS: Left internal jugular, subclavian, axillary, brachial, radial and ulnar veins are compressible with normal color-flow and wave forms and response to physiologic maneuvers including Valsalva and augmentation. The cephalic and basilic veins are not definitively visualized, precluding optimal evaluation. Included limited view of the left internal jugular vein is patent.     No deep venous thrombosis identified. Electronically signed by

## 2025-07-07 NOTE — PROGRESS NOTES
Post discharge note 7/7 1630:  patient referral for HH had been sent out to [facility] per spouse request; they declined today for out of network; [facility] reviewing and will contact care manager if can be accepted; contacted spouse and if [facility] cannot accept, will open referral out to all facilities.  1655: received call back from [facility]; they can accept and notified patient's spouse to expect a call either tonight or tomorrow to schedule a visit.

## 2025-07-07 NOTE — PROGRESS NOTES
[facility]  Pulmonary, Critical Care, and Sleep Medicine    Name: Eryn Howell MRN: [id number]   : 1955 Hospital: [facility]    Date: 2025  Room: [address] [place] [address] Note                                              Consult requesting physician: Dr. Thompson  Reason for Consult: Assisting in ICU care for acute on chronic hypoxic hypercapnic respiratory failure    IMPRESSION:       Acute on chronic hypoxic hypercapnic respiratory failure  COPD exacerbation (HCC)  Leukocytosis  Elevated D-dimer  Hyponatremia  Anemia  PTSD (post-traumatic stress disorder)  Anxiety  Recent left TSA at [facility] on 2025  Pulmonary nodules    Active Hospital Problems    Diagnosis Date Noted    Lung nodule [R91.1] 2025    Status post surgery [Z98.890] 2025    Hyponatremia [E87.1] 2025    COPD exacerbation (HCC) [J44.1] 2025    Anxiety [F41.9] 2024    Acute on chronic respiratory failure with hypoxia and hypercapnia (HCC) [J96.21, J96.22] 2017    PTSD (post-traumatic stress disorder) [F43.10] 2017       Code status: Prior      RECOMMENDATIONS:     Respiratory: Presented with worsening dyspnea, hypoxia associated with wheezing, mental status changes requiring continuous BiPAP support, multiple neb treatments, systemic steroids and admission to ICU. Protecting airway.   Follows with Dr. Pagan in our office for Gold class IV COPD, on home O2 and NIV    CXR 2025: No acute pulmonary findings.  Severe emphysema.    Hypercapnic respiratory failure due to COPD/emphysema.  ABG had significantly improved on BiPAP 15/5 CWP with 2 LPM O2 NC.  When she used her home iVAPS last night; worsening ABG with decreased pH and increased pCO2; patient was placed back on hospital BiPAP 15/5 CWP with 2 LPM O2.  iVAP with BiPAP settings were changed to PEEP 5, PS 10-25, backup rate 12, tidal volume 6 to 10 mL/kg; with 2 LPM O2.  Repeat ABG on this showed

## 2025-07-09 NOTE — PLAN OF CARE
Problem: Discharge Planning  Goal: Discharge to home or other facility with appropriate resources  6/30/2025 1305 by Kevin Lizarraga RN  Outcome: Progressing  Flowsheets (Taken 6/30/2025 1245 by Shari Albrecht RN)  Discharge to home or other facility with appropriate resources:   Identify barriers to discharge with patient and caregiver   Arrange for needed discharge resources and transportation as appropriate   Identify discharge learning needs (meds, wound care, etc)     Problem: Pain  Goal: Verbalizes/displays adequate comfort level or baseline comfort level  6/30/2025 2058 by Aubrey Taylor RN  Outcome: Progressing  Flowsheets (Taken 6/30/2025 1245 by Shari Albrecht RN)  Verbalizes/displays adequate comfort level or baseline comfort level:   Encourage patient to monitor pain and request assistance   Assess pain using appropriate pain scale   Administer analgesics based on type and severity of pain and evaluate response  6/30/2025 1305 by Kevin Lizarraga RN  Outcome: Progressing  Flowsheets (Taken 6/30/2025 1245 by Shari Albrecht RN)  Verbalizes/displays adequate comfort level or baseline comfort level:   Encourage patient to monitor pain and request assistance   Assess pain using appropriate pain scale   Administer analgesics based on type and severity of pain and evaluate response     Problem: Safety - Adult  Goal: Free from fall injury  6/30/2025 2058 by Aubrey Taylor RN  Outcome: Progressing  Flowsheets (Taken 6/30/2025 2058)  Free From Fall Injury: Instruct family/caregiver on patient safety  6/30/2025 1305 by Kevin Lizarraga RN  Outcome: Progressing     Problem: ABCDS Injury Assessment  Goal: Absence of physical injury  6/30/2025 2058 by Aubrey Taylor RN  Outcome: Progressing  Flowsheets (Taken 6/30/2025 2058)  Absence of Physical Injury: Implement safety measures based on patient assessment  6/30/2025 1305 by Kevin Lizarraga RN  Outcome:  Yes Moderate Risk High Risk

## 2025-08-28 ENCOUNTER — HOSPITAL ENCOUNTER (OUTPATIENT)
Facility: HOSPITAL | Age: 70
Setting detail: SPECIMEN
Discharge: HOME | End: 2025-08-31
Payer: MEDICARE

## 2025-08-28 LAB
ALBUMIN SERPL-MCNC: 2.8 G/DL (ref 3.4–5)
ALBUMIN/GLOB SERPL: 0.8 (ref 0.8–1.7)
ALP SERPL-CCNC: 78 U/L (ref 45–117)
ALT SERPL-CCNC: 28 U/L (ref 10–35)
ANION GAP SERPL CALC-SCNC: 11 MMOL/L (ref 3–18)
AST SERPL-CCNC: 29 U/L (ref 10–38)
BASOPHILS # BLD: 0.07 K/UL (ref 0–0.1)
BASOPHILS NFR BLD: 0.8 % (ref 0–2)
BILIRUB SERPL-MCNC: 0.3 MG/DL (ref 0.2–1)
BUN SERPL-MCNC: 16 MG/DL (ref 6–23)
BUN/CREAT SERPL: 20 (ref 12–20)
CALCIUM SERPL-MCNC: 9.4 MG/DL (ref 8.5–10.1)
CHLORIDE SERPL-SCNC: 100 MMOL/L (ref 98–107)
CK SERPL-CCNC: 35 U/L (ref 26–192)
CO2 SERPL-SCNC: 29 MMOL/L (ref 21–32)
CREAT SERPL-MCNC: 0.78 MG/DL (ref 0.6–1.3)
CRP SERPL-MCNC: 0.9 MG/DL (ref 0–5)
DIFFERENTIAL METHOD BLD: ABNORMAL
EOSINOPHIL # BLD: 0.15 K/UL (ref 0–0.4)
EOSINOPHIL NFR BLD: 1.7 % (ref 0–5)
ERYTHROCYTE [DISTWIDTH] IN BLOOD BY AUTOMATED COUNT: 15.9 % (ref 11.6–14.5)
GLOBULIN SER CALC-MCNC: 3.5 G/DL (ref 2–4)
GLUCOSE SERPL-MCNC: 123 MG/DL (ref 74–108)
HCT VFR BLD AUTO: 29.4 % (ref 35–45)
HGB BLD-MCNC: 8.9 G/DL (ref 12–16)
IMM GRANULOCYTES # BLD AUTO: 0.03 K/UL (ref 0–0.04)
IMM GRANULOCYTES NFR BLD AUTO: 0.3 % (ref 0–0.5)
LYMPHOCYTES # BLD: 1.6 K/UL (ref 0.9–3.3)
LYMPHOCYTES NFR BLD: 18.1 % (ref 21–52)
MCH RBC QN AUTO: 28.1 PG (ref 24–34)
MCHC RBC AUTO-ENTMCNC: 30.3 G/DL (ref 31–37)
MCV RBC AUTO: 92.7 FL (ref 78–100)
MONOCYTES # BLD: 0.6 K/UL (ref 0.05–1.2)
MONOCYTES NFR BLD: 6.8 % (ref 3–10)
NEUTS SEG # BLD: 6.37 K/UL (ref 1.8–8)
NEUTS SEG NFR BLD: 72.3 % (ref 40–73)
NRBC # BLD: 0 K/UL (ref 0–0.01)
NRBC BLD-RTO: 0 PER 100 WBC
PLATELET # BLD AUTO: 319 K/UL (ref 135–420)
PMV BLD AUTO: 10.7 FL (ref 9.2–11.8)
POTASSIUM SERPL-SCNC: 4.4 MMOL/L (ref 3.5–5.5)
PROT SERPL-MCNC: 6.3 G/DL (ref 6.4–8.2)
RBC # BLD AUTO: 3.17 M/UL (ref 4.2–5.3)
SODIUM SERPL-SCNC: 140 MMOL/L (ref 136–145)
WBC # BLD AUTO: 8.8 K/UL (ref 4.6–13.2)

## 2025-08-28 PROCEDURE — 86140 C-REACTIVE PROTEIN: CPT

## 2025-08-28 PROCEDURE — 85025 COMPLETE CBC W/AUTO DIFF WBC: CPT

## 2025-08-28 PROCEDURE — 80053 COMPREHEN METABOLIC PANEL: CPT

## 2025-08-28 PROCEDURE — 82550 ASSAY OF CK (CPK): CPT

## 2025-08-29 LAB
FAX TO NUMBER: NORMAL
TEST RESULTS FAXED TO: NORMAL

## 2025-09-02 ENCOUNTER — HOSPITAL ENCOUNTER (OUTPATIENT)
Facility: HOSPITAL | Age: 70
Setting detail: SPECIMEN
Discharge: HOME | End: 2025-09-05
Payer: MEDICARE

## 2025-09-02 LAB
ALBUMIN SERPL-MCNC: 2.9 G/DL (ref 3.4–5)
ALBUMIN/GLOB SERPL: 0.7 (ref 0.8–1.7)
ALP SERPL-CCNC: 99 U/L (ref 45–117)
ALT SERPL-CCNC: 24 U/L (ref 10–35)
ANION GAP SERPL CALC-SCNC: 7 MMOL/L (ref 3–18)
AST SERPL-CCNC: 28 U/L (ref 10–38)
BASOPHILS # BLD: 0.03 K/UL (ref 0–0.1)
BASOPHILS NFR BLD: 0.5 % (ref 0–2)
BILIRUB SERPL-MCNC: 0.2 MG/DL (ref 0.2–1)
BUN SERPL-MCNC: 11 MG/DL (ref 6–23)
BUN/CREAT SERPL: 17 (ref 12–20)
CALCIUM SERPL-MCNC: 9.4 MG/DL (ref 8.5–10.1)
CHLORIDE SERPL-SCNC: 101 MMOL/L (ref 98–107)
CK SERPL-CCNC: 36 U/L (ref 26–192)
CO2 SERPL-SCNC: 31 MMOL/L (ref 21–32)
CREAT SERPL-MCNC: 0.65 MG/DL (ref 0.6–1.3)
CRP SERPL-MCNC: 5.9 MG/DL (ref 0–5)
DIFFERENTIAL METHOD BLD: ABNORMAL
EOSINOPHIL # BLD: 0.1 K/UL (ref 0–0.4)
EOSINOPHIL NFR BLD: 1.5 % (ref 0–5)
ERYTHROCYTE [DISTWIDTH] IN BLOOD BY AUTOMATED COUNT: 16.1 % (ref 11.6–14.5)
GLOBULIN SER CALC-MCNC: 3.9 G/DL (ref 2–4)
GLUCOSE SERPL-MCNC: 114 MG/DL (ref 74–108)
HCT VFR BLD AUTO: 31.2 % (ref 35–45)
HGB BLD-MCNC: 9.4 G/DL (ref 12–16)
IMM GRANULOCYTES # BLD AUTO: 0.02 K/UL (ref 0–0.04)
IMM GRANULOCYTES NFR BLD AUTO: 0.3 % (ref 0–0.5)
LYMPHOCYTES # BLD: 1.17 K/UL (ref 0.9–3.3)
LYMPHOCYTES NFR BLD: 17.9 % (ref 21–52)
MCH RBC QN AUTO: 28.3 PG (ref 24–34)
MCHC RBC AUTO-ENTMCNC: 30.1 G/DL (ref 31–37)
MCV RBC AUTO: 94 FL (ref 78–100)
MONOCYTES # BLD: 0.56 K/UL (ref 0.05–1.2)
MONOCYTES NFR BLD: 8.6 % (ref 3–10)
NEUTS SEG # BLD: 4.64 K/UL (ref 1.8–8)
NEUTS SEG NFR BLD: 71.2 % (ref 40–73)
NRBC # BLD: 0 K/UL (ref 0–0.01)
NRBC BLD-RTO: 0 PER 100 WBC
PLATELET # BLD AUTO: 280 K/UL (ref 135–420)
PMV BLD AUTO: 10.5 FL (ref 9.2–11.8)
POTASSIUM SERPL-SCNC: 4.6 MMOL/L (ref 3.5–5.5)
PROT SERPL-MCNC: 6.8 G/DL (ref 6.4–8.2)
RBC # BLD AUTO: 3.32 M/UL (ref 4.2–5.3)
SODIUM SERPL-SCNC: 139 MMOL/L (ref 136–145)
WBC # BLD AUTO: 6.5 K/UL (ref 4.6–13.2)

## 2025-09-02 PROCEDURE — 85025 COMPLETE CBC W/AUTO DIFF WBC: CPT

## 2025-09-02 PROCEDURE — 80053 COMPREHEN METABOLIC PANEL: CPT

## 2025-09-02 PROCEDURE — 86140 C-REACTIVE PROTEIN: CPT

## 2025-09-02 PROCEDURE — 82550 ASSAY OF CK (CPK): CPT

## 2025-09-03 ENCOUNTER — ANESTHESIA EVENT (OUTPATIENT)
Facility: HOSPITAL | Age: 70
End: 2025-09-03
Payer: MEDICARE

## 2025-09-03 ENCOUNTER — ANESTHESIA (OUTPATIENT)
Facility: HOSPITAL | Age: 70
End: 2025-09-03
Payer: MEDICARE

## 2025-09-03 PROBLEM — R78.81 MRSA BACTEREMIA: Status: ACTIVE | Noted: 2025-09-03

## 2025-09-03 PROBLEM — B95.62 MRSA BACTEREMIA: Status: ACTIVE | Noted: 2025-09-03

## 2025-09-03 PROBLEM — J96.01 ACUTE HYPOXIC RESPIRATORY FAILURE (HCC): Status: ACTIVE | Noted: 2025-09-03

## 2025-09-03 PROBLEM — J96.21 ACUTE ON CHRONIC HYPOXIC RESPIRATORY FAILURE (HCC): Status: ACTIVE | Noted: 2025-09-03

## 2025-09-03 PROBLEM — E44.0 MODERATE MALNUTRITION: Status: ACTIVE | Noted: 2025-09-03

## 2025-09-03 PROBLEM — Z86.19: Status: ACTIVE | Noted: 2025-06-29

## 2025-09-03 PROBLEM — T82.898A OCCLUDED PICC LINE: Status: ACTIVE | Noted: 2025-09-03

## 2025-09-03 LAB
FAX TO NUMBER: NORMAL
TEST RESULTS FAXED TO: NORMAL

## 2025-09-03 PROCEDURE — 31500 INSERT EMERGENCY AIRWAY: CPT | Performed by: NURSE ANESTHETIST, CERTIFIED REGISTERED

## (undated) DEVICE — BANDAGE COMPR W4INXL10YD WHITE/BEIGE E MTRX HK LOOP CLSR

## (undated) DEVICE — APPLICATOR MEDICATED 26 CC SOLUTION HI LT ORNG CHLORAPREP

## (undated) DEVICE — C-ARMOR C-ARM EQUIPMENT COVERS CLEAR STERILE UNIVERSAL FIT 12 PER CASE: Brand: C-ARMOR

## (undated) DEVICE — Device

## (undated) DEVICE — SOLUTION IRRIG 3000ML 0.9% SOD CHL FLX CONT 0797208] ICU MEDICAL INC]

## (undated) DEVICE — SUT VCRL + 0 27IN CT2 UD --

## (undated) DEVICE — SHEET,DRAPE,40X58,STERILE: Brand: MEDLINE

## (undated) DEVICE — GLOVE SURG SZ 65 THK91MIL LTX FREE SYN POLYISOPRENE

## (undated) DEVICE — 2108 SERIES SAGITTAL BLADE, OFFSET (12.4 X 1.24 X 73.7MM)

## (undated) DEVICE — 3M™ STERI-DRAPE™ U-DRAPE 1015: Brand: STERI-DRAPE™

## (undated) DEVICE — DRESSING ALG W4XL8IN AG FOAM SUPERABSORBENT SIL ANTIMIC

## (undated) DEVICE — SUTURE FIBERWIRE SZ 2 W/ TAPERED NEEDLE BLUE L38IN NONABSORB BLU L26.5MM 1/2 CIRCLE AR7200

## (undated) DEVICE — HANDPIECE SET WITH HIGH FLOW TIP AND SUCTION TUBE: Brand: INTERPULSE

## (undated) DEVICE — STERILE POLYISOPRENE POWDER-FREE SURGICAL GLOVES: Brand: PROTEXIS

## (undated) DEVICE — SUTURE MONOCRYL SZ 3-0 L27IN ABSRB UD PS-2 3/8 CIR REV CUT NDL MCP427H

## (undated) DEVICE — SUT MONOCRYL PLUS UD 3-0 --

## (undated) DEVICE — SUTURE MCRYL SZ 2-0 L36IN ABSRB UD L36MM CT-1 1/2 CIR Y945H

## (undated) DEVICE — IMMOB SHLDR SPEC MED --

## (undated) DEVICE — SOL IRRIGATION INJ NACL 0.9% 500ML BTL

## (undated) DEVICE — BIT DRL KIT SHLDR 2MM/3.2MM -- DISP EQUINOXE

## (undated) DEVICE — DRESSING,GAUZE,XEROFORM,CURAD,1"X8",ST: Brand: CURAD

## (undated) DEVICE — PREP SKN CHLRAPRP APL 26ML STR --

## (undated) DEVICE — UNTHREADED GUIDE WIRE: Brand: FIXOS

## (undated) DEVICE — SOLUTION IRRIG 500ML 0.9% SOD CHLO USP POUR PLAS BTL

## (undated) DEVICE — BANDAGE,GAUZE,BULKEE II,4.5"X4.1YD,STRL: Brand: MEDLINE

## (undated) DEVICE — MAYO STAND COVER: Brand: CONVERTORS

## (undated) DEVICE — GLOVE ORANGE PI 8 1/2   MSG9085

## (undated) DEVICE — DRESSING FOAM POST OPERATIVE 20X10 CM MEPLIX BORDER

## (undated) DEVICE — SYSTEM SKIN CLSR 22CM DERMBND PRINEO

## (undated) DEVICE — GOWN,SIRUS,NONRNF,SETINSLV,2XL,18/CS: Brand: MEDLINE

## (undated) DEVICE — STERILE POLYISOPRENE POWDER-FREE SURGICAL GLOVES WITH EMOLLIENT COATING: Brand: PROTEXIS

## (undated) DEVICE — OPTIFOAM GENTLE SA, POSTOP, 4X8: Brand: MEDLINE

## (undated) DEVICE — DRAPE C ARM UNIV W41XL74IN CLR PLAS XR VELC CLSR POLY STRP

## (undated) DEVICE — SYRINGE MED 30ML STD CLR PLAS LUERLOCK TIP N CTRL DISP

## (undated) DEVICE — TOWEL,OR,DSP,ST,BLUE,STD,4/PK,20PK/CS: Brand: MEDLINE

## (undated) DEVICE — MASTISOL ADHESIVE LIQ 2/3ML

## (undated) DEVICE — BLADE ES L6IN ELASTOMERIC COAT EXT DURABLE BEND UPTO 90DEG

## (undated) DEVICE — ELECTRODE PT RET AD L9FT HI MOIST COND ADH HYDRGEL CORDED

## (undated) DEVICE — STRIP SKIN CLSR W0.25XL4IN WHT SPUNBOUND FBR NYL HI ADH

## (undated) DEVICE — GARMENT,MEDLINE,DVT,INT,CALF,MED, GEN2: Brand: MEDLINE

## (undated) DEVICE — Z DISCONTINUED USE 2272124 DRAPE SURG XL N INVASIVE 2 LAYR DISP

## (undated) DEVICE — PACK PROCEDURE SURG TOT SHLDR CUST

## (undated) DEVICE — CANNULATED DRILL

## (undated) DEVICE — OSCILLATING TIP SAW CARTRIDGE: Brand: PRECISION FALCON

## (undated) DEVICE — HOOD, PEEL-AWAY: Brand: FLYTE

## (undated) DEVICE — SUTURE MONOCRYL SZ 2-0 L36IN ABSRB UD L36MM CT-1 1/2 CIR Y945H

## (undated) DEVICE — GLOVE SURG SZ 7 L12IN FNGR THK79MIL GRN LTX FREE

## (undated) DEVICE — BLADE,CARBON-STEEL,15,STRL,DISPOSABLE,TB: Brand: MEDLINE

## (undated) DEVICE — DRESSING FOAM 4X8IN DISP POSTOP MEPILEX BORD AG

## (undated) DEVICE — DRILL BIT, AO DIA2.6MM X 135MM, SCALED: Brand: VARIAX

## (undated) DEVICE — GLOVE ORANGE PI 7   MSG9070

## (undated) DEVICE — SOLUTION IRRIGATION LR 3000 ML USP TITAN XL CONTAINER 4/CA

## (undated) DEVICE — ADHESIVE SKIN CLOSURE WND 8.661X1.5 IN 22 CM LIQUIBAND SECUR

## (undated) DEVICE — SUTURE MONOCRYL + SZ 4-0 L27IN ABSRB UD L19MM PS-2 3/8 CIR MCP426H

## (undated) DEVICE — BLADE ELECTRODE: Brand: EDGE

## (undated) DEVICE — PADDING,UNDERCAST,COTTON, 4"X4YD STERILE: Brand: MEDLINE

## (undated) DEVICE — GLOVE ORTHO 8   MSG9480

## (undated) DEVICE — SUTURE ETHILON SZ 4-0 L18IN NONABSORBABLE BLK L19MM PC-5 3/8 1894G